# Patient Record
Sex: MALE | Race: WHITE | Employment: OTHER | URBAN - METROPOLITAN AREA
[De-identification: names, ages, dates, MRNs, and addresses within clinical notes are randomized per-mention and may not be internally consistent; named-entity substitution may affect disease eponyms.]

---

## 2020-10-15 ENCOUNTER — APPOINTMENT (OUTPATIENT)
Dept: CT IMAGING | Age: 45
End: 2020-10-15
Attending: EMERGENCY MEDICINE
Payer: SUBSIDIZED

## 2020-10-15 ENCOUNTER — HOSPITAL ENCOUNTER (EMERGENCY)
Age: 45
Discharge: HOME | End: 2020-10-15
Attending: EMERGENCY MEDICINE
Payer: SUBSIDIZED

## 2020-10-15 VITALS
OXYGEN SATURATION: 99 % | SYSTOLIC BLOOD PRESSURE: 145 MMHG | HEIGHT: 71 IN | HEART RATE: 72 BPM | TEMPERATURE: 98.1 F | BODY MASS INDEX: 26.6 KG/M2 | RESPIRATION RATE: 16 BRPM | WEIGHT: 190 LBS | DIASTOLIC BLOOD PRESSURE: 78 MMHG

## 2020-10-15 DIAGNOSIS — N20.0 KIDNEY STONE: ICD-10-CM

## 2020-10-15 DIAGNOSIS — D49.0 GASTRIC TUMOR: Primary | ICD-10-CM

## 2020-10-15 LAB
ALBUMIN SERPL-MCNC: 3.8 G/DL (ref 3.5–5)
ALBUMIN/GLOB SERPL: 1 {RATIO} (ref 1.2–3.5)
ALP SERPL-CCNC: 102 U/L (ref 50–136)
ALT SERPL-CCNC: 34 U/L (ref 12–65)
ANION GAP SERPL CALC-SCNC: 11 MMOL/L (ref 7–16)
AST SERPL-CCNC: 47 U/L (ref 15–37)
BACTERIA URNS QL MICRO: ABNORMAL /HPF
BASOPHILS # BLD: 0 K/UL (ref 0–0.2)
BASOPHILS NFR BLD: 0 % (ref 0–2)
BILIRUB SERPL-MCNC: 0.9 MG/DL (ref 0.2–1.1)
BUN SERPL-MCNC: 16 MG/DL (ref 6–23)
CALCIUM SERPL-MCNC: 9.6 MG/DL (ref 8.3–10.4)
CASTS URNS QL MICRO: 0 /LPF
CHLORIDE SERPL-SCNC: 101 MMOL/L (ref 98–107)
CO2 SERPL-SCNC: 25 MMOL/L (ref 21–32)
CREAT SERPL-MCNC: 1.1 MG/DL (ref 0.8–1.5)
CRYSTALS URNS QL MICRO: 0 /LPF
DIFFERENTIAL METHOD BLD: ABNORMAL
EOSINOPHIL # BLD: 0 K/UL (ref 0–0.8)
EOSINOPHIL NFR BLD: 0 % (ref 0.5–7.8)
EPI CELLS #/AREA URNS HPF: 0 /HPF
ERYTHROCYTE [DISTWIDTH] IN BLOOD BY AUTOMATED COUNT: 11.9 % (ref 11.9–14.6)
GLOBULIN SER CALC-MCNC: 3.9 G/DL (ref 2.3–3.5)
GLUCOSE SERPL-MCNC: 110 MG/DL (ref 65–100)
HCT VFR BLD AUTO: 40.5 % (ref 41.1–50.3)
HGB BLD-MCNC: 14.2 G/DL (ref 13.6–17.2)
IMM GRANULOCYTES # BLD AUTO: 0.1 K/UL (ref 0–0.5)
IMM GRANULOCYTES NFR BLD AUTO: 0 % (ref 0–5)
LYMPHOCYTES # BLD: 0.7 K/UL (ref 0.5–4.6)
LYMPHOCYTES NFR BLD: 6 % (ref 13–44)
MCH RBC QN AUTO: 29 PG (ref 26.1–32.9)
MCHC RBC AUTO-ENTMCNC: 35.1 G/DL (ref 31.4–35)
MCV RBC AUTO: 82.7 FL (ref 79.6–97.8)
MONOCYTES # BLD: 0.8 K/UL (ref 0.1–1.3)
MONOCYTES NFR BLD: 7 % (ref 4–12)
MUCOUS THREADS URNS QL MICRO: ABNORMAL /LPF
NEUTS SEG # BLD: 10 K/UL (ref 1.7–8.2)
NEUTS SEG NFR BLD: 86 % (ref 43–78)
NRBC # BLD: 0 K/UL (ref 0–0.2)
PLATELET # BLD AUTO: 388 K/UL (ref 150–450)
PMV BLD AUTO: 9.8 FL (ref 9.4–12.3)
POTASSIUM SERPL-SCNC: 3.6 MMOL/L (ref 3.5–5.1)
PROT SERPL-MCNC: 7.7 G/DL (ref 6.3–8.2)
RBC # BLD AUTO: 4.9 M/UL (ref 4.23–5.6)
RBC #/AREA URNS HPF: >100 /HPF
SODIUM SERPL-SCNC: 137 MMOL/L (ref 136–145)
WBC # BLD AUTO: 11.6 K/UL (ref 4.3–11.1)
WBC URNS QL MICRO: ABNORMAL /HPF
YEAST URNS QL MICRO: ABNORMAL

## 2020-10-15 PROCEDURE — 85025 COMPLETE CBC W/AUTO DIFF WBC: CPT

## 2020-10-15 PROCEDURE — 99283 EMERGENCY DEPT VISIT LOW MDM: CPT

## 2020-10-15 PROCEDURE — 80053 COMPREHEN METABOLIC PANEL: CPT

## 2020-10-15 PROCEDURE — 96374 THER/PROPH/DIAG INJ IV PUSH: CPT

## 2020-10-15 PROCEDURE — 74011250637 HC RX REV CODE- 250/637: Performed by: EMERGENCY MEDICINE

## 2020-10-15 PROCEDURE — 74176 CT ABD & PELVIS W/O CONTRAST: CPT

## 2020-10-15 PROCEDURE — 81015 MICROSCOPIC EXAM OF URINE: CPT

## 2020-10-15 PROCEDURE — 74011250636 HC RX REV CODE- 250/636: Performed by: EMERGENCY MEDICINE

## 2020-10-15 PROCEDURE — 96375 TX/PRO/DX INJ NEW DRUG ADDON: CPT

## 2020-10-15 PROCEDURE — 99284 EMERGENCY DEPT VISIT MOD MDM: CPT

## 2020-10-15 RX ORDER — HYDROMORPHONE HYDROCHLORIDE 1 MG/ML
1 INJECTION, SOLUTION INTRAMUSCULAR; INTRAVENOUS; SUBCUTANEOUS
Status: COMPLETED | OUTPATIENT
Start: 2020-10-15 | End: 2020-10-15

## 2020-10-15 RX ORDER — OXYCODONE AND ACETAMINOPHEN 7.5; 325 MG/1; MG/1
1 TABLET ORAL
Status: COMPLETED | OUTPATIENT
Start: 2020-10-15 | End: 2020-10-15

## 2020-10-15 RX ORDER — ONDANSETRON 8 MG/1
8 TABLET, ORALLY DISINTEGRATING ORAL
Status: COMPLETED | OUTPATIENT
Start: 2020-10-15 | End: 2020-10-15

## 2020-10-15 RX ORDER — ONDANSETRON 4 MG/1
4 TABLET, ORALLY DISINTEGRATING ORAL
Qty: 15 TAB | Refills: 0 | Status: SHIPPED | OUTPATIENT
Start: 2020-10-15 | End: 2021-05-05

## 2020-10-15 RX ORDER — OXYCODONE AND ACETAMINOPHEN 5; 325 MG/1; MG/1
1 TABLET ORAL
Qty: 15 TAB | Refills: 0 | Status: SHIPPED | OUTPATIENT
Start: 2020-10-15 | End: 2020-10-20

## 2020-10-15 RX ORDER — KETOROLAC TROMETHAMINE 30 MG/ML
30 INJECTION, SOLUTION INTRAMUSCULAR; INTRAVENOUS
Status: COMPLETED | OUTPATIENT
Start: 2020-10-15 | End: 2020-10-15

## 2020-10-15 RX ADMIN — HYDROMORPHONE HYDROCHLORIDE 1 MG: 1 INJECTION, SOLUTION INTRAMUSCULAR; INTRAVENOUS; SUBCUTANEOUS at 14:39

## 2020-10-15 RX ADMIN — KETOROLAC TROMETHAMINE 30 MG: 30 INJECTION, SOLUTION INTRAMUSCULAR at 16:48

## 2020-10-15 RX ADMIN — ONDANSETRON 8 MG: 8 TABLET, ORALLY DISINTEGRATING ORAL at 13:41

## 2020-10-15 RX ADMIN — SODIUM CHLORIDE 1000 ML: 900 INJECTION, SOLUTION INTRAVENOUS at 14:39

## 2020-10-15 RX ADMIN — OXYCODONE HYDROCHLORIDE AND ACETAMINOPHEN 1 TABLET: 7.5; 325 TABLET ORAL at 16:32

## 2020-10-15 NOTE — ED NOTES
I have reviewed discharge instructions with the patient. The patient verbalized understanding. Patient left ED via Discharge Method: ambulatory to Home with spouse. Opportunity for questions and clarification provided. Patient given 2 scripts. To continue your aftercare when you leave the hospital, you may receive an automated call from our care team to check in on how you are doing. This is a free service and part of our promise to provide the best care and service to meet your aftercare needs.  If you have questions, or wish to unsubscribe from this service please call [phone]. Thank you for Choosing our [facility] Emergency Department.

## 2020-10-15 NOTE — ED PROVIDER NOTES
Mask was worn during the entire patient examination. Darshana Clark is a 41 y.o. male who presents to the ED with a chief complaint of right flank pain. Patient states that started just couple hours ago and has been constant since. Is mainly in the right lower flank and right lower quadrant. Pain is a 7 out of 10. No radiation anywhere else. He then began to have gross hematuria. He states he has never had a kidney stone before. He denies any fevers or chills. Flank Pain    Associated symptoms include abdominal pain. Pertinent negatives include no chest pain, no fever and no dysuria. History reviewed. No pertinent past medical history. History reviewed. No pertinent surgical history. History reviewed. No pertinent family history.     Social History     Socioeconomic History    Marital status:      Spouse name: Not on file    Number of children: Not on file    Years of education: Not on file    Highest education level: Not on file   Occupational History    Not on file   Social Needs    Financial resource strain: Not on file    Food insecurity     Worry: Not on file     Inability: Not on file    Transportation needs     Medical: Not on file     Non-medical: Not on file   Tobacco Use    Smoking status: Never Smoker    Smokeless tobacco: Never Used   Substance and Sexual Activity    Alcohol use: Yes     Comment: occasional    Drug use: Never    Sexual activity: Not on file   Lifestyle    Physical activity     Days per week: Not on file     Minutes per session: Not on file    Stress: Not on file   Relationships    Social connections     Talks on phone: Not on file     Gets together: Not on file     Attends [employer] service: Not on file     Active member of club or organization: Not on file     Attends meetings of clubs or organizations: Not on file     Relationship status: Not on file    Intimate partner violence     Fear of current or ex partner: Not on file Emotionally abused: Not on file     Physically abused: Not on file     Forced sexual activity: Not on file   Other Topics Concern    Not on file   Social History Narrative    Not on file         ALLERGIES: Patient has no known allergies. Review of Systems   Constitutional: Negative. Negative for chills, fatigue and fever. Respiratory: Negative for chest tightness, shortness of breath, wheezing and stridor. Cardiovascular: Negative for chest pain, palpitations and leg swelling. Gastrointestinal: Positive for abdominal pain. Negative for diarrhea, nausea and vomiting. Genitourinary: Positive for flank pain, frequency and hematuria. Negative for difficulty urinating, dysuria, enuresis and genital sores. Musculoskeletal: Negative for arthralgias, joint swelling, myalgias, neck pain and neck stiffness. Skin: Negative for color change, rash and wound. Psychiatric/Behavioral: Negative for agitation. All other systems reviewed and are negative. Vitals:    10/15/20 1331   BP: (!) 145/78   Pulse: 72   Resp: 16   Temp: 98.1 °F (36.7 °C)   SpO2: 99%   Weight: 86.2 kg (190 lb)   Height: 5' 11\" (1.803 m)            Physical Exam  Vitals signs and nursing note reviewed. Constitutional:       General: He is not in acute distress. Appearance: He is well-developed. He is not ill-appearing, toxic-appearing or diaphoretic. HENT:      Head: Normocephalic and atraumatic. Eyes:      General: No scleral icterus. Conjunctiva/sclera: Conjunctivae normal.   Neck:      Musculoskeletal: Normal range of motion and neck supple. Trachea: No tracheal deviation. Cardiovascular:      Rate and Rhythm: Normal rate and regular rhythm. Pulmonary:      Effort: Pulmonary effort is normal. No respiratory distress. Breath sounds: No stridor. No wheezing, rhonchi or rales. Chest:      Chest wall: No tenderness. Abdominal:      Tenderness:  There is abdominal tenderness (right sided abdominal and flank pain is not reproduciple on reevaluation. ). There is no guarding or rebound. Hernia: No hernia is present. Skin:     Capillary Refill: Capillary refill takes less than 2 seconds. Neurological:      General: No focal deficit present. Mental Status: He is alert and oriented to person, place, and time. Mental status is at baseline. Cranial Nerves: No cranial nerve deficit. Sensory: No sensory deficit. Motor: No weakness. Coordination: Coordination normal.   Psychiatric:         Mood and Affect: Mood normal.         Behavior: Behavior normal.          MDM  Number of Diagnoses or Management Options  Gastric tumor:   Kidney stone:   Diagnosis management comments: Patient looks like he has recently passed right ureteral stone. Unfortunately looks like he has incidental finding of stomach cancer with metastasis to the liver. He does report some weight loss and some trouble swallowing. I have called oncology and discussed case with Dr. Xavier Bai he will see him on Monday at 1020 in the morning. I have also spoken with his family on the phone and we have discussed all of there current questions. Aime Roberts MD; 10/15/2020 @4:51 PM Voice dictation software was used during the making of this note. This software is not perfect and grammatical and other typographical errors may be present.   This note has not been proofread for errors.  ===================================================================          Amount and/or Complexity of Data Reviewed  Clinical lab tests: ordered and reviewed (Results for orders placed or performed during the hospital encounter of 10/15/20  -CBC WITH AUTOMATED DIFF       Result                      Value             Ref Range           WBC                         11.6 (H)          4.3 - 11.1 K*       RBC                         4.90              4.23 - 5.6 M*       HGB                         14.2              13.6 - 17.2 *       HCT 40.5 (L)          41.1 - 50.3 %       MCV                         82.7              79.6 - 97.8 *       MCH                         29.0              26.1 - 32.9 *       MCHC                        35.1 (H)          31.4 - 35.0 *       RDW                         11.9              11.9 - 14.6 %       PLATELET                    388               150 - 450 K/*       MPV                         9.8               9.4 - 12.3 FL       ABSOLUTE NRBC               0.00              0.0 - 0.2 K/*       DF                          AUTOMATED                             NEUTROPHILS                 86 (H)            43 - 78 %           LYMPHOCYTES                 6 (L)             13 - 44 %           MONOCYTES                   7                 4.0 - 12.0 %        EOSINOPHILS                 0 (L)             0.5 - 7.8 %         BASOPHILS                   0                 0.0 - 2.0 %         IMMATURE GRANULOCYTES       0                 0.0 - 5.0 %         ABS. NEUTROPHILS            10.0 (H)          1.7 - 8.2 K/*       ABS. LYMPHOCYTES            0.7               0.5 - 4.6 K/*       ABS. MONOCYTES              0.8               0.1 - 1.3 K/*       ABS. EOSINOPHILS            0.0               0.0 - 0.8 K/*       ABS. BASOPHILS              0.0               0.0 - 0.2 K/*       ABS. IMM.  GRANS.            0.1               0.0 - 0.5 K/*  -METABOLIC PANEL, COMPREHENSIVE       Result                      Value             Ref Range           Sodium                      137               136 - 145 mm*       Potassium                   3.6               3.5 - 5.1 mm*       Chloride                    101               98 - 107 mmo*       CO2                         25                21 - 32 mmol*       Anion gap                   11                7 - 16 mmol/L       Glucose                     110 (H)           65 - 100 mg/*       BUN                         16                6 - 23 MG/DL        Creatinine 1.10              0.8 - 1.5 MG*       GFR est AA                  >60               >60 ml/min/1*       GFR est non-AA              >60               >60 ml/min/1*       Calcium                     9.6               8.3 - 10.4 M*       Bilirubin, total            0.9               0.2 - 1.1 MG*       ALT (SGPT)                  34                12 - 65 U/L         AST (SGOT)                  47 (H)            15 - 37 U/L         Alk. phosphatase            102               50 - 136 U/L        Protein, total              7.7               6.3 - 8.2 g/*       Albumin                     3.8               3.5 - 5.0 g/*       Globulin                    3.9 (H)           2.3 - 3.5 g/*       A-G Ratio                   1.0 (L)           1.2 - 3.5      -URINE MICROSCOPIC       Result                      Value             Ref Range           WBC                         5-10              0 /hpf              RBC                         >100              0 /hpf              Epithelial cells            0                 0 /hpf              Bacteria                    1+ (H)            0 /hpf              Casts                       0                 0 /lpf              Crystals, urine             0                 0 /LPF              Mucus                       TRACE             0 /lpf              Yeast                       OCCASIONAL                      )  Tests in the radiology section of CPT®: ordered and reviewed (Ct Urogram Wo Cont    Result Date: 10/15/2020  CT OF THE ABDOMEN AND PELVIS WITHOUT IV CONTRAST INDICATION: right flank. COMPARISON: None available. TECHNIQUE: Multiple axial images were obtained through the abdomen and pelvis without IV contrast. Radiation dose reduction techniques were used for this study. Our CT scanners use one or all of the following: Automated exposure control, adjustment of the mA and/or kV according to patient size, iterative reconstruction.  FINDINGS: Visualized thorax: Normal. Liver: The liver demonstrates multiple large hypoattenuating masses, the largest centered in segment 6 measuring 10.0 x 8.7 cm. Partially imaged lesion in the left hepatic lobe measuring 6.6 x 2.8 cm. Gallbladder/biliary: Normal gallbladder. No biliary dilatation. Pancreas: Normal. Spleen: Normal. Adrenals: Normal. Kidneys: Small bilateral nonobstructing renal calculi measuring up to 4 mm in the right inferior pole. There is mild right hydroureter but no definite radiopaque ureteral stone is evident. A calcification in the right pelvis likely reflects an intravascular phleboliths. Bladder: Normal. Pelvic organs: Unremarkable prostate and seminal vesicles. Gastrointestinal: There is a large mass partially imaged, which appears to be centered along the greater curvature of the stomach, involving the gastric body, fundus, and cardia in likely the gastroesophageal junction. The mass is ill-defined but measures approximately 8.6 x 7.5 cm. The mass invades into the lesser omentum and may focally contacts the left diaphragmatic hellen. Peritoneum/retroperitoneum: No free fluid or worrisome peritoneal nodule. Invasion of the lesser omentum by the gastric mass, as above. Small bilateral fat-containing inguinal hernias. Lymph nodes: Retroperitoneal lymphadenopathy, including a lesser omentum metastatic node measuring 6.4 x 3.7 cm, a 2.2 x 1.5 cm retrocaval lymph node, and a 1.7 cm short axis left para-aortic lymph node. Vessels: Unremarkable noncontrast appearance. Bones/Soft tissues: No aggressive osseous lesion. IMPRESSION: 1. Large mass centered in the lesser curvature the stomach measuring up to 8.6 cm invading into the lesser omentum concerning for a primary gastric cancer. 2.  Multiple large hypoattenuating hepatic metastases. 3.  Retroperitoneal metastatic adenopathy. 4.  Small nonobstructing bilateral renal calculi measuring up to 4 mm in the right.  There is mild right hydroureter but no obstructing stone is evident. Findings could indicate a recently passed or nonradiopaque stone.  689 Above findings discussed with Dr. Ishaan Oliver by myself at the time of interpretation.   )  Discuss the patient with other providers: yes           Procedures

## 2020-10-15 NOTE — ED TRIAGE NOTES
Patient with mask on during triage, sharp pain to right flank area and obvious blood in urine. Patient advises pain causing some nausea, states some decrease in urine at this time, brought same in from home and gross blood present.

## 2020-10-15 NOTE — DISCHARGE INSTRUCTIONS
Please followup with Dr. Martell Haynes on Monday at 10:20 as we discussed to work up the stomache tumor we discussed.

## 2020-10-19 ENCOUNTER — HOSPITAL ENCOUNTER (OUTPATIENT)
Dept: LAB | Age: 45
Discharge: HOME | End: 2020-10-19
Payer: SUBSIDIZED

## 2020-10-19 DIAGNOSIS — D49.0 GASTRIC TUMOR: ICD-10-CM

## 2020-10-19 LAB
ALBUMIN SERPL-MCNC: 3.5 G/DL (ref 3.5–5)
ALBUMIN/GLOB SERPL: 0.8 {RATIO} (ref 1.2–3.5)
ALP SERPL-CCNC: 95 U/L (ref 50–136)
ALT SERPL-CCNC: 51 U/L (ref 12–65)
ANION GAP SERPL CALC-SCNC: 5 MMOL/L (ref 7–16)
AST SERPL-CCNC: 51 U/L (ref 15–37)
BASOPHILS # BLD: 0 K/UL (ref 0–0.2)
BASOPHILS NFR BLD: 0 % (ref 0–2)
BILIRUB SERPL-MCNC: 0.6 MG/DL (ref 0.2–1.1)
BUN SERPL-MCNC: 11 MG/DL (ref 6–23)
CALCIUM SERPL-MCNC: 9.2 MG/DL (ref 8.3–10.4)
CANCER AG19-9 SERPL-ACNC: 156.3 U/ML (ref 2–37)
CEA SERPL-MCNC: 21.2 NG/ML (ref 0–3)
CHLORIDE SERPL-SCNC: 101 MMOL/L (ref 98–107)
CO2 SERPL-SCNC: 30 MMOL/L (ref 21–32)
CREAT SERPL-MCNC: 0.9 MG/DL (ref 0.8–1.5)
DIFFERENTIAL METHOD BLD: ABNORMAL
EOSINOPHIL # BLD: 0.1 K/UL (ref 0–0.8)
EOSINOPHIL NFR BLD: 2 % (ref 0.5–7.8)
ERYTHROCYTE [DISTWIDTH] IN BLOOD BY AUTOMATED COUNT: 11.9 % (ref 11.9–14.6)
GLOBULIN SER CALC-MCNC: 4.2 G/DL (ref 2.3–3.5)
GLUCOSE SERPL-MCNC: 94 MG/DL (ref 65–100)
HCT VFR BLD AUTO: 40.8 % (ref 41.1–50.3)
HGB BLD-MCNC: 13.8 G/DL (ref 13.6–17.2)
IMM GRANULOCYTES # BLD AUTO: 0 K/UL (ref 0–0.5)
IMM GRANULOCYTES NFR BLD AUTO: 0 % (ref 0–5)
LYMPHOCYTES # BLD: 1 K/UL (ref 0.5–4.6)
LYMPHOCYTES NFR BLD: 16 % (ref 13–44)
MCH RBC QN AUTO: 28.6 PG (ref 26.1–32.9)
MCHC RBC AUTO-ENTMCNC: 33.8 G/DL (ref 31.4–35)
MCV RBC AUTO: 84.6 FL (ref 79.6–97.8)
MONOCYTES # BLD: 0.9 K/UL (ref 0.1–1.3)
MONOCYTES NFR BLD: 14 % (ref 4–12)
NEUTS SEG # BLD: 4.4 K/UL (ref 1.7–8.2)
NEUTS SEG NFR BLD: 68 % (ref 43–78)
NRBC # BLD: 0 K/UL (ref 0–0.2)
PLATELET # BLD AUTO: 373 K/UL (ref 150–450)
PMV BLD AUTO: 9.8 FL (ref 9.4–12.3)
POTASSIUM SERPL-SCNC: 3.3 MMOL/L (ref 3.5–5.1)
PROT SERPL-MCNC: 7.7 G/DL (ref 6.3–8.2)
RBC # BLD AUTO: 4.82 M/UL (ref 4.23–5.67)
SODIUM SERPL-SCNC: 136 MMOL/L (ref 136–145)
WBC # BLD AUTO: 6.5 K/UL (ref 4.3–11.1)

## 2020-10-19 PROCEDURE — 80053 COMPREHEN METABOLIC PANEL: CPT

## 2020-10-19 PROCEDURE — 86301 IMMUNOASSAY TUMOR CA 19-9: CPT

## 2020-10-19 PROCEDURE — 82378 CARCINOEMBRYONIC ANTIGEN: CPT

## 2020-10-19 PROCEDURE — 85025 COMPLETE CBC W/AUTO DIFF WBC: CPT

## 2020-10-19 PROCEDURE — 36415 COLL VENOUS BLD VENIPUNCTURE: CPT

## 2020-10-20 NOTE — PROGRESS NOTES
I sat down with Mr and Mrs Fang Pedersen per Dr. Geovanni Sagastume request to go over assistance opportunities to help with the financial side of treatment. Mr. Fang Pedersen does not have any insurance. I went over the hospital financial assistance application. I went through the application, income documentation needed, what the application applies to, determination timeframe, and how the effective dates work. I let them know that the would be contacted by [facility] to screen him for SSD, Medicaid, and could help him with [employer] enrollment. I also let them know that our , Luca Holman, would be getting into contact with them about other resources on the home front. I also went over the LPOA document that authorizes a [facility] representative to enroll him into an assistance opportunity. I went over the document with . Jean-Claude Cruz. He agreed to sign it and did. LPOA will be scanned into chart. Patient expressed understanding of the above information and all questions were answered to his satisfaction.

## 2020-10-21 ENCOUNTER — HOSPITAL ENCOUNTER (OUTPATIENT)
Dept: CT IMAGING | Age: 45
Discharge: HOME | End: 2020-10-21
Attending: INTERNAL MEDICINE
Payer: SUBSIDIZED

## 2020-10-21 DIAGNOSIS — D49.0 GASTRIC TUMOR: ICD-10-CM

## 2020-10-21 PROCEDURE — 74177 CT ABD & PELVIS W/CONTRAST: CPT

## 2020-10-21 PROCEDURE — 74011000636 HC RX REV CODE- 636: Performed by: INTERNAL MEDICINE

## 2020-10-21 PROCEDURE — 74011000258 HC RX REV CODE- 258: Performed by: INTERNAL MEDICINE

## 2020-10-21 RX ORDER — SODIUM CHLORIDE 0.9 % (FLUSH) 0.9 %
10 SYRINGE (ML) INJECTION
Status: COMPLETED | OUTPATIENT
Start: 2020-10-21 | End: 2020-10-21

## 2020-10-21 RX ADMIN — DIATRIZOATE MEGLUMINE AND DIATRIZOATE SODIUM 15 ML: 660; 100 LIQUID ORAL; RECTAL at 12:41

## 2020-10-21 RX ADMIN — SODIUM CHLORIDE 100 ML: 900 INJECTION, SOLUTION INTRAVENOUS at 12:41

## 2020-10-21 RX ADMIN — IOPAMIDOL 100 ML: 755 INJECTION, SOLUTION INTRAVENOUS at 12:41

## 2020-10-21 RX ADMIN — Medication 10 ML: at 12:41

## 2020-10-22 ENCOUNTER — HOSPITAL ENCOUNTER (OUTPATIENT)
Dept: ULTRASOUND IMAGING | Age: 45
Discharge: HOME | End: 2020-10-22
Attending: INTERNAL MEDICINE
Payer: SUBSIDIZED

## 2020-10-22 VITALS
HEIGHT: 71 IN | RESPIRATION RATE: 12 BRPM | TEMPERATURE: 99 F | HEART RATE: 75 BPM | OXYGEN SATURATION: 97 % | SYSTOLIC BLOOD PRESSURE: 125 MMHG | BODY MASS INDEX: 26.6 KG/M2 | WEIGHT: 190 LBS | DIASTOLIC BLOOD PRESSURE: 71 MMHG

## 2020-10-22 DIAGNOSIS — D49.0 GASTRIC TUMOR: ICD-10-CM

## 2020-10-22 DIAGNOSIS — R16.0 LIVER MASS: ICD-10-CM

## 2020-10-22 PROCEDURE — 47000 NEEDLE BIOPSY OF LIVER PERQ: CPT

## 2020-10-22 PROCEDURE — 88307 TISSUE EXAM BY PATHOLOGIST: CPT

## 2020-10-22 PROCEDURE — 99152 MOD SED SAME PHYS/QHP 5/>YRS: CPT

## 2020-10-22 PROCEDURE — 74011250636 HC RX REV CODE- 250/636: Performed by: RADIOLOGY

## 2020-10-22 PROCEDURE — 74011000250 HC RX REV CODE- 250: Performed by: RADIOLOGY

## 2020-10-22 PROCEDURE — 77030014007 HC SPNG HEMSTAT J&J -B

## 2020-10-22 RX ORDER — FENTANYL CITRATE 50 UG/ML
25-50 INJECTION, SOLUTION INTRAMUSCULAR; INTRAVENOUS
Status: DISCONTINUED | OUTPATIENT
Start: 2020-10-22 | End: 2020-10-26 | Stop reason: HOSPADM

## 2020-10-22 RX ORDER — MORPHINE SULFATE 10 MG/ML
1 INJECTION, SOLUTION INTRAMUSCULAR; INTRAVENOUS
Status: DISCONTINUED | OUTPATIENT
Start: 2020-10-22 | End: 2020-10-26 | Stop reason: HOSPADM

## 2020-10-22 RX ORDER — HYDROCODONE BITARTRATE AND ACETAMINOPHEN 7.5; 325 MG/1; MG/1
1 TABLET ORAL
Status: DISCONTINUED | OUTPATIENT
Start: 2020-10-22 | End: 2020-10-26 | Stop reason: HOSPADM

## 2020-10-22 RX ORDER — DIPHENHYDRAMINE HYDROCHLORIDE 50 MG/ML
12.5-5 INJECTION, SOLUTION INTRAMUSCULAR; INTRAVENOUS ONCE
Status: COMPLETED | OUTPATIENT
Start: 2020-10-22 | End: 2020-10-22

## 2020-10-22 RX ORDER — IBUPROFEN 200 MG
600 TABLET ORAL
Status: DISCONTINUED | OUTPATIENT
Start: 2020-10-22 | End: 2020-10-26 | Stop reason: HOSPADM

## 2020-10-22 RX ORDER — SODIUM CHLORIDE 9 MG/ML
150 INJECTION, SOLUTION INTRAVENOUS CONTINUOUS
Status: DISCONTINUED | OUTPATIENT
Start: 2020-10-22 | End: 2020-10-23 | Stop reason: HOSPADM

## 2020-10-22 RX ORDER — MIDAZOLAM HYDROCHLORIDE 1 MG/ML
.5-2 INJECTION, SOLUTION INTRAMUSCULAR; INTRAVENOUS
Status: DISCONTINUED | OUTPATIENT
Start: 2020-10-22 | End: 2020-10-26 | Stop reason: HOSPADM

## 2020-10-22 RX ORDER — SODIUM CHLORIDE 9 MG/ML
25 INJECTION, SOLUTION INTRAVENOUS ONCE
Status: COMPLETED | OUTPATIENT
Start: 2020-10-22 | End: 2020-10-22

## 2020-10-22 RX ORDER — LIDOCAINE HYDROCHLORIDE 20 MG/ML
0.2 INJECTION, SOLUTION INFILTRATION; PERINEURAL ONCE
Status: COMPLETED | OUTPATIENT
Start: 2020-10-22 | End: 2020-10-22

## 2020-10-22 RX ADMIN — FENTANYL CITRATE 50 MCG: 50 INJECTION, SOLUTION INTRAMUSCULAR; INTRAVENOUS at 08:25

## 2020-10-22 RX ADMIN — DIPHENHYDRAMINE HYDROCHLORIDE 50 MG: 50 INJECTION, SOLUTION INTRAMUSCULAR; INTRAVENOUS at 08:25

## 2020-10-22 RX ADMIN — FENTANYL CITRATE 50 MCG: 50 INJECTION, SOLUTION INTRAMUSCULAR; INTRAVENOUS at 08:35

## 2020-10-22 RX ADMIN — LIDOCAINE HYDROCHLORIDE 4 MG: 20 INJECTION, SOLUTION INFILTRATION; PERINEURAL at 08:38

## 2020-10-22 RX ADMIN — SODIUM CHLORIDE 25 ML/HR: 900 INJECTION, SOLUTION INTRAVENOUS at 08:20

## 2020-10-22 RX ADMIN — MIDAZOLAM 1 MG: 1 INJECTION INTRAMUSCULAR; INTRAVENOUS at 08:25

## 2020-10-22 RX ADMIN — MIDAZOLAM 1 MG: 1 INJECTION INTRAMUSCULAR; INTRAVENOUS at 08:35

## 2020-10-22 NOTE — H&P
Department of Interventional Radiology  [phone]    History and Physical    Patient:  Weber Rubinstein MRN:  [id number]  SSN:  [id number]    YOB: 1975  Age:  41 y.o. Sex:  male      Primary Care Provider:  None  Referring Physician:  Pebbles Sheets MD    Subjective:     Chief Complaint: biposy    History of the Present Illness: The patient is a 41 y.o. male with newly diagnosed gastric cancer who presents for biopsy of one of the liver lesions. He presented to the ED recently with kidney stone related complaints. CT scan revealed gastric tumor with liver mets. No pain today. Gross hematuria has resolved. NPO. History reviewed. No pertinent past medical history. Past Surgical History:   Procedure Laterality Date    HX ORTHOPAEDIC      ankle    HX TONSILLECTOMY      lazy eye        Review of Systems:    Pertinent items are noted in the History of Present Illness. Prior to Admission medications    Medication Sig Start Date End Date Taking? Authorizing Provider   ondansetron (ZOFRAN ODT) 4 mg disintegrating tablet Take 1 Tab by mouth every eight (8) hours as needed for Nausea.  10/15/20   Atul Grayson MD        No Known Allergies    Family History   Problem Relation Age of Onset    Dementia Mother     Depression Mother     Diabetes Mother    [address] [facility] Schizophrenia Mother      Social History     Tobacco Use    Smoking status: Never Smoker    Smokeless tobacco: Never Used   Substance Use Topics    Alcohol use: Yes     Comment: occasional        Objective:       Physical Examination:    Vitals:    10/22/20 0730   BP: 136/85   Pulse: 80   Resp: 16   Temp: 99 °F (37.2 °C)   SpO2: 95%   Weight: 86.2 kg (190 lb)   Height: 5' 11\" (1.803 m)       Pain Assessment  Pain Intensity 1: 0 (10/22/20 0730)        Patient Stated Pain Goal: 0      HEART: regular rate and rhythm  LUNG: clear to auscultation bilaterally  ABDOMEN: soft, nontender  EXTREMITIES: warm, no edema    Laboratory:     Lab Results   Component Value Date/Time    Sodium 136 10/19/2020 10:38 AM    Sodium 137 10/15/2020 02:41 PM    Potassium 3.3 (L) 10/19/2020 10:38 AM    Potassium 3.6 10/15/2020 02:41 PM    Chloride 101 10/19/2020 10:38 AM    Chloride 101 10/15/2020 02:41 PM    CO2 30 10/19/2020 10:38 AM    CO2 25 10/15/2020 02:41 PM    Anion gap 5 (L) 10/19/2020 10:38 AM    Anion gap 11 10/15/2020 02:41 PM    Glucose 94 10/19/2020 10:38 AM    Glucose 110 (H) 10/15/2020 02:41 PM    BUN 11 10/19/2020 10:38 AM    BUN 16 10/15/2020 02:41 PM    Creatinine 0.90 10/19/2020 10:38 AM    Creatinine 1.10 10/15/2020 02:41 PM    GFR est AA >60 10/19/2020 10:38 AM    GFR est AA >60 10/15/2020 02:41 PM    GFR est non-AA >60 10/19/2020 10:38 AM    GFR est non-AA >60 10/15/2020 02:41 PM    Calcium 9.2 10/19/2020 10:38 AM    Calcium 9.6 10/15/2020 02:41 PM    Albumin 3.5 10/19/2020 10:38 AM    Albumin 3.8 10/15/2020 02:41 PM    Protein, total 7.7 10/19/2020 10:38 AM    Protein, total 7.7 10/15/2020 02:41 PM    Globulin 4.2 (H) 10/19/2020 10:38 AM    Globulin 3.9 (H) 10/15/2020 02:41 PM    A-G Ratio 0.8 (L) 10/19/2020 10:38 AM    A-G Ratio 1.0 (L) 10/15/2020 02:41 PM    ALT (SGPT) 51 10/19/2020 10:38 AM    ALT (SGPT) 34 10/15/2020 02:41 PM     Lab Results   Component Value Date/Time    WBC 6.5 10/19/2020 10:38 AM    WBC 11.6 (H) 10/15/2020 02:41 PM    HGB 13.8 10/19/2020 10:38 AM    HGB 14.2 10/15/2020 02:41 PM    HCT 40.8 (L) 10/19/2020 10:38 AM    HCT 40.5 (L) 10/15/2020 02:41 PM    PLATELET 994 85/71/2021 10:38 AM    PLATELET 569 06/77/5573 02:41 PM     No results found for: APTT, PTP, INR, INREXT    Assessment:     Gastric cancer    Hospital Problems  Date Reviewed: 10/19/2020    None          Plan:     Planned Procedure:  Liver mets. Risks, benefits, and alternatives reviewed with patient and he agrees to proceed with the procedure.       Signed By: Kalyani Samaniego PA-C     October 22, 2020

## 2020-10-22 NOTE — PROGRESS NOTES
TRANSFER - OUT REPORT:    Verbal report given to Sonia Hwang on Cirilo Wallace  being transferred to IR room #2 for routine progression of care       Report consisted of patients Situation, Background, Assessment and   Recommendations(SBAR). Information from the following report(s) SBAR, Kardex, Procedure Summary and MAR was reviewed with the receiving nurse. Lines:   Peripheral IV 10/21/20 Right Antecubital (Active)       Peripheral IV 10/22/20 Left Antecubital (Active)        Opportunity for questions and clarification was provided.       Patient transported with:   Registered Nurse

## 2020-10-22 NOTE — PROCEDURES
Department of Interventional Radiology  [phone]        Interventional Radiology Brief Procedure Note    Patient: Eric Siegel MRN: [id number]  SSN: [id number]    YOB: 1975  Age: 41 y.o. Sex: male      Date of Procedure: 10/22/2020    Pre-Procedure Diagnosis: Gastric cancer w Hepatic metastases. Post-Procedure Diagnosis: SAME    Procedure(s): Image Guided Biopsy    Brief Description of Procedure: Left Lobe Liver Mass core bx    Performed By: Zuly Yee MD     Assistants: None    Anesthesia:Moderate Sedation    Estimated Blood Loss: Less than 10ml    Specimens:  Pathology    Implants:  None    Findings: Multiple masses.      Complications: None    Recommendations: 1 hour bedrest     Follow Up: Dr Nick Brian    Signed By: Zuly Yee MD     October 22, 2020

## 2020-10-22 NOTE — DISCHARGE INSTRUCTIONS
[address]  Department of Interventional Radiology  [facility]  [phone] Office  [phone] Fax    BIOPSY DISCHARGE INSTRUCTIONS    General Instructions:     A biopsy is the removal of a small piece of tissue for microscopic examination or testing. Healthy tissue can be obtained for the purpose of tissue-type matching for transplants. Unhealthy tissues are more commonly biopsied to diagnose disease. Lung Biopsy:     A needle lung biopsy is performed when there is a mass discovered in the lung area. The most serious risk is infection, bleeding, and pneumothorax (a collapsed lung). Signs of pneumothorax include shortness of breath, rapid heart rate, and blueness of the skin. If any of these occur, call 911. Liver Biopsy: This test helps detect cancer, infections, and the cause of an enlargement of the liver or elevated liver enzymes. It also helps to diagnose a number of liver diseases. The pain associated with the procedure may be felt in the shoulder. The risks include internal bleeding, pneumothorax, and injury to the surrounding organs. Renal Biopsy: This procedure is sometimes done to evaluate a transplanted kidney. It is also used to evaluate unexplained decrease in kidney function, blood, or protein in the urine. You may see bright red blood in the urine the first 24 hours after the test. If the bleeding lasts longer, you need to call your doctor. There is a risk of infection and bleeding into the muscle, which may cause soreness. Spinal Biopsy: This test is sometimes done in conjunction with other procedures. Your back will be sore, as we are taking a small sample of bone, which is slightly more difficult to sample than tissue. General Biopsy:     A mass can grow in any area of the body, and we are taking a specimen as ordered by your doctor. The risks are the same.  They include bleeding, pain, and infection. Home Care Instructions: You may resume your regular diet and medication regimen. Do not drink alcohol, drive, or make any important legal decisions in the next 24 hours. Do not lift anything heavier than a gallon of milk until the soreness goes away. You may use over the counter acetaminophen or ibuprofen for the soreness. You may apply an ice pack to the affected area for 20-30 minutes at time for the first 24 hours. After that, you may apply a heat pack. Call If: You should call your Physician and/or the Radiology Nurse if you have any questions or concerns about the biopsy site. Call if you should have increased pain, fever, redness, drainage, or bleeding more than a small spot on the bandage. Follow-Up Instructions: Please see your ordering doctor as he/she has requested. To Reach Us: If you have any questions about your procedure, please call the Interventional Radiology department at [phone]. After business hours (5pm) and weekends, call the answering service at [phone] and ask for the Radiologist on call to be paged. Si tiene Preguntas acerca del procedimiento, por favor llame al departamento de Radiología Intervencional al [phone]. Después de horas de oficina (5 pm) y los fines de [place], llamar al [place] [phone] y pregunte por el Radiologo de [facility]. Interventional Radiology General Nurse Discharge    After general anesthesia or intravenous sedation, for 24 hours or while taking prescription Narcotics:  · Limit your activities  · Do not drive and operate hazardous machinery  · Do not make important personal or business decisions  · Do  not drink alcoholic beverages  · If you have not urinated within 8 hours after discharge, please contact your surgeon on call. * Please give a list of your current medications to your Primary Care Provider.   * Please update this list whenever your medications are discontinued, doses are changed, or new medications (including over-the-counter products) are added. * Please carry medication information at all times in case of emergency situations. These are general instructions for a healthy lifestyle:    No smoking/ No tobacco products/ Avoid exposure to second hand smoke  Surgeon General's Warning:  Quitting smoking now greatly reduces serious risk to your health. Obesity, smoking, and sedentary lifestyle greatly increases your risk for illness  A healthy diet, regular physical exercise & weight monitoring are important for maintaining a healthy lifestyle    You may be retaining fluid if you have a history of heart failure or if you experience any of the following symptoms:  Weight gain of 3 pounds or more overnight or 5 pounds in a week, increased swelling in our hands or feet or shortness of breath while lying flat in bed. Please call your doctor as soon as you notice any of these symptoms; do not wait until your next office visit. Recognize signs and symptoms of STROKE:  F-face looks uneven    A-arms unable to move or move unevenly    S-speech slurred or non-existent    T-time-call 911 as soon as signs and symptoms begin-DO NOT go       Back to bed or wait to see if you get better-TIME IS BRAIN.       Patient Signature:  Date: 10/22/2020  Discharging Nurse: Amelia Burnett RN

## 2020-10-28 PROBLEM — C16.8 MALIGNANT NEOPLASM OF OVERLAPPING SITES OF STOMACH (HCC): Status: ACTIVE | Noted: 2020-10-28

## 2020-11-02 NOTE — PROGRESS NOTES
I spoke with Mr. Curtis Mtz regarding oral medications. I told him that if he ever had any problems getting his oral medications filled to give the dedicated [facility] , Nicolás Summers, a call. Next, I spoke with Mr. Curtis Mtz regarding enrolling with [facility]. I went over some of the services that [facility] and [facility] offers and the enrollment process. Mr. Curtis Mtz said he had already went by [facility] and enrolled. Next, I spoke with Mr. Curtis Mtz regarding the hospital financial assistance he was approved for. I let him know that if his income situation changes, a re-evaluation of the hospital assistance can be done. Next, I gave Mr. Gallardo flyers about the free Yoga classes for cancer survivors and the Oncology Massage program.  I let him know that he can get a free 30 minute massage. Lastly, I gave Mr. Curtis Mtz a form with various resource organizations that could assist with specific needs (example:  transportation, lodging, preparing meals, home cleaning)     Emailed Patient Referral form to the Arslan Alberts at [email or web address] [phone]. Form scanned into chart. Patient expressed understanding of the information above and all questions were answered to his satisfaction.

## 2020-11-03 ENCOUNTER — HOSPITAL ENCOUNTER (OUTPATIENT)
Dept: INTERVENTIONAL RADIOLOGY/VASCULAR | Age: 45
Discharge: HOME | End: 2020-11-03
Attending: INTERNAL MEDICINE
Payer: SUBSIDIZED

## 2020-11-03 VITALS
DIASTOLIC BLOOD PRESSURE: 59 MMHG | OXYGEN SATURATION: 91 % | HEART RATE: 101 BPM | RESPIRATION RATE: 16 BRPM | SYSTOLIC BLOOD PRESSURE: 125 MMHG

## 2020-11-03 DIAGNOSIS — Z79.899 NEED FOR PROPHYLACTIC CHEMOTHERAPY: ICD-10-CM

## 2020-11-03 PROCEDURE — 77030031139 HC SUT VCRL2 J&J -A

## 2020-11-03 PROCEDURE — 74011250636 HC RX REV CODE- 250/636: Performed by: RADIOLOGY

## 2020-11-03 PROCEDURE — 74011000250 HC RX REV CODE- 250: Performed by: RADIOLOGY

## 2020-11-03 PROCEDURE — C1788 PORT, INDWELLING, IMP: HCPCS

## 2020-11-03 PROCEDURE — 36561 INSERT TUNNELED CV CATH: CPT

## 2020-11-03 PROCEDURE — C1894 INTRO/SHEATH, NON-LASER: HCPCS

## 2020-11-03 PROCEDURE — 77030010507 HC ADH SKN DERMBND J&J -B

## 2020-11-03 PROCEDURE — 77030031131 HC SUT MXN P COVD -B

## 2020-11-03 RX ORDER — LIDOCAINE HYDROCHLORIDE AND EPINEPHRINE 20; 10 MG/ML; UG/ML
20-200 INJECTION, SOLUTION INFILTRATION; PERINEURAL ONCE
Status: COMPLETED | OUTPATIENT
Start: 2020-11-03 | End: 2020-11-03

## 2020-11-03 RX ORDER — FENTANYL CITRATE 50 UG/ML
12.5-1 INJECTION, SOLUTION INTRAMUSCULAR; INTRAVENOUS
Status: DISCONTINUED | OUTPATIENT
Start: 2020-11-03 | End: 2020-11-07 | Stop reason: HOSPADM

## 2020-11-03 RX ORDER — DIPHENHYDRAMINE HYDROCHLORIDE 50 MG/ML
12.5-5 INJECTION, SOLUTION INTRAMUSCULAR; INTRAVENOUS ONCE
Status: COMPLETED | OUTPATIENT
Start: 2020-11-03 | End: 2020-11-03

## 2020-11-03 RX ORDER — HEPARIN SODIUM (PORCINE) LOCK FLUSH IV SOLN 100 UNIT/ML 100 UNIT/ML
500 SOLUTION INTRAVENOUS ONCE
Status: COMPLETED | OUTPATIENT
Start: 2020-11-03 | End: 2020-11-03

## 2020-11-03 RX ORDER — MIDAZOLAM HYDROCHLORIDE 1 MG/ML
.25-2 INJECTION, SOLUTION INTRAMUSCULAR; INTRAVENOUS
Status: DISCONTINUED | OUTPATIENT
Start: 2020-11-03 | End: 2020-11-07 | Stop reason: HOSPADM

## 2020-11-03 RX ORDER — SODIUM CHLORIDE 9 MG/ML
25 INJECTION, SOLUTION INTRAVENOUS CONTINUOUS
Status: DISCONTINUED | OUTPATIENT
Start: 2020-11-03 | End: 2020-11-07 | Stop reason: HOSPADM

## 2020-11-03 RX ADMIN — MIDAZOLAM 1 MG: 1 INJECTION INTRAMUSCULAR; INTRAVENOUS at 10:36

## 2020-11-03 RX ADMIN — FENTANYL CITRATE 50 MCG: 50 INJECTION, SOLUTION INTRAMUSCULAR; INTRAVENOUS at 10:28

## 2020-11-03 RX ADMIN — SODIUM CHLORIDE 25 ML/HR: 900 INJECTION, SOLUTION INTRAVENOUS at 10:10

## 2020-11-03 RX ADMIN — HEPARIN SODIUM (PORCINE) LOCK FLUSH IV SOLN 100 UNIT/ML 500 UNITS: 100 SOLUTION at 10:47

## 2020-11-03 RX ADMIN — LIDOCAINE HYDROCHLORIDE AND EPINEPHRINE 160 MG: 20; 10 INJECTION, SOLUTION INFILTRATION; PERINEURAL at 10:45

## 2020-11-03 RX ADMIN — MIDAZOLAM 1 MG: 1 INJECTION INTRAMUSCULAR; INTRAVENOUS at 10:29

## 2020-11-03 RX ADMIN — DIPHENHYDRAMINE HYDROCHLORIDE 50 MG: 50 INJECTION, SOLUTION INTRAMUSCULAR; INTRAVENOUS at 10:20

## 2020-11-03 RX ADMIN — WATER 2 G: 1 INJECTION INTRAMUSCULAR; INTRAVENOUS; SUBCUTANEOUS at 10:00

## 2020-11-03 RX ADMIN — FENTANYL CITRATE 50 MCG: 50 INJECTION, SOLUTION INTRAMUSCULAR; INTRAVENOUS at 10:20

## 2020-11-03 RX ADMIN — MIDAZOLAM 1 MG: 1 INJECTION INTRAMUSCULAR; INTRAVENOUS at 10:20

## 2020-11-03 RX ADMIN — FENTANYL CITRATE 50 MCG: 50 INJECTION, SOLUTION INTRAMUSCULAR; INTRAVENOUS at 10:36

## 2020-11-03 NOTE — H&P
Department of Interventional Radiology  [phone]    History and Physical    Patient:  Ivana Eastman MRN:  [id number]  SSN:  [id number]    YOB: 1975  Age:  41 y.o. Sex:  male      Primary Care Provider:  None  Referring Physician:  Manjula Velazco MD    Subjective:     Chief Complaint: port    History of the Present Illness: The patient is a 41 y.o. male with metastatic gastric cancer who presents for venous chest port placement. NPO. History reviewed. No pertinent past medical history. Past Surgical History:   Procedure Laterality Date    HX ORTHOPAEDIC      ankle    HX TONSILLECTOMY      lazy eye        Review of Systems:    Pertinent items are noted in the History of Present Illness. Prior to Admission medications    Medication Sig Start Date End Date Taking? Authorizing Provider   mirtazapine (REMERON) 15 mg tablet Take 1 Tab by mouth nightly. 10/28/20  Yes Manjula Velazco MD   prochlorperazine (Compazine) 10 mg tablet Take 1 Tab by mouth every six (6) hours as needed for Nausea. Indications: nausea and vomiting caused by cancer drugs, nausea and vomiting 10/28/20   Manjula Velazco MD   ondansetron hcl (Zofran) 8 mg tablet Take 1 Tab by mouth every eight (8) hours as needed for Nausea. Indications: nausea and vomiting caused by cancer drugs 10/28/20   Manjula Velazco MD   lidocaine-prilocaine (EMLA) topical cream Apply  to affected area as needed for Pain. 10/28/20   Manjula Velazco MD   LORazepam (Ativan) 1 mg tablet Take 1 Tab by mouth every six (6) hours as needed (nausea). Max Daily Amount: 4 mg. Indications: nausea and vomiting caused by cancer drugs 10/28/20   Manjula Velazco MD   ondansetron [facility] ODT) 4 mg disintegrating tablet Take 1 Tab by mouth every eight (8) hours as needed for Nausea.  10/15/20   Vandana Grayson MD        No Known Allergies    Family History   Problem Relation Age of Onset   [facility] Dementia Mother     Depression Mother     Diabetes Mother    [facility] Schizophrenia Mother      Social History     Tobacco Use    Smoking status: Never Smoker    Smokeless tobacco: Never Used   Substance Use Topics    Alcohol use: Yes     Comment: occasional        Objective:       Physical Examination:    There were no vitals filed for this visit.     Pain Assessment  Pain Intensity 1: 0 (11/03/20 0848)               HEART: regular rate and rhythm  LUNG: clear to auscultation bilaterally  ABDOMEN: normal findings: soft, non-tender  EXTREMITIES: normal strength, tone, and muscle mass    Laboratory:     Lab Results   Component Value Date/Time    Sodium 136 10/19/2020 10:38 AM    Sodium 137 10/15/2020 02:41 PM    Potassium 3.3 (L) 10/19/2020 10:38 AM    Potassium 3.6 10/15/2020 02:41 PM    Chloride 101 10/19/2020 10:38 AM    Chloride 101 10/15/2020 02:41 PM    CO2 30 10/19/2020 10:38 AM    CO2 25 10/15/2020 02:41 PM    Anion gap 5 (L) 10/19/2020 10:38 AM    Anion gap 11 10/15/2020 02:41 PM    Glucose 94 10/19/2020 10:38 AM    Glucose 110 (H) 10/15/2020 02:41 PM    BUN 11 10/19/2020 10:38 AM    BUN 16 10/15/2020 02:41 PM    Creatinine 0.90 10/19/2020 10:38 AM    Creatinine 1.10 10/15/2020 02:41 PM    GFR est AA >60 10/19/2020 10:38 AM    GFR est AA >60 10/15/2020 02:41 PM    GFR est non-AA >60 10/19/2020 10:38 AM    GFR est non-AA >60 10/15/2020 02:41 PM    Calcium 9.2 10/19/2020 10:38 AM    Calcium 9.6 10/15/2020 02:41 PM    Albumin 3.5 10/19/2020 10:38 AM    Albumin 3.8 10/15/2020 02:41 PM    Protein, total 7.7 10/19/2020 10:38 AM    Protein, total 7.7 10/15/2020 02:41 PM    Globulin 4.2 (H) 10/19/2020 10:38 AM    Globulin 3.9 (H) 10/15/2020 02:41 PM    A-G Ratio 0.8 (L) 10/19/2020 10:38 AM    A-G Ratio 1.0 (L) 10/15/2020 02:41 PM    ALT (SGPT) 51 10/19/2020 10:38 AM    ALT (SGPT) 34 10/15/2020 02:41 PM     Lab Results   Component Value Date/Time    WBC 6.5 10/19/2020 10:38 AM    WBC 11.6 (H) 10/15/2020 02:41 PM    HGB 13.8 10/19/2020 10:38 AM    HGB 14.2 10/15/2020 02:41 PM    HCT 40.8 (L) 10/19/2020 10:38 AM    HCT 40.5 (L) 10/15/2020 02:41 PM    PLATELET 050 06/28/2504 10:38 AM    PLATELET 111 20/83/8953 02:41 PM     No results found for: APTT, PTP, INR, INREXT    Assessment:     Metastatic gastric cancer    Hospital Problems  Date Reviewed: 11/2/2020    None          Plan:     Planned Procedure:  Port placement    Risks, benefits, and alternatives reviewed with patient and he agrees to proceed with the procedure.       Signed By: Lalita Juan PA-C     November 3, 2020

## 2020-11-03 NOTE — DISCHARGE INSTRUCTIONS
[address]  Department of Interventional Radiology  [facility]  [phone] Office  [phone] Fax  Implanted Port Discharge Instructions      General Instructions:   A port is like an implanted IV. They are usually ordered for patients who will be getting chemotherapy, but can also be used as an IV for long term antibiotics, large amounts of fluids, and/or blood products. Your blood can be drawn from your port for labs also. Those patients who do not have good veins find the ports convenient as they can get the IV they need with one stick. The port can be used long term, and the care is easy. The device is under the skin, and once the skin heals, care is minimal. All that is required is the nurse who accesses the port will need to flush it with heparinized saline after each use. Ports are usually placed in the chest wall, usually on the right side. But they can be place in the arms and in the abdomen. Home Care Instructions: If your port is in your arm, do not allow blood pressure or other IVs to be place in that arm. Do not allow bra straps or any clothing to rub the skin over the port. Do not bathe or swim until the skin has healed and if the port is accessed. Once it is healed, and when the port is not accessed, it is okay to bathe and swim. Restrict yourself to light activity for the first 5 days after getting the port put in, after that, resume normal activity slowly. You may resume your normal diet and medications. Follow-Up Instructions: Please see your oncologist, or whatever physician ordered the port as he/she has requested of you. Call If: You should call your Physician and/or the Radiology Nurse if you notice redness, pus, swelling, or pain from the area of your incision. Call if you should develop a fever. The nurses who access your port will know to call your doctor if the port does not seem to be working properly. You need to tell the nurses who use the port if you should have any pain or swelling at the site during an infusion. To Reach Us: If you have any questions about your procedure, please call the Interventional Radiology department at [phone]. After business hours (5pm) and weekends, call the answering service at [phone] and ask for the Radiologist on call to be paged. Si tiene Preguntas acerca del procedimiento, por favor llame al departamento de Radiología Intervencional al [phone]. Después de horas de oficina (5 pm) y los fines de [place], llamar al [facility] [address] [address] al [phone] y pregunte por el Radiologo de [facility]. Interventional Radiology General Nurse Discharge    After general anesthesia or intravenous sedation, for 24 hours or while taking prescription Narcotics:  · Limit your activities  · Do not drive and operate hazardous machinery  · Do not make important personal or business decisions  · Do  not drink alcoholic beverages  · If you have not urinated within 8 hours after discharge, please contact your surgeon on call. * Please give a list of your current medications to your Primary Care Provider. * Please update this list whenever your medications are discontinued, doses are     changed, or new medications (including over-the-counter products) are added. * Please carry medication information at all times in case of emergency situations. These are general instructions for a healthy lifestyle:    No smoking/ No tobacco products/ Avoid exposure to second hand smoke  Surgeon General's Warning:  Quitting smoking now greatly reduces serious risk to your health.     Obesity, smoking, and sedentary lifestyle greatly increases your risk for illness  A healthy diet, regular physical exercise & weight monitoring are important for maintaining a healthy lifestyle    You may be retaining fluid if you have a history of heart failure or if you experience any of the following symptoms:  Weight gain of 3 pounds or more overnight or 5 pounds in a week, increased swelling in our hands or feet or shortness of breath while lying flat in bed. Please call your doctor as soon as you notice any of these symptoms; do not wait until your next office visit. Recognize signs and symptoms of STROKE:  F-face looks uneven    A-arms unable to move or move unevenly    S-speech slurred or non-existent    T-time-call 911 as soon as signs and symptoms begin-DO NOT go       Back to bed or wait to see if you get better-TIME IS BRAIN.         Patient Signature:  Date: 11/3/2020  Discharging Nurse: Jun Jimenes

## 2020-11-03 NOTE — PROCEDURES
Department of Interventional Radiology  [phone]        Interventional Radiology Brief Procedure Note    Patient: Bonita Lai MRN: [id number]  SSN: [id number]    YOB: 1975  Age: 41 y.o.   Sex: male      Date of Procedure: 11/3/2020    Pre-Procedure Diagnosis: metastatic gastric cancer    Post-Procedure Diagnosis: SAME    Procedure(s): Venous Chest Port Placement    Brief Description of Procedure: US, fluoro guided right IJ venous chest port placed    Performed By: Barbara Pop PA-C     Assistants: None    Anesthesia:moderate sedation per Shelli Alvarado MD    Estimated Blood Loss: Less than 10ml    Specimens:  None    Implants:  Chest Port Placement    Findings: catheter tip in right atrium     Complications: None    Recommendations: ok to use port     Follow Up: prn    Signed By: Barbara Pop PA-C     November 3, 2020

## 2020-11-05 ENCOUNTER — HOSPITAL ENCOUNTER (OUTPATIENT)
Dept: INFUSION THERAPY | Age: 45
Discharge: HOME | End: 2020-11-05
Payer: SUBSIDIZED

## 2020-11-05 ENCOUNTER — HOSPITAL ENCOUNTER (OUTPATIENT)
Dept: LAB | Age: 45
Discharge: HOME | End: 2020-11-05
Payer: SUBSIDIZED

## 2020-11-05 DIAGNOSIS — C16.8 MALIGNANT NEOPLASM OF OVERLAPPING SITES OF STOMACH (HCC): Primary | ICD-10-CM

## 2020-11-05 DIAGNOSIS — C16.8 MALIGNANT NEOPLASM OF OVERLAPPING SITES OF STOMACH (HCC): ICD-10-CM

## 2020-11-05 LAB
ALBUMIN SERPL-MCNC: 3.7 G/DL (ref 3.5–5)
ALBUMIN/GLOB SERPL: 0.8 {RATIO} (ref 1.2–3.5)
ALP SERPL-CCNC: 156 U/L (ref 50–136)
ALT SERPL-CCNC: 75 U/L (ref 12–65)
ANION GAP SERPL CALC-SCNC: 9 MMOL/L (ref 7–16)
AST SERPL-CCNC: 75 U/L (ref 15–37)
BASOPHILS # BLD: 0 K/UL (ref 0–0.2)
BASOPHILS NFR BLD: 0 % (ref 0–2)
BILIRUB SERPL-MCNC: 0.7 MG/DL (ref 0.2–1.1)
BUN SERPL-MCNC: 13 MG/DL (ref 6–23)
CALCIUM SERPL-MCNC: 9.3 MG/DL (ref 8.3–10.4)
CANCER AG19-9 SERPL-ACNC: 253 U/ML (ref 2–37)
CEA SERPL-MCNC: 26.3 NG/ML (ref 0–3)
CHLORIDE SERPL-SCNC: 100 MMOL/L (ref 98–107)
CO2 SERPL-SCNC: 27 MMOL/L (ref 21–32)
CREAT SERPL-MCNC: 1.1 MG/DL (ref 0.8–1.5)
DIFFERENTIAL METHOD BLD: ABNORMAL
EOSINOPHIL # BLD: 0.3 K/UL (ref 0–0.8)
EOSINOPHIL NFR BLD: 3 % (ref 0.5–7.8)
ERYTHROCYTE [DISTWIDTH] IN BLOOD BY AUTOMATED COUNT: 12.2 % (ref 11.9–14.6)
GLOBULIN SER CALC-MCNC: 4.4 G/DL (ref 2.3–3.5)
GLUCOSE SERPL-MCNC: 121 MG/DL (ref 65–100)
HCT VFR BLD AUTO: 40.6 % (ref 41.1–50.3)
HGB BLD-MCNC: 13.6 G/DL (ref 13.6–17.2)
IMM GRANULOCYTES # BLD AUTO: 0 K/UL (ref 0–0.5)
IMM GRANULOCYTES NFR BLD AUTO: 0 % (ref 0–5)
LYMPHOCYTES # BLD: 1.3 K/UL (ref 0.5–4.6)
LYMPHOCYTES NFR BLD: 16 % (ref 13–44)
MAGNESIUM SERPL-MCNC: 1.8 MG/DL (ref 1.8–2.4)
MCH RBC QN AUTO: 28 PG (ref 26.1–32.9)
MCHC RBC AUTO-ENTMCNC: 33.5 G/DL (ref 31.4–35)
MCV RBC AUTO: 83.7 FL (ref 79.6–97.8)
MONOCYTES # BLD: 0.9 K/UL (ref 0.1–1.3)
MONOCYTES NFR BLD: 10 % (ref 4–12)
NEUTS SEG # BLD: 5.8 K/UL (ref 1.7–8.2)
NEUTS SEG NFR BLD: 71 % (ref 43–78)
NRBC # BLD: 0 K/UL (ref 0–0.2)
PLATELET # BLD AUTO: 451 K/UL (ref 150–450)
PMV BLD AUTO: 9.6 FL (ref 9.4–12.3)
POTASSIUM SERPL-SCNC: 4 MMOL/L (ref 3.5–5.1)
PROT SERPL-MCNC: 8.1 G/DL (ref 6.3–8.2)
RBC # BLD AUTO: 4.85 M/UL (ref 4.23–5.67)
SODIUM SERPL-SCNC: 136 MMOL/L (ref 136–145)
WBC # BLD AUTO: 8.3 K/UL (ref 4.3–11.1)

## 2020-11-05 PROCEDURE — 80053 COMPREHEN METABOLIC PANEL: CPT

## 2020-11-05 PROCEDURE — 83735 ASSAY OF MAGNESIUM: CPT

## 2020-11-05 PROCEDURE — 96368 THER/DIAG CONCURRENT INF: CPT

## 2020-11-05 PROCEDURE — 96415 CHEMO IV INFUSION ADDL HR: CPT

## 2020-11-05 PROCEDURE — 96417 CHEMO IV INFUS EACH ADDL SEQ: CPT

## 2020-11-05 PROCEDURE — 96411 CHEMO IV PUSH ADDL DRUG: CPT

## 2020-11-05 PROCEDURE — 82378 CARCINOEMBRYONIC ANTIGEN: CPT

## 2020-11-05 PROCEDURE — 96413 CHEMO IV INFUSION 1 HR: CPT

## 2020-11-05 PROCEDURE — 96372 THER/PROPH/DIAG INJ SC/IM: CPT

## 2020-11-05 PROCEDURE — 36415 COLL VENOUS BLD VENIPUNCTURE: CPT

## 2020-11-05 PROCEDURE — 96375 TX/PRO/DX INJ NEW DRUG ADDON: CPT

## 2020-11-05 PROCEDURE — G0498 CHEMO EXTEND IV INFUS W/PUMP: HCPCS

## 2020-11-05 PROCEDURE — 74011000258 HC RX REV CODE- 258: Performed by: INTERNAL MEDICINE

## 2020-11-05 PROCEDURE — 85025 COMPLETE CBC W/AUTO DIFF WBC: CPT

## 2020-11-05 PROCEDURE — 86301 IMMUNOASSAY TUMOR CA 19-9: CPT

## 2020-11-05 PROCEDURE — 74011250636 HC RX REV CODE- 250/636: Performed by: INTERNAL MEDICINE

## 2020-11-05 RX ORDER — DIPHENHYDRAMINE HYDROCHLORIDE 50 MG/ML
50 INJECTION, SOLUTION INTRAMUSCULAR; INTRAVENOUS AS NEEDED
Status: DISPENSED | OUTPATIENT
Start: 2020-11-05 | End: 2020-11-05

## 2020-11-05 RX ORDER — DEXTROSE MONOHYDRATE 50 MG/ML
25 INJECTION, SOLUTION INTRAVENOUS CONTINUOUS
Status: ACTIVE | OUTPATIENT
Start: 2020-11-05 | End: 2020-11-05

## 2020-11-05 RX ORDER — FLUOROURACIL 50 MG/ML
300 INJECTION, SOLUTION INTRAVENOUS ONCE
Status: COMPLETED | OUTPATIENT
Start: 2020-11-05 | End: 2020-11-05

## 2020-11-05 RX ORDER — ATROPINE SULFATE 0.4 MG/ML
0.4 INJECTION, SOLUTION ENDOTRACHEAL; INTRAMEDULLARY; INTRAMUSCULAR; INTRAVENOUS; SUBCUTANEOUS ONCE
Status: COMPLETED | OUTPATIENT
Start: 2020-11-05 | End: 2020-11-05

## 2020-11-05 RX ORDER — ONDANSETRON 2 MG/ML
8 INJECTION INTRAMUSCULAR; INTRAVENOUS ONCE
Status: COMPLETED | OUTPATIENT
Start: 2020-11-05 | End: 2020-11-05

## 2020-11-05 RX ORDER — SODIUM CHLORIDE 0.9 % (FLUSH) 0.9 %
10 SYRINGE (ML) INJECTION AS NEEDED
Status: ACTIVE | OUTPATIENT
Start: 2020-11-05 | End: 2020-11-05

## 2020-11-05 RX ORDER — HYDROCORTISONE SODIUM SUCCINATE 100 MG/2ML
100 INJECTION, POWDER, FOR SOLUTION INTRAMUSCULAR; INTRAVENOUS AS NEEDED
Status: DISPENSED | OUTPATIENT
Start: 2020-11-05 | End: 2020-11-05

## 2020-11-05 RX ADMIN — ATROPINE SULFATE 0.4 MG: 0.4 INJECTION, SOLUTION INTRAMUSCULAR; INTRAVENOUS; SUBCUTANEOUS at 13:45

## 2020-11-05 RX ADMIN — DEXAMETHASONE SODIUM PHOSPHATE 12 MG: 4 INJECTION, SOLUTION INTRAMUSCULAR; INTRAVENOUS at 10:32

## 2020-11-05 RX ADMIN — LEUCOVORIN CALCIUM 816 MG: 350 INJECTION, POWDER, LYOPHILIZED, FOR SOLUTION INTRAMUSCULAR; INTRAVENOUS at 13:51

## 2020-11-05 RX ADMIN — DEXTROSE MONOHYDRATE 25 ML/HR: 5 INJECTION, SOLUTION INTRAVENOUS at 10:20

## 2020-11-05 RX ADMIN — Medication 10 ML: at 15:30

## 2020-11-05 RX ADMIN — OXALIPLATIN 132.5 MG: 5 INJECTION, SOLUTION INTRAVENOUS at 11:34

## 2020-11-05 RX ADMIN — Medication 10 ML: at 10:20

## 2020-11-05 RX ADMIN — IRINOTECAN HYDROCHLORIDE 240 MG: 20 INJECTION, SOLUTION INTRAVENOUS at 13:52

## 2020-11-05 RX ADMIN — ONDANSETRON 8 MG: 2 INJECTION INTRAMUSCULAR; INTRAVENOUS at 10:31

## 2020-11-05 RX ADMIN — FLUOROURACIL 4000 MG: 50 INJECTION, SOLUTION INTRAVENOUS at 15:37

## 2020-11-05 RX ADMIN — FLUOROURACIL 612 MG: 50 INJECTION, SOLUTION INTRAVENOUS at 15:34

## 2020-11-05 NOTE — PROGRESS NOTES
Arrived to the [facility]. D1C1 FOLFIRINOX completed. Elastomeric pump infusing. Connections checked with Benjamin Fisher RN. Patient tolerated well. Any issues or concerns during appointment: no.  Patient aware of next infusion appointment on 11/7/2020 (date) at 5 pm (time). Discharged ambulatory with self.

## 2020-11-07 ENCOUNTER — HOSPITAL ENCOUNTER (OUTPATIENT)
Dept: INFUSION THERAPY | Age: 45
Discharge: HOME | End: 2020-11-07
Payer: SUBSIDIZED

## 2020-11-07 VITALS
OXYGEN SATURATION: 97 % | HEART RATE: 97 BPM | SYSTOLIC BLOOD PRESSURE: 120 MMHG | RESPIRATION RATE: 18 BRPM | TEMPERATURE: 97.8 F | DIASTOLIC BLOOD PRESSURE: 68 MMHG

## 2020-11-07 DIAGNOSIS — C16.8 MALIGNANT NEOPLASM OF OVERLAPPING SITES OF STOMACH (HCC): Primary | ICD-10-CM

## 2020-11-07 PROCEDURE — 74011250636 HC RX REV CODE- 250/636: Performed by: INTERNAL MEDICINE

## 2020-11-07 PROCEDURE — 96360 HYDRATION IV INFUSION INIT: CPT

## 2020-11-07 RX ORDER — SODIUM CHLORIDE 0.9 % (FLUSH) 0.9 %
10 SYRINGE (ML) INJECTION AS NEEDED
Status: DISCONTINUED | OUTPATIENT
Start: 2020-11-07 | End: 2020-11-08 | Stop reason: HOSPADM

## 2020-11-07 RX ADMIN — SODIUM CHLORIDE 1000 ML: 900 INJECTION, SOLUTION INTRAVENOUS at 17:15

## 2020-11-07 RX ADMIN — Medication 10 ML: at 17:15

## 2020-11-07 NOTE — PROGRESS NOTES
Pt arrived ambulatory today at 1701, to have fluorouracil pump removed. Pt asked for a liter of IV fluids. Poor po fluid intake reported and pt tachycardic upon admission. IV fluids given per protocol. Pt tolerated without difficulty. Patient discharged via ambulatory accompanied by self. Instructed to notify physician of any problems, questions or concerns. Allowed opportunity for patient/family to ask questions. Verbalized understanding. Next appointment is Nov 19 at 0930 with Shan Ortiz.

## 2020-11-19 ENCOUNTER — HOSPITAL ENCOUNTER (OUTPATIENT)
Dept: LAB | Age: 45
Discharge: HOME | End: 2020-11-19
Payer: SUBSIDIZED

## 2020-11-19 ENCOUNTER — PATIENT OUTREACH (OUTPATIENT)
Dept: CASE MANAGEMENT | Age: 45
End: 2020-11-19

## 2020-11-19 ENCOUNTER — HOSPITAL ENCOUNTER (OUTPATIENT)
Dept: INFUSION THERAPY | Age: 45
Discharge: HOME | End: 2020-11-19
Payer: SUBSIDIZED

## 2020-11-19 DIAGNOSIS — C16.8 MALIGNANT NEOPLASM OF OVERLAPPING SITES OF STOMACH (HCC): Primary | ICD-10-CM

## 2020-11-19 DIAGNOSIS — C16.8 MALIGNANT NEOPLASM OF OVERLAPPING SITES OF STOMACH (HCC): ICD-10-CM

## 2020-11-19 DIAGNOSIS — E87.6 HYPOKALEMIA: Primary | ICD-10-CM

## 2020-11-19 PROBLEM — R11.2 CINV (CHEMOTHERAPY-INDUCED NAUSEA AND VOMITING): Status: ACTIVE | Noted: 2020-11-19

## 2020-11-19 PROBLEM — T45.1X5A CINV (CHEMOTHERAPY-INDUCED NAUSEA AND VOMITING): Status: ACTIVE | Noted: 2020-11-19

## 2020-11-19 LAB
ALBUMIN SERPL-MCNC: 3.5 G/DL (ref 3.5–5)
ALBUMIN/GLOB SERPL: 1 {RATIO} (ref 1.2–3.5)
ALP SERPL-CCNC: 119 U/L (ref 50–136)
ALT SERPL-CCNC: 49 U/L (ref 12–65)
ANION GAP SERPL CALC-SCNC: 8 MMOL/L (ref 7–16)
AST SERPL-CCNC: 31 U/L (ref 15–37)
BASOPHILS # BLD: 0 K/UL (ref 0–0.2)
BASOPHILS NFR BLD: 1 % (ref 0–2)
BILIRUB SERPL-MCNC: 0.5 MG/DL (ref 0.2–1.1)
BUN SERPL-MCNC: 11 MG/DL (ref 6–23)
CALCIUM SERPL-MCNC: 9 MG/DL (ref 8.3–10.4)
CANCER AG19-9 SERPL-ACNC: 210.4 U/ML (ref 2–37)
CEA SERPL-MCNC: 19.9 NG/ML (ref 0–3)
CHLORIDE SERPL-SCNC: 109 MMOL/L (ref 98–107)
CO2 SERPL-SCNC: 23 MMOL/L (ref 21–32)
CREAT SERPL-MCNC: 1 MG/DL (ref 0.8–1.5)
DIFFERENTIAL METHOD BLD: ABNORMAL
EOSINOPHIL # BLD: 0.1 K/UL (ref 0–0.8)
EOSINOPHIL NFR BLD: 4 % (ref 0.5–7.8)
ERYTHROCYTE [DISTWIDTH] IN BLOOD BY AUTOMATED COUNT: 13.1 % (ref 11.9–14.6)
GLOBULIN SER CALC-MCNC: 3.4 G/DL (ref 2.3–3.5)
GLUCOSE SERPL-MCNC: 174 MG/DL (ref 65–100)
HCT VFR BLD AUTO: 35.3 % (ref 41.1–50.3)
HGB BLD-MCNC: 11.8 G/DL (ref 13.6–17.2)
IMM GRANULOCYTES # BLD AUTO: 0 K/UL (ref 0–0.5)
IMM GRANULOCYTES NFR BLD AUTO: 0 % (ref 0–5)
LYMPHOCYTES # BLD: 1.2 K/UL (ref 0.5–4.6)
LYMPHOCYTES NFR BLD: 37 % (ref 13–44)
MAGNESIUM SERPL-MCNC: 2 MG/DL (ref 1.8–2.4)
MCH RBC QN AUTO: 28.4 PG (ref 26.1–32.9)
MCHC RBC AUTO-ENTMCNC: 33.4 G/DL (ref 31.4–35)
MCV RBC AUTO: 84.9 FL (ref 79.6–97.8)
MONOCYTES # BLD: 0.2 K/UL (ref 0.1–1.3)
MONOCYTES NFR BLD: 7 % (ref 4–12)
NEUTS SEG # BLD: 1.7 K/UL (ref 1.7–8.2)
NEUTS SEG NFR BLD: 51 % (ref 43–78)
NRBC # BLD: 0 K/UL (ref 0–0.2)
PLATELET # BLD AUTO: 273 K/UL (ref 150–450)
PMV BLD AUTO: 9.4 FL (ref 9.4–12.3)
POTASSIUM SERPL-SCNC: 3.3 MMOL/L (ref 3.5–5.1)
PROT SERPL-MCNC: 6.9 G/DL (ref 6.3–8.2)
RBC # BLD AUTO: 4.16 M/UL (ref 4.23–5.67)
SODIUM SERPL-SCNC: 140 MMOL/L (ref 136–145)
WBC # BLD AUTO: 3.3 K/UL (ref 4.3–11.1)

## 2020-11-19 PROCEDURE — 96413 CHEMO IV INFUSION 1 HR: CPT

## 2020-11-19 PROCEDURE — 80053 COMPREHEN METABOLIC PANEL: CPT

## 2020-11-19 PROCEDURE — 96417 CHEMO IV INFUS EACH ADDL SEQ: CPT

## 2020-11-19 PROCEDURE — 74011000258 HC RX REV CODE- 258: Performed by: INTERNAL MEDICINE

## 2020-11-19 PROCEDURE — 96376 TX/PRO/DX INJ SAME DRUG ADON: CPT

## 2020-11-19 PROCEDURE — 96372 THER/PROPH/DIAG INJ SC/IM: CPT

## 2020-11-19 PROCEDURE — 74011250636 HC RX REV CODE- 250/636: Performed by: INTERNAL MEDICINE

## 2020-11-19 PROCEDURE — 96375 TX/PRO/DX INJ NEW DRUG ADDON: CPT

## 2020-11-19 PROCEDURE — 96415 CHEMO IV INFUSION ADDL HR: CPT

## 2020-11-19 PROCEDURE — 85025 COMPLETE CBC W/AUTO DIFF WBC: CPT

## 2020-11-19 PROCEDURE — 83735 ASSAY OF MAGNESIUM: CPT

## 2020-11-19 PROCEDURE — 82378 CARCINOEMBRYONIC ANTIGEN: CPT

## 2020-11-19 PROCEDURE — G0498 CHEMO EXTEND IV INFUS W/PUMP: HCPCS

## 2020-11-19 PROCEDURE — 86301 IMMUNOASSAY TUMOR CA 19-9: CPT

## 2020-11-19 PROCEDURE — 96411 CHEMO IV PUSH ADDL DRUG: CPT

## 2020-11-19 PROCEDURE — 96368 THER/DIAG CONCURRENT INF: CPT

## 2020-11-19 RX ORDER — ONDANSETRON 2 MG/ML
8 INJECTION INTRAMUSCULAR; INTRAVENOUS AS NEEDED
Status: DISCONTINUED | OUTPATIENT
Start: 2020-11-19 | End: 2020-11-20 | Stop reason: HOSPADM

## 2020-11-19 RX ORDER — DEXAMETHASONE SODIUM PHOSPHATE 10 MG/ML
10 INJECTION INTRAMUSCULAR; INTRAVENOUS ONCE
Status: CANCELLED | OUTPATIENT
Start: 2020-11-21

## 2020-11-19 RX ORDER — POTASSIUM CHLORIDE 20 MEQ/1
40 TABLET, EXTENDED RELEASE ORAL DAILY
Qty: 60 TAB | Refills: 0 | Status: SHIPPED | OUTPATIENT
Start: 2020-11-19 | End: 2020-12-30 | Stop reason: SDUPTHER

## 2020-11-19 RX ORDER — FLUOROURACIL 50 MG/ML
300 INJECTION, SOLUTION INTRAVENOUS ONCE
Status: COMPLETED | OUTPATIENT
Start: 2020-11-19 | End: 2020-11-19

## 2020-11-19 RX ORDER — SODIUM CHLORIDE 9 MG/ML
1000 INJECTION, SOLUTION INTRAVENOUS ONCE
Status: CANCELLED
Start: 2020-11-21

## 2020-11-19 RX ORDER — SODIUM CHLORIDE 0.9 % (FLUSH) 0.9 %
10 SYRINGE (ML) INJECTION AS NEEDED
Status: ACTIVE | OUTPATIENT
Start: 2020-11-19 | End: 2020-11-19

## 2020-11-19 RX ORDER — ONDANSETRON 2 MG/ML
8 INJECTION INTRAMUSCULAR; INTRAVENOUS ONCE
Status: COMPLETED | OUTPATIENT
Start: 2020-11-19 | End: 2020-11-19

## 2020-11-19 RX ORDER — DEXTROSE MONOHYDRATE 50 MG/ML
25 INJECTION, SOLUTION INTRAVENOUS CONTINUOUS
Status: ACTIVE | OUTPATIENT
Start: 2020-11-19 | End: 2020-11-19

## 2020-11-19 RX ORDER — ATROPINE SULFATE 0.4 MG/ML
0.4 INJECTION, SOLUTION ENDOTRACHEAL; INTRAMEDULLARY; INTRAMUSCULAR; INTRAVENOUS; SUBCUTANEOUS ONCE
Status: COMPLETED | OUTPATIENT
Start: 2020-11-19 | End: 2020-11-19

## 2020-11-19 RX ORDER — ONDANSETRON 2 MG/ML
8 INJECTION INTRAMUSCULAR; INTRAVENOUS ONCE
Status: CANCELLED | OUTPATIENT
Start: 2020-11-21

## 2020-11-19 RX ADMIN — ONDANSETRON 8 MG: 2 INJECTION INTRAMUSCULAR; INTRAVENOUS at 11:32

## 2020-11-19 RX ADMIN — DEXTROSE MONOHYDRATE 25 ML/HR: 5 INJECTION, SOLUTION INTRAVENOUS at 12:00

## 2020-11-19 RX ADMIN — Medication 10 ML: at 16:40

## 2020-11-19 RX ADMIN — ATROPINE SULFATE 0.4 MG: 0.4 INJECTION, SOLUTION INTRAMUSCULAR; INTRAVENOUS; SUBCUTANEOUS at 14:37

## 2020-11-19 RX ADMIN — FLUOROURACIL 612 MG: 50 INJECTION, SOLUTION INTRAVENOUS at 16:42

## 2020-11-19 RX ADMIN — FLUOROURACIL 4000 MG: 50 INJECTION, SOLUTION INTRAVENOUS at 16:45

## 2020-11-19 RX ADMIN — DEXAMETHASONE SODIUM PHOSPHATE 12 MG: 4 INJECTION, SOLUTION INTRAMUSCULAR; INTRAVENOUS at 11:34

## 2020-11-19 RX ADMIN — ONDANSETRON 8 MG: 2 INJECTION INTRAMUSCULAR; INTRAVENOUS at 16:57

## 2020-11-19 RX ADMIN — Medication 10 ML: at 12:00

## 2020-11-19 RX ADMIN — LEUCOVORIN CALCIUM 816 MG: 100 INJECTION, POWDER, LYOPHILIZED, FOR SUSPENSION INTRAMUSCULAR; INTRAVENOUS at 14:35

## 2020-11-19 RX ADMIN — OXALIPLATIN 132.5 MG: 5 INJECTION, SOLUTION INTRAVENOUS at 12:15

## 2020-11-19 RX ADMIN — IRINOTECAN HYDROCHLORIDE 240 MG: 20 INJECTION, SOLUTION INTRAVENOUS at 14:35

## 2020-11-19 NOTE — PROGRESS NOTES
Arrived to the [facility]. Assessment completed and labs reviewed. . Pre meds and Folfirinox infusion completed. . Patient began to complain of nausea after Adrucil syringe was pushed. 8 mg of Zofran given. Elastomeric pump attached. Patient tolerated well. Any issues or concerns during appointment: none. Patient aware of next infusion appointment on 11/21/2020 at 1600. Discharged ambulatory.

## 2020-11-20 ENCOUNTER — HOSPITAL ENCOUNTER (EMERGENCY)
Age: 45
Discharge: HOME | End: 2020-11-20
Attending: EMERGENCY MEDICINE
Payer: SUBSIDIZED

## 2020-11-20 ENCOUNTER — APPOINTMENT (OUTPATIENT)
Dept: GENERAL RADIOLOGY | Age: 45
End: 2020-11-20
Attending: EMERGENCY MEDICINE
Payer: SUBSIDIZED

## 2020-11-20 VITALS
BODY MASS INDEX: 24.92 KG/M2 | HEIGHT: 71 IN | RESPIRATION RATE: 18 BRPM | TEMPERATURE: 99.4 F | DIASTOLIC BLOOD PRESSURE: 73 MMHG | OXYGEN SATURATION: 96 % | WEIGHT: 178 LBS | HEART RATE: 109 BPM | SYSTOLIC BLOOD PRESSURE: 117 MMHG

## 2020-11-20 DIAGNOSIS — R50.9 ACUTE FEBRILE ILLNESS: Primary | ICD-10-CM

## 2020-11-20 DIAGNOSIS — T45.1X5A ADVERSE EFFECT OF CHEMOTHERAPY, INITIAL ENCOUNTER: ICD-10-CM

## 2020-11-20 DIAGNOSIS — C16.9 MALIGNANT NEOPLASM OF STOMACH, UNSPECIFIED LOCATION (HCC): ICD-10-CM

## 2020-11-20 LAB
ALBUMIN SERPL-MCNC: 3.4 G/DL (ref 3.5–5)
ALBUMIN/GLOB SERPL: 0.9 {RATIO} (ref 1.2–3.5)
ALP SERPL-CCNC: 124 U/L (ref 50–136)
ALT SERPL-CCNC: 50 U/L (ref 12–65)
ANION GAP SERPL CALC-SCNC: 9 MMOL/L (ref 7–16)
APPEARANCE UR: NORMAL
AST SERPL-CCNC: 31 U/L (ref 15–37)
BASOPHILS # BLD: 0 K/UL (ref 0–0.2)
BASOPHILS NFR BLD: 0 % (ref 0–2)
BILIRUB SERPL-MCNC: 0.6 MG/DL (ref 0.2–1.1)
BILIRUB UR QL: NEGATIVE
BUN SERPL-MCNC: 12 MG/DL (ref 6–23)
CALCIUM SERPL-MCNC: 9.1 MG/DL (ref 8.3–10.4)
CHLORIDE SERPL-SCNC: 105 MMOL/L (ref 98–107)
CO2 SERPL-SCNC: 25 MMOL/L (ref 21–32)
COLOR UR: YELLOW
COVID-19 RAPID TEST, COVR: NOT DETECTED
CREAT SERPL-MCNC: 1.13 MG/DL (ref 0.8–1.5)
DIFFERENTIAL METHOD BLD: ABNORMAL
EOSINOPHIL # BLD: 0.1 K/UL (ref 0–0.8)
EOSINOPHIL NFR BLD: 1 % (ref 0.5–7.8)
ERYTHROCYTE [DISTWIDTH] IN BLOOD BY AUTOMATED COUNT: 13.1 % (ref 11.9–14.6)
GLOBULIN SER CALC-MCNC: 4 G/DL (ref 2.3–3.5)
GLUCOSE SERPL-MCNC: 146 MG/DL (ref 65–100)
GLUCOSE UR STRIP.AUTO-MCNC: NEGATIVE MG/DL
HCT VFR BLD AUTO: 36.6 % (ref 41.1–50.3)
HGB BLD-MCNC: 12.5 G/DL (ref 13.6–17.2)
HGB UR QL STRIP: NEGATIVE
IMM GRANULOCYTES # BLD AUTO: 0 K/UL (ref 0–0.5)
IMM GRANULOCYTES NFR BLD AUTO: 0 % (ref 0–5)
KETONES UR QL STRIP.AUTO: NEGATIVE MG/DL
LACTATE SERPL-SCNC: 2.4 MMOL/L (ref 0.4–2)
LEUKOCYTE ESTERASE UR QL STRIP.AUTO: NEGATIVE
LYMPHOCYTES # BLD: 0.6 K/UL (ref 0.5–4.6)
LYMPHOCYTES NFR BLD: 14 % (ref 13–44)
MCH RBC QN AUTO: 28.7 PG (ref 26.1–32.9)
MCHC RBC AUTO-ENTMCNC: 34.2 G/DL (ref 31.4–35)
MCV RBC AUTO: 83.9 FL (ref 79.6–97.8)
MONOCYTES # BLD: 0.5 K/UL (ref 0.1–1.3)
MONOCYTES NFR BLD: 13 % (ref 4–12)
NEUTS SEG # BLD: 2.9 K/UL (ref 1.7–8.2)
NEUTS SEG NFR BLD: 71 % (ref 43–78)
NITRITE UR QL STRIP.AUTO: NEGATIVE
NRBC # BLD: 0 K/UL (ref 0–0.2)
PH UR STRIP: 5.5 [PH] (ref 5–9)
PLATELET # BLD AUTO: 235 K/UL (ref 150–450)
PMV BLD AUTO: 9.3 FL (ref 9.4–12.3)
POTASSIUM SERPL-SCNC: 3.5 MMOL/L (ref 3.5–5.1)
PROT SERPL-MCNC: 7.4 G/DL (ref 6.3–8.2)
PROT UR STRIP-MCNC: NEGATIVE MG/DL
RBC # BLD AUTO: 4.36 M/UL (ref 4.23–5.6)
SARS COV-2, XPGCVT: NEGATIVE
SODIUM SERPL-SCNC: 139 MMOL/L (ref 136–145)
SOURCE, COVRS: NORMAL
SP GR UR REFRACTOMETRY: 1.02 (ref 1–1.02)
UROBILINOGEN UR QL STRIP.AUTO: 0.2 EU/DL (ref 0.2–1)
WBC # BLD AUTO: 4.1 K/UL (ref 4.3–11.1)

## 2020-11-20 PROCEDURE — 87635 SARS-COV-2 COVID-19 AMP PRB: CPT

## 2020-11-20 PROCEDURE — 87086 URINE CULTURE/COLONY COUNT: CPT

## 2020-11-20 PROCEDURE — 81003 URINALYSIS AUTO W/O SCOPE: CPT

## 2020-11-20 PROCEDURE — 74011250637 HC RX REV CODE- 250/637: Performed by: EMERGENCY MEDICINE

## 2020-11-20 PROCEDURE — 83605 ASSAY OF LACTIC ACID: CPT

## 2020-11-20 PROCEDURE — 99284 EMERGENCY DEPT VISIT MOD MDM: CPT

## 2020-11-20 PROCEDURE — 96374 THER/PROPH/DIAG INJ IV PUSH: CPT

## 2020-11-20 PROCEDURE — 74011000250 HC RX REV CODE- 250: Performed by: EMERGENCY MEDICINE

## 2020-11-20 PROCEDURE — 87040 BLOOD CULTURE FOR BACTERIA: CPT

## 2020-11-20 PROCEDURE — 85025 COMPLETE CBC W/AUTO DIFF WBC: CPT

## 2020-11-20 PROCEDURE — 93005 ELECTROCARDIOGRAM TRACING: CPT | Performed by: EMERGENCY MEDICINE

## 2020-11-20 PROCEDURE — 80053 COMPREHEN METABOLIC PANEL: CPT

## 2020-11-20 PROCEDURE — 74011250636 HC RX REV CODE- 250/636: Performed by: EMERGENCY MEDICINE

## 2020-11-20 PROCEDURE — 71045 X-RAY EXAM CHEST 1 VIEW: CPT

## 2020-11-20 PROCEDURE — 96361 HYDRATE IV INFUSION ADD-ON: CPT

## 2020-11-20 RX ORDER — ACETAMINOPHEN 500 MG
1000 TABLET ORAL
Status: COMPLETED | OUTPATIENT
Start: 2020-11-20 | End: 2020-11-20

## 2020-11-20 RX ADMIN — SODIUM CHLORIDE 1000 ML: 900 INJECTION, SOLUTION INTRAVENOUS at 05:11

## 2020-11-20 RX ADMIN — ACETAMINOPHEN 1000 MG: 500 TABLET, FILM COATED ORAL at 05:13

## 2020-11-20 RX ADMIN — PROCHLORPERAZINE EDISYLATE 10 MG: 5 INJECTION INTRAMUSCULAR; INTRAVENOUS at 05:11

## 2020-11-20 NOTE — ED TRIAGE NOTES
Pt reports he is a chemo pt with complains of fever with nausea. Reports taking Zofran at sergo 0100. Pt denies taking Tylenol or Motrin. Pt arrived with mask from home in place. Pt reports he had chemo today.

## 2020-11-20 NOTE — DISCHARGE INSTRUCTIONS
Alternate 4 ibuprofen every 8 hours with 2 extra-strength tylenol every 6 hours  Drink plenty of fluids   follow-up with Dr. Amy Chester, call the office to check in after you wake up later this morning  And to the ER if worse in any way

## 2020-11-20 NOTE — ED PROVIDER NOTES
Chief complaint : Fever    HISTORY OF PRESENT ILLNESS :  Location : Systemic    Quality : Tactile    Quantity : Constant    Timing : Few hours ago    Severity : 103 °F    Context : Patient with gastric cancer and liver metastasis discovered early October of this year    Alleviating / exacerbating factors : Patient received chemotherapy yesterday, Thursday, November 19    Associated Symptoms : Mild body aches, nausea but no vomiting             Past Medical History:   Diagnosis Date    Gastric cancer ([facility] [address]        Past Surgical History:   Procedure Laterality Date    HX ORTHOPAEDIC      ankle    HX TONSILLECTOMY      lazy eye    IR INSERT TUNL CVC W PORT OVER 5 YEARS  11/3/2020         Family History:   Problem Relation Age of Onset    Dementia Mother     Depression Mother     Diabetes Mother     Schizophrenia Mother        Social History     Socioeconomic History    Marital status:      Spouse name: Not on file    Number of children: Not on file    Years of education: Not on file    Highest education level: Not on file   Occupational History    Not on file   Social Needs    Financial resource strain: Not on file    Food insecurity     Worry: Not on file     Inability: Not on file    Transportation needs     Medical: Not on file     Non-medical: Not on file   Tobacco Use    Smoking status: Never Smoker    Smokeless tobacco: Never Used   Substance and Sexual Activity    Alcohol use: Yes     Comment: occasional    Drug use: Never    Sexual activity: Not on file   Lifestyle    Physical activity     Days per week: Not on file     Minutes per session: Not on file    Stress: Not on file   Relationships    Social connections     Talks on phone: Not on file     Gets together: Not on file     Attends Advent service: Not on file     Active member of club or organization: Not on file     Attends meetings of clubs or organizations: Not on file     Relationship status: Not on file    Intimate partner violence     Fear of current or ex partner: Not on file     Emotionally abused: Not on file     Physically abused: Not on file     Forced sexual activity: Not on file   Other Topics Concern    Not on file   Social History Narrative    Not on file         ALLERGIES: Patient has no known allergies. Review of Systems   Constitutional: Positive for chills and fever. HENT: Negative for rhinorrhea and sore throat. Eyes: Negative for discharge and redness. Respiratory: Negative for cough and shortness of breath. Cardiovascular: Negative for chest pain and palpitations. Gastrointestinal: Positive for nausea. Negative for abdominal pain, diarrhea and vomiting. Musculoskeletal: Positive for arthralgias, back pain and myalgias. Skin: Negative for pallor and rash. Neurological: Negative for dizziness and headaches. All other systems reviewed and are negative. Vitals:    11/20/20 0458 11/20/20 0500 11/20/20 0505 11/20/20 0548   BP: 129/66 129/66 129/66    Pulse:  89 (!) 125 98   Resp:  17 18    Temp:  (!) 103 °F (39.4 °C)     SpO2:  97% 99% 97%   Weight:  80.7 kg (178 lb)     Height:  5' 11\" (1.803 m)              Physical Exam  Vitals signs and nursing note reviewed. Constitutional:       General: He is not in acute distress. Appearance: Normal appearance. He is well-developed. He is not ill-appearing, toxic-appearing or diaphoretic. Comments: Febrile and shivering   HENT:      Head: Normocephalic and atraumatic. Right Ear: External ear normal.      Left Ear: External ear normal.      Mouth/Throat:      Mouth: Mucous membranes are moist.      Pharynx: Oropharynx is clear. No oropharyngeal exudate or posterior oropharyngeal erythema. Eyes:      General: No scleral icterus. Right eye: No discharge. Left eye: No discharge. Extraocular Movements: Extraocular movements intact.       Conjunctiva/sclera: Conjunctivae normal.      Pupils: Pupils are equal, round, and reactive to light. Neck:      Musculoskeletal: Normal range of motion and neck supple. Normal range of motion. Thyroid: No thyromegaly. Trachea: Trachea normal.   Cardiovascular:      Rate and Rhythm: Regular rhythm. Tachycardia present. Heart sounds: Normal heart sounds. No murmur. No gallop. Pulmonary:      Effort: Pulmonary effort is normal. No respiratory distress. Breath sounds: Normal breath sounds. No wheezing or rales. Abdominal:      General: Bowel sounds are normal.      Palpations: Abdomen is soft. There is no hepatomegaly, splenomegaly or pulsatile mass. Tenderness: There is no abdominal tenderness. There is no guarding. Musculoskeletal: Normal range of motion. Lymphadenopathy:      Cervical: No cervical adenopathy. Skin:     General: Skin is warm and dry. Neurological:      General: No focal deficit present. Mental Status: He is alert and oriented to person, place, and time. Mental status is at baseline. Motor: No abnormal muscle tone. Comments: cni 2-12 grossly   Psychiatric:         Mood and Affect: Mood normal.         Behavior: Behavior normal.          MDM  Number of Diagnoses or Management Options  Acute febrile illness: Adverse effect of chemotherapy, initial encounter:   Malignant neoplasm of stomach, unspecified location [facility]):   Diagnosis management comments: Medical decision making note:  Fever to 103 after his first day of chemotherapy, no source of infection encountered, cultures of urine and blood obtained. Went ahead and got a Covid swab as well. Discussed with oncology on-call. We will watch cultures, hold off on antibiotics at this point. This concludes the \"medical decision making note\" part of this emergency department visit note.          Amount and/or Complexity of Data Reviewed  Discuss the patient with other providers: yes Deisi camacho           Procedures

## 2020-11-20 NOTE — ED NOTES
I have reviewed discharge instructions with the patient. The patient verbalized understanding. Patient left ED via Discharge Method: ambulatory to Home with self. Opportunity for questions and clarification provided. Patient given 0 scripts. To continue your aftercare when you leave the hospital, you may receive an automated call from our care team to check in on how you are doing. This is a free service and part of our promise to provide the best care and service to meet your aftercare needs.  If you have questions, or wish to unsubscribe from this service please call [phone]. Thank you for Choosing our [facility] Emergency Department.

## 2020-11-21 ENCOUNTER — HOSPITAL ENCOUNTER (OUTPATIENT)
Dept: INFUSION THERAPY | Age: 45
Discharge: HOME | End: 2020-11-21
Payer: SUBSIDIZED

## 2020-11-21 VITALS
WEIGHT: 180 LBS | RESPIRATION RATE: 18 BRPM | TEMPERATURE: 98.8 F | OXYGEN SATURATION: 98 % | DIASTOLIC BLOOD PRESSURE: 82 MMHG | SYSTOLIC BLOOD PRESSURE: 129 MMHG | BODY MASS INDEX: 25.1 KG/M2 | HEART RATE: 83 BPM

## 2020-11-21 DIAGNOSIS — T45.1X5A CINV (CHEMOTHERAPY-INDUCED NAUSEA AND VOMITING): Primary | ICD-10-CM

## 2020-11-21 DIAGNOSIS — C16.8 MALIGNANT NEOPLASM OF OVERLAPPING SITES OF STOMACH (HCC): ICD-10-CM

## 2020-11-21 DIAGNOSIS — R11.2 CINV (CHEMOTHERAPY-INDUCED NAUSEA AND VOMITING): Primary | ICD-10-CM

## 2020-11-21 LAB
ATRIAL RATE: 101 BPM
CALCULATED P AXIS, ECG09: 71 DEGREES
CALCULATED R AXIS, ECG10: 23 DEGREES
CALCULATED T AXIS, ECG11: 55 DEGREES
DIAGNOSIS, 93000: NORMAL
P-R INTERVAL, ECG05: 124 MS
Q-T INTERVAL, ECG07: 330 MS
QRS DURATION, ECG06: 92 MS
QTC CALCULATION (BEZET), ECG08: 427 MS
VENTRICULAR RATE, ECG03: 101 BPM

## 2020-11-21 PROCEDURE — 96374 THER/PROPH/DIAG INJ IV PUSH: CPT

## 2020-11-21 PROCEDURE — 96361 HYDRATE IV INFUSION ADD-ON: CPT

## 2020-11-21 PROCEDURE — 74011250636 HC RX REV CODE- 250/636: Performed by: INTERNAL MEDICINE

## 2020-11-21 RX ORDER — DEXAMETHASONE SODIUM PHOSPHATE 10 MG/ML
10 INJECTION INTRAMUSCULAR; INTRAVENOUS ONCE
Status: DISCONTINUED | OUTPATIENT
Start: 2020-11-21 | End: 2020-11-21

## 2020-11-21 RX ORDER — SODIUM CHLORIDE 9 MG/ML
1000 INJECTION, SOLUTION INTRAVENOUS ONCE
Status: COMPLETED | OUTPATIENT
Start: 2020-11-21 | End: 2020-11-21

## 2020-11-21 RX ORDER — DEXAMETHASONE SODIUM PHOSPHATE 100 MG/10ML
10 INJECTION INTRAMUSCULAR; INTRAVENOUS ONCE
Status: COMPLETED | OUTPATIENT
Start: 2020-11-21 | End: 2020-11-21

## 2020-11-21 RX ORDER — SODIUM CHLORIDE 0.9 % (FLUSH) 0.9 %
10 SYRINGE (ML) INJECTION AS NEEDED
Status: DISCONTINUED | OUTPATIENT
Start: 2020-11-21 | End: 2020-11-22 | Stop reason: HOSPADM

## 2020-11-21 RX ORDER — ONDANSETRON 2 MG/ML
8 INJECTION INTRAMUSCULAR; INTRAVENOUS ONCE
Status: DISCONTINUED | OUTPATIENT
Start: 2020-11-21 | End: 2020-11-22 | Stop reason: HOSPADM

## 2020-11-21 RX ADMIN — DEXAMETHASONE SODIUM PHOSPHATE 10 MG: 10 INJECTION INTRAMUSCULAR; INTRAVENOUS at 16:02

## 2020-11-21 RX ADMIN — SODIUM CHLORIDE 1000 ML: 900 INJECTION, SOLUTION INTRAVENOUS at 16:05

## 2020-11-21 RX ADMIN — Medication 10 ML: at 16:01

## 2020-11-21 NOTE — PROGRESS NOTES
Arrived to the [facility]. DC pump and hydration/decadron completed. Patient tolerated well. Any issues or concerns during appointment: none  Patient aware of next infusion appointment on 12/3 at 9:30  Discharged ambulatory to home.

## 2020-11-22 LAB
BACTERIA SPEC CULT: NORMAL
SERVICE CMNT-IMP: NORMAL

## 2020-11-25 LAB
BACTERIA SPEC CULT: NORMAL
BACTERIA SPEC CULT: NORMAL
SERVICE CMNT-IMP: NORMAL
SERVICE CMNT-IMP: NORMAL

## 2020-12-03 ENCOUNTER — HOSPITAL ENCOUNTER (OUTPATIENT)
Dept: LAB | Age: 45
Discharge: HOME | End: 2020-12-03
Payer: SUBSIDIZED

## 2020-12-03 ENCOUNTER — HOSPITAL ENCOUNTER (OUTPATIENT)
Dept: INFUSION THERAPY | Age: 45
Discharge: HOME | End: 2020-12-03
Payer: SUBSIDIZED

## 2020-12-03 DIAGNOSIS — C16.8 MALIGNANT NEOPLASM OF OVERLAPPING SITES OF STOMACH (HCC): ICD-10-CM

## 2020-12-03 LAB
ALBUMIN SERPL-MCNC: 3.4 G/DL (ref 3.5–5)
ALBUMIN/GLOB SERPL: 0.9 {RATIO} (ref 1.2–3.5)
ALP SERPL-CCNC: 106 U/L (ref 50–136)
ALT SERPL-CCNC: 58 U/L (ref 12–65)
ANION GAP SERPL CALC-SCNC: 11 MMOL/L (ref 7–16)
AST SERPL-CCNC: 44 U/L (ref 15–37)
BASOPHILS # BLD: 0 K/UL (ref 0–0.2)
BASOPHILS NFR BLD: 0 % (ref 0–2)
BILIRUB SERPL-MCNC: 0.4 MG/DL (ref 0.2–1.1)
BUN SERPL-MCNC: 16 MG/DL (ref 6–23)
CALCIUM SERPL-MCNC: 8.8 MG/DL (ref 8.3–10.4)
CANCER AG19-9 SERPL-ACNC: 110.4 U/ML (ref 2–37)
CEA SERPL-MCNC: 13.5 NG/ML (ref 0–3)
CHLORIDE SERPL-SCNC: 106 MMOL/L (ref 98–107)
CO2 SERPL-SCNC: 22 MMOL/L (ref 21–32)
CREAT SERPL-MCNC: 1 MG/DL (ref 0.8–1.5)
DIFFERENTIAL METHOD BLD: ABNORMAL
EOSINOPHIL # BLD: 0 K/UL (ref 0–0.8)
EOSINOPHIL NFR BLD: 2 % (ref 0.5–7.8)
ERYTHROCYTE [DISTWIDTH] IN BLOOD BY AUTOMATED COUNT: 14.6 % (ref 11.9–14.6)
GLOBULIN SER CALC-MCNC: 3.7 G/DL (ref 2.3–3.5)
GLUCOSE SERPL-MCNC: 270 MG/DL (ref 65–100)
HCT VFR BLD AUTO: 35.1 % (ref 41.1–50.3)
HGB BLD-MCNC: 11.8 G/DL (ref 13.6–17.2)
IMM GRANULOCYTES # BLD AUTO: 0 K/UL (ref 0–0.5)
IMM GRANULOCYTES NFR BLD AUTO: 1 % (ref 0–5)
LYMPHOCYTES # BLD: 0.8 K/UL (ref 0.5–4.6)
LYMPHOCYTES NFR BLD: 49 % (ref 13–44)
MAGNESIUM SERPL-MCNC: 1.9 MG/DL (ref 1.8–2.4)
MCH RBC QN AUTO: 29 PG (ref 26.1–32.9)
MCHC RBC AUTO-ENTMCNC: 33.6 G/DL (ref 31.4–35)
MCV RBC AUTO: 86.2 FL (ref 79.6–97.8)
MONOCYTES # BLD: 0.2 K/UL (ref 0.1–1.3)
MONOCYTES NFR BLD: 12 % (ref 4–12)
NEUTS SEG # BLD: 0.6 K/UL (ref 1.7–8.2)
NEUTS SEG NFR BLD: 37 % (ref 43–78)
NRBC # BLD: 0 K/UL (ref 0–0.2)
PLATELET # BLD AUTO: 156 K/UL (ref 150–450)
PMV BLD AUTO: 9 FL (ref 9.4–12.3)
POTASSIUM SERPL-SCNC: 3.6 MMOL/L (ref 3.5–5.1)
PROT SERPL-MCNC: 7.1 G/DL (ref 6.3–8.2)
RBC # BLD AUTO: 4.07 M/UL (ref 4.23–5.67)
SODIUM SERPL-SCNC: 139 MMOL/L (ref 136–145)
WBC # BLD AUTO: 1.7 K/UL (ref 4.3–11.1)

## 2020-12-03 PROCEDURE — 83735 ASSAY OF MAGNESIUM: CPT

## 2020-12-03 PROCEDURE — 86301 IMMUNOASSAY TUMOR CA 19-9: CPT

## 2020-12-03 PROCEDURE — 80053 COMPREHEN METABOLIC PANEL: CPT

## 2020-12-03 PROCEDURE — 36591 DRAW BLOOD OFF VENOUS DEVICE: CPT

## 2020-12-03 PROCEDURE — 85025 COMPLETE CBC W/AUTO DIFF WBC: CPT

## 2020-12-03 PROCEDURE — 82378 CARCINOEMBRYONIC ANTIGEN: CPT

## 2020-12-05 ENCOUNTER — APPOINTMENT (OUTPATIENT)
Dept: INFUSION THERAPY | Age: 45
End: 2020-12-05

## 2020-12-10 ENCOUNTER — HOSPITAL ENCOUNTER (OUTPATIENT)
Dept: INFUSION THERAPY | Age: 45
Discharge: HOME | End: 2020-12-10
Payer: SUBSIDIZED

## 2020-12-10 DIAGNOSIS — C16.9 MALIGNANT NEOPLASM OF STOMACH, UNSPECIFIED LOCATION (HCC): Primary | ICD-10-CM

## 2020-12-10 DIAGNOSIS — T45.1X5A CINV (CHEMOTHERAPY-INDUCED NAUSEA AND VOMITING): ICD-10-CM

## 2020-12-10 DIAGNOSIS — R11.2 CINV (CHEMOTHERAPY-INDUCED NAUSEA AND VOMITING): ICD-10-CM

## 2020-12-10 DIAGNOSIS — C16.8 MALIGNANT NEOPLASM OF OVERLAPPING SITES OF STOMACH (HCC): ICD-10-CM

## 2020-12-10 LAB
ALBUMIN SERPL-MCNC: 3.6 G/DL (ref 3.5–5)
ALBUMIN/GLOB SERPL: 1 {RATIO} (ref 1.2–3.5)
ALP SERPL-CCNC: 158 U/L (ref 50–136)
ALT SERPL-CCNC: 131 U/L (ref 12–65)
ANION GAP SERPL CALC-SCNC: 8 MMOL/L (ref 7–16)
AST SERPL-CCNC: 88 U/L (ref 15–37)
BASOPHILS # BLD: 0.1 K/UL (ref 0–0.2)
BASOPHILS NFR BLD: 1 % (ref 0–2)
BILIRUB SERPL-MCNC: 0.4 MG/DL (ref 0.2–1.1)
BUN SERPL-MCNC: 12 MG/DL (ref 6–23)
CALCIUM SERPL-MCNC: 8.9 MG/DL (ref 8.3–10.4)
CANCER AG19-9 SERPL-ACNC: 152.3 U/ML (ref 2–37)
CEA SERPL-MCNC: 20.3 NG/ML (ref 0–3)
CHLORIDE SERPL-SCNC: 107 MMOL/L (ref 98–107)
CO2 SERPL-SCNC: 25 MMOL/L (ref 21–32)
CREAT SERPL-MCNC: 0.9 MG/DL (ref 0.8–1.5)
DIFFERENTIAL METHOD BLD: ABNORMAL
EOSINOPHIL # BLD: 0 K/UL (ref 0–0.8)
EOSINOPHIL NFR BLD: 0 % (ref 0.5–7.8)
ERYTHROCYTE [DISTWIDTH] IN BLOOD BY AUTOMATED COUNT: 15.9 % (ref 11.9–14.6)
GLOBULIN SER CALC-MCNC: 3.6 G/DL (ref 2.3–3.5)
GLUCOSE SERPL-MCNC: 142 MG/DL (ref 65–100)
HCT VFR BLD AUTO: 38 % (ref 41.1–50.3)
HGB BLD-MCNC: 12.6 G/DL (ref 13.6–17.2)
IMM GRANULOCYTES # BLD AUTO: 0.2 K/UL (ref 0–0.5)
IMM GRANULOCYTES NFR BLD AUTO: 5 % (ref 0–5)
LYMPHOCYTES # BLD: 1.7 K/UL (ref 0.5–4.6)
LYMPHOCYTES NFR BLD: 37 % (ref 13–44)
MAGNESIUM SERPL-MCNC: 2.2 MG/DL (ref 1.8–2.4)
MCH RBC QN AUTO: 29 PG (ref 26.1–32.9)
MCHC RBC AUTO-ENTMCNC: 33.2 G/DL (ref 31.4–35)
MCV RBC AUTO: 87.6 FL (ref 79.6–97.8)
MONOCYTES # BLD: 0.9 K/UL (ref 0.1–1.3)
MONOCYTES NFR BLD: 19 % (ref 4–12)
NEUTS SEG # BLD: 1.8 K/UL (ref 1.7–8.2)
NEUTS SEG NFR BLD: 38 % (ref 43–78)
NRBC # BLD: 0 K/UL (ref 0–0.2)
PLATELET # BLD AUTO: 313 K/UL (ref 150–450)
PMV BLD AUTO: 9.4 FL (ref 9.4–12.3)
POTASSIUM SERPL-SCNC: 4.2 MMOL/L (ref 3.5–5.1)
PROT SERPL-MCNC: 7.2 G/DL (ref 6.3–8.2)
RBC # BLD AUTO: 4.34 M/UL (ref 4.23–5.67)
SODIUM SERPL-SCNC: 140 MMOL/L (ref 136–145)
WBC # BLD AUTO: 4.7 K/UL (ref 4.3–11.1)

## 2020-12-10 PROCEDURE — 96411 CHEMO IV PUSH ADDL DRUG: CPT

## 2020-12-10 PROCEDURE — 96375 TX/PRO/DX INJ NEW DRUG ADDON: CPT

## 2020-12-10 PROCEDURE — 82378 CARCINOEMBRYONIC ANTIGEN: CPT

## 2020-12-10 PROCEDURE — 80053 COMPREHEN METABOLIC PANEL: CPT

## 2020-12-10 PROCEDURE — 83735 ASSAY OF MAGNESIUM: CPT

## 2020-12-10 PROCEDURE — 96372 THER/PROPH/DIAG INJ SC/IM: CPT

## 2020-12-10 PROCEDURE — 96415 CHEMO IV INFUSION ADDL HR: CPT

## 2020-12-10 PROCEDURE — 96367 TX/PROPH/DG ADDL SEQ IV INF: CPT

## 2020-12-10 PROCEDURE — 96417 CHEMO IV INFUS EACH ADDL SEQ: CPT

## 2020-12-10 PROCEDURE — 86301 IMMUNOASSAY TUMOR CA 19-9: CPT

## 2020-12-10 PROCEDURE — G0498 CHEMO EXTEND IV INFUS W/PUMP: HCPCS

## 2020-12-10 PROCEDURE — 74011000258 HC RX REV CODE- 258: Performed by: INTERNAL MEDICINE

## 2020-12-10 PROCEDURE — 96413 CHEMO IV INFUSION 1 HR: CPT

## 2020-12-10 PROCEDURE — 74011250636 HC RX REV CODE- 250/636: Performed by: INTERNAL MEDICINE

## 2020-12-10 PROCEDURE — 85025 COMPLETE CBC W/AUTO DIFF WBC: CPT

## 2020-12-10 PROCEDURE — 74011250636 HC RX REV CODE- 250/636: Performed by: NURSE PRACTITIONER

## 2020-12-10 PROCEDURE — 96368 THER/DIAG CONCURRENT INF: CPT

## 2020-12-10 PROCEDURE — 74011000258 HC RX REV CODE- 258: Performed by: NURSE PRACTITIONER

## 2020-12-10 RX ORDER — SODIUM CHLORIDE 0.9 % (FLUSH) 0.9 %
10 SYRINGE (ML) INJECTION AS NEEDED
Status: ACTIVE | OUTPATIENT
Start: 2020-12-10 | End: 2020-12-10

## 2020-12-10 RX ORDER — ONDANSETRON 2 MG/ML
8 INJECTION INTRAMUSCULAR; INTRAVENOUS ONCE
Status: COMPLETED | OUTPATIENT
Start: 2020-12-10 | End: 2020-12-10

## 2020-12-10 RX ORDER — DEXTROSE MONOHYDRATE 50 MG/ML
25 INJECTION, SOLUTION INTRAVENOUS CONTINUOUS
Status: ACTIVE | OUTPATIENT
Start: 2020-12-10 | End: 2020-12-10

## 2020-12-10 RX ORDER — FLUOROURACIL 50 MG/ML
300 INJECTION, SOLUTION INTRAVENOUS ONCE
Status: COMPLETED | OUTPATIENT
Start: 2020-12-10 | End: 2020-12-10

## 2020-12-10 RX ORDER — ATROPINE SULFATE 0.4 MG/ML
0.4 INJECTION, SOLUTION ENDOTRACHEAL; INTRAMEDULLARY; INTRAMUSCULAR; INTRAVENOUS; SUBCUTANEOUS ONCE
Status: COMPLETED | OUTPATIENT
Start: 2020-12-10 | End: 2020-12-10

## 2020-12-10 RX ADMIN — DEXAMETHASONE SODIUM PHOSPHATE 12 MG: 4 INJECTION, SOLUTION INTRAMUSCULAR; INTRAVENOUS at 10:21

## 2020-12-10 RX ADMIN — LEUCOVORIN CALCIUM 816 MG: 100 INJECTION, POWDER, LYOPHILIZED, FOR SUSPENSION INTRAMUSCULAR; INTRAVENOUS at 13:35

## 2020-12-10 RX ADMIN — FLUOROURACIL 4000 MG: 50 INJECTION, SOLUTION INTRAVENOUS at 15:32

## 2020-12-10 RX ADMIN — SODIUM CHLORIDE 150 MG: 900 INJECTION, SOLUTION INTRAVENOUS at 10:42

## 2020-12-10 RX ADMIN — IRINOTECAN HYDROCHLORIDE 240 MG: 20 INJECTION, SOLUTION INTRAVENOUS at 13:35

## 2020-12-10 RX ADMIN — Medication 10 ML: at 10:05

## 2020-12-10 RX ADMIN — ATROPINE SULFATE 0.4 MG: 0.4 INJECTION, SOLUTION INTRAMUSCULAR; INTRAVENOUS; SUBCUTANEOUS at 12:06

## 2020-12-10 RX ADMIN — ONDANSETRON 8 MG: 2 INJECTION INTRAMUSCULAR; INTRAVENOUS at 10:18

## 2020-12-10 RX ADMIN — OXALIPLATIN 132.5 MG: 5 INJECTION, SOLUTION, CONCENTRATE INTRAVENOUS at 11:19

## 2020-12-10 RX ADMIN — FLUOROURACIL 612 MG: 50 INJECTION, SOLUTION INTRAVENOUS at 15:22

## 2020-12-10 RX ADMIN — DEXTROSE MONOHYDRATE 25 ML/HR: 5 INJECTION, SOLUTION INTRAVENOUS at 10:05

## 2020-12-10 NOTE — PROGRESS NOTES
Arrived to the [facility]. Assessment completed. Labs reviewed. Patient tolerated chemotherapy well. Adrucil pump connected to his port today, before discharge, and all clamps on the tubing are open. The pump was checked with Vera Laughlin RN. Any issues or concerns during appointment: none. Patient aware of next infusion appointment on 12/12/20 (date) at 1500 (time) with [facility]. Discharged ambulatory, per self. Patient instructed to call Dr. Sumi Arevalo office immediately for any problems or concerns. He verbalizes understanding.

## 2020-12-12 ENCOUNTER — HOSPITAL ENCOUNTER (OUTPATIENT)
Dept: INFUSION THERAPY | Age: 45
Discharge: HOME | End: 2020-12-12
Payer: SUBSIDIZED

## 2020-12-12 VITALS
SYSTOLIC BLOOD PRESSURE: 118 MMHG | DIASTOLIC BLOOD PRESSURE: 73 MMHG | HEART RATE: 75 BPM | RESPIRATION RATE: 18 BRPM | TEMPERATURE: 97.3 F | OXYGEN SATURATION: 98 %

## 2020-12-12 DIAGNOSIS — T45.1X5A CINV (CHEMOTHERAPY-INDUCED NAUSEA AND VOMITING): ICD-10-CM

## 2020-12-12 DIAGNOSIS — C16.8 MALIGNANT NEOPLASM OF OVERLAPPING SITES OF STOMACH (HCC): ICD-10-CM

## 2020-12-12 DIAGNOSIS — R11.2 CINV (CHEMOTHERAPY-INDUCED NAUSEA AND VOMITING): ICD-10-CM

## 2020-12-12 PROCEDURE — 96523 IRRIG DRUG DELIVERY DEVICE: CPT

## 2020-12-12 RX ORDER — SODIUM CHLORIDE 0.9 % (FLUSH) 0.9 %
10-30 SYRINGE (ML) INJECTION AS NEEDED
Status: DISCONTINUED | OUTPATIENT
Start: 2020-12-12 | End: 2020-12-14 | Stop reason: HOSPADM

## 2020-12-12 RX ADMIN — Medication 10 ML: at 15:05

## 2020-12-12 NOTE — PROGRESS NOTES
Arrived to the [facility]. 5 FU Elastomeric pump completed. Provided education on pump  Patient instructed to report any side affects to ordering provider-  Patient tolerated well   Any issues or concerns during appointment:Patient asking about receiving IVF on day of pump d/c. Received them with first 2 cycles. No drop in B/P today,patient drinking well,no nausea. Patient declined nurse offer to infuse fluids.   Patient aware of next infusion appointment on Thursday,December 24th @ 1000  Discharged home ambulatory

## 2020-12-24 ENCOUNTER — HOSPITAL ENCOUNTER (OUTPATIENT)
Dept: INFUSION THERAPY | Age: 45
Discharge: HOME | End: 2020-12-24
Payer: SUBSIDIZED

## 2020-12-24 ENCOUNTER — PATIENT OUTREACH (OUTPATIENT)
Dept: CASE MANAGEMENT | Age: 45
End: 2020-12-24

## 2020-12-24 DIAGNOSIS — C16.8 MALIGNANT NEOPLASM OF OVERLAPPING SITES OF STOMACH (HCC): Primary | ICD-10-CM

## 2020-12-24 DIAGNOSIS — T45.1X5A CINV (CHEMOTHERAPY-INDUCED NAUSEA AND VOMITING): ICD-10-CM

## 2020-12-24 DIAGNOSIS — R11.2 CINV (CHEMOTHERAPY-INDUCED NAUSEA AND VOMITING): ICD-10-CM

## 2020-12-24 LAB
ALBUMIN SERPL-MCNC: 3.6 G/DL (ref 3.5–5)
ALBUMIN/GLOB SERPL: 1.1 {RATIO} (ref 1.2–3.5)
ALP SERPL-CCNC: 111 U/L (ref 50–136)
ALT SERPL-CCNC: 56 U/L (ref 12–65)
ANION GAP SERPL CALC-SCNC: 4 MMOL/L (ref 7–16)
AST SERPL-CCNC: 43 U/L (ref 15–37)
BASOPHILS # BLD: 0 K/UL (ref 0–0.2)
BASOPHILS NFR BLD: 0 % (ref 0–2)
BILIRUB SERPL-MCNC: 0.3 MG/DL (ref 0.2–1.1)
BUN SERPL-MCNC: 13 MG/DL (ref 6–23)
CALCIUM SERPL-MCNC: 9.2 MG/DL (ref 8.3–10.4)
CANCER AG19-9 SERPL-ACNC: 120.6 U/ML (ref 2–37)
CEA SERPL-MCNC: 16.9 NG/ML (ref 0–3)
CHLORIDE SERPL-SCNC: 109 MMOL/L (ref 98–107)
CO2 SERPL-SCNC: 27 MMOL/L (ref 21–32)
CREAT SERPL-MCNC: 0.8 MG/DL (ref 0.8–1.5)
DIFFERENTIAL METHOD BLD: ABNORMAL
EOSINOPHIL # BLD: 0 K/UL (ref 0–0.8)
EOSINOPHIL NFR BLD: 2 % (ref 0.5–7.8)
ERYTHROCYTE [DISTWIDTH] IN BLOOD BY AUTOMATED COUNT: 15.2 % (ref 11.9–14.6)
GLOBULIN SER CALC-MCNC: 3.4 G/DL (ref 2.3–3.5)
GLUCOSE SERPL-MCNC: 115 MG/DL (ref 65–100)
HCT VFR BLD AUTO: 35.3 % (ref 41.1–50.3)
HGB BLD-MCNC: 11.8 G/DL (ref 13.6–17.2)
IMM GRANULOCYTES # BLD AUTO: 0 K/UL (ref 0–0.5)
IMM GRANULOCYTES NFR BLD AUTO: 0 % (ref 0–5)
LYMPHOCYTES # BLD: 1.2 K/UL (ref 0.5–4.6)
LYMPHOCYTES NFR BLD: 43 % (ref 13–44)
MAGNESIUM SERPL-MCNC: 2 MG/DL (ref 1.8–2.4)
MCH RBC QN AUTO: 29.2 PG (ref 26.1–32.9)
MCHC RBC AUTO-ENTMCNC: 33.4 G/DL (ref 31.4–35)
MCV RBC AUTO: 87.4 FL (ref 79.6–97.8)
MONOCYTES # BLD: 0.5 K/UL (ref 0.1–1.3)
MONOCYTES NFR BLD: 19 % (ref 4–12)
NEUTS SEG # BLD: 1 K/UL (ref 1.7–8.2)
NEUTS SEG NFR BLD: 37 % (ref 43–78)
NRBC # BLD: 0 K/UL (ref 0–0.2)
PLATELET # BLD AUTO: 184 K/UL (ref 150–450)
PMV BLD AUTO: 9.8 FL (ref 9.4–12.3)
POTASSIUM SERPL-SCNC: 4.2 MMOL/L (ref 3.5–5.1)
PROT SERPL-MCNC: 7 G/DL (ref 6.3–8.2)
RBC # BLD AUTO: 4.04 M/UL (ref 4.23–5.67)
SODIUM SERPL-SCNC: 140 MMOL/L (ref 136–145)
WBC # BLD AUTO: 2.7 K/UL (ref 4.3–11.1)

## 2020-12-24 PROCEDURE — 96367 TX/PROPH/DG ADDL SEQ IV INF: CPT

## 2020-12-24 PROCEDURE — 82378 CARCINOEMBRYONIC ANTIGEN: CPT

## 2020-12-24 PROCEDURE — 85025 COMPLETE CBC W/AUTO DIFF WBC: CPT

## 2020-12-24 PROCEDURE — 86301 IMMUNOASSAY TUMOR CA 19-9: CPT

## 2020-12-24 PROCEDURE — 96411 CHEMO IV PUSH ADDL DRUG: CPT

## 2020-12-24 PROCEDURE — 96368 THER/DIAG CONCURRENT INF: CPT

## 2020-12-24 PROCEDURE — 84403 ASSAY OF TOTAL TESTOSTERONE: CPT

## 2020-12-24 PROCEDURE — 83735 ASSAY OF MAGNESIUM: CPT

## 2020-12-24 PROCEDURE — 80053 COMPREHEN METABOLIC PANEL: CPT

## 2020-12-24 PROCEDURE — 96417 CHEMO IV INFUS EACH ADDL SEQ: CPT

## 2020-12-24 PROCEDURE — 96372 THER/PROPH/DIAG INJ SC/IM: CPT

## 2020-12-24 PROCEDURE — G0498 CHEMO EXTEND IV INFUS W/PUMP: HCPCS

## 2020-12-24 PROCEDURE — 74011000258 HC RX REV CODE- 258: Performed by: INTERNAL MEDICINE

## 2020-12-24 PROCEDURE — 96415 CHEMO IV INFUSION ADDL HR: CPT

## 2020-12-24 PROCEDURE — 96375 TX/PRO/DX INJ NEW DRUG ADDON: CPT

## 2020-12-24 PROCEDURE — 96413 CHEMO IV INFUSION 1 HR: CPT

## 2020-12-24 PROCEDURE — 74011250636 HC RX REV CODE- 250/636: Performed by: INTERNAL MEDICINE

## 2020-12-24 RX ORDER — HYDROCORTISONE SODIUM SUCCINATE 100 MG/2ML
100 INJECTION, POWDER, FOR SOLUTION INTRAMUSCULAR; INTRAVENOUS AS NEEDED
Status: DISCONTINUED | OUTPATIENT
Start: 2020-12-24 | End: 2020-12-24 | Stop reason: HOSPADM

## 2020-12-24 RX ORDER — DEXTROSE MONOHYDRATE 50 MG/ML
25 INJECTION, SOLUTION INTRAVENOUS CONTINUOUS
Status: ACTIVE | OUTPATIENT
Start: 2020-12-24 | End: 2020-12-24

## 2020-12-24 RX ORDER — FLUOROURACIL 50 MG/ML
300 INJECTION, SOLUTION INTRAVENOUS ONCE
Status: COMPLETED | OUTPATIENT
Start: 2020-12-24 | End: 2020-12-24

## 2020-12-24 RX ORDER — ATROPINE SULFATE 0.4 MG/ML
0.4 INJECTION, SOLUTION ENDOTRACHEAL; INTRAMEDULLARY; INTRAMUSCULAR; INTRAVENOUS; SUBCUTANEOUS ONCE
Status: COMPLETED | OUTPATIENT
Start: 2020-12-24 | End: 2020-12-24

## 2020-12-24 RX ORDER — DIPHENHYDRAMINE HYDROCHLORIDE 50 MG/ML
25 INJECTION, SOLUTION INTRAMUSCULAR; INTRAVENOUS AS NEEDED
Status: DISCONTINUED | OUTPATIENT
Start: 2020-12-24 | End: 2020-12-24 | Stop reason: HOSPADM

## 2020-12-24 RX ORDER — ONDANSETRON 2 MG/ML
8 INJECTION INTRAMUSCULAR; INTRAVENOUS ONCE
Status: COMPLETED | OUTPATIENT
Start: 2020-12-24 | End: 2020-12-24

## 2020-12-24 RX ORDER — SODIUM CHLORIDE 0.9 % (FLUSH) 0.9 %
10 SYRINGE (ML) INJECTION ONCE
Status: COMPLETED | OUTPATIENT
Start: 2020-12-24 | End: 2020-12-24

## 2020-12-24 RX ORDER — SODIUM CHLORIDE 0.9 % (FLUSH) 0.9 %
10 SYRINGE (ML) INJECTION AS NEEDED
Status: ACTIVE | OUTPATIENT
Start: 2020-12-24 | End: 2020-12-24

## 2020-12-24 RX ADMIN — Medication 10 ML: at 10:30

## 2020-12-24 RX ADMIN — SODIUM CHLORIDE 150 MG: 900 INJECTION, SOLUTION INTRAVENOUS at 11:35

## 2020-12-24 RX ADMIN — LEUCOVORIN CALCIUM 816 MG: 100 INJECTION, POWDER, LYOPHILIZED, FOR SUSPENSION INTRAMUSCULAR; INTRAVENOUS at 14:15

## 2020-12-24 RX ADMIN — ONDANSETRON 8 MG: 2 INJECTION INTRAMUSCULAR; INTRAVENOUS at 10:58

## 2020-12-24 RX ADMIN — ATROPINE SULFATE 0.4 MG: 0.4 INJECTION, SOLUTION INTRAMUSCULAR; INTRAVENOUS; SUBCUTANEOUS at 13:25

## 2020-12-24 RX ADMIN — IRINOTECAN HYDROCHLORIDE 240 MG: 20 INJECTION, SOLUTION INTRAVENOUS at 14:15

## 2020-12-24 RX ADMIN — FLUOROURACIL 612 MG: 50 INJECTION, SOLUTION INTRAVENOUS at 16:10

## 2020-12-24 RX ADMIN — DEXTROSE MONOHYDRATE 25 ML/HR: 5 INJECTION, SOLUTION INTRAVENOUS at 10:40

## 2020-12-24 RX ADMIN — Medication 10 ML: at 16:15

## 2020-12-24 RX ADMIN — DEXAMETHASONE SODIUM PHOSPHATE 12 MG: 4 INJECTION, SOLUTION INTRAMUSCULAR; INTRAVENOUS at 11:14

## 2020-12-24 RX ADMIN — FLUOROURACIL 4000 MG: 50 INJECTION, SOLUTION INTRAVENOUS at 16:15

## 2020-12-24 RX ADMIN — OXALIPLATIN 132.5 MG: 5 INJECTION, SOLUTION, CONCENTRATE INTRAVENOUS at 12:05

## 2020-12-24 RX ADMIN — Medication 10 ML: at 09:15

## 2020-12-24 NOTE — PROGRESS NOTES
12/24/2020  Patient seen today with Dr Nallely Andre for pre-chemo office visit for C 4 5FU and adding Udenyca. Added labs called to lab.  Patient wishes to continue on treatment plan and navigation will follow

## 2020-12-24 NOTE — PROGRESS NOTES
Tolerated chemotherapy without difficulty. Fluorouracil elastomeric pump connected at 5ml/hr x 46 hours. Clamps unclamped x 2 and verified by Vitaly Chandler RN. Patient discharged via ambulation accompanied by self. Instructed to notify physician of any problems, questions or concerns after discharge. Next appointment is 01/26/2020 at 1430 with infusion.

## 2020-12-24 NOTE — ADDENDUM NOTE
Encounter addended by: Charity Vazquez on: 12/24/2020 10:48 AM   Actions taken: i-Vent created or edited

## 2020-12-24 NOTE — PROGRESS NOTES
Patient with blood tinged return to ort at time of arrival, after infusion of IVF patient noted to have full blood return.

## 2020-12-24 NOTE — PROGRESS NOTES
Port successfully accessed; despite several flushed and positions was not able to obtain blood return. Patient did not request Heparin. Labs drawn peripherally from left [facility]; sent to lab for testing. Educated patient on [employer] and reasoning why some ports do not give blood return.

## 2020-12-26 ENCOUNTER — HOSPITAL ENCOUNTER (OUTPATIENT)
Dept: INFUSION THERAPY | Age: 45
Discharge: HOME | End: 2020-12-26
Payer: SUBSIDIZED

## 2020-12-26 VITALS
SYSTOLIC BLOOD PRESSURE: 134 MMHG | TEMPERATURE: 98.1 F | HEART RATE: 95 BPM | RESPIRATION RATE: 18 BRPM | DIASTOLIC BLOOD PRESSURE: 71 MMHG | OXYGEN SATURATION: 97 %

## 2020-12-26 DIAGNOSIS — R11.2 CINV (CHEMOTHERAPY-INDUCED NAUSEA AND VOMITING): Primary | ICD-10-CM

## 2020-12-26 DIAGNOSIS — C16.8 MALIGNANT NEOPLASM OF OVERLAPPING SITES OF STOMACH (HCC): ICD-10-CM

## 2020-12-26 DIAGNOSIS — T45.1X5A CINV (CHEMOTHERAPY-INDUCED NAUSEA AND VOMITING): Primary | ICD-10-CM

## 2020-12-26 LAB
TESTOST FREE SERPL-MCNC: 7.5 PG/ML (ref 6.8–21.5)
TESTOST SERPL-MCNC: 628 NG/DL (ref 264–916)

## 2020-12-26 PROCEDURE — 96523 IRRIG DRUG DELIVERY DEVICE: CPT

## 2020-12-26 PROCEDURE — 74011000250 HC RX REV CODE- 250: Performed by: INTERNAL MEDICINE

## 2020-12-26 RX ORDER — SODIUM CHLORIDE 9 MG/ML
10 INJECTION INTRAMUSCULAR; INTRAVENOUS; SUBCUTANEOUS AS NEEDED
Status: DISCONTINUED | OUTPATIENT
Start: 2020-12-26 | End: 2020-12-27 | Stop reason: HOSPADM

## 2020-12-26 RX ADMIN — SODIUM CHLORIDE 10 ML: 9 INJECTION, SOLUTION INTRAMUSCULAR; INTRAVENOUS; SUBCUTANEOUS at 14:57

## 2020-12-26 NOTE — PROGRESS NOTES
Arrived to the [facility]. Assessment completed Adricil  elastomeric pump completed and d/carlos eduardo . Patient tolerated without problems. Any issues or concerns during appointment: None  Instructed to call Dr Master Ivory  with any side effects or concerns  Patient aware of next infusion appointment on 12/28/20 (date) at 9 27 AM (time).   Discharged ambulatory

## 2020-12-28 ENCOUNTER — HOSPITAL ENCOUNTER (OUTPATIENT)
Dept: INFUSION THERAPY | Age: 45
Discharge: HOME | End: 2020-12-28
Payer: SUBSIDIZED

## 2020-12-28 VITALS
WEIGHT: 184.8 LBS | BODY MASS INDEX: 25.77 KG/M2 | TEMPERATURE: 97.1 F | HEART RATE: 98 BPM | RESPIRATION RATE: 18 BRPM | DIASTOLIC BLOOD PRESSURE: 76 MMHG | SYSTOLIC BLOOD PRESSURE: 125 MMHG | OXYGEN SATURATION: 97 %

## 2020-12-28 DIAGNOSIS — R11.2 CINV (CHEMOTHERAPY-INDUCED NAUSEA AND VOMITING): Primary | ICD-10-CM

## 2020-12-28 DIAGNOSIS — C16.8 MALIGNANT NEOPLASM OF OVERLAPPING SITES OF STOMACH (HCC): ICD-10-CM

## 2020-12-28 DIAGNOSIS — T45.1X5A CINV (CHEMOTHERAPY-INDUCED NAUSEA AND VOMITING): Primary | ICD-10-CM

## 2020-12-28 PROCEDURE — 96372 THER/PROPH/DIAG INJ SC/IM: CPT

## 2020-12-28 PROCEDURE — 74011250636 HC RX REV CODE- 250/636: Performed by: INTERNAL MEDICINE

## 2020-12-28 RX ADMIN — PEGFILGRASTIM-CBQV 6 MG: 6 INJECTION, SOLUTION SUBCUTANEOUS at 11:57

## 2020-12-28 NOTE — PROGRESS NOTES
[facility] Assessment completed and labs reviewed. Udenyca injection given. Patient tolerated well. Any issues or concerns during appointment: none. Patient aware of next infusion appointment on 01/07/2021 at 1100. Discharged ambulatory.

## 2021-01-07 ENCOUNTER — PATIENT OUTREACH (OUTPATIENT)
Dept: CASE MANAGEMENT | Age: 46
End: 2021-01-07

## 2021-01-07 ENCOUNTER — HOSPITAL ENCOUNTER (OUTPATIENT)
Dept: INFUSION THERAPY | Age: 46
Discharge: HOME | End: 2021-01-07
Payer: SUBSIDIZED

## 2021-01-07 VITALS — OXYGEN SATURATION: 100 %

## 2021-01-07 DIAGNOSIS — C16.8 MALIGNANT NEOPLASM OF OVERLAPPING SITES OF STOMACH (HCC): Primary | ICD-10-CM

## 2021-01-07 DIAGNOSIS — T45.1X5A CINV (CHEMOTHERAPY-INDUCED NAUSEA AND VOMITING): ICD-10-CM

## 2021-01-07 DIAGNOSIS — R11.2 CINV (CHEMOTHERAPY-INDUCED NAUSEA AND VOMITING): ICD-10-CM

## 2021-01-07 LAB
ALBUMIN SERPL-MCNC: 3.4 G/DL (ref 3.5–5)
ALBUMIN/GLOB SERPL: 1 {RATIO} (ref 1.2–3.5)
ALP SERPL-CCNC: 189 U/L (ref 50–136)
ALT SERPL-CCNC: 57 U/L (ref 12–65)
ANION GAP SERPL CALC-SCNC: 7 MMOL/L (ref 7–16)
AST SERPL-CCNC: 57 U/L (ref 15–37)
BASOPHILS # BLD: 0.1 K/UL (ref 0–0.2)
BASOPHILS NFR BLD: 1 % (ref 0–2)
BILIRUB SERPL-MCNC: 0.2 MG/DL (ref 0.2–1.1)
BUN SERPL-MCNC: 12 MG/DL (ref 6–23)
CALCIUM SERPL-MCNC: 9 MG/DL (ref 8.3–10.4)
CANCER AG19-9 SERPL-ACNC: 125.6 U/ML (ref 2–37)
CEA SERPL-MCNC: 14.3 NG/ML (ref 0–3)
CHLORIDE SERPL-SCNC: 108 MMOL/L (ref 98–107)
CO2 SERPL-SCNC: 24 MMOL/L (ref 21–32)
CREAT SERPL-MCNC: 1 MG/DL (ref 0.8–1.5)
DIFFERENTIAL METHOD BLD: ABNORMAL
EOSINOPHIL # BLD: 0 K/UL (ref 0–0.8)
EOSINOPHIL NFR BLD: 0 % (ref 0.5–7.8)
ERYTHROCYTE [DISTWIDTH] IN BLOOD BY AUTOMATED COUNT: 15.9 % (ref 11.9–14.6)
GLOBULIN SER CALC-MCNC: 3.3 G/DL (ref 2.3–3.5)
GLUCOSE SERPL-MCNC: 184 MG/DL (ref 65–100)
HCT VFR BLD AUTO: 35.4 % (ref 41.1–50.3)
HGB BLD-MCNC: 11.4 G/DL (ref 13.6–17.2)
IMM GRANULOCYTES # BLD AUTO: 0.8 K/UL (ref 0–0.5)
IMM GRANULOCYTES NFR BLD AUTO: 6 % (ref 0–5)
LYMPHOCYTES # BLD: 1.8 K/UL (ref 0.5–4.6)
LYMPHOCYTES NFR BLD: 13 % (ref 13–44)
MAGNESIUM SERPL-MCNC: 2 MG/DL (ref 1.8–2.4)
MCH RBC QN AUTO: 29.3 PG (ref 26.1–32.9)
MCHC RBC AUTO-ENTMCNC: 32.2 G/DL (ref 31.4–35)
MCV RBC AUTO: 91 FL (ref 79.6–97.8)
MONOCYTES # BLD: 1.1 K/UL (ref 0.1–1.3)
MONOCYTES NFR BLD: 8 % (ref 4–12)
NEUTS SEG # BLD: 9.8 K/UL (ref 1.7–8.2)
NEUTS SEG NFR BLD: 72 % (ref 43–78)
NRBC # BLD: 0.05 K/UL (ref 0–0.2)
PLATELET # BLD AUTO: 107 K/UL (ref 150–450)
PLATELET COMMENTS,PCOM: ABNORMAL
PMV BLD AUTO: 10.2 FL (ref 9.4–12.3)
POTASSIUM SERPL-SCNC: 3.6 MMOL/L (ref 3.5–5.1)
PROT SERPL-MCNC: 6.7 G/DL (ref 6.3–8.2)
RBC # BLD AUTO: 3.89 M/UL (ref 4.23–5.67)
RBC MORPH BLD: ABNORMAL
RBC MORPH BLD: ABNORMAL
SODIUM SERPL-SCNC: 139 MMOL/L (ref 136–145)
WBC # BLD AUTO: 13.6 K/UL (ref 4.3–11.1)
WBC MORPH BLD: ABNORMAL

## 2021-01-07 PROCEDURE — 74011250636 HC RX REV CODE- 250/636: Performed by: INTERNAL MEDICINE

## 2021-01-07 PROCEDURE — 96375 TX/PRO/DX INJ NEW DRUG ADDON: CPT

## 2021-01-07 PROCEDURE — 80053 COMPREHEN METABOLIC PANEL: CPT

## 2021-01-07 PROCEDURE — 96417 CHEMO IV INFUS EACH ADDL SEQ: CPT

## 2021-01-07 PROCEDURE — 96367 TX/PROPH/DG ADDL SEQ IV INF: CPT

## 2021-01-07 PROCEDURE — 96372 THER/PROPH/DIAG INJ SC/IM: CPT

## 2021-01-07 PROCEDURE — 83735 ASSAY OF MAGNESIUM: CPT

## 2021-01-07 PROCEDURE — 82378 CARCINOEMBRYONIC ANTIGEN: CPT

## 2021-01-07 PROCEDURE — 86301 IMMUNOASSAY TUMOR CA 19-9: CPT

## 2021-01-07 PROCEDURE — 96413 CHEMO IV INFUSION 1 HR: CPT

## 2021-01-07 PROCEDURE — 96368 THER/DIAG CONCURRENT INF: CPT

## 2021-01-07 PROCEDURE — 74011000258 HC RX REV CODE- 258: Performed by: INTERNAL MEDICINE

## 2021-01-07 PROCEDURE — 96411 CHEMO IV PUSH ADDL DRUG: CPT

## 2021-01-07 PROCEDURE — G0498 CHEMO EXTEND IV INFUS W/PUMP: HCPCS

## 2021-01-07 PROCEDURE — 96415 CHEMO IV INFUSION ADDL HR: CPT

## 2021-01-07 PROCEDURE — 85025 COMPLETE CBC W/AUTO DIFF WBC: CPT

## 2021-01-07 RX ORDER — ONDANSETRON 2 MG/ML
8 INJECTION INTRAMUSCULAR; INTRAVENOUS ONCE
Status: COMPLETED | OUTPATIENT
Start: 2021-01-07 | End: 2021-01-07

## 2021-01-07 RX ORDER — FLUOROURACIL 50 MG/ML
300 INJECTION, SOLUTION INTRAVENOUS ONCE
Status: COMPLETED | OUTPATIENT
Start: 2021-01-07 | End: 2021-01-07

## 2021-01-07 RX ORDER — SODIUM CHLORIDE 0.9 % (FLUSH) 0.9 %
10 SYRINGE (ML) INJECTION AS NEEDED
Status: ACTIVE | OUTPATIENT
Start: 2021-01-07 | End: 2021-01-07

## 2021-01-07 RX ORDER — ATROPINE SULFATE 0.4 MG/ML
0.4 INJECTION, SOLUTION ENDOTRACHEAL; INTRAMEDULLARY; INTRAMUSCULAR; INTRAVENOUS; SUBCUTANEOUS ONCE
Status: COMPLETED | OUTPATIENT
Start: 2021-01-07 | End: 2021-01-07

## 2021-01-07 RX ORDER — DEXTROSE MONOHYDRATE 50 MG/ML
25 INJECTION, SOLUTION INTRAVENOUS CONTINUOUS
Status: ACTIVE | OUTPATIENT
Start: 2021-01-07 | End: 2021-01-07

## 2021-01-07 RX ADMIN — LEUCOVORIN CALCIUM 816 MG: 200 INJECTION, POWDER, LYOPHILIZED, FOR SUSPENSION INTRAMUSCULAR; INTRAVENOUS at 15:12

## 2021-01-07 RX ADMIN — OXALIPLATIN 132.5 MG: 5 INJECTION, SOLUTION, CONCENTRATE INTRAVENOUS at 13:00

## 2021-01-07 RX ADMIN — FLUOROURACIL 4000 MG: 50 INJECTION, SOLUTION INTRAVENOUS at 16:55

## 2021-01-07 RX ADMIN — IRINOTECAN HYDROCHLORIDE 240 MG: 20 INJECTION, SOLUTION INTRAVENOUS at 15:12

## 2021-01-07 RX ADMIN — FLUOROURACIL 612 MG: 50 INJECTION, SOLUTION INTRAVENOUS at 16:50

## 2021-01-07 RX ADMIN — DEXTROSE MONOHYDRATE 25 ML/HR: 5 INJECTION, SOLUTION INTRAVENOUS at 11:50

## 2021-01-07 RX ADMIN — ONDANSETRON 8 MG: 2 INJECTION INTRAMUSCULAR; INTRAVENOUS at 11:53

## 2021-01-07 RX ADMIN — DEXAMETHASONE SODIUM PHOSPHATE 12 MG: 4 INJECTION, SOLUTION INTRAMUSCULAR; INTRAVENOUS at 12:06

## 2021-01-07 RX ADMIN — ATROPINE SULFATE 0.4 MG: 0.4 INJECTION, SOLUTION INTRAMUSCULAR; INTRAVENOUS; SUBCUTANEOUS at 14:17

## 2021-01-07 RX ADMIN — SODIUM CHLORIDE 150 MG: 900 INJECTION, SOLUTION INTRAVENOUS at 12:30

## 2021-01-07 NOTE — PROGRESS NOTES
Tolerated chemotherapy without difficulty. Patient discharged via ambulation accompanied by family. Instructed to notify physician of any problems, questions or concerns after discharge.   Next appointment is 01/09/2021 at 1600 with Infusion for pump removal.

## 2021-01-09 ENCOUNTER — HOSPITAL ENCOUNTER (OUTPATIENT)
Dept: INFUSION THERAPY | Age: 46
Discharge: HOME | End: 2021-01-09
Payer: SUBSIDIZED

## 2021-01-09 DIAGNOSIS — C16.8 MALIGNANT NEOPLASM OF OVERLAPPING SITES OF STOMACH (HCC): ICD-10-CM

## 2021-01-09 DIAGNOSIS — R11.2 CINV (CHEMOTHERAPY-INDUCED NAUSEA AND VOMITING): Primary | ICD-10-CM

## 2021-01-09 DIAGNOSIS — T45.1X5A CINV (CHEMOTHERAPY-INDUCED NAUSEA AND VOMITING): Primary | ICD-10-CM

## 2021-01-09 PROCEDURE — 96523 IRRIG DRUG DELIVERY DEVICE: CPT

## 2021-01-09 RX ORDER — SODIUM CHLORIDE 0.9 % (FLUSH) 0.9 %
10 SYRINGE (ML) INJECTION AS NEEDED
Status: DISCONTINUED | OUTPATIENT
Start: 2021-01-09 | End: 2021-01-10 | Stop reason: HOSPADM

## 2021-01-09 RX ORDER — SODIUM CHLORIDE 9 MG/ML
10 INJECTION INTRAMUSCULAR; INTRAVENOUS; SUBCUTANEOUS AS NEEDED
Status: DISCONTINUED | OUTPATIENT
Start: 2021-01-09 | End: 2021-01-10 | Stop reason: HOSPADM

## 2021-01-09 RX ORDER — HEPARIN 100 UNIT/ML
300-500 SYRINGE INTRAVENOUS AS NEEDED
Status: DISCONTINUED | OUTPATIENT
Start: 2021-01-09 | End: 2021-01-10 | Stop reason: HOSPADM

## 2021-01-09 RX ADMIN — Medication 10 ML: at 16:15

## 2021-01-09 NOTE — PROGRESS NOTES
Arrived to the [facility]. DC chemo pump completed. Patient tolerated well. Any issues or concerns during appointment: Pt has concerns about the cost of Udenyca injections going forward. Advised pt to speak to financial assistance and physician regarding necessity for injection. Patient aware of next infusion appointment on 01/11/2021   Discharged home in stable condition.

## 2021-01-11 ENCOUNTER — HOSPITAL ENCOUNTER (OUTPATIENT)
Dept: INFUSION THERAPY | Age: 46
Discharge: HOME | End: 2021-01-11
Payer: SUBSIDIZED

## 2021-01-11 VITALS
DIASTOLIC BLOOD PRESSURE: 55 MMHG | TEMPERATURE: 98.2 F | RESPIRATION RATE: 16 BRPM | OXYGEN SATURATION: 99 % | SYSTOLIC BLOOD PRESSURE: 98 MMHG | HEART RATE: 91 BPM

## 2021-01-11 DIAGNOSIS — C16.8 MALIGNANT NEOPLASM OF OVERLAPPING SITES OF STOMACH (HCC): Primary | ICD-10-CM

## 2021-01-11 PROCEDURE — 74011250636 HC RX REV CODE- 250/636: Performed by: INTERNAL MEDICINE

## 2021-01-11 PROCEDURE — 96372 THER/PROPH/DIAG INJ SC/IM: CPT

## 2021-01-11 RX ADMIN — PEGFILGRASTIM-CBQV 6 MG: 6 INJECTION, SOLUTION SUBCUTANEOUS at 14:28

## 2021-01-11 NOTE — PROGRESS NOTES
Arrived to the [facility]. Udenyca injection completed. Provided education on udenyca side effects. Patient instructed to report any side affects to ordering provider. Patient tolerated well. Any issues or concerns during appointment: none. Patient aware of next infusion appointment on 1/21 at 9:30am.  Discharged ambulatory to home.

## 2021-01-21 ENCOUNTER — HOSPITAL ENCOUNTER (OUTPATIENT)
Dept: INFUSION THERAPY | Age: 46
Discharge: HOME | End: 2021-01-21
Payer: SUBSIDIZED

## 2021-01-21 DIAGNOSIS — C16.8 MALIGNANT NEOPLASM OF OVERLAPPING SITES OF STOMACH (HCC): Primary | ICD-10-CM

## 2021-01-21 DIAGNOSIS — T45.1X5A CINV (CHEMOTHERAPY-INDUCED NAUSEA AND VOMITING): ICD-10-CM

## 2021-01-21 DIAGNOSIS — R11.2 CINV (CHEMOTHERAPY-INDUCED NAUSEA AND VOMITING): ICD-10-CM

## 2021-01-21 LAB
ALBUMIN SERPL-MCNC: 3.4 G/DL (ref 3.5–5)
ALBUMIN/GLOB SERPL: 1 {RATIO} (ref 1.2–3.5)
ALP SERPL-CCNC: 198 U/L (ref 50–136)
ALT SERPL-CCNC: 46 U/L (ref 12–65)
ANION GAP SERPL CALC-SCNC: 8 MMOL/L (ref 7–16)
AST SERPL-CCNC: 38 U/L (ref 15–37)
BASOPHILS # BLD: 0.2 K/UL (ref 0–0.2)
BASOPHILS NFR BLD: 1 % (ref 0–2)
BILIRUB SERPL-MCNC: 0.2 MG/DL (ref 0.2–1.1)
BUN SERPL-MCNC: 11 MG/DL (ref 6–23)
CALCIUM SERPL-MCNC: 8.9 MG/DL (ref 8.3–10.4)
CANCER AG19-9 SERPL-ACNC: 72.3 U/ML (ref 2–37)
CEA SERPL-MCNC: 9.3 NG/ML (ref 0–3)
CHLORIDE SERPL-SCNC: 109 MMOL/L (ref 98–107)
CO2 SERPL-SCNC: 24 MMOL/L (ref 21–32)
CREAT SERPL-MCNC: 1.1 MG/DL (ref 0.8–1.5)
DIFFERENTIAL METHOD BLD: ABNORMAL
EOSINOPHIL # BLD: 0 K/UL (ref 0–0.8)
EOSINOPHIL NFR BLD: 0 % (ref 0.5–7.8)
ERYTHROCYTE [DISTWIDTH] IN BLOOD BY AUTOMATED COUNT: 16.5 % (ref 11.9–14.6)
GLOBULIN SER CALC-MCNC: 3.3 G/DL (ref 2.3–3.5)
GLUCOSE SERPL-MCNC: 168 MG/DL (ref 65–100)
HCT VFR BLD AUTO: 34.8 % (ref 41.1–50.3)
HGB BLD-MCNC: 11.3 G/DL (ref 13.6–17.2)
IMM GRANULOCYTES # BLD AUTO: 1.9 K/UL (ref 0–0.5)
IMM GRANULOCYTES NFR BLD AUTO: 10 % (ref 0–5)
LYMPHOCYTES # BLD: 2.6 K/UL (ref 0.5–4.6)
LYMPHOCYTES NFR BLD: 14 % (ref 13–44)
MAGNESIUM SERPL-MCNC: 2.1 MG/DL (ref 1.8–2.4)
MCH RBC QN AUTO: 29.9 PG (ref 26.1–32.9)
MCHC RBC AUTO-ENTMCNC: 32.5 G/DL (ref 31.4–35)
MCV RBC AUTO: 92.1 FL (ref 79.6–97.8)
MONOCYTES # BLD: 1.5 K/UL (ref 0.1–1.3)
MONOCYTES NFR BLD: 8 % (ref 4–12)
NEUTS SEG # BLD: 12.7 K/UL (ref 1.7–8.2)
NEUTS SEG NFR BLD: 67 % (ref 43–78)
NRBC # BLD: 0.09 K/UL (ref 0–0.2)
PLATELET # BLD AUTO: 138 K/UL (ref 150–450)
PLATELET COMMENTS,PCOM: ADEQUATE
PMV BLD AUTO: 9.5 FL (ref 9.4–12.3)
POTASSIUM SERPL-SCNC: 3.7 MMOL/L (ref 3.5–5.1)
PROT SERPL-MCNC: 6.7 G/DL (ref 6.3–8.2)
RBC # BLD AUTO: 3.78 M/UL (ref 4.23–5.67)
RBC MORPH BLD: ABNORMAL
SODIUM SERPL-SCNC: 141 MMOL/L (ref 136–145)
WBC # BLD AUTO: 18.9 K/UL (ref 4.3–11.1)
WBC MORPH BLD: ABNORMAL

## 2021-01-21 PROCEDURE — 80053 COMPREHEN METABOLIC PANEL: CPT

## 2021-01-21 PROCEDURE — 96411 CHEMO IV PUSH ADDL DRUG: CPT

## 2021-01-21 PROCEDURE — G0498 CHEMO EXTEND IV INFUS W/PUMP: HCPCS

## 2021-01-21 PROCEDURE — 85025 COMPLETE CBC W/AUTO DIFF WBC: CPT

## 2021-01-21 PROCEDURE — 96368 THER/DIAG CONCURRENT INF: CPT

## 2021-01-21 PROCEDURE — 96375 TX/PRO/DX INJ NEW DRUG ADDON: CPT

## 2021-01-21 PROCEDURE — 86301 IMMUNOASSAY TUMOR CA 19-9: CPT

## 2021-01-21 PROCEDURE — 82378 CARCINOEMBRYONIC ANTIGEN: CPT

## 2021-01-21 PROCEDURE — 96415 CHEMO IV INFUSION ADDL HR: CPT

## 2021-01-21 PROCEDURE — 96417 CHEMO IV INFUS EACH ADDL SEQ: CPT

## 2021-01-21 PROCEDURE — 74011250636 HC RX REV CODE- 250/636: Performed by: INTERNAL MEDICINE

## 2021-01-21 PROCEDURE — 96372 THER/PROPH/DIAG INJ SC/IM: CPT

## 2021-01-21 PROCEDURE — 96413 CHEMO IV INFUSION 1 HR: CPT

## 2021-01-21 PROCEDURE — 74011000258 HC RX REV CODE- 258: Performed by: INTERNAL MEDICINE

## 2021-01-21 PROCEDURE — 96367 TX/PROPH/DG ADDL SEQ IV INF: CPT

## 2021-01-21 PROCEDURE — 83735 ASSAY OF MAGNESIUM: CPT

## 2021-01-21 RX ORDER — SODIUM CHLORIDE 0.9 % (FLUSH) 0.9 %
10 SYRINGE (ML) INJECTION AS NEEDED
Status: ACTIVE | OUTPATIENT
Start: 2021-01-21 | End: 2021-01-21

## 2021-01-21 RX ORDER — ATROPINE SULFATE 0.4 MG/ML
0.4 INJECTION, SOLUTION ENDOTRACHEAL; INTRAMEDULLARY; INTRAMUSCULAR; INTRAVENOUS; SUBCUTANEOUS
Status: ACTIVE | OUTPATIENT
Start: 2021-01-21 | End: 2021-01-21

## 2021-01-21 RX ORDER — DEXTROSE MONOHYDRATE 50 MG/ML
25 INJECTION, SOLUTION INTRAVENOUS CONTINUOUS
Status: ACTIVE | OUTPATIENT
Start: 2021-01-21 | End: 2021-01-21

## 2021-01-21 RX ORDER — ATROPINE SULFATE 0.4 MG/ML
0.4 INJECTION, SOLUTION ENDOTRACHEAL; INTRAMEDULLARY; INTRAMUSCULAR; INTRAVENOUS; SUBCUTANEOUS ONCE
Status: COMPLETED | OUTPATIENT
Start: 2021-01-21 | End: 2021-01-21

## 2021-01-21 RX ORDER — ONDANSETRON 2 MG/ML
8 INJECTION INTRAMUSCULAR; INTRAVENOUS ONCE
Status: COMPLETED | OUTPATIENT
Start: 2021-01-21 | End: 2021-01-21

## 2021-01-21 RX ORDER — FLUOROURACIL 50 MG/ML
300 INJECTION, SOLUTION INTRAVENOUS ONCE
Status: COMPLETED | OUTPATIENT
Start: 2021-01-21 | End: 2021-01-21

## 2021-01-21 RX ADMIN — SODIUM CHLORIDE 150 MG: 900 INJECTION, SOLUTION INTRAVENOUS at 11:00

## 2021-01-21 RX ADMIN — OXALIPLATIN 132.5 MG: 5 INJECTION, SOLUTION, CONCENTRATE INTRAVENOUS at 11:27

## 2021-01-21 RX ADMIN — DEXTROSE MONOHYDRATE 25 ML/HR: 5 INJECTION, SOLUTION INTRAVENOUS at 10:36

## 2021-01-21 RX ADMIN — DEXAMETHASONE SODIUM PHOSPHATE 12 MG: 4 INJECTION, SOLUTION INTRAMUSCULAR; INTRAVENOUS at 10:40

## 2021-01-21 RX ADMIN — IRINOTECAN HYDROCHLORIDE 240 MG: 20 INJECTION, SOLUTION INTRAVENOUS at 13:09

## 2021-01-21 RX ADMIN — ONDANSETRON 8 MG: 2 INJECTION INTRAMUSCULAR; INTRAVENOUS at 10:31

## 2021-01-21 RX ADMIN — Medication 10 ML: at 10:30

## 2021-01-21 RX ADMIN — FLUOROURACIL 4000 MG: 50 INJECTION, SOLUTION INTRAVENOUS at 15:15

## 2021-01-21 RX ADMIN — FLUOROURACIL 612 MG: 50 INJECTION, SOLUTION INTRAVENOUS at 15:10

## 2021-01-21 RX ADMIN — ATROPINE SULFATE 0.4 MG: 0.4 INJECTION, SOLUTION INTRAMUSCULAR; INTRAVENOUS; SUBCUTANEOUS at 13:12

## 2021-01-21 RX ADMIN — LEUCOVORIN CALCIUM 816 MG: 200 INJECTION, POWDER, LYOPHILIZED, FOR SUSPENSION INTRAMUSCULAR; INTRAVENOUS at 13:09

## 2021-01-21 NOTE — PROGRESS NOTES
Arrived to the [facility]. Folfirinox completed. Patient tolerated without adverse reaction. Any issues or concerns during appointment: none. Patient aware of next infusion appointment on 1/23 (date) at 1330 (time). Discharged to home.

## 2021-01-23 ENCOUNTER — HOSPITAL ENCOUNTER (OUTPATIENT)
Dept: INFUSION THERAPY | Age: 46
Discharge: HOME | End: 2021-01-23

## 2021-01-23 NOTE — PROGRESS NOTES
Patient did no keep appointment for pump D/C. Called patient and he said he is out of town and can not come in until tomorrow. Informed patient that infusion center closes at 1500. Patient verbalizes understanding.

## 2021-01-24 ENCOUNTER — APPOINTMENT (OUTPATIENT)
Dept: INFUSION THERAPY | Age: 46
End: 2021-01-24

## 2021-01-24 ENCOUNTER — HOSPITAL ENCOUNTER (OUTPATIENT)
Dept: INFUSION THERAPY | Age: 46
Discharge: HOME | End: 2021-01-24
Payer: SUBSIDIZED

## 2021-01-24 VITALS
TEMPERATURE: 97.4 F | OXYGEN SATURATION: 99 % | HEART RATE: 80 BPM | SYSTOLIC BLOOD PRESSURE: 112 MMHG | DIASTOLIC BLOOD PRESSURE: 62 MMHG | RESPIRATION RATE: 18 BRPM

## 2021-01-24 DIAGNOSIS — T45.1X5A CINV (CHEMOTHERAPY-INDUCED NAUSEA AND VOMITING): Primary | ICD-10-CM

## 2021-01-24 DIAGNOSIS — R11.2 CINV (CHEMOTHERAPY-INDUCED NAUSEA AND VOMITING): Primary | ICD-10-CM

## 2021-01-24 DIAGNOSIS — C16.8 MALIGNANT NEOPLASM OF OVERLAPPING SITES OF STOMACH (HCC): ICD-10-CM

## 2021-01-24 PROCEDURE — 96523 IRRIG DRUG DELIVERY DEVICE: CPT

## 2021-01-24 RX ORDER — SODIUM CHLORIDE 0.9 % (FLUSH) 0.9 %
10 SYRINGE (ML) INJECTION AS NEEDED
Status: DISCONTINUED | OUTPATIENT
Start: 2021-01-24 | End: 2021-01-25 | Stop reason: HOSPADM

## 2021-01-24 RX ADMIN — Medication 10 ML: at 13:53

## 2021-01-24 NOTE — PROGRESS NOTES
Arrived to the [facility]. Chemotherapy pump completed & disconnected from pt. Patient tolerated well. Any issues or concerns during appointment: none. Patient aware of next infusion appointment on 2-4-21 (date) at 80 (time). Discharged via ambulatory.

## 2021-01-26 NOTE — ADDENDUM NOTE
Encounter addended by: Elan Mejia RN on: 1/26/2021 9:30 AM   Actions taken: MAR administration accepted

## 2021-01-26 NOTE — ADDENDUM NOTE
Encounter addended by: Brennen Walker RN on: 1/26/2021 6:15 PM   Actions taken: MAR administration edited, MAR administration accepted

## 2021-02-04 ENCOUNTER — HOSPITAL ENCOUNTER (OUTPATIENT)
Dept: INFUSION THERAPY | Age: 46
Discharge: HOME | End: 2021-02-04
Payer: SUBSIDIZED

## 2021-02-04 DIAGNOSIS — C16.8 MALIGNANT NEOPLASM OF OVERLAPPING SITES OF STOMACH (HCC): ICD-10-CM

## 2021-02-04 LAB
ALBUMIN SERPL-MCNC: 3.3 G/DL (ref 3.5–5)
ALBUMIN/GLOB SERPL: 1.1 {RATIO} (ref 1.2–3.5)
ALP SERPL-CCNC: 117 U/L (ref 50–136)
ALT SERPL-CCNC: 38 U/L (ref 12–65)
ANION GAP SERPL CALC-SCNC: 6 MMOL/L (ref 7–16)
AST SERPL-CCNC: 29 U/L (ref 15–37)
BASOPHILS # BLD: 0 K/UL (ref 0–0.2)
BASOPHILS NFR BLD: 1 % (ref 0–2)
BILIRUB SERPL-MCNC: 0.3 MG/DL (ref 0.2–1.1)
BUN SERPL-MCNC: 12 MG/DL (ref 6–23)
CALCIUM SERPL-MCNC: 8.2 MG/DL (ref 8.3–10.4)
CANCER AG19-9 SERPL-ACNC: 35.8 U/ML (ref 2–37)
CEA SERPL-MCNC: 5.8 NG/ML (ref 0–3)
CHLORIDE SERPL-SCNC: 112 MMOL/L (ref 98–107)
CO2 SERPL-SCNC: 25 MMOL/L (ref 21–32)
CREAT SERPL-MCNC: 0.8 MG/DL (ref 0.8–1.5)
DIFFERENTIAL METHOD BLD: ABNORMAL
EOSINOPHIL # BLD: 0 K/UL (ref 0–0.8)
EOSINOPHIL NFR BLD: 1 % (ref 0.5–7.8)
ERYTHROCYTE [DISTWIDTH] IN BLOOD BY AUTOMATED COUNT: 15 % (ref 11.9–14.6)
GLOBULIN SER CALC-MCNC: 2.9 G/DL (ref 2.3–3.5)
GLUCOSE SERPL-MCNC: 138 MG/DL (ref 65–100)
HCT VFR BLD AUTO: 29.6 % (ref 41.1–50.3)
HGB BLD-MCNC: 9.7 G/DL (ref 13.6–17.2)
IMM GRANULOCYTES # BLD AUTO: 0 K/UL (ref 0–0.5)
IMM GRANULOCYTES NFR BLD AUTO: 0 % (ref 0–5)
LYMPHOCYTES # BLD: 0.7 K/UL (ref 0.5–4.6)
LYMPHOCYTES NFR BLD: 46 % (ref 13–44)
MCH RBC QN AUTO: 29.4 PG (ref 26.1–32.9)
MCHC RBC AUTO-ENTMCNC: 32.8 G/DL (ref 31.4–35)
MCV RBC AUTO: 89.7 FL (ref 79.6–97.8)
MONOCYTES # BLD: 0.3 K/UL (ref 0.1–1.3)
MONOCYTES NFR BLD: 22 % (ref 4–12)
NEUTS SEG # BLD: 0.5 K/UL (ref 1.7–8.2)
NEUTS SEG NFR BLD: 30 % (ref 43–78)
NRBC # BLD: 0 K/UL (ref 0–0.2)
PLATELET # BLD AUTO: 127 K/UL (ref 150–450)
PMV BLD AUTO: 10.3 FL (ref 9.4–12.3)
POTASSIUM SERPL-SCNC: 3.1 MMOL/L (ref 3.5–5.1)
PROT SERPL-MCNC: 6.2 G/DL (ref 6.3–8.2)
RBC # BLD AUTO: 3.3 M/UL (ref 4.23–5.67)
SODIUM SERPL-SCNC: 143 MMOL/L (ref 136–145)
WBC # BLD AUTO: 1.5 K/UL (ref 4.3–11.1)

## 2021-02-04 PROCEDURE — 36591 DRAW BLOOD OFF VENOUS DEVICE: CPT

## 2021-02-04 PROCEDURE — 86301 IMMUNOASSAY TUMOR CA 19-9: CPT

## 2021-02-04 PROCEDURE — 85025 COMPLETE CBC W/AUTO DIFF WBC: CPT

## 2021-02-04 PROCEDURE — 82378 CARCINOEMBRYONIC ANTIGEN: CPT

## 2021-02-04 PROCEDURE — 80053 COMPREHEN METABOLIC PANEL: CPT

## 2021-02-06 ENCOUNTER — APPOINTMENT (OUTPATIENT)
Dept: INFUSION THERAPY | Age: 46
End: 2021-02-06

## 2021-02-07 ENCOUNTER — APPOINTMENT (OUTPATIENT)
Dept: INFUSION THERAPY | Age: 46
End: 2021-02-07

## 2021-02-10 ENCOUNTER — HOSPITAL ENCOUNTER (OUTPATIENT)
Dept: LAB | Age: 46
Discharge: HOME | End: 2021-02-10
Payer: SUBSIDIZED

## 2021-02-10 ENCOUNTER — HOSPITAL ENCOUNTER (OUTPATIENT)
Dept: CT IMAGING | Age: 46
Discharge: HOME | End: 2021-02-10
Attending: NURSE PRACTITIONER
Payer: SUBSIDIZED

## 2021-02-10 DIAGNOSIS — C16.8 MALIGNANT NEOPLASM OF OVERLAPPING SITES OF STOMACH (HCC): ICD-10-CM

## 2021-02-10 DIAGNOSIS — C16.9 MALIGNANT NEOPLASM OF STOMACH, UNSPECIFIED LOCATION (HCC): ICD-10-CM

## 2021-02-10 LAB
ALBUMIN SERPL-MCNC: 3.6 G/DL (ref 3.5–5)
ALBUMIN/GLOB SERPL: 1 {RATIO} (ref 1.2–3.5)
ALP SERPL-CCNC: 141 U/L (ref 50–136)
ALT SERPL-CCNC: 48 U/L (ref 12–65)
ANION GAP SERPL CALC-SCNC: 4 MMOL/L (ref 7–16)
AST SERPL-CCNC: 40 U/L (ref 15–37)
BASOPHILS # BLD: 0 K/UL (ref 0–0.2)
BASOPHILS NFR BLD: 1 % (ref 0–2)
BILIRUB SERPL-MCNC: 0.2 MG/DL (ref 0.2–1.1)
BUN SERPL-MCNC: 8 MG/DL (ref 6–23)
CALCIUM SERPL-MCNC: 8.9 MG/DL (ref 8.3–10.4)
CANCER AG19-9 SERPL-ACNC: 34.4 U/ML (ref 2–37)
CEA SERPL-MCNC: 5.4 NG/ML (ref 0–3)
CHLORIDE SERPL-SCNC: 108 MMOL/L (ref 98–107)
CO2 SERPL-SCNC: 31 MMOL/L (ref 21–32)
CREAT SERPL-MCNC: 0.9 MG/DL (ref 0.8–1.5)
DIFFERENTIAL METHOD BLD: ABNORMAL
EOSINOPHIL # BLD: 0.1 K/UL (ref 0–0.8)
EOSINOPHIL NFR BLD: 2 % (ref 0.5–7.8)
ERYTHROCYTE [DISTWIDTH] IN BLOOD BY AUTOMATED COUNT: 14.5 % (ref 11.9–14.6)
GLOBULIN SER CALC-MCNC: 3.5 G/DL (ref 2.3–3.5)
GLUCOSE SERPL-MCNC: 83 MG/DL (ref 65–100)
HCT VFR BLD AUTO: 36.3 % (ref 41.1–50.3)
HGB BLD-MCNC: 11.7 G/DL (ref 13.6–17.2)
IMM GRANULOCYTES # BLD AUTO: 0 K/UL (ref 0–0.5)
IMM GRANULOCYTES NFR BLD AUTO: 1 % (ref 0–5)
LYMPHOCYTES # BLD: 1.4 K/UL (ref 0.5–4.6)
LYMPHOCYTES NFR BLD: 37 % (ref 13–44)
MAGNESIUM SERPL-MCNC: 2.3 MG/DL (ref 1.8–2.4)
MCH RBC QN AUTO: 28.8 PG (ref 26.1–32.9)
MCHC RBC AUTO-ENTMCNC: 32.2 G/DL (ref 31.4–35)
MCV RBC AUTO: 89.4 FL (ref 79.6–97.8)
MONOCYTES # BLD: 0.9 K/UL (ref 0.1–1.3)
MONOCYTES NFR BLD: 24 % (ref 4–12)
NEUTS SEG # BLD: 1.3 K/UL (ref 1.7–8.2)
NEUTS SEG NFR BLD: 35 % (ref 43–78)
NRBC # BLD: 0 K/UL (ref 0–0.2)
PLATELET # BLD AUTO: 257 K/UL (ref 150–450)
PMV BLD AUTO: 9.3 FL (ref 9.4–12.3)
POTASSIUM SERPL-SCNC: 4.5 MMOL/L (ref 3.5–5.1)
PROT SERPL-MCNC: 7.1 G/DL (ref 6.3–8.2)
RBC # BLD AUTO: 4.06 M/UL (ref 4.23–5.67)
SODIUM SERPL-SCNC: 143 MMOL/L (ref 136–145)
WBC # BLD AUTO: 3.8 K/UL (ref 4.3–11.1)

## 2021-02-10 PROCEDURE — 83735 ASSAY OF MAGNESIUM: CPT

## 2021-02-10 PROCEDURE — 74177 CT ABD & PELVIS W/CONTRAST: CPT

## 2021-02-10 PROCEDURE — 80053 COMPREHEN METABOLIC PANEL: CPT

## 2021-02-10 PROCEDURE — 74011000258 HC RX REV CODE- 258: Performed by: NURSE PRACTITIONER

## 2021-02-10 PROCEDURE — 86301 IMMUNOASSAY TUMOR CA 19-9: CPT

## 2021-02-10 PROCEDURE — 85025 COMPLETE CBC W/AUTO DIFF WBC: CPT

## 2021-02-10 PROCEDURE — 36415 COLL VENOUS BLD VENIPUNCTURE: CPT

## 2021-02-10 PROCEDURE — 82378 CARCINOEMBRYONIC ANTIGEN: CPT

## 2021-02-10 PROCEDURE — 74011000636 HC RX REV CODE- 636: Performed by: NURSE PRACTITIONER

## 2021-02-10 RX ORDER — SODIUM CHLORIDE 0.9 % (FLUSH) 0.9 %
10 SYRINGE (ML) INJECTION
Status: COMPLETED | OUTPATIENT
Start: 2021-02-10 | End: 2021-02-10

## 2021-02-10 RX ADMIN — DIATRIZOATE MEGLUMINE AND DIATRIZOATE SODIUM 15 ML: 660; 100 LIQUID ORAL; RECTAL at 13:29

## 2021-02-10 RX ADMIN — SODIUM CHLORIDE 100 ML: 900 INJECTION, SOLUTION INTRAVENOUS at 13:29

## 2021-02-10 RX ADMIN — Medication 10 ML: at 13:29

## 2021-02-10 RX ADMIN — IOPAMIDOL 100 ML: 755 INJECTION, SOLUTION INTRAVENOUS at 13:29

## 2021-02-11 ENCOUNTER — HOSPITAL ENCOUNTER (OUTPATIENT)
Dept: INFUSION THERAPY | Age: 46
Discharge: HOME | End: 2021-02-11
Payer: SUBSIDIZED

## 2021-02-11 VITALS
SYSTOLIC BLOOD PRESSURE: 140 MMHG | BODY MASS INDEX: 26.47 KG/M2 | WEIGHT: 189.8 LBS | TEMPERATURE: 96.8 F | DIASTOLIC BLOOD PRESSURE: 74 MMHG | RESPIRATION RATE: 18 BRPM | OXYGEN SATURATION: 98 % | HEART RATE: 101 BPM

## 2021-02-11 DIAGNOSIS — C16.8 MALIGNANT NEOPLASM OF OVERLAPPING SITES OF STOMACH (HCC): ICD-10-CM

## 2021-02-11 DIAGNOSIS — R11.2 CINV (CHEMOTHERAPY-INDUCED NAUSEA AND VOMITING): Primary | ICD-10-CM

## 2021-02-11 DIAGNOSIS — T45.1X5A CINV (CHEMOTHERAPY-INDUCED NAUSEA AND VOMITING): Primary | ICD-10-CM

## 2021-02-11 PROCEDURE — 96367 TX/PROPH/DG ADDL SEQ IV INF: CPT

## 2021-02-11 PROCEDURE — 96411 CHEMO IV PUSH ADDL DRUG: CPT

## 2021-02-11 PROCEDURE — 96417 CHEMO IV INFUS EACH ADDL SEQ: CPT

## 2021-02-11 PROCEDURE — G0498 CHEMO EXTEND IV INFUS W/PUMP: HCPCS

## 2021-02-11 PROCEDURE — 96368 THER/DIAG CONCURRENT INF: CPT

## 2021-02-11 PROCEDURE — 96415 CHEMO IV INFUSION ADDL HR: CPT

## 2021-02-11 PROCEDURE — 96413 CHEMO IV INFUSION 1 HR: CPT

## 2021-02-11 PROCEDURE — 96372 THER/PROPH/DIAG INJ SC/IM: CPT

## 2021-02-11 PROCEDURE — 96375 TX/PRO/DX INJ NEW DRUG ADDON: CPT

## 2021-02-11 PROCEDURE — 74011000258 HC RX REV CODE- 258: Performed by: INTERNAL MEDICINE

## 2021-02-11 PROCEDURE — 74011250636 HC RX REV CODE- 250/636: Performed by: INTERNAL MEDICINE

## 2021-02-11 RX ORDER — FLUOROURACIL 50 MG/ML
300 INJECTION, SOLUTION INTRAVENOUS ONCE
Status: COMPLETED | OUTPATIENT
Start: 2021-02-11 | End: 2021-02-11

## 2021-02-11 RX ORDER — ONDANSETRON 2 MG/ML
8 INJECTION INTRAMUSCULAR; INTRAVENOUS ONCE
Status: COMPLETED | OUTPATIENT
Start: 2021-02-11 | End: 2021-02-11

## 2021-02-11 RX ORDER — SODIUM CHLORIDE 0.9 % (FLUSH) 0.9 %
10 SYRINGE (ML) INJECTION AS NEEDED
Status: ACTIVE | OUTPATIENT
Start: 2021-02-11 | End: 2021-02-11

## 2021-02-11 RX ORDER — DEXTROSE MONOHYDRATE 50 MG/ML
25 INJECTION, SOLUTION INTRAVENOUS CONTINUOUS
Status: ACTIVE | OUTPATIENT
Start: 2021-02-11 | End: 2021-02-11

## 2021-02-11 RX ORDER — ATROPINE SULFATE 0.4 MG/ML
0.4 INJECTION, SOLUTION ENDOTRACHEAL; INTRAMEDULLARY; INTRAMUSCULAR; INTRAVENOUS; SUBCUTANEOUS ONCE
Status: COMPLETED | OUTPATIENT
Start: 2021-02-11 | End: 2021-02-11

## 2021-02-11 RX ADMIN — ONDANSETRON 8 MG: 2 INJECTION INTRAMUSCULAR; INTRAVENOUS at 09:35

## 2021-02-11 RX ADMIN — IRINOTECAN HYDROCHLORIDE 240 MG: 20 INJECTION, SOLUTION INTRAVENOUS at 13:00

## 2021-02-11 RX ADMIN — LEUCOVORIN CALCIUM 816 MG: 350 INJECTION, POWDER, LYOPHILIZED, FOR SOLUTION INTRAMUSCULAR; INTRAVENOUS at 13:00

## 2021-02-11 RX ADMIN — FLUOROURACIL 612 MG: 50 INJECTION, SOLUTION INTRAVENOUS at 14:45

## 2021-02-11 RX ADMIN — SODIUM CHLORIDE 150 MG: 900 INJECTION, SOLUTION INTRAVENOUS at 10:15

## 2021-02-11 RX ADMIN — OXALIPLATIN 132.5 MG: 5 INJECTION, SOLUTION, CONCENTRATE INTRAVENOUS at 10:53

## 2021-02-11 RX ADMIN — Medication 10 ML: at 09:33

## 2021-02-11 RX ADMIN — FLUOROURACIL 4000 MG: 50 INJECTION, SOLUTION INTRAVENOUS at 14:50

## 2021-02-11 RX ADMIN — DEXTROSE MONOHYDRATE 25 ML/HR: 5 INJECTION, SOLUTION INTRAVENOUS at 09:34

## 2021-02-11 RX ADMIN — ATROPINE SULFATE 0.4 MG: 0.4 INJECTION, SOLUTION INTRAMUSCULAR; INTRAVENOUS; SUBCUTANEOUS at 13:02

## 2021-02-11 RX ADMIN — DEXAMETHASONE SODIUM PHOSPHATE 12 MG: 4 INJECTION, SOLUTION INTRAMUSCULAR; INTRAVENOUS at 09:55

## 2021-02-11 NOTE — PROGRESS NOTES
Arrived to the [facility]. Folfirinox completed. Patient tolerated without adverse reaction. Any issues or concerns during appointment: none. Patient aware of next infusion appointment on 2/13 (date) at 1330 (time). Discharged to home.

## 2021-02-11 NOTE — ADDENDUM NOTE
Encounter addended by: Yeni Louie RPH on: 2/11/2021 9:28 AM   Actions taken: i-Vent created or edited

## 2021-02-12 ENCOUNTER — HOSPITAL ENCOUNTER (OUTPATIENT)
Dept: INFUSION THERAPY | Age: 46
End: 2021-02-12

## 2021-02-13 ENCOUNTER — HOSPITAL ENCOUNTER (OUTPATIENT)
Dept: INFUSION THERAPY | Age: 46
Discharge: HOME | End: 2021-02-13
Payer: SUBSIDIZED

## 2021-02-13 VITALS
TEMPERATURE: 96.9 F | SYSTOLIC BLOOD PRESSURE: 128 MMHG | DIASTOLIC BLOOD PRESSURE: 73 MMHG | RESPIRATION RATE: 16 BRPM | HEART RATE: 81 BPM | OXYGEN SATURATION: 99 %

## 2021-02-13 DIAGNOSIS — T45.1X5A CINV (CHEMOTHERAPY-INDUCED NAUSEA AND VOMITING): Primary | ICD-10-CM

## 2021-02-13 DIAGNOSIS — C16.8 MALIGNANT NEOPLASM OF OVERLAPPING SITES OF STOMACH (HCC): ICD-10-CM

## 2021-02-13 DIAGNOSIS — R11.2 CINV (CHEMOTHERAPY-INDUCED NAUSEA AND VOMITING): Primary | ICD-10-CM

## 2021-02-13 PROCEDURE — 96523 IRRIG DRUG DELIVERY DEVICE: CPT

## 2021-02-13 RX ORDER — SODIUM CHLORIDE 0.9 % (FLUSH) 0.9 %
10 SYRINGE (ML) INJECTION AS NEEDED
Status: DISCONTINUED | OUTPATIENT
Start: 2021-02-13 | End: 2021-02-14 | Stop reason: HOSPADM

## 2021-02-13 RX ADMIN — Medication 10 ML: at 12:39

## 2021-02-13 NOTE — PROGRESS NOTES
Arrived to the [facility]. Assessment and continuous chemotherapy pump completed. Patient tolerated well. Any issues or concerns during appointment: No.  Patient aware of next infusion appointment on 02/14/21 at 230pm.  Discharged ambulatory.

## 2021-02-14 ENCOUNTER — APPOINTMENT (OUTPATIENT)
Dept: INFUSION THERAPY | Age: 46
End: 2021-02-14

## 2021-02-14 ENCOUNTER — HOSPITAL ENCOUNTER (OUTPATIENT)
Dept: INFUSION THERAPY | Age: 46
Discharge: HOME | End: 2021-02-14
Payer: SUBSIDIZED

## 2021-02-14 VITALS
OXYGEN SATURATION: 99 % | RESPIRATION RATE: 18 BRPM | HEART RATE: 76 BPM | TEMPERATURE: 97 F | SYSTOLIC BLOOD PRESSURE: 122 MMHG | DIASTOLIC BLOOD PRESSURE: 71 MMHG

## 2021-02-14 DIAGNOSIS — R11.2 CINV (CHEMOTHERAPY-INDUCED NAUSEA AND VOMITING): Primary | ICD-10-CM

## 2021-02-14 DIAGNOSIS — C16.8 MALIGNANT NEOPLASM OF OVERLAPPING SITES OF STOMACH (HCC): ICD-10-CM

## 2021-02-14 DIAGNOSIS — T45.1X5A CINV (CHEMOTHERAPY-INDUCED NAUSEA AND VOMITING): Primary | ICD-10-CM

## 2021-02-14 PROCEDURE — 74011250636 HC RX REV CODE- 250/636: Performed by: INTERNAL MEDICINE

## 2021-02-14 PROCEDURE — 96372 THER/PROPH/DIAG INJ SC/IM: CPT

## 2021-02-14 RX ADMIN — PEGFILGRASTIM-CBQV 6 MG: 6 INJECTION, SOLUTION SUBCUTANEOUS at 14:36

## 2021-02-14 NOTE — PROGRESS NOTES
Arrived to the [facility]. Udenyca injection completed. Patient tolerated well. Any issues or concerns during appointment: none. Discharged ambulatory.

## 2021-02-24 ENCOUNTER — HOSPITAL ENCOUNTER (OUTPATIENT)
Dept: LAB | Age: 46
Discharge: HOME | End: 2021-02-24
Payer: SUBSIDIZED

## 2021-02-24 ENCOUNTER — PATIENT OUTREACH (OUTPATIENT)
Dept: CASE MANAGEMENT | Age: 46
End: 2021-02-24

## 2021-02-24 DIAGNOSIS — C16.8 MALIGNANT NEOPLASM OF OVERLAPPING SITES OF STOMACH (HCC): ICD-10-CM

## 2021-02-24 LAB
ALBUMIN SERPL-MCNC: 3.4 G/DL (ref 3.5–5)
ALBUMIN/GLOB SERPL: 1.1 {RATIO} (ref 1.2–3.5)
ALP SERPL-CCNC: 231 U/L (ref 50–136)
ALT SERPL-CCNC: 106 U/L (ref 12–65)
ANION GAP SERPL CALC-SCNC: 6 MMOL/L (ref 7–16)
AST SERPL-CCNC: 62 U/L (ref 15–37)
BASOPHILS # BLD: 0.2 K/UL (ref 0–0.2)
BASOPHILS NFR BLD: 1 % (ref 0–2)
BILIRUB SERPL-MCNC: 0.2 MG/DL (ref 0.2–1.1)
BUN SERPL-MCNC: 12 MG/DL (ref 6–23)
CALCIUM SERPL-MCNC: 8.8 MG/DL (ref 8.3–10.4)
CANCER AG19-9 SERPL-ACNC: 51 U/ML (ref 2–37)
CEA SERPL-MCNC: 6.8 NG/ML (ref 0–3)
CHLORIDE SERPL-SCNC: 111 MMOL/L (ref 98–107)
CO2 SERPL-SCNC: 27 MMOL/L (ref 21–32)
CREAT SERPL-MCNC: 0.9 MG/DL (ref 0.8–1.5)
DIFFERENTIAL METHOD BLD: ABNORMAL
EOSINOPHIL # BLD: 0 K/UL (ref 0–0.8)
EOSINOPHIL NFR BLD: 0 % (ref 0.5–7.8)
ERYTHROCYTE [DISTWIDTH] IN BLOOD BY AUTOMATED COUNT: 14.6 % (ref 11.9–14.6)
GLOBULIN SER CALC-MCNC: 3.2 G/DL (ref 2.3–3.5)
GLUCOSE SERPL-MCNC: 112 MG/DL (ref 65–100)
HCT VFR BLD AUTO: 35.4 % (ref 41.1–50.3)
HGB BLD-MCNC: 11.3 G/DL (ref 13.6–17.2)
IMM GRANULOCYTES # BLD AUTO: 1.2 K/UL (ref 0–0.5)
IMM GRANULOCYTES NFR BLD AUTO: 7 % (ref 0–5)
LYMPHOCYTES # BLD: 2 K/UL (ref 0.5–4.6)
LYMPHOCYTES NFR BLD: 12 % (ref 13–44)
MAGNESIUM SERPL-MCNC: 2 MG/DL (ref 1.8–2.4)
MCH RBC QN AUTO: 28.6 PG (ref 26.1–32.9)
MCHC RBC AUTO-ENTMCNC: 31.9 G/DL (ref 31.4–35)
MCV RBC AUTO: 89.6 FL (ref 79.6–97.8)
MONOCYTES # BLD: 1.5 K/UL (ref 0.1–1.3)
MONOCYTES NFR BLD: 9 % (ref 4–12)
NEUTS SEG # BLD: 11.7 K/UL (ref 1.7–8.2)
NEUTS SEG NFR BLD: 71 % (ref 43–78)
NRBC # BLD: 0.04 K/UL (ref 0–0.2)
PLATELET # BLD AUTO: 126 K/UL (ref 150–450)
PLATELET COMMENTS,PCOM: ABNORMAL
PMV BLD AUTO: 10.2 FL (ref 9.4–12.3)
POTASSIUM SERPL-SCNC: 3.6 MMOL/L (ref 3.5–5.1)
PROT SERPL-MCNC: 6.6 G/DL (ref 6.3–8.2)
RBC # BLD AUTO: 3.95 M/UL (ref 4.23–5.67)
RBC MORPH BLD: ABNORMAL
SODIUM SERPL-SCNC: 144 MMOL/L (ref 136–145)
WBC # BLD AUTO: 16.6 K/UL (ref 4.3–11.1)
WBC MORPH BLD: ABNORMAL

## 2021-02-24 PROCEDURE — 85025 COMPLETE CBC W/AUTO DIFF WBC: CPT

## 2021-02-24 PROCEDURE — 86301 IMMUNOASSAY TUMOR CA 19-9: CPT

## 2021-02-24 PROCEDURE — 83735 ASSAY OF MAGNESIUM: CPT

## 2021-02-24 PROCEDURE — 36415 COLL VENOUS BLD VENIPUNCTURE: CPT

## 2021-02-24 PROCEDURE — 80053 COMPREHEN METABOLIC PANEL: CPT

## 2021-02-24 PROCEDURE — 82378 CARCINOEMBRYONIC ANTIGEN: CPT

## 2021-02-24 NOTE — PROGRESS NOTES
2/24/2021  Patient seen with MARI NP for pre-treatment office visit for C8 D1 FOLFIRINOX on 2/25. Patient to proceed to infusion. Questions and discussion completed to the satisfaction of the patient. Patient encouraged to continue using the imodium for diarrhea, and to try taking two 15 mg Remeron to see if that would aide sleep. Prescription would be adjusted next office visit if it is helpful. Patient also encouraged to keep an eye on the port site for any signs of infection. Patient encouraged to call the office for any concerns or assistance. Patient wishes to continue on treatment plan and navigation will follow.

## 2021-02-25 ENCOUNTER — HOSPITAL ENCOUNTER (OUTPATIENT)
Dept: INFUSION THERAPY | Age: 46
Discharge: HOME | End: 2021-02-25
Payer: SUBSIDIZED

## 2021-02-25 VITALS
SYSTOLIC BLOOD PRESSURE: 123 MMHG | BODY MASS INDEX: 26.3 KG/M2 | DIASTOLIC BLOOD PRESSURE: 78 MMHG | OXYGEN SATURATION: 98 % | TEMPERATURE: 96.3 F | RESPIRATION RATE: 20 BRPM | WEIGHT: 188.6 LBS | HEART RATE: 73 BPM

## 2021-02-25 DIAGNOSIS — T45.1X5A CINV (CHEMOTHERAPY-INDUCED NAUSEA AND VOMITING): Primary | ICD-10-CM

## 2021-02-25 DIAGNOSIS — R11.2 CINV (CHEMOTHERAPY-INDUCED NAUSEA AND VOMITING): Primary | ICD-10-CM

## 2021-02-25 DIAGNOSIS — C16.8 MALIGNANT NEOPLASM OF OVERLAPPING SITES OF STOMACH (HCC): ICD-10-CM

## 2021-02-25 PROCEDURE — 96372 THER/PROPH/DIAG INJ SC/IM: CPT

## 2021-02-25 PROCEDURE — 96417 CHEMO IV INFUS EACH ADDL SEQ: CPT

## 2021-02-25 PROCEDURE — 96413 CHEMO IV INFUSION 1 HR: CPT

## 2021-02-25 PROCEDURE — G0498 CHEMO EXTEND IV INFUS W/PUMP: HCPCS

## 2021-02-25 PROCEDURE — 96368 THER/DIAG CONCURRENT INF: CPT

## 2021-02-25 PROCEDURE — 74011000258 HC RX REV CODE- 258: Performed by: INTERNAL MEDICINE

## 2021-02-25 PROCEDURE — 96367 TX/PROPH/DG ADDL SEQ IV INF: CPT

## 2021-02-25 PROCEDURE — 96375 TX/PRO/DX INJ NEW DRUG ADDON: CPT

## 2021-02-25 PROCEDURE — 96415 CHEMO IV INFUSION ADDL HR: CPT

## 2021-02-25 PROCEDURE — 96411 CHEMO IV PUSH ADDL DRUG: CPT

## 2021-02-25 PROCEDURE — 74011250636 HC RX REV CODE- 250/636: Performed by: INTERNAL MEDICINE

## 2021-02-25 RX ORDER — ATROPINE SULFATE 0.4 MG/ML
0.4 INJECTION, SOLUTION ENDOTRACHEAL; INTRAMEDULLARY; INTRAMUSCULAR; INTRAVENOUS; SUBCUTANEOUS ONCE
Status: COMPLETED | OUTPATIENT
Start: 2021-02-25 | End: 2021-02-25

## 2021-02-25 RX ORDER — DEXTROSE MONOHYDRATE 50 MG/ML
25 INJECTION, SOLUTION INTRAVENOUS CONTINUOUS
Status: ACTIVE | OUTPATIENT
Start: 2021-02-25 | End: 2021-02-25

## 2021-02-25 RX ORDER — FLUOROURACIL 50 MG/ML
300 INJECTION, SOLUTION INTRAVENOUS ONCE
Status: COMPLETED | OUTPATIENT
Start: 2021-02-25 | End: 2021-02-25

## 2021-02-25 RX ORDER — SODIUM CHLORIDE 0.9 % (FLUSH) 0.9 %
10 SYRINGE (ML) INJECTION AS NEEDED
Status: ACTIVE | OUTPATIENT
Start: 2021-02-25 | End: 2021-02-25

## 2021-02-25 RX ORDER — ONDANSETRON 2 MG/ML
8 INJECTION INTRAMUSCULAR; INTRAVENOUS ONCE
Status: COMPLETED | OUTPATIENT
Start: 2021-02-25 | End: 2021-02-25

## 2021-02-25 RX ADMIN — IRINOTECAN HYDROCHLORIDE 240 MG: 20 INJECTION, SOLUTION INTRAVENOUS at 10:37

## 2021-02-25 RX ADMIN — OXALIPLATIN 132.5 MG: 5 INJECTION, SOLUTION, CONCENTRATE INTRAVENOUS at 08:44

## 2021-02-25 RX ADMIN — LEUCOVORIN CALCIUM 816 MG: 350 INJECTION, POWDER, LYOPHILIZED, FOR SOLUTION INTRAMUSCULAR; INTRAVENOUS at 10:37

## 2021-02-25 RX ADMIN — DEXAMETHASONE SODIUM PHOSPHATE 12 MG: 4 INJECTION, SOLUTION INTRAMUSCULAR; INTRAVENOUS at 07:40

## 2021-02-25 RX ADMIN — ATROPINE SULFATE 0.4 MG: 0.4 INJECTION, SOLUTION INTRAMUSCULAR; INTRAVENOUS; SUBCUTANEOUS at 10:37

## 2021-02-25 RX ADMIN — DEXTROSE MONOHYDRATE 25 ML/HR: 5 INJECTION, SOLUTION INTRAVENOUS at 08:20

## 2021-02-25 RX ADMIN — ONDANSETRON 8 MG: 2 INJECTION INTRAMUSCULAR; INTRAVENOUS at 07:38

## 2021-02-25 RX ADMIN — Medication 10 ML: at 12:23

## 2021-02-25 RX ADMIN — FLUOROURACIL 612 MG: 50 INJECTION, SOLUTION INTRAVENOUS at 12:18

## 2021-02-25 RX ADMIN — FLUOROURACIL 4000 MG: 50 INJECTION, SOLUTION INTRAVENOUS at 12:23

## 2021-02-25 RX ADMIN — SODIUM CHLORIDE 150 MG: 900 INJECTION, SOLUTION INTRAVENOUS at 07:56

## 2021-02-25 NOTE — PROGRESS NOTES
Pt arrived ambulatory, folfirinox completed, pt tolerated well, pump connected and unclamped, pt discharged home ambulatory aware of appointment on saturday

## 2021-02-27 ENCOUNTER — HOSPITAL ENCOUNTER (OUTPATIENT)
Dept: INFUSION THERAPY | Age: 46
Discharge: HOME | End: 2021-02-27
Payer: SUBSIDIZED

## 2021-02-27 VITALS
TEMPERATURE: 97.1 F | SYSTOLIC BLOOD PRESSURE: 144 MMHG | OXYGEN SATURATION: 97 % | RESPIRATION RATE: 18 BRPM | HEART RATE: 84 BPM | DIASTOLIC BLOOD PRESSURE: 85 MMHG

## 2021-02-27 DIAGNOSIS — R11.2 CINV (CHEMOTHERAPY-INDUCED NAUSEA AND VOMITING): Primary | ICD-10-CM

## 2021-02-27 DIAGNOSIS — C16.8 MALIGNANT NEOPLASM OF OVERLAPPING SITES OF STOMACH (HCC): ICD-10-CM

## 2021-02-27 DIAGNOSIS — T45.1X5A CINV (CHEMOTHERAPY-INDUCED NAUSEA AND VOMITING): Primary | ICD-10-CM

## 2021-02-27 PROCEDURE — 96523 IRRIG DRUG DELIVERY DEVICE: CPT

## 2021-02-27 RX ORDER — SODIUM CHLORIDE 0.9 % (FLUSH) 0.9 %
10 SYRINGE (ML) INJECTION AS NEEDED
Status: ACTIVE | OUTPATIENT
Start: 2021-02-27 | End: 2021-02-27

## 2021-02-27 RX ADMIN — Medication 10 ML: at 11:37

## 2021-02-27 NOTE — PROGRESS NOTES
Arrived to the [facility]. 5FU pump completed and disconnected and patient tolerated well. Any issues or concerns during appointment: denies  Patient given education on process  Instructed patient to notify provider for any issues or worrisome symptoms. They verbalized understanding. Patient aware of next infusion appointment scheduled for tomorrow at 1130  Discharged ambulatory with self.

## 2021-02-28 ENCOUNTER — HOSPITAL ENCOUNTER (OUTPATIENT)
Dept: INFUSION THERAPY | Age: 46
Discharge: HOME | End: 2021-02-28
Payer: SUBSIDIZED

## 2021-02-28 VITALS
DIASTOLIC BLOOD PRESSURE: 70 MMHG | SYSTOLIC BLOOD PRESSURE: 115 MMHG | OXYGEN SATURATION: 100 % | TEMPERATURE: 97.2 F | HEART RATE: 75 BPM | RESPIRATION RATE: 18 BRPM

## 2021-02-28 DIAGNOSIS — C16.8 MALIGNANT NEOPLASM OF OVERLAPPING SITES OF STOMACH (HCC): ICD-10-CM

## 2021-02-28 DIAGNOSIS — T45.1X5A CINV (CHEMOTHERAPY-INDUCED NAUSEA AND VOMITING): Primary | ICD-10-CM

## 2021-02-28 DIAGNOSIS — R11.2 CINV (CHEMOTHERAPY-INDUCED NAUSEA AND VOMITING): Primary | ICD-10-CM

## 2021-02-28 PROCEDURE — 74011250636 HC RX REV CODE- 250/636: Performed by: INTERNAL MEDICINE

## 2021-02-28 PROCEDURE — 96372 THER/PROPH/DIAG INJ SC/IM: CPT

## 2021-02-28 RX ADMIN — PEGFILGRASTIM-CBQV 6 MG: 6 INJECTION, SOLUTION SUBCUTANEOUS at 11:42

## 2021-02-28 NOTE — PROGRESS NOTES
Arrived to the [facility]. Assessment complete. Udenyca completed. Patient tolerated without problems. Any issues or concerns during appointment: None. Patient aware of next infusion appointment on 3/11/2021 (date) at 0930 (time). Discharged ambulatory.

## 2021-03-10 ENCOUNTER — HOSPITAL ENCOUNTER (OUTPATIENT)
Dept: LAB | Age: 46
Discharge: HOME | End: 2021-03-10
Payer: SUBSIDIZED

## 2021-03-10 DIAGNOSIS — C16.8 MALIGNANT NEOPLASM OF OVERLAPPING SITES OF STOMACH (HCC): ICD-10-CM

## 2021-03-10 LAB
ALBUMIN SERPL-MCNC: 3.7 G/DL (ref 3.5–5)
ALBUMIN/GLOB SERPL: 1.1 {RATIO} (ref 1.2–3.5)
ALP SERPL-CCNC: 203 U/L (ref 50–136)
ALT SERPL-CCNC: 62 U/L (ref 12–65)
ANION GAP SERPL CALC-SCNC: 3 MMOL/L (ref 7–16)
AST SERPL-CCNC: 39 U/L (ref 15–37)
BASOPHILS # BLD: 0 K/UL (ref 0–0.2)
BASOPHILS NFR BLD: 1 % (ref 0–2)
BILIRUB SERPL-MCNC: 0.2 MG/DL (ref 0.2–1.1)
BUN SERPL-MCNC: 12 MG/DL (ref 6–23)
CALCIUM SERPL-MCNC: 9.3 MG/DL (ref 8.3–10.4)
CANCER AG19-9 SERPL-ACNC: 50.2 U/ML (ref 2–37)
CEA SERPL-MCNC: 6.7 NG/ML (ref 0–3)
CHLORIDE SERPL-SCNC: 110 MMOL/L (ref 98–107)
CO2 SERPL-SCNC: 30 MMOL/L (ref 21–32)
CREAT SERPL-MCNC: 0.8 MG/DL (ref 0.8–1.5)
DIFFERENTIAL METHOD BLD: ABNORMAL
EOSINOPHIL # BLD: 0.1 K/UL (ref 0–0.8)
EOSINOPHIL NFR BLD: 2 % (ref 0.5–7.8)
ERYTHROCYTE [DISTWIDTH] IN BLOOD BY AUTOMATED COUNT: 15.1 % (ref 11.9–14.6)
GLOBULIN SER CALC-MCNC: 3.4 G/DL (ref 2.3–3.5)
GLUCOSE SERPL-MCNC: 83 MG/DL (ref 65–100)
HCT VFR BLD AUTO: 36.2 % (ref 41.1–50.3)
HGB BLD-MCNC: 11.5 G/DL (ref 13.6–17.2)
IMM GRANULOCYTES # BLD AUTO: 0 K/UL (ref 0–0.5)
IMM GRANULOCYTES NFR BLD AUTO: 1 % (ref 0–5)
LYMPHOCYTES # BLD: 1.2 K/UL (ref 0.5–4.6)
LYMPHOCYTES NFR BLD: 35 % (ref 13–44)
MAGNESIUM SERPL-MCNC: 2.3 MG/DL (ref 1.8–2.4)
MCH RBC QN AUTO: 28.8 PG (ref 26.1–32.9)
MCHC RBC AUTO-ENTMCNC: 31.8 G/DL (ref 31.4–35)
MCV RBC AUTO: 90.7 FL (ref 79.6–97.8)
MONOCYTES # BLD: 0.6 K/UL (ref 0.1–1.3)
MONOCYTES NFR BLD: 17 % (ref 4–12)
NEUTS SEG # BLD: 1.6 K/UL (ref 1.7–8.2)
NEUTS SEG NFR BLD: 45 % (ref 43–78)
NRBC # BLD: 0 K/UL (ref 0–0.2)
PLATELET # BLD AUTO: 145 K/UL (ref 150–450)
PMV BLD AUTO: 10.1 FL (ref 9.4–12.3)
POTASSIUM SERPL-SCNC: 4.5 MMOL/L (ref 3.5–5.1)
PROT SERPL-MCNC: 7.1 G/DL (ref 6.3–8.2)
RBC # BLD AUTO: 3.99 M/UL (ref 4.23–5.67)
SODIUM SERPL-SCNC: 143 MMOL/L (ref 136–145)
WBC # BLD AUTO: 3.6 K/UL (ref 4.3–11.1)

## 2021-03-10 PROCEDURE — 85025 COMPLETE CBC W/AUTO DIFF WBC: CPT

## 2021-03-10 PROCEDURE — 83735 ASSAY OF MAGNESIUM: CPT

## 2021-03-10 PROCEDURE — 82378 CARCINOEMBRYONIC ANTIGEN: CPT

## 2021-03-10 PROCEDURE — 80053 COMPREHEN METABOLIC PANEL: CPT

## 2021-03-10 PROCEDURE — 86301 IMMUNOASSAY TUMOR CA 19-9: CPT

## 2021-03-10 PROCEDURE — 36415 COLL VENOUS BLD VENIPUNCTURE: CPT

## 2021-03-11 ENCOUNTER — HOSPITAL ENCOUNTER (OUTPATIENT)
Dept: INFUSION THERAPY | Age: 46
Discharge: HOME | End: 2021-03-11
Payer: SUBSIDIZED

## 2021-03-11 VITALS
SYSTOLIC BLOOD PRESSURE: 117 MMHG | BODY MASS INDEX: 26.22 KG/M2 | RESPIRATION RATE: 18 BRPM | OXYGEN SATURATION: 96 % | TEMPERATURE: 98.4 F | DIASTOLIC BLOOD PRESSURE: 75 MMHG | WEIGHT: 188 LBS | HEART RATE: 80 BPM

## 2021-03-11 DIAGNOSIS — R11.2 CINV (CHEMOTHERAPY-INDUCED NAUSEA AND VOMITING): Primary | ICD-10-CM

## 2021-03-11 DIAGNOSIS — C16.8 MALIGNANT NEOPLASM OF OVERLAPPING SITES OF STOMACH (HCC): ICD-10-CM

## 2021-03-11 DIAGNOSIS — T45.1X5A CINV (CHEMOTHERAPY-INDUCED NAUSEA AND VOMITING): Primary | ICD-10-CM

## 2021-03-11 PROCEDURE — 96417 CHEMO IV INFUS EACH ADDL SEQ: CPT

## 2021-03-11 PROCEDURE — 96367 TX/PROPH/DG ADDL SEQ IV INF: CPT

## 2021-03-11 PROCEDURE — 74011000258 HC RX REV CODE- 258: Performed by: INTERNAL MEDICINE

## 2021-03-11 PROCEDURE — G0498 CHEMO EXTEND IV INFUS W/PUMP: HCPCS

## 2021-03-11 PROCEDURE — 96415 CHEMO IV INFUSION ADDL HR: CPT

## 2021-03-11 PROCEDURE — 74011250636 HC RX REV CODE- 250/636: Performed by: INTERNAL MEDICINE

## 2021-03-11 PROCEDURE — 96413 CHEMO IV INFUSION 1 HR: CPT

## 2021-03-11 PROCEDURE — 96368 THER/DIAG CONCURRENT INF: CPT

## 2021-03-11 PROCEDURE — 96372 THER/PROPH/DIAG INJ SC/IM: CPT

## 2021-03-11 PROCEDURE — 96375 TX/PRO/DX INJ NEW DRUG ADDON: CPT

## 2021-03-11 PROCEDURE — 96411 CHEMO IV PUSH ADDL DRUG: CPT

## 2021-03-11 RX ORDER — FLUOROURACIL 50 MG/ML
300 INJECTION, SOLUTION INTRAVENOUS ONCE
Status: COMPLETED | OUTPATIENT
Start: 2021-03-11 | End: 2021-03-11

## 2021-03-11 RX ORDER — SODIUM CHLORIDE 0.9 % (FLUSH) 0.9 %
10 SYRINGE (ML) INJECTION AS NEEDED
Status: ACTIVE | OUTPATIENT
Start: 2021-03-11 | End: 2021-03-11

## 2021-03-11 RX ORDER — ATROPINE SULFATE 0.4 MG/ML
0.4 INJECTION, SOLUTION ENDOTRACHEAL; INTRAMEDULLARY; INTRAMUSCULAR; INTRAVENOUS; SUBCUTANEOUS ONCE
Status: COMPLETED | OUTPATIENT
Start: 2021-03-11 | End: 2021-03-11

## 2021-03-11 RX ORDER — DEXTROSE MONOHYDRATE 50 MG/ML
25 INJECTION, SOLUTION INTRAVENOUS CONTINUOUS
Status: ACTIVE | OUTPATIENT
Start: 2021-03-11 | End: 2021-03-11

## 2021-03-11 RX ORDER — ONDANSETRON 2 MG/ML
8 INJECTION INTRAMUSCULAR; INTRAVENOUS ONCE
Status: COMPLETED | OUTPATIENT
Start: 2021-03-11 | End: 2021-03-11

## 2021-03-11 RX ADMIN — DEXAMETHASONE SODIUM PHOSPHATE 12 MG: 4 INJECTION, SOLUTION INTRAMUSCULAR; INTRAVENOUS at 10:53

## 2021-03-11 RX ADMIN — FLUOROURACIL 612 MG: 50 INJECTION, SOLUTION INTRAVENOUS at 15:18

## 2021-03-11 RX ADMIN — LEUCOVORIN CALCIUM 816 MG: 350 INJECTION, POWDER, LYOPHILIZED, FOR SOLUTION INTRAMUSCULAR; INTRAVENOUS at 13:47

## 2021-03-11 RX ADMIN — DEXTROSE MONOHYDRATE 25 ML/HR: 5 INJECTION, SOLUTION INTRAVENOUS at 10:45

## 2021-03-11 RX ADMIN — ATROPINE SULFATE 0.4 MG: 0.4 INJECTION, SOLUTION INTRAMUSCULAR; INTRAVENOUS; SUBCUTANEOUS at 12:19

## 2021-03-11 RX ADMIN — FOSAPREPITANT 150 MG: 150 INJECTION, POWDER, LYOPHILIZED, FOR SOLUTION INTRAVENOUS at 11:08

## 2021-03-11 RX ADMIN — ONDANSETRON 8 MG: 2 INJECTION INTRAMUSCULAR; INTRAVENOUS at 10:51

## 2021-03-11 RX ADMIN — IRINOTECAN HYDROCHLORIDE 240 MG: 20 INJECTION, SOLUTION INTRAVENOUS at 13:47

## 2021-03-11 RX ADMIN — OXALIPLATIN 132.5 MG: 5 INJECTION, SOLUTION INTRAVENOUS at 11:43

## 2021-03-11 RX ADMIN — FLUOROURACIL 4000 MG: 50 INJECTION, SOLUTION INTRAVENOUS at 15:19

## 2021-03-11 RX ADMIN — Medication 10 ML: at 10:45

## 2021-03-11 NOTE — PROGRESS NOTES
Arrived to the [facility]. FOLFIRINOX completed. Patient tolerated well. Any issues or concerns during appointment: none. Patient aware of next infusion appointment on 3/13 (date) at 4:30 PM (time). Discharged ambulatory.

## 2021-03-13 ENCOUNTER — HOSPITAL ENCOUNTER (OUTPATIENT)
Dept: INFUSION THERAPY | Age: 46
Discharge: HOME | End: 2021-03-13
Payer: SUBSIDIZED

## 2021-03-13 VITALS
DIASTOLIC BLOOD PRESSURE: 66 MMHG | TEMPERATURE: 97.9 F | HEART RATE: 64 BPM | SYSTOLIC BLOOD PRESSURE: 121 MMHG | RESPIRATION RATE: 18 BRPM | OXYGEN SATURATION: 98 %

## 2021-03-13 DIAGNOSIS — T45.1X5A CINV (CHEMOTHERAPY-INDUCED NAUSEA AND VOMITING): Primary | ICD-10-CM

## 2021-03-13 DIAGNOSIS — R11.2 CINV (CHEMOTHERAPY-INDUCED NAUSEA AND VOMITING): Primary | ICD-10-CM

## 2021-03-13 DIAGNOSIS — C16.8 MALIGNANT NEOPLASM OF OVERLAPPING SITES OF STOMACH (HCC): ICD-10-CM

## 2021-03-13 PROCEDURE — 96523 IRRIG DRUG DELIVERY DEVICE: CPT

## 2021-03-13 RX ORDER — SODIUM CHLORIDE 0.9 % (FLUSH) 0.9 %
10 SYRINGE (ML) INJECTION EVERY 8 HOURS
Status: DISCONTINUED | OUTPATIENT
Start: 2021-03-13 | End: 2021-03-15 | Stop reason: HOSPADM

## 2021-03-13 RX ADMIN — Medication 10 ML: at 14:10

## 2021-03-13 NOTE — PROGRESS NOTES
Arrived to the [facility]. Assessment completed . 5 FU Elastomeric pump completed. Patient tolerated without problems. Any issues or concerns during appointment: None  Instructed to call Dr Shanta Brooks  with any side effects or concerns  Patient aware of next infusion appointment on 3/14/21(date) at 2 30 Pm (time).   Discharged ambulatory

## 2021-03-14 ENCOUNTER — HOSPITAL ENCOUNTER (OUTPATIENT)
Dept: INFUSION THERAPY | Age: 46
Discharge: HOME | End: 2021-03-14
Payer: SUBSIDIZED

## 2021-03-14 VITALS
TEMPERATURE: 98.1 F | OXYGEN SATURATION: 100 % | SYSTOLIC BLOOD PRESSURE: 115 MMHG | RESPIRATION RATE: 18 BRPM | DIASTOLIC BLOOD PRESSURE: 75 MMHG | HEART RATE: 76 BPM

## 2021-03-14 DIAGNOSIS — C16.8 MALIGNANT NEOPLASM OF OVERLAPPING SITES OF STOMACH (HCC): ICD-10-CM

## 2021-03-14 DIAGNOSIS — R11.2 CINV (CHEMOTHERAPY-INDUCED NAUSEA AND VOMITING): Primary | ICD-10-CM

## 2021-03-14 DIAGNOSIS — T45.1X5A CINV (CHEMOTHERAPY-INDUCED NAUSEA AND VOMITING): Primary | ICD-10-CM

## 2021-03-14 PROCEDURE — 74011250636 HC RX REV CODE- 250/636: Performed by: INTERNAL MEDICINE

## 2021-03-14 PROCEDURE — 96372 THER/PROPH/DIAG INJ SC/IM: CPT

## 2021-03-14 RX ADMIN — PEGFILGRASTIM-CBQV 6 MG: 6 INJECTION, SOLUTION SUBCUTANEOUS at 14:34

## 2021-03-14 NOTE — PROGRESS NOTES
Arrived to the [facility]. Carlie completed. Provided education on side effects. Patient instructed to report any side affects to ordering provider. Patient tolerated well. Any issues or concerns during appointment: none. Patient aware of next infusion appointment on 03/25/21 (date) at 0900 (time). Discharged ambulatory.

## 2021-03-24 ENCOUNTER — HOSPITAL ENCOUNTER (OUTPATIENT)
Dept: LAB | Age: 46
Discharge: HOME | End: 2021-03-24
Payer: SUBSIDIZED

## 2021-03-24 DIAGNOSIS — R30.0 BURNING WITH URINATION: ICD-10-CM

## 2021-03-24 DIAGNOSIS — C16.8 MALIGNANT NEOPLASM OF OVERLAPPING SITES OF STOMACH (HCC): ICD-10-CM

## 2021-03-24 LAB
ALBUMIN SERPL-MCNC: 3.7 G/DL (ref 3.5–5)
ALBUMIN/GLOB SERPL: 1.1 {RATIO} (ref 1.2–3.5)
ALP SERPL-CCNC: 262 U/L (ref 50–136)
ALT SERPL-CCNC: 99 U/L (ref 12–65)
ANION GAP SERPL CALC-SCNC: 5 MMOL/L (ref 7–16)
APPEARANCE UR: ABNORMAL
AST SERPL-CCNC: 55 U/L (ref 15–37)
BACTERIA URNS QL MICRO: ABNORMAL /HPF
BASOPHILS # BLD: 0.1 K/UL (ref 0–0.2)
BASOPHILS NFR BLD: 1 % (ref 0–2)
BILIRUB SERPL-MCNC: 0.2 MG/DL (ref 0.2–1.1)
BILIRUB UR QL: NEGATIVE
BUN SERPL-MCNC: 16 MG/DL (ref 6–23)
CALCIUM SERPL-MCNC: 8.9 MG/DL (ref 8.3–10.4)
CANCER AG19-9 SERPL-ACNC: 64.5 U/ML (ref 2–37)
CASTS URNS QL MICRO: ABNORMAL /LPF
CEA SERPL-MCNC: 8.2 NG/ML (ref 0–3)
CHLORIDE SERPL-SCNC: 108 MMOL/L (ref 98–107)
CO2 SERPL-SCNC: 28 MMOL/L (ref 21–32)
COLOR UR: YELLOW
CREAT SERPL-MCNC: 1 MG/DL (ref 0.8–1.5)
CRYSTALS URNS QL MICRO: 0 /LPF
DIFFERENTIAL METHOD BLD: ABNORMAL
EOSINOPHIL # BLD: 0.1 K/UL (ref 0–0.8)
EOSINOPHIL NFR BLD: 1 % (ref 0.5–7.8)
EPI CELLS #/AREA URNS HPF: 0 /HPF
ERYTHROCYTE [DISTWIDTH] IN BLOOD BY AUTOMATED COUNT: 15.7 % (ref 11.9–14.6)
GLOBULIN SER CALC-MCNC: 3.4 G/DL (ref 2.3–3.5)
GLUCOSE SERPL-MCNC: 109 MG/DL (ref 65–100)
GLUCOSE UR STRIP.AUTO-MCNC: NEGATIVE MG/DL
HCT VFR BLD AUTO: 39.1 % (ref 41.1–50.3)
HGB BLD-MCNC: 12.1 G/DL (ref 13.6–17.2)
HGB UR QL STRIP: ABNORMAL
IMM GRANULOCYTES # BLD AUTO: 0.9 K/UL (ref 0–0.5)
IMM GRANULOCYTES NFR BLD AUTO: 7 % (ref 0–5)
KETONES UR QL STRIP.AUTO: NEGATIVE MG/DL
LEUKOCYTE ESTERASE UR QL STRIP.AUTO: ABNORMAL
LYMPHOCYTES # BLD: 1.6 K/UL (ref 0.5–4.6)
LYMPHOCYTES NFR BLD: 13 % (ref 13–44)
MAGNESIUM SERPL-MCNC: 2.1 MG/DL (ref 1.8–2.4)
MCH RBC QN AUTO: 28.2 PG (ref 26.1–32.9)
MCHC RBC AUTO-ENTMCNC: 30.9 G/DL (ref 31.4–35)
MCV RBC AUTO: 91.1 FL (ref 79.6–97.8)
MONOCYTES # BLD: 1.3 K/UL (ref 0.1–1.3)
MONOCYTES NFR BLD: 11 % (ref 4–12)
MUCOUS THREADS URNS QL MICRO: ABNORMAL /LPF
NEUTS SEG # BLD: 8.2 K/UL (ref 1.7–8.2)
NEUTS SEG NFR BLD: 67 % (ref 43–78)
NITRITE UR QL STRIP.AUTO: NEGATIVE
NRBC # BLD: 0.02 K/UL (ref 0–0.2)
PH UR STRIP: 5 [PH] (ref 5–9)
PLATELET # BLD AUTO: 131 K/UL (ref 150–450)
PLATELET COMMENTS,PCOM: SLIGHT
PMV BLD AUTO: 9.6 FL (ref 9.4–12.3)
POTASSIUM SERPL-SCNC: 3.9 MMOL/L (ref 3.5–5.1)
PROT SERPL-MCNC: 7.1 G/DL (ref 6.3–8.2)
PROT UR STRIP-MCNC: ABNORMAL MG/DL
RBC # BLD AUTO: 4.29 M/UL (ref 4.23–5.6)
RBC #/AREA URNS HPF: ABNORMAL /HPF
RBC MORPH BLD: ABNORMAL
SODIUM SERPL-SCNC: 141 MMOL/L (ref 136–145)
SP GR UR REFRACTOMETRY: 1.02 (ref 1–1.02)
UROBILINOGEN UR QL STRIP.AUTO: 0.2 EU/DL (ref 0.2–1)
WBC # BLD AUTO: 12.2 K/UL (ref 4.3–11.1)
WBC MORPH BLD: ABNORMAL
WBC URNS QL MICRO: ABNORMAL /HPF

## 2021-03-24 PROCEDURE — 86301 IMMUNOASSAY TUMOR CA 19-9: CPT

## 2021-03-24 PROCEDURE — 36415 COLL VENOUS BLD VENIPUNCTURE: CPT

## 2021-03-24 PROCEDURE — 80053 COMPREHEN METABOLIC PANEL: CPT

## 2021-03-24 PROCEDURE — 82378 CARCINOEMBRYONIC ANTIGEN: CPT

## 2021-03-24 PROCEDURE — 81003 URINALYSIS AUTO W/O SCOPE: CPT

## 2021-03-24 PROCEDURE — 85025 COMPLETE CBC W/AUTO DIFF WBC: CPT

## 2021-03-24 PROCEDURE — 81015 MICROSCOPIC EXAM OF URINE: CPT

## 2021-03-24 PROCEDURE — 83735 ASSAY OF MAGNESIUM: CPT

## 2021-03-25 ENCOUNTER — HOSPITAL ENCOUNTER (OUTPATIENT)
Dept: INFUSION THERAPY | Age: 46
Discharge: HOME | End: 2021-03-25
Payer: SUBSIDIZED

## 2021-03-25 VITALS
TEMPERATURE: 98.2 F | SYSTOLIC BLOOD PRESSURE: 126 MMHG | DIASTOLIC BLOOD PRESSURE: 75 MMHG | OXYGEN SATURATION: 98 % | RESPIRATION RATE: 16 BRPM | HEART RATE: 74 BPM | BODY MASS INDEX: 25.91 KG/M2 | WEIGHT: 185.8 LBS

## 2021-03-25 DIAGNOSIS — T45.1X5A CINV (CHEMOTHERAPY-INDUCED NAUSEA AND VOMITING): Primary | ICD-10-CM

## 2021-03-25 DIAGNOSIS — R11.2 CINV (CHEMOTHERAPY-INDUCED NAUSEA AND VOMITING): Primary | ICD-10-CM

## 2021-03-25 DIAGNOSIS — C16.8 MALIGNANT NEOPLASM OF OVERLAPPING SITES OF STOMACH (HCC): ICD-10-CM

## 2021-03-25 PROCEDURE — 74011250636 HC RX REV CODE- 250/636: Performed by: INTERNAL MEDICINE

## 2021-03-25 PROCEDURE — 96411 CHEMO IV PUSH ADDL DRUG: CPT

## 2021-03-25 PROCEDURE — 96372 THER/PROPH/DIAG INJ SC/IM: CPT

## 2021-03-25 PROCEDURE — 96413 CHEMO IV INFUSION 1 HR: CPT

## 2021-03-25 PROCEDURE — 96367 TX/PROPH/DG ADDL SEQ IV INF: CPT

## 2021-03-25 PROCEDURE — 96368 THER/DIAG CONCURRENT INF: CPT

## 2021-03-25 PROCEDURE — 74011000258 HC RX REV CODE- 258: Performed by: INTERNAL MEDICINE

## 2021-03-25 PROCEDURE — 96415 CHEMO IV INFUSION ADDL HR: CPT

## 2021-03-25 PROCEDURE — G0498 CHEMO EXTEND IV INFUS W/PUMP: HCPCS

## 2021-03-25 PROCEDURE — 96375 TX/PRO/DX INJ NEW DRUG ADDON: CPT

## 2021-03-25 RX ORDER — ONDANSETRON 2 MG/ML
8 INJECTION INTRAMUSCULAR; INTRAVENOUS ONCE
Status: COMPLETED | OUTPATIENT
Start: 2021-03-25 | End: 2021-03-25

## 2021-03-25 RX ORDER — DEXTROSE MONOHYDRATE 50 MG/ML
25 INJECTION, SOLUTION INTRAVENOUS CONTINUOUS
Status: ACTIVE | OUTPATIENT
Start: 2021-03-25 | End: 2021-03-25

## 2021-03-25 RX ORDER — SODIUM CHLORIDE 0.9 % (FLUSH) 0.9 %
10 SYRINGE (ML) INJECTION AS NEEDED
Status: ACTIVE | OUTPATIENT
Start: 2021-03-25 | End: 2021-03-25

## 2021-03-25 RX ORDER — ATROPINE SULFATE 0.4 MG/ML
0.4 INJECTION, SOLUTION ENDOTRACHEAL; INTRAMEDULLARY; INTRAMUSCULAR; INTRAVENOUS; SUBCUTANEOUS ONCE
Status: COMPLETED | OUTPATIENT
Start: 2021-03-25 | End: 2021-03-25

## 2021-03-25 RX ORDER — FLUOROURACIL 50 MG/ML
300 INJECTION, SOLUTION INTRAVENOUS ONCE
Status: COMPLETED | OUTPATIENT
Start: 2021-03-25 | End: 2021-03-25

## 2021-03-25 RX ADMIN — LEUCOVORIN CALCIUM 816 MG: 350 INJECTION, POWDER, LYOPHILIZED, FOR SOLUTION INTRAMUSCULAR; INTRAVENOUS at 11:54

## 2021-03-25 RX ADMIN — Medication 10 ML: at 10:30

## 2021-03-25 RX ADMIN — FLUOROURACIL 612 MG: 50 INJECTION, SOLUTION INTRAVENOUS at 13:41

## 2021-03-25 RX ADMIN — FLUOROURACIL 4000 MG: 50 INJECTION, SOLUTION INTRAVENOUS at 13:48

## 2021-03-25 RX ADMIN — FOSAPREPITANT 150 MG: 150 INJECTION, POWDER, LYOPHILIZED, FOR SOLUTION INTRAVENOUS at 11:15

## 2021-03-25 RX ADMIN — IRINOTECAN HYDROCHLORIDE 240 MG: 20 INJECTION, SOLUTION INTRAVENOUS at 11:54

## 2021-03-25 RX ADMIN — ATROPINE SULFATE 0.4 MG: 0.4 INJECTION, SOLUTION INTRAMUSCULAR; INTRAVENOUS; SUBCUTANEOUS at 11:56

## 2021-03-25 RX ADMIN — DEXAMETHASONE SODIUM PHOSPHATE 12 MG: 4 INJECTION, SOLUTION INTRAMUSCULAR; INTRAVENOUS at 10:53

## 2021-03-25 RX ADMIN — ONDANSETRON 8 MG: 2 INJECTION INTRAMUSCULAR; INTRAVENOUS at 10:51

## 2021-03-25 RX ADMIN — DEXTROSE MONOHYDRATE 25 ML/HR: 5 INJECTION, SOLUTION INTRAVENOUS at 10:30

## 2021-03-25 RX ADMIN — Medication 10 ML: at 13:48

## 2021-03-25 NOTE — PROGRESS NOTES
Patient tolerated chemotherapy well. No reactions. Fluorouracil in Elastomeric infusion pump connected to port to infuse over 46 hrs. See MAR. Patient/family instructed to notify [facility] on call number [phone] of any problems, questions or concerns with pump. Patient/family reminded to use chemotherapy precautions at home. Allowed opportunity for patient/family to ask questions. Verbalized understanding of instructions. Patient discharged ambulatory. Next appointment is 03/27/21 at [phone]  with Benjamín.

## 2021-03-27 ENCOUNTER — HOSPITAL ENCOUNTER (OUTPATIENT)
Dept: INFUSION THERAPY | Age: 46
Discharge: HOME | End: 2021-03-27
Payer: SUBSIDIZED

## 2021-03-27 VITALS
SYSTOLIC BLOOD PRESSURE: 122 MMHG | DIASTOLIC BLOOD PRESSURE: 76 MMHG | RESPIRATION RATE: 18 BRPM | HEART RATE: 69 BPM | TEMPERATURE: 98.4 F

## 2021-03-27 DIAGNOSIS — R11.2 CINV (CHEMOTHERAPY-INDUCED NAUSEA AND VOMITING): Primary | ICD-10-CM

## 2021-03-27 DIAGNOSIS — C16.8 MALIGNANT NEOPLASM OF OVERLAPPING SITES OF STOMACH (HCC): ICD-10-CM

## 2021-03-27 DIAGNOSIS — T45.1X5A CINV (CHEMOTHERAPY-INDUCED NAUSEA AND VOMITING): Primary | ICD-10-CM

## 2021-03-27 PROCEDURE — 96523 IRRIG DRUG DELIVERY DEVICE: CPT

## 2021-03-27 RX ORDER — SODIUM CHLORIDE 0.9 % (FLUSH) 0.9 %
10 SYRINGE (ML) INJECTION AS NEEDED
Status: DISCONTINUED | OUTPATIENT
Start: 2021-03-27 | End: 2021-03-28 | Stop reason: HOSPADM

## 2021-03-27 RX ADMIN — Medication 10 ML: at 14:38

## 2021-03-27 NOTE — PROGRESS NOTES
Arrived to the [facility]. 5FU pump completed and disconnected and patient tolerated well. Any issues or concerns during appointment: denies  Patient given education on process  Instructed patient to notify provider for any issues or worrisome symptoms. They verbalized understanding. Patient aware of next infusion appointment scheduled for tomorrow at 230pm  Discharged ambulatory with self.

## 2021-03-28 ENCOUNTER — HOSPITAL ENCOUNTER (OUTPATIENT)
Dept: INFUSION THERAPY | Age: 46
Discharge: HOME | End: 2021-03-28
Payer: SUBSIDIZED

## 2021-03-28 VITALS
DIASTOLIC BLOOD PRESSURE: 80 MMHG | RESPIRATION RATE: 18 BRPM | TEMPERATURE: 98.2 F | HEART RATE: 86 BPM | OXYGEN SATURATION: 98 % | SYSTOLIC BLOOD PRESSURE: 142 MMHG

## 2021-03-28 DIAGNOSIS — R11.2 CINV (CHEMOTHERAPY-INDUCED NAUSEA AND VOMITING): Primary | ICD-10-CM

## 2021-03-28 DIAGNOSIS — T45.1X5A CINV (CHEMOTHERAPY-INDUCED NAUSEA AND VOMITING): Primary | ICD-10-CM

## 2021-03-28 DIAGNOSIS — C16.8 MALIGNANT NEOPLASM OF OVERLAPPING SITES OF STOMACH (HCC): ICD-10-CM

## 2021-03-28 PROCEDURE — 96372 THER/PROPH/DIAG INJ SC/IM: CPT

## 2021-03-28 PROCEDURE — 74011250636 HC RX REV CODE- 250/636: Performed by: INTERNAL MEDICINE

## 2021-03-28 RX ADMIN — PEGFILGRASTIM-CBQV 6 MG: 6 INJECTION, SOLUTION SUBCUTANEOUS at 14:45

## 2021-03-28 NOTE — PROGRESS NOTES
Arrived to the [facility]. Carlie completed. Provided education on hydration    Patient instructed to report any side affects to ordering provider. Patient tolerated without problems  Any issues or concerns during appointment: no.  Patient aware of next infusion appointment on 4/8/21 (date) at 0930 (time). Discharged ambulatory.

## 2021-04-07 ENCOUNTER — HOSPITAL ENCOUNTER (OUTPATIENT)
Dept: LAB | Age: 46
Discharge: HOME | End: 2021-04-07
Payer: SUBSIDIZED

## 2021-04-07 DIAGNOSIS — R30.0 BURNING WITH URINATION: ICD-10-CM

## 2021-04-07 DIAGNOSIS — C16.8 MALIGNANT NEOPLASM OF OVERLAPPING SITES OF STOMACH (HCC): ICD-10-CM

## 2021-04-07 LAB
ALBUMIN SERPL-MCNC: 4 G/DL (ref 3.5–5)
ALBUMIN/GLOB SERPL: 1.2 {RATIO} (ref 1.2–3.5)
ALP SERPL-CCNC: 261 U/L (ref 50–136)
ALT SERPL-CCNC: 73 U/L (ref 12–65)
ANION GAP SERPL CALC-SCNC: 6 MMOL/L (ref 7–16)
APPEARANCE UR: CLEAR
AST SERPL-CCNC: 45 U/L (ref 15–37)
BACTERIA URNS QL MICRO: 0 /HPF
BASOPHILS # BLD: 0.2 K/UL (ref 0–0.2)
BASOPHILS NFR BLD: 1 % (ref 0–2)
BILIRUB SERPL-MCNC: 0.3 MG/DL (ref 0.2–1.1)
BILIRUB UR QL: NEGATIVE
BUN SERPL-MCNC: 15 MG/DL (ref 6–23)
CALCIUM SERPL-MCNC: 8.9 MG/DL (ref 8.3–10.4)
CANCER AG19-9 SERPL-ACNC: 52.5 U/ML (ref 2–37)
CASTS URNS QL MICRO: 0 /LPF
CEA SERPL-MCNC: 8.5 NG/ML (ref 0–3)
CHLORIDE SERPL-SCNC: 109 MMOL/L (ref 98–107)
CO2 SERPL-SCNC: 27 MMOL/L (ref 21–32)
COLOR UR: YELLOW
CREAT SERPL-MCNC: 1.1 MG/DL (ref 0.8–1.5)
CRYSTALS URNS QL MICRO: ABNORMAL /LPF
DIFFERENTIAL METHOD BLD: ABNORMAL
EOSINOPHIL # BLD: 0.2 K/UL (ref 0–0.8)
EOSINOPHIL NFR BLD: 1 % (ref 0.5–7.8)
EPI CELLS #/AREA URNS HPF: 0 /HPF
ERYTHROCYTE [DISTWIDTH] IN BLOOD BY AUTOMATED COUNT: 16.5 % (ref 11.9–14.6)
GLOBULIN SER CALC-MCNC: 3.3 G/DL (ref 2.3–3.5)
GLUCOSE SERPL-MCNC: 86 MG/DL (ref 65–100)
GLUCOSE UR STRIP.AUTO-MCNC: NEGATIVE MG/DL
HCT VFR BLD AUTO: 39.2 %
HGB BLD-MCNC: 12.1 G/DL (ref 13.6–17.2)
HGB UR QL STRIP: ABNORMAL
IMM GRANULOCYTES # BLD AUTO: 1.2 K/UL (ref 0–0.5)
IMM GRANULOCYTES NFR BLD AUTO: 6 % (ref 0–5)
KETONES UR QL STRIP.AUTO: NEGATIVE MG/DL
LEUKOCYTE ESTERASE UR QL STRIP.AUTO: ABNORMAL
LYMPHOCYTES # BLD: 2.3 K/UL (ref 0.5–4.6)
LYMPHOCYTES NFR BLD: 12 % (ref 13–44)
MAGNESIUM SERPL-MCNC: 2 MG/DL (ref 1.8–2.4)
MCH RBC QN AUTO: 28.1 PG (ref 26.1–32.9)
MCHC RBC AUTO-ENTMCNC: 30.9 G/DL (ref 31.4–35)
MCV RBC AUTO: 91 FL (ref 79.6–97.8)
MONOCYTES # BLD: 2.1 K/UL (ref 0.1–1.3)
MONOCYTES NFR BLD: 11 % (ref 4–12)
MUCOUS THREADS URNS QL MICRO: ABNORMAL /LPF
NEUTS SEG # BLD: 13.2 K/UL (ref 1.7–8.2)
NEUTS SEG NFR BLD: 69 % (ref 43–78)
NITRITE UR QL STRIP.AUTO: NEGATIVE
NRBC # BLD: 0.03 K/UL (ref 0–0.2)
PH UR STRIP: 5.5 [PH] (ref 5–9)
PLATELET # BLD AUTO: 182 K/UL (ref 150–450)
PLATELET COMMENTS,PCOM: ADEQUATE
PMV BLD AUTO: 10 FL (ref 9.4–12.3)
POTASSIUM SERPL-SCNC: 3.6 MMOL/L (ref 3.5–5.1)
PROT SERPL-MCNC: 7.3 G/DL (ref 6.3–8.2)
PROT UR STRIP-MCNC: NEGATIVE MG/DL
RBC # BLD AUTO: 4.31 M/UL (ref 4.23–5.6)
RBC #/AREA URNS HPF: ABNORMAL /HPF
RBC MORPH BLD: ABNORMAL
SODIUM SERPL-SCNC: 142 MMOL/L (ref 136–145)
SP GR UR REFRACTOMETRY: 1.02 (ref 1–1.02)
UROBILINOGEN UR QL STRIP.AUTO: 0.2 EU/DL (ref 0.2–1)
WBC # BLD AUTO: 19.2 K/UL (ref 4.3–11.1)
WBC MORPH BLD: ABNORMAL
WBC URNS QL MICRO: ABNORMAL /HPF

## 2021-04-07 PROCEDURE — 81015 MICROSCOPIC EXAM OF URINE: CPT

## 2021-04-07 PROCEDURE — 82378 CARCINOEMBRYONIC ANTIGEN: CPT

## 2021-04-07 PROCEDURE — 86301 IMMUNOASSAY TUMOR CA 19-9: CPT

## 2021-04-07 PROCEDURE — 83735 ASSAY OF MAGNESIUM: CPT

## 2021-04-07 PROCEDURE — 36415 COLL VENOUS BLD VENIPUNCTURE: CPT

## 2021-04-07 PROCEDURE — 80053 COMPREHEN METABOLIC PANEL: CPT

## 2021-04-07 PROCEDURE — 85025 COMPLETE CBC W/AUTO DIFF WBC: CPT

## 2021-04-07 PROCEDURE — 81003 URINALYSIS AUTO W/O SCOPE: CPT

## 2021-04-08 ENCOUNTER — HOSPITAL ENCOUNTER (OUTPATIENT)
Dept: INFUSION THERAPY | Age: 46
Discharge: HOME | End: 2021-04-08
Payer: SUBSIDIZED

## 2021-04-08 VITALS
TEMPERATURE: 98.2 F | SYSTOLIC BLOOD PRESSURE: 128 MMHG | RESPIRATION RATE: 16 BRPM | HEART RATE: 74 BPM | BODY MASS INDEX: 26.53 KG/M2 | DIASTOLIC BLOOD PRESSURE: 71 MMHG | WEIGHT: 190.2 LBS | OXYGEN SATURATION: 97 %

## 2021-04-08 DIAGNOSIS — C16.8 MALIGNANT NEOPLASM OF OVERLAPPING SITES OF STOMACH (HCC): ICD-10-CM

## 2021-04-08 DIAGNOSIS — T45.1X5A CINV (CHEMOTHERAPY-INDUCED NAUSEA AND VOMITING): Primary | ICD-10-CM

## 2021-04-08 DIAGNOSIS — R11.2 CINV (CHEMOTHERAPY-INDUCED NAUSEA AND VOMITING): Primary | ICD-10-CM

## 2021-04-08 PROBLEM — R39.15 URGENCY OF URINATION: Status: ACTIVE | Noted: 2021-04-08

## 2021-04-08 LAB
APPEARANCE UR: CLEAR
BACTERIA URNS QL MICRO: ABNORMAL /HPF
BILIRUB UR QL: ABNORMAL
CASTS URNS QL MICRO: ABNORMAL /LPF
COLOR UR: ABNORMAL
CRYSTALS URNS QL MICRO: 0 /LPF
EPI CELLS #/AREA URNS HPF: 0 /HPF
GLUCOSE UR STRIP.AUTO-MCNC: 250 MG/DL
HGB UR QL STRIP: ABNORMAL
KETONES UR QL STRIP.AUTO: ABNORMAL MG/DL
LEUKOCYTE ESTERASE UR QL STRIP.AUTO: NEGATIVE
MUCOUS THREADS URNS QL MICRO: ABNORMAL /LPF
NITRITE UR QL STRIP.AUTO: POSITIVE
OTHER OBSERVATIONS,UCOM: ABNORMAL
PH UR STRIP: 5 [PH] (ref 5–9)
PROT UR STRIP-MCNC: 30 MG/DL
RBC #/AREA URNS HPF: ABNORMAL /HPF
SP GR UR REFRACTOMETRY: 1.01 (ref 1–1.02)
UROBILINOGEN UR QL STRIP.AUTO: 2 EU/DL (ref 0.2–1)
WBC URNS QL MICRO: ABNORMAL /HPF

## 2021-04-08 PROCEDURE — 74011250636 HC RX REV CODE- 250/636: Performed by: INTERNAL MEDICINE

## 2021-04-08 PROCEDURE — 81015 MICROSCOPIC EXAM OF URINE: CPT

## 2021-04-08 PROCEDURE — 96368 THER/DIAG CONCURRENT INF: CPT

## 2021-04-08 PROCEDURE — 87086 URINE CULTURE/COLONY COUNT: CPT

## 2021-04-08 PROCEDURE — G0498 CHEMO EXTEND IV INFUS W/PUMP: HCPCS

## 2021-04-08 PROCEDURE — 96367 TX/PROPH/DG ADDL SEQ IV INF: CPT

## 2021-04-08 PROCEDURE — 96415 CHEMO IV INFUSION ADDL HR: CPT

## 2021-04-08 PROCEDURE — 96411 CHEMO IV PUSH ADDL DRUG: CPT

## 2021-04-08 PROCEDURE — 81003 URINALYSIS AUTO W/O SCOPE: CPT

## 2021-04-08 PROCEDURE — 96372 THER/PROPH/DIAG INJ SC/IM: CPT

## 2021-04-08 PROCEDURE — 96375 TX/PRO/DX INJ NEW DRUG ADDON: CPT

## 2021-04-08 PROCEDURE — 74011000258 HC RX REV CODE- 258: Performed by: INTERNAL MEDICINE

## 2021-04-08 PROCEDURE — 96417 CHEMO IV INFUS EACH ADDL SEQ: CPT

## 2021-04-08 PROCEDURE — 96413 CHEMO IV INFUSION 1 HR: CPT

## 2021-04-08 RX ORDER — FLUOROURACIL 50 MG/ML
300 INJECTION, SOLUTION INTRAVENOUS ONCE
Status: COMPLETED | OUTPATIENT
Start: 2021-04-08 | End: 2021-04-08

## 2021-04-08 RX ORDER — ATROPINE SULFATE 0.4 MG/ML
0.4 INJECTION, SOLUTION ENDOTRACHEAL; INTRAMEDULLARY; INTRAMUSCULAR; INTRAVENOUS; SUBCUTANEOUS ONCE
Status: COMPLETED | OUTPATIENT
Start: 2021-04-08 | End: 2021-04-08

## 2021-04-08 RX ORDER — ONDANSETRON 2 MG/ML
8 INJECTION INTRAMUSCULAR; INTRAVENOUS ONCE
Status: COMPLETED | OUTPATIENT
Start: 2021-04-08 | End: 2021-04-08

## 2021-04-08 RX ORDER — SODIUM CHLORIDE 9 MG/ML
10 INJECTION INTRAMUSCULAR; INTRAVENOUS; SUBCUTANEOUS AS NEEDED
Status: ACTIVE | OUTPATIENT
Start: 2021-04-08 | End: 2021-04-08

## 2021-04-08 RX ORDER — DEXTROSE MONOHYDRATE 50 MG/ML
25 INJECTION, SOLUTION INTRAVENOUS CONTINUOUS
Status: ACTIVE | OUTPATIENT
Start: 2021-04-08 | End: 2021-04-08

## 2021-04-08 RX ADMIN — SODIUM CHLORIDE 10 ML: 9 INJECTION INTRAMUSCULAR; INTRAVENOUS; SUBCUTANEOUS at 15:05

## 2021-04-08 RX ADMIN — ONDANSETRON 8 MG: 2 INJECTION INTRAMUSCULAR; INTRAVENOUS at 10:20

## 2021-04-08 RX ADMIN — LEUCOVORIN CALCIUM 816 MG: 350 INJECTION, POWDER, LYOPHILIZED, FOR SOLUTION INTRAMUSCULAR; INTRAVENOUS at 13:19

## 2021-04-08 RX ADMIN — ATROPINE SULFATE 0.4 MG: 0.4 INJECTION, SOLUTION INTRAMUSCULAR; INTRAVENOUS; SUBCUTANEOUS at 13:22

## 2021-04-08 RX ADMIN — FLUOROURACIL 4000 MG: 50 INJECTION, SOLUTION INTRAVENOUS at 15:06

## 2021-04-08 RX ADMIN — DEXTROSE MONOHYDRATE 25 ML/HR: 5 INJECTION, SOLUTION INTRAVENOUS at 09:50

## 2021-04-08 RX ADMIN — DEXAMETHASONE SODIUM PHOSPHATE 12 MG: 4 INJECTION, SOLUTION INTRAMUSCULAR; INTRAVENOUS at 10:23

## 2021-04-08 RX ADMIN — FOSAPREPITANT 150 MG: 150 INJECTION, POWDER, LYOPHILIZED, FOR SOLUTION INTRAVENOUS at 10:44

## 2021-04-08 RX ADMIN — IRINOTECAN HYDROCHLORIDE 240 MG: 20 INJECTION, SOLUTION INTRAVENOUS at 13:25

## 2021-04-08 RX ADMIN — OXALIPLATIN 100 MG: 5 INJECTION, SOLUTION INTRAVENOUS at 11:15

## 2021-04-08 RX ADMIN — FLUOROURACIL 612 MG: 50 INJECTION, SOLUTION INTRAVENOUS at 15:01

## 2021-04-08 NOTE — PROGRESS NOTES
Arrived to the [facility]. Assessment complete, labs reviewed. Folfirinoxcompleted. Patient tolerated without problems. . Flurouracil  elastomeric pump connected to infuse over 46 hours at 5ml/hr. Clamps unclamped x 2 and verified with Marvel Horner Rn  Pump placed at 1506  Urine for UA and culture collected during visit  Any issues or concerns during appointment: None  Instructed to call provider with any side effects or concerns  Patient aware of next infusion appointment on 4/10/21(date) at 3 PM (time).   Discharged ambulatory

## 2021-04-10 ENCOUNTER — HOSPITAL ENCOUNTER (OUTPATIENT)
Dept: INFUSION THERAPY | Age: 46
Discharge: HOME | End: 2021-04-10
Payer: SUBSIDIZED

## 2021-04-10 VITALS
HEART RATE: 78 BPM | SYSTOLIC BLOOD PRESSURE: 124 MMHG | RESPIRATION RATE: 18 BRPM | TEMPERATURE: 98.3 F | OXYGEN SATURATION: 98 % | DIASTOLIC BLOOD PRESSURE: 73 MMHG

## 2021-04-10 DIAGNOSIS — R39.15 URGENCY OF URINATION: Primary | ICD-10-CM

## 2021-04-10 DIAGNOSIS — C16.8 MALIGNANT NEOPLASM OF OVERLAPPING SITES OF STOMACH (HCC): ICD-10-CM

## 2021-04-10 DIAGNOSIS — R11.2 CINV (CHEMOTHERAPY-INDUCED NAUSEA AND VOMITING): ICD-10-CM

## 2021-04-10 DIAGNOSIS — T45.1X5A CINV (CHEMOTHERAPY-INDUCED NAUSEA AND VOMITING): ICD-10-CM

## 2021-04-10 LAB
BACTERIA SPEC CULT: NORMAL
SERVICE CMNT-IMP: NORMAL

## 2021-04-10 PROCEDURE — 96523 IRRIG DRUG DELIVERY DEVICE: CPT

## 2021-04-10 RX ORDER — SODIUM CHLORIDE 0.9 % (FLUSH) 0.9 %
10 SYRINGE (ML) INJECTION AS NEEDED
Status: DISCONTINUED | OUTPATIENT
Start: 2021-04-10 | End: 2021-04-11 | Stop reason: HOSPADM

## 2021-04-10 RX ADMIN — Medication 10 ML: at 14:40

## 2021-04-10 NOTE — PROGRESS NOTES
Arrived to the [facility]. DC CI 5fu pump completed. Patient tolerated well. Any issues or concerns during appointment: no.  Patient aware of next infusion appointment on  4/11 at 1330  Discharged to home ambulatory.

## 2021-04-11 ENCOUNTER — HOSPITAL ENCOUNTER (OUTPATIENT)
Dept: INFUSION THERAPY | Age: 46
Discharge: HOME | End: 2021-04-11
Payer: SUBSIDIZED

## 2021-04-11 VITALS
HEART RATE: 74 BPM | TEMPERATURE: 98.1 F | RESPIRATION RATE: 18 BRPM | SYSTOLIC BLOOD PRESSURE: 131 MMHG | OXYGEN SATURATION: 97 % | DIASTOLIC BLOOD PRESSURE: 82 MMHG

## 2021-04-11 DIAGNOSIS — R11.2 CINV (CHEMOTHERAPY-INDUCED NAUSEA AND VOMITING): ICD-10-CM

## 2021-04-11 DIAGNOSIS — C16.8 MALIGNANT NEOPLASM OF OVERLAPPING SITES OF STOMACH (HCC): ICD-10-CM

## 2021-04-11 DIAGNOSIS — T45.1X5A CINV (CHEMOTHERAPY-INDUCED NAUSEA AND VOMITING): ICD-10-CM

## 2021-04-11 DIAGNOSIS — R39.15 URGENCY OF URINATION: Primary | ICD-10-CM

## 2021-04-11 PROCEDURE — 74011250636 HC RX REV CODE- 250/636: Performed by: INTERNAL MEDICINE

## 2021-04-11 PROCEDURE — 96372 THER/PROPH/DIAG INJ SC/IM: CPT

## 2021-04-11 RX ADMIN — PEGFILGRASTIM-CBQV 6 MG: 6 INJECTION, SOLUTION SUBCUTANEOUS at 13:36

## 2021-04-11 NOTE — PROGRESS NOTES
Pt. Discharged ambulatory. Tolerated injection well. No distress noted. To call physician with any problems or concerns. Understanding voiced. To return to Infusions on 4/22/21. Patient's chemotherapy pump was completed at 1300 on 4/10/21. Nurse disconnected pump at 1440.

## 2021-04-12 NOTE — PROGRESS NOTES
Phoned patient to discuss MyChart question. Per NP, Patient should complete the 7 days of Macrobid. Script for additional tablets sent to [facility]. Pt reports symptoms resolved. Encouraged to call with questions. Navigation will continue to follow.

## 2021-04-21 ENCOUNTER — HOSPITAL ENCOUNTER (OUTPATIENT)
Dept: LAB | Age: 46
Discharge: HOME | End: 2021-04-21
Payer: SUBSIDIZED

## 2021-04-21 ENCOUNTER — PATIENT OUTREACH (OUTPATIENT)
Dept: CASE MANAGEMENT | Age: 46
End: 2021-04-21

## 2021-04-21 DIAGNOSIS — C16.8 MALIGNANT NEOPLASM OF OVERLAPPING SITES OF STOMACH (HCC): ICD-10-CM

## 2021-04-21 LAB
ALBUMIN SERPL-MCNC: 3.8 G/DL (ref 3.5–5)
ALBUMIN/GLOB SERPL: 1.2 {RATIO} (ref 1.2–3.5)
ALP SERPL-CCNC: 229 U/L (ref 50–136)
ALT SERPL-CCNC: 67 U/L (ref 12–65)
ANION GAP SERPL CALC-SCNC: 4 MMOL/L (ref 7–16)
AST SERPL-CCNC: 43 U/L (ref 15–37)
BASOPHILS # BLD: 0.1 K/UL (ref 0–0.2)
BASOPHILS NFR BLD: 1 % (ref 0–2)
BILIRUB SERPL-MCNC: 0.2 MG/DL (ref 0.2–1.1)
BUN SERPL-MCNC: 17 MG/DL (ref 6–23)
CALCIUM SERPL-MCNC: 9.2 MG/DL (ref 8.3–10.4)
CANCER AG19-9 SERPL-ACNC: 49.4 U/ML (ref 2–37)
CEA SERPL-MCNC: 7.6 NG/ML (ref 0–3)
CHLORIDE SERPL-SCNC: 107 MMOL/L (ref 98–107)
CO2 SERPL-SCNC: 30 MMOL/L (ref 21–32)
CREAT SERPL-MCNC: 0.9 MG/DL (ref 0.8–1.5)
DIFFERENTIAL METHOD BLD: ABNORMAL
EOSINOPHIL # BLD: 0.2 K/UL (ref 0–0.8)
EOSINOPHIL NFR BLD: 2 % (ref 0.5–7.8)
ERYTHROCYTE [DISTWIDTH] IN BLOOD BY AUTOMATED COUNT: 16.9 % (ref 11.9–14.6)
GLOBULIN SER CALC-MCNC: 3.3 G/DL (ref 2.3–3.5)
GLUCOSE SERPL-MCNC: 114 MG/DL (ref 65–100)
HCT VFR BLD AUTO: 37.5 %
HGB BLD-MCNC: 11.7 G/DL (ref 13.6–17.2)
IMM GRANULOCYTES # BLD AUTO: 0.3 K/UL (ref 0–0.5)
IMM GRANULOCYTES NFR BLD AUTO: 4 % (ref 0–5)
LYMPHOCYTES # BLD: 1.5 K/UL (ref 0.5–4.6)
LYMPHOCYTES NFR BLD: 18 % (ref 13–44)
MAGNESIUM SERPL-MCNC: 2.3 MG/DL (ref 1.8–2.4)
MCH RBC QN AUTO: 28.2 PG (ref 26.1–32.9)
MCHC RBC AUTO-ENTMCNC: 31.2 G/DL (ref 31.4–35)
MCV RBC AUTO: 90.4 FL (ref 79.6–97.8)
MONOCYTES # BLD: 1 K/UL (ref 0.1–1.3)
MONOCYTES NFR BLD: 12 % (ref 4–12)
NEUTS SEG # BLD: 5 K/UL (ref 1.7–8.2)
NEUTS SEG NFR BLD: 63 % (ref 43–78)
NRBC # BLD: 0.02 K/UL (ref 0–0.2)
PLATELET # BLD AUTO: 146 K/UL (ref 150–450)
PLATELET COMMENTS,PCOM: ABNORMAL
PMV BLD AUTO: 10.6 FL (ref 9.4–12.3)
POTASSIUM SERPL-SCNC: 3.8 MMOL/L (ref 3.5–5.1)
PROT SERPL-MCNC: 7.1 G/DL (ref 6.3–8.2)
RBC # BLD AUTO: 4.15 M/UL (ref 4.23–5.6)
RBC MORPH BLD: ABNORMAL
RBC MORPH BLD: ABNORMAL
SODIUM SERPL-SCNC: 141 MMOL/L (ref 136–145)
WBC # BLD AUTO: 8.1 K/UL (ref 4.3–11.1)
WBC MORPH BLD: ABNORMAL

## 2021-04-21 PROCEDURE — 83735 ASSAY OF MAGNESIUM: CPT

## 2021-04-21 PROCEDURE — 82378 CARCINOEMBRYONIC ANTIGEN: CPT

## 2021-04-21 PROCEDURE — 85025 COMPLETE CBC W/AUTO DIFF WBC: CPT

## 2021-04-21 PROCEDURE — 86301 IMMUNOASSAY TUMOR CA 19-9: CPT

## 2021-04-21 PROCEDURE — 80053 COMPREHEN METABOLIC PANEL: CPT

## 2021-04-21 PROCEDURE — 36415 COLL VENOUS BLD VENIPUNCTURE: CPT

## 2021-04-22 ENCOUNTER — HOSPITAL ENCOUNTER (OUTPATIENT)
Dept: INFUSION THERAPY | Age: 46
Discharge: HOME | End: 2021-04-22
Payer: SUBSIDIZED

## 2021-04-22 VITALS
DIASTOLIC BLOOD PRESSURE: 75 MMHG | WEIGHT: 190 LBS | HEART RATE: 75 BPM | RESPIRATION RATE: 16 BRPM | BODY MASS INDEX: 26.5 KG/M2 | OXYGEN SATURATION: 97 % | SYSTOLIC BLOOD PRESSURE: 125 MMHG | TEMPERATURE: 98 F

## 2021-04-22 DIAGNOSIS — T45.1X5A CINV (CHEMOTHERAPY-INDUCED NAUSEA AND VOMITING): ICD-10-CM

## 2021-04-22 DIAGNOSIS — C16.8 MALIGNANT NEOPLASM OF OVERLAPPING SITES OF STOMACH (HCC): ICD-10-CM

## 2021-04-22 DIAGNOSIS — R11.2 CINV (CHEMOTHERAPY-INDUCED NAUSEA AND VOMITING): ICD-10-CM

## 2021-04-22 DIAGNOSIS — R39.15 URGENCY OF URINATION: Primary | ICD-10-CM

## 2021-04-22 PROCEDURE — 96372 THER/PROPH/DIAG INJ SC/IM: CPT

## 2021-04-22 PROCEDURE — G0498 CHEMO EXTEND IV INFUS W/PUMP: HCPCS

## 2021-04-22 PROCEDURE — 96413 CHEMO IV INFUSION 1 HR: CPT

## 2021-04-22 PROCEDURE — 96367 TX/PROPH/DG ADDL SEQ IV INF: CPT

## 2021-04-22 PROCEDURE — 96417 CHEMO IV INFUS EACH ADDL SEQ: CPT

## 2021-04-22 PROCEDURE — 74011250636 HC RX REV CODE- 250/636: Performed by: INTERNAL MEDICINE

## 2021-04-22 PROCEDURE — 96368 THER/DIAG CONCURRENT INF: CPT

## 2021-04-22 PROCEDURE — 96415 CHEMO IV INFUSION ADDL HR: CPT

## 2021-04-22 PROCEDURE — 96411 CHEMO IV PUSH ADDL DRUG: CPT

## 2021-04-22 PROCEDURE — 74011000258 HC RX REV CODE- 258: Performed by: INTERNAL MEDICINE

## 2021-04-22 PROCEDURE — 96375 TX/PRO/DX INJ NEW DRUG ADDON: CPT

## 2021-04-22 RX ORDER — ATROPINE SULFATE 0.4 MG/ML
0.4 INJECTION, SOLUTION ENDOTRACHEAL; INTRAMEDULLARY; INTRAMUSCULAR; INTRAVENOUS; SUBCUTANEOUS ONCE
Status: COMPLETED | OUTPATIENT
Start: 2021-04-22 | End: 2021-04-22

## 2021-04-22 RX ORDER — DEXTROSE MONOHYDRATE 50 MG/ML
25 INJECTION, SOLUTION INTRAVENOUS CONTINUOUS
Status: ACTIVE | OUTPATIENT
Start: 2021-04-22 | End: 2021-04-22

## 2021-04-22 RX ORDER — ONDANSETRON 2 MG/ML
8 INJECTION INTRAMUSCULAR; INTRAVENOUS ONCE
Status: COMPLETED | OUTPATIENT
Start: 2021-04-22 | End: 2021-04-22

## 2021-04-22 RX ORDER — FLUOROURACIL 50 MG/ML
300 INJECTION, SOLUTION INTRAVENOUS ONCE
Status: COMPLETED | OUTPATIENT
Start: 2021-04-22 | End: 2021-04-22

## 2021-04-22 RX ORDER — SODIUM CHLORIDE 0.9 % (FLUSH) 0.9 %
10 SYRINGE (ML) INJECTION AS NEEDED
Status: ACTIVE | OUTPATIENT
Start: 2021-04-22 | End: 2021-04-22

## 2021-04-22 RX ADMIN — FOSAPREPITANT 150 MG: 150 INJECTION, POWDER, LYOPHILIZED, FOR SOLUTION INTRAVENOUS at 10:37

## 2021-04-22 RX ADMIN — FLUOROURACIL 4000 MG: 50 INJECTION, SOLUTION INTRAVENOUS at 14:55

## 2021-04-22 RX ADMIN — ONDANSETRON 8 MG: 2 INJECTION INTRAMUSCULAR; INTRAVENOUS at 09:42

## 2021-04-22 RX ADMIN — DEXTROSE MONOHYDRATE 25 ML/HR: 5 INJECTION, SOLUTION INTRAVENOUS at 09:25

## 2021-04-22 RX ADMIN — IRINOTECAN HYDROCHLORIDE 240 MG: 20 INJECTION, SOLUTION INTRAVENOUS at 13:12

## 2021-04-22 RX ADMIN — ATROPINE SULFATE 0.4 MG: 0.4 INJECTION, SOLUTION INTRAMUSCULAR; INTRAVENOUS; SUBCUTANEOUS at 13:00

## 2021-04-22 RX ADMIN — Medication 10 ML: at 09:25

## 2021-04-22 RX ADMIN — LEUCOVORIN CALCIUM 816 MG: 350 INJECTION, POWDER, LYOPHILIZED, FOR SOLUTION INTRAMUSCULAR; INTRAVENOUS at 13:12

## 2021-04-22 RX ADMIN — OXALIPLATIN 100 MG: 5 INJECTION, SOLUTION INTRAVENOUS at 11:03

## 2021-04-22 RX ADMIN — FLUOROURACIL 612 MG: 50 INJECTION, SOLUTION INTRAVENOUS at 14:51

## 2021-04-22 RX ADMIN — DEXAMETHASONE SODIUM PHOSPHATE 12 MG: 4 INJECTION, SOLUTION INTRAMUSCULAR; INTRAVENOUS at 10:15

## 2021-04-22 NOTE — PROGRESS NOTES
Pt arrived ambulatory to [facility]. Port accessed with good blood return. D5 infusing. Pre meds given as ordered. Oxaliplatin infused. Atropine SQ. Irrinotecan and leucovorin infusing. 5FU iv push. Pump infusing at 1455. Pt aware of next appt on 4/24/21 at 1400. Pt discharged ambulatory.

## 2021-04-24 ENCOUNTER — HOSPITAL ENCOUNTER (OUTPATIENT)
Dept: INFUSION THERAPY | Age: 46
Discharge: HOME | End: 2021-04-24
Payer: SUBSIDIZED

## 2021-04-24 VITALS
TEMPERATURE: 98.2 F | SYSTOLIC BLOOD PRESSURE: 143 MMHG | OXYGEN SATURATION: 99 % | RESPIRATION RATE: 16 BRPM | HEART RATE: 73 BPM | DIASTOLIC BLOOD PRESSURE: 78 MMHG

## 2021-04-24 DIAGNOSIS — R39.15 URGENCY OF URINATION: Primary | ICD-10-CM

## 2021-04-24 DIAGNOSIS — C16.8 MALIGNANT NEOPLASM OF OVERLAPPING SITES OF STOMACH (HCC): ICD-10-CM

## 2021-04-24 DIAGNOSIS — T45.1X5A CINV (CHEMOTHERAPY-INDUCED NAUSEA AND VOMITING): ICD-10-CM

## 2021-04-24 DIAGNOSIS — R11.2 CINV (CHEMOTHERAPY-INDUCED NAUSEA AND VOMITING): ICD-10-CM

## 2021-04-24 PROCEDURE — 96523 IRRIG DRUG DELIVERY DEVICE: CPT

## 2021-04-24 RX ORDER — SODIUM CHLORIDE 0.9 % (FLUSH) 0.9 %
10 SYRINGE (ML) INJECTION AS NEEDED
Status: DISCONTINUED | OUTPATIENT
Start: 2021-04-24 | End: 2021-04-25 | Stop reason: HOSPADM

## 2021-04-24 RX ADMIN — Medication 10 ML: at 14:01

## 2021-04-24 NOTE — PROGRESS NOTES
Continuous chemo completed; elastomeric pump removed. Patient tolerated well. Patient aware of next appointment on 4/25 at 1430.     So Dixon RN

## 2021-04-25 ENCOUNTER — HOSPITAL ENCOUNTER (OUTPATIENT)
Dept: INFUSION THERAPY | Age: 46
Discharge: HOME | End: 2021-04-25
Payer: SUBSIDIZED

## 2021-04-25 VITALS
OXYGEN SATURATION: 98 % | SYSTOLIC BLOOD PRESSURE: 114 MMHG | HEART RATE: 88 BPM | TEMPERATURE: 99 F | BODY MASS INDEX: 26.08 KG/M2 | DIASTOLIC BLOOD PRESSURE: 68 MMHG | RESPIRATION RATE: 18 BRPM | WEIGHT: 187 LBS

## 2021-04-25 DIAGNOSIS — T45.1X5A CINV (CHEMOTHERAPY-INDUCED NAUSEA AND VOMITING): ICD-10-CM

## 2021-04-25 DIAGNOSIS — R11.2 CINV (CHEMOTHERAPY-INDUCED NAUSEA AND VOMITING): ICD-10-CM

## 2021-04-25 DIAGNOSIS — R39.15 URGENCY OF URINATION: Primary | ICD-10-CM

## 2021-04-25 DIAGNOSIS — C16.8 MALIGNANT NEOPLASM OF OVERLAPPING SITES OF STOMACH (HCC): ICD-10-CM

## 2021-04-25 PROCEDURE — 96372 THER/PROPH/DIAG INJ SC/IM: CPT

## 2021-04-25 PROCEDURE — 74011250636 HC RX REV CODE- 250/636: Performed by: INTERNAL MEDICINE

## 2021-04-25 RX ADMIN — PEGFILGRASTIM-CBQV 6 MG: 6 INJECTION, SOLUTION SUBCUTANEOUS at 13:42

## 2021-04-25 NOTE — PROGRESS NOTES
Arrived to the [facility]. Carlie completed. Patient tolerated well. Any issues or concerns during appointment: None. Patient aware of next infusion appointment on 5/6 (date) at 0800 (time). Discharged ambulatory in stable condition.

## 2021-05-05 ENCOUNTER — HOSPITAL ENCOUNTER (OUTPATIENT)
Dept: LAB | Age: 46
Discharge: HOME | End: 2021-05-05
Payer: SUBSIDIZED

## 2021-05-05 ENCOUNTER — PATIENT OUTREACH (OUTPATIENT)
Dept: CASE MANAGEMENT | Age: 46
End: 2021-05-05

## 2021-05-05 DIAGNOSIS — C16.8 MALIGNANT NEOPLASM OF OVERLAPPING SITES OF STOMACH (HCC): ICD-10-CM

## 2021-05-05 LAB
ALBUMIN SERPL-MCNC: 4.1 G/DL (ref 3.5–5)
ALBUMIN/GLOB SERPL: 1.2 {RATIO} (ref 1.2–3.5)
ALP SERPL-CCNC: 288 U/L (ref 50–136)
ALT SERPL-CCNC: 94 U/L (ref 12–65)
ANION GAP SERPL CALC-SCNC: 6 MMOL/L (ref 7–16)
AST SERPL-CCNC: 58 U/L (ref 15–37)
BASOPHILS # BLD: 0.1 K/UL (ref 0–0.2)
BASOPHILS NFR BLD: 1 % (ref 0–2)
BILIRUB SERPL-MCNC: 0.2 MG/DL (ref 0.2–1.1)
BUN SERPL-MCNC: 11 MG/DL (ref 6–23)
CALCIUM SERPL-MCNC: 8.9 MG/DL (ref 8.3–10.4)
CANCER AG19-9 SERPL-ACNC: 66.9 U/ML (ref 2–37)
CEA SERPL-MCNC: 8.9 NG/ML (ref 0–3)
CHLORIDE SERPL-SCNC: 109 MMOL/L (ref 98–107)
CO2 SERPL-SCNC: 29 MMOL/L (ref 21–32)
CREAT SERPL-MCNC: 1 MG/DL (ref 0.8–1.5)
DIFFERENTIAL METHOD BLD: ABNORMAL
EOSINOPHIL # BLD: 0.1 K/UL (ref 0–0.8)
EOSINOPHIL NFR BLD: 1 % (ref 0.5–7.8)
ERYTHROCYTE [DISTWIDTH] IN BLOOD BY AUTOMATED COUNT: 17.4 % (ref 11.9–14.6)
GLOBULIN SER CALC-MCNC: 3.3 G/DL (ref 2.3–3.5)
GLUCOSE SERPL-MCNC: 72 MG/DL (ref 65–100)
HCT VFR BLD AUTO: 38.5 %
HGB BLD-MCNC: 12.3 G/DL (ref 13.6–17.2)
IMM GRANULOCYTES # BLD AUTO: 1 K/UL (ref 0–0.5)
IMM GRANULOCYTES NFR BLD AUTO: 7 % (ref 0–5)
LYMPHOCYTES # BLD: 1.7 K/UL (ref 0.5–4.6)
LYMPHOCYTES NFR BLD: 12 % (ref 13–44)
MAGNESIUM SERPL-MCNC: 2.4 MG/DL (ref 1.8–2.4)
MCH RBC QN AUTO: 28.7 PG (ref 26.1–32.9)
MCHC RBC AUTO-ENTMCNC: 31.9 G/DL (ref 31.4–35)
MCV RBC AUTO: 90 FL (ref 79.6–97.8)
MONOCYTES # BLD: 1.9 K/UL (ref 0.1–1.3)
MONOCYTES NFR BLD: 13 % (ref 4–12)
NEUTS SEG # BLD: 9.7 K/UL (ref 1.7–8.2)
NEUTS SEG NFR BLD: 66 % (ref 43–78)
NRBC # BLD: 0.02 K/UL (ref 0–0.2)
PLATELET # BLD AUTO: 153 K/UL (ref 150–450)
PLATELET COMMENTS,PCOM: ADEQUATE
PMV BLD AUTO: 10.4 FL (ref 9.4–12.3)
POTASSIUM SERPL-SCNC: 3.9 MMOL/L (ref 3.5–5.1)
PROT SERPL-MCNC: 7.4 G/DL (ref 6.3–8.2)
RBC # BLD AUTO: 4.28 M/UL (ref 4.23–5.6)
RBC MORPH BLD: ABNORMAL
RBC MORPH BLD: ABNORMAL
SODIUM SERPL-SCNC: 144 MMOL/L (ref 136–145)
WBC # BLD AUTO: 14.5 K/UL (ref 4.3–11.1)
WBC MORPH BLD: ABNORMAL

## 2021-05-05 PROCEDURE — 36415 COLL VENOUS BLD VENIPUNCTURE: CPT

## 2021-05-05 PROCEDURE — 80053 COMPREHEN METABOLIC PANEL: CPT

## 2021-05-05 PROCEDURE — 85025 COMPLETE CBC W/AUTO DIFF WBC: CPT

## 2021-05-05 PROCEDURE — 86301 IMMUNOASSAY TUMOR CA 19-9: CPT

## 2021-05-05 PROCEDURE — 83735 ASSAY OF MAGNESIUM: CPT

## 2021-05-05 PROCEDURE — 82378 CARCINOEMBRYONIC ANTIGEN: CPT

## 2021-05-05 NOTE — PROGRESS NOTES
I saw patient today prior to chemo tomorrow prior to C13. Pt denies complaints today.  We will renew Ativan at pt request. Nurse navigation will continue following

## 2021-05-06 ENCOUNTER — HOSPITAL ENCOUNTER (OUTPATIENT)
Dept: INFUSION THERAPY | Age: 46
Discharge: HOME | End: 2021-05-06
Payer: SUBSIDIZED

## 2021-05-06 VITALS
OXYGEN SATURATION: 98 % | RESPIRATION RATE: 16 BRPM | HEART RATE: 80 BPM | DIASTOLIC BLOOD PRESSURE: 74 MMHG | BODY MASS INDEX: 26.69 KG/M2 | SYSTOLIC BLOOD PRESSURE: 121 MMHG | TEMPERATURE: 98.4 F | WEIGHT: 191.4 LBS

## 2021-05-06 DIAGNOSIS — C16.8 MALIGNANT NEOPLASM OF OVERLAPPING SITES OF STOMACH (HCC): ICD-10-CM

## 2021-05-06 DIAGNOSIS — T45.1X5A CINV (CHEMOTHERAPY-INDUCED NAUSEA AND VOMITING): ICD-10-CM

## 2021-05-06 DIAGNOSIS — R11.2 CINV (CHEMOTHERAPY-INDUCED NAUSEA AND VOMITING): ICD-10-CM

## 2021-05-06 DIAGNOSIS — R39.15 URGENCY OF URINATION: Primary | ICD-10-CM

## 2021-05-06 PROCEDURE — 96415 CHEMO IV INFUSION ADDL HR: CPT

## 2021-05-06 PROCEDURE — 74011250636 HC RX REV CODE- 250/636: Performed by: INTERNAL MEDICINE

## 2021-05-06 PROCEDURE — 74011000258 HC RX REV CODE- 258: Performed by: INTERNAL MEDICINE

## 2021-05-06 PROCEDURE — 96411 CHEMO IV PUSH ADDL DRUG: CPT

## 2021-05-06 PROCEDURE — G0498 CHEMO EXTEND IV INFUS W/PUMP: HCPCS

## 2021-05-06 PROCEDURE — 96367 TX/PROPH/DG ADDL SEQ IV INF: CPT

## 2021-05-06 PROCEDURE — 96413 CHEMO IV INFUSION 1 HR: CPT

## 2021-05-06 PROCEDURE — 96368 THER/DIAG CONCURRENT INF: CPT

## 2021-05-06 PROCEDURE — 96372 THER/PROPH/DIAG INJ SC/IM: CPT

## 2021-05-06 PROCEDURE — 96417 CHEMO IV INFUS EACH ADDL SEQ: CPT

## 2021-05-06 PROCEDURE — 96375 TX/PRO/DX INJ NEW DRUG ADDON: CPT

## 2021-05-06 RX ORDER — SODIUM CHLORIDE 9 MG/ML
10 INJECTION INTRAMUSCULAR; INTRAVENOUS; SUBCUTANEOUS AS NEEDED
Status: ACTIVE | OUTPATIENT
Start: 2021-05-06 | End: 2021-05-06

## 2021-05-06 RX ORDER — ONDANSETRON 2 MG/ML
8 INJECTION INTRAMUSCULAR; INTRAVENOUS ONCE
Status: COMPLETED | OUTPATIENT
Start: 2021-05-06 | End: 2021-05-06

## 2021-05-06 RX ORDER — FLUOROURACIL 50 MG/ML
300 INJECTION, SOLUTION INTRAVENOUS ONCE
Status: COMPLETED | OUTPATIENT
Start: 2021-05-06 | End: 2021-05-06

## 2021-05-06 RX ORDER — ATROPINE SULFATE 0.4 MG/ML
0.4 INJECTION, SOLUTION ENDOTRACHEAL; INTRAMEDULLARY; INTRAMUSCULAR; INTRAVENOUS; SUBCUTANEOUS ONCE
Status: COMPLETED | OUTPATIENT
Start: 2021-05-06 | End: 2021-05-06

## 2021-05-06 RX ORDER — DEXTROSE MONOHYDRATE 50 MG/ML
25 INJECTION, SOLUTION INTRAVENOUS CONTINUOUS
Status: ACTIVE | OUTPATIENT
Start: 2021-05-06 | End: 2021-05-06

## 2021-05-06 RX ORDER — SODIUM CHLORIDE 0.9 % (FLUSH) 0.9 %
10 SYRINGE (ML) INJECTION AS NEEDED
Status: ACTIVE | OUTPATIENT
Start: 2021-05-06 | End: 2021-05-06

## 2021-05-06 RX ADMIN — FOSAPREPITANT 150 MG: 150 INJECTION, POWDER, LYOPHILIZED, FOR SOLUTION INTRAVENOUS at 09:35

## 2021-05-06 RX ADMIN — Medication 10 ML: at 14:15

## 2021-05-06 RX ADMIN — ONDANSETRON 8 MG: 2 INJECTION INTRAMUSCULAR; INTRAVENOUS at 09:13

## 2021-05-06 RX ADMIN — DEXAMETHASONE SODIUM PHOSPHATE 12 MG: 4 INJECTION, SOLUTION INTRAMUSCULAR; INTRAVENOUS at 09:17

## 2021-05-06 RX ADMIN — ATROPINE SULFATE 0.4 MG: 0.4 INJECTION, SOLUTION INTRAMUSCULAR; INTRAVENOUS; SUBCUTANEOUS at 12:28

## 2021-05-06 RX ADMIN — LEUCOVORIN CALCIUM 816 MG: 350 INJECTION, POWDER, LYOPHILIZED, FOR SOLUTION INTRAMUSCULAR; INTRAVENOUS at 12:34

## 2021-05-06 RX ADMIN — FLUOROURACIL 4000 MG: 50 INJECTION, SOLUTION INTRAVENOUS at 14:13

## 2021-05-06 RX ADMIN — FLUOROURACIL 612 MG: 50 INJECTION, SOLUTION INTRAVENOUS at 14:13

## 2021-05-06 RX ADMIN — IRINOTECAN HYDROCHLORIDE 240 MG: 20 INJECTION, SOLUTION INTRAVENOUS at 12:32

## 2021-05-06 RX ADMIN — OXALIPLATIN 100 MG: 5 INJECTION, SOLUTION INTRAVENOUS at 10:18

## 2021-05-06 RX ADMIN — DEXTROSE MONOHYDRATE 25 ML/HR: 5 INJECTION, SOLUTION INTRAVENOUS at 08:45

## 2021-05-06 NOTE — Clinical Note
Arrived to the infusion center. Assessment done. Completed Folfirinox without signs of adverse reaction. No new issues or concerns voiced during the visit. Aware of next visit on 5/8 at 1400

## 2021-05-06 NOTE — ADDENDUM NOTE
Encounter addended by: Noah Harris RPH on: 5/6/2021 8:55 AM   Actions taken: i-Vent created or edited

## 2021-05-06 NOTE — PROGRESS NOTES
Arrived to the infusion center. Assessment completed. Folfiri completed without signs of any adverse reaction. No new issues or concerns voiced during the visit. Aware of appointment on 5/8 at 1400. Pump started at 1420. Discharged in satisfactory condition.

## 2021-05-08 ENCOUNTER — HOSPITAL ENCOUNTER (OUTPATIENT)
Dept: INFUSION THERAPY | Age: 46
Discharge: HOME | End: 2021-05-08
Payer: SUBSIDIZED

## 2021-05-08 VITALS
SYSTOLIC BLOOD PRESSURE: 142 MMHG | DIASTOLIC BLOOD PRESSURE: 72 MMHG | TEMPERATURE: 98.2 F | OXYGEN SATURATION: 98 % | HEART RATE: 85 BPM | RESPIRATION RATE: 16 BRPM

## 2021-05-08 DIAGNOSIS — C16.8 MALIGNANT NEOPLASM OF OVERLAPPING SITES OF STOMACH (HCC): ICD-10-CM

## 2021-05-08 DIAGNOSIS — T45.1X5A CINV (CHEMOTHERAPY-INDUCED NAUSEA AND VOMITING): ICD-10-CM

## 2021-05-08 DIAGNOSIS — R39.15 URGENCY OF URINATION: Primary | ICD-10-CM

## 2021-05-08 DIAGNOSIS — R11.2 CINV (CHEMOTHERAPY-INDUCED NAUSEA AND VOMITING): ICD-10-CM

## 2021-05-08 PROCEDURE — 96523 IRRIG DRUG DELIVERY DEVICE: CPT

## 2021-05-08 RX ORDER — HEPARIN 100 UNIT/ML
300-500 SYRINGE INTRAVENOUS AS NEEDED
Status: DISCONTINUED | OUTPATIENT
Start: 2021-05-08 | End: 2021-05-09 | Stop reason: HOSPADM

## 2021-05-08 RX ORDER — SODIUM CHLORIDE 9 MG/ML
10 INJECTION INTRAMUSCULAR; INTRAVENOUS; SUBCUTANEOUS AS NEEDED
Status: DISCONTINUED | OUTPATIENT
Start: 2021-05-08 | End: 2021-05-09 | Stop reason: HOSPADM

## 2021-05-08 RX ORDER — SODIUM CHLORIDE 0.9 % (FLUSH) 0.9 %
10 SYRINGE (ML) INJECTION AS NEEDED
Status: DISCONTINUED | OUTPATIENT
Start: 2021-05-08 | End: 2021-05-09 | Stop reason: HOSPADM

## 2021-05-08 RX ADMIN — SODIUM CHLORIDE 10 ML: 9 INJECTION INTRAMUSCULAR; INTRAVENOUS; SUBCUTANEOUS at 14:22

## 2021-05-08 RX ADMIN — Medication 10 ML: at 14:20

## 2021-05-08 NOTE — PROGRESS NOTES
Arrived to the [facility]. Adrucil in Elastomeric pump disconnected from port, flushed port and deaccessed. Patient tolerated well. Any issues or concerns during appointment: NO.  Patient aware of next infusion appointment on 05/09/21 (date) at [phone] (time). Discharged ambulatory.

## 2021-05-09 ENCOUNTER — HOSPITAL ENCOUNTER (OUTPATIENT)
Dept: INFUSION THERAPY | Age: 46
Discharge: HOME | End: 2021-05-09
Payer: SUBSIDIZED

## 2021-05-09 VITALS
WEIGHT: 191.6 LBS | OXYGEN SATURATION: 97 % | RESPIRATION RATE: 17 BRPM | SYSTOLIC BLOOD PRESSURE: 126 MMHG | DIASTOLIC BLOOD PRESSURE: 72 MMHG | BODY MASS INDEX: 26.72 KG/M2 | HEART RATE: 81 BPM | TEMPERATURE: 98.7 F

## 2021-05-09 DIAGNOSIS — R11.2 CINV (CHEMOTHERAPY-INDUCED NAUSEA AND VOMITING): ICD-10-CM

## 2021-05-09 DIAGNOSIS — C16.8 MALIGNANT NEOPLASM OF OVERLAPPING SITES OF STOMACH (HCC): ICD-10-CM

## 2021-05-09 DIAGNOSIS — T45.1X5A CINV (CHEMOTHERAPY-INDUCED NAUSEA AND VOMITING): ICD-10-CM

## 2021-05-09 DIAGNOSIS — R39.15 URGENCY OF URINATION: Primary | ICD-10-CM

## 2021-05-09 PROCEDURE — 96372 THER/PROPH/DIAG INJ SC/IM: CPT

## 2021-05-09 PROCEDURE — 74011250636 HC RX REV CODE- 250/636: Performed by: INTERNAL MEDICINE

## 2021-05-09 RX ADMIN — PEGFILGRASTIM-CBQV 6 MG: 6 INJECTION, SOLUTION SUBCUTANEOUS at 14:32

## 2021-05-09 NOTE — PROGRESS NOTES
Arrived to the [facility]. Assessment complete. Udenyca completed. Patient tolerated without porblems. Any issues or concerns during appointment: None. Patient aware of next infusion appointment on 5/20/2021 (date) at 0830 (time). Discharged ambulatory.

## 2021-05-12 ENCOUNTER — HOSPITAL ENCOUNTER (OUTPATIENT)
Dept: CT IMAGING | Age: 46
Discharge: HOME | End: 2021-05-12
Attending: INTERNAL MEDICINE
Payer: SUBSIDIZED

## 2021-05-12 DIAGNOSIS — C16.9 MALIGNANT NEOPLASM OF STOMACH, UNSPECIFIED LOCATION (HCC): ICD-10-CM

## 2021-05-12 PROCEDURE — 74011000636 HC RX REV CODE- 636: Performed by: INTERNAL MEDICINE

## 2021-05-12 PROCEDURE — 74011000258 HC RX REV CODE- 258: Performed by: INTERNAL MEDICINE

## 2021-05-12 PROCEDURE — 74177 CT ABD & PELVIS W/CONTRAST: CPT

## 2021-05-12 RX ORDER — SODIUM CHLORIDE 0.9 % (FLUSH) 0.9 %
10 SYRINGE (ML) INJECTION
Status: COMPLETED | OUTPATIENT
Start: 2021-05-12 | End: 2021-05-12

## 2021-05-12 RX ADMIN — Medication 10 ML: at 10:49

## 2021-05-12 RX ADMIN — DIATRIZOATE MEGLUMINE AND DIATRIZOATE SODIUM 15 ML: 660; 100 LIQUID ORAL; RECTAL at 10:48

## 2021-05-12 RX ADMIN — IOPAMIDOL 100 ML: 755 INJECTION, SOLUTION INTRAVENOUS at 10:48

## 2021-05-12 RX ADMIN — SODIUM CHLORIDE 100 ML: 900 INJECTION, SOLUTION INTRAVENOUS at 10:49

## 2021-05-19 ENCOUNTER — HOSPITAL ENCOUNTER (OUTPATIENT)
Dept: LAB | Age: 46
Discharge: HOME | End: 2021-05-19
Payer: SUBSIDIZED

## 2021-05-19 ENCOUNTER — PATIENT OUTREACH (OUTPATIENT)
Dept: CASE MANAGEMENT | Age: 46
End: 2021-05-19

## 2021-05-19 DIAGNOSIS — C16.9 MALIGNANT NEOPLASM OF STOMACH, UNSPECIFIED LOCATION (HCC): ICD-10-CM

## 2021-05-19 LAB
ALBUMIN SERPL-MCNC: 3.9 G/DL (ref 3.5–5)
ALBUMIN/GLOB SERPL: 1.3 {RATIO} (ref 1.2–3.5)
ALP SERPL-CCNC: 241 U/L (ref 50–136)
ALT SERPL-CCNC: 73 U/L (ref 12–65)
ANION GAP SERPL CALC-SCNC: 6 MMOL/L (ref 7–16)
AST SERPL-CCNC: 48 U/L (ref 15–37)
BASOPHILS # BLD: 0.1 K/UL (ref 0–0.2)
BASOPHILS NFR BLD: 1 % (ref 0–2)
BILIRUB SERPL-MCNC: 0.2 MG/DL (ref 0.2–1.1)
BUN SERPL-MCNC: 13 MG/DL (ref 6–23)
CALCIUM SERPL-MCNC: 8.6 MG/DL (ref 8.3–10.4)
CANCER AG19-9 SERPL-ACNC: 65.1 U/ML (ref 2–37)
CEA SERPL-MCNC: 9.6 NG/ML (ref 0–3)
CHLORIDE SERPL-SCNC: 109 MMOL/L (ref 98–107)
CO2 SERPL-SCNC: 27 MMOL/L (ref 21–32)
CREAT SERPL-MCNC: 1 MG/DL (ref 0.8–1.5)
DIFFERENTIAL METHOD BLD: ABNORMAL
EOSINOPHIL # BLD: 0.1 K/UL (ref 0–0.8)
EOSINOPHIL NFR BLD: 1 % (ref 0.5–7.8)
ERYTHROCYTE [DISTWIDTH] IN BLOOD BY AUTOMATED COUNT: 17.2 % (ref 11.9–14.6)
GLOBULIN SER CALC-MCNC: 3.1 G/DL (ref 2.3–3.5)
GLUCOSE SERPL-MCNC: 108 MG/DL (ref 65–100)
HCT VFR BLD AUTO: 36.4 %
HGB BLD-MCNC: 11.7 G/DL (ref 13.6–17.2)
IMM GRANULOCYTES # BLD AUTO: 0.4 K/UL (ref 0–0.5)
IMM GRANULOCYTES NFR BLD AUTO: 4 % (ref 0–5)
LYMPHOCYTES # BLD: 1.4 K/UL (ref 0.5–4.6)
LYMPHOCYTES NFR BLD: 16 % (ref 13–44)
MAGNESIUM SERPL-MCNC: 2.2 MG/DL (ref 1.8–2.4)
MCH RBC QN AUTO: 29 PG (ref 26.1–32.9)
MCHC RBC AUTO-ENTMCNC: 32.1 G/DL (ref 31.4–35)
MCV RBC AUTO: 90.3 FL (ref 79.6–97.8)
MONOCYTES # BLD: 1.2 K/UL (ref 0.1–1.3)
MONOCYTES NFR BLD: 13 % (ref 4–12)
NEUTS SEG # BLD: 5.8 K/UL (ref 1.7–8.2)
NEUTS SEG NFR BLD: 65 % (ref 43–78)
NRBC # BLD: 0.03 K/UL (ref 0–0.2)
PLATELET # BLD AUTO: 128 K/UL (ref 150–450)
PMV BLD AUTO: 10.8 FL (ref 9.4–12.3)
POTASSIUM SERPL-SCNC: 3.6 MMOL/L (ref 3.5–5.1)
PROT SERPL-MCNC: 7 G/DL (ref 6.3–8.2)
RBC # BLD AUTO: 4.03 M/UL (ref 4.23–5.6)
SODIUM SERPL-SCNC: 142 MMOL/L (ref 136–145)
WBC # BLD AUTO: 8.9 K/UL (ref 4.3–11.1)

## 2021-05-19 PROCEDURE — 36415 COLL VENOUS BLD VENIPUNCTURE: CPT

## 2021-05-19 PROCEDURE — 82378 CARCINOEMBRYONIC ANTIGEN: CPT

## 2021-05-19 PROCEDURE — 83735 ASSAY OF MAGNESIUM: CPT

## 2021-05-19 PROCEDURE — 86301 IMMUNOASSAY TUMOR CA 19-9: CPT

## 2021-05-19 PROCEDURE — 80053 COMPREHEN METABOLIC PANEL: CPT

## 2021-05-19 PROCEDURE — 85025 COMPLETE CBC W/AUTO DIFF WBC: CPT

## 2021-05-19 NOTE — PROGRESS NOTES
5/19/2021 Saw the patient with Dr Lin Mensah. Dr Lin Mensah discussed the CT results and overall the CT shows stable disease except for left periaortic retroperitoneal lymph node and we will refer the patient for radiation to this area. The patient has no new complaints. I have asked him to let us know if he does not receive a radiation appointment. He will proceed with folfirinox as planned. Navigation will continue to follow.

## 2021-05-20 ENCOUNTER — HOSPITAL ENCOUNTER (OUTPATIENT)
Dept: INFUSION THERAPY | Age: 46
Discharge: HOME | End: 2021-05-20
Payer: SUBSIDIZED

## 2021-05-20 VITALS
HEART RATE: 71 BPM | RESPIRATION RATE: 16 BRPM | OXYGEN SATURATION: 98 % | TEMPERATURE: 98.5 F | WEIGHT: 189 LBS | SYSTOLIC BLOOD PRESSURE: 152 MMHG | DIASTOLIC BLOOD PRESSURE: 69 MMHG | BODY MASS INDEX: 26.36 KG/M2

## 2021-05-20 DIAGNOSIS — R39.15 URGENCY OF URINATION: Primary | ICD-10-CM

## 2021-05-20 DIAGNOSIS — R11.2 CINV (CHEMOTHERAPY-INDUCED NAUSEA AND VOMITING): ICD-10-CM

## 2021-05-20 DIAGNOSIS — T45.1X5A CINV (CHEMOTHERAPY-INDUCED NAUSEA AND VOMITING): ICD-10-CM

## 2021-05-20 DIAGNOSIS — C16.8 MALIGNANT NEOPLASM OF OVERLAPPING SITES OF STOMACH (HCC): ICD-10-CM

## 2021-05-20 PROCEDURE — 96417 CHEMO IV INFUS EACH ADDL SEQ: CPT

## 2021-05-20 PROCEDURE — 96415 CHEMO IV INFUSION ADDL HR: CPT

## 2021-05-20 PROCEDURE — 96367 TX/PROPH/DG ADDL SEQ IV INF: CPT

## 2021-05-20 PROCEDURE — 96375 TX/PRO/DX INJ NEW DRUG ADDON: CPT

## 2021-05-20 PROCEDURE — 96413 CHEMO IV INFUSION 1 HR: CPT

## 2021-05-20 PROCEDURE — 96368 THER/DIAG CONCURRENT INF: CPT

## 2021-05-20 PROCEDURE — 74011250636 HC RX REV CODE- 250/636: Performed by: INTERNAL MEDICINE

## 2021-05-20 PROCEDURE — 74011000258 HC RX REV CODE- 258: Performed by: INTERNAL MEDICINE

## 2021-05-20 PROCEDURE — 96372 THER/PROPH/DIAG INJ SC/IM: CPT

## 2021-05-20 PROCEDURE — 96411 CHEMO IV PUSH ADDL DRUG: CPT

## 2021-05-20 PROCEDURE — G0498 CHEMO EXTEND IV INFUS W/PUMP: HCPCS

## 2021-05-20 RX ORDER — ATROPINE SULFATE 0.4 MG/ML
0.4 INJECTION, SOLUTION ENDOTRACHEAL; INTRAMEDULLARY; INTRAMUSCULAR; INTRAVENOUS; SUBCUTANEOUS ONCE
Status: COMPLETED | OUTPATIENT
Start: 2021-05-20 | End: 2021-05-20

## 2021-05-20 RX ORDER — FLUOROURACIL 50 MG/ML
300 INJECTION, SOLUTION INTRAVENOUS ONCE
Status: COMPLETED | OUTPATIENT
Start: 2021-05-20 | End: 2021-05-20

## 2021-05-20 RX ORDER — DEXTROSE MONOHYDRATE 50 MG/ML
25 INJECTION, SOLUTION INTRAVENOUS CONTINUOUS
Status: ACTIVE | OUTPATIENT
Start: 2021-05-20 | End: 2021-05-20

## 2021-05-20 RX ORDER — ATROPINE SULFATE 0.4 MG/ML
0.4 INJECTION, SOLUTION ENDOTRACHEAL; INTRAMEDULLARY; INTRAMUSCULAR; INTRAVENOUS; SUBCUTANEOUS
Status: ACTIVE | OUTPATIENT
Start: 2021-05-20 | End: 2021-05-20

## 2021-05-20 RX ORDER — SODIUM CHLORIDE 0.9 % (FLUSH) 0.9 %
10 SYRINGE (ML) INJECTION AS NEEDED
Status: ACTIVE | OUTPATIENT
Start: 2021-05-20 | End: 2021-05-20

## 2021-05-20 RX ORDER — ONDANSETRON 2 MG/ML
8 INJECTION INTRAMUSCULAR; INTRAVENOUS ONCE
Status: COMPLETED | OUTPATIENT
Start: 2021-05-20 | End: 2021-05-20

## 2021-05-20 RX ADMIN — LEUCOVORIN CALCIUM 816 MG: 350 INJECTION, POWDER, LYOPHILIZED, FOR SOLUTION INTRAMUSCULAR; INTRAVENOUS at 12:25

## 2021-05-20 RX ADMIN — IRINOTECAN HYDROCHLORIDE 240 MG: 20 INJECTION, SOLUTION INTRAVENOUS at 12:25

## 2021-05-20 RX ADMIN — FOSAPREPITANT 150 MG: 150 INJECTION, POWDER, LYOPHILIZED, FOR SOLUTION INTRAVENOUS at 09:46

## 2021-05-20 RX ADMIN — FLUOROURACIL 4000 MG: 50 INJECTION, SOLUTION INTRAVENOUS at 14:00

## 2021-05-20 RX ADMIN — DEXAMETHASONE SODIUM PHOSPHATE 12 MG: 4 INJECTION, SOLUTION INTRAMUSCULAR; INTRAVENOUS at 09:30

## 2021-05-20 RX ADMIN — DEXTROSE MONOHYDRATE 25 ML/HR: 5 INJECTION, SOLUTION INTRAVENOUS at 10:19

## 2021-05-20 RX ADMIN — ATROPINE SULFATE 0.4 MG: 0.4 INJECTION, SOLUTION INTRAMUSCULAR; INTRAVENOUS; SUBCUTANEOUS at 11:31

## 2021-05-20 RX ADMIN — OXALIPLATIN 100 MG: 5 INJECTION, SOLUTION INTRAVENOUS at 10:20

## 2021-05-20 RX ADMIN — ONDANSETRON 8 MG: 2 INJECTION INTRAMUSCULAR; INTRAVENOUS at 09:27

## 2021-05-20 RX ADMIN — FLUOROURACIL 612 MG: 50 INJECTION, SOLUTION INTRAVENOUS at 13:55

## 2021-05-20 NOTE — PROGRESS NOTES
Arrived to the [facility]. FOLFIRINOX completed. Patient tolerated well. Fluorouracil elastomeric pump connected to pt. Any issues or concerns during appointment: none. Patient aware of next infusion appointment on 5-22-21 (date) at 1300 (time). Discharged via ambulatory.

## 2021-05-22 ENCOUNTER — HOSPITAL ENCOUNTER (OUTPATIENT)
Dept: INFUSION THERAPY | Age: 46
Discharge: HOME | End: 2021-05-22
Payer: SUBSIDIZED

## 2021-05-22 VITALS
HEART RATE: 88 BPM | SYSTOLIC BLOOD PRESSURE: 121 MMHG | TEMPERATURE: 98.2 F | DIASTOLIC BLOOD PRESSURE: 82 MMHG | RESPIRATION RATE: 16 BRPM | OXYGEN SATURATION: 98 %

## 2021-05-22 DIAGNOSIS — R11.2 CINV (CHEMOTHERAPY-INDUCED NAUSEA AND VOMITING): ICD-10-CM

## 2021-05-22 DIAGNOSIS — R39.15 URGENCY OF URINATION: Primary | ICD-10-CM

## 2021-05-22 DIAGNOSIS — C16.8 MALIGNANT NEOPLASM OF OVERLAPPING SITES OF STOMACH (HCC): ICD-10-CM

## 2021-05-22 DIAGNOSIS — T45.1X5A CINV (CHEMOTHERAPY-INDUCED NAUSEA AND VOMITING): ICD-10-CM

## 2021-05-22 PROCEDURE — 96523 IRRIG DRUG DELIVERY DEVICE: CPT

## 2021-05-22 RX ORDER — SODIUM CHLORIDE 9 MG/ML
10 INJECTION INTRAMUSCULAR; INTRAVENOUS; SUBCUTANEOUS AS NEEDED
Status: DISCONTINUED | OUTPATIENT
Start: 2021-05-22 | End: 2021-05-23 | Stop reason: HOSPADM

## 2021-05-22 RX ADMIN — SODIUM CHLORIDE 10 ML: 9 INJECTION INTRAMUSCULAR; INTRAVENOUS; SUBCUTANEOUS at 13:31

## 2021-05-22 NOTE — PROGRESS NOTES
Arrived to the [facility]. [place] 5fu pump completed. Provided education on hydration maintenance. Patient instructed to report any side affects to ordering provider. Patient tolerated well. Any issues or concerns during appointment: none. Patient aware of next infusion appointment on 5/23 at 2:30pm.  Discharged ambulatory to home.

## 2021-05-23 ENCOUNTER — HOSPITAL ENCOUNTER (OUTPATIENT)
Dept: INFUSION THERAPY | Age: 46
Discharge: HOME | End: 2021-05-23
Payer: SUBSIDIZED

## 2021-05-23 VITALS
RESPIRATION RATE: 17 BRPM | DIASTOLIC BLOOD PRESSURE: 74 MMHG | SYSTOLIC BLOOD PRESSURE: 122 MMHG | TEMPERATURE: 98.2 F | HEART RATE: 84 BPM | BODY MASS INDEX: 26.11 KG/M2 | WEIGHT: 187.2 LBS | OXYGEN SATURATION: 98 %

## 2021-05-23 DIAGNOSIS — C16.8 MALIGNANT NEOPLASM OF OVERLAPPING SITES OF STOMACH (HCC): ICD-10-CM

## 2021-05-23 DIAGNOSIS — T45.1X5A CINV (CHEMOTHERAPY-INDUCED NAUSEA AND VOMITING): ICD-10-CM

## 2021-05-23 DIAGNOSIS — R11.2 CINV (CHEMOTHERAPY-INDUCED NAUSEA AND VOMITING): ICD-10-CM

## 2021-05-23 DIAGNOSIS — R39.15 URGENCY OF URINATION: Primary | ICD-10-CM

## 2021-05-23 PROCEDURE — 96372 THER/PROPH/DIAG INJ SC/IM: CPT

## 2021-05-23 PROCEDURE — 74011250636 HC RX REV CODE- 250/636: Performed by: INTERNAL MEDICINE

## 2021-05-23 RX ADMIN — PEGFILGRASTIM-CBQV 6 MG: 6 INJECTION, SOLUTION SUBCUTANEOUS at 14:38

## 2021-05-23 NOTE — PROGRESS NOTES
Arrived to the [facility]. Assessment complete. Udenyca completed. Patient tolerated without problems. Any issues or concerns during appointment: None. Patient aware of next infusion appointment on 6/3/2021 (date) at 0830 (time). Discharged ambulatory.

## 2021-05-26 ENCOUNTER — HOSPITAL ENCOUNTER (OUTPATIENT)
Dept: RADIATION ONCOLOGY | Age: 46
Discharge: HOME | End: 2021-05-26
Payer: SUBSIDIZED

## 2021-05-26 VITALS
OXYGEN SATURATION: 98 % | DIASTOLIC BLOOD PRESSURE: 78 MMHG | HEART RATE: 93 BPM | WEIGHT: 184 LBS | TEMPERATURE: 99 F | BODY MASS INDEX: 25.66 KG/M2 | SYSTOLIC BLOOD PRESSURE: 136 MMHG

## 2021-05-26 PROCEDURE — 99211 OFF/OP EST MAY X REQ PHY/QHP: CPT

## 2021-05-26 NOTE — PROGRESS NOTES
Pt is here today for the initial RT consult with Dr. Ryan Gale for gastric cancer. Pt has been receiving chemo, and according to the 5/12/21 CT CAP has had a mixed response with progression of a left periaortic LN. He also has mets to the liver. An overview of RT was given. Pt denies N/V, or pain. RT consents were signed. Per Dr. Ryan Gale, he will talk with Dr. Sidra Cronin about the best way to schedule the RT around pt's chemo schedule. Pt is aware of the CT/Sim appt. on 6/4/21.

## 2021-05-27 ENCOUNTER — HOSPITAL ENCOUNTER (OUTPATIENT)
Dept: RADIATION ONCOLOGY | Age: 46
Discharge: HOME | End: 2021-05-27
Payer: SUBSIDIZED

## 2021-05-27 PROCEDURE — 77470 SPECIAL RADIATION TREATMENT: CPT

## 2021-05-27 NOTE — CONSULTS
Patient: June Hdez MRN: [id number]  SSN: [id number]    YOB: 1975  Age: 40 y.o. Sex: male      Other Providers:  Dr. Nehemias Ellsworth: fatigue    DIAGNOSIS: metastatic gastric cancer    PREVIOUS TREATMENT:  1. Nov 2020 - present: FOLFIRINOX    HISTORY OF PRESENT ILLNESS:  June Hdez is a 40 y.o. male who I am seeing at the request of Dr. Eboni Mcrae. His oncologic history began this October when he was found to have widely metastatic gastric cancer. Biopsy of gastric mass confirmed adenocarcinoma. He began chemotherapy and his latest scan earlier this month revealed a CR in some hepatic lesions and a OR in the rest. Unfortunately, there is one abdominal LN that has slowly increased in size. Symptomatically, he's tolerated chemo. Notes some fatigue. No pain, N/V or diarrhea. PAST MEDICAL HISTORY:    Past Medical History:   Diagnosis Date    Gastric cancer [facility])        The patient denies history of collagen vascular diseases, pacemaker insertion, prior radiation. PAST SURGICAL HISTORY:   Past Surgical History:   Procedure Laterality Date    HX ORTHOPAEDIC      ankle    HX TONSILLECTOMY      lazy eye    IR INSERT TUNL CVC W PORT OVER 5 YEARS  11/3/2020       MEDICATIONS:     Current Outpatient Medications:     LORazepam (Ativan) 1 mg tablet, Take 1 Tab by mouth every six (6) hours as needed (nausea). Max Daily Amount: 4 mg., Disp: 90 Tab, Rfl: 1    mirtazapine (REMERON) 30 mg tablet, Take 1 Tab by mouth nightly., Disp: 30 Tab, Rfl: 5    lidocaine-prilocaine (EMLA) topical cream, Apply  to affected area as needed for Pain. (Patient not taking: Reported on 5/19/2021), Disp: 30 g, Rfl: 1    prochlorperazine (Compazine) 10 mg tablet, Take 1 Tab by mouth every six (6) hours as needed for Nausea.  Indications: nausea and vomiting caused by cancer drugs, nausea and vomiting (Patient not taking: Reported on 5/19/2021), Disp: 90 Tab, Rfl: 3    ondansetron hcl (Zofran) 8 mg tablet, Take 1 Tab by mouth every eight (8) hours as needed for Nausea. Indications: nausea and vomiting caused by cancer drugs (Patient not taking: Reported on 5/19/2021), Disp: 60 Tab, Rfl: 3    ALLERGIES:   No Known Allergies    SOCIAL HISTORY:   Social History     Socioeconomic History    Marital status:      Spouse name: Not on file    Number of children: Not on file    Years of education: Not on file    Highest education level: Not on file   Occupational History    Not on file   Tobacco Use    Smoking status: Never Smoker    Smokeless tobacco: Never Used   Substance and Sexual Activity    Alcohol use: Yes     Comment: occasional    Drug use: Never    Sexual activity: Not on file   Other Topics Concern    Not on file   Social History Narrative    Not on file     Social Determinants of Health     Financial Resource Strain:     Difficulty of Paying Living Expenses:    Food Insecurity:     Worried About Running Out of Food in the Last Year:     [address] in the Last Year:    Transportation Needs:     Lack of Transportation (Medical):  Lack of Transportation (Non-Medical):    Physical Activity:     Days of Exercise per Week:     Minutes of Exercise per Session:    Stress:     Feeling of Stress :    Social Connections:     Frequency of Communication with Friends and Family:     Frequency of Social Gatherings with Friends and Family:     Attends [employer] Services:     Active Member of Clubs or Organizations:     Attends Club or Organization Meetings:     Marital Status:    Intimate Partner Violence:     Fear of Current or Ex-Partner:     Emotionally Abused:     Physically Abused:     Sexually Abused:        FAMILY HISTORY:   Family History   Problem Relation Age of Onset    Dementia Mother     Depression Mother     Diabetes Mother     Schizophrenia Mother        REVIEW OF SYSTEMS: Please see the completed review of systems sheet in the chart that I have reviewed today. PHYSICAL EXAMINATION:   ECOG Performance status 0  VITAL SIGNS:   Visit Vitals  /78   Pulse 93   Temp 99 °F (37.2 °C)   Wt 83.5 kg (184 lb)   SpO2 98%   BMI 25.66 kg/m²      General: well developed/nourished adult Male in no acute distress; appears stated age  [de-identified]: normocephalic, atraumatic; EOMI  Neck: supple with full ROM; Respiratory: normal inspiratory effort, no audible wheezes  Extremities: no cyanosis, clubbing, or edema  Musculoskeletal: mobility intact x4; normal ROM in all joints  Skin: no skin lesions identified  Neuro: AOx3; sensation intact x 4; CNII-XII grossly intact  Psych: appropriate affect, insight, and judgement  GI: abdomen soft, non-distended    PATHOLOGY:    Pathology report reviewed - see HPI    LABORATORY:   Lab Results   Component Value Date/Time    Sodium 142 05/19/2021 02:26 PM    Potassium 3.6 05/19/2021 02:26 PM    Chloride 109 (H) 05/19/2021 02:26 PM    CO2 27 05/19/2021 02:26 PM    Anion gap 6 (L) 05/19/2021 02:26 PM    Glucose 108 (H) 05/19/2021 02:26 PM    BUN 13 05/19/2021 02:26 PM    Creatinine 1.00 05/19/2021 02:26 PM    GFR est AA >60 05/19/2021 02:26 PM    GFR est non-AA >60 05/19/2021 02:26 PM    Calcium 8.6 05/19/2021 02:26 PM    Magnesium 2.2 05/19/2021 02:26 PM    Albumin 3.9 05/19/2021 02:26 PM    Protein, total 7.0 05/19/2021 02:26 PM    Globulin 3.1 05/19/2021 02:26 PM    A-G Ratio 1.3 05/19/2021 02:26 PM    ALT (SGPT) 73 (H) 05/19/2021 02:26 PM     Lab Results   Component Value Date/Time    WBC 8.9 05/19/2021 02:26 PM    HGB 11.7 (L) 05/19/2021 02:26 PM    HCT 36.4 05/19/2021 02:26 PM    PLATELET 110 (L) 32/26/2673 02:26 PM       RADIOLOGY:    CT CHEST ABD PELV W CONT    Result Date: 5/12/2021  CT CHEST, ABDOMEN AND PELVIS WITH INTRAVENOUS CONTRAST DATED 5/12/2021. History: Stomach cancer follow-up.  Comparison: CT chest, abdomen, and pelvis 2/10/2021 Technique:   Multiple contiguous helical CT images reconstructed at 5 mm were obtained from the base of the neck to the ischial tuberosities following oral and 100 cc Isovue-370 without acute complication. All CT scans performed at this facility use one or all of the following: Automated exposure control, adjustment of the mA and/or kVp according to patient's size, iterative reconstruction. Findings: CT Chest: The base of the neck is unremarkable in appearance. No axillary, mediastinal, or hilar lymphadenopathy is seen. The thoracic aorta is normal in caliber. Evaluation with lung windows demonstrates no evolving suspicious pulmonary lesion. No pleural effusion is seen. Lungs are expanded without evidence for pneumothorax. No evolving aggressive osseous lesion is seen. CT ABDOMEN:  The Liver demonstrates stable or improving lesions. The prior largest mass involving segment 7 is slightly decreased in size now measuring 7.6 cm x 7 cm. A directly adjacent satellite nodule now seen on image 48 is unchanged measuring 3 cm in maximal diameter. Additional smaller scattered lesions in the more anterior right lobe appear stable or improved. No evolving suspicious hepatic lesion is seen. The spleen is moderately enlarged although homogeneous in its appearance. This could be related to hepatic changes previously described. No contour deforming or enhancing mass lesions are seen of the pancreas or right adrenal gland. Left adrenal nodularity which appears to represent direct mesenteric tumor extension is not significant changed. The gallbladder has an unremarkable CT appearance without radioopaque stones or pericholecystic fluid/inflammatory changes. The kidneys enhance symmetrically and no evidence of hydronephrosis is seen. The visualized loops of small bowel and colon are normal in caliber. No free fluid and no free air is seen in the abdomen.  Soft tissue mass centered at the gastroesophageal junction is stable if not slightly improved now measuring 6.8 cm x 5 cm when measured using a similar technique (previously measured 7.4 cm x 4.9 cm). Direct extension of mesenteric tumor encasing the celiac axis and its proximal branches, and abutting the SMA, and pancreas are not significant changed. However, there is continued enlargement of the left periaortic retroperitoneal lymph node which is now seen on image 71 measuring 2.8 cm x 2.1 cm (previously measured 2.4 cm x 1.8 m). No evolving adenopathy is otherwise seen in the abdomen. An additional right retroperitoneal lymph node at the level of the renal vessels is not significantly changed now measuring 11 mm short axis. The abdominal aorta is unremarkable in appearance. No evolving aggressive osseous lesion is seen. CT PELVIS: No abnormal pelvic fluid collections are present. No evolving pelvic adenopathy is seen. The urinary bladder is unremarkable. No evolving aggressive osseous lesion is seen. 1.  Mixed response with again suggested. Mass centered at the gastroesophageal junction invading the mesentery appears stable or slightly improved. Hepatic metastatic disease appears slightly improved. However, there is continued progression of the left periaortic retroperitoneal lymph node which now measures 2.8 cm x 2.1 cm previously measured 2.4 cm x 1.8 cm. This is concerning for an evolving metastatic lymph node. This report was made using voice transcription. Despite my best efforts to avoid any, transcription errors may persist. If there is any question about the accuracy of the report or need for clarification, then please call [phone], or text me through perfectserv for clarification or correction. IMPRESSION:  Heidi Erazo is a 40 y.o. male with metastatic gastric cancer with oligoprogressive disease on FOLFIRINOX. PLAN:    Plan to treat this lesion to 25 Gy in 5 fractions. Simulation Friday. Will coordinate with Dr. Martell Pratt re: timing of SBRT between chemo cycles.     Delbert Loja MD   May 27, 2021

## 2021-05-28 ENCOUNTER — DOCUMENTATION ONLY (OUTPATIENT)
Dept: HEMATOLOGY | Age: 46
End: 2021-05-28

## 2021-05-28 ENCOUNTER — HOSPITAL ENCOUNTER (OUTPATIENT)
Dept: RADIATION ONCOLOGY | Age: 46
Discharge: HOME | End: 2021-05-28
Payer: SUBSIDIZED

## 2021-05-28 PROCEDURE — 77334 RADIATION TREATMENT AID(S): CPT

## 2021-06-02 ENCOUNTER — HOSPITAL ENCOUNTER (OUTPATIENT)
Dept: LAB | Age: 46
Discharge: HOME | End: 2021-06-02

## 2021-06-02 ENCOUNTER — PATIENT OUTREACH (OUTPATIENT)
Dept: CASE MANAGEMENT | Age: 46
End: 2021-06-02

## 2021-06-02 DIAGNOSIS — C16.8 MALIGNANT NEOPLASM OF OVERLAPPING SITES OF STOMACH (HCC): ICD-10-CM

## 2021-06-02 LAB
ALBUMIN SERPL-MCNC: 3.8 G/DL (ref 3.5–5)
ALBUMIN/GLOB SERPL: 1.3 {RATIO} (ref 1.2–3.5)
ALP SERPL-CCNC: 222 U/L (ref 50–136)
ALT SERPL-CCNC: 72 U/L (ref 12–65)
ANION GAP SERPL CALC-SCNC: 5 MMOL/L (ref 7–16)
AST SERPL-CCNC: 50 U/L (ref 15–37)
BASOPHILS # BLD: 0.1 K/UL (ref 0–0.2)
BASOPHILS NFR BLD: 1 % (ref 0–2)
BILIRUB SERPL-MCNC: 0.2 MG/DL (ref 0.2–1.1)
BUN SERPL-MCNC: 12 MG/DL (ref 6–23)
CALCIUM SERPL-MCNC: 9.1 MG/DL (ref 8.3–10.4)
CHLORIDE SERPL-SCNC: 109 MMOL/L (ref 98–107)
CO2 SERPL-SCNC: 29 MMOL/L (ref 21–32)
CREAT SERPL-MCNC: 0.8 MG/DL (ref 0.8–1.5)
DIFFERENTIAL METHOD BLD: ABNORMAL
EOSINOPHIL # BLD: 0.1 K/UL (ref 0–0.8)
EOSINOPHIL NFR BLD: 1 % (ref 0.5–7.8)
ERYTHROCYTE [DISTWIDTH] IN BLOOD BY AUTOMATED COUNT: 17.2 % (ref 11.9–14.6)
GLOBULIN SER CALC-MCNC: 2.9 G/DL (ref 2.3–3.5)
GLUCOSE SERPL-MCNC: 86 MG/DL (ref 65–100)
HCT VFR BLD AUTO: 35.4 %
HGB BLD-MCNC: 11.3 G/DL (ref 13.6–17.2)
IMM GRANULOCYTES # BLD AUTO: 0.4 K/UL (ref 0–0.5)
IMM GRANULOCYTES NFR BLD AUTO: 5 % (ref 0–5)
LYMPHOCYTES # BLD: 1.3 K/UL (ref 0.5–4.6)
LYMPHOCYTES NFR BLD: 15 % (ref 13–44)
MAGNESIUM SERPL-MCNC: 2.2 MG/DL (ref 1.8–2.4)
MCH RBC QN AUTO: 28.5 PG (ref 26.1–32.9)
MCHC RBC AUTO-ENTMCNC: 31.9 G/DL (ref 31.4–35)
MCV RBC AUTO: 89.4 FL (ref 79.6–97.8)
MONOCYTES # BLD: 1.2 K/UL (ref 0.1–1.3)
MONOCYTES NFR BLD: 14 % (ref 4–12)
NEUTS SEG # BLD: 5.7 K/UL (ref 1.7–8.2)
NEUTS SEG NFR BLD: 64 % (ref 43–78)
NRBC # BLD: 0.03 K/UL (ref 0–0.2)
PLATELET # BLD AUTO: 128 K/UL (ref 150–450)
PLATELET COMMENTS,PCOM: ABNORMAL
PMV BLD AUTO: 9.6 FL (ref 9.4–12.3)
POTASSIUM SERPL-SCNC: 3.5 MMOL/L (ref 3.5–5.1)
PROT SERPL-MCNC: 6.7 G/DL (ref 6.3–8.2)
RBC # BLD AUTO: 3.96 M/UL (ref 4.23–5.6)
RBC MORPH BLD: ABNORMAL
SODIUM SERPL-SCNC: 143 MMOL/L (ref 136–145)
WBC # BLD AUTO: 8.8 K/UL (ref 4.3–11.1)
WBC MORPH BLD: SLIGHT

## 2021-06-02 PROCEDURE — 83735 ASSAY OF MAGNESIUM: CPT

## 2021-06-02 PROCEDURE — 80053 COMPREHEN METABOLIC PANEL: CPT

## 2021-06-02 PROCEDURE — 85025 COMPLETE CBC W/AUTO DIFF WBC: CPT

## 2021-06-02 PROCEDURE — 36415 COLL VENOUS BLD VENIPUNCTURE: CPT

## 2021-06-02 NOTE — PROGRESS NOTES
I saw patient today with Jasmyne Bowen prior to [employer]. Pt denies current complaints or uncontrolled symptoms. We will hold chemo tomorrow and pt will proceed with radiation. CT SIM  was 5-28-21. Dr Tushar Raines and pt notified of plan. Pt will return on 6-30-21 to restart chemo after radiation.

## 2021-06-03 ENCOUNTER — HOSPITAL ENCOUNTER (OUTPATIENT)
Dept: RADIATION ONCOLOGY | Age: 46
Discharge: HOME | End: 2021-06-03

## 2021-06-03 ENCOUNTER — HOSPITAL ENCOUNTER (OUTPATIENT)
Dept: INFUSION THERAPY | Age: 46
End: 2021-06-03

## 2021-06-03 PROCEDURE — 77300 RADIATION THERAPY DOSE PLAN: CPT

## 2021-06-03 PROCEDURE — 77399 UNLISTED PX MED RADJ PHYSICS: CPT

## 2021-06-03 PROCEDURE — 77301 RADIOTHERAPY DOSE PLAN IMRT: CPT

## 2021-06-03 PROCEDURE — 77293 RESPIRATOR MOTION MGMT SIMUL: CPT

## 2021-06-04 ENCOUNTER — HOSPITAL ENCOUNTER (OUTPATIENT)
Dept: RADIATION ONCOLOGY | Age: 46
Discharge: HOME | End: 2021-06-04

## 2021-06-04 PROCEDURE — 77338 DESIGN MLC DEVICE FOR IMRT: CPT

## 2021-06-06 ENCOUNTER — APPOINTMENT (OUTPATIENT)
Dept: INFUSION THERAPY | Age: 46
End: 2021-06-06

## 2021-06-07 ENCOUNTER — HOSPITAL ENCOUNTER (OUTPATIENT)
Dept: RADIATION ONCOLOGY | Age: 46
Discharge: HOME | End: 2021-06-07

## 2021-06-07 PROCEDURE — 77373 STRTCTC BDY RAD THER TX DLVR: CPT

## 2021-06-08 ENCOUNTER — HOSPITAL ENCOUNTER (OUTPATIENT)
Dept: RADIATION ONCOLOGY | Age: 46
Discharge: HOME | End: 2021-06-08

## 2021-06-08 VITALS
HEART RATE: 84 BPM | TEMPERATURE: 99.5 F | WEIGHT: 190.1 LBS | SYSTOLIC BLOOD PRESSURE: 122 MMHG | DIASTOLIC BLOOD PRESSURE: 76 MMHG | OXYGEN SATURATION: 96 % | BODY MASS INDEX: 26.51 KG/M2

## 2021-06-08 PROCEDURE — 77373 STRTCTC BDY RAD THER TX DLVR: CPT

## 2021-06-08 NOTE — PROGRESS NOTES
Patient: Ren Amaral MRN: [id number]  SSN: [id number]    YOB: 1975  Age: 40 y.o. Sex: male      DIAGNOSIS:  Metastatic gastric cancer    TREATMENT SITE:  Abdomen    DOSE and FRACTIONATION:  1000 of 2500 cGy; 2 of 5 fractions    INTERVAL HISTORY:  Ren Amaral is a 40 y.o. male being treated for metastatic gastric cancer. Week 1: No complaints. OBJECTIVE:  NAD  Visit Vitals  /76   Pulse 84   Temp 99.5 °F (37.5 °C)   Wt 86.2 kg (190 lb 1.6 oz)   SpO2 96%   BMI 26.51 kg/m²       Lab Results   Component Value Date/Time    Sodium 143 06/02/2021 07:58 AM    Potassium 3.5 06/02/2021 07:58 AM    Chloride 109 (H) 06/02/2021 07:58 AM    CO2 29 06/02/2021 07:58 AM    Anion gap 5 (L) 06/02/2021 07:58 AM    Glucose 86 06/02/2021 07:58 AM    BUN 12 06/02/2021 07:58 AM    Creatinine 0.80 06/02/2021 07:58 AM    GFR est AA >60 06/02/2021 07:58 AM    GFR est non-AA >60 06/02/2021 07:58 AM    Calcium 9.1 06/02/2021 07:58 AM    Magnesium 2.2 06/02/2021 07:58 AM    Albumin 3.8 06/02/2021 07:58 AM    Protein, total 6.7 06/02/2021 07:58 AM    Globulin 2.9 06/02/2021 07:58 AM    A-G Ratio 1.3 06/02/2021 07:58 AM    ALT (SGPT) 72 (H) 06/02/2021 07:58 AM     Lab Results   Component Value Date/Time    WBC 8.8 06/02/2021 07:58 AM    HGB 11.3 (L) 06/02/2021 07:58 AM    HCT 35.4 06/02/2021 07:58 AM    PLATELET 074 (L) 50/83/9567 07:58 AM       ASSESSMENT and PLAN:  Ren Amaral is tolerating radiation as anticipated for the current dose and fraction. He will complete radiation therapy on 6/11/21 and follow up in radiation oncology in 6 months.       Yousuf Brown MD   June 8, 2021

## 2021-06-09 ENCOUNTER — HOSPITAL ENCOUNTER (OUTPATIENT)
Dept: RADIATION ONCOLOGY | Age: 46
Discharge: HOME | End: 2021-06-09

## 2021-06-09 PROCEDURE — 77373 STRTCTC BDY RAD THER TX DLVR: CPT

## 2021-06-10 ENCOUNTER — HOSPITAL ENCOUNTER (OUTPATIENT)
Dept: RADIATION ONCOLOGY | Age: 46
Discharge: HOME | End: 2021-06-10

## 2021-06-10 PROCEDURE — 77373 STRTCTC BDY RAD THER TX DLVR: CPT

## 2021-06-11 ENCOUNTER — HOSPITAL ENCOUNTER (OUTPATIENT)
Dept: RADIATION ONCOLOGY | Age: 46
Discharge: HOME | End: 2021-06-11

## 2021-06-11 PROCEDURE — 77373 STRTCTC BDY RAD THER TX DLVR: CPT

## 2021-06-11 PROCEDURE — 77336 RADIATION PHYSICS CONSULT: CPT

## 2021-06-30 ENCOUNTER — PATIENT OUTREACH (OUTPATIENT)
Dept: CASE MANAGEMENT | Age: 46
End: 2021-06-30

## 2021-06-30 ENCOUNTER — HOSPITAL ENCOUNTER (OUTPATIENT)
Dept: LAB | Age: 46
Discharge: HOME | End: 2021-06-30

## 2021-06-30 DIAGNOSIS — C16.8 MALIGNANT NEOPLASM OF OVERLAPPING SITES OF STOMACH (HCC): ICD-10-CM

## 2021-06-30 LAB
ALBUMIN SERPL-MCNC: 3.7 G/DL (ref 3.5–5)
ALBUMIN/GLOB SERPL: 1.1 {RATIO} (ref 1.2–3.5)
ALP SERPL-CCNC: 157 U/L (ref 50–136)
ALT SERPL-CCNC: 58 U/L (ref 12–65)
ANION GAP SERPL CALC-SCNC: 5 MMOL/L (ref 7–16)
AST SERPL-CCNC: 46 U/L (ref 15–37)
BASOPHILS # BLD: 0 K/UL (ref 0–0.2)
BASOPHILS NFR BLD: 0 % (ref 0–2)
BILIRUB SERPL-MCNC: 0.4 MG/DL (ref 0.2–1.1)
BUN SERPL-MCNC: 12 MG/DL (ref 6–23)
CALCIUM SERPL-MCNC: 9.2 MG/DL (ref 8.3–10.4)
CEA SERPL-MCNC: 18.2 NG/ML (ref 0–3)
CHLORIDE SERPL-SCNC: 110 MMOL/L (ref 98–107)
CO2 SERPL-SCNC: 27 MMOL/L (ref 21–32)
CREAT SERPL-MCNC: 0.7 MG/DL (ref 0.8–1.5)
DIFFERENTIAL METHOD BLD: ABNORMAL
EOSINOPHIL # BLD: 0.3 K/UL (ref 0–0.8)
EOSINOPHIL NFR BLD: 7 % (ref 0.5–7.8)
ERYTHROCYTE [DISTWIDTH] IN BLOOD BY AUTOMATED COUNT: 14.9 % (ref 11.9–14.6)
GLOBULIN SER CALC-MCNC: 3.3 G/DL (ref 2.3–3.5)
GLUCOSE SERPL-MCNC: 94 MG/DL (ref 65–100)
HCT VFR BLD AUTO: 38 %
HGB BLD-MCNC: 11.9 G/DL (ref 13.6–17.2)
IMM GRANULOCYTES # BLD AUTO: 0 K/UL (ref 0–0.5)
IMM GRANULOCYTES NFR BLD AUTO: 0 % (ref 0–5)
LYMPHOCYTES # BLD: 0.8 K/UL (ref 0.5–4.6)
LYMPHOCYTES NFR BLD: 17 % (ref 13–44)
MAGNESIUM SERPL-MCNC: 2 MG/DL (ref 1.8–2.4)
MCH RBC QN AUTO: 29.3 PG (ref 26.1–32.9)
MCHC RBC AUTO-ENTMCNC: 31.3 G/DL (ref 31.4–35)
MCV RBC AUTO: 93.6 FL (ref 79.6–97.8)
MONOCYTES # BLD: 0.5 K/UL (ref 0.1–1.3)
MONOCYTES NFR BLD: 11 % (ref 4–12)
NEUTS SEG # BLD: 2.9 K/UL (ref 1.7–8.2)
NEUTS SEG NFR BLD: 64 % (ref 43–78)
NRBC # BLD: 0 K/UL (ref 0–0.2)
PLATELET # BLD AUTO: 104 K/UL (ref 150–450)
PMV BLD AUTO: 10.2 FL (ref 9.4–12.3)
POTASSIUM SERPL-SCNC: 4.3 MMOL/L (ref 3.5–5.1)
PROT SERPL-MCNC: 7 G/DL (ref 6.3–8.2)
RBC # BLD AUTO: 4.06 M/UL (ref 4.23–5.6)
SODIUM SERPL-SCNC: 142 MMOL/L (ref 136–145)
WBC # BLD AUTO: 4.6 K/UL (ref 4.3–11.1)

## 2021-06-30 PROCEDURE — 83735 ASSAY OF MAGNESIUM: CPT

## 2021-06-30 PROCEDURE — 85025 COMPLETE CBC W/AUTO DIFF WBC: CPT

## 2021-06-30 PROCEDURE — 82378 CARCINOEMBRYONIC ANTIGEN: CPT

## 2021-06-30 PROCEDURE — 36415 COLL VENOUS BLD VENIPUNCTURE: CPT

## 2021-06-30 PROCEDURE — 80053 COMPREHEN METABOLIC PANEL: CPT

## 2021-06-30 NOTE — PROGRESS NOTES
Saw patient prior to C15 Folfiri. Pt states he doesn't feel as if Neurontin has helped neuropathy very much but neuropathy is tolerable at this point and he can use hands/feet and function with daily activities. Pt finished radiation first of June 2021. He denies any complaints at present or uncontrolled symptoms.  Navigation will be following

## 2021-07-01 ENCOUNTER — HOSPITAL ENCOUNTER (OUTPATIENT)
Dept: INFUSION THERAPY | Age: 46
Discharge: HOME | End: 2021-07-01

## 2021-07-01 VITALS
RESPIRATION RATE: 16 BRPM | TEMPERATURE: 98.6 F | OXYGEN SATURATION: 98 % | DIASTOLIC BLOOD PRESSURE: 74 MMHG | HEART RATE: 83 BPM | SYSTOLIC BLOOD PRESSURE: 133 MMHG | BODY MASS INDEX: 27.2 KG/M2 | WEIGHT: 195 LBS

## 2021-07-01 VITALS
RESPIRATION RATE: 18 BRPM | TEMPERATURE: 99 F | BODY MASS INDEX: 27.2 KG/M2 | OXYGEN SATURATION: 98 % | HEART RATE: 84 BPM | DIASTOLIC BLOOD PRESSURE: 65 MMHG | SYSTOLIC BLOOD PRESSURE: 123 MMHG | HEIGHT: 71 IN

## 2021-07-01 DIAGNOSIS — R11.2 CINV (CHEMOTHERAPY-INDUCED NAUSEA AND VOMITING): ICD-10-CM

## 2021-07-01 DIAGNOSIS — R39.15 URGENCY OF URINATION: Primary | ICD-10-CM

## 2021-07-01 DIAGNOSIS — C16.8 MALIGNANT NEOPLASM OF OVERLAPPING SITES OF STOMACH (HCC): ICD-10-CM

## 2021-07-01 DIAGNOSIS — T45.1X5A CINV (CHEMOTHERAPY-INDUCED NAUSEA AND VOMITING): ICD-10-CM

## 2021-07-01 PROCEDURE — 96367 TX/PROPH/DG ADDL SEQ IV INF: CPT

## 2021-07-01 PROCEDURE — 74011250636 HC RX REV CODE- 250/636: Performed by: INTERNAL MEDICINE

## 2021-07-01 PROCEDURE — 96413 CHEMO IV INFUSION 1 HR: CPT

## 2021-07-01 PROCEDURE — 96417 CHEMO IV INFUS EACH ADDL SEQ: CPT

## 2021-07-01 PROCEDURE — 96372 THER/PROPH/DIAG INJ SC/IM: CPT

## 2021-07-01 PROCEDURE — 96411 CHEMO IV PUSH ADDL DRUG: CPT

## 2021-07-01 PROCEDURE — 96375 TX/PRO/DX INJ NEW DRUG ADDON: CPT

## 2021-07-01 PROCEDURE — G0498 CHEMO EXTEND IV INFUS W/PUMP: HCPCS

## 2021-07-01 PROCEDURE — 96415 CHEMO IV INFUSION ADDL HR: CPT

## 2021-07-01 PROCEDURE — 96368 THER/DIAG CONCURRENT INF: CPT

## 2021-07-01 PROCEDURE — 74011000258 HC RX REV CODE- 258: Performed by: INTERNAL MEDICINE

## 2021-07-01 RX ORDER — FLUOROURACIL 50 MG/ML
300 INJECTION, SOLUTION INTRAVENOUS ONCE
Status: COMPLETED | OUTPATIENT
Start: 2021-07-01 | End: 2021-07-01

## 2021-07-01 RX ORDER — SODIUM CHLORIDE 0.9 % (FLUSH) 0.9 %
10 SYRINGE (ML) INJECTION AS NEEDED
Status: ACTIVE | OUTPATIENT
Start: 2021-07-01 | End: 2021-07-01

## 2021-07-01 RX ORDER — ONDANSETRON 2 MG/ML
8 INJECTION INTRAMUSCULAR; INTRAVENOUS ONCE
Status: COMPLETED | OUTPATIENT
Start: 2021-07-01 | End: 2021-07-01

## 2021-07-01 RX ORDER — DEXTROSE MONOHYDRATE 50 MG/ML
25 INJECTION, SOLUTION INTRAVENOUS CONTINUOUS
Status: ACTIVE | OUTPATIENT
Start: 2021-07-01 | End: 2021-07-01

## 2021-07-01 RX ORDER — ATROPINE SULFATE 0.4 MG/ML
0.4 INJECTION, SOLUTION ENDOTRACHEAL; INTRAMEDULLARY; INTRAMUSCULAR; INTRAVENOUS; SUBCUTANEOUS ONCE
Status: COMPLETED | OUTPATIENT
Start: 2021-07-01 | End: 2021-07-01

## 2021-07-01 RX ADMIN — DEXTROSE MONOHYDRATE 25 ML/HR: 5 INJECTION, SOLUTION INTRAVENOUS at 09:51

## 2021-07-01 RX ADMIN — OXALIPLATIN 100 MG: 5 INJECTION, SOLUTION INTRAVENOUS at 10:45

## 2021-07-01 RX ADMIN — ONDANSETRON 8 MG: 2 INJECTION INTRAMUSCULAR; INTRAVENOUS at 09:41

## 2021-07-01 RX ADMIN — FOSAPREPITANT 150 MG: 150 INJECTION, POWDER, LYOPHILIZED, FOR SOLUTION INTRAVENOUS at 10:11

## 2021-07-01 RX ADMIN — FLUOROURACIL 4000 MG: 50 INJECTION, SOLUTION INTRAVENOUS at 14:49

## 2021-07-01 RX ADMIN — LEUCOVORIN CALCIUM 800 MG: 200 INJECTION, POWDER, LYOPHILIZED, FOR SOLUTION INTRAMUSCULAR; INTRAVENOUS at 13:00

## 2021-07-01 RX ADMIN — DEXAMETHASONE SODIUM PHOSPHATE 12 MG: 4 INJECTION, SOLUTION INTRAMUSCULAR; INTRAVENOUS at 09:47

## 2021-07-01 RX ADMIN — ATROPINE SULFATE 0.4 MG: 0.4 INJECTION, SOLUTION INTRAMUSCULAR; INTRAVENOUS; SUBCUTANEOUS at 12:51

## 2021-07-01 RX ADMIN — FLUOROURACIL 612 MG: 50 INJECTION, SOLUTION INTRAVENOUS at 14:46

## 2021-07-01 RX ADMIN — Medication 10 ML: at 14:50

## 2021-07-01 RX ADMIN — IRINOTECAN HYDROCHLORIDE 240 MG: 20 INJECTION, SOLUTION INTRAVENOUS at 12:58

## 2021-07-01 NOTE — ADDENDUM NOTE
Encounter addended by: Yeni Louie, [facility] on: 7/1/2021 9:19 AM   Actions taken: Flowsheet accepted, i-Vent created or edited

## 2021-07-01 NOTE — PROGRESS NOTES
Arrived to the infusion center. Folfirinox completed without signs of adverse reaction. No new issues or concerns voiced during  the visit. Aware of appointment on 7/3 at 1600 to have pump dc/d. Discharged in stable condtion.  Pump placed at 1500

## 2021-07-03 ENCOUNTER — HOSPITAL ENCOUNTER (OUTPATIENT)
Dept: INFUSION THERAPY | Age: 46
Discharge: HOME | End: 2021-07-03

## 2021-07-03 VITALS
OXYGEN SATURATION: 95 % | DIASTOLIC BLOOD PRESSURE: 71 MMHG | TEMPERATURE: 98.1 F | RESPIRATION RATE: 18 BRPM | SYSTOLIC BLOOD PRESSURE: 125 MMHG | HEART RATE: 75 BPM

## 2021-07-03 DIAGNOSIS — R39.15 URGENCY OF URINATION: Primary | ICD-10-CM

## 2021-07-03 DIAGNOSIS — R11.2 CINV (CHEMOTHERAPY-INDUCED NAUSEA AND VOMITING): ICD-10-CM

## 2021-07-03 DIAGNOSIS — T45.1X5A CINV (CHEMOTHERAPY-INDUCED NAUSEA AND VOMITING): ICD-10-CM

## 2021-07-03 DIAGNOSIS — C16.8 MALIGNANT NEOPLASM OF OVERLAPPING SITES OF STOMACH (HCC): ICD-10-CM

## 2021-07-03 PROCEDURE — 96523 IRRIG DRUG DELIVERY DEVICE: CPT

## 2021-07-03 RX ORDER — SODIUM CHLORIDE 0.9 % (FLUSH) 0.9 %
10 SYRINGE (ML) INJECTION AS NEEDED
Status: DISCONTINUED | OUTPATIENT
Start: 2021-07-03 | End: 2021-07-04 | Stop reason: HOSPADM

## 2021-07-03 RX ADMIN — Medication 10 ML: at 13:20

## 2021-07-03 NOTE — PROGRESS NOTES
[facility] D/C pump completed. Patient tolerated well. Any issues or concerns during appointment: no.  Patient aware of next infusion appointment on 7/4/2021 (date) at 1 pm (time). Discharged ambulatory with self.

## 2021-07-04 ENCOUNTER — HOSPITAL ENCOUNTER (OUTPATIENT)
Dept: INFUSION THERAPY | Age: 46
Discharge: HOME | End: 2021-07-04

## 2021-07-04 VITALS
RESPIRATION RATE: 18 BRPM | HEART RATE: 102 BPM | OXYGEN SATURATION: 96 % | WEIGHT: 195.8 LBS | SYSTOLIC BLOOD PRESSURE: 137 MMHG | BODY MASS INDEX: 27.31 KG/M2 | TEMPERATURE: 98.6 F | DIASTOLIC BLOOD PRESSURE: 75 MMHG

## 2021-07-04 DIAGNOSIS — R11.2 CINV (CHEMOTHERAPY-INDUCED NAUSEA AND VOMITING): ICD-10-CM

## 2021-07-04 DIAGNOSIS — R39.15 URGENCY OF URINATION: Primary | ICD-10-CM

## 2021-07-04 DIAGNOSIS — T45.1X5A CINV (CHEMOTHERAPY-INDUCED NAUSEA AND VOMITING): ICD-10-CM

## 2021-07-04 DIAGNOSIS — C16.8 MALIGNANT NEOPLASM OF OVERLAPPING SITES OF STOMACH (HCC): ICD-10-CM

## 2021-07-04 PROCEDURE — 96372 THER/PROPH/DIAG INJ SC/IM: CPT

## 2021-07-04 PROCEDURE — 74011250636 HC RX REV CODE- 250/636: Performed by: INTERNAL MEDICINE

## 2021-07-04 RX ADMIN — PEGFILGRASTIM-CBQV 6 MG: 6 INJECTION, SOLUTION SUBCUTANEOUS at 13:25

## 2021-07-04 NOTE — PROGRESS NOTES
Problem: Knowledge Deficit  Goal: *Verbalizes understanding of procedures and medications  Outcome: Progressing Towards Goal     Problem: Knowledge Deficit  Goal: *Verbalizes understanding of procedures and medications  Outcome: Progressing Towards Goal     Problem: Patient Education:  Go to Education Activity  Goal: Patient/Family Education  Outcome: Progressing Towards Goal

## 2021-07-04 NOTE — PROGRESS NOTES
Pt arrived ambulatory today at 1308, to receive Udenyca. Pt tolerated without difficulty. Patient discharged via ambulatory accompanied by self. Instructed to notify physician of any problems, questions or concerns. Allowed opportunity for patient/family to ask questions. Verbalized understanding. Next appointment is July 14 at 1100 with Shan Ortiz.

## 2021-07-07 ENCOUNTER — PATIENT OUTREACH (OUTPATIENT)
Dept: CASE MANAGEMENT | Age: 46
End: 2021-07-07

## 2021-07-07 NOTE — PROGRESS NOTES
Spoke with patient yesterday and he requested Gabapentin BID.  Spoke with Dr Eulalio Sandoval and he will be prescribed Gabapentin 300mg BID for neuropathy

## 2021-07-14 ENCOUNTER — HOSPITAL ENCOUNTER (OUTPATIENT)
Dept: LAB | Age: 46
Discharge: HOME | End: 2021-07-14

## 2021-07-14 ENCOUNTER — HOSPITAL ENCOUNTER (OUTPATIENT)
Dept: INFUSION THERAPY | Age: 46
Discharge: HOME | End: 2021-07-14

## 2021-07-14 ENCOUNTER — PATIENT OUTREACH (OUTPATIENT)
Dept: CASE MANAGEMENT | Age: 46
End: 2021-07-14

## 2021-07-14 DIAGNOSIS — C16.8 MALIGNANT NEOPLASM OF OVERLAPPING SITES OF STOMACH (HCC): ICD-10-CM

## 2021-07-14 LAB
ALBUMIN SERPL-MCNC: 3.9 G/DL (ref 3.5–5)
ALBUMIN/GLOB SERPL: 1.3 {RATIO} (ref 1.2–3.5)
ALP SERPL-CCNC: 165 U/L (ref 50–136)
ALT SERPL-CCNC: 55 U/L (ref 12–65)
ANION GAP SERPL CALC-SCNC: 6 MMOL/L (ref 7–16)
AST SERPL-CCNC: 39 U/L (ref 15–37)
BASOPHILS # BLD: 0 K/UL (ref 0–0.2)
BASOPHILS NFR BLD: 0 % (ref 0–2)
BILIRUB SERPL-MCNC: 0.3 MG/DL (ref 0.2–1.1)
BUN SERPL-MCNC: 10 MG/DL (ref 6–23)
CALCIUM SERPL-MCNC: 9.1 MG/DL (ref 8.3–10.4)
CEA SERPL-MCNC: 19.5 NG/ML (ref 0–3)
CHLORIDE SERPL-SCNC: 107 MMOL/L (ref 98–107)
CO2 SERPL-SCNC: 29 MMOL/L (ref 21–32)
CREAT SERPL-MCNC: 0.8 MG/DL (ref 0.8–1.5)
DIFFERENTIAL METHOD BLD: ABNORMAL
EOSINOPHIL # BLD: 0.2 K/UL (ref 0–0.8)
EOSINOPHIL NFR BLD: 5 % (ref 0.5–7.8)
ERYTHROCYTE [DISTWIDTH] IN BLOOD BY AUTOMATED COUNT: 14.6 % (ref 11.9–14.6)
GLOBULIN SER CALC-MCNC: 3.1 G/DL (ref 2.3–3.5)
GLUCOSE SERPL-MCNC: 90 MG/DL (ref 65–100)
HCT VFR BLD AUTO: 35.9 %
HGB BLD-MCNC: 11.6 G/DL (ref 13.6–17.2)
IMM GRANULOCYTES # BLD AUTO: 0 K/UL (ref 0–0.5)
IMM GRANULOCYTES NFR BLD AUTO: 0 % (ref 0–5)
LYMPHOCYTES # BLD: 0.9 K/UL (ref 0.5–4.6)
LYMPHOCYTES NFR BLD: 20 % (ref 13–44)
MAGNESIUM SERPL-MCNC: 2.4 MG/DL (ref 1.8–2.4)
MCH RBC QN AUTO: 29.9 PG (ref 26.1–32.9)
MCHC RBC AUTO-ENTMCNC: 32.3 G/DL (ref 31.4–35)
MCV RBC AUTO: 92.5 FL (ref 79.6–97.8)
MONOCYTES # BLD: 0.5 K/UL (ref 0.1–1.3)
MONOCYTES NFR BLD: 10 % (ref 4–12)
NEUTS SEG # BLD: 3.1 K/UL (ref 1.7–8.2)
NEUTS SEG NFR BLD: 65 % (ref 43–78)
NRBC # BLD: 0 K/UL (ref 0–0.2)
PLATELET # BLD AUTO: 95 K/UL (ref 150–450)
PMV BLD AUTO: 9.5 FL (ref 9.4–12.3)
POTASSIUM SERPL-SCNC: 3.8 MMOL/L (ref 3.5–5.1)
PROT SERPL-MCNC: 7 G/DL (ref 6.3–8.2)
RBC # BLD AUTO: 3.88 M/UL (ref 4.23–5.6)
SODIUM SERPL-SCNC: 142 MMOL/L (ref 136–145)
WBC # BLD AUTO: 4.7 K/UL (ref 4.3–11.1)

## 2021-07-14 PROCEDURE — 36415 COLL VENOUS BLD VENIPUNCTURE: CPT

## 2021-07-14 PROCEDURE — 85025 COMPLETE CBC W/AUTO DIFF WBC: CPT

## 2021-07-14 PROCEDURE — 82378 CARCINOEMBRYONIC ANTIGEN: CPT

## 2021-07-14 PROCEDURE — 80053 COMPREHEN METABOLIC PANEL: CPT

## 2021-07-14 PROCEDURE — 83735 ASSAY OF MAGNESIUM: CPT

## 2021-07-14 NOTE — PROGRESS NOTES
I saw patient today with Dr Gregor Reina. Chemo is usually decoupled and scheduled on Thursday so we will move until tomorrow. Pt does not voice complaints today.  Nurse navigation will continue to follow

## 2021-07-15 ENCOUNTER — HOSPITAL ENCOUNTER (OUTPATIENT)
Dept: INFUSION THERAPY | Age: 46
Discharge: HOME | End: 2021-07-15

## 2021-07-15 VITALS
OXYGEN SATURATION: 96 % | SYSTOLIC BLOOD PRESSURE: 120 MMHG | DIASTOLIC BLOOD PRESSURE: 69 MMHG | RESPIRATION RATE: 16 BRPM | TEMPERATURE: 98.7 F | HEART RATE: 85 BPM | WEIGHT: 195 LBS | BODY MASS INDEX: 27.2 KG/M2

## 2021-07-15 DIAGNOSIS — R39.15 URGENCY OF URINATION: Primary | ICD-10-CM

## 2021-07-15 DIAGNOSIS — C16.8 MALIGNANT NEOPLASM OF OVERLAPPING SITES OF STOMACH (HCC): ICD-10-CM

## 2021-07-15 DIAGNOSIS — R11.2 CINV (CHEMOTHERAPY-INDUCED NAUSEA AND VOMITING): ICD-10-CM

## 2021-07-15 DIAGNOSIS — T45.1X5A CINV (CHEMOTHERAPY-INDUCED NAUSEA AND VOMITING): ICD-10-CM

## 2021-07-15 PROCEDURE — 96415 CHEMO IV INFUSION ADDL HR: CPT

## 2021-07-15 PROCEDURE — 96368 THER/DIAG CONCURRENT INF: CPT

## 2021-07-15 PROCEDURE — 96375 TX/PRO/DX INJ NEW DRUG ADDON: CPT

## 2021-07-15 PROCEDURE — 96413 CHEMO IV INFUSION 1 HR: CPT

## 2021-07-15 PROCEDURE — G0498 CHEMO EXTEND IV INFUS W/PUMP: HCPCS

## 2021-07-15 PROCEDURE — 74011000258 HC RX REV CODE- 258: Performed by: INTERNAL MEDICINE

## 2021-07-15 PROCEDURE — 96372 THER/PROPH/DIAG INJ SC/IM: CPT

## 2021-07-15 PROCEDURE — 96367 TX/PROPH/DG ADDL SEQ IV INF: CPT

## 2021-07-15 PROCEDURE — 74011250636 HC RX REV CODE- 250/636: Performed by: INTERNAL MEDICINE

## 2021-07-15 PROCEDURE — 96411 CHEMO IV PUSH ADDL DRUG: CPT

## 2021-07-15 PROCEDURE — 96417 CHEMO IV INFUS EACH ADDL SEQ: CPT

## 2021-07-15 RX ORDER — SODIUM CHLORIDE 9 MG/ML
10 INJECTION INTRAMUSCULAR; INTRAVENOUS; SUBCUTANEOUS AS NEEDED
Status: ACTIVE | OUTPATIENT
Start: 2021-07-15 | End: 2021-07-15

## 2021-07-15 RX ORDER — ATROPINE SULFATE 0.4 MG/ML
0.4 INJECTION, SOLUTION ENDOTRACHEAL; INTRAMEDULLARY; INTRAMUSCULAR; INTRAVENOUS; SUBCUTANEOUS ONCE
Status: COMPLETED | OUTPATIENT
Start: 2021-07-15 | End: 2021-07-15

## 2021-07-15 RX ORDER — DEXTROSE MONOHYDRATE 50 MG/ML
25 INJECTION, SOLUTION INTRAVENOUS CONTINUOUS
Status: ACTIVE | OUTPATIENT
Start: 2021-07-15 | End: 2021-07-15

## 2021-07-15 RX ORDER — SODIUM CHLORIDE 9 MG/ML
25 INJECTION, SOLUTION INTRAVENOUS CONTINUOUS
Status: DISCONTINUED | OUTPATIENT
Start: 2021-07-15 | End: 2021-07-17 | Stop reason: HOSPADM

## 2021-07-15 RX ORDER — ONDANSETRON 2 MG/ML
8 INJECTION INTRAMUSCULAR; INTRAVENOUS ONCE
Status: COMPLETED | OUTPATIENT
Start: 2021-07-15 | End: 2021-07-15

## 2021-07-15 RX ORDER — FLUOROURACIL 50 MG/ML
300 INJECTION, SOLUTION INTRAVENOUS ONCE
Status: COMPLETED | OUTPATIENT
Start: 2021-07-15 | End: 2021-07-15

## 2021-07-15 RX ADMIN — DEXTROSE MONOHYDRATE 25 ML/HR: 5 INJECTION, SOLUTION INTRAVENOUS at 09:58

## 2021-07-15 RX ADMIN — SODIUM CHLORIDE 25 ML/HR: 900 INJECTION, SOLUTION INTRAVENOUS at 08:30

## 2021-07-15 RX ADMIN — DEXAMETHASONE SODIUM PHOSPHATE 12 MG: 4 INJECTION, SOLUTION INTRAMUSCULAR; INTRAVENOUS at 09:05

## 2021-07-15 RX ADMIN — IRINOTECAN HYDROCHLORIDE 240 MG: 20 INJECTION, SOLUTION INTRAVENOUS at 12:10

## 2021-07-15 RX ADMIN — SODIUM CHLORIDE 10 ML: 9 INJECTION INTRAMUSCULAR; INTRAVENOUS; SUBCUTANEOUS at 14:05

## 2021-07-15 RX ADMIN — FLUOROURACIL 612 MG: 50 INJECTION, SOLUTION INTRAVENOUS at 13:53

## 2021-07-15 RX ADMIN — OXALIPLATIN 100 MG: 5 INJECTION, SOLUTION INTRAVENOUS at 09:58

## 2021-07-15 RX ADMIN — LEUCOVORIN CALCIUM 800 MG: 350 INJECTION, POWDER, LYOPHILIZED, FOR SOLUTION INTRAMUSCULAR; INTRAVENOUS at 12:10

## 2021-07-15 RX ADMIN — ONDANSETRON 8 MG: 2 INJECTION INTRAMUSCULAR; INTRAVENOUS at 09:02

## 2021-07-15 RX ADMIN — FLUOROURACIL 4000 MG: 50 INJECTION, SOLUTION INTRAVENOUS at 13:57

## 2021-07-15 RX ADMIN — FOSAPREPITANT 150 MG: 150 INJECTION, POWDER, LYOPHILIZED, FOR SOLUTION INTRAVENOUS at 09:27

## 2021-07-15 RX ADMIN — ATROPINE SULFATE 0.4 MG: 0.4 INJECTION, SOLUTION INTRAMUSCULAR; INTRAVENOUS; SUBCUTANEOUS at 12:07

## 2021-07-15 NOTE — PROGRESS NOTES
Arrived to the [facility]. Assessment complete, labs reviewed. Folfirinox completed. Patient tolerated without problems. . Flurouracil  elastomeric pump connected to infuse over 46 hours at 5ml/hr. Clamps unclamped x 2 and verified with [facility] RN  Pump placed at 1357  Any issues or concerns during appointment: None  Instructed to call provider with any side effects or concerns  Patient aware of next infusion appointment on 7/17/21(date) at 2 PM for pump removal (time).   Discharged ambulatory

## 2021-07-16 ENCOUNTER — APPOINTMENT (OUTPATIENT)
Dept: INFUSION THERAPY | Age: 46
End: 2021-07-16

## 2021-07-17 ENCOUNTER — HOSPITAL ENCOUNTER (OUTPATIENT)
Dept: INFUSION THERAPY | Age: 46
Discharge: HOME | End: 2021-07-17

## 2021-07-17 VITALS
OXYGEN SATURATION: 96 % | HEART RATE: 81 BPM | SYSTOLIC BLOOD PRESSURE: 124 MMHG | TEMPERATURE: 98.2 F | DIASTOLIC BLOOD PRESSURE: 71 MMHG | RESPIRATION RATE: 18 BRPM

## 2021-07-17 DIAGNOSIS — T45.1X5A CINV (CHEMOTHERAPY-INDUCED NAUSEA AND VOMITING): ICD-10-CM

## 2021-07-17 DIAGNOSIS — R11.2 CINV (CHEMOTHERAPY-INDUCED NAUSEA AND VOMITING): ICD-10-CM

## 2021-07-17 DIAGNOSIS — R39.15 URGENCY OF URINATION: Primary | ICD-10-CM

## 2021-07-17 DIAGNOSIS — C16.8 MALIGNANT NEOPLASM OF OVERLAPPING SITES OF STOMACH (HCC): ICD-10-CM

## 2021-07-17 PROCEDURE — 96523 IRRIG DRUG DELIVERY DEVICE: CPT

## 2021-07-17 RX ORDER — SODIUM CHLORIDE 0.9 % (FLUSH) 0.9 %
10 SYRINGE (ML) INJECTION AS NEEDED
Status: DISCONTINUED | OUTPATIENT
Start: 2021-07-17 | End: 2021-07-18 | Stop reason: HOSPADM

## 2021-07-17 RX ADMIN — Medication 10 ML: at 13:05

## 2021-07-17 NOTE — PROGRESS NOTES
Patient arrived for chemo pump removal. Elastomeric pump completely empty. Disposed of chemo pump per chemo gator. Patient discharged via ambulation accompanied by self. Instructed to notify physician of any problems, questions or concerns after discharge. Next appointment is 07/18/2021 at 1430 with Infusion for Udenyca injection. Annette Queen

## 2021-07-18 ENCOUNTER — HOSPITAL ENCOUNTER (OUTPATIENT)
Dept: INFUSION THERAPY | Age: 46
Discharge: HOME | End: 2021-07-18

## 2021-07-18 VITALS
HEART RATE: 88 BPM | OXYGEN SATURATION: 99 % | DIASTOLIC BLOOD PRESSURE: 74 MMHG | SYSTOLIC BLOOD PRESSURE: 127 MMHG | RESPIRATION RATE: 16 BRPM | TEMPERATURE: 98.3 F

## 2021-07-18 DIAGNOSIS — R11.2 CINV (CHEMOTHERAPY-INDUCED NAUSEA AND VOMITING): ICD-10-CM

## 2021-07-18 DIAGNOSIS — T45.1X5A CINV (CHEMOTHERAPY-INDUCED NAUSEA AND VOMITING): ICD-10-CM

## 2021-07-18 DIAGNOSIS — R39.15 URGENCY OF URINATION: Primary | ICD-10-CM

## 2021-07-18 DIAGNOSIS — C16.8 MALIGNANT NEOPLASM OF OVERLAPPING SITES OF STOMACH (HCC): ICD-10-CM

## 2021-07-18 PROCEDURE — 96372 THER/PROPH/DIAG INJ SC/IM: CPT

## 2021-07-18 PROCEDURE — 74011250636 HC RX REV CODE- 250/636: Performed by: INTERNAL MEDICINE

## 2021-07-18 RX ADMIN — PEGFILGRASTIM-CBQV 6 MG: 6 INJECTION, SOLUTION SUBCUTANEOUS at 13:32

## 2021-07-18 NOTE — PROGRESS NOTES
Arrived to the [facility]. Carlie completed. Provided education on side effects to report to MD.    Patient instructed to report any side affects to ordering provider. Patient tolerated well. Any issues or concerns during appointment: none. Patient aware of next infusion appointment on 07/29/21 (date) at 0800 (time). Discharged ambulatory.

## 2021-07-28 ENCOUNTER — HOSPITAL ENCOUNTER (OUTPATIENT)
Dept: LAB | Age: 46
Discharge: HOME | End: 2021-07-28

## 2021-07-28 ENCOUNTER — APPOINTMENT (OUTPATIENT)
Dept: INFUSION THERAPY | Age: 46
End: 2021-07-28

## 2021-07-28 DIAGNOSIS — C16.9 MALIGNANT NEOPLASM OF STOMACH, UNSPECIFIED LOCATION (HCC): ICD-10-CM

## 2021-07-28 LAB
ALBUMIN SERPL-MCNC: 3.9 G/DL (ref 3.5–5)
ALBUMIN/GLOB SERPL: 1.2 {RATIO} (ref 1.2–3.5)
ALP SERPL-CCNC: 178 U/L (ref 50–136)
ALT SERPL-CCNC: 73 U/L (ref 12–65)
ANION GAP SERPL CALC-SCNC: 7 MMOL/L (ref 7–16)
AST SERPL-CCNC: 53 U/L (ref 15–37)
BASOPHILS # BLD: 0 K/UL (ref 0–0.2)
BASOPHILS NFR BLD: 1 % (ref 0–2)
BILIRUB SERPL-MCNC: 0.2 MG/DL (ref 0.2–1.1)
BUN SERPL-MCNC: 14 MG/DL (ref 6–23)
CALCIUM SERPL-MCNC: 9 MG/DL (ref 8.3–10.4)
CANCER AG19-9 SERPL-ACNC: 166.3 U/ML (ref 2–37)
CEA SERPL-MCNC: 19.9 NG/ML (ref 0–3)
CHLORIDE SERPL-SCNC: 107 MMOL/L (ref 98–107)
CO2 SERPL-SCNC: 28 MMOL/L (ref 21–32)
CREAT SERPL-MCNC: 0.9 MG/DL (ref 0.8–1.5)
DIFFERENTIAL METHOD BLD: ABNORMAL
EOSINOPHIL # BLD: 0.1 K/UL (ref 0–0.8)
EOSINOPHIL NFR BLD: 1 % (ref 0.5–7.8)
ERYTHROCYTE [DISTWIDTH] IN BLOOD BY AUTOMATED COUNT: 14.8 % (ref 11.9–14.6)
GLOBULIN SER CALC-MCNC: 3.2 G/DL (ref 2.3–3.5)
GLUCOSE SERPL-MCNC: 121 MG/DL (ref 65–100)
HCT VFR BLD AUTO: 36.2 %
HGB BLD-MCNC: 11.8 G/DL (ref 13.6–17.2)
IMM GRANULOCYTES # BLD AUTO: 0.2 K/UL (ref 0–0.5)
IMM GRANULOCYTES NFR BLD AUTO: 4 % (ref 0–5)
LYMPHOCYTES # BLD: 1 K/UL (ref 0.5–4.6)
LYMPHOCYTES NFR BLD: 16 % (ref 13–44)
MAGNESIUM SERPL-MCNC: 2.3 MG/DL (ref 1.8–2.4)
MCH RBC QN AUTO: 30.4 PG (ref 26.1–32.9)
MCHC RBC AUTO-ENTMCNC: 32.6 G/DL (ref 31.4–35)
MCV RBC AUTO: 93.3 FL (ref 79.6–97.8)
MONOCYTES # BLD: 0.8 K/UL (ref 0.1–1.3)
MONOCYTES NFR BLD: 12 % (ref 4–12)
NEUTS SEG # BLD: 4.4 K/UL (ref 1.7–8.2)
NEUTS SEG NFR BLD: 67 % (ref 43–78)
NRBC # BLD: 0 K/UL (ref 0–0.2)
PLATELET # BLD AUTO: 104 K/UL (ref 150–450)
PMV BLD AUTO: 9.4 FL (ref 9.4–12.3)
POTASSIUM SERPL-SCNC: 4.1 MMOL/L (ref 3.5–5.1)
PROT SERPL-MCNC: 7.1 G/DL (ref 6.3–8.2)
RBC # BLD AUTO: 3.88 M/UL (ref 4.23–5.6)
SODIUM SERPL-SCNC: 142 MMOL/L (ref 136–145)
WBC # BLD AUTO: 6.5 K/UL (ref 4.3–11.1)

## 2021-07-28 PROCEDURE — 82378 CARCINOEMBRYONIC ANTIGEN: CPT

## 2021-07-28 PROCEDURE — 36415 COLL VENOUS BLD VENIPUNCTURE: CPT

## 2021-07-28 PROCEDURE — 86301 IMMUNOASSAY TUMOR CA 19-9: CPT

## 2021-07-28 PROCEDURE — 80053 COMPREHEN METABOLIC PANEL: CPT

## 2021-07-28 PROCEDURE — 85025 COMPLETE CBC W/AUTO DIFF WBC: CPT

## 2021-07-28 PROCEDURE — 83735 ASSAY OF MAGNESIUM: CPT

## 2021-07-29 ENCOUNTER — HOSPITAL ENCOUNTER (OUTPATIENT)
Dept: INFUSION THERAPY | Age: 46
Discharge: HOME | End: 2021-07-29

## 2021-07-29 VITALS
HEART RATE: 77 BPM | RESPIRATION RATE: 18 BRPM | WEIGHT: 195 LBS | TEMPERATURE: 98.3 F | OXYGEN SATURATION: 97 % | DIASTOLIC BLOOD PRESSURE: 67 MMHG | BODY MASS INDEX: 27.2 KG/M2 | SYSTOLIC BLOOD PRESSURE: 112 MMHG

## 2021-07-29 DIAGNOSIS — R39.15 URGENCY OF URINATION: Primary | ICD-10-CM

## 2021-07-29 DIAGNOSIS — R11.2 CINV (CHEMOTHERAPY-INDUCED NAUSEA AND VOMITING): ICD-10-CM

## 2021-07-29 DIAGNOSIS — T45.1X5A CINV (CHEMOTHERAPY-INDUCED NAUSEA AND VOMITING): ICD-10-CM

## 2021-07-29 DIAGNOSIS — C16.8 MALIGNANT NEOPLASM OF OVERLAPPING SITES OF STOMACH (HCC): ICD-10-CM

## 2021-07-29 PROCEDURE — 96413 CHEMO IV INFUSION 1 HR: CPT

## 2021-07-29 PROCEDURE — 74011000258 HC RX REV CODE- 258: Performed by: INTERNAL MEDICINE

## 2021-07-29 PROCEDURE — G0498 CHEMO EXTEND IV INFUS W/PUMP: HCPCS

## 2021-07-29 PROCEDURE — 96375 TX/PRO/DX INJ NEW DRUG ADDON: CPT

## 2021-07-29 PROCEDURE — 96417 CHEMO IV INFUS EACH ADDL SEQ: CPT

## 2021-07-29 PROCEDURE — 96367 TX/PROPH/DG ADDL SEQ IV INF: CPT

## 2021-07-29 PROCEDURE — 96415 CHEMO IV INFUSION ADDL HR: CPT

## 2021-07-29 PROCEDURE — 96368 THER/DIAG CONCURRENT INF: CPT

## 2021-07-29 PROCEDURE — 96372 THER/PROPH/DIAG INJ SC/IM: CPT

## 2021-07-29 PROCEDURE — 96411 CHEMO IV PUSH ADDL DRUG: CPT

## 2021-07-29 PROCEDURE — 74011250636 HC RX REV CODE- 250/636: Performed by: INTERNAL MEDICINE

## 2021-07-29 RX ORDER — SODIUM CHLORIDE 0.9 % (FLUSH) 0.9 %
10 SYRINGE (ML) INJECTION AS NEEDED
Status: ACTIVE | OUTPATIENT
Start: 2021-07-29 | End: 2021-07-29

## 2021-07-29 RX ORDER — FLUOROURACIL 50 MG/ML
300 INJECTION, SOLUTION INTRAVENOUS ONCE
Status: COMPLETED | OUTPATIENT
Start: 2021-07-29 | End: 2021-07-29

## 2021-07-29 RX ORDER — ONDANSETRON 2 MG/ML
8 INJECTION INTRAMUSCULAR; INTRAVENOUS ONCE
Status: COMPLETED | OUTPATIENT
Start: 2021-07-29 | End: 2021-07-29

## 2021-07-29 RX ORDER — DEXTROSE MONOHYDRATE 50 MG/ML
25 INJECTION, SOLUTION INTRAVENOUS CONTINUOUS
Status: ACTIVE | OUTPATIENT
Start: 2021-07-29 | End: 2021-07-29

## 2021-07-29 RX ORDER — ATROPINE SULFATE 0.4 MG/ML
0.4 INJECTION, SOLUTION ENDOTRACHEAL; INTRAMEDULLARY; INTRAMUSCULAR; INTRAVENOUS; SUBCUTANEOUS
Status: ACTIVE | OUTPATIENT
Start: 2021-07-29 | End: 2021-07-29

## 2021-07-29 RX ORDER — ATROPINE SULFATE 0.4 MG/ML
0.4 INJECTION, SOLUTION ENDOTRACHEAL; INTRAMEDULLARY; INTRAMUSCULAR; INTRAVENOUS; SUBCUTANEOUS ONCE
Status: COMPLETED | OUTPATIENT
Start: 2021-07-29 | End: 2021-07-29

## 2021-07-29 RX ADMIN — ONDANSETRON 8 MG: 2 INJECTION INTRAMUSCULAR; INTRAVENOUS at 09:11

## 2021-07-29 RX ADMIN — FOSAPREPITANT 150 MG: 150 INJECTION, POWDER, LYOPHILIZED, FOR SOLUTION INTRAVENOUS at 09:30

## 2021-07-29 RX ADMIN — FLUOROURACIL 4000 MG: 50 INJECTION, SOLUTION INTRAVENOUS at 13:55

## 2021-07-29 RX ADMIN — OXALIPLATIN 100 MG: 5 INJECTION, SOLUTION INTRAVENOUS at 10:00

## 2021-07-29 RX ADMIN — DEXAMETHASONE SODIUM PHOSPHATE 12 MG: 4 INJECTION, SOLUTION INTRAMUSCULAR; INTRAVENOUS at 09:14

## 2021-07-29 RX ADMIN — DEXTROSE MONOHYDRATE 25 ML/HR: 5 INJECTION, SOLUTION INTRAVENOUS at 09:50

## 2021-07-29 RX ADMIN — LEUCOVORIN CALCIUM 816 MG: 350 INJECTION, POWDER, LYOPHILIZED, FOR SOLUTION INTRAMUSCULAR; INTRAVENOUS at 12:09

## 2021-07-29 RX ADMIN — ATROPINE SULFATE 0.4 MG: 0.4 INJECTION, SOLUTION INTRAMUSCULAR; INTRAVENOUS; SUBCUTANEOUS at 12:08

## 2021-07-29 RX ADMIN — FLUOROURACIL 612 MG: 50 INJECTION, SOLUTION INTRAVENOUS at 13:48

## 2021-07-29 RX ADMIN — Medication 10 ML: at 09:00

## 2021-07-29 RX ADMIN — IRINOTECAN HYDROCHLORIDE 240 MG: 20 INJECTION, SOLUTION INTRAVENOUS at 12:10

## 2021-07-29 NOTE — PROGRESS NOTES
Pt. Discharged ambulatory. Tolerated chemotherapy well. Continuous chemo infusion pump in place and functioning properly. To call physician with any problems or concerns. Understanding voiced. To return to Infusions on 7/1/21.

## 2021-07-30 ENCOUNTER — APPOINTMENT (OUTPATIENT)
Dept: INFUSION THERAPY | Age: 46
End: 2021-07-30

## 2021-07-31 ENCOUNTER — HOSPITAL ENCOUNTER (OUTPATIENT)
Dept: INFUSION THERAPY | Age: 46
Discharge: HOME | End: 2021-07-31

## 2021-07-31 VITALS
WEIGHT: 197.4 LBS | RESPIRATION RATE: 18 BRPM | OXYGEN SATURATION: 96 % | SYSTOLIC BLOOD PRESSURE: 121 MMHG | TEMPERATURE: 98.3 F | DIASTOLIC BLOOD PRESSURE: 71 MMHG | BODY MASS INDEX: 27.53 KG/M2 | HEART RATE: 83 BPM

## 2021-07-31 DIAGNOSIS — R11.2 CINV (CHEMOTHERAPY-INDUCED NAUSEA AND VOMITING): ICD-10-CM

## 2021-07-31 DIAGNOSIS — R39.15 URGENCY OF URINATION: Primary | ICD-10-CM

## 2021-07-31 DIAGNOSIS — T45.1X5A CINV (CHEMOTHERAPY-INDUCED NAUSEA AND VOMITING): ICD-10-CM

## 2021-07-31 DIAGNOSIS — C16.8 MALIGNANT NEOPLASM OF OVERLAPPING SITES OF STOMACH (HCC): ICD-10-CM

## 2021-07-31 PROCEDURE — 96523 IRRIG DRUG DELIVERY DEVICE: CPT

## 2021-07-31 RX ORDER — SODIUM CHLORIDE 0.9 % (FLUSH) 0.9 %
10 SYRINGE (ML) INJECTION AS NEEDED
Status: DISCONTINUED | OUTPATIENT
Start: 2021-07-31 | End: 2021-08-01 | Stop reason: HOSPADM

## 2021-07-31 RX ADMIN — Medication 10 ML: at 14:08

## 2021-07-31 NOTE — PROGRESS NOTES
Arrived to the [facility]. D/C pump completed. Patient tolerated well. Any issues or concerns during appointment: none. Patient aware of next infusion appointment on 8/1 (date) at 2:30 PM (time). Discharged ambulatory.

## 2021-08-01 ENCOUNTER — HOSPITAL ENCOUNTER (OUTPATIENT)
Dept: INFUSION THERAPY | Age: 46
Discharge: HOME | End: 2021-08-01

## 2021-08-01 VITALS
TEMPERATURE: 98 F | HEART RATE: 86 BPM | RESPIRATION RATE: 18 BRPM | SYSTOLIC BLOOD PRESSURE: 127 MMHG | DIASTOLIC BLOOD PRESSURE: 63 MMHG | OXYGEN SATURATION: 95 %

## 2021-08-01 DIAGNOSIS — R11.2 CINV (CHEMOTHERAPY-INDUCED NAUSEA AND VOMITING): ICD-10-CM

## 2021-08-01 DIAGNOSIS — T45.1X5A CINV (CHEMOTHERAPY-INDUCED NAUSEA AND VOMITING): ICD-10-CM

## 2021-08-01 DIAGNOSIS — C16.8 MALIGNANT NEOPLASM OF OVERLAPPING SITES OF STOMACH (HCC): ICD-10-CM

## 2021-08-01 DIAGNOSIS — R39.15 URGENCY OF URINATION: Primary | ICD-10-CM

## 2021-08-01 PROCEDURE — 96372 THER/PROPH/DIAG INJ SC/IM: CPT

## 2021-08-01 PROCEDURE — 74011250636 HC RX REV CODE- 250/636: Performed by: INTERNAL MEDICINE

## 2021-08-01 RX ADMIN — PEGFILGRASTIM-CBQV 6 MG: 6 INJECTION, SOLUTION SUBCUTANEOUS at 14:49

## 2021-08-01 NOTE — PROGRESS NOTES
Arrived to the [facility]. Assessment and Udenyca completed. Patient tolerated well. Any issues or concerns during appointment: No.  Patient aware of next infusion appointment on 08/19/21 @0775. Discharged ambulatory.

## 2021-08-12 ENCOUNTER — HOSPITAL ENCOUNTER (OUTPATIENT)
Dept: CT IMAGING | Age: 46
Discharge: HOME | End: 2021-08-12
Attending: INTERNAL MEDICINE

## 2021-08-12 DIAGNOSIS — C16.8 MALIGNANT NEOPLASM OF OVERLAPPING SITES OF STOMACH (HCC): ICD-10-CM

## 2021-08-12 RX ORDER — HEPARIN SODIUM (PORCINE) LOCK FLUSH IV SOLN 100 UNIT/ML 100 UNIT/ML
500 SOLUTION INTRAVENOUS AS NEEDED
Status: DISCONTINUED | OUTPATIENT
Start: 2021-08-12 | End: 2021-08-16 | Stop reason: HOSPADM

## 2021-08-12 RX ORDER — SODIUM CHLORIDE 0.9 % (FLUSH) 0.9 %
10 SYRINGE (ML) INJECTION
Status: COMPLETED | OUTPATIENT
Start: 2021-08-12 | End: 2021-08-12

## 2021-08-12 RX ADMIN — HEPARIN SODIUM (PORCINE) LOCK FLUSH IV SOLN 100 UNIT/ML 500 UNITS: 100 SOLUTION at 12:30

## 2021-08-12 RX ADMIN — Medication 10 ML: at 12:37

## 2021-08-12 NOTE — PROGRESS NOTES
Port accessed with # 20 gauge 3/4 inch Stearns needle. Site without redness or swelling, has good blood return. Sensitive dressing applied to site. Pt tolerated procedure well, offering no complaints.

## 2021-08-12 NOTE — PROGRESS NOTES
Port accessed with # 20 gauge 3/4 inch Stearns needle. Site without redness or swelling, has good blood return. Sensitive dressing applied to site. Pt tollerated pro cedure well, offering no complaints.

## 2021-08-12 NOTE — PROGRESS NOTES
Port de-accessed after being flushed with 20 cc of normal saline and 5 cc of heparin flush. Site without redness or swelling, had good blood return. Pressure dressing applied to site with 2x2 and paper tape. Pt tolerated procedure well, offering no complaints.

## 2021-08-17 ENCOUNTER — DOCUMENTATION ONLY (OUTPATIENT)
Dept: HEMATOLOGY | Age: 46
End: 2021-08-17

## 2021-08-17 NOTE — PROGRESS NOTES
Julio César Downey from [facility] has reached out to Mr. Gallardo to screen him for disability and Medicaid without success. I called Mr. Gallardo at the phone number on file and left him a message with Taina's phone number and extension.

## 2021-08-18 ENCOUNTER — HOSPITAL ENCOUNTER (OUTPATIENT)
Dept: LAB | Age: 46
Discharge: HOME | End: 2021-08-18

## 2021-08-18 DIAGNOSIS — C16.8 MALIGNANT NEOPLASM OF OVERLAPPING SITES OF STOMACH (HCC): ICD-10-CM

## 2021-08-18 LAB
ALBUMIN SERPL-MCNC: 3.5 G/DL (ref 3.5–5)
ALBUMIN/GLOB SERPL: 1 {RATIO} (ref 1.2–3.5)
ALP SERPL-CCNC: 153 U/L (ref 50–136)
ALT SERPL-CCNC: 47 U/L (ref 12–65)
ANION GAP SERPL CALC-SCNC: 5 MMOL/L (ref 7–16)
AST SERPL-CCNC: 45 U/L (ref 15–37)
BASOPHILS # BLD: 0 K/UL (ref 0–0.2)
BASOPHILS NFR BLD: 1 % (ref 0–2)
BILIRUB SERPL-MCNC: 0.3 MG/DL (ref 0.2–1.1)
BUN SERPL-MCNC: 8 MG/DL (ref 6–23)
CALCIUM SERPL-MCNC: 8.8 MG/DL (ref 8.3–10.4)
CEA SERPL-MCNC: 24.8 NG/ML (ref 0–3)
CHLORIDE SERPL-SCNC: 108 MMOL/L (ref 98–107)
CO2 SERPL-SCNC: 28 MMOL/L (ref 21–32)
CREAT SERPL-MCNC: 0.9 MG/DL (ref 0.8–1.5)
DIFFERENTIAL METHOD BLD: ABNORMAL
EOSINOPHIL # BLD: 0 K/UL (ref 0–0.8)
EOSINOPHIL NFR BLD: 1 % (ref 0.5–7.8)
ERYTHROCYTE [DISTWIDTH] IN BLOOD BY AUTOMATED COUNT: 15.9 % (ref 11.9–14.6)
GLOBULIN SER CALC-MCNC: 3.5 G/DL (ref 2.3–3.5)
GLUCOSE SERPL-MCNC: 77 MG/DL (ref 65–100)
HCT VFR BLD AUTO: 35.2 %
HGB BLD-MCNC: 11.4 G/DL (ref 13.6–17.2)
IMM GRANULOCYTES # BLD AUTO: 0.1 K/UL (ref 0–0.5)
IMM GRANULOCYTES NFR BLD AUTO: 1 % (ref 0–5)
LYMPHOCYTES # BLD: 0.8 K/UL (ref 0.5–4.6)
LYMPHOCYTES NFR BLD: 10 % (ref 13–44)
MAGNESIUM SERPL-MCNC: 2.3 MG/DL (ref 1.8–2.4)
MCH RBC QN AUTO: 30.5 PG (ref 26.1–32.9)
MCHC RBC AUTO-ENTMCNC: 32.4 G/DL (ref 31.4–35)
MCV RBC AUTO: 94.1 FL (ref 79.6–97.8)
MONOCYTES # BLD: 0.9 K/UL (ref 0.1–1.3)
MONOCYTES NFR BLD: 11 % (ref 4–12)
NEUTS SEG # BLD: 5.8 K/UL (ref 1.7–8.2)
NEUTS SEG NFR BLD: 77 % (ref 43–78)
NRBC # BLD: 0 K/UL (ref 0–0.2)
PLATELET # BLD AUTO: 100 K/UL (ref 150–450)
PMV BLD AUTO: 10.1 FL (ref 9.4–12.3)
POTASSIUM SERPL-SCNC: 4.1 MMOL/L (ref 3.5–5.1)
PROT SERPL-MCNC: 7 G/DL (ref 6.3–8.2)
RBC # BLD AUTO: 3.74 M/UL (ref 4.23–5.6)
SODIUM SERPL-SCNC: 141 MMOL/L (ref 136–145)
WBC # BLD AUTO: 7.5 K/UL (ref 4.3–11.1)

## 2021-08-18 PROCEDURE — 85025 COMPLETE CBC W/AUTO DIFF WBC: CPT

## 2021-08-18 PROCEDURE — 82378 CARCINOEMBRYONIC ANTIGEN: CPT

## 2021-08-18 PROCEDURE — 80053 COMPREHEN METABOLIC PANEL: CPT

## 2021-08-18 PROCEDURE — 36415 COLL VENOUS BLD VENIPUNCTURE: CPT

## 2021-08-18 PROCEDURE — 83735 ASSAY OF MAGNESIUM: CPT

## 2021-08-19 ENCOUNTER — HOSPITAL ENCOUNTER (OUTPATIENT)
Dept: INFUSION THERAPY | Age: 46
Discharge: HOME | End: 2021-08-19

## 2021-08-19 VITALS
HEART RATE: 78 BPM | TEMPERATURE: 98.5 F | DIASTOLIC BLOOD PRESSURE: 74 MMHG | RESPIRATION RATE: 18 BRPM | OXYGEN SATURATION: 99 % | SYSTOLIC BLOOD PRESSURE: 115 MMHG | WEIGHT: 191.2 LBS | BODY MASS INDEX: 26.67 KG/M2

## 2021-08-19 DIAGNOSIS — R11.2 CINV (CHEMOTHERAPY-INDUCED NAUSEA AND VOMITING): ICD-10-CM

## 2021-08-19 DIAGNOSIS — R39.15 URGENCY OF URINATION: Primary | ICD-10-CM

## 2021-08-19 DIAGNOSIS — C16.8 MALIGNANT NEOPLASM OF OVERLAPPING SITES OF STOMACH (HCC): ICD-10-CM

## 2021-08-19 DIAGNOSIS — T45.1X5A CINV (CHEMOTHERAPY-INDUCED NAUSEA AND VOMITING): ICD-10-CM

## 2021-08-19 PROCEDURE — 74011000258 HC RX REV CODE- 258: Performed by: INTERNAL MEDICINE

## 2021-08-19 PROCEDURE — 96367 TX/PROPH/DG ADDL SEQ IV INF: CPT

## 2021-08-19 PROCEDURE — 96415 CHEMO IV INFUSION ADDL HR: CPT

## 2021-08-19 PROCEDURE — 96375 TX/PRO/DX INJ NEW DRUG ADDON: CPT

## 2021-08-19 PROCEDURE — 74011250636 HC RX REV CODE- 250/636: Performed by: INTERNAL MEDICINE

## 2021-08-19 PROCEDURE — 96413 CHEMO IV INFUSION 1 HR: CPT

## 2021-08-19 PROCEDURE — 96372 THER/PROPH/DIAG INJ SC/IM: CPT

## 2021-08-19 PROCEDURE — 96411 CHEMO IV PUSH ADDL DRUG: CPT

## 2021-08-19 PROCEDURE — G0498 CHEMO EXTEND IV INFUS W/PUMP: HCPCS

## 2021-08-19 PROCEDURE — 96368 THER/DIAG CONCURRENT INF: CPT

## 2021-08-19 PROCEDURE — 96417 CHEMO IV INFUS EACH ADDL SEQ: CPT

## 2021-08-19 RX ORDER — HYDROCORTISONE SODIUM SUCCINATE 100 MG/2ML
100 INJECTION, POWDER, FOR SOLUTION INTRAMUSCULAR; INTRAVENOUS AS NEEDED
Status: ACTIVE | OUTPATIENT
Start: 2021-08-19 | End: 2021-08-19

## 2021-08-19 RX ORDER — FLUOROURACIL 50 MG/ML
300 INJECTION, SOLUTION INTRAVENOUS ONCE
Status: COMPLETED | OUTPATIENT
Start: 2021-08-19 | End: 2021-08-19

## 2021-08-19 RX ORDER — DIPHENHYDRAMINE HYDROCHLORIDE 50 MG/ML
25 INJECTION, SOLUTION INTRAMUSCULAR; INTRAVENOUS AS NEEDED
Status: ACTIVE | OUTPATIENT
Start: 2021-08-19 | End: 2021-08-19

## 2021-08-19 RX ORDER — ONDANSETRON 2 MG/ML
8 INJECTION INTRAMUSCULAR; INTRAVENOUS ONCE
Status: COMPLETED | OUTPATIENT
Start: 2021-08-19 | End: 2021-08-19

## 2021-08-19 RX ORDER — ATROPINE SULFATE 0.4 MG/ML
0.4 INJECTION, SOLUTION ENDOTRACHEAL; INTRAMEDULLARY; INTRAMUSCULAR; INTRAVENOUS; SUBCUTANEOUS ONCE
Status: COMPLETED | OUTPATIENT
Start: 2021-08-19 | End: 2021-08-19

## 2021-08-19 RX ORDER — ONDANSETRON 2 MG/ML
8 INJECTION INTRAMUSCULAR; INTRAVENOUS AS NEEDED
Status: ACTIVE | OUTPATIENT
Start: 2021-08-19 | End: 2021-08-19

## 2021-08-19 RX ORDER — DEXTROSE MONOHYDRATE 50 MG/ML
25 INJECTION, SOLUTION INTRAVENOUS CONTINUOUS
Status: ACTIVE | OUTPATIENT
Start: 2021-08-19 | End: 2021-08-19

## 2021-08-19 RX ORDER — SODIUM CHLORIDE 0.9 % (FLUSH) 0.9 %
10 SYRINGE (ML) INJECTION AS NEEDED
Status: ACTIVE | OUTPATIENT
Start: 2021-08-19 | End: 2021-08-19

## 2021-08-19 RX ADMIN — Medication 10 ML: at 07:43

## 2021-08-19 RX ADMIN — DEXTROSE MONOHYDRATE 25 ML/HR: 5 INJECTION, SOLUTION INTRAVENOUS at 07:43

## 2021-08-19 RX ADMIN — LEUCOVORIN CALCIUM 816 MG: 100 INJECTION, POWDER, LYOPHILIZED, FOR SUSPENSION INTRAMUSCULAR; INTRAVENOUS at 11:16

## 2021-08-19 RX ADMIN — FLUOROURACIL 4000 MG: 50 INJECTION, SOLUTION INTRAVENOUS at 13:11

## 2021-08-19 RX ADMIN — IRINOTECAN HYDROCHLORIDE 240 MG: 20 INJECTION, SOLUTION INTRAVENOUS at 11:16

## 2021-08-19 RX ADMIN — DEXAMETHASONE SODIUM PHOSPHATE 12 MG: 4 INJECTION, SOLUTION INTRAMUSCULAR; INTRAVENOUS at 08:00

## 2021-08-19 RX ADMIN — OXALIPLATIN 100 MG: 5 INJECTION, SOLUTION INTRAVENOUS at 09:01

## 2021-08-19 RX ADMIN — ONDANSETRON 8 MG: 2 INJECTION INTRAMUSCULAR; INTRAVENOUS at 07:54

## 2021-08-19 RX ADMIN — FOSAPREPITANT 150 MG: 150 INJECTION, POWDER, LYOPHILIZED, FOR SOLUTION INTRAVENOUS at 08:28

## 2021-08-19 RX ADMIN — ATROPINE SULFATE 0.4 MG: 0.4 INJECTION, SOLUTION INTRAMUSCULAR; INTRAVENOUS; SUBCUTANEOUS at 10:15

## 2021-08-19 RX ADMIN — FLUOROURACIL 612 MG: 50 INJECTION, SOLUTION INTRAVENOUS at 13:07

## 2021-08-19 NOTE — PROGRESS NOTES
Arrived to the [facility]. Assessment completed. Labs reviewed. Patient tolerated chemotherapy  well. The Fluorouracil pump was connected to his port, before discharge. The port was checked with Mei Oliveira RN. Any issues or concerns during appointment: none. Patient aware of next infusion appointment on 8/20/21 (date) at 1600 (time) with IV infusion center. Patient aware of next lab and [facility] office visit on 9/1/21 (date) at [phone] with lab and 21 [phone] with [facility]. Discharged ambulatory, per self. Patient instructed to call Dr. Fransisca Scherer office immediately for any problems or concerns. He verbalizes understanding.

## 2021-08-21 ENCOUNTER — HOSPITAL ENCOUNTER (OUTPATIENT)
Dept: INFUSION THERAPY | Age: 46
Discharge: HOME | End: 2021-08-21

## 2021-08-21 VITALS
SYSTOLIC BLOOD PRESSURE: 119 MMHG | HEART RATE: 83 BPM | TEMPERATURE: 98.1 F | RESPIRATION RATE: 18 BRPM | DIASTOLIC BLOOD PRESSURE: 74 MMHG

## 2021-08-21 DIAGNOSIS — R11.2 CINV (CHEMOTHERAPY-INDUCED NAUSEA AND VOMITING): ICD-10-CM

## 2021-08-21 DIAGNOSIS — C16.8 MALIGNANT NEOPLASM OF OVERLAPPING SITES OF STOMACH (HCC): ICD-10-CM

## 2021-08-21 DIAGNOSIS — R39.15 URGENCY OF URINATION: Primary | ICD-10-CM

## 2021-08-21 DIAGNOSIS — T45.1X5A CINV (CHEMOTHERAPY-INDUCED NAUSEA AND VOMITING): ICD-10-CM

## 2021-08-21 PROCEDURE — 96523 IRRIG DRUG DELIVERY DEVICE: CPT

## 2021-08-21 RX ORDER — SODIUM CHLORIDE 0.9 % (FLUSH) 0.9 %
10 SYRINGE (ML) INJECTION AS NEEDED
Status: DISCONTINUED | OUTPATIENT
Start: 2021-08-21 | End: 2021-08-22 | Stop reason: HOSPADM

## 2021-08-21 RX ADMIN — Medication 10 ML: at 13:47

## 2021-08-21 NOTE — PROGRESS NOTES
Arrived to the Infusion Center.  5FU pump completed and disconnected and patient tolerated well. Any issues or concerns during appointment: denies  Patient given education on process  Instructed patient to notify provider for any issues or worrisome symptoms. They verbalized understanding. Patient aware of next infusion appointment scheduled for 8/22/21 at 1130am  Discharged ambulatory with self.

## 2021-08-22 ENCOUNTER — HOSPITAL ENCOUNTER (OUTPATIENT)
Dept: INFUSION THERAPY | Age: 46
Discharge: HOME | End: 2021-08-22

## 2021-08-22 VITALS
OXYGEN SATURATION: 96 % | SYSTOLIC BLOOD PRESSURE: 126 MMHG | TEMPERATURE: 98.4 F | DIASTOLIC BLOOD PRESSURE: 84 MMHG | HEART RATE: 87 BPM | RESPIRATION RATE: 16 BRPM

## 2021-08-22 DIAGNOSIS — T45.1X5A CINV (CHEMOTHERAPY-INDUCED NAUSEA AND VOMITING): ICD-10-CM

## 2021-08-22 DIAGNOSIS — R39.15 URGENCY OF URINATION: Primary | ICD-10-CM

## 2021-08-22 DIAGNOSIS — C16.8 MALIGNANT NEOPLASM OF OVERLAPPING SITES OF STOMACH (HCC): ICD-10-CM

## 2021-08-22 DIAGNOSIS — R11.2 CINV (CHEMOTHERAPY-INDUCED NAUSEA AND VOMITING): ICD-10-CM

## 2021-08-22 PROCEDURE — 74011250636 HC RX REV CODE- 250/636: Performed by: INTERNAL MEDICINE

## 2021-08-22 PROCEDURE — 96372 THER/PROPH/DIAG INJ SC/IM: CPT

## 2021-08-22 RX ADMIN — PEGFILGRASTIM-CBQV 6 MG: 6 INJECTION, SOLUTION SUBCUTANEOUS at 14:11

## 2021-08-22 NOTE — PROGRESS NOTES
Arrived to the [facility]. Udenyca injection completed. Provided education on same    Patient instructed to report any side affects to ordering provider. Patient tolerated well. Any issues or concerns during appointment: none. Patient aware of next infusion appointment on 09/02/2021 (date) at 0800 (time). Discharged ambulatory.

## 2021-09-01 ENCOUNTER — HOSPITAL ENCOUNTER (OUTPATIENT)
Dept: INFUSION THERAPY | Age: 46
End: 2021-09-01

## 2021-09-01 ENCOUNTER — HOSPITAL ENCOUNTER (OUTPATIENT)
Dept: LAB | Age: 46
Discharge: HOME | End: 2021-09-01

## 2021-09-01 DIAGNOSIS — C16.8 MALIGNANT NEOPLASM OF OVERLAPPING SITES OF STOMACH (HCC): ICD-10-CM

## 2021-09-01 LAB
ALBUMIN SERPL-MCNC: 3.6 G/DL (ref 3.5–5)
ALBUMIN/GLOB SERPL: 1 {RATIO} (ref 1.2–3.5)
ALP SERPL-CCNC: 199 U/L (ref 50–136)
ALT SERPL-CCNC: 67 U/L (ref 12–65)
ANION GAP SERPL CALC-SCNC: 5 MMOL/L (ref 7–16)
AST SERPL-CCNC: 48 U/L (ref 15–37)
BASOPHILS # BLD: 0 K/UL (ref 0–0.2)
BASOPHILS NFR BLD: 1 % (ref 0–2)
BILIRUB SERPL-MCNC: 0.3 MG/DL (ref 0.2–1.1)
BUN SERPL-MCNC: 12 MG/DL (ref 6–23)
CALCIUM SERPL-MCNC: 9.2 MG/DL (ref 8.3–10.4)
CANCER AG19-9 SERPL-ACNC: 249.6 U/ML (ref 2–37)
CEA SERPL-MCNC: 28.6 NG/ML (ref 0–3)
CHLORIDE SERPL-SCNC: 113 MMOL/L (ref 98–107)
CO2 SERPL-SCNC: 26 MMOL/L (ref 21–32)
CREAT SERPL-MCNC: 0.9 MG/DL (ref 0.8–1.5)
DIFFERENTIAL METHOD BLD: ABNORMAL
EOSINOPHIL # BLD: 0.1 K/UL (ref 0–0.8)
EOSINOPHIL NFR BLD: 1 % (ref 0.5–7.8)
ERYTHROCYTE [DISTWIDTH] IN BLOOD BY AUTOMATED COUNT: 15.5 % (ref 11.9–14.6)
GLOBULIN SER CALC-MCNC: 3.6 G/DL (ref 2.3–3.5)
GLUCOSE SERPL-MCNC: 122 MG/DL (ref 65–100)
HCT VFR BLD AUTO: 36.6 %
HGB BLD-MCNC: 12.1 G/DL (ref 13.6–17.2)
IMM GRANULOCYTES # BLD AUTO: 0 K/UL (ref 0–0.5)
IMM GRANULOCYTES NFR BLD AUTO: 1 % (ref 0–5)
LYMPHOCYTES # BLD: 1 K/UL (ref 0.5–4.6)
LYMPHOCYTES NFR BLD: 17 % (ref 13–44)
MAGNESIUM SERPL-MCNC: 2.3 MG/DL (ref 1.8–2.4)
MCH RBC QN AUTO: 30.9 PG (ref 26.1–32.9)
MCHC RBC AUTO-ENTMCNC: 33.1 G/DL (ref 31.4–35)
MCV RBC AUTO: 93.4 FL (ref 79.6–97.8)
MONOCYTES # BLD: 0.7 K/UL (ref 0.1–1.3)
MONOCYTES NFR BLD: 12 % (ref 4–12)
NEUTS SEG # BLD: 4.2 K/UL (ref 1.7–8.2)
NEUTS SEG NFR BLD: 69 % (ref 43–78)
NRBC # BLD: 0 K/UL (ref 0–0.2)
PLATELET # BLD AUTO: 89 K/UL (ref 150–450)
PMV BLD AUTO: 10.3 FL (ref 9.4–12.3)
POTASSIUM SERPL-SCNC: 3.3 MMOL/L (ref 3.5–5.1)
PROT SERPL-MCNC: 7.2 G/DL (ref 6.3–8.2)
RBC # BLD AUTO: 3.92 M/UL (ref 4.23–5.6)
SODIUM SERPL-SCNC: 144 MMOL/L (ref 136–145)
WBC # BLD AUTO: 6 K/UL (ref 4.3–11.1)

## 2021-09-01 PROCEDURE — 86301 IMMUNOASSAY TUMOR CA 19-9: CPT

## 2021-09-01 PROCEDURE — 82378 CARCINOEMBRYONIC ANTIGEN: CPT

## 2021-09-01 PROCEDURE — 85025 COMPLETE CBC W/AUTO DIFF WBC: CPT

## 2021-09-01 PROCEDURE — 83735 ASSAY OF MAGNESIUM: CPT

## 2021-09-01 PROCEDURE — 36415 COLL VENOUS BLD VENIPUNCTURE: CPT

## 2021-09-01 PROCEDURE — 80053 COMPREHEN METABOLIC PANEL: CPT

## 2021-09-02 ENCOUNTER — PATIENT OUTREACH (OUTPATIENT)
Dept: CASE MANAGEMENT | Age: 46
End: 2021-09-02

## 2021-09-02 ENCOUNTER — HOSPITAL ENCOUNTER (OUTPATIENT)
Dept: INFUSION THERAPY | Age: 46
Discharge: HOME | End: 2021-09-02

## 2021-09-02 VITALS
WEIGHT: 190 LBS | RESPIRATION RATE: 16 BRPM | OXYGEN SATURATION: 96 % | DIASTOLIC BLOOD PRESSURE: 77 MMHG | BODY MASS INDEX: 26.5 KG/M2 | HEART RATE: 76 BPM | SYSTOLIC BLOOD PRESSURE: 130 MMHG | TEMPERATURE: 98.3 F

## 2021-09-02 DIAGNOSIS — T45.1X5A CINV (CHEMOTHERAPY-INDUCED NAUSEA AND VOMITING): ICD-10-CM

## 2021-09-02 DIAGNOSIS — R11.2 CINV (CHEMOTHERAPY-INDUCED NAUSEA AND VOMITING): ICD-10-CM

## 2021-09-02 DIAGNOSIS — C16.8 MALIGNANT NEOPLASM OF OVERLAPPING SITES OF STOMACH (HCC): ICD-10-CM

## 2021-09-02 DIAGNOSIS — R39.15 URGENCY OF URINATION: Primary | ICD-10-CM

## 2021-09-02 PROCEDURE — 74011000258 HC RX REV CODE- 258: Performed by: INTERNAL MEDICINE

## 2021-09-02 PROCEDURE — 96375 TX/PRO/DX INJ NEW DRUG ADDON: CPT

## 2021-09-02 PROCEDURE — 96417 CHEMO IV INFUS EACH ADDL SEQ: CPT

## 2021-09-02 PROCEDURE — G0498 CHEMO EXTEND IV INFUS W/PUMP: HCPCS

## 2021-09-02 PROCEDURE — 96372 THER/PROPH/DIAG INJ SC/IM: CPT

## 2021-09-02 PROCEDURE — 96411 CHEMO IV PUSH ADDL DRUG: CPT

## 2021-09-02 PROCEDURE — 96413 CHEMO IV INFUSION 1 HR: CPT

## 2021-09-02 PROCEDURE — 96415 CHEMO IV INFUSION ADDL HR: CPT

## 2021-09-02 PROCEDURE — 74011250636 HC RX REV CODE- 250/636: Performed by: INTERNAL MEDICINE

## 2021-09-02 PROCEDURE — 96367 TX/PROPH/DG ADDL SEQ IV INF: CPT

## 2021-09-02 PROCEDURE — 96368 THER/DIAG CONCURRENT INF: CPT

## 2021-09-02 RX ORDER — ONDANSETRON 2 MG/ML
8 INJECTION INTRAMUSCULAR; INTRAVENOUS ONCE
Status: COMPLETED | OUTPATIENT
Start: 2021-09-02 | End: 2021-09-02

## 2021-09-02 RX ORDER — DEXTROSE MONOHYDRATE 50 MG/ML
25 INJECTION, SOLUTION INTRAVENOUS CONTINUOUS
Status: ACTIVE | OUTPATIENT
Start: 2021-09-02 | End: 2021-09-02

## 2021-09-02 RX ORDER — FLUOROURACIL 50 MG/ML
300 INJECTION, SOLUTION INTRAVENOUS ONCE
Status: COMPLETED | OUTPATIENT
Start: 2021-09-02 | End: 2021-09-02

## 2021-09-02 RX ORDER — SODIUM CHLORIDE 0.9 % (FLUSH) 0.9 %
10 SYRINGE (ML) INJECTION AS NEEDED
Status: ACTIVE | OUTPATIENT
Start: 2021-09-02 | End: 2021-09-02

## 2021-09-02 RX ORDER — ATROPINE SULFATE 0.4 MG/ML
0.4 INJECTION, SOLUTION ENDOTRACHEAL; INTRAMEDULLARY; INTRAMUSCULAR; INTRAVENOUS; SUBCUTANEOUS ONCE
Status: COMPLETED | OUTPATIENT
Start: 2021-09-02 | End: 2021-09-02

## 2021-09-02 RX ADMIN — DEXTROSE MONOHYDRATE 25 ML/HR: 5 INJECTION, SOLUTION INTRAVENOUS at 08:40

## 2021-09-02 RX ADMIN — FLUOROURACIL 4000 MG: 50 INJECTION, SOLUTION INTRAVENOUS at 13:48

## 2021-09-02 RX ADMIN — FOSAPREPITANT 150 MG: 150 INJECTION, POWDER, LYOPHILIZED, FOR SOLUTION INTRAVENOUS at 09:25

## 2021-09-02 RX ADMIN — ONDANSETRON 8 MG: 2 INJECTION INTRAMUSCULAR; INTRAVENOUS at 08:59

## 2021-09-02 RX ADMIN — IRINOTECAN HYDROCHLORIDE 240 MG: 20 INJECTION, SOLUTION INTRAVENOUS at 11:55

## 2021-09-02 RX ADMIN — LEUCOVORIN CALCIUM 816 MG: 350 INJECTION, POWDER, LYOPHILIZED, FOR SOLUTION INTRAMUSCULAR; INTRAVENOUS at 11:55

## 2021-09-02 RX ADMIN — OXALIPLATIN 100 MG: 5 INJECTION, SOLUTION INTRAVENOUS at 09:50

## 2021-09-02 RX ADMIN — ATROPINE SULFATE 0.4 MG: 0.4 INJECTION, SOLUTION INTRAMUSCULAR; INTRAVENOUS; SUBCUTANEOUS at 11:50

## 2021-09-02 RX ADMIN — DEXAMETHASONE SODIUM PHOSPHATE 12 MG: 4 INJECTION, SOLUTION INTRAMUSCULAR; INTRAVENOUS at 09:00

## 2021-09-02 RX ADMIN — Medication 10 ML: at 13:40

## 2021-09-02 RX ADMIN — Medication 10 ML: at 08:40

## 2021-09-02 RX ADMIN — FLUOROURACIL 612 MG: 50 INJECTION, SOLUTION INTRAVENOUS at 13:43

## 2021-09-02 NOTE — PROGRESS NOTES
Arrived to the [facility]. FOLFIRINOX completed. Elastomeric pump infusing. Patient tolerated well. Any issues or concerns during appointment: Patient has several small cuts around port site, but not directly on the insertion site from shaving. Patient educated on infection prevention and recommendation not to shave around port site. Patient aware of next infusion appointment on 9/4/2021 (date) at 1:30 pm (time). Patient aware of next lab and [facility] office visit on 9/15/2021 (date) at 9:50 am (time). Discharged ambulatory with self.

## 2021-09-02 NOTE — PROGRESS NOTES
I saw patient on 9-1-21 with Dr Jaylene Flores with his wife. Dr Jaylene Flores discussed possibe surgery option vs continuing chemotherapy. Pt states neuropathy is worsening in hands so this is concerning with treatment distance. Pt will continue regimen for now and await next scan to see if disease stays under control. Decision will be made at that time. Some concern since CEA continues to slowly rise.

## 2021-09-04 ENCOUNTER — HOSPITAL ENCOUNTER (OUTPATIENT)
Dept: INFUSION THERAPY | Age: 46
Discharge: HOME | End: 2021-09-04

## 2021-09-04 VITALS
SYSTOLIC BLOOD PRESSURE: 131 MMHG | TEMPERATURE: 98.6 F | DIASTOLIC BLOOD PRESSURE: 62 MMHG | OXYGEN SATURATION: 97 % | RESPIRATION RATE: 16 BRPM | HEART RATE: 78 BPM

## 2021-09-04 DIAGNOSIS — R39.15 URGENCY OF URINATION: Primary | ICD-10-CM

## 2021-09-04 DIAGNOSIS — T45.1X5A CINV (CHEMOTHERAPY-INDUCED NAUSEA AND VOMITING): ICD-10-CM

## 2021-09-04 DIAGNOSIS — C16.8 MALIGNANT NEOPLASM OF OVERLAPPING SITES OF STOMACH (HCC): ICD-10-CM

## 2021-09-04 DIAGNOSIS — R11.2 CINV (CHEMOTHERAPY-INDUCED NAUSEA AND VOMITING): ICD-10-CM

## 2021-09-04 PROCEDURE — 96523 IRRIG DRUG DELIVERY DEVICE: CPT

## 2021-09-04 RX ORDER — SODIUM CHLORIDE 9 MG/ML
10 INJECTION INTRAMUSCULAR; INTRAVENOUS; SUBCUTANEOUS AS NEEDED
Status: DISCONTINUED | OUTPATIENT
Start: 2021-09-04 | End: 2021-09-05 | Stop reason: HOSPADM

## 2021-09-04 RX ADMIN — SODIUM CHLORIDE 10 ML: 9 INJECTION INTRAMUSCULAR; INTRAVENOUS; SUBCUTANEOUS at 13:38

## 2021-09-04 NOTE — PROGRESS NOTES
Arrived to the [facility]. MO 5fu pump completed. Provided education on chemo side effects. Patient instructed to report any side affects to ordering provider. Patient tolerated well. Any issues or concerns during appointment: none. Patient aware of next infusion appointment on 9/5 at 2:45pm.  Discharged ambulatory to home.

## 2021-09-05 ENCOUNTER — HOSPITAL ENCOUNTER (OUTPATIENT)
Dept: INFUSION THERAPY | Age: 46
Discharge: HOME | End: 2021-09-05

## 2021-09-05 VITALS
DIASTOLIC BLOOD PRESSURE: 83 MMHG | HEART RATE: 84 BPM | TEMPERATURE: 99 F | RESPIRATION RATE: 18 BRPM | SYSTOLIC BLOOD PRESSURE: 121 MMHG | OXYGEN SATURATION: 97 %

## 2021-09-05 DIAGNOSIS — R39.15 URGENCY OF URINATION: Primary | ICD-10-CM

## 2021-09-05 DIAGNOSIS — T45.1X5A CINV (CHEMOTHERAPY-INDUCED NAUSEA AND VOMITING): ICD-10-CM

## 2021-09-05 DIAGNOSIS — C16.8 MALIGNANT NEOPLASM OF OVERLAPPING SITES OF STOMACH (HCC): ICD-10-CM

## 2021-09-05 DIAGNOSIS — R11.2 CINV (CHEMOTHERAPY-INDUCED NAUSEA AND VOMITING): ICD-10-CM

## 2021-09-05 PROCEDURE — 74011250636 HC RX REV CODE- 250/636: Performed by: INTERNAL MEDICINE

## 2021-09-05 PROCEDURE — 96372 THER/PROPH/DIAG INJ SC/IM: CPT

## 2021-09-05 RX ADMIN — PEGFILGRASTIM-CBQV 6 MG: 6 INJECTION, SOLUTION SUBCUTANEOUS at 14:05

## 2021-09-05 NOTE — PROGRESS NOTES
Arrived to the [facility]. Udenyca completed and tolerated well. Any issues or concerns during appointment: denies  Patient given education on injection   Informed of next infusion appointment scheduled for 9/16/21 at [facility] [place] [address] scheduled for 9/15/21 at 9:19 and 10:15am.  Instructed patient to notify provider for any issues or worrisome symptoms. They verbalized understanding. Discharged ambulatory with self.

## 2021-09-15 ENCOUNTER — HOSPITAL ENCOUNTER (OUTPATIENT)
Dept: LAB | Age: 46
Discharge: HOME | End: 2021-09-15

## 2021-09-15 DIAGNOSIS — C16.8 MALIGNANT NEOPLASM OF OVERLAPPING SITES OF STOMACH (HCC): ICD-10-CM

## 2021-09-15 DIAGNOSIS — C16.9 MALIGNANT NEOPLASM OF STOMACH, UNSPECIFIED LOCATION (HCC): ICD-10-CM

## 2021-09-15 LAB
ALBUMIN SERPL-MCNC: 3.7 G/DL (ref 3.5–5)
ALBUMIN/GLOB SERPL: 1.1 {RATIO} (ref 1.2–3.5)
ALP SERPL-CCNC: 174 U/L (ref 50–136)
ALT SERPL-CCNC: 62 U/L (ref 12–65)
ANION GAP SERPL CALC-SCNC: 5 MMOL/L (ref 7–16)
AST SERPL-CCNC: 46 U/L (ref 15–37)
BASOPHILS # BLD: 0 K/UL (ref 0–0.2)
BASOPHILS NFR BLD: 0 % (ref 0–2)
BILIRUB SERPL-MCNC: 0.3 MG/DL (ref 0.2–1.1)
BUN SERPL-MCNC: 10 MG/DL (ref 6–23)
CALCIUM SERPL-MCNC: 8.8 MG/DL (ref 8.3–10.4)
CANCER AG19-9 SERPL-ACNC: 232 U/ML (ref 2–37)
CEA SERPL-MCNC: 29 NG/ML (ref 0–3)
CHLORIDE SERPL-SCNC: 107 MMOL/L (ref 98–107)
CO2 SERPL-SCNC: 28 MMOL/L (ref 21–32)
CREAT SERPL-MCNC: 1 MG/DL (ref 0.8–1.5)
DIFFERENTIAL METHOD BLD: ABNORMAL
EOSINOPHIL # BLD: 0.1 K/UL (ref 0–0.8)
EOSINOPHIL NFR BLD: 1 % (ref 0.5–7.8)
ERYTHROCYTE [DISTWIDTH] IN BLOOD BY AUTOMATED COUNT: 15.6 % (ref 11.9–14.6)
GLOBULIN SER CALC-MCNC: 3.4 G/DL (ref 2.3–3.5)
GLUCOSE SERPL-MCNC: 126 MG/DL (ref 65–100)
HCT VFR BLD AUTO: 35.2 %
HGB BLD-MCNC: 11.6 G/DL (ref 13.6–17.2)
IMM GRANULOCYTES # BLD AUTO: 0.1 K/UL (ref 0–0.5)
IMM GRANULOCYTES NFR BLD AUTO: 1 % (ref 0–5)
LYMPHOCYTES # BLD: 0.9 K/UL (ref 0.5–4.6)
LYMPHOCYTES NFR BLD: 14 % (ref 13–44)
MAGNESIUM SERPL-MCNC: 2.1 MG/DL (ref 1.8–2.4)
MCH RBC QN AUTO: 30.8 PG (ref 26.1–32.9)
MCHC RBC AUTO-ENTMCNC: 33 G/DL (ref 31.4–35)
MCV RBC AUTO: 93.4 FL (ref 79.6–97.8)
MONOCYTES # BLD: 0.8 K/UL (ref 0.1–1.3)
MONOCYTES NFR BLD: 13 % (ref 4–12)
NEUTS SEG # BLD: 4.4 K/UL (ref 1.7–8.2)
NEUTS SEG NFR BLD: 71 % (ref 43–78)
NRBC # BLD: 0 K/UL (ref 0–0.2)
PLATELET # BLD AUTO: 91 K/UL (ref 150–450)
PMV BLD AUTO: 10 FL (ref 9.4–12.3)
POTASSIUM SERPL-SCNC: 3.9 MMOL/L (ref 3.5–5.1)
PROT SERPL-MCNC: 7.1 G/DL (ref 6.3–8.2)
RBC # BLD AUTO: 3.77 M/UL (ref 4.23–5.6)
SODIUM SERPL-SCNC: 140 MMOL/L (ref 136–145)
WBC # BLD AUTO: 6.2 K/UL (ref 4.3–11.1)

## 2021-09-15 PROCEDURE — 36415 COLL VENOUS BLD VENIPUNCTURE: CPT

## 2021-09-15 PROCEDURE — 85025 COMPLETE CBC W/AUTO DIFF WBC: CPT

## 2021-09-15 PROCEDURE — 86301 IMMUNOASSAY TUMOR CA 19-9: CPT

## 2021-09-15 PROCEDURE — 82378 CARCINOEMBRYONIC ANTIGEN: CPT

## 2021-09-15 PROCEDURE — 83735 ASSAY OF MAGNESIUM: CPT

## 2021-09-15 PROCEDURE — 80053 COMPREHEN METABOLIC PANEL: CPT

## 2021-09-16 ENCOUNTER — HOSPITAL ENCOUNTER (OUTPATIENT)
Dept: INFUSION THERAPY | Age: 46
Discharge: HOME | End: 2021-09-16

## 2021-09-16 VITALS
OXYGEN SATURATION: 100 % | TEMPERATURE: 98.1 F | BODY MASS INDEX: 25.86 KG/M2 | HEART RATE: 83 BPM | RESPIRATION RATE: 18 BRPM | SYSTOLIC BLOOD PRESSURE: 114 MMHG | WEIGHT: 185.4 LBS | DIASTOLIC BLOOD PRESSURE: 72 MMHG

## 2021-09-16 DIAGNOSIS — T45.1X5A CINV (CHEMOTHERAPY-INDUCED NAUSEA AND VOMITING): ICD-10-CM

## 2021-09-16 DIAGNOSIS — C16.8 MALIGNANT NEOPLASM OF OVERLAPPING SITES OF STOMACH (HCC): ICD-10-CM

## 2021-09-16 DIAGNOSIS — R39.15 URGENCY OF URINATION: Primary | ICD-10-CM

## 2021-09-16 DIAGNOSIS — R11.2 CINV (CHEMOTHERAPY-INDUCED NAUSEA AND VOMITING): ICD-10-CM

## 2021-09-16 PROCEDURE — 96375 TX/PRO/DX INJ NEW DRUG ADDON: CPT

## 2021-09-16 PROCEDURE — 96411 CHEMO IV PUSH ADDL DRUG: CPT

## 2021-09-16 PROCEDURE — 74011250636 HC RX REV CODE- 250/636: Performed by: INTERNAL MEDICINE

## 2021-09-16 PROCEDURE — 96367 TX/PROPH/DG ADDL SEQ IV INF: CPT

## 2021-09-16 PROCEDURE — G0498 CHEMO EXTEND IV INFUS W/PUMP: HCPCS

## 2021-09-16 PROCEDURE — 96372 THER/PROPH/DIAG INJ SC/IM: CPT

## 2021-09-16 PROCEDURE — 96368 THER/DIAG CONCURRENT INF: CPT

## 2021-09-16 PROCEDURE — 96415 CHEMO IV INFUSION ADDL HR: CPT

## 2021-09-16 PROCEDURE — 96413 CHEMO IV INFUSION 1 HR: CPT

## 2021-09-16 PROCEDURE — 74011000258 HC RX REV CODE- 258: Performed by: INTERNAL MEDICINE

## 2021-09-16 PROCEDURE — 96417 CHEMO IV INFUS EACH ADDL SEQ: CPT

## 2021-09-16 RX ORDER — FLUOROURACIL 50 MG/ML
300 INJECTION, SOLUTION INTRAVENOUS ONCE
Status: COMPLETED | OUTPATIENT
Start: 2021-09-16 | End: 2021-09-16

## 2021-09-16 RX ORDER — SODIUM CHLORIDE 0.9 % (FLUSH) 0.9 %
10 SYRINGE (ML) INJECTION AS NEEDED
Status: ACTIVE | OUTPATIENT
Start: 2021-09-16 | End: 2021-09-16

## 2021-09-16 RX ORDER — ONDANSETRON 2 MG/ML
8 INJECTION INTRAMUSCULAR; INTRAVENOUS ONCE
Status: COMPLETED | OUTPATIENT
Start: 2021-09-16 | End: 2021-09-16

## 2021-09-16 RX ORDER — DEXTROSE MONOHYDRATE 50 MG/ML
25 INJECTION, SOLUTION INTRAVENOUS CONTINUOUS
Status: ACTIVE | OUTPATIENT
Start: 2021-09-16 | End: 2021-09-16

## 2021-09-16 RX ORDER — ATROPINE SULFATE 0.4 MG/ML
0.4 INJECTION, SOLUTION ENDOTRACHEAL; INTRAMEDULLARY; INTRAMUSCULAR; INTRAVENOUS; SUBCUTANEOUS ONCE
Status: COMPLETED | OUTPATIENT
Start: 2021-09-16 | End: 2021-09-16

## 2021-09-16 RX ADMIN — ONDANSETRON 8 MG: 2 INJECTION INTRAMUSCULAR; INTRAVENOUS at 09:01

## 2021-09-16 RX ADMIN — DEXAMETHASONE SODIUM PHOSPHATE 12 MG: 4 INJECTION, SOLUTION INTRAMUSCULAR; INTRAVENOUS at 09:03

## 2021-09-16 RX ADMIN — Medication 10 ML: at 13:37

## 2021-09-16 RX ADMIN — FLUOROURACIL 612 MG: 50 INJECTION, SOLUTION INTRAVENOUS at 13:30

## 2021-09-16 RX ADMIN — IRINOTECAN HYDROCHLORIDE 240 MG: 20 INJECTION, SOLUTION INTRAVENOUS at 11:53

## 2021-09-16 RX ADMIN — DEXTROSE MONOHYDRATE 25 ML/HR: 5 INJECTION, SOLUTION INTRAVENOUS at 09:44

## 2021-09-16 RX ADMIN — FOSAPREPITANT 150 MG: 150 INJECTION, POWDER, LYOPHILIZED, FOR SOLUTION INTRAVENOUS at 09:21

## 2021-09-16 RX ADMIN — ATROPINE SULFATE 0.4 MG: 0.4 INJECTION, SOLUTION INTRAMUSCULAR; INTRAVENOUS; SUBCUTANEOUS at 11:51

## 2021-09-16 RX ADMIN — LEUCOVORIN CALCIUM 816 MG: 350 INJECTION, POWDER, LYOPHILIZED, FOR SOLUTION INTRAMUSCULAR; INTRAVENOUS at 11:53

## 2021-09-16 RX ADMIN — FLUOROURACIL 4000 MG: 50 INJECTION, SOLUTION INTRAVENOUS at 13:33

## 2021-09-16 RX ADMIN — OXALIPLATIN 100 MG: 5 INJECTION, SOLUTION INTRAVENOUS at 09:44

## 2021-09-16 NOTE — PROGRESS NOTES
Patient arrived at [facility] for Folfirinox. Assessment completed. VSS. No needs voiced at this time. Patient tolerated chemo well. Patient is aware of next appointment on 9/18/2021 @1333.

## 2021-09-18 ENCOUNTER — HOSPITAL ENCOUNTER (OUTPATIENT)
Dept: INFUSION THERAPY | Age: 46
Discharge: HOME | End: 2021-09-18

## 2021-09-18 VITALS
RESPIRATION RATE: 18 BRPM | TEMPERATURE: 98.7 F | DIASTOLIC BLOOD PRESSURE: 71 MMHG | HEART RATE: 79 BPM | SYSTOLIC BLOOD PRESSURE: 116 MMHG | OXYGEN SATURATION: 97 %

## 2021-09-18 DIAGNOSIS — R39.15 URGENCY OF URINATION: Primary | ICD-10-CM

## 2021-09-18 DIAGNOSIS — C16.8 MALIGNANT NEOPLASM OF OVERLAPPING SITES OF STOMACH (HCC): ICD-10-CM

## 2021-09-18 DIAGNOSIS — R11.2 CINV (CHEMOTHERAPY-INDUCED NAUSEA AND VOMITING): ICD-10-CM

## 2021-09-18 DIAGNOSIS — T45.1X5A CINV (CHEMOTHERAPY-INDUCED NAUSEA AND VOMITING): ICD-10-CM

## 2021-09-18 PROCEDURE — 96523 IRRIG DRUG DELIVERY DEVICE: CPT

## 2021-09-18 RX ORDER — SODIUM CHLORIDE 0.9 % (FLUSH) 0.9 %
10 SYRINGE (ML) INJECTION AS NEEDED
Status: DISCONTINUED | OUTPATIENT
Start: 2021-09-18 | End: 2021-09-19 | Stop reason: HOSPADM

## 2021-09-18 RX ADMIN — Medication 10 ML: at 13:45

## 2021-09-19 ENCOUNTER — HOSPITAL ENCOUNTER (OUTPATIENT)
Dept: INFUSION THERAPY | Age: 46
Discharge: HOME | End: 2021-09-19

## 2021-09-19 VITALS
TEMPERATURE: 98.7 F | RESPIRATION RATE: 16 BRPM | OXYGEN SATURATION: 98 % | DIASTOLIC BLOOD PRESSURE: 70 MMHG | SYSTOLIC BLOOD PRESSURE: 132 MMHG | HEART RATE: 84 BPM

## 2021-09-19 DIAGNOSIS — C16.8 MALIGNANT NEOPLASM OF OVERLAPPING SITES OF STOMACH (HCC): ICD-10-CM

## 2021-09-19 DIAGNOSIS — R39.15 URGENCY OF URINATION: Primary | ICD-10-CM

## 2021-09-19 DIAGNOSIS — T45.1X5A CINV (CHEMOTHERAPY-INDUCED NAUSEA AND VOMITING): ICD-10-CM

## 2021-09-19 DIAGNOSIS — R11.2 CINV (CHEMOTHERAPY-INDUCED NAUSEA AND VOMITING): ICD-10-CM

## 2021-09-19 PROCEDURE — 96372 THER/PROPH/DIAG INJ SC/IM: CPT

## 2021-09-19 PROCEDURE — 74011250636 HC RX REV CODE- 250/636: Performed by: INTERNAL MEDICINE

## 2021-09-19 RX ADMIN — PEGFILGRASTIM-CBQV 6 MG: 6 INJECTION, SOLUTION SUBCUTANEOUS at 14:36

## 2021-09-29 ENCOUNTER — PATIENT OUTREACH (OUTPATIENT)
Dept: CASE MANAGEMENT | Age: 46
End: 2021-09-29

## 2021-09-29 ENCOUNTER — HOSPITAL ENCOUNTER (OUTPATIENT)
Dept: LAB | Age: 46
Discharge: HOME | End: 2021-09-29

## 2021-09-29 DIAGNOSIS — C16.8 MALIGNANT NEOPLASM OF OVERLAPPING SITES OF STOMACH (HCC): ICD-10-CM

## 2021-09-29 LAB
ALBUMIN SERPL-MCNC: 3.7 G/DL (ref 3.5–5)
ALBUMIN/GLOB SERPL: 1 {RATIO} (ref 1.2–3.5)
ALP SERPL-CCNC: 189 U/L (ref 50–136)
ALT SERPL-CCNC: 56 U/L (ref 12–65)
ANION GAP SERPL CALC-SCNC: 4 MMOL/L (ref 7–16)
AST SERPL-CCNC: 46 U/L (ref 15–37)
BASOPHILS # BLD: 0.1 K/UL (ref 0–0.2)
BASOPHILS NFR BLD: 1 % (ref 0–2)
BILIRUB SERPL-MCNC: 0.2 MG/DL (ref 0.2–1.1)
BUN SERPL-MCNC: 12 MG/DL (ref 6–23)
CALCIUM SERPL-MCNC: 8.7 MG/DL (ref 8.3–10.4)
CANCER AG19-9 SERPL-ACNC: 252.1 U/ML (ref 2–37)
CEA SERPL-MCNC: 32.3 NG/ML (ref 0–3)
CHLORIDE SERPL-SCNC: 108 MMOL/L (ref 98–107)
CO2 SERPL-SCNC: 30 MMOL/L (ref 21–32)
CREAT SERPL-MCNC: 1 MG/DL (ref 0.8–1.5)
DIFFERENTIAL METHOD BLD: ABNORMAL
EOSINOPHIL # BLD: 0.1 K/UL (ref 0–0.8)
EOSINOPHIL NFR BLD: 1 % (ref 0.5–7.8)
ERYTHROCYTE [DISTWIDTH] IN BLOOD BY AUTOMATED COUNT: 16.1 % (ref 11.9–14.6)
GLOBULIN SER CALC-MCNC: 3.6 G/DL (ref 2.3–3.5)
GLUCOSE SERPL-MCNC: 127 MG/DL (ref 65–100)
HCT VFR BLD AUTO: 33.6 %
HGB BLD-MCNC: 11 G/DL (ref 13.6–17.2)
IMM GRANULOCYTES # BLD AUTO: 0.3 K/UL (ref 0–0.5)
IMM GRANULOCYTES NFR BLD AUTO: 4 % (ref 0–5)
LYMPHOCYTES # BLD: 0.9 K/UL (ref 0.5–4.6)
LYMPHOCYTES NFR BLD: 11 % (ref 13–44)
MAGNESIUM SERPL-MCNC: 2.4 MG/DL (ref 1.8–2.4)
MCH RBC QN AUTO: 31.4 PG (ref 26.1–32.9)
MCHC RBC AUTO-ENTMCNC: 32.7 G/DL (ref 31.4–35)
MCV RBC AUTO: 96 FL (ref 79.6–97.8)
MONOCYTES # BLD: 1.2 K/UL (ref 0.1–1.3)
MONOCYTES NFR BLD: 14 % (ref 4–12)
NEUTS SEG # BLD: 5.8 K/UL (ref 1.7–8.2)
NEUTS SEG NFR BLD: 69 % (ref 43–78)
NRBC # BLD: 0.04 K/UL (ref 0–0.2)
PLATELET # BLD AUTO: 91 K/UL (ref 150–450)
PLATELET COMMENTS,PCOM: ABNORMAL
PMV BLD AUTO: 10.1 FL (ref 9.4–12.3)
POTASSIUM SERPL-SCNC: 3.6 MMOL/L (ref 3.5–5.1)
PROT SERPL-MCNC: 7.3 G/DL (ref 6.3–8.2)
RBC # BLD AUTO: 3.5 M/UL (ref 4.23–5.6)
RBC MORPH BLD: ABNORMAL
SODIUM SERPL-SCNC: 142 MMOL/L (ref 136–145)
WBC # BLD AUTO: 8.4 K/UL (ref 4.3–11.1)
WBC MORPH BLD: ABNORMAL

## 2021-09-29 PROCEDURE — 85025 COMPLETE CBC W/AUTO DIFF WBC: CPT

## 2021-09-29 PROCEDURE — 86301 IMMUNOASSAY TUMOR CA 19-9: CPT

## 2021-09-29 PROCEDURE — 82378 CARCINOEMBRYONIC ANTIGEN: CPT

## 2021-09-29 PROCEDURE — 83735 ASSAY OF MAGNESIUM: CPT

## 2021-09-29 PROCEDURE — 36415 COLL VENOUS BLD VENIPUNCTURE: CPT

## 2021-09-29 PROCEDURE — 80053 COMPREHEN METABOLIC PANEL: CPT

## 2021-09-29 NOTE — PROGRESS NOTES
9/29/21 saw pt today with Dr. Maya Zavala for pre chemo FOLFIRINOX. He is tolerating chemo well. Diarrhea well controlled. PO intake is good. Infusion tomorrow. Follow up in 2 weeks. Encouraged to call with any concerns. Navigation will continue to follow.

## 2021-09-30 ENCOUNTER — HOSPITAL ENCOUNTER (OUTPATIENT)
Dept: INFUSION THERAPY | Age: 46
Discharge: HOME | End: 2021-09-30

## 2021-09-30 VITALS
BODY MASS INDEX: 25.8 KG/M2 | RESPIRATION RATE: 16 BRPM | TEMPERATURE: 98.7 F | WEIGHT: 185 LBS | DIASTOLIC BLOOD PRESSURE: 70 MMHG | SYSTOLIC BLOOD PRESSURE: 130 MMHG | HEART RATE: 90 BPM | OXYGEN SATURATION: 96 %

## 2021-09-30 DIAGNOSIS — C16.8 MALIGNANT NEOPLASM OF OVERLAPPING SITES OF STOMACH (HCC): ICD-10-CM

## 2021-09-30 DIAGNOSIS — T45.1X5A CINV (CHEMOTHERAPY-INDUCED NAUSEA AND VOMITING): ICD-10-CM

## 2021-09-30 DIAGNOSIS — R11.2 CINV (CHEMOTHERAPY-INDUCED NAUSEA AND VOMITING): ICD-10-CM

## 2021-09-30 DIAGNOSIS — R39.15 URGENCY OF URINATION: Primary | ICD-10-CM

## 2021-09-30 PROCEDURE — 96415 CHEMO IV INFUSION ADDL HR: CPT

## 2021-09-30 PROCEDURE — 74011250636 HC RX REV CODE- 250/636: Performed by: INTERNAL MEDICINE

## 2021-09-30 PROCEDURE — 74011000258 HC RX REV CODE- 258: Performed by: INTERNAL MEDICINE

## 2021-09-30 PROCEDURE — 96411 CHEMO IV PUSH ADDL DRUG: CPT

## 2021-09-30 PROCEDURE — G0498 CHEMO EXTEND IV INFUS W/PUMP: HCPCS

## 2021-09-30 PROCEDURE — 96413 CHEMO IV INFUSION 1 HR: CPT

## 2021-09-30 PROCEDURE — 96368 THER/DIAG CONCURRENT INF: CPT

## 2021-09-30 PROCEDURE — 96375 TX/PRO/DX INJ NEW DRUG ADDON: CPT

## 2021-09-30 PROCEDURE — 96417 CHEMO IV INFUS EACH ADDL SEQ: CPT

## 2021-09-30 PROCEDURE — 96372 THER/PROPH/DIAG INJ SC/IM: CPT

## 2021-09-30 RX ORDER — ONDANSETRON 2 MG/ML
8 INJECTION INTRAMUSCULAR; INTRAVENOUS ONCE
Status: COMPLETED | OUTPATIENT
Start: 2021-09-30 | End: 2021-09-30

## 2021-09-30 RX ORDER — ATROPINE SULFATE 0.4 MG/ML
0.4 INJECTION, SOLUTION ENDOTRACHEAL; INTRAMEDULLARY; INTRAMUSCULAR; INTRAVENOUS; SUBCUTANEOUS ONCE
Status: COMPLETED | OUTPATIENT
Start: 2021-09-30 | End: 2021-09-30

## 2021-09-30 RX ORDER — FLUOROURACIL 50 MG/ML
300 INJECTION, SOLUTION INTRAVENOUS ONCE
Status: COMPLETED | OUTPATIENT
Start: 2021-09-30 | End: 2021-09-30

## 2021-09-30 RX ORDER — DEXTROSE MONOHYDRATE 50 MG/ML
25 INJECTION, SOLUTION INTRAVENOUS CONTINUOUS
Status: ACTIVE | OUTPATIENT
Start: 2021-09-30 | End: 2021-09-30

## 2021-09-30 RX ORDER — SODIUM CHLORIDE 0.9 % (FLUSH) 0.9 %
10 SYRINGE (ML) INJECTION AS NEEDED
Status: ACTIVE | OUTPATIENT
Start: 2021-09-30 | End: 2021-09-30

## 2021-09-30 RX ADMIN — IRINOTECAN HYDROCHLORIDE 240 MG: 20 INJECTION, SOLUTION INTRAVENOUS at 13:10

## 2021-09-30 RX ADMIN — LEUCOVORIN CALCIUM 816 MG: 100 INJECTION, POWDER, LYOPHILIZED, FOR SUSPENSION INTRAMUSCULAR; INTRAVENOUS at 13:10

## 2021-09-30 RX ADMIN — Medication 10 ML: at 10:00

## 2021-09-30 RX ADMIN — FOSAPREPITANT 150 MG: 150 INJECTION, POWDER, LYOPHILIZED, FOR SOLUTION INTRAVENOUS at 10:25

## 2021-09-30 RX ADMIN — FLUOROURACIL 4000 MG: 50 INJECTION, SOLUTION INTRAVENOUS at 14:50

## 2021-09-30 RX ADMIN — DEXAMETHASONE SODIUM PHOSPHATE 12 MG: 4 INJECTION, SOLUTION INTRAMUSCULAR; INTRAVENOUS at 10:03

## 2021-09-30 RX ADMIN — DEXTROSE MONOHYDRATE 25 ML/HR: 5 INJECTION, SOLUTION INTRAVENOUS at 10:01

## 2021-09-30 RX ADMIN — FLUOROURACIL 612 MG: 50 INJECTION, SOLUTION INTRAVENOUS at 14:46

## 2021-09-30 RX ADMIN — OXALIPLATIN 100 MG: 5 INJECTION, SOLUTION INTRAVENOUS at 11:00

## 2021-09-30 RX ADMIN — ATROPINE SULFATE 0.4 MG: 0.4 INJECTION, SOLUTION INTRAMUSCULAR; INTRAVENOUS; SUBCUTANEOUS at 12:45

## 2021-09-30 RX ADMIN — ONDANSETRON 8 MG: 2 INJECTION INTRAMUSCULAR; INTRAVENOUS at 10:01

## 2021-09-30 NOTE — PROGRESS NOTES
Patient arrived ambulatory to infusion center. C21D1 of FOLFIRINOX completed. Patient tolerated well. Discharged home ambulatory with elastomeric pump. Tubing unclamped. Patient aware of pump d/c appt on 10/2.  At 1:30 pm.

## 2021-10-02 ENCOUNTER — HOSPITAL ENCOUNTER (OUTPATIENT)
Dept: INFUSION THERAPY | Age: 46
Discharge: HOME | End: 2021-10-02

## 2021-10-02 VITALS
RESPIRATION RATE: 18 BRPM | DIASTOLIC BLOOD PRESSURE: 66 MMHG | SYSTOLIC BLOOD PRESSURE: 121 MMHG | TEMPERATURE: 98.7 F | OXYGEN SATURATION: 96 % | HEART RATE: 95 BPM

## 2021-10-02 DIAGNOSIS — R11.2 CINV (CHEMOTHERAPY-INDUCED NAUSEA AND VOMITING): ICD-10-CM

## 2021-10-02 DIAGNOSIS — C16.8 MALIGNANT NEOPLASM OF OVERLAPPING SITES OF STOMACH (HCC): ICD-10-CM

## 2021-10-02 DIAGNOSIS — T45.1X5A CINV (CHEMOTHERAPY-INDUCED NAUSEA AND VOMITING): ICD-10-CM

## 2021-10-02 DIAGNOSIS — R39.15 URGENCY OF URINATION: Primary | ICD-10-CM

## 2021-10-02 PROCEDURE — 96523 IRRIG DRUG DELIVERY DEVICE: CPT

## 2021-10-02 RX ORDER — SODIUM CHLORIDE 0.9 % (FLUSH) 0.9 %
10 SYRINGE (ML) INJECTION AS NEEDED
Status: DISCONTINUED | OUTPATIENT
Start: 2021-10-02 | End: 2021-10-03 | Stop reason: HOSPADM

## 2021-10-02 RX ADMIN — Medication 10 ML: at 14:41

## 2021-10-02 NOTE — PROGRESS NOTES
Arrived to the Infusion Center.  5FU pump completed and disconnected and patient tolerated well. Any issues or concerns during appointment: denies  Patient given education on process  Instructed patient to notify provider for any issues or worrisome symptoms. They verbalized understanding. Patient aware of next infusion appointment scheduled for 10/2/21 at 230pm   Discharged ambulatory with self.

## 2021-10-03 ENCOUNTER — HOSPITAL ENCOUNTER (OUTPATIENT)
Dept: INFUSION THERAPY | Age: 46
Discharge: HOME | End: 2021-10-03

## 2021-10-03 VITALS
HEART RATE: 82 BPM | DIASTOLIC BLOOD PRESSURE: 74 MMHG | TEMPERATURE: 98.3 F | RESPIRATION RATE: 18 BRPM | SYSTOLIC BLOOD PRESSURE: 129 MMHG

## 2021-10-03 DIAGNOSIS — R11.2 CINV (CHEMOTHERAPY-INDUCED NAUSEA AND VOMITING): ICD-10-CM

## 2021-10-03 DIAGNOSIS — C16.8 MALIGNANT NEOPLASM OF OVERLAPPING SITES OF STOMACH (HCC): ICD-10-CM

## 2021-10-03 DIAGNOSIS — T45.1X5A CINV (CHEMOTHERAPY-INDUCED NAUSEA AND VOMITING): ICD-10-CM

## 2021-10-03 DIAGNOSIS — R39.15 URGENCY OF URINATION: Primary | ICD-10-CM

## 2021-10-03 PROCEDURE — 74011250636 HC RX REV CODE- 250/636: Performed by: INTERNAL MEDICINE

## 2021-10-03 PROCEDURE — 96372 THER/PROPH/DIAG INJ SC/IM: CPT

## 2021-10-03 RX ADMIN — PEGFILGRASTIM-CBQV 6 MG: 6 INJECTION, SOLUTION SUBCUTANEOUS at 14:17

## 2021-10-03 NOTE — PROGRESS NOTES
Arrived to the [facility]. Carmencitaenyca completed. Provided education on Udenyca    Patient instructed to report any side affects to ordering provider. Patient tolerated well. Any issues or concerns during appointment: none  Patient aware of next infusion appointment on 10/14 (date) at 0830 (time). Discharged ambulatory, no distress noted.

## 2021-10-13 ENCOUNTER — HOSPITAL ENCOUNTER (OUTPATIENT)
Dept: LAB | Age: 46
Discharge: HOME | End: 2021-10-13

## 2021-10-13 DIAGNOSIS — C16.8 MALIGNANT NEOPLASM OF OVERLAPPING SITES OF STOMACH (HCC): ICD-10-CM

## 2021-10-13 LAB
ALBUMIN SERPL-MCNC: 3.6 G/DL (ref 3.5–5)
ALBUMIN/GLOB SERPL: 1 {RATIO} (ref 1.2–3.5)
ALP SERPL-CCNC: 196 U/L (ref 50–136)
ALT SERPL-CCNC: 62 U/L (ref 12–65)
ANION GAP SERPL CALC-SCNC: 6 MMOL/L (ref 7–16)
AST SERPL-CCNC: 53 U/L (ref 15–37)
BASOPHILS # BLD: 0.1 K/UL (ref 0–0.2)
BASOPHILS NFR BLD: 1 % (ref 0–2)
BILIRUB SERPL-MCNC: 0.3 MG/DL (ref 0.2–1.1)
BUN SERPL-MCNC: 11 MG/DL (ref 6–23)
CALCIUM SERPL-MCNC: 9.2 MG/DL (ref 8.3–10.4)
CHLORIDE SERPL-SCNC: 109 MMOL/L (ref 98–107)
CO2 SERPL-SCNC: 27 MMOL/L (ref 21–32)
CREAT SERPL-MCNC: 0.9 MG/DL (ref 0.8–1.5)
DIFFERENTIAL METHOD BLD: ABNORMAL
EOSINOPHIL # BLD: 0.1 K/UL (ref 0–0.8)
EOSINOPHIL NFR BLD: 1 % (ref 0.5–7.8)
ERYTHROCYTE [DISTWIDTH] IN BLOOD BY AUTOMATED COUNT: 16.7 % (ref 11.9–14.6)
GLOBULIN SER CALC-MCNC: 3.6 G/DL (ref 2.3–3.5)
GLUCOSE SERPL-MCNC: 135 MG/DL (ref 65–100)
HCT VFR BLD AUTO: 33.6 %
HGB BLD-MCNC: 10.8 G/DL (ref 13.6–17.2)
IMM GRANULOCYTES # BLD AUTO: 0.3 K/UL (ref 0–0.5)
IMM GRANULOCYTES NFR BLD AUTO: 4 % (ref 0–5)
LYMPHOCYTES # BLD: 1 K/UL (ref 0.5–4.6)
LYMPHOCYTES NFR BLD: 12 % (ref 13–44)
MAGNESIUM SERPL-MCNC: 2.3 MG/DL (ref 1.8–2.4)
MCH RBC QN AUTO: 31.1 PG (ref 26.1–32.9)
MCHC RBC AUTO-ENTMCNC: 32.1 G/DL (ref 31.4–35)
MCV RBC AUTO: 96.8 FL (ref 79.6–97.8)
MONOCYTES # BLD: 0.9 K/UL (ref 0.1–1.3)
MONOCYTES NFR BLD: 10 % (ref 4–12)
NEUTS SEG # BLD: 6.3 K/UL (ref 1.7–8.2)
NEUTS SEG NFR BLD: 72 % (ref 43–78)
NRBC # BLD: 0.02 K/UL (ref 0–0.2)
PLATELET # BLD AUTO: 91 K/UL (ref 150–450)
PLATELET COMMENTS,PCOM: SLIGHT
PMV BLD AUTO: 10.3 FL (ref 9.4–12.3)
POTASSIUM SERPL-SCNC: 3.3 MMOL/L (ref 3.5–5.1)
PROT SERPL-MCNC: 7.2 G/DL (ref 6.3–8.2)
RBC # BLD AUTO: 3.47 M/UL (ref 4.23–5.6)
RBC MORPH BLD: ABNORMAL
SODIUM SERPL-SCNC: 142 MMOL/L (ref 136–145)
WBC # BLD AUTO: 8.7 K/UL (ref 4.3–11.1)
WBC MORPH BLD: ABNORMAL

## 2021-10-13 PROCEDURE — 85025 COMPLETE CBC W/AUTO DIFF WBC: CPT

## 2021-10-13 PROCEDURE — 83735 ASSAY OF MAGNESIUM: CPT

## 2021-10-13 PROCEDURE — 36415 COLL VENOUS BLD VENIPUNCTURE: CPT

## 2021-10-13 PROCEDURE — 80053 COMPREHEN METABOLIC PANEL: CPT

## 2021-10-14 ENCOUNTER — HOSPITAL ENCOUNTER (OUTPATIENT)
Dept: INFUSION THERAPY | Age: 46
Discharge: HOME | End: 2021-10-14

## 2021-10-14 VITALS
DIASTOLIC BLOOD PRESSURE: 82 MMHG | WEIGHT: 184 LBS | RESPIRATION RATE: 18 BRPM | HEART RATE: 90 BPM | BODY MASS INDEX: 25.66 KG/M2 | SYSTOLIC BLOOD PRESSURE: 115 MMHG | TEMPERATURE: 97.8 F | OXYGEN SATURATION: 98 %

## 2021-10-14 DIAGNOSIS — R11.2 CINV (CHEMOTHERAPY-INDUCED NAUSEA AND VOMITING): ICD-10-CM

## 2021-10-14 DIAGNOSIS — R39.15 URGENCY OF URINATION: Primary | ICD-10-CM

## 2021-10-14 DIAGNOSIS — T45.1X5A CINV (CHEMOTHERAPY-INDUCED NAUSEA AND VOMITING): ICD-10-CM

## 2021-10-14 DIAGNOSIS — C16.8 MALIGNANT NEOPLASM OF OVERLAPPING SITES OF STOMACH (HCC): ICD-10-CM

## 2021-10-14 LAB — CEA SERPL-MCNC: 34.5 NG/ML (ref 0–3)

## 2021-10-14 PROCEDURE — G0498 CHEMO EXTEND IV INFUS W/PUMP: HCPCS

## 2021-10-14 PROCEDURE — 96413 CHEMO IV INFUSION 1 HR: CPT

## 2021-10-14 PROCEDURE — 82378 CARCINOEMBRYONIC ANTIGEN: CPT

## 2021-10-14 PROCEDURE — 74011250636 HC RX REV CODE- 250/636: Performed by: INTERNAL MEDICINE

## 2021-10-14 PROCEDURE — 96411 CHEMO IV PUSH ADDL DRUG: CPT

## 2021-10-14 PROCEDURE — 96417 CHEMO IV INFUS EACH ADDL SEQ: CPT

## 2021-10-14 PROCEDURE — 96372 THER/PROPH/DIAG INJ SC/IM: CPT

## 2021-10-14 PROCEDURE — 96375 TX/PRO/DX INJ NEW DRUG ADDON: CPT

## 2021-10-14 PROCEDURE — 74011000258 HC RX REV CODE- 258: Performed by: INTERNAL MEDICINE

## 2021-10-14 PROCEDURE — 96367 TX/PROPH/DG ADDL SEQ IV INF: CPT

## 2021-10-14 PROCEDURE — 96368 THER/DIAG CONCURRENT INF: CPT

## 2021-10-14 PROCEDURE — 96415 CHEMO IV INFUSION ADDL HR: CPT

## 2021-10-14 RX ORDER — FLUOROURACIL 50 MG/ML
300 INJECTION, SOLUTION INTRAVENOUS ONCE
Status: COMPLETED | OUTPATIENT
Start: 2021-10-14 | End: 2021-10-14

## 2021-10-14 RX ORDER — ONDANSETRON 2 MG/ML
8 INJECTION INTRAMUSCULAR; INTRAVENOUS ONCE
Status: COMPLETED | OUTPATIENT
Start: 2021-10-14 | End: 2021-10-14

## 2021-10-14 RX ORDER — DEXTROSE MONOHYDRATE 50 MG/ML
25 INJECTION, SOLUTION INTRAVENOUS CONTINUOUS
Status: ACTIVE | OUTPATIENT
Start: 2021-10-14 | End: 2021-10-14

## 2021-10-14 RX ORDER — SODIUM CHLORIDE 0.9 % (FLUSH) 0.9 %
10 SYRINGE (ML) INJECTION AS NEEDED
Status: ACTIVE | OUTPATIENT
Start: 2021-10-14 | End: 2021-10-14

## 2021-10-14 RX ORDER — ATROPINE SULFATE 0.4 MG/ML
0.4 INJECTION, SOLUTION ENDOTRACHEAL; INTRAMEDULLARY; INTRAMUSCULAR; INTRAVENOUS; SUBCUTANEOUS ONCE
Status: COMPLETED | OUTPATIENT
Start: 2021-10-14 | End: 2021-10-14

## 2021-10-14 RX ADMIN — ATROPINE SULFATE 0.4 MG: 0.4 INJECTION, SOLUTION INTRAMUSCULAR; INTRAVENOUS; SUBCUTANEOUS at 12:50

## 2021-10-14 RX ADMIN — DEXTROSE MONOHYDRATE 100 ML/HR: 5 INJECTION, SOLUTION INTRAVENOUS at 09:15

## 2021-10-14 RX ADMIN — LEUCOVORIN CALCIUM 816 MG: 350 INJECTION, POWDER, LYOPHILIZED, FOR SOLUTION INTRAMUSCULAR; INTRAVENOUS at 12:54

## 2021-10-14 RX ADMIN — OXALIPLATIN 100 MG: 5 INJECTION, SOLUTION INTRAVENOUS at 10:53

## 2021-10-14 RX ADMIN — IRINOTECAN HYDROCHLORIDE 240 MG: 20 INJECTION, SOLUTION INTRAVENOUS at 12:54

## 2021-10-14 RX ADMIN — FOSAPREPITANT 150 MG: 150 INJECTION, POWDER, LYOPHILIZED, FOR SOLUTION INTRAVENOUS at 10:25

## 2021-10-14 RX ADMIN — DEXAMETHASONE SODIUM PHOSPHATE 12 MG: 4 INJECTION, SOLUTION INTRAMUSCULAR; INTRAVENOUS at 10:04

## 2021-10-14 RX ADMIN — FLUOROURACIL 612 MG: 50 INJECTION, SOLUTION INTRAVENOUS at 14:40

## 2021-10-14 RX ADMIN — ONDANSETRON 8 MG: 2 INJECTION INTRAMUSCULAR; INTRAVENOUS at 10:02

## 2021-10-14 RX ADMIN — Medication 10 ML: at 10:54

## 2021-10-14 RX ADMIN — Medication 10 ML: at 09:15

## 2021-10-14 RX ADMIN — FLUOROURACIL 4000 MG: 50 INJECTION, SOLUTION INTRAVENOUS at 14:45

## 2021-10-14 NOTE — PROGRESS NOTES
Problem: Patient Education: Go to Patient Education Activity  Goal: Patient/Family Education  Outcome: Progressing Towards Goal     Problem: Knowledge Deficit  Goal: *Participate in the learning process  Outcome: Progressing Towards Goal  Goal: *Verbalize description of procedure  Outcome: Progressing Towards Goal  Goal: *Verbalizes understanding and describes medication purposes and frequencies  Outcome: Progressing Towards Goal  Goal: *Knowledge of discharge instructions  Outcome: Progressing Towards Goal

## 2021-10-14 NOTE — PROGRESS NOTES
Pt arrived ambulatory today at [address] [facility], to receive IV FOLFIRINOX regimen. Pt tolerated without difficulty. Patient discharged via ambulatory accompanied by self. Instructed to notify physician of any problems, questions or concerns. Allowed opportunity for patient/family to ask questions. Verbalized understanding. Next appointment is Oct 16 at 1330 with [address] [place] [address].

## 2021-10-14 NOTE — ADDENDUM NOTE
Encounter addended by: Meseret Seo [facility] on: 10/14/2021 9:29 AM   Actions taken: i-Vent created or edited

## 2021-10-15 ENCOUNTER — PATIENT OUTREACH (OUTPATIENT)
Dept: CASE MANAGEMENT | Age: 46
End: 2021-10-15

## 2021-10-16 ENCOUNTER — HOSPITAL ENCOUNTER (OUTPATIENT)
Dept: INFUSION THERAPY | Age: 46
Discharge: HOME | End: 2021-10-16

## 2021-10-16 VITALS
HEART RATE: 83 BPM | DIASTOLIC BLOOD PRESSURE: 77 MMHG | SYSTOLIC BLOOD PRESSURE: 123 MMHG | RESPIRATION RATE: 18 BRPM | TEMPERATURE: 99.1 F | OXYGEN SATURATION: 95 %

## 2021-10-16 DIAGNOSIS — C16.8 MALIGNANT NEOPLASM OF OVERLAPPING SITES OF STOMACH (HCC): ICD-10-CM

## 2021-10-16 DIAGNOSIS — R39.15 URGENCY OF URINATION: Primary | ICD-10-CM

## 2021-10-16 DIAGNOSIS — R11.2 CINV (CHEMOTHERAPY-INDUCED NAUSEA AND VOMITING): ICD-10-CM

## 2021-10-16 DIAGNOSIS — T45.1X5A CINV (CHEMOTHERAPY-INDUCED NAUSEA AND VOMITING): ICD-10-CM

## 2021-10-16 PROCEDURE — 96523 IRRIG DRUG DELIVERY DEVICE: CPT

## 2021-10-16 RX ORDER — SODIUM CHLORIDE 0.9 % (FLUSH) 0.9 %
10 SYRINGE (ML) INJECTION AS NEEDED
Status: DISCONTINUED | OUTPATIENT
Start: 2021-10-16 | End: 2021-10-17 | Stop reason: HOSPADM

## 2021-10-16 RX ADMIN — Medication 10 ML: at 13:39

## 2021-10-16 NOTE — PROGRESS NOTES
Patient arrived ambulatory to infusion center for pump d/c. VS stable. Port de-accessed and patient discharged ambulatory. Patient aware of infusion appt tomorrow 10/17.

## 2021-10-17 ENCOUNTER — HOSPITAL ENCOUNTER (OUTPATIENT)
Dept: INFUSION THERAPY | Age: 46
Discharge: HOME | End: 2021-10-17

## 2021-10-17 VITALS
OXYGEN SATURATION: 97 % | DIASTOLIC BLOOD PRESSURE: 77 MMHG | HEART RATE: 89 BPM | RESPIRATION RATE: 18 BRPM | TEMPERATURE: 98.4 F | SYSTOLIC BLOOD PRESSURE: 142 MMHG

## 2021-10-17 DIAGNOSIS — T45.1X5A CINV (CHEMOTHERAPY-INDUCED NAUSEA AND VOMITING): ICD-10-CM

## 2021-10-17 DIAGNOSIS — R11.2 CINV (CHEMOTHERAPY-INDUCED NAUSEA AND VOMITING): ICD-10-CM

## 2021-10-17 DIAGNOSIS — C16.8 MALIGNANT NEOPLASM OF OVERLAPPING SITES OF STOMACH (HCC): ICD-10-CM

## 2021-10-17 DIAGNOSIS — R39.15 URGENCY OF URINATION: Primary | ICD-10-CM

## 2021-10-17 PROCEDURE — 96372 THER/PROPH/DIAG INJ SC/IM: CPT

## 2021-10-17 PROCEDURE — 74011250636 HC RX REV CODE- 250/636: Performed by: INTERNAL MEDICINE

## 2021-10-17 RX ADMIN — PEGFILGRASTIM-CBQV 6 MG: 6 INJECTION, SOLUTION SUBCUTANEOUS at 14:30

## 2021-11-03 ENCOUNTER — HOSPITAL ENCOUNTER (OUTPATIENT)
Dept: LAB | Age: 46
Discharge: HOME | End: 2021-11-03

## 2021-11-03 ENCOUNTER — PATIENT OUTREACH (OUTPATIENT)
Dept: CASE MANAGEMENT | Age: 46
End: 2021-11-03

## 2021-11-03 DIAGNOSIS — C16.8 MALIGNANT NEOPLASM OF OVERLAPPING SITES OF STOMACH (HCC): ICD-10-CM

## 2021-11-03 LAB
ALBUMIN SERPL-MCNC: 3.4 G/DL (ref 3.5–5)
ALBUMIN/GLOB SERPL: 0.9 {RATIO} (ref 1.2–3.5)
ALP SERPL-CCNC: 174 U/L (ref 50–136)
ALT SERPL-CCNC: 40 U/L (ref 12–65)
ANION GAP SERPL CALC-SCNC: 3 MMOL/L (ref 7–16)
AST SERPL-CCNC: 39 U/L (ref 15–37)
BASOPHILS # BLD: 0 K/UL (ref 0–0.2)
BASOPHILS NFR BLD: 0 % (ref 0–2)
BILIRUB SERPL-MCNC: 0.3 MG/DL (ref 0.2–1.1)
BUN SERPL-MCNC: 10 MG/DL (ref 6–23)
CALCIUM SERPL-MCNC: 9.2 MG/DL (ref 8.3–10.4)
CANCER AG19-9 SERPL-ACNC: 319.4 U/ML (ref 2–37)
CEA SERPL-MCNC: 35.2 NG/ML (ref 0–3)
CHLORIDE SERPL-SCNC: 110 MMOL/L (ref 98–107)
CO2 SERPL-SCNC: 29 MMOL/L (ref 21–32)
CREAT SERPL-MCNC: 0.9 MG/DL (ref 0.8–1.5)
DIFFERENTIAL METHOD BLD: ABNORMAL
EOSINOPHIL # BLD: 0.1 K/UL (ref 0–0.8)
EOSINOPHIL NFR BLD: 1 % (ref 0.5–7.8)
ERYTHROCYTE [DISTWIDTH] IN BLOOD BY AUTOMATED COUNT: 17.4 % (ref 11.9–14.6)
GLOBULIN SER CALC-MCNC: 3.7 G/DL (ref 2.3–3.5)
GLUCOSE SERPL-MCNC: 118 MG/DL (ref 65–100)
HCT VFR BLD AUTO: 34.4 %
HGB BLD-MCNC: 11.2 G/DL (ref 13.6–17.2)
IMM GRANULOCYTES # BLD AUTO: 0.1 K/UL (ref 0–0.5)
IMM GRANULOCYTES NFR BLD AUTO: 1 % (ref 0–5)
LYMPHOCYTES # BLD: 0.7 K/UL (ref 0.5–4.6)
LYMPHOCYTES NFR BLD: 7 % (ref 13–44)
MAGNESIUM SERPL-MCNC: 2.4 MG/DL (ref 1.8–2.4)
MCH RBC QN AUTO: 32.8 PG (ref 26.1–32.9)
MCHC RBC AUTO-ENTMCNC: 32.6 G/DL (ref 31.4–35)
MCV RBC AUTO: 100.9 FL (ref 79.6–97.8)
MONOCYTES # BLD: 0.8 K/UL (ref 0.1–1.3)
MONOCYTES NFR BLD: 8 % (ref 4–12)
NEUTS SEG # BLD: 7.7 K/UL (ref 1.7–8.2)
NEUTS SEG NFR BLD: 83 % (ref 43–78)
NRBC # BLD: 0 K/UL (ref 0–0.2)
PLATELET # BLD AUTO: 116 K/UL (ref 150–450)
PMV BLD AUTO: 10 FL (ref 9.4–12.3)
POTASSIUM SERPL-SCNC: 4.1 MMOL/L (ref 3.5–5.1)
PROT SERPL-MCNC: 7.1 G/DL (ref 6.3–8.2)
RBC # BLD AUTO: 3.41 M/UL (ref 4.23–5.6)
SODIUM SERPL-SCNC: 142 MMOL/L (ref 136–145)
WBC # BLD AUTO: 9.3 K/UL (ref 4.3–11.1)

## 2021-11-03 PROCEDURE — 82378 CARCINOEMBRYONIC ANTIGEN: CPT

## 2021-11-03 PROCEDURE — 83735 ASSAY OF MAGNESIUM: CPT

## 2021-11-03 PROCEDURE — 36415 COLL VENOUS BLD VENIPUNCTURE: CPT

## 2021-11-03 PROCEDURE — 80053 COMPREHEN METABOLIC PANEL: CPT

## 2021-11-03 PROCEDURE — 85025 COMPLETE CBC W/AUTO DIFF WBC: CPT

## 2021-11-03 PROCEDURE — 86301 IMMUNOASSAY TUMOR CA 19-9: CPT

## 2021-11-03 NOTE — PROGRESS NOTES
I saw patient today with Dr Ольга Jansen prior to Folfirinox. Pt recently was on cruise and had relaxing time. He denies any complaints today.  He will continue to therapy tomorrow. ----michelle to follow

## 2021-11-04 ENCOUNTER — HOSPITAL ENCOUNTER (OUTPATIENT)
Dept: INFUSION THERAPY | Age: 46
Discharge: HOME | End: 2021-11-04

## 2021-11-04 DIAGNOSIS — C16.8 MALIGNANT NEOPLASM OF OVERLAPPING SITES OF STOMACH (HCC): ICD-10-CM

## 2021-11-04 DIAGNOSIS — T45.1X5A CINV (CHEMOTHERAPY-INDUCED NAUSEA AND VOMITING): ICD-10-CM

## 2021-11-04 DIAGNOSIS — R39.15 URGENCY OF URINATION: Primary | ICD-10-CM

## 2021-11-04 DIAGNOSIS — R11.2 CINV (CHEMOTHERAPY-INDUCED NAUSEA AND VOMITING): ICD-10-CM

## 2021-11-04 PROCEDURE — 96368 THER/DIAG CONCURRENT INF: CPT

## 2021-11-04 PROCEDURE — 96415 CHEMO IV INFUSION ADDL HR: CPT

## 2021-11-04 PROCEDURE — 96367 TX/PROPH/DG ADDL SEQ IV INF: CPT

## 2021-11-04 PROCEDURE — 96413 CHEMO IV INFUSION 1 HR: CPT

## 2021-11-04 PROCEDURE — 74011250636 HC RX REV CODE- 250/636: Performed by: INTERNAL MEDICINE

## 2021-11-04 PROCEDURE — 96411 CHEMO IV PUSH ADDL DRUG: CPT

## 2021-11-04 PROCEDURE — 96375 TX/PRO/DX INJ NEW DRUG ADDON: CPT

## 2021-11-04 PROCEDURE — 74011000258 HC RX REV CODE- 258: Performed by: INTERNAL MEDICINE

## 2021-11-04 PROCEDURE — 96417 CHEMO IV INFUS EACH ADDL SEQ: CPT

## 2021-11-04 PROCEDURE — 96372 THER/PROPH/DIAG INJ SC/IM: CPT

## 2021-11-04 PROCEDURE — G0498 CHEMO EXTEND IV INFUS W/PUMP: HCPCS

## 2021-11-04 RX ORDER — FLUOROURACIL 50 MG/ML
300 INJECTION, SOLUTION INTRAVENOUS ONCE
Status: COMPLETED | OUTPATIENT
Start: 2021-11-04 | End: 2021-11-04

## 2021-11-04 RX ORDER — ATROPINE SULFATE 0.4 MG/ML
0.4 INJECTION, SOLUTION ENDOTRACHEAL; INTRAMEDULLARY; INTRAMUSCULAR; INTRAVENOUS; SUBCUTANEOUS ONCE
Status: COMPLETED | OUTPATIENT
Start: 2021-11-04 | End: 2021-11-04

## 2021-11-04 RX ORDER — DEXTROSE MONOHYDRATE 50 MG/ML
25 INJECTION, SOLUTION INTRAVENOUS CONTINUOUS
Status: ACTIVE | OUTPATIENT
Start: 2021-11-04 | End: 2021-11-04

## 2021-11-04 RX ORDER — ONDANSETRON 2 MG/ML
8 INJECTION INTRAMUSCULAR; INTRAVENOUS ONCE
Status: COMPLETED | OUTPATIENT
Start: 2021-11-04 | End: 2021-11-04

## 2021-11-04 RX ORDER — SODIUM CHLORIDE 0.9 % (FLUSH) 0.9 %
10 SYRINGE (ML) INJECTION AS NEEDED
Status: ACTIVE | OUTPATIENT
Start: 2021-11-04 | End: 2021-11-04

## 2021-11-04 RX ADMIN — FLUOROURACIL 612 MG: 50 INJECTION, SOLUTION INTRAVENOUS at 13:57

## 2021-11-04 RX ADMIN — ATROPINE SULFATE 0.4 MG: 0.4 INJECTION, SOLUTION INTRAMUSCULAR; INTRAVENOUS; SUBCUTANEOUS at 12:16

## 2021-11-04 RX ADMIN — LEUCOVORIN CALCIUM 816 MG: 350 INJECTION, POWDER, LYOPHILIZED, FOR SOLUTION INTRAMUSCULAR; INTRAVENOUS at 12:16

## 2021-11-04 RX ADMIN — DEXTROSE MONOHYDRATE 25 ML/HR: 5 INJECTION, SOLUTION INTRAVENOUS at 09:07

## 2021-11-04 RX ADMIN — OXALIPLATIN 100 MG: 5 INJECTION, SOLUTION INTRAVENOUS at 10:12

## 2021-11-04 RX ADMIN — ONDANSETRON 8 MG: 2 INJECTION INTRAMUSCULAR; INTRAVENOUS at 09:15

## 2021-11-04 RX ADMIN — Medication 10 ML: at 14:02

## 2021-11-04 RX ADMIN — DEXAMETHASONE SODIUM PHOSPHATE 12 MG: 4 INJECTION, SOLUTION INTRAMUSCULAR; INTRAVENOUS at 09:30

## 2021-11-04 RX ADMIN — FLUOROURACIL 4000 MG: 50 INJECTION, SOLUTION INTRAVENOUS at 14:03

## 2021-11-04 RX ADMIN — FOSAPREPITANT 150 MG: 150 INJECTION, POWDER, LYOPHILIZED, FOR SOLUTION INTRAVENOUS at 09:46

## 2021-11-04 RX ADMIN — IRINOTECAN HYDROCHLORIDE 240 MG: 20 INJECTION, SOLUTION INTRAVENOUS at 12:16

## 2021-11-04 NOTE — PROGRESS NOTES
Pt arrived ambulatory. Folfirinox completed without complications. 5FU pump infusing. Pt aware of appt 11/6 at 1330. Discharged ambulatory, no distress noted.

## 2021-11-06 ENCOUNTER — HOSPITAL ENCOUNTER (OUTPATIENT)
Dept: INFUSION THERAPY | Age: 46
Discharge: HOME | End: 2021-11-06

## 2021-11-06 VITALS
DIASTOLIC BLOOD PRESSURE: 74 MMHG | WEIGHT: 183.6 LBS | SYSTOLIC BLOOD PRESSURE: 120 MMHG | BODY MASS INDEX: 25.61 KG/M2 | RESPIRATION RATE: 18 BRPM | HEART RATE: 84 BPM | TEMPERATURE: 98.4 F | OXYGEN SATURATION: 99 %

## 2021-11-06 PROCEDURE — 96523 IRRIG DRUG DELIVERY DEVICE: CPT

## 2021-11-06 RX ORDER — SODIUM CHLORIDE 0.9 % (FLUSH) 0.9 %
10 SYRINGE (ML) INJECTION EVERY 8 HOURS
Status: ACTIVE | OUTPATIENT
Start: 2021-11-06

## 2021-11-06 RX ADMIN — Medication 10 ML: at 10:24

## 2021-11-06 NOTE — PROGRESS NOTES
Arrived to the infusion center. chemo pump dc/d. No issues or concerns voiced. Awre of his next appointment on 11/7 for Ady Hylton. Disscharged ambulatory in satisfactory condition.

## 2021-11-07 ENCOUNTER — HOSPITAL ENCOUNTER (OUTPATIENT)
Dept: INFUSION THERAPY | Age: 46
Discharge: HOME | End: 2021-11-07

## 2021-11-07 VITALS
TEMPERATURE: 98.1 F | OXYGEN SATURATION: 98 % | HEART RATE: 86 BPM | SYSTOLIC BLOOD PRESSURE: 111 MMHG | DIASTOLIC BLOOD PRESSURE: 77 MMHG | RESPIRATION RATE: 17 BRPM

## 2021-11-07 DIAGNOSIS — R11.2 CINV (CHEMOTHERAPY-INDUCED NAUSEA AND VOMITING): ICD-10-CM

## 2021-11-07 DIAGNOSIS — R39.15 URGENCY OF URINATION: Primary | ICD-10-CM

## 2021-11-07 DIAGNOSIS — C16.8 MALIGNANT NEOPLASM OF OVERLAPPING SITES OF STOMACH (HCC): ICD-10-CM

## 2021-11-07 DIAGNOSIS — T45.1X5A CINV (CHEMOTHERAPY-INDUCED NAUSEA AND VOMITING): ICD-10-CM

## 2021-11-07 PROCEDURE — 96372 THER/PROPH/DIAG INJ SC/IM: CPT

## 2021-11-07 PROCEDURE — 74011250636 HC RX REV CODE- 250/636: Performed by: INTERNAL MEDICINE

## 2021-11-07 RX ADMIN — PEGFILGRASTIM-CBQV 6 MG: 6 INJECTION, SOLUTION SUBCUTANEOUS at 13:20

## 2021-11-07 NOTE — PROGRESS NOTES
Arrived to the [facility] ambulatory. lesly dwyer completed. Patient tolerated well. Any issues or concerns during appointment: no.  Patient aware of next infusion appointment on 11/18 @0830  Discharged to home ambulatory.

## 2021-11-16 ENCOUNTER — HOSPITAL ENCOUNTER (OUTPATIENT)
Dept: CT IMAGING | Age: 46
Discharge: HOME | End: 2021-11-16
Attending: INTERNAL MEDICINE

## 2021-11-16 DIAGNOSIS — C16.8 MALIGNANT NEOPLASM OF OVERLAPPING SITES OF STOMACH (HCC): ICD-10-CM

## 2021-11-16 RX ORDER — SODIUM CHLORIDE 0.9 % (FLUSH) 0.9 %
10 SYRINGE (ML) INJECTION
Status: COMPLETED | OUTPATIENT
Start: 2021-11-16 | End: 2021-11-16

## 2021-11-16 RX ADMIN — Medication 10 ML: at 12:51

## 2021-11-17 ENCOUNTER — HOSPITAL ENCOUNTER (OUTPATIENT)
Dept: LAB | Age: 46
Discharge: HOME | End: 2021-11-17

## 2021-11-17 DIAGNOSIS — C78.7 LIVER METASTASIS (HCC): ICD-10-CM

## 2021-11-17 LAB
ALBUMIN SERPL-MCNC: 3.5 G/DL (ref 3.5–5)
ALBUMIN/GLOB SERPL: 0.9 {RATIO} (ref 1.2–3.5)
ALP SERPL-CCNC: 197 U/L (ref 50–136)
ALT SERPL-CCNC: 44 U/L (ref 12–65)
ANION GAP SERPL CALC-SCNC: 9 MMOL/L (ref 7–16)
AST SERPL-CCNC: 33 U/L (ref 15–37)
BASOPHILS # BLD: 0 K/UL (ref 0–0.2)
BASOPHILS NFR BLD: 0 % (ref 0–2)
BILIRUB SERPL-MCNC: 0.3 MG/DL (ref 0.2–1.1)
BUN SERPL-MCNC: 9 MG/DL (ref 6–23)
CALCIUM SERPL-MCNC: 8.7 MG/DL (ref 8.3–10.4)
CEA SERPL-MCNC: 38.9 NG/ML (ref 0–3)
CHLORIDE SERPL-SCNC: 107 MMOL/L (ref 98–107)
CO2 SERPL-SCNC: 26 MMOL/L (ref 21–32)
CREAT SERPL-MCNC: 1 MG/DL (ref 0.8–1.5)
DIFFERENTIAL METHOD BLD: ABNORMAL
EOSINOPHIL # BLD: 0 K/UL (ref 0–0.8)
EOSINOPHIL NFR BLD: 1 % (ref 0.5–7.8)
ERYTHROCYTE [DISTWIDTH] IN BLOOD BY AUTOMATED COUNT: 15.8 % (ref 11.9–14.6)
GLOBULIN SER CALC-MCNC: 3.7 G/DL (ref 2.3–3.5)
GLUCOSE SERPL-MCNC: 178 MG/DL (ref 65–100)
HCT VFR BLD AUTO: 32.9 %
HGB BLD-MCNC: 11 G/DL (ref 13.6–17.2)
IMM GRANULOCYTES # BLD AUTO: 0 K/UL (ref 0–0.5)
IMM GRANULOCYTES NFR BLD AUTO: 1 % (ref 0–5)
LYMPHOCYTES # BLD: 0.8 K/UL (ref 0.5–4.6)
LYMPHOCYTES NFR BLD: 12 % (ref 13–44)
MAGNESIUM SERPL-MCNC: 2.3 MG/DL (ref 1.8–2.4)
MCH RBC QN AUTO: 32.9 PG (ref 26.1–32.9)
MCHC RBC AUTO-ENTMCNC: 33.4 G/DL (ref 31.4–35)
MCV RBC AUTO: 98.5 FL (ref 79.6–97.8)
MONOCYTES # BLD: 0.7 K/UL (ref 0.1–1.3)
MONOCYTES NFR BLD: 10 % (ref 4–12)
NEUTS SEG # BLD: 5.3 K/UL (ref 1.7–8.2)
NEUTS SEG NFR BLD: 76 % (ref 43–78)
NRBC # BLD: 0 K/UL (ref 0–0.2)
PLATELET # BLD AUTO: 98 K/UL (ref 150–450)
PMV BLD AUTO: 9.5 FL (ref 9.4–12.3)
POTASSIUM SERPL-SCNC: 3.3 MMOL/L (ref 3.5–5.1)
PROT SERPL-MCNC: 7.2 G/DL (ref 6.3–8.2)
RBC # BLD AUTO: 3.34 M/UL (ref 4.23–5.6)
SODIUM SERPL-SCNC: 142 MMOL/L (ref 136–145)
WBC # BLD AUTO: 7 K/UL (ref 4.3–11.1)

## 2021-11-17 PROCEDURE — 83735 ASSAY OF MAGNESIUM: CPT

## 2021-11-17 PROCEDURE — 80053 COMPREHEN METABOLIC PANEL: CPT

## 2021-11-17 PROCEDURE — 82378 CARCINOEMBRYONIC ANTIGEN: CPT

## 2021-11-17 PROCEDURE — 36415 COLL VENOUS BLD VENIPUNCTURE: CPT

## 2021-11-17 PROCEDURE — 85025 COMPLETE CBC W/AUTO DIFF WBC: CPT

## 2021-11-18 ENCOUNTER — HOSPITAL ENCOUNTER (OUTPATIENT)
Dept: INFUSION THERAPY | Age: 46
Discharge: HOME | End: 2021-11-18

## 2021-11-18 ENCOUNTER — PATIENT OUTREACH (OUTPATIENT)
Dept: CASE MANAGEMENT | Age: 46
End: 2021-11-18

## 2021-11-18 VITALS
WEIGHT: 184 LBS | RESPIRATION RATE: 16 BRPM | TEMPERATURE: 98.9 F | BODY MASS INDEX: 25.66 KG/M2 | OXYGEN SATURATION: 98 % | HEART RATE: 76 BPM | DIASTOLIC BLOOD PRESSURE: 77 MMHG | SYSTOLIC BLOOD PRESSURE: 133 MMHG

## 2021-11-18 DIAGNOSIS — T45.1X5A CINV (CHEMOTHERAPY-INDUCED NAUSEA AND VOMITING): ICD-10-CM

## 2021-11-18 DIAGNOSIS — R11.2 CINV (CHEMOTHERAPY-INDUCED NAUSEA AND VOMITING): ICD-10-CM

## 2021-11-18 DIAGNOSIS — C16.8 MALIGNANT NEOPLASM OF OVERLAPPING SITES OF STOMACH (HCC): ICD-10-CM

## 2021-11-18 DIAGNOSIS — R39.15 URGENCY OF URINATION: Primary | ICD-10-CM

## 2021-11-18 PROCEDURE — 96413 CHEMO IV INFUSION 1 HR: CPT

## 2021-11-18 PROCEDURE — G0498 CHEMO EXTEND IV INFUS W/PUMP: HCPCS

## 2021-11-18 PROCEDURE — 96368 THER/DIAG CONCURRENT INF: CPT

## 2021-11-18 PROCEDURE — 96375 TX/PRO/DX INJ NEW DRUG ADDON: CPT

## 2021-11-18 PROCEDURE — 96415 CHEMO IV INFUSION ADDL HR: CPT

## 2021-11-18 PROCEDURE — 96372 THER/PROPH/DIAG INJ SC/IM: CPT

## 2021-11-18 PROCEDURE — 74011000258 HC RX REV CODE- 258: Performed by: INTERNAL MEDICINE

## 2021-11-18 PROCEDURE — 96367 TX/PROPH/DG ADDL SEQ IV INF: CPT

## 2021-11-18 PROCEDURE — 96411 CHEMO IV PUSH ADDL DRUG: CPT

## 2021-11-18 PROCEDURE — 74011250636 HC RX REV CODE- 250/636: Performed by: INTERNAL MEDICINE

## 2021-11-18 PROCEDURE — 96417 CHEMO IV INFUS EACH ADDL SEQ: CPT

## 2021-11-18 RX ORDER — SODIUM CHLORIDE 0.9 % (FLUSH) 0.9 %
10 SYRINGE (ML) INJECTION AS NEEDED
Status: ACTIVE | OUTPATIENT
Start: 2021-11-18 | End: 2021-11-18

## 2021-11-18 RX ORDER — FLUOROURACIL 50 MG/ML
300 INJECTION, SOLUTION INTRAVENOUS ONCE
Status: COMPLETED | OUTPATIENT
Start: 2021-11-18 | End: 2021-11-18

## 2021-11-18 RX ORDER — ATROPINE SULFATE 0.4 MG/ML
0.4 INJECTION, SOLUTION ENDOTRACHEAL; INTRAMEDULLARY; INTRAMUSCULAR; INTRAVENOUS; SUBCUTANEOUS ONCE
Status: COMPLETED | OUTPATIENT
Start: 2021-11-18 | End: 2021-11-18

## 2021-11-18 RX ORDER — DEXTROSE MONOHYDRATE 50 MG/ML
25 INJECTION, SOLUTION INTRAVENOUS CONTINUOUS
Status: ACTIVE | OUTPATIENT
Start: 2021-11-18 | End: 2021-11-18

## 2021-11-18 RX ORDER — ONDANSETRON 2 MG/ML
8 INJECTION INTRAMUSCULAR; INTRAVENOUS ONCE
Status: COMPLETED | OUTPATIENT
Start: 2021-11-18 | End: 2021-11-18

## 2021-11-18 RX ADMIN — FLUOROURACIL 4000 MG: 50 INJECTION, SOLUTION INTRAVENOUS at 14:27

## 2021-11-18 RX ADMIN — ONDANSETRON 8 MG: 2 INJECTION INTRAMUSCULAR; INTRAVENOUS at 09:37

## 2021-11-18 RX ADMIN — ATROPINE SULFATE 0.4 MG: 0.4 INJECTION, SOLUTION INTRAMUSCULAR; INTRAVENOUS; SUBCUTANEOUS at 12:13

## 2021-11-18 RX ADMIN — FOSAPREPITANT 150 MG: 150 INJECTION, POWDER, LYOPHILIZED, FOR SOLUTION INTRAVENOUS at 09:58

## 2021-11-18 RX ADMIN — DEXAMETHASONE SODIUM PHOSPHATE 12 MG: 4 INJECTION, SOLUTION INTRAMUSCULAR; INTRAVENOUS at 09:40

## 2021-11-18 RX ADMIN — Medication 10 ML: at 09:05

## 2021-11-18 RX ADMIN — Medication 10 ML: at 14:25

## 2021-11-18 RX ADMIN — FLUOROURACIL 612 MG: 50 INJECTION, SOLUTION INTRAVENOUS at 14:21

## 2021-11-18 RX ADMIN — LEUCOVORIN CALCIUM 816 MG: 200 INJECTION, POWDER, LYOPHILIZED, FOR SOLUTION INTRAMUSCULAR; INTRAVENOUS at 12:19

## 2021-11-18 RX ADMIN — DEXTROSE MONOHYDRATE 25 ML/HR: 5 INJECTION, SOLUTION INTRAVENOUS at 10:27

## 2021-11-18 RX ADMIN — IRINOTECAN HYDROCHLORIDE 240 MG: 20 INJECTION, SOLUTION INTRAVENOUS at 12:36

## 2021-11-18 RX ADMIN — OXALIPLATIN 100 MG: 5 INJECTION, SOLUTION INTRAVENOUS at 10:34

## 2021-11-18 NOTE — ADDENDUM NOTE
Encounter addended by: Earnest Tejada, [address] [facility] on: 11/18/2021 9:25 AM   Actions taken: i-Vent created or edited

## 2021-11-18 NOTE — PROGRESS NOTES
Patient arrived at [facility] for Folfirinox. Assessment completed. No needs voiced at this time. Patient tolerated chemo well and is aware of next appointment on 11/20/2021 @1330. Patient discharged ambulatory.

## 2021-11-18 NOTE — PROGRESS NOTES
Saw patient on 11-17-21 with Dr Heather Vang prior to [facility] treatment. Dr Heather Vang explained that patient has progression on scans but this can be spot radiated and we can continue Folfirinox. Pt can also choose to start Immunotherapy and stop Folfirinox so these options were given to pt. For now, he will continue Folfirinox therapy and consult with radiation on 11-22-21.  Navigation is following

## 2021-11-20 ENCOUNTER — HOSPITAL ENCOUNTER (OUTPATIENT)
Dept: INFUSION THERAPY | Age: 46
Discharge: HOME | End: 2021-11-20

## 2021-11-20 VITALS
DIASTOLIC BLOOD PRESSURE: 85 MMHG | TEMPERATURE: 98.2 F | RESPIRATION RATE: 18 BRPM | HEART RATE: 80 BPM | SYSTOLIC BLOOD PRESSURE: 135 MMHG

## 2021-11-20 DIAGNOSIS — C16.8 MALIGNANT NEOPLASM OF OVERLAPPING SITES OF STOMACH (HCC): ICD-10-CM

## 2021-11-20 DIAGNOSIS — R39.15 URGENCY OF URINATION: Primary | ICD-10-CM

## 2021-11-20 DIAGNOSIS — T45.1X5A CINV (CHEMOTHERAPY-INDUCED NAUSEA AND VOMITING): ICD-10-CM

## 2021-11-20 DIAGNOSIS — R11.2 CINV (CHEMOTHERAPY-INDUCED NAUSEA AND VOMITING): ICD-10-CM

## 2021-11-20 PROCEDURE — 96523 IRRIG DRUG DELIVERY DEVICE: CPT

## 2021-11-20 RX ORDER — SODIUM CHLORIDE 0.9 % (FLUSH) 0.9 %
10 SYRINGE (ML) INJECTION AS NEEDED
Status: DISCONTINUED | OUTPATIENT
Start: 2021-11-20 | End: 2021-11-21 | Stop reason: HOSPADM

## 2021-11-20 RX ADMIN — Medication 10 ML: at 13:33

## 2021-11-20 NOTE — PROGRESS NOTES
Arrived to the Infusion Center.  5FU pump completed and disconnected and patient tolerated well. Any issues or concerns during appointment: not r/t treatment but patient stated had to put cat down today - condolences given. Patient given education on process  Instructed patient to notify provider for any issues or worrisome symptoms. They verbalized understanding. Patient aware of next infusion appointment scheduled for 11/21/21 at 230pm   Discharged ambulatory with self.

## 2021-11-21 ENCOUNTER — HOSPITAL ENCOUNTER (OUTPATIENT)
Dept: INFUSION THERAPY | Age: 46
Discharge: HOME | End: 2021-11-21

## 2021-11-21 VITALS
OXYGEN SATURATION: 97 % | DIASTOLIC BLOOD PRESSURE: 81 MMHG | RESPIRATION RATE: 18 BRPM | SYSTOLIC BLOOD PRESSURE: 133 MMHG | HEART RATE: 80 BPM | TEMPERATURE: 98.4 F

## 2021-11-21 DIAGNOSIS — T45.1X5A CINV (CHEMOTHERAPY-INDUCED NAUSEA AND VOMITING): ICD-10-CM

## 2021-11-21 DIAGNOSIS — C16.8 MALIGNANT NEOPLASM OF OVERLAPPING SITES OF STOMACH (HCC): ICD-10-CM

## 2021-11-21 DIAGNOSIS — R11.2 CINV (CHEMOTHERAPY-INDUCED NAUSEA AND VOMITING): ICD-10-CM

## 2021-11-21 DIAGNOSIS — R39.15 URGENCY OF URINATION: Primary | ICD-10-CM

## 2021-11-21 PROCEDURE — 74011250636 HC RX REV CODE- 250/636: Performed by: INTERNAL MEDICINE

## 2021-11-21 PROCEDURE — 96372 THER/PROPH/DIAG INJ SC/IM: CPT

## 2021-11-21 RX ADMIN — PEGFILGRASTIM-CBQV 6 MG: 6 INJECTION, SOLUTION SUBCUTANEOUS at 14:52

## 2021-11-21 NOTE — PROGRESS NOTES
Arrived to the [facility]. Udenyca injection completed. Provided education on [facility]. Patient has had this medication before. Opportunity for questions provided. Patient instructed to report any side affects to ordering provider. Patient tolerated well. Any issues or concerns during appointment: no.  Patient aware of next infusion appointment on 12/1/2021 (date) at 10:40 am (time). Patient aware of next infusion appointment on 12/2/2021 (date) at 8:30 am (time). Discharged ambulatory with self.

## 2021-11-22 ENCOUNTER — HOSPITAL ENCOUNTER (OUTPATIENT)
Dept: RADIATION ONCOLOGY | Age: 46
Discharge: HOME | End: 2021-11-22

## 2021-11-22 VITALS
SYSTOLIC BLOOD PRESSURE: 123 MMHG | OXYGEN SATURATION: 97 % | WEIGHT: 178.6 LBS | TEMPERATURE: 99.5 F | BODY MASS INDEX: 24.91 KG/M2 | HEART RATE: 113 BPM | DIASTOLIC BLOOD PRESSURE: 78 MMHG

## 2021-11-22 PROCEDURE — 99211 OFF/OP EST MAY X REQ PHY/QHP: CPT

## 2021-11-22 NOTE — PROGRESS NOTES
Pt is here again at [address] [place] [address] to discuss additional RT for new adrenal mets as indicated by a 11/16/21 CT CAP. Pt received SBRT x 5 treatments 6/2021 for stomach cancer. He has been receiving FOLFIRINOX chemo through Dr. Angélica Cisneros in [facility]--the latest one was 11/18/21, and the next cycle is scheduled for 12/1/21. Pt denies pain. Pt is aware to be NPO for the CT/Sim appt on 11/30/21. RT consents were signed.

## 2021-11-23 NOTE — CONSULTS
Patient: Yoli Vargas MRN: [id number]  SSN: [id number]    YOB: 1975  Age: 40 y.o. Sex: male      Other Providers:  Dr. Trent English: fatigue    DIAGNOSIS: metastatic (oligoprogressive) gastric cancer    PREVIOUS TREATMENT:  1. Nov 2020 - present: FOLFIRINOX  2. 06/11/2021: SBRT to L para-aortic LN    HISTORY OF PRESENT ILLNESS:  Yoli Vargas is a 40 y.o. male who I am seeing at the request of Dr. Heather Vang. His oncologic history is outlined above. Most recently, he had a CT which showed stable size of the treated LN and other disease sites except for a new L adrenal met and a periaortic LN adjacent to the descending thoracic aorta in the posterior mediastinum. Symptomatically, he has some fatigue. He has had several deaths in the family and this has been emotionally taxing on him. He has no weight loss or pain. PAST MEDICAL HISTORY:    Past Medical History:   Diagnosis Date    Gastric cancer [facility])        The patient denies history of collagen vascular diseases, pacemaker insertion, prior radiation or prior chemotherapy. PAST SURGICAL HISTORY:   Past Surgical History:   Procedure Laterality Date    HX ORTHOPAEDIC      ankle    HX TONSILLECTOMY      lazy eye    IR INSERT TUNL CVC W PORT OVER 5 YEARS  11/3/2020       MEDICATIONS:     Current Outpatient Medications:     zolpidem (AMBIEN) 10 mg tablet, Take 1 Tablet by mouth nightly as needed for Sleep. Max Daily Amount: 10 mg., Disp: 30 Tablet, Rfl: 1    gabapentin (NEURONTIN) 300 mg capsule, Take 1 Capsule by mouth two (2) times a day., Disp: 60 Capsule, Rfl: 2    LORazepam (Ativan) 1 mg tablet, Take 1 Tablet by mouth every six (6) hours as needed (nausea). Max Daily Amount: 4 mg., Disp: 90 Tablet, Rfl: 1    diphenoxylate-atropine (LomotiL) 2.5-0.025 mg per tablet, Take 1 Tablet by mouth four (4) times daily as needed for Diarrhea. Max Daily Amount: 4 Tablets.  Indications: diarrhea caused by chemotherapy, Disp: 60 Tablet, Rfl: 0    potassium chloride (K-DUR, KLOR-CON) 20 mEq tablet, Take 1 Tablet by mouth daily. , Disp: 30 Tablet, Rfl: 2    mirtazapine (REMERON) 30 mg tablet, Take 1 Tablet by mouth nightly., Disp: 30 Tablet, Rfl: 5    lidocaine-prilocaine (EMLA) topical cream, Apply  to affected area as needed for Pain., Disp: 30 g, Rfl: 1    prochlorperazine (Compazine) 10 mg tablet, Take 1 Tab by mouth every six (6) hours as needed for Nausea. Indications: nausea and vomiting caused by cancer drugs, nausea and vomiting, Disp: 90 Tab, Rfl: 3    ondansetron hcl (Zofran) 8 mg tablet, Take 1 Tab by mouth every eight (8) hours as needed for Nausea. Indications: nausea and vomiting caused by cancer drugs, Disp: 60 Tab, Rfl: 3  No current facility-administered medications for this encounter.     Facility-Administered Medications Ordered in Other Encounters:     central line flush (saline) syringe 10 mL, 10 mL, InterCATHeter, Q8H, Millie Chavez MD    central line flush (saline) syringe 10 mL, 10 mL, InterCATHeter, Q8H, Millie Chavez MD    ALLERGIES:   No Known Allergies    SOCIAL HISTORY:   Social History     Socioeconomic History    Marital status:      Spouse name: Not on file    Number of children: Not on file    Years of education: Not on file    Highest education level: Not on file   Occupational History    Not on file   Tobacco Use    Smoking status: Never Smoker    Smokeless tobacco: Never Used   Substance and Sexual Activity    Alcohol use: Yes     Comment: occasional    Drug use: Never    Sexual activity: Not on file   Other Topics Concern    Not on file   Social History Narrative    Not on file     Social Determinants of Health     Financial Resource Strain:     Difficulty of Paying Living Expenses: Not on file   Food Insecurity:     Worried About Running Out of Food in the Last Year: Not on file    Abena of Food in the Last Year: Not on file   Transportation Needs:     Lack of Transportation (Medical): Not on file    Lack of Transportation (Non-Medical): Not on file   Physical Activity:     Days of Exercise per Week: Not on file    Minutes of Exercise per Session: Not on file   Stress:     Feeling of Stress : Not on file   Social Connections:     Frequency of Communication with Friends and Family: Not on file    Frequency of Social Gatherings with Friends and Family: Not on file    Attends Synagogue Services: Not on file    Active Member of [address] [place] [address] [employer] or Organizations: Not on file    Attends Club or Organization Meetings: Not on file    Marital Status: Not on file   Intimate Partner Violence:     Fear of Current or Ex-Partner: Not on file    Emotionally Abused: Not on file    Physically Abused: Not on file    Sexually Abused: Not on file   Housing Stability:     Unable to Pay for Housing in the Last Year: Not on file    Number of [place] in the Last Year: Not on file    Unstable Housing in the Last Year: Not on file       FAMILY HISTORY:   Family History   Problem Relation Age of Onset    Dementia Mother     Depression Mother     Diabetes Mother     Schizophrenia Mother        REVIEW OF SYSTEMS: Please see the completed review of systems sheet in the chart that I have reviewed today. PHYSICAL EXAMINATION:   ECOG Performance status 0  VITAL SIGNS:   Visit Vitals  /78   Pulse (!) 113   Temp 99.5 °F (37.5 °C)   Wt 81 kg (178 lb 9.6 oz)   SpO2 97%   BMI 24.91 kg/m²      General: well developed/nourished adult Male in no acute distress; appears stated age  [de-identified]: normocephalic, atraumatic; EOMI  Neck: supple with full ROM;    Respiratory: normal inspiratory effort, no audible wheezes  Extremities: no cyanosis, clubbing, or edema  Musculoskeletal: mobility intact x4; normal ROM in all joints  Skin: no skin lesions identified  Neuro: AOx3; sensation intact x 4; CNII-XII grossly intact  Psych: appropriate affect, insight, and judgement  GI: abdomen soft, non-distended    PATHOLOGY:    No new path    LABORATORY:   Lab Results   Component Value Date/Time    Sodium 142 11/17/2021 11:10 AM    Potassium 3.3 (L) 11/17/2021 11:10 AM    Chloride 107 11/17/2021 11:10 AM    CO2 26 11/17/2021 11:10 AM    Anion gap 9 11/17/2021 11:10 AM    Glucose 178 (H) 11/17/2021 11:10 AM    BUN 9 11/17/2021 11:10 AM    Creatinine 1.00 11/17/2021 11:10 AM    GFR est AA >60 11/17/2021 11:10 AM    GFR est non-AA >60 11/17/2021 11:10 AM    Calcium 8.7 11/17/2021 11:10 AM    Magnesium 2.3 11/17/2021 11:10 AM    Albumin 3.5 11/17/2021 11:10 AM    Protein, total 7.2 11/17/2021 11:10 AM    Globulin 3.7 (H) 11/17/2021 11:10 AM    A-G Ratio 0.9 (L) 11/17/2021 11:10 AM    ALT (SGPT) 44 11/17/2021 11:10 AM     Lab Results   Component Value Date/Time    WBC 7.0 11/17/2021 11:10 AM    HGB 11.0 (L) 11/17/2021 11:10 AM    HCT 32.9 11/17/2021 11:10 AM    PLATELET 98 (L) 45/99/5752 11:10 AM       RADIOLOGY:    CT CHEST ABD PELV W CONT    Result Date: 11/16/2021  CT of the Chest, Abdomen, and Pelvis INDICATION: Follow-up gastric cancer. Multiple axial images were obtained through the chest, abdomen, and pelvis. Oral contrast was used for bowel opacification. 100mL of Isovue 370 intravenous contrast was used for better evaluation of solid organs and vascular structures. Radiation dose reduction techniques were used for this study. All CT scans performed at this facility use one or all of the following: Automated exposure control, adjustment of the mA and/or kVp according to patient's size, iterative reconstruction. COMPARISON: CT chest abdomen and pelvis 8/12/2021. FINDINGS: LUNGS AND PLEURA: Lungs are clear. No pleural fluid. MEDIASTINUM/AXILLAE: Heart is normal in size. No pericardial fluid. Right chest port is in good position. No enlarged mediastinal, hilar or axillary lymph nodes.  An enlarged periaortic node left paraspinal region on image 42 of series 2 measures 2.0 x 2.0 cm significantly increased from the previous 1.1 x 0.9 cm. HEPATOBILIARY: Posterior medial right hepatic lobe mass now measures 8.7 x 7.1 cm on image 55, previously 9.0 x 6.8 cm. No new liver lesions. Gallbladder is decompressed. PANCREAS: Gastric mass partially involves the body/tail of the pancreas. SPLEEN: Stable splenomegaly. Spleen measures 16 cm in AP dimension. ADRENAL GLANDS: Right adrenal gland is unremarkable. A new left adrenal metastasis measures 3.4 x 2.5 cm. KIDNEYS/BLADDER: Kidneys and bladder are unremarkable. No hydronephrosis or renal calculi. BOWEL: No bowel obstruction or diverticulitis. Soft tissue mass near the distal esophagus and gastric cardia now measures 6.8 x 5.4 cm, previously 6.9 x 5.0 cm, minimally changed. This is contiguous with the body/tail of the pancreas, periaortic region and the new left adrenal metastasis. LYMPH NODES: Left periaortic lymph node on image 72 of series 2 measures 2.5 x 1.9 cm, previously 2.1 x 2.1 cm which is minimally changed. No other discrete, enlarged abdominal or pelvic lymph nodes. VASCULATURE: Unremarkable. PELVIC ORGANS: Prostate gland and rectum are unremarkable. No pelvic free fluid. MUSCULOSKELETAL: No destructive bone lesions. 1. Enlarging periaortic lymph node adjacent to the descending thoracic aorta, posterior mediastinum. This in addition to the new left adrenal metastasis suggest progression of patient's disease. Hepatic metastasis and perigastric mass near the GE junction are relatively stable. Left periaortic lymph node in the mid abdomen also minimally changed. 2. Stable splenomegaly. IMPRESSION:  Lynne Dixon is a 40 y.o. male with oligoprogressive SBRT. PLAN:    Plan for SBRT, 40 Gy in 5 fractions to both sites. Will discuss with Dr. Jc Public timing given that he is on chemotherapy. Simulation set for 11/30 with RT to start soon thereafter.     Zion Moore MD   November 23, 2021

## 2021-11-30 ENCOUNTER — HOSPITAL ENCOUNTER (OUTPATIENT)
Dept: RADIATION ONCOLOGY | Age: 46
Discharge: HOME | End: 2021-11-30

## 2021-11-30 ENCOUNTER — PATIENT OUTREACH (OUTPATIENT)
Dept: CASE MANAGEMENT | Age: 46
End: 2021-11-30

## 2021-11-30 PROCEDURE — 77470 SPECIAL RADIATION TREATMENT: CPT

## 2021-11-30 PROCEDURE — 77334 RADIATION TREATMENT AID(S): CPT

## 2021-12-01 ENCOUNTER — HOSPITAL ENCOUNTER (OUTPATIENT)
Dept: LAB | Age: 46
Discharge: HOME | End: 2021-12-01

## 2021-12-01 DIAGNOSIS — C77.2 METASTASIS TO RETROPERITONEAL LYMPH NODE (HCC): ICD-10-CM

## 2021-12-01 LAB
ALBUMIN SERPL-MCNC: 3.5 G/DL (ref 3.5–5)
ALBUMIN/GLOB SERPL: 1 {RATIO} (ref 1.2–3.5)
ALP SERPL-CCNC: 178 U/L (ref 50–136)
ALT SERPL-CCNC: 45 U/L (ref 12–65)
ANION GAP SERPL CALC-SCNC: 8 MMOL/L (ref 7–16)
AST SERPL-CCNC: 38 U/L (ref 15–37)
BASOPHILS # BLD: 0 K/UL (ref 0–0.2)
BASOPHILS NFR BLD: 1 % (ref 0–2)
BILIRUB SERPL-MCNC: 0.2 MG/DL (ref 0.2–1.1)
BUN SERPL-MCNC: 9 MG/DL (ref 6–23)
CALCIUM SERPL-MCNC: 8.9 MG/DL (ref 8.3–10.4)
CEA SERPL-MCNC: 48.2 NG/ML (ref 0–3)
CHLORIDE SERPL-SCNC: 105 MMOL/L (ref 98–107)
CO2 SERPL-SCNC: 28 MMOL/L (ref 21–32)
CREAT SERPL-MCNC: 1 MG/DL (ref 0.8–1.5)
DIFFERENTIAL METHOD BLD: ABNORMAL
EOSINOPHIL # BLD: 0.1 K/UL (ref 0–0.8)
EOSINOPHIL NFR BLD: 1 % (ref 0.5–7.8)
ERYTHROCYTE [DISTWIDTH] IN BLOOD BY AUTOMATED COUNT: 15.4 % (ref 11.9–14.6)
GLOBULIN SER CALC-MCNC: 3.6 G/DL (ref 2.3–3.5)
GLUCOSE SERPL-MCNC: 81 MG/DL (ref 65–100)
HCT VFR BLD AUTO: 32.3 %
HGB BLD-MCNC: 10.7 G/DL (ref 13.6–17.2)
IMM GRANULOCYTES # BLD AUTO: 0.1 K/UL (ref 0–0.5)
IMM GRANULOCYTES NFR BLD AUTO: 1 % (ref 0–5)
LYMPHOCYTES # BLD: 1 K/UL (ref 0.5–4.6)
LYMPHOCYTES NFR BLD: 16 % (ref 13–44)
MAGNESIUM SERPL-MCNC: 2.3 MG/DL (ref 1.8–2.4)
MCH RBC QN AUTO: 33.3 PG (ref 26.1–32.9)
MCHC RBC AUTO-ENTMCNC: 33.1 G/DL (ref 31.4–35)
MCV RBC AUTO: 100.6 FL (ref 79.6–97.8)
MONOCYTES # BLD: 0.7 K/UL (ref 0.1–1.3)
MONOCYTES NFR BLD: 12 % (ref 4–12)
NEUTS SEG # BLD: 4.1 K/UL (ref 1.7–8.2)
NEUTS SEG NFR BLD: 69 % (ref 43–78)
NRBC # BLD: 0 K/UL (ref 0–0.2)
PLATELET # BLD AUTO: 95 K/UL (ref 150–450)
PMV BLD AUTO: 9.6 FL (ref 9.4–12.3)
POTASSIUM SERPL-SCNC: 3.8 MMOL/L (ref 3.5–5.1)
PROT SERPL-MCNC: 7.1 G/DL (ref 6.3–8.2)
RBC # BLD AUTO: 3.21 M/UL (ref 4.23–5.6)
SODIUM SERPL-SCNC: 141 MMOL/L (ref 136–145)
WBC # BLD AUTO: 5.9 K/UL (ref 4.3–11.1)

## 2021-12-01 PROCEDURE — 80053 COMPREHEN METABOLIC PANEL: CPT

## 2021-12-01 PROCEDURE — 85025 COMPLETE CBC W/AUTO DIFF WBC: CPT

## 2021-12-01 PROCEDURE — 82378 CARCINOEMBRYONIC ANTIGEN: CPT

## 2021-12-01 PROCEDURE — 36415 COLL VENOUS BLD VENIPUNCTURE: CPT

## 2021-12-01 PROCEDURE — 83735 ASSAY OF MAGNESIUM: CPT

## 2021-12-01 RX ORDER — SODIUM CHLORIDE 0.9 % (FLUSH) 0.9 %
10 SYRINGE (ML) INJECTION EVERY 8 HOURS
Status: ACTIVE | OUTPATIENT
Start: 2021-12-01

## 2021-12-02 ENCOUNTER — HOSPITAL ENCOUNTER (OUTPATIENT)
Dept: INFUSION THERAPY | Age: 46
Discharge: HOME | End: 2021-12-02

## 2021-12-02 ENCOUNTER — PATIENT OUTREACH (OUTPATIENT)
Dept: CASE MANAGEMENT | Age: 46
End: 2021-12-02

## 2021-12-02 ENCOUNTER — HOSPITAL ENCOUNTER (OUTPATIENT)
Dept: RADIATION ONCOLOGY | Age: 46
Discharge: HOME | End: 2021-12-02

## 2021-12-02 VITALS
DIASTOLIC BLOOD PRESSURE: 77 MMHG | BODY MASS INDEX: 25.1 KG/M2 | RESPIRATION RATE: 16 BRPM | OXYGEN SATURATION: 98 % | WEIGHT: 180 LBS | TEMPERATURE: 97.9 F | HEART RATE: 69 BPM | SYSTOLIC BLOOD PRESSURE: 125 MMHG

## 2021-12-02 DIAGNOSIS — C16.8 MALIGNANT NEOPLASM OF OVERLAPPING SITES OF STOMACH (HCC): ICD-10-CM

## 2021-12-02 DIAGNOSIS — T45.1X5A CINV (CHEMOTHERAPY-INDUCED NAUSEA AND VOMITING): ICD-10-CM

## 2021-12-02 DIAGNOSIS — R11.2 CINV (CHEMOTHERAPY-INDUCED NAUSEA AND VOMITING): ICD-10-CM

## 2021-12-02 DIAGNOSIS — R39.15 URGENCY OF URINATION: Primary | ICD-10-CM

## 2021-12-02 PROCEDURE — 96413 CHEMO IV INFUSION 1 HR: CPT

## 2021-12-02 PROCEDURE — 96415 CHEMO IV INFUSION ADDL HR: CPT

## 2021-12-02 PROCEDURE — 74011250636 HC RX REV CODE- 250/636: Performed by: INTERNAL MEDICINE

## 2021-12-02 PROCEDURE — 96372 THER/PROPH/DIAG INJ SC/IM: CPT

## 2021-12-02 PROCEDURE — 96368 THER/DIAG CONCURRENT INF: CPT

## 2021-12-02 PROCEDURE — 77399 UNLISTED PX MED RADJ PHYSICS: CPT

## 2021-12-02 PROCEDURE — 77300 RADIATION THERAPY DOSE PLAN: CPT

## 2021-12-02 PROCEDURE — 77293 RESPIRATOR MOTION MGMT SIMUL: CPT

## 2021-12-02 PROCEDURE — 96375 TX/PRO/DX INJ NEW DRUG ADDON: CPT

## 2021-12-02 PROCEDURE — 96411 CHEMO IV PUSH ADDL DRUG: CPT

## 2021-12-02 PROCEDURE — 77301 RADIOTHERAPY DOSE PLAN IMRT: CPT

## 2021-12-02 PROCEDURE — G0498 CHEMO EXTEND IV INFUS W/PUMP: HCPCS

## 2021-12-02 PROCEDURE — 74011000258 HC RX REV CODE- 258: Performed by: INTERNAL MEDICINE

## 2021-12-02 PROCEDURE — 96417 CHEMO IV INFUS EACH ADDL SEQ: CPT

## 2021-12-02 PROCEDURE — 96367 TX/PROPH/DG ADDL SEQ IV INF: CPT

## 2021-12-02 RX ORDER — ATROPINE SULFATE 0.4 MG/ML
0.4 INJECTION, SOLUTION ENDOTRACHEAL; INTRAMEDULLARY; INTRAMUSCULAR; INTRAVENOUS; SUBCUTANEOUS ONCE
Status: COMPLETED | OUTPATIENT
Start: 2021-12-02 | End: 2021-12-02

## 2021-12-02 RX ORDER — FLUOROURACIL 50 MG/ML
300 INJECTION, SOLUTION INTRAVENOUS ONCE
Status: COMPLETED | OUTPATIENT
Start: 2021-12-02 | End: 2021-12-02

## 2021-12-02 RX ORDER — DEXTROSE MONOHYDRATE 50 MG/ML
25 INJECTION, SOLUTION INTRAVENOUS CONTINUOUS
Status: ACTIVE | OUTPATIENT
Start: 2021-12-02 | End: 2021-12-02

## 2021-12-02 RX ORDER — ONDANSETRON 2 MG/ML
8 INJECTION INTRAMUSCULAR; INTRAVENOUS ONCE
Status: COMPLETED | OUTPATIENT
Start: 2021-12-02 | End: 2021-12-02

## 2021-12-02 RX ORDER — SODIUM CHLORIDE 0.9 % (FLUSH) 0.9 %
10 SYRINGE (ML) INJECTION AS NEEDED
Status: ACTIVE | OUTPATIENT
Start: 2021-12-02 | End: 2021-12-02

## 2021-12-02 RX ADMIN — Medication 10 ML: at 08:50

## 2021-12-02 RX ADMIN — DEXTROSE MONOHYDRATE 25 ML/HR: 5 INJECTION, SOLUTION INTRAVENOUS at 08:50

## 2021-12-02 RX ADMIN — DEXAMETHASONE SODIUM PHOSPHATE 12 MG: 4 INJECTION, SOLUTION INTRAMUSCULAR; INTRAVENOUS at 09:03

## 2021-12-02 RX ADMIN — OXALIPLATIN 100 MG: 5 INJECTION, SOLUTION INTRAVENOUS at 09:40

## 2021-12-02 RX ADMIN — ATROPINE SULFATE 0.4 MG: 0.4 INJECTION, SOLUTION INTRAMUSCULAR; INTRAVENOUS; SUBCUTANEOUS at 11:43

## 2021-12-02 RX ADMIN — FLUOROURACIL 4000 MG: 50 INJECTION, SOLUTION INTRAVENOUS at 13:25

## 2021-12-02 RX ADMIN — IRINOTECAN HYDROCHLORIDE 240 MG: 20 INJECTION, SOLUTION INTRAVENOUS at 11:46

## 2021-12-02 RX ADMIN — LEUCOVORIN CALCIUM 816 MG: 350 INJECTION, POWDER, LYOPHILIZED, FOR SOLUTION INTRAMUSCULAR; INTRAVENOUS at 11:45

## 2021-12-02 RX ADMIN — FLUOROURACIL 612 MG: 50 INJECTION, SOLUTION INTRAVENOUS at 13:20

## 2021-12-02 RX ADMIN — ONDANSETRON 8 MG: 2 INJECTION INTRAMUSCULAR; INTRAVENOUS at 08:59

## 2021-12-02 RX ADMIN — FOSAPREPITANT 150 MG: 150 INJECTION, POWDER, LYOPHILIZED, FOR SOLUTION INTRAVENOUS at 09:18

## 2021-12-02 NOTE — PROGRESS NOTES
I saw patient on 12-1-21 with Dr Ольга Jansen prior to [facility]. We will proceed with this dose. Pt was CT SIM'd on 11-30-21 and communication with Dr Brianna Sommers states radiation to start in 2-3 weeks. We will tentatively schedule pt for 12-29-21 for next dose but this may need adjusting. Pt is aware.  Nurse navigation is following

## 2021-12-02 NOTE — PROGRESS NOTES
Pt arrived ambulatory, chemo completed, ambulatory, 5FU pump connected and unclamped, discharged home ambulatory

## 2021-12-03 NOTE — ADDENDUM NOTE
Encounter addended by: Tobin Jaimes RN on: 12/3/2021 1:44 PM   Actions taken: MAR administration edited, MAR administration accepted

## 2021-12-03 NOTE — ADDENDUM NOTE
Encounter addended by: Dru Treviño RN on: 12/3/2021 4:24 AM   Actions taken: MAR administration edited, MAR administration accepted

## 2021-12-03 NOTE — ADDENDUM NOTE
Encounter addended by: Terry Mallory RN on: 12/3/2021 4:20 AM   Actions taken: MAR administration edited, MAR administration accepted

## 2021-12-04 ENCOUNTER — HOSPITAL ENCOUNTER (OUTPATIENT)
Dept: INFUSION THERAPY | Age: 46
Discharge: HOME | End: 2021-12-04

## 2021-12-04 VITALS
TEMPERATURE: 98.9 F | RESPIRATION RATE: 18 BRPM | OXYGEN SATURATION: 99 % | DIASTOLIC BLOOD PRESSURE: 69 MMHG | HEART RATE: 75 BPM | SYSTOLIC BLOOD PRESSURE: 117 MMHG

## 2021-12-04 DIAGNOSIS — T45.1X5A CINV (CHEMOTHERAPY-INDUCED NAUSEA AND VOMITING): ICD-10-CM

## 2021-12-04 DIAGNOSIS — C16.8 MALIGNANT NEOPLASM OF OVERLAPPING SITES OF STOMACH (HCC): ICD-10-CM

## 2021-12-04 DIAGNOSIS — R39.15 URGENCY OF URINATION: Primary | ICD-10-CM

## 2021-12-04 DIAGNOSIS — R11.2 CINV (CHEMOTHERAPY-INDUCED NAUSEA AND VOMITING): ICD-10-CM

## 2021-12-04 PROCEDURE — 96523 IRRIG DRUG DELIVERY DEVICE: CPT

## 2021-12-04 RX ORDER — SODIUM CHLORIDE 0.9 % (FLUSH) 0.9 %
10 SYRINGE (ML) INJECTION AS NEEDED
Status: DISCONTINUED | OUTPATIENT
Start: 2021-12-04 | End: 2021-12-05 | Stop reason: HOSPADM

## 2021-12-04 RX ADMIN — Medication 10 ML: at 13:45

## 2021-12-04 NOTE — PROGRESS NOTES
Arrived to the Infusion Center.  5FU pump completed and disconnected and patient tolerated well. Any issues or concerns during appointment: denies  Patient given education on process  Instructed patient to notify provider for any issues or worrisome symptoms. They verbalized understanding. Patient aware of next infusion appointment scheduled for 12/5/21 at 2pm   Discharged ambulatory with self.

## 2021-12-05 ENCOUNTER — HOSPITAL ENCOUNTER (OUTPATIENT)
Dept: INFUSION THERAPY | Age: 46
Discharge: HOME | End: 2021-12-05

## 2021-12-05 VITALS — TEMPERATURE: 98.9 F | OXYGEN SATURATION: 100 % | HEART RATE: 87 BPM | RESPIRATION RATE: 18 BRPM

## 2021-12-05 DIAGNOSIS — R39.15 URGENCY OF URINATION: Primary | ICD-10-CM

## 2021-12-05 DIAGNOSIS — C16.8 MALIGNANT NEOPLASM OF OVERLAPPING SITES OF STOMACH (HCC): ICD-10-CM

## 2021-12-05 DIAGNOSIS — T45.1X5A CINV (CHEMOTHERAPY-INDUCED NAUSEA AND VOMITING): ICD-10-CM

## 2021-12-05 DIAGNOSIS — R11.2 CINV (CHEMOTHERAPY-INDUCED NAUSEA AND VOMITING): ICD-10-CM

## 2021-12-05 PROCEDURE — 96372 THER/PROPH/DIAG INJ SC/IM: CPT

## 2021-12-05 PROCEDURE — 74011250636 HC RX REV CODE- 250/636: Performed by: INTERNAL MEDICINE

## 2021-12-05 RX ADMIN — PEGFILGRASTIM-CBQV 6 MG: 6 INJECTION, SOLUTION SUBCUTANEOUS at 14:04

## 2021-12-05 NOTE — PROGRESS NOTES
Arrived to the [facility]. Udenyca injection completed. Patient tolerated well. Any issues or concerns during appointment: none. Patient aware of next infusion appointment on 12/30 (date) at 8:00 AM (time). Discharged ambulatory.

## 2021-12-06 ENCOUNTER — HOSPITAL ENCOUNTER (OUTPATIENT)
Dept: RADIATION ONCOLOGY | Age: 46
Discharge: HOME | End: 2021-12-06

## 2021-12-06 PROCEDURE — 77338 DESIGN MLC DEVICE FOR IMRT: CPT

## 2021-12-06 NOTE — ADDENDUM NOTE
Encounter addended by: Sagar Mays RN on: 12/6/2021 10:50 AM   Actions taken: MAR administration accepted

## 2021-12-06 NOTE — ADDENDUM NOTE
Encounter addended by: Jose Khan RN on: 12/6/2021 1:47 PM   Actions taken: Charge Capture section accepted

## 2021-12-06 NOTE — PROGRESS NOTES
Arrived to the infusion center. Pump disconnected and port deaccessed. No new issues or complaints. Aware of his next appointment. Dischaged ambulatory in satisfactory condition.

## 2021-12-09 NOTE — ADDENDUM NOTE
Encounter addended by: Vinay Al RN on: 12/9/2021 10:23 AM   Actions taken: Charge Capture section accepted

## 2021-12-15 ENCOUNTER — HOSPITAL ENCOUNTER (OUTPATIENT)
Dept: LAB | Age: 46
Discharge: HOME | End: 2021-12-15

## 2021-12-15 DIAGNOSIS — C16.8 MALIGNANT NEOPLASM OF OVERLAPPING SITES OF STOMACH (HCC): ICD-10-CM

## 2021-12-15 LAB
ALBUMIN SERPL-MCNC: 3.7 G/DL (ref 3.5–5)
ALBUMIN/GLOB SERPL: 1.2 {RATIO} (ref 1.2–3.5)
ALP SERPL-CCNC: 199 U/L (ref 50–136)
ALT SERPL-CCNC: 58 U/L (ref 12–65)
ANION GAP SERPL CALC-SCNC: 6 MMOL/L (ref 7–16)
AST SERPL-CCNC: 58 U/L (ref 15–37)
BASOPHILS # BLD: 0.1 K/UL (ref 0–0.2)
BASOPHILS NFR BLD: 1 % (ref 0–2)
BILIRUB SERPL-MCNC: 0.3 MG/DL (ref 0.2–1.1)
BUN SERPL-MCNC: 11 MG/DL (ref 6–23)
CALCIUM SERPL-MCNC: 9 MG/DL (ref 8.3–10.4)
CEA SERPL-MCNC: 46.7 NG/ML (ref 0–3)
CHLORIDE SERPL-SCNC: 107 MMOL/L (ref 98–107)
CO2 SERPL-SCNC: 28 MMOL/L (ref 21–32)
CREAT SERPL-MCNC: 1 MG/DL (ref 0.8–1.5)
DIFFERENTIAL METHOD BLD: ABNORMAL
EOSINOPHIL # BLD: 0 K/UL (ref 0–0.8)
EOSINOPHIL NFR BLD: 0 % (ref 0.5–7.8)
ERYTHROCYTE [DISTWIDTH] IN BLOOD BY AUTOMATED COUNT: 15.9 % (ref 11.9–14.6)
GLOBULIN SER CALC-MCNC: 3.2 G/DL (ref 2.3–3.5)
GLUCOSE SERPL-MCNC: 136 MG/DL (ref 65–100)
HCT VFR BLD AUTO: 29.4 %
HGB BLD-MCNC: 9.7 G/DL (ref 13.6–17.2)
IMM GRANULOCYTES # BLD AUTO: 0.3 K/UL (ref 0–0.5)
IMM GRANULOCYTES NFR BLD AUTO: 3 % (ref 0–5)
LYMPHOCYTES # BLD: 1 K/UL (ref 0.5–4.6)
LYMPHOCYTES NFR BLD: 11 % (ref 13–44)
MAGNESIUM SERPL-MCNC: 2.4 MG/DL (ref 1.8–2.4)
MCH RBC QN AUTO: 33.7 PG (ref 26.1–32.9)
MCHC RBC AUTO-ENTMCNC: 33 G/DL (ref 31.4–35)
MCV RBC AUTO: 102.1 FL (ref 79.6–97.8)
MONOCYTES # BLD: 1 K/UL (ref 0.1–1.3)
MONOCYTES NFR BLD: 11 % (ref 4–12)
NEUTS SEG # BLD: 6.4 K/UL (ref 1.7–8.2)
NEUTS SEG NFR BLD: 74 % (ref 43–78)
NRBC # BLD: 0.02 K/UL (ref 0–0.2)
PLATELET # BLD AUTO: 83 K/UL (ref 150–450)
PLATELET COMMENTS,PCOM: SLIGHT
PMV BLD AUTO: 9.7 FL (ref 9.4–12.3)
POTASSIUM SERPL-SCNC: 3.4 MMOL/L (ref 3.5–5.1)
PROT SERPL-MCNC: 6.9 G/DL (ref 6.3–8.2)
RBC # BLD AUTO: 2.88 M/UL (ref 4.23–5.6)
RBC MORPH BLD: ABNORMAL
SODIUM SERPL-SCNC: 141 MMOL/L (ref 136–145)
WBC # BLD AUTO: 8.8 K/UL (ref 4.3–11.1)
WBC MORPH BLD: ABNORMAL

## 2021-12-15 PROCEDURE — 80053 COMPREHEN METABOLIC PANEL: CPT

## 2021-12-15 PROCEDURE — 83735 ASSAY OF MAGNESIUM: CPT

## 2021-12-15 PROCEDURE — 36415 COLL VENOUS BLD VENIPUNCTURE: CPT

## 2021-12-15 PROCEDURE — 85025 COMPLETE CBC W/AUTO DIFF WBC: CPT

## 2021-12-15 PROCEDURE — 82378 CARCINOEMBRYONIC ANTIGEN: CPT

## 2021-12-16 ENCOUNTER — HOSPITAL ENCOUNTER (OUTPATIENT)
Dept: INFUSION THERAPY | Age: 46
Discharge: HOME | End: 2021-12-16

## 2021-12-16 ENCOUNTER — PATIENT OUTREACH (OUTPATIENT)
Dept: CASE MANAGEMENT | Age: 46
End: 2021-12-16

## 2021-12-16 ENCOUNTER — APPOINTMENT (OUTPATIENT)
Dept: RADIATION ONCOLOGY | Age: 46
End: 2021-12-16

## 2021-12-16 VITALS
DIASTOLIC BLOOD PRESSURE: 78 MMHG | OXYGEN SATURATION: 99 % | SYSTOLIC BLOOD PRESSURE: 120 MMHG | RESPIRATION RATE: 18 BRPM | HEART RATE: 79 BPM | TEMPERATURE: 98.4 F | WEIGHT: 182 LBS | BODY MASS INDEX: 25.38 KG/M2

## 2021-12-16 DIAGNOSIS — R11.2 CINV (CHEMOTHERAPY-INDUCED NAUSEA AND VOMITING): ICD-10-CM

## 2021-12-16 DIAGNOSIS — C16.8 MALIGNANT NEOPLASM OF OVERLAPPING SITES OF STOMACH (HCC): ICD-10-CM

## 2021-12-16 DIAGNOSIS — R39.15 URGENCY OF URINATION: Primary | ICD-10-CM

## 2021-12-16 DIAGNOSIS — T45.1X5A CINV (CHEMOTHERAPY-INDUCED NAUSEA AND VOMITING): ICD-10-CM

## 2021-12-16 PROCEDURE — 74011000258 HC RX REV CODE- 258: Performed by: INTERNAL MEDICINE

## 2021-12-16 PROCEDURE — 96375 TX/PRO/DX INJ NEW DRUG ADDON: CPT

## 2021-12-16 PROCEDURE — 74011250636 HC RX REV CODE- 250/636: Performed by: INTERNAL MEDICINE

## 2021-12-16 PROCEDURE — 96417 CHEMO IV INFUS EACH ADDL SEQ: CPT

## 2021-12-16 PROCEDURE — 96372 THER/PROPH/DIAG INJ SC/IM: CPT

## 2021-12-16 PROCEDURE — G0498 CHEMO EXTEND IV INFUS W/PUMP: HCPCS

## 2021-12-16 PROCEDURE — 96368 THER/DIAG CONCURRENT INF: CPT

## 2021-12-16 PROCEDURE — 96415 CHEMO IV INFUSION ADDL HR: CPT

## 2021-12-16 PROCEDURE — 96413 CHEMO IV INFUSION 1 HR: CPT

## 2021-12-16 PROCEDURE — 96367 TX/PROPH/DG ADDL SEQ IV INF: CPT

## 2021-12-16 PROCEDURE — 96411 CHEMO IV PUSH ADDL DRUG: CPT

## 2021-12-16 RX ORDER — ATROPINE SULFATE 0.4 MG/ML
0.4 INJECTION, SOLUTION ENDOTRACHEAL; INTRAMEDULLARY; INTRAMUSCULAR; INTRAVENOUS; SUBCUTANEOUS ONCE
Status: COMPLETED | OUTPATIENT
Start: 2021-12-16 | End: 2021-12-16

## 2021-12-16 RX ORDER — SODIUM CHLORIDE 0.9 % (FLUSH) 0.9 %
10 SYRINGE (ML) INJECTION AS NEEDED
Status: ACTIVE | OUTPATIENT
Start: 2021-12-16 | End: 2021-12-16

## 2021-12-16 RX ORDER — FLUOROURACIL 50 MG/ML
300 INJECTION, SOLUTION INTRAVENOUS ONCE
Status: COMPLETED | OUTPATIENT
Start: 2021-12-16 | End: 2021-12-16

## 2021-12-16 RX ORDER — DEXTROSE MONOHYDRATE 50 MG/ML
25 INJECTION, SOLUTION INTRAVENOUS CONTINUOUS
Status: ACTIVE | OUTPATIENT
Start: 2021-12-16 | End: 2021-12-16

## 2021-12-16 RX ORDER — ONDANSETRON 2 MG/ML
8 INJECTION INTRAMUSCULAR; INTRAVENOUS ONCE
Status: COMPLETED | OUTPATIENT
Start: 2021-12-16 | End: 2021-12-16

## 2021-12-16 RX ADMIN — FLUOROURACIL 4000 MG: 50 INJECTION, SOLUTION INTRAVENOUS at 13:20

## 2021-12-16 RX ADMIN — Medication 10 ML: at 13:20

## 2021-12-16 RX ADMIN — ONDANSETRON 8 MG: 2 INJECTION INTRAMUSCULAR; INTRAVENOUS at 08:06

## 2021-12-16 RX ADMIN — IRINOTECAN HYDROCHLORIDE 240 MG: 20 INJECTION, SOLUTION INTRAVENOUS at 11:20

## 2021-12-16 RX ADMIN — OXALIPLATIN 100 MG: 5 INJECTION, SOLUTION INTRAVENOUS at 09:10

## 2021-12-16 RX ADMIN — FOSAPREPITANT 150 MG: 150 INJECTION, POWDER, LYOPHILIZED, FOR SOLUTION INTRAVENOUS at 08:30

## 2021-12-16 RX ADMIN — FLUOROURACIL 612 MG: 50 INJECTION, SOLUTION INTRAVENOUS at 13:12

## 2021-12-16 RX ADMIN — ATROPINE SULFATE 0.4 MG: 0.4 INJECTION, SOLUTION INTRAMUSCULAR; INTRAVENOUS; SUBCUTANEOUS at 11:25

## 2021-12-16 RX ADMIN — LEUCOVORIN CALCIUM 816 MG: 350 INJECTION, POWDER, LYOPHILIZED, FOR SOLUTION INTRAMUSCULAR; INTRAVENOUS at 11:20

## 2021-12-16 RX ADMIN — Medication 10 ML: at 07:35

## 2021-12-16 RX ADMIN — DEXAMETHASONE SODIUM PHOSPHATE 12 MG: 4 INJECTION, SOLUTION INTRAMUSCULAR; INTRAVENOUS at 08:10

## 2021-12-16 RX ADMIN — DEXTROSE MONOHYDRATE 25 ML/HR: 5 INJECTION, SOLUTION INTRAVENOUS at 07:35

## 2021-12-16 NOTE — PROGRESS NOTES
Arrived to the [facility]. FOLFIRINOX chemo infusion completed. Adrucil in Elatomeric continuous  Infusion pump connected to port. Patient tolerated well. Any issues or concerns during appointment: NO.  Patient aware of next infusion appointment on 12/18/21 (date) at 0 (time). Patient aware of next lab and [facility] office visit on 01/03/22 (date) at [phone] (time). Discharged ambulatory.

## 2021-12-16 NOTE — ADDENDUM NOTE
Encounter addended by: Sharron Mcnally [facility] on: 12/16/2021 7:45 AM   Actions taken: i-Vent created or edited

## 2021-12-16 NOTE — PROGRESS NOTES
Saw patient on 12-16-21 with Dr Jonathan Sheffield prior to [facility]. Pt will start radiation on 12-20-21 per Dr Tom Ram. Dr Jonathan Sheffield states pt may continue radiation and not skip any days. Pt will be given several extra days to recover due to the holidays otherwise we will continue to next cycle on 1-3-22.  Nurse navigation will cont to follow

## 2021-12-18 ENCOUNTER — HOSPITAL ENCOUNTER (OUTPATIENT)
Dept: INFUSION THERAPY | Age: 46
Discharge: HOME | End: 2021-12-18

## 2021-12-18 VITALS
HEART RATE: 88 BPM | DIASTOLIC BLOOD PRESSURE: 76 MMHG | OXYGEN SATURATION: 98 % | SYSTOLIC BLOOD PRESSURE: 127 MMHG | RESPIRATION RATE: 18 BRPM | TEMPERATURE: 98.4 F

## 2021-12-18 DIAGNOSIS — C16.8 MALIGNANT NEOPLASM OF OVERLAPPING SITES OF STOMACH (HCC): ICD-10-CM

## 2021-12-18 DIAGNOSIS — R11.2 CINV (CHEMOTHERAPY-INDUCED NAUSEA AND VOMITING): ICD-10-CM

## 2021-12-18 DIAGNOSIS — T45.1X5A CINV (CHEMOTHERAPY-INDUCED NAUSEA AND VOMITING): ICD-10-CM

## 2021-12-18 DIAGNOSIS — R39.15 URGENCY OF URINATION: Primary | ICD-10-CM

## 2021-12-18 PROCEDURE — 96523 IRRIG DRUG DELIVERY DEVICE: CPT

## 2021-12-18 RX ORDER — SODIUM CHLORIDE 0.9 % (FLUSH) 0.9 %
10 SYRINGE (ML) INJECTION AS NEEDED
Status: DISCONTINUED | OUTPATIENT
Start: 2021-12-18 | End: 2021-12-19 | Stop reason: HOSPADM

## 2021-12-18 RX ADMIN — Medication 10 ML: at 12:20

## 2021-12-19 ENCOUNTER — HOSPITAL ENCOUNTER (OUTPATIENT)
Dept: INFUSION THERAPY | Age: 46
Discharge: HOME | End: 2021-12-19

## 2021-12-19 VITALS
OXYGEN SATURATION: 96 % | RESPIRATION RATE: 16 BRPM | DIASTOLIC BLOOD PRESSURE: 77 MMHG | HEART RATE: 92 BPM | TEMPERATURE: 98.5 F | SYSTOLIC BLOOD PRESSURE: 115 MMHG

## 2021-12-19 DIAGNOSIS — T45.1X5A CINV (CHEMOTHERAPY-INDUCED NAUSEA AND VOMITING): ICD-10-CM

## 2021-12-19 DIAGNOSIS — R11.2 CINV (CHEMOTHERAPY-INDUCED NAUSEA AND VOMITING): ICD-10-CM

## 2021-12-19 DIAGNOSIS — C16.8 MALIGNANT NEOPLASM OF OVERLAPPING SITES OF STOMACH (HCC): ICD-10-CM

## 2021-12-19 DIAGNOSIS — R39.15 URGENCY OF URINATION: Primary | ICD-10-CM

## 2021-12-19 PROCEDURE — 96372 THER/PROPH/DIAG INJ SC/IM: CPT

## 2021-12-19 PROCEDURE — 74011250636 HC RX REV CODE- 250/636: Performed by: INTERNAL MEDICINE

## 2021-12-19 RX ADMIN — PEGFILGRASTIM-CBQV 6 MG: 6 INJECTION, SOLUTION SUBCUTANEOUS at 13:03

## 2021-12-19 NOTE — PROGRESS NOTES
Arrived to the [facility]. Carlie completed. Patient tolerated well. Any issues or concerns during appointment: none. Patient aware of next infusion appointment on 1/4/22 at 0800. Patient aware of next lab and [facility] office visit on 1/3/22 at 0950. Discharged amb.

## 2021-12-20 ENCOUNTER — HOSPITAL ENCOUNTER (OUTPATIENT)
Dept: RADIATION ONCOLOGY | Age: 46
Discharge: HOME | End: 2021-12-20

## 2021-12-20 PROCEDURE — 77373 STRTCTC BDY RAD THER TX DLVR: CPT

## 2021-12-21 ENCOUNTER — HOSPITAL ENCOUNTER (OUTPATIENT)
Dept: RADIATION ONCOLOGY | Age: 46
Discharge: HOME | End: 2021-12-21

## 2021-12-21 VITALS
HEART RATE: 87 BPM | SYSTOLIC BLOOD PRESSURE: 103 MMHG | DIASTOLIC BLOOD PRESSURE: 72 MMHG | TEMPERATURE: 98.4 F | RESPIRATION RATE: 16 BRPM | WEIGHT: 178.1 LBS | OXYGEN SATURATION: 100 % | BODY MASS INDEX: 24.84 KG/M2

## 2021-12-21 PROCEDURE — 77373 STRTCTC BDY RAD THER TX DLVR: CPT

## 2021-12-21 NOTE — PROGRESS NOTES
Patient: Nic Hinds MRN: [id number]  SSN: [id number]    YOB: 1975  Age: 40 y.o. Sex: male      DIAGNOSIS:  metastatic (oligoprogressive) gastric cancer    TREATMENT SITE:  L adrenal and posterior mediastinum    DOSE and FRACTIONATION:  2 of 5 fractions; 1600 cGy and 2000 cGy of 4000 cGy and 5000 cGy, respectively    INTERVAL HISTORY:  Nic Hinds is a 40 y.o. male being treated for oligoprogression. Week 1: no complaints      OBJECTIVE:  NAD  Visit Vitals  /72 (BP 1 Location: Left upper arm, BP Patient Position: Sitting)   Pulse 87   Temp 98.4 °F (36.9 °C)   Resp 16   Wt 80.8 kg (178 lb 1.6 oz)   SpO2 100%   BMI 24.84 kg/m²       Lab Results   Component Value Date/Time    Sodium 141 12/15/2021 02:55 PM    Potassium 3.4 (L) 12/15/2021 02:55 PM    Chloride 107 12/15/2021 02:55 PM    CO2 28 12/15/2021 02:55 PM    Anion gap 6 (L) 12/15/2021 02:55 PM    Glucose 136 (H) 12/15/2021 02:55 PM    BUN 11 12/15/2021 02:55 PM    Creatinine 1.00 12/15/2021 02:55 PM    GFR est AA >60 12/15/2021 02:55 PM    GFR est non-AA >60 12/15/2021 02:55 PM    Calcium 9.0 12/15/2021 02:55 PM    Magnesium 2.4 12/15/2021 02:55 PM    Albumin 3.7 12/15/2021 02:55 PM    Protein, total 6.9 12/15/2021 02:55 PM    Globulin 3.2 12/15/2021 02:55 PM    A-G Ratio 1.2 12/15/2021 02:55 PM    ALT (SGPT) 58 12/15/2021 02:55 PM     Lab Results   Component Value Date/Time    WBC 8.8 12/15/2021 02:55 PM    HGB 9.7 (L) 12/15/2021 02:55 PM    HCT 29.4 12/15/2021 02:55 PM    PLATELET 83 (L) 51/46/2725 02:55 PM       ASSESSMENT and PLAN:  Nic Hinds is tolerating radiation as anticipated for the current dose and fraction. We will continue on as planned.         Tiffanie Riojas MD   December 21, 2021

## 2021-12-22 ENCOUNTER — HOSPITAL ENCOUNTER (OUTPATIENT)
Dept: RADIATION ONCOLOGY | Age: 46
Discharge: HOME | End: 2021-12-22

## 2021-12-22 PROCEDURE — 77373 STRTCTC BDY RAD THER TX DLVR: CPT

## 2021-12-23 ENCOUNTER — HOSPITAL ENCOUNTER (OUTPATIENT)
Dept: RADIATION ONCOLOGY | Age: 46
Discharge: HOME | End: 2021-12-23

## 2021-12-23 PROCEDURE — 77373 STRTCTC BDY RAD THER TX DLVR: CPT

## 2021-12-27 ENCOUNTER — HOSPITAL ENCOUNTER (OUTPATIENT)
Dept: RADIATION ONCOLOGY | Age: 46
Discharge: HOME | End: 2021-12-27

## 2021-12-27 PROCEDURE — 77336 RADIATION PHYSICS CONSULT: CPT

## 2021-12-27 PROCEDURE — 77373 STRTCTC BDY RAD THER TX DLVR: CPT

## 2022-01-01 ENCOUNTER — HOSPITAL ENCOUNTER (OUTPATIENT)
Age: 47
Setting detail: OUTPATIENT SURGERY
Discharge: HOME | End: 2022-07-05
Attending: INTERNAL MEDICINE | Admitting: INTERNAL MEDICINE

## 2022-01-01 ENCOUNTER — HOSPITAL ENCOUNTER (OUTPATIENT)
Dept: INFUSION THERAPY | Age: 47
Discharge: HOME | End: 2022-05-28

## 2022-01-01 ENCOUNTER — HOSPITAL ENCOUNTER (OUTPATIENT)
Dept: INFUSION THERAPY | Age: 47
Discharge: HOME | End: 2022-07-13

## 2022-01-01 ENCOUNTER — OFFICE VISIT (OUTPATIENT)
Dept: ONCOLOGY | Age: 47
End: 2022-01-01

## 2022-01-01 ENCOUNTER — APPOINTMENT (OUTPATIENT)
Dept: INTERVENTIONAL RADIOLOGY/VASCULAR | Age: 47
DRG: 872 | End: 2022-01-01

## 2022-01-01 ENCOUNTER — PREP FOR PROCEDURE (OUTPATIENT)
Dept: ADMINISTRATIVE | Age: 47
End: 2022-01-01

## 2022-01-01 ENCOUNTER — ANESTHESIA (OUTPATIENT)
Dept: ENDOSCOPY | Age: 47
End: 2022-01-01

## 2022-01-01 ENCOUNTER — CLINICAL DOCUMENTATION (OUTPATIENT)
Dept: ONCOLOGY | Age: 47
End: 2022-01-01

## 2022-01-01 ENCOUNTER — HOSPITAL ENCOUNTER (OUTPATIENT)
Dept: INFUSION THERAPY | Age: 47
Discharge: HOME | End: 2022-08-15

## 2022-01-01 ENCOUNTER — HOSPITAL ENCOUNTER (INPATIENT)
Age: 47
LOS: 2 days | Discharge: HOME | DRG: 375 | End: 2022-08-06
Attending: INTERNAL MEDICINE | Admitting: INTERNAL MEDICINE

## 2022-01-01 ENCOUNTER — HOSPITAL ENCOUNTER (OUTPATIENT)
Dept: INFUSION THERAPY | Age: 47
Discharge: HOME | End: 2022-07-21

## 2022-01-01 ENCOUNTER — HOSPITAL ENCOUNTER (OUTPATIENT)
Dept: INFUSION THERAPY | Age: 47
Discharge: HOME | End: 2022-09-06

## 2022-01-01 ENCOUNTER — HOSPITAL ENCOUNTER (OUTPATIENT)
Dept: RADIATION ONCOLOGY | Age: 47
Setting detail: RECURRING SERIES
Discharge: HOME | End: 2022-09-04

## 2022-01-01 ENCOUNTER — APPOINTMENT (OUTPATIENT)
Dept: RADIATION ONCOLOGY | Age: 47
End: 2022-01-01

## 2022-01-01 ENCOUNTER — HOSPITAL ENCOUNTER (OUTPATIENT)
Dept: CT IMAGING | Age: 47
Discharge: HOME | End: 2022-07-01

## 2022-01-01 ENCOUNTER — HOSPITAL ENCOUNTER (OUTPATIENT)
Dept: LAB | Age: 47
Discharge: HOME | End: 2022-07-02

## 2022-01-01 ENCOUNTER — TELEPHONE (OUTPATIENT)
Dept: ONCOLOGY | Age: 47
End: 2022-01-01

## 2022-01-01 ENCOUNTER — HOSPITAL ENCOUNTER (OUTPATIENT)
Age: 47
Setting detail: OUTPATIENT SURGERY
Discharge: HOME | End: 2022-07-14
Attending: INTERNAL MEDICINE | Admitting: INTERNAL MEDICINE

## 2022-01-01 ENCOUNTER — ANESTHESIA EVENT (OUTPATIENT)
Dept: ENDOSCOPY | Age: 47
End: 2022-01-01

## 2022-01-01 ENCOUNTER — TELEPHONE (OUTPATIENT)
Dept: INTERNAL MEDICINE CLINIC | Facility: CLINIC | Age: 47
End: 2022-01-01

## 2022-01-01 ENCOUNTER — APPOINTMENT (OUTPATIENT)
Dept: INTERVENTIONAL RADIOLOGY/VASCULAR | Age: 47
DRG: 375 | End: 2022-01-01

## 2022-01-01 ENCOUNTER — HOSPITAL ENCOUNTER (OUTPATIENT)
Dept: LAB | Age: 47
Discharge: HOME | End: 2022-07-15

## 2022-01-01 ENCOUNTER — HOSPITAL ENCOUNTER (OUTPATIENT)
Dept: INTERVENTIONAL RADIOLOGY/VASCULAR | Age: 47
Discharge: HOME | End: 2022-09-11

## 2022-01-01 ENCOUNTER — HOSPITAL ENCOUNTER (OUTPATIENT)
Dept: INFUSION THERAPY | Age: 47
End: 2022-01-01

## 2022-01-01 ENCOUNTER — HOSPITAL ENCOUNTER (OUTPATIENT)
Dept: INFUSION THERAPY | Age: 47
Discharge: HOME | End: 2022-06-30

## 2022-01-01 ENCOUNTER — OFFICE VISIT (OUTPATIENT)
Dept: ONCOLOGY | Age: 47
End: 2022-01-01
Payer: SELF-PAY

## 2022-01-01 ENCOUNTER — HOSPITAL ENCOUNTER (INPATIENT)
Age: 47
LOS: 2 days | Discharge: HOME | DRG: 378 | End: 2022-07-19
Attending: EMERGENCY MEDICINE | Admitting: FAMILY MEDICINE

## 2022-01-01 ENCOUNTER — HOSPITAL ENCOUNTER (OUTPATIENT)
Dept: LAB | Age: 47
Discharge: HOME | End: 2022-06-11

## 2022-01-01 ENCOUNTER — HOSPITAL ENCOUNTER (INPATIENT)
Age: 47
LOS: 4 days | Discharge: HOME | DRG: 375 | End: 2022-09-02
Attending: EMERGENCY MEDICINE | Admitting: FAMILY MEDICINE

## 2022-01-01 ENCOUNTER — ANESTHESIA (OUTPATIENT)
Dept: ENDOSCOPY | Age: 47
DRG: 374 | End: 2022-01-01

## 2022-01-01 ENCOUNTER — APPOINTMENT (OUTPATIENT)
Dept: OTHER | Age: 47
DRG: 872 | End: 2022-01-01

## 2022-01-01 ENCOUNTER — APPOINTMENT (OUTPATIENT)
Dept: CT IMAGING | Age: 47
DRG: 375 | End: 2022-01-01

## 2022-01-01 ENCOUNTER — HOSPITAL ENCOUNTER (OUTPATIENT)
Dept: INFUSION THERAPY | Age: 47
Setting detail: INFUSION SERIES
Discharge: HOME | End: 2022-06-12

## 2022-01-01 ENCOUNTER — HOSPITAL ENCOUNTER (OUTPATIENT)
Dept: INFUSION THERAPY | Age: 47
Discharge: HOME | End: 2022-06-09

## 2022-01-01 ENCOUNTER — ANESTHESIA EVENT (OUTPATIENT)
Dept: ENDOSCOPY | Age: 47
DRG: 374 | End: 2022-01-01

## 2022-01-01 ENCOUNTER — HOSPITAL ENCOUNTER (OUTPATIENT)
Dept: CT IMAGING | Age: 47
Discharge: HOME | End: 2022-06-30

## 2022-01-01 ENCOUNTER — HOSPITAL ENCOUNTER (INPATIENT)
Age: 47
LOS: 3 days | Discharge: ANOTHER HOSPITAL | DRG: 374 | End: 2022-08-04
Attending: EMERGENCY MEDICINE | Admitting: STUDENT IN AN ORGANIZED HEALTH CARE EDUCATION/TRAINING PROGRAM

## 2022-01-01 ENCOUNTER — HOSPITAL ENCOUNTER (OUTPATIENT)
Dept: INFUSION THERAPY | Age: 47
Discharge: HOME | End: 2022-07-02

## 2022-01-01 ENCOUNTER — HOSPITAL ENCOUNTER (OUTPATIENT)
Dept: INFUSION THERAPY | Age: 47
Discharge: HOME | End: 2022-07-03

## 2022-01-01 ENCOUNTER — HOSPITAL ENCOUNTER (EMERGENCY)
Dept: GENERAL RADIOLOGY | Age: 47
Discharge: HOME | DRG: 374 | End: 2022-08-04

## 2022-01-01 ENCOUNTER — APPOINTMENT (OUTPATIENT)
Dept: GENERAL RADIOLOGY | Age: 47
DRG: 374 | End: 2022-01-01

## 2022-01-01 ENCOUNTER — HOSPITAL ENCOUNTER (OUTPATIENT)
Dept: INFUSION THERAPY | Age: 47
Discharge: HOME | End: 2022-05-31

## 2022-01-01 ENCOUNTER — APPOINTMENT (OUTPATIENT)
Dept: GENERAL RADIOLOGY | Age: 47
DRG: 375 | End: 2022-01-01

## 2022-01-01 ENCOUNTER — APPOINTMENT (OUTPATIENT)
Dept: CT IMAGING | Age: 47
DRG: 872 | End: 2022-01-01

## 2022-01-01 ENCOUNTER — HOSPITAL ENCOUNTER (OUTPATIENT)
Dept: INFUSION THERAPY | Age: 47
Discharge: HOME | End: 2022-05-26

## 2022-01-01 ENCOUNTER — HOSPITAL ENCOUNTER (OUTPATIENT)
Dept: INFUSION THERAPY | Age: 47
Discharge: HOME | End: 2022-06-11

## 2022-01-01 ENCOUNTER — HOSPITAL ENCOUNTER (INPATIENT)
Age: 47
LOS: 3 days | Discharge: HOME | DRG: 872 | End: 2022-08-26
Attending: EMERGENCY MEDICINE | Admitting: STUDENT IN AN ORGANIZED HEALTH CARE EDUCATION/TRAINING PROGRAM

## 2022-01-01 ENCOUNTER — APPOINTMENT (OUTPATIENT)
Dept: GENERAL RADIOLOGY | Age: 47
End: 2022-01-01
Attending: INTERNAL MEDICINE

## 2022-01-01 VITALS
OXYGEN SATURATION: 96 % | SYSTOLIC BLOOD PRESSURE: 108 MMHG | WEIGHT: 157 LBS | HEIGHT: 72 IN | RESPIRATION RATE: 16 BRPM | BODY MASS INDEX: 21.26 KG/M2 | TEMPERATURE: 98.6 F | DIASTOLIC BLOOD PRESSURE: 79 MMHG | HEART RATE: 92 BPM

## 2022-01-01 VITALS
RESPIRATION RATE: 16 BRPM | HEIGHT: 72 IN | BODY MASS INDEX: 21.26 KG/M2 | WEIGHT: 157 LBS | HEART RATE: 105 BPM | TEMPERATURE: 98.3 F | SYSTOLIC BLOOD PRESSURE: 108 MMHG | OXYGEN SATURATION: 97 % | DIASTOLIC BLOOD PRESSURE: 76 MMHG

## 2022-01-01 VITALS
HEART RATE: 104 BPM | SYSTOLIC BLOOD PRESSURE: 93 MMHG | WEIGHT: 147.4 LBS | RESPIRATION RATE: 15 BRPM | HEIGHT: 72 IN | DIASTOLIC BLOOD PRESSURE: 63 MMHG | BODY MASS INDEX: 19.96 KG/M2 | TEMPERATURE: 97.8 F | OXYGEN SATURATION: 98 %

## 2022-01-01 VITALS
OXYGEN SATURATION: 99 % | SYSTOLIC BLOOD PRESSURE: 96 MMHG | WEIGHT: 155 LBS | TEMPERATURE: 96.8 F | RESPIRATION RATE: 16 BRPM | HEART RATE: 84 BPM | DIASTOLIC BLOOD PRESSURE: 55 MMHG | HEIGHT: 71 IN | BODY MASS INDEX: 21.7 KG/M2

## 2022-01-01 VITALS
WEIGHT: 175 LBS | TEMPERATURE: 97.4 F | RESPIRATION RATE: 18 BRPM | DIASTOLIC BLOOD PRESSURE: 59 MMHG | HEIGHT: 71 IN | HEART RATE: 82 BPM | BODY MASS INDEX: 24.5 KG/M2 | OXYGEN SATURATION: 97 % | SYSTOLIC BLOOD PRESSURE: 108 MMHG

## 2022-01-01 VITALS
HEART RATE: 98 BPM | DIASTOLIC BLOOD PRESSURE: 64 MMHG | WEIGHT: 156.8 LBS | TEMPERATURE: 97.8 F | RESPIRATION RATE: 16 BRPM | OXYGEN SATURATION: 100 % | SYSTOLIC BLOOD PRESSURE: 103 MMHG | BODY MASS INDEX: 21.56 KG/M2

## 2022-01-01 VITALS
HEART RATE: 99 BPM | TEMPERATURE: 98.7 F | SYSTOLIC BLOOD PRESSURE: 104 MMHG | RESPIRATION RATE: 16 BRPM | DIASTOLIC BLOOD PRESSURE: 60 MMHG

## 2022-01-01 VITALS
RESPIRATION RATE: 12 BRPM | TEMPERATURE: 97.5 F | BODY MASS INDEX: 22.89 KG/M2 | DIASTOLIC BLOOD PRESSURE: 54 MMHG | HEART RATE: 107 BPM | OXYGEN SATURATION: 95 % | SYSTOLIC BLOOD PRESSURE: 81 MMHG | WEIGHT: 169 LBS | HEIGHT: 72 IN

## 2022-01-01 VITALS
SYSTOLIC BLOOD PRESSURE: 100 MMHG | HEART RATE: 99 BPM | OXYGEN SATURATION: 100 % | TEMPERATURE: 98 F | DIASTOLIC BLOOD PRESSURE: 52 MMHG | RESPIRATION RATE: 18 BRPM

## 2022-01-01 VITALS
RESPIRATION RATE: 18 BRPM | OXYGEN SATURATION: 99 % | HEART RATE: 90 BPM | BODY MASS INDEX: 20.88 KG/M2 | WEIGHT: 154.2 LBS | HEIGHT: 72 IN | TEMPERATURE: 99.1 F | DIASTOLIC BLOOD PRESSURE: 50 MMHG | SYSTOLIC BLOOD PRESSURE: 88 MMHG

## 2022-01-01 VITALS
OXYGEN SATURATION: 94 % | TEMPERATURE: 98.2 F | DIASTOLIC BLOOD PRESSURE: 53 MMHG | SYSTOLIC BLOOD PRESSURE: 91 MMHG | RESPIRATION RATE: 12 BRPM | HEART RATE: 104 BPM

## 2022-01-01 VITALS
SYSTOLIC BLOOD PRESSURE: 97 MMHG | OXYGEN SATURATION: 94 % | HEIGHT: 71 IN | BODY MASS INDEX: 21 KG/M2 | TEMPERATURE: 99 F | WEIGHT: 150 LBS | DIASTOLIC BLOOD PRESSURE: 60 MMHG | HEART RATE: 78 BPM | RESPIRATION RATE: 18 BRPM

## 2022-01-01 VITALS
WEIGHT: 175.3 LBS | TEMPERATURE: 98.1 F | BODY MASS INDEX: 23.74 KG/M2 | HEIGHT: 72 IN | RESPIRATION RATE: 21 BRPM | DIASTOLIC BLOOD PRESSURE: 74 MMHG | OXYGEN SATURATION: 97 % | SYSTOLIC BLOOD PRESSURE: 102 MMHG | HEART RATE: 100 BPM

## 2022-01-01 VITALS
SYSTOLIC BLOOD PRESSURE: 104 MMHG | HEART RATE: 91 BPM | WEIGHT: 173.5 LBS | OXYGEN SATURATION: 92 % | RESPIRATION RATE: 18 BRPM | BODY MASS INDEX: 24.29 KG/M2 | TEMPERATURE: 98.2 F | HEIGHT: 71 IN | DIASTOLIC BLOOD PRESSURE: 73 MMHG

## 2022-01-01 VITALS
RESPIRATION RATE: 18 BRPM | DIASTOLIC BLOOD PRESSURE: 54 MMHG | OXYGEN SATURATION: 100 % | HEART RATE: 69 BPM | SYSTOLIC BLOOD PRESSURE: 98 MMHG | TEMPERATURE: 98 F

## 2022-01-01 VITALS
DIASTOLIC BLOOD PRESSURE: 66 MMHG | WEIGHT: 161 LBS | OXYGEN SATURATION: 100 % | SYSTOLIC BLOOD PRESSURE: 113 MMHG | BODY MASS INDEX: 21.81 KG/M2 | TEMPERATURE: 98.3 F | HEART RATE: 88 BPM | RESPIRATION RATE: 16 BRPM | HEIGHT: 72 IN

## 2022-01-01 VITALS
TEMPERATURE: 98.4 F | HEART RATE: 106 BPM | WEIGHT: 170 LBS | SYSTOLIC BLOOD PRESSURE: 95 MMHG | OXYGEN SATURATION: 97 % | RESPIRATION RATE: 16 BRPM | BODY MASS INDEX: 23.8 KG/M2 | DIASTOLIC BLOOD PRESSURE: 66 MMHG | HEIGHT: 71 IN

## 2022-01-01 VITALS
SYSTOLIC BLOOD PRESSURE: 91 MMHG | HEART RATE: 113 BPM | DIASTOLIC BLOOD PRESSURE: 57 MMHG | RESPIRATION RATE: 18 BRPM | TEMPERATURE: 98.5 F

## 2022-01-01 VITALS
OXYGEN SATURATION: 100 % | HEIGHT: 72 IN | WEIGHT: 156.6 LBS | TEMPERATURE: 97.9 F | SYSTOLIC BLOOD PRESSURE: 102 MMHG | BODY MASS INDEX: 21.21 KG/M2 | DIASTOLIC BLOOD PRESSURE: 67 MMHG | HEART RATE: 86 BPM | RESPIRATION RATE: 21 BRPM

## 2022-01-01 VITALS
RESPIRATION RATE: 16 BRPM | HEART RATE: 90 BPM | SYSTOLIC BLOOD PRESSURE: 106 MMHG | TEMPERATURE: 99.4 F | OXYGEN SATURATION: 98 % | DIASTOLIC BLOOD PRESSURE: 66 MMHG

## 2022-01-01 VITALS
DIASTOLIC BLOOD PRESSURE: 48 MMHG | TEMPERATURE: 98 F | RESPIRATION RATE: 20 BRPM | HEART RATE: 61 BPM | OXYGEN SATURATION: 98 % | WEIGHT: 156 LBS | BODY MASS INDEX: 21.45 KG/M2 | SYSTOLIC BLOOD PRESSURE: 87 MMHG

## 2022-01-01 VITALS
OXYGEN SATURATION: 98 % | RESPIRATION RATE: 18 BRPM | SYSTOLIC BLOOD PRESSURE: 93 MMHG | HEART RATE: 85 BPM | TEMPERATURE: 98.9 F | DIASTOLIC BLOOD PRESSURE: 62 MMHG

## 2022-01-01 VITALS
OXYGEN SATURATION: 97 % | HEART RATE: 106 BPM | DIASTOLIC BLOOD PRESSURE: 70 MMHG | TEMPERATURE: 98 F | SYSTOLIC BLOOD PRESSURE: 96 MMHG

## 2022-01-01 VITALS
HEART RATE: 88 BPM | SYSTOLIC BLOOD PRESSURE: 120 MMHG | DIASTOLIC BLOOD PRESSURE: 50 MMHG | TEMPERATURE: 98.4 F | OXYGEN SATURATION: 100 % | RESPIRATION RATE: 18 BRPM

## 2022-01-01 VITALS
RESPIRATION RATE: 16 BRPM | OXYGEN SATURATION: 93 % | WEIGHT: 179.01 LBS | TEMPERATURE: 99 F | BODY MASS INDEX: 25.06 KG/M2 | HEART RATE: 98 BPM | DIASTOLIC BLOOD PRESSURE: 84 MMHG | SYSTOLIC BLOOD PRESSURE: 118 MMHG | HEIGHT: 71 IN

## 2022-01-01 DIAGNOSIS — G89.3 CANCER ASSOCIATED PAIN: ICD-10-CM

## 2022-01-01 DIAGNOSIS — E03.9 HYPOTHYROIDISM, UNSPECIFIED TYPE: ICD-10-CM

## 2022-01-01 DIAGNOSIS — C16.8 MALIGNANT NEOPLASM OF OVERLAPPING SITES OF STOMACH (HCC): ICD-10-CM

## 2022-01-01 DIAGNOSIS — C16.8 MALIGNANT NEOPLASM OF OVERLAPPING SITES OF STOMACH (HCC): Primary | ICD-10-CM

## 2022-01-01 DIAGNOSIS — R53.83 FATIGUE DUE TO TREATMENT: ICD-10-CM

## 2022-01-01 DIAGNOSIS — G62.9 POLYNEUROPATHY, UNSPECIFIED: ICD-10-CM

## 2022-01-01 DIAGNOSIS — R11.2 CINV (CHEMOTHERAPY-INDUCED NAUSEA AND VOMITING): Primary | ICD-10-CM

## 2022-01-01 DIAGNOSIS — E87.6 HYPOKALEMIA: ICD-10-CM

## 2022-01-01 DIAGNOSIS — T45.1X5A CINV (CHEMOTHERAPY-INDUCED NAUSEA AND VOMITING): Primary | ICD-10-CM

## 2022-01-01 DIAGNOSIS — T45.1X5A CINV (CHEMOTHERAPY-INDUCED NAUSEA AND VOMITING): ICD-10-CM

## 2022-01-01 DIAGNOSIS — R53.1 GENERAL WEAKNESS: ICD-10-CM

## 2022-01-01 DIAGNOSIS — Z00.8 NUTRITIONAL ASSESSMENT: Primary | ICD-10-CM

## 2022-01-01 DIAGNOSIS — R11.2 CINV (CHEMOTHERAPY-INDUCED NAUSEA AND VOMITING): ICD-10-CM

## 2022-01-01 DIAGNOSIS — D64.9 ANEMIA, UNSPECIFIED TYPE: ICD-10-CM

## 2022-01-01 DIAGNOSIS — D69.6 THROMBOCYTOPENIA (HCC): ICD-10-CM

## 2022-01-01 DIAGNOSIS — K92.2 GASTROINTESTINAL HEMORRHAGE, UNSPECIFIED GASTROINTESTINAL HEMORRHAGE TYPE: Primary | ICD-10-CM

## 2022-01-01 DIAGNOSIS — R63.4 WEIGHT LOSS: ICD-10-CM

## 2022-01-01 DIAGNOSIS — R13.10 DYSPHAGIA, UNSPECIFIED TYPE: ICD-10-CM

## 2022-01-01 DIAGNOSIS — D64.9 ANEMIA, UNSPECIFIED TYPE: Primary | ICD-10-CM

## 2022-01-01 DIAGNOSIS — E03.9 HYPOTHYROIDISM, UNSPECIFIED TYPE: Primary | ICD-10-CM

## 2022-01-01 DIAGNOSIS — T45.1X5A CHEMOTHERAPY-INDUCED NEUTROPENIA (HCC): ICD-10-CM

## 2022-01-01 DIAGNOSIS — D64.9 SEVERE ANEMIA: ICD-10-CM

## 2022-01-01 DIAGNOSIS — C78.7 LIVER METASTASIS (HCC): ICD-10-CM

## 2022-01-01 DIAGNOSIS — R65.20 SEVERE SEPSIS (HCC): Primary | ICD-10-CM

## 2022-01-01 DIAGNOSIS — Z79.899 HIGH RISK MEDICATION USE: ICD-10-CM

## 2022-01-01 DIAGNOSIS — R18.0 MALIGNANT ASCITES: ICD-10-CM

## 2022-01-01 DIAGNOSIS — R64 CACHEXIA (HCC): ICD-10-CM

## 2022-01-01 DIAGNOSIS — Z66 DNR (DO NOT RESUSCITATE): ICD-10-CM

## 2022-01-01 DIAGNOSIS — D70.1 CHEMOTHERAPY-INDUCED NEUTROPENIA (HCC): ICD-10-CM

## 2022-01-01 DIAGNOSIS — A41.9 SEVERE SEPSIS (HCC): Primary | ICD-10-CM

## 2022-01-01 DIAGNOSIS — D64.9 SEVERE ANEMIA: Primary | ICD-10-CM

## 2022-01-01 DIAGNOSIS — K92.1 HEMATOCHEZIA: ICD-10-CM

## 2022-01-01 DIAGNOSIS — E87.6 HYPOKALEMIA: Primary | ICD-10-CM

## 2022-01-01 DIAGNOSIS — Z09 HOSPITAL DISCHARGE FOLLOW-UP: ICD-10-CM

## 2022-01-01 DIAGNOSIS — R18.8 OTHER ASCITES: ICD-10-CM

## 2022-01-01 DIAGNOSIS — C79.9 METASTATIC MALIGNANT NEOPLASM, UNSPECIFIED SITE (HCC): ICD-10-CM

## 2022-01-01 DIAGNOSIS — G89.3 CANCER ASSOCIATED PAIN: Primary | ICD-10-CM

## 2022-01-01 LAB
25(OH)D3 SERPL-MCNC: 8.8 NG/ML (ref 30–100)
ABO + RH BLD: NORMAL
ALBUMIN FLD-MCNC: 0.6 G/DL
ALBUMIN FLD-MCNC: 1.1 G/DL
ALBUMIN SERPL-MCNC: 1.7 G/DL (ref 3.5–5)
ALBUMIN SERPL-MCNC: 1.9 G/DL (ref 3.5–5)
ALBUMIN SERPL-MCNC: 1.9 G/DL (ref 3.5–5)
ALBUMIN SERPL-MCNC: 2 G/DL (ref 3.5–5)
ALBUMIN SERPL-MCNC: 2.1 G/DL (ref 3.5–5)
ALBUMIN SERPL-MCNC: 2.2 G/DL (ref 3.5–5)
ALBUMIN SERPL-MCNC: 2.2 G/DL (ref 3.5–5)
ALBUMIN SERPL-MCNC: 2.3 G/DL (ref 3.5–5)
ALBUMIN SERPL-MCNC: 2.6 G/DL (ref 3.5–5)
ALBUMIN SERPL-MCNC: 3 G/DL (ref 3.5–5)
ALBUMIN SERPL-MCNC: 3 G/DL (ref 3.5–5)
ALBUMIN SERPL-MCNC: 3.4 G/DL (ref 3.5–5)
ALBUMIN SERPL-MCNC: 3.5 G/DL (ref 3.5–5)
ALBUMIN/GLOB SERPL: 0.5 {RATIO} (ref 1.2–3.5)
ALBUMIN/GLOB SERPL: 0.6 {RATIO} (ref 1.2–3.5)
ALBUMIN/GLOB SERPL: 0.7 {RATIO} (ref 1.2–3.5)
ALBUMIN/GLOB SERPL: 0.8 {RATIO} (ref 1.2–3.5)
ALBUMIN/GLOB SERPL: 0.9 {RATIO} (ref 1.2–3.5)
ALBUMIN/GLOB SERPL: 0.9 {RATIO} (ref 1.2–3.5)
ALBUMIN/GLOB SERPL: 1.1 {RATIO} (ref 1.2–3.5)
ALP SERPL-CCNC: 147 U/L (ref 50–136)
ALP SERPL-CCNC: 169 U/L (ref 50–136)
ALP SERPL-CCNC: 177 U/L (ref 50–136)
ALP SERPL-CCNC: 180 U/L (ref 50–136)
ALP SERPL-CCNC: 181 U/L (ref 50–136)
ALP SERPL-CCNC: 188 U/L (ref 50–136)
ALP SERPL-CCNC: 196 U/L (ref 50–136)
ALP SERPL-CCNC: 204 U/L (ref 50–136)
ALP SERPL-CCNC: 236 U/L (ref 50–136)
ALP SERPL-CCNC: 256 U/L (ref 50–136)
ALP SERPL-CCNC: 261 U/L (ref 50–136)
ALP SERPL-CCNC: 265 U/L (ref 50–136)
ALP SERPL-CCNC: 276 U/L (ref 50–136)
ALP SERPL-CCNC: 282 U/L (ref 50–136)
ALP SERPL-CCNC: 285 U/L (ref 50–136)
ALP SERPL-CCNC: 306 U/L (ref 50–136)
ALP SERPL-CCNC: 309 U/L (ref 50–136)
ALP SERPL-CCNC: 373 U/L (ref 50–136)
ALP SERPL-CCNC: 406 U/L (ref 50–136)
ALT SERPL-CCNC: 20 U/L (ref 12–65)
ALT SERPL-CCNC: 23 U/L (ref 12–65)
ALT SERPL-CCNC: 23 U/L (ref 12–65)
ALT SERPL-CCNC: 24 U/L (ref 12–65)
ALT SERPL-CCNC: 25 U/L (ref 12–65)
ALT SERPL-CCNC: 26 U/L (ref 12–65)
ALT SERPL-CCNC: 27 U/L (ref 12–65)
ALT SERPL-CCNC: 27 U/L (ref 12–65)
ALT SERPL-CCNC: 28 U/L (ref 12–65)
ALT SERPL-CCNC: 28 U/L (ref 12–65)
ALT SERPL-CCNC: 30 U/L (ref 12–65)
ALT SERPL-CCNC: 32 U/L (ref 12–65)
ALT SERPL-CCNC: 33 U/L (ref 12–65)
ALT SERPL-CCNC: 40 U/L (ref 12–65)
ALT SERPL-CCNC: 43 U/L (ref 12–65)
AMMONIA PLAS-SCNC: <10 UMOL/L (ref 11–32)
AMYLASE FLD-CCNC: 6 U/L
AMYLASE FLD-CCNC: 9 U/L
ANION GAP SERPL CALC-SCNC: 10 MMOL/L (ref 7–16)
ANION GAP SERPL CALC-SCNC: 4 MMOL/L (ref 7–16)
ANION GAP SERPL CALC-SCNC: 4 MMOL/L (ref 7–16)
ANION GAP SERPL CALC-SCNC: 6 MMOL/L (ref 7–16)
ANION GAP SERPL CALC-SCNC: 7 MMOL/L (ref 4–13)
ANION GAP SERPL CALC-SCNC: 7 MMOL/L (ref 7–16)
ANION GAP SERPL CALC-SCNC: 8 MMOL/L (ref 4–13)
ANION GAP SERPL CALC-SCNC: 8 MMOL/L (ref 7–16)
ANION GAP SERPL CALC-SCNC: 9 MMOL/L (ref 7–16)
ANION GAP SERPL CALC-SCNC: 9 MMOL/L (ref 7–16)
APPEARANCE FLD: NORMAL
APPEARANCE FLD: NORMAL
APPEARANCE UR: CLEAR
APTT PPP: 35.3 SEC (ref 24.1–35.1)
APTT PPP: 35.3 SEC (ref 24.1–35.1)
APTT PPP: 36.2 SEC (ref 24.1–35.1)
APTT PPP: 40.7 SEC (ref 24.1–35.1)
APTT PPP: 42.4 SEC (ref 24.1–35.1)
ARTERIAL PATENCY WRIST A: ABNORMAL
ARTERIAL PATENCY WRIST A: POSITIVE
AST SERPL-CCNC: 102 U/L (ref 15–37)
AST SERPL-CCNC: 103 U/L (ref 15–37)
AST SERPL-CCNC: 110 U/L (ref 15–37)
AST SERPL-CCNC: 114 U/L (ref 15–37)
AST SERPL-CCNC: 116 U/L (ref 15–37)
AST SERPL-CCNC: 149 U/L (ref 15–37)
AST SERPL-CCNC: 150 U/L (ref 15–37)
AST SERPL-CCNC: 155 U/L (ref 15–37)
AST SERPL-CCNC: 36 U/L (ref 15–37)
AST SERPL-CCNC: 40 U/L (ref 15–37)
AST SERPL-CCNC: 41 U/L (ref 15–37)
AST SERPL-CCNC: 43 U/L (ref 15–37)
AST SERPL-CCNC: 47 U/L (ref 15–37)
AST SERPL-CCNC: 64 U/L (ref 15–37)
AST SERPL-CCNC: 67 U/L (ref 15–37)
AST SERPL-CCNC: 70 U/L (ref 15–37)
AST SERPL-CCNC: 87 U/L (ref 15–37)
AST SERPL-CCNC: 92 U/L (ref 15–37)
AST SERPL-CCNC: 95 U/L (ref 15–37)
B PERT DNA SPEC QL NAA+PROBE: NOT DETECTED
BACTERIA SPEC CULT: NORMAL
BACTERIA URNS QL MICRO: NEGATIVE /HPF
BASE DEFICIT BLD-SCNC: 2.9 MMOL/L
BASE DEFICIT BLD-SCNC: 4.6 MMOL/L
BASOPHILS # BLD: 0 K/UL (ref 0–0.2)
BASOPHILS # BLD: 0.1 K/UL (ref 0–0.2)
BASOPHILS NFR BLD: 0 % (ref 0–2)
BASOPHILS NFR BLD: 1 % (ref 0–2)
BDY SITE: ABNORMAL
BDY SITE: ABNORMAL
BILIRUB SERPL-MCNC: 0.2 MG/DL (ref 0.2–1.1)
BILIRUB SERPL-MCNC: 0.3 MG/DL (ref 0.2–1.1)
BILIRUB SERPL-MCNC: 0.4 MG/DL (ref 0.2–1.1)
BILIRUB SERPL-MCNC: 0.4 MG/DL (ref 0.2–1.1)
BILIRUB SERPL-MCNC: 0.6 MG/DL (ref 0.2–1.1)
BILIRUB SERPL-MCNC: 0.7 MG/DL (ref 0.2–1.1)
BILIRUB SERPL-MCNC: 0.8 MG/DL (ref 0.2–1.1)
BILIRUB SERPL-MCNC: 0.9 MG/DL (ref 0.2–1.1)
BILIRUB SERPL-MCNC: 1 MG/DL (ref 0.2–1.1)
BILIRUB SERPL-MCNC: 1.5 MG/DL (ref 0.2–1.1)
BILIRUB SERPL-MCNC: 1.7 MG/DL (ref 0.2–1.1)
BILIRUB SERPL-MCNC: 1.8 MG/DL (ref 0.2–1.1)
BILIRUB SERPL-MCNC: 1.9 MG/DL (ref 0.2–1.1)
BILIRUB SERPL-MCNC: 2.1 MG/DL (ref 0.2–1.1)
BILIRUB SERPL-MCNC: 2.4 MG/DL (ref 0.2–1.1)
BILIRUB UR QL: NEGATIVE
BLD PROD TYP BPU: NORMAL
BLOOD BANK DISPENSE STATUS: NORMAL
BLOOD GROUP ANTIBODIES SERPL: NORMAL
BORDETELLA PARAPERTUSSIS BY PCR: NOT DETECTED
BPU ID: NORMAL
BUN SERPL-MCNC: 10 MG/DL (ref 6–23)
BUN SERPL-MCNC: 11 MG/DL (ref 6–23)
BUN SERPL-MCNC: 12 MG/DL (ref 6–23)
BUN SERPL-MCNC: 13 MG/DL (ref 6–23)
BUN SERPL-MCNC: 14 MG/DL (ref 6–23)
BUN SERPL-MCNC: 15 MG/DL (ref 6–23)
BUN SERPL-MCNC: 16 MG/DL (ref 6–23)
BUN SERPL-MCNC: 16 MG/DL (ref 6–23)
BUN SERPL-MCNC: 17 MG/DL (ref 6–23)
BUN SERPL-MCNC: 18 MG/DL (ref 6–23)
BUN SERPL-MCNC: 18 MG/DL (ref 6–23)
BUN SERPL-MCNC: 19 MG/DL (ref 6–23)
BUN SERPL-MCNC: 20 MG/DL (ref 6–23)
BUN SERPL-MCNC: 26 MG/DL (ref 6–23)
BUN SERPL-MCNC: 7 MG/DL (ref 6–23)
BUN SERPL-MCNC: 8 MG/DL (ref 6–23)
BUN SERPL-MCNC: 9 MG/DL (ref 6–23)
C PNEUM DNA SPEC QL NAA+PROBE: NOT DETECTED
CALCIUM SERPL-MCNC: 5.2 MG/DL (ref 8.3–10.4)
CALCIUM SERPL-MCNC: 7.3 MG/DL (ref 8.3–10.4)
CALCIUM SERPL-MCNC: 7.3 MG/DL (ref 8.3–10.4)
CALCIUM SERPL-MCNC: 7.4 MG/DL (ref 8.3–10.4)
CALCIUM SERPL-MCNC: 7.5 MG/DL (ref 8.3–10.4)
CALCIUM SERPL-MCNC: 7.7 MG/DL (ref 8.3–10.4)
CALCIUM SERPL-MCNC: 7.8 MG/DL (ref 8.3–10.4)
CALCIUM SERPL-MCNC: 7.8 MG/DL (ref 8.3–10.4)
CALCIUM SERPL-MCNC: 7.9 MG/DL (ref 8.3–10.4)
CALCIUM SERPL-MCNC: 8 MG/DL (ref 8.3–10.4)
CALCIUM SERPL-MCNC: 8.1 MG/DL (ref 8.3–10.4)
CALCIUM SERPL-MCNC: 8.2 MG/DL (ref 8.3–10.4)
CALCIUM SERPL-MCNC: 8.2 MG/DL (ref 8.3–10.4)
CALCIUM SERPL-MCNC: 8.4 MG/DL (ref 8.3–10.4)
CALCIUM SERPL-MCNC: 8.5 MG/DL (ref 8.3–10.4)
CALCIUM SERPL-MCNC: 8.9 MG/DL (ref 8.3–10.4)
CASTS URNS QL MICRO: ABNORMAL /LPF
CEA SERPL-MCNC: 38.5 NG/ML (ref 0–3)
CEA SERPL-MCNC: 43.8 NG/ML (ref 0–3)
CEA SERPL-MCNC: 50.9 NG/ML (ref 0–3)
CEA SERPL-MCNC: 80.7 NG/ML (ref 0–3)
CHLORIDE SERPL-SCNC: 100 MMOL/L (ref 98–107)
CHLORIDE SERPL-SCNC: 101 MMOL/L (ref 98–107)
CHLORIDE SERPL-SCNC: 103 MMOL/L (ref 101–110)
CHLORIDE SERPL-SCNC: 103 MMOL/L (ref 101–110)
CHLORIDE SERPL-SCNC: 103 MMOL/L (ref 98–107)
CHLORIDE SERPL-SCNC: 104 MMOL/L (ref 98–107)
CHLORIDE SERPL-SCNC: 105 MMOL/L (ref 98–107)
CHLORIDE SERPL-SCNC: 106 MMOL/L (ref 98–107)
CHLORIDE SERPL-SCNC: 108 MMOL/L (ref 98–107)
CHLORIDE SERPL-SCNC: 108 MMOL/L (ref 98–107)
CHLORIDE SERPL-SCNC: 99 MMOL/L (ref 101–110)
CHLORIDE SERPL-SCNC: 99 MMOL/L (ref 101–110)
CO2 BLD-SCNC: 20 MMOL/L (ref 13–23)
CO2 SERPL-SCNC: 17 MMOL/L (ref 21–32)
CO2 SERPL-SCNC: 19 MMOL/L (ref 21–32)
CO2 SERPL-SCNC: 19 MMOL/L (ref 21–32)
CO2 SERPL-SCNC: 20 MMOL/L (ref 21–32)
CO2 SERPL-SCNC: 21 MMOL/L (ref 21–32)
CO2 SERPL-SCNC: 21 MMOL/L (ref 21–32)
CO2 SERPL-SCNC: 22 MMOL/L (ref 21–32)
CO2 SERPL-SCNC: 23 MMOL/L (ref 21–32)
CO2 SERPL-SCNC: 24 MMOL/L (ref 21–32)
CO2 SERPL-SCNC: 24 MMOL/L (ref 21–32)
CO2 SERPL-SCNC: 25 MMOL/L (ref 21–32)
CO2 SERPL-SCNC: 26 MMOL/L (ref 21–32)
CO2 SERPL-SCNC: 27 MMOL/L (ref 21–32)
CO2 SERPL-SCNC: 28 MMOL/L (ref 21–32)
COLOR FLD: NORMAL
COLOR FLD: YELLOW
COLOR UR: ABNORMAL
CORTIS BS SERPL-MCNC: 21.8 UG/DL
CREAT SERPL-MCNC: 0.65 MG/DL (ref 0.8–1.5)
CREAT SERPL-MCNC: 0.68 MG/DL (ref 0.8–1.5)
CREAT SERPL-MCNC: 0.7 MG/DL (ref 0.8–1.5)
CREAT SERPL-MCNC: 0.76 MG/DL (ref 0.8–1.5)
CREAT SERPL-MCNC: 0.8 MG/DL (ref 0.8–1.5)
CREAT SERPL-MCNC: 0.81 MG/DL (ref 0.8–1.5)
CREAT SERPL-MCNC: 0.88 MG/DL (ref 0.8–1.5)
CREAT SERPL-MCNC: 0.9 MG/DL (ref 0.8–1.5)
CREAT SERPL-MCNC: 1 MG/DL (ref 0.8–1.5)
CREAT SERPL-MCNC: 1.1 MG/DL (ref 0.8–1.5)
CREAT SERPL-MCNC: 1.2 MG/DL (ref 0.8–1.5)
CREAT SERPL-MCNC: 1.3 MG/DL (ref 0.8–1.5)
CROSSMATCH RESULT: NORMAL
CYTOLOGY-NON GYN: NORMAL
CYTOLOGY-NON GYN: NORMAL
D DIMER PPP FEU-MCNC: 14.74 UG/ML(FEU)
D DIMER PPP FEU-MCNC: 15.77 UG/ML(FEU)
D DIMER PPP FEU-MCNC: 16.43 UG/ML(FEU)
D DIMER PPP FEU-MCNC: 9.87 UG/ML(FEU)
DAT POLY-SP REAG RBC QL: NORMAL
DAT POLY-SP REAG RBC QL: NORMAL
DIFFERENTIAL METHOD BLD: ABNORMAL
EKG ATRIAL RATE: 102 BPM
EKG ATRIAL RATE: 112 BPM
EKG ATRIAL RATE: 117 BPM
EKG DIAGNOSIS: NORMAL
EKG P AXIS: 49 DEGREES
EKG P AXIS: 62 DEGREES
EKG P AXIS: 62 DEGREES
EKG P-R INTERVAL: 114 MS
EKG P-R INTERVAL: 114 MS
EKG P-R INTERVAL: 122 MS
EKG Q-T INTERVAL: 310 MS
EKG Q-T INTERVAL: 310 MS
EKG Q-T INTERVAL: 411 MS
EKG QRS DURATION: 82 MS
EKG QRS DURATION: 86 MS
EKG QRS DURATION: 98 MS
EKG QTC CALCULATION (BAZETT): 404 MS
EKG QTC CALCULATION (BAZETT): 432 MS
EKG QTC CALCULATION (BAZETT): 549 MS
EKG R AXIS: 105 DEGREES
EKG R AXIS: 25 DEGREES
EKG R AXIS: 94 DEGREES
EKG T AXIS: 31 DEGREES
EKG T AXIS: 32 DEGREES
EKG T AXIS: 58 DEGREES
EKG VENTRICULAR RATE: 102 BPM
EKG VENTRICULAR RATE: 108 BPM
EKG VENTRICULAR RATE: 117 BPM
EOSINOPHIL # BLD: 0 K/UL (ref 0–0.8)
EOSINOPHIL # BLD: 0.1 K/UL (ref 0–0.8)
EOSINOPHIL # BLD: 0.2 K/UL (ref 0–0.8)
EOSINOPHIL NFR BLD: 0 % (ref 0.5–7.8)
EOSINOPHIL NFR BLD: 1 % (ref 0.5–7.8)
EOSINOPHIL NFR BLD: 2 % (ref 0.5–7.8)
EPI CELLS #/AREA URNS HPF: ABNORMAL /HPF
ERYTHROCYTE [DISTWIDTH] IN BLOOD BY AUTOMATED COUNT: 15.5 % (ref 11.9–14.6)
ERYTHROCYTE [DISTWIDTH] IN BLOOD BY AUTOMATED COUNT: 16.4 % (ref 11.9–14.6)
ERYTHROCYTE [DISTWIDTH] IN BLOOD BY AUTOMATED COUNT: 17.8 % (ref 11.9–14.6)
ERYTHROCYTE [DISTWIDTH] IN BLOOD BY AUTOMATED COUNT: 18.9 % (ref 11.9–14.6)
ERYTHROCYTE [DISTWIDTH] IN BLOOD BY AUTOMATED COUNT: 19 % (ref 11.9–14.6)
ERYTHROCYTE [DISTWIDTH] IN BLOOD BY AUTOMATED COUNT: 19.3 % (ref 11.9–14.6)
ERYTHROCYTE [DISTWIDTH] IN BLOOD BY AUTOMATED COUNT: 19.3 % (ref 11.9–14.6)
ERYTHROCYTE [DISTWIDTH] IN BLOOD BY AUTOMATED COUNT: 19.4 % (ref 11.9–14.6)
ERYTHROCYTE [DISTWIDTH] IN BLOOD BY AUTOMATED COUNT: 19.5 % (ref 11.9–14.6)
ERYTHROCYTE [DISTWIDTH] IN BLOOD BY AUTOMATED COUNT: 19.6 % (ref 11.9–14.6)
ERYTHROCYTE [DISTWIDTH] IN BLOOD BY AUTOMATED COUNT: 19.6 % (ref 11.9–14.6)
ERYTHROCYTE [DISTWIDTH] IN BLOOD BY AUTOMATED COUNT: 19.9 % (ref 11.9–14.6)
ERYTHROCYTE [DISTWIDTH] IN BLOOD BY AUTOMATED COUNT: 20.1 % (ref 11.9–14.6)
ERYTHROCYTE [DISTWIDTH] IN BLOOD BY AUTOMATED COUNT: 20.7 % (ref 11.9–14.6)
ERYTHROCYTE [DISTWIDTH] IN BLOOD BY AUTOMATED COUNT: 21 % (ref 11.9–14.6)
ERYTHROCYTE [DISTWIDTH] IN BLOOD BY AUTOMATED COUNT: 21 % (ref 11.9–14.6)
ERYTHROCYTE [DISTWIDTH] IN BLOOD BY AUTOMATED COUNT: 21.1 % (ref 11.9–14.6)
ERYTHROCYTE [DISTWIDTH] IN BLOOD BY AUTOMATED COUNT: 21.2 % (ref 11.9–14.6)
ERYTHROCYTE [DISTWIDTH] IN BLOOD BY AUTOMATED COUNT: 21.3 % (ref 11.9–14.6)
ERYTHROCYTE [DISTWIDTH] IN BLOOD BY AUTOMATED COUNT: 21.5 % (ref 11.9–14.6)
ERYTHROCYTE [DISTWIDTH] IN BLOOD BY AUTOMATED COUNT: 21.6 % (ref 11.9–14.6)
ERYTHROCYTE [DISTWIDTH] IN BLOOD BY AUTOMATED COUNT: 21.6 % (ref 11.9–14.6)
ERYTHROCYTE [DISTWIDTH] IN BLOOD BY AUTOMATED COUNT: 21.7 % (ref 11.9–14.6)
FERRITIN SERPL-MCNC: 183 NG/ML (ref 8–388)
FERRITIN SERPL-MCNC: 289 NG/ML (ref 8–388)
FERRITIN SERPL-MCNC: 307 NG/ML (ref 8–388)
FERRITIN SERPL-MCNC: 331 NG/ML (ref 8–388)
FIBRINOGEN PPP-MCNC: 179 MG/DL (ref 190–501)
FIBRINOGEN PPP-MCNC: 206 MG/DL (ref 190–501)
FIBRINOGEN PPP-MCNC: 209 MG/DL (ref 190–501)
FIBRINOGEN PPP-MCNC: 219 MG/DL (ref 190–501)
FIBRINOGEN PPP-MCNC: 228 MG/DL (ref 190–501)
FLUAV SUBTYP SPEC NAA+PROBE: NOT DETECTED
FLUBV RNA SPEC QL NAA+PROBE: NOT DETECTED
FLUID CULTURE: NORMAL
FOLATE SERPL-MCNC: 33.3 NG/ML (ref 3.1–17.5)
FOLATE SERPL-MCNC: 7.9 NG/ML (ref 3.1–17.5)
FOLATE SERPL-MCNC: 9.9 NG/ML (ref 3.1–17.5)
GAS FLOW.O2 O2 DELIVERY SYS: ABNORMAL L/MIN
GAS FLOW.O2 O2 DELIVERY SYS: ABNORMAL L/MIN
GLOBULIN SER CALC-MCNC: 2.8 G/DL (ref 2.3–3.5)
GLOBULIN SER CALC-MCNC: 2.9 G/DL (ref 2.3–3.5)
GLOBULIN SER CALC-MCNC: 3 G/DL (ref 2.3–3.5)
GLOBULIN SER CALC-MCNC: 3 G/DL (ref 2.3–3.5)
GLOBULIN SER CALC-MCNC: 3.1 G/DL (ref 2.3–3.5)
GLOBULIN SER CALC-MCNC: 3.2 G/DL (ref 2.3–3.5)
GLOBULIN SER CALC-MCNC: 3.3 G/DL (ref 2.3–3.5)
GLOBULIN SER CALC-MCNC: 3.3 G/DL (ref 2.3–3.5)
GLOBULIN SER CALC-MCNC: 3.5 G/DL (ref 2.3–3.5)
GLOBULIN SER CALC-MCNC: 3.5 G/DL (ref 2.3–3.5)
GLOBULIN SER CALC-MCNC: 3.6 G/DL (ref 2.3–3.5)
GLOBULIN SER CALC-MCNC: 3.7 G/DL (ref 2.3–3.5)
GLOBULIN SER CALC-MCNC: 3.7 G/DL (ref 2.3–3.5)
GLOBULIN SER CALC-MCNC: 3.9 G/DL (ref 2.3–3.5)
GLOBULIN SER CALC-MCNC: 4.2 G/DL (ref 2.3–3.5)
GLUCOSE FLD-MCNC: 120 MG/DL
GLUCOSE FLD-MCNC: 121 MG/DL
GLUCOSE SERPL-MCNC: 103 MG/DL (ref 65–100)
GLUCOSE SERPL-MCNC: 103 MG/DL (ref 65–100)
GLUCOSE SERPL-MCNC: 109 MG/DL (ref 65–100)
GLUCOSE SERPL-MCNC: 137 MG/DL (ref 65–100)
GLUCOSE SERPL-MCNC: 150 MG/DL (ref 65–100)
GLUCOSE SERPL-MCNC: 160 MG/DL (ref 65–100)
GLUCOSE SERPL-MCNC: 163 MG/DL (ref 65–100)
GLUCOSE SERPL-MCNC: 204 MG/DL (ref 65–100)
GLUCOSE SERPL-MCNC: 74 MG/DL (ref 65–100)
GLUCOSE SERPL-MCNC: 75 MG/DL (ref 65–100)
GLUCOSE SERPL-MCNC: 76 MG/DL (ref 65–100)
GLUCOSE SERPL-MCNC: 81 MG/DL (ref 65–100)
GLUCOSE SERPL-MCNC: 83 MG/DL (ref 65–100)
GLUCOSE SERPL-MCNC: 85 MG/DL (ref 65–100)
GLUCOSE SERPL-MCNC: 86 MG/DL (ref 65–100)
GLUCOSE SERPL-MCNC: 87 MG/DL (ref 65–100)
GLUCOSE SERPL-MCNC: 89 MG/DL (ref 65–100)
GLUCOSE SERPL-MCNC: 89 MG/DL (ref 65–100)
GLUCOSE SERPL-MCNC: 91 MG/DL (ref 65–100)
GLUCOSE SERPL-MCNC: 92 MG/DL (ref 65–100)
GLUCOSE SERPL-MCNC: 92 MG/DL (ref 65–100)
GLUCOSE SERPL-MCNC: 94 MG/DL (ref 65–100)
GLUCOSE SERPL-MCNC: 98 MG/DL (ref 65–100)
GLUCOSE UR STRIP.AUTO-MCNC: NEGATIVE MG/DL
GRAM STN SPEC: NORMAL
HADV DNA SPEC QL NAA+PROBE: NOT DETECTED
HAPTOGLOB SERPL-MCNC: 103 MG/DL (ref 30–200)
HAPTOGLOB SERPL-MCNC: 118 MG/DL (ref 30–200)
HAPTOGLOB SERPL-MCNC: 145 MG/DL (ref 30–200)
HCO3 BLD-SCNC: 18.6 MMOL/L (ref 22–26)
HCO3 BLD-SCNC: 19.6 MMOL/L (ref 22–26)
HCOV 229E RNA SPEC QL NAA+PROBE: NOT DETECTED
HCOV HKU1 RNA SPEC QL NAA+PROBE: NOT DETECTED
HCOV NL63 RNA SPEC QL NAA+PROBE: NOT DETECTED
HCOV OC43 RNA SPEC QL NAA+PROBE: NOT DETECTED
HCT VFR BLD AUTO: 16.2 %
HCT VFR BLD AUTO: 16.4 % (ref 41.1–50.3)
HCT VFR BLD AUTO: 17 % (ref 41.1–50.3)
HCT VFR BLD AUTO: 19 % (ref 41.1–50.3)
HCT VFR BLD AUTO: 20.6 %
HCT VFR BLD AUTO: 20.8 % (ref 41.1–50.3)
HCT VFR BLD AUTO: 21.2 % (ref 41.1–50.3)
HCT VFR BLD AUTO: 21.6 % (ref 41.1–50.3)
HCT VFR BLD AUTO: 21.6 % (ref 41.1–50.3)
HCT VFR BLD AUTO: 22.4 % (ref 41.1–50.3)
HCT VFR BLD AUTO: 22.6 %
HCT VFR BLD AUTO: 22.6 % (ref 41.1–50.3)
HCT VFR BLD AUTO: 24 % (ref 41.1–50.3)
HCT VFR BLD AUTO: 24.2 % (ref 41.1–50.3)
HCT VFR BLD AUTO: 24.6 % (ref 41.1–50.3)
HCT VFR BLD AUTO: 24.7 %
HCT VFR BLD AUTO: 24.8 % (ref 41.1–50.3)
HCT VFR BLD AUTO: 25.2 %
HCT VFR BLD AUTO: 25.2 % (ref 41.1–50.3)
HCT VFR BLD AUTO: 25.6 % (ref 41.1–50.3)
HCT VFR BLD AUTO: 25.7 % (ref 41.1–50.3)
HCT VFR BLD AUTO: 25.7 % (ref 41.1–50.3)
HCT VFR BLD AUTO: 26.1 % (ref 41.1–50.3)
HCT VFR BLD AUTO: 26.3 % (ref 41.1–50.3)
HCT VFR BLD AUTO: 26.5 % (ref 41.1–50.3)
HCT VFR BLD AUTO: 26.5 % (ref 41.1–50.3)
HCT VFR BLD AUTO: 26.7 % (ref 41.1–50.3)
HCT VFR BLD AUTO: 26.8 % (ref 41.1–50.3)
HCT VFR BLD AUTO: 26.9 % (ref 41.1–50.3)
HCT VFR BLD AUTO: 27.5 %
HCT VFR BLD AUTO: 27.5 % (ref 41.1–50.3)
HCT VFR BLD AUTO: 27.6 % (ref 41.1–50.3)
HCT VFR BLD AUTO: 27.8 % (ref 41.1–50.3)
HCT VFR BLD AUTO: 27.8 % (ref 41.1–50.3)
HCT VFR BLD AUTO: 28.3 %
HCT VFR BLD AUTO: 31.2 % (ref 41.1–50.3)
HGB BLD-MCNC: 10.4 G/DL (ref 13.6–17.2)
HGB BLD-MCNC: 5.3 G/DL (ref 13.6–17.2)
HGB BLD-MCNC: 5.4 G/DL (ref 13.6–17.2)
HGB BLD-MCNC: 5.7 G/DL (ref 13.6–17.2)
HGB BLD-MCNC: 5.9 G/DL (ref 13.6–17.2)
HGB BLD-MCNC: 6.6 G/DL (ref 13.6–17.2)
HGB BLD-MCNC: 6.6 G/DL (ref 13.6–17.2)
HGB BLD-MCNC: 6.9 G/DL (ref 13.6–17.2)
HGB BLD-MCNC: 6.9 G/DL (ref 13.6–17.2)
HGB BLD-MCNC: 7.2 G/DL (ref 13.6–17.2)
HGB BLD-MCNC: 7.4 G/DL (ref 13.6–17.2)
HGB BLD-MCNC: 7.5 G/DL (ref 13.6–17.2)
HGB BLD-MCNC: 7.8 G/DL (ref 13.6–17.2)
HGB BLD-MCNC: 7.8 G/DL (ref 13.6–17.2)
HGB BLD-MCNC: 7.9 G/DL (ref 13.6–17.2)
HGB BLD-MCNC: 8.1 G/DL (ref 13.6–17.2)
HGB BLD-MCNC: 8.1 G/DL (ref 13.6–17.2)
HGB BLD-MCNC: 8.2 G/DL (ref 13.6–17.2)
HGB BLD-MCNC: 8.4 G/DL (ref 13.6–17.2)
HGB BLD-MCNC: 8.6 G/DL (ref 13.6–17.2)
HGB BLD-MCNC: 8.7 G/DL (ref 13.6–17.2)
HGB BLD-MCNC: 8.7 G/DL (ref 13.6–17.2)
HGB BLD-MCNC: 8.9 G/DL (ref 13.6–17.2)
HGB BLD-MCNC: 9 G/DL (ref 13.6–17.2)
HGB BLD-MCNC: 9.1 G/DL (ref 13.6–17.2)
HGB BLD-MCNC: 9.4 G/DL (ref 13.6–17.2)
HGB BLD-MCNC: 9.5 G/DL (ref 13.6–17.2)
HGB RETIC QN AUTO: 32 PG (ref 29–35)
HGB RETIC QN AUTO: 33 PG (ref 29–35)
HGB RETIC QN AUTO: 35 PG (ref 29–35)
HGB UR QL STRIP: NEGATIVE
HISTORY CHECK: NORMAL
HMPV RNA SPEC QL NAA+PROBE: NOT DETECTED
HPIV1 RNA SPEC QL NAA+PROBE: NOT DETECTED
HPIV2 RNA SPEC QL NAA+PROBE: NOT DETECTED
HPIV3 RNA SPEC QL NAA+PROBE: NOT DETECTED
HPIV4 RNA SPEC QL NAA+PROBE: NOT DETECTED
IMM GRANULOCYTES # BLD AUTO: 0 K/UL (ref 0–0.5)
IMM GRANULOCYTES # BLD AUTO: 0.1 K/UL (ref 0–0.5)
IMM GRANULOCYTES # BLD AUTO: 0.2 K/UL (ref 0–0.5)
IMM GRANULOCYTES # BLD AUTO: 0.2 K/UL (ref 0–0.5)
IMM GRANULOCYTES NFR BLD AUTO: 0 % (ref 0–5)
IMM GRANULOCYTES NFR BLD AUTO: 0 % (ref 0–5)
IMM GRANULOCYTES NFR BLD AUTO: 1 % (ref 0–5)
IMM GRANULOCYTES NFR BLD AUTO: 2 % (ref 0–5)
IMM RETICS NFR: 24.3 % (ref 2.3–13.4)
IMM RETICS NFR: 25.1 % (ref 2.3–13.4)
IMM RETICS NFR: 29.1 % (ref 2.3–13.4)
IMM RETICS NFR: 34.6 % (ref 2.3–13.4)
INR BLD: 1.3 (ref 0.9–1.2)
INR PPP: 1.2
INR PPP: 1.3
INR PPP: 1.3
IRON SATN MFR SERPL: 19 %
IRON SATN MFR SERPL: 21 %
IRON SATN MFR SERPL: 41 %
IRON SATN MFR SERPL: 47 %
IRON SERPL-MCNC: 29 UG/DL (ref 35–150)
IRON SERPL-MCNC: 55 UG/DL (ref 35–150)
IRON SERPL-MCNC: 57 UG/DL (ref 35–150)
IRON SERPL-MCNC: 83 UG/DL (ref 35–150)
KETONES UR QL STRIP.AUTO: 15 MG/DL
L PNEUMO1 AG UR QL IA: NEGATIVE
LACTATE SERPL-SCNC: 1.9 MMOL/L (ref 0.4–2)
LACTATE SERPL-SCNC: 1.9 MMOL/L (ref 0.4–2)
LACTATE SERPL-SCNC: 2 MMOL/L (ref 0.4–2)
LACTATE SERPL-SCNC: 3.2 MMOL/L (ref 0.4–2)
LDH FLD L TO P-CCNC: 1101 U/L
LDH FLD L TO P-CCNC: 837 U/L
LDH SERPL L TO P-CCNC: 3071 U/L (ref 100–190)
LDH SERPL L TO P-CCNC: 3327 U/L (ref 100–190)
LEUKOCYTE ESTERASE UR QL STRIP.AUTO: ABNORMAL
LIPASE SERPL-CCNC: 31 U/L (ref 73–393)
LIPASE SERPL-CCNC: 36 U/L (ref 73–393)
LYMPHOCYTES # BLD: 0.3 K/UL (ref 0.5–4.6)
LYMPHOCYTES # BLD: 0.4 K/UL (ref 0.5–4.6)
LYMPHOCYTES # BLD: 0.5 K/UL (ref 0.5–4.6)
LYMPHOCYTES # BLD: 0.6 K/UL (ref 0.5–4.6)
LYMPHOCYTES # BLD: 0.7 K/UL (ref 0.5–4.6)
LYMPHOCYTES # BLD: 0.8 K/UL (ref 0.5–4.6)
LYMPHOCYTES # BLD: 1 K/UL (ref 0.5–4.6)
LYMPHOCYTES # BLD: 1 K/UL (ref 0.5–4.6)
LYMPHOCYTES # BLD: 1.1 K/UL (ref 0.5–4.6)
LYMPHOCYTES NFR BLD: 11 % (ref 13–44)
LYMPHOCYTES NFR BLD: 14 % (ref 13–44)
LYMPHOCYTES NFR BLD: 3 % (ref 13–44)
LYMPHOCYTES NFR BLD: 3 % (ref 13–44)
LYMPHOCYTES NFR BLD: 4 % (ref 13–44)
LYMPHOCYTES NFR BLD: 5 % (ref 13–44)
LYMPHOCYTES NFR BLD: 6 % (ref 13–44)
LYMPHOCYTES NFR BLD: 7 % (ref 13–44)
LYMPHOCYTES NFR BLD: 8 % (ref 13–44)
LYMPHOCYTES NFR BLD: 9 % (ref 13–44)
LYMPHOCYTES NFR BRONCH MANUAL: 30 %
LYMPHOCYTES NFR BRONCH MANUAL: 85 %
Lab: NORMAL
M PNEUMO DNA SPEC QL NAA+PROBE: NOT DETECTED
M PNEUMO IGG SER IA-ACNC: 618 U/ML (ref 0–99)
M PNEUMO IGM SER IA-ACNC: <770 U/ML (ref 0–769)
MACROPHAGES NFR BRONCH MANUAL: 1 %
MAGNESIUM SERPL-MCNC: 1.8 MG/DL (ref 1.8–2.4)
MAGNESIUM SERPL-MCNC: 1.8 MG/DL (ref 1.8–2.4)
MAGNESIUM SERPL-MCNC: 1.9 MG/DL (ref 1.8–2.4)
MAGNESIUM SERPL-MCNC: 2 MG/DL (ref 1.8–2.4)
MAGNESIUM SERPL-MCNC: 2.2 MG/DL (ref 1.8–2.4)
MAGNESIUM SERPL-MCNC: 2.3 MG/DL (ref 1.8–2.4)
MAGNESIUM SERPL-MCNC: 2.4 MG/DL (ref 1.8–2.4)
MAGNESIUM SERPL-MCNC: 2.5 MG/DL (ref 1.8–2.4)
MCH RBC QN AUTO: 30.4 PG (ref 26.1–32.9)
MCH RBC QN AUTO: 30.5 PG (ref 26.1–32.9)
MCH RBC QN AUTO: 30.5 PG (ref 26.1–32.9)
MCH RBC QN AUTO: 30.6 PG (ref 26.1–32.9)
MCH RBC QN AUTO: 30.8 PG (ref 26.1–32.9)
MCH RBC QN AUTO: 30.9 PG (ref 26.1–32.9)
MCH RBC QN AUTO: 31 PG (ref 26.1–32.9)
MCH RBC QN AUTO: 31.2 PG (ref 26.1–32.9)
MCH RBC QN AUTO: 31.4 PG (ref 26.1–32.9)
MCH RBC QN AUTO: 31.4 PG (ref 26.1–32.9)
MCH RBC QN AUTO: 31.7 PG (ref 26.1–32.9)
MCH RBC QN AUTO: 31.7 PG (ref 26.1–32.9)
MCH RBC QN AUTO: 31.9 PG (ref 26.1–32.9)
MCH RBC QN AUTO: 32.1 PG (ref 26.1–32.9)
MCH RBC QN AUTO: 32.3 PG (ref 26.1–32.9)
MCH RBC QN AUTO: 32.4 PG (ref 26.1–32.9)
MCH RBC QN AUTO: 32.4 PG (ref 26.1–32.9)
MCH RBC QN AUTO: 33 PG (ref 26.1–32.9)
MCH RBC QN AUTO: 34.3 PG (ref 26.1–32.9)
MCH RBC QN AUTO: 35.1 PG (ref 26.1–32.9)
MCH RBC QN AUTO: 35.3 PG (ref 26.1–32.9)
MCH RBC QN AUTO: 35.7 PG (ref 26.1–32.9)
MCH RBC QN AUTO: 36.7 PG (ref 26.1–32.9)
MCHC RBC AUTO-ENTMCNC: 31.1 G/DL (ref 31.4–35)
MCHC RBC AUTO-ENTMCNC: 31.3 G/DL (ref 31.4–35)
MCHC RBC AUTO-ENTMCNC: 31.7 G/DL (ref 31.4–35)
MCHC RBC AUTO-ENTMCNC: 32 G/DL (ref 31.4–35)
MCHC RBC AUTO-ENTMCNC: 32.4 G/DL (ref 31.4–35)
MCHC RBC AUTO-ENTMCNC: 32.5 G/DL (ref 31.4–35)
MCHC RBC AUTO-ENTMCNC: 32.6 G/DL (ref 31.4–35)
MCHC RBC AUTO-ENTMCNC: 32.7 G/DL (ref 31.4–35)
MCHC RBC AUTO-ENTMCNC: 32.7 G/DL (ref 31.4–35)
MCHC RBC AUTO-ENTMCNC: 32.8 G/DL (ref 31.4–35)
MCHC RBC AUTO-ENTMCNC: 32.8 G/DL (ref 31.4–35)
MCHC RBC AUTO-ENTMCNC: 32.9 G/DL (ref 31.4–35)
MCHC RBC AUTO-ENTMCNC: 33 G/DL (ref 31.4–35)
MCHC RBC AUTO-ENTMCNC: 33.1 G/DL (ref 31.4–35)
MCHC RBC AUTO-ENTMCNC: 33.2 G/DL (ref 31.4–35)
MCHC RBC AUTO-ENTMCNC: 33.3 G/DL (ref 31.4–35)
MCHC RBC AUTO-ENTMCNC: 33.8 G/DL (ref 31.4–35)
MCHC RBC AUTO-ENTMCNC: 33.8 G/DL (ref 31.4–35)
MCHC RBC AUTO-ENTMCNC: 34.1 G/DL (ref 31.4–35)
MCHC RBC AUTO-ENTMCNC: 34.2 G/DL (ref 31.4–35)
MCHC RBC AUTO-ENTMCNC: 34.5 G/DL (ref 31.4–35)
MCV RBC AUTO: 102.2 FL (ref 79.6–97.8)
MCV RBC AUTO: 103.5 FL (ref 79.6–97.8)
MCV RBC AUTO: 104.6 FL (ref 79.6–97.8)
MCV RBC AUTO: 107.3 FL (ref 79.6–97.8)
MCV RBC AUTO: 109.6 FL (ref 79.6–97.8)
MCV RBC AUTO: 110.2 FL (ref 79.6–97.8)
MCV RBC AUTO: 111.8 FL (ref 79.6–97.8)
MCV RBC AUTO: 87.9 FL (ref 79.6–97.8)
MCV RBC AUTO: 89.7 FL (ref 79.6–97.8)
MCV RBC AUTO: 90 FL (ref 79.6–97.8)
MCV RBC AUTO: 91.7 FL (ref 79.6–97.8)
MCV RBC AUTO: 92.3 FL (ref 79.6–97.8)
MCV RBC AUTO: 93.1 FL (ref 79.6–97.8)
MCV RBC AUTO: 93.8 FL (ref 79.6–97.8)
MCV RBC AUTO: 94 FL (ref 79.6–97.8)
MCV RBC AUTO: 95.2 FL (ref 79.6–97.8)
MCV RBC AUTO: 95.6 FL (ref 79.6–97.8)
MCV RBC AUTO: 95.9 FL (ref 79.6–97.8)
MCV RBC AUTO: 96.2 FL (ref 79.6–97.8)
MCV RBC AUTO: 96.3 FL (ref 79.6–97.8)
MCV RBC AUTO: 96.5 FL (ref 79.6–97.8)
MCV RBC AUTO: 97.5 FL (ref 79.6–97.8)
MCV RBC AUTO: 97.6 FL (ref 79.6–97.8)
MCV RBC AUTO: 99.5 FL (ref 79.6–97.8)
MCV RBC AUTO: 99.6 FL (ref 79.6–97.8)
MM INDURATION, POC: 0 MM (ref 0–5)
MM INDURATION, POC: 0 MM (ref 0–5)
MONOCYTES # BLD: 0.1 K/UL (ref 0.1–1.3)
MONOCYTES # BLD: 0.5 K/UL (ref 0.1–1.3)
MONOCYTES # BLD: 0.6 K/UL (ref 0.1–1.3)
MONOCYTES # BLD: 0.6 K/UL (ref 0.1–1.3)
MONOCYTES # BLD: 0.7 K/UL (ref 0.1–1.3)
MONOCYTES # BLD: 0.8 K/UL (ref 0.1–1.3)
MONOCYTES # BLD: 0.8 K/UL (ref 0.1–1.3)
MONOCYTES # BLD: 0.9 K/UL (ref 0.1–1.3)
MONOCYTES # BLD: 1 K/UL (ref 0.1–1.3)
MONOCYTES # BLD: 1.1 K/UL (ref 0.1–1.3)
MONOCYTES # BLD: 1.1 K/UL (ref 0.1–1.3)
MONOCYTES # BLD: 1.2 K/UL (ref 0.1–1.3)
MONOCYTES # BLD: 1.3 K/UL (ref 0.1–1.3)
MONOCYTES NFR BLD: 1 % (ref 4–12)
MONOCYTES NFR BLD: 10 % (ref 4–12)
MONOCYTES NFR BLD: 10 % (ref 4–12)
MONOCYTES NFR BLD: 11 % (ref 4–12)
MONOCYTES NFR BLD: 12 % (ref 4–12)
MONOCYTES NFR BLD: 5 % (ref 4–12)
MONOCYTES NFR BLD: 6 % (ref 4–12)
MONOCYTES NFR BLD: 7 % (ref 4–12)
MONOCYTES NFR BLD: 7 % (ref 4–12)
MONOCYTES NFR BLD: 8 % (ref 4–12)
MONOCYTES NFR BLD: 9 % (ref 4–12)
NEUTROPHILS NFR BRONCH MANUAL: 15 %
NEUTROPHILS NFR BRONCH MANUAL: 69 %
NEUTS SEG # BLD: 11.2 K/UL (ref 1.7–8.2)
NEUTS SEG # BLD: 11.4 K/UL (ref 1.7–8.2)
NEUTS SEG # BLD: 11.6 K/UL (ref 1.7–8.2)
NEUTS SEG # BLD: 11.7 K/UL (ref 1.7–8.2)
NEUTS SEG # BLD: 11.7 K/UL (ref 1.7–8.2)
NEUTS SEG # BLD: 13.2 K/UL (ref 1.7–8.2)
NEUTS SEG # BLD: 17.7 K/UL (ref 1.7–8.2)
NEUTS SEG # BLD: 3.6 K/UL (ref 1.7–8.2)
NEUTS SEG # BLD: 3.7 K/UL (ref 1.7–8.2)
NEUTS SEG # BLD: 4.8 K/UL (ref 1.7–8.2)
NEUTS SEG # BLD: 4.8 K/UL (ref 1.7–8.2)
NEUTS SEG # BLD: 5.1 K/UL (ref 1.7–8.2)
NEUTS SEG # BLD: 6 K/UL (ref 1.7–8.2)
NEUTS SEG # BLD: 6.8 K/UL (ref 1.7–8.2)
NEUTS SEG # BLD: 6.8 K/UL (ref 1.7–8.2)
NEUTS SEG # BLD: 6.9 K/UL (ref 1.7–8.2)
NEUTS SEG # BLD: 7.1 K/UL (ref 1.7–8.2)
NEUTS SEG # BLD: 7.7 K/UL (ref 1.7–8.2)
NEUTS SEG # BLD: 7.8 K/UL (ref 1.7–8.2)
NEUTS SEG # BLD: 8.2 K/UL (ref 1.7–8.2)
NEUTS SEG # BLD: 8.3 K/UL (ref 1.7–8.2)
NEUTS SEG # BLD: 8.6 K/UL (ref 1.7–8.2)
NEUTS SEG # BLD: 8.8 K/UL (ref 1.7–8.2)
NEUTS SEG # BLD: 9.1 K/UL (ref 1.7–8.2)
NEUTS SEG # BLD: 9.1 K/UL (ref 1.7–8.2)
NEUTS SEG NFR BLD: 75 % (ref 43–78)
NEUTS SEG NFR BLD: 75 % (ref 43–78)
NEUTS SEG NFR BLD: 78 % (ref 43–78)
NEUTS SEG NFR BLD: 79 % (ref 43–78)
NEUTS SEG NFR BLD: 79 % (ref 43–78)
NEUTS SEG NFR BLD: 80 % (ref 43–78)
NEUTS SEG NFR BLD: 81 % (ref 43–78)
NEUTS SEG NFR BLD: 81 % (ref 43–78)
NEUTS SEG NFR BLD: 82 % (ref 43–78)
NEUTS SEG NFR BLD: 82 % (ref 43–78)
NEUTS SEG NFR BLD: 83 % (ref 43–78)
NEUTS SEG NFR BLD: 84 % (ref 43–78)
NEUTS SEG NFR BLD: 85 % (ref 43–78)
NEUTS SEG NFR BLD: 85 % (ref 43–78)
NEUTS SEG NFR BLD: 86 % (ref 43–78)
NEUTS SEG NFR BLD: 87 % (ref 43–78)
NEUTS SEG NFR BLD: 89 % (ref 43–78)
NEUTS SEG NFR BLD: 90 % (ref 43–78)
NEUTS SEG NFR BLD: 94 % (ref 43–78)
NITRITE UR QL STRIP.AUTO: NEGATIVE
NRBC # BLD: 0 K/UL (ref 0–0.2)
NRBC # BLD: 0.02 K/UL (ref 0–0.2)
NRBC # BLD: 0.02 K/UL (ref 0–0.2)
NRBC # BLD: 0.03 K/UL (ref 0–0.2)
NRBC # BLD: 0.05 K/UL (ref 0–0.2)
NRBC # BLD: 0.08 K/UL (ref 0–0.2)
NUC CELL # FLD: 158 /CU MM
NUC CELL # FLD: 420 /CU MM
ORGANISM ID: NORMAL
PATH REV BLD -IMP: NORMAL
PCO2 BLD: 26.4 MMHG (ref 35–45)
PCO2 BLD: 26.5 MMHG (ref 35–45)
PH BLD: 7.45 [PH] (ref 7.35–7.45)
PH BLD: 7.48 [PH] (ref 7.35–7.45)
PH UR STRIP: 6 [PH] (ref 5–9)
PHOSPHATE SERPL-MCNC: 1.9 MG/DL (ref 2.5–4.5)
PHOSPHATE SERPL-MCNC: 2.5 MG/DL (ref 2.5–4.5)
PLATELET # BLD AUTO: 102 K/UL (ref 150–450)
PLATELET # BLD AUTO: 102 K/UL (ref 150–450)
PLATELET # BLD AUTO: 112 K/UL (ref 150–450)
PLATELET # BLD AUTO: 113 K/UL (ref 150–450)
PLATELET # BLD AUTO: 114 K/UL (ref 150–450)
PLATELET # BLD AUTO: 115 K/UL (ref 150–450)
PLATELET # BLD AUTO: 118 K/UL (ref 150–450)
PLATELET # BLD AUTO: 141 K/UL (ref 150–450)
PLATELET # BLD AUTO: 166 K/UL (ref 150–450)
PLATELET # BLD AUTO: 167 K/UL (ref 150–450)
PLATELET # BLD AUTO: 177 K/UL (ref 150–450)
PLATELET # BLD AUTO: 65 K/UL (ref 150–450)
PLATELET # BLD AUTO: 66 K/UL (ref 150–450)
PLATELET # BLD AUTO: 72 K/UL (ref 150–450)
PLATELET # BLD AUTO: 73 K/UL (ref 150–450)
PLATELET # BLD AUTO: 79 K/UL (ref 150–450)
PLATELET # BLD AUTO: 81 K/UL (ref 150–450)
PLATELET # BLD AUTO: 82 K/UL (ref 150–450)
PLATELET # BLD AUTO: 83 K/UL (ref 150–450)
PLATELET # BLD AUTO: 86 K/UL (ref 150–450)
PLATELET # BLD AUTO: 94 K/UL (ref 150–450)
PLATELET # BLD AUTO: 95 K/UL (ref 150–450)
PLATELET # BLD AUTO: 96 K/UL (ref 150–450)
PLATELET # BLD AUTO: 97 K/UL (ref 150–450)
PLATELET # BLD AUTO: 99 K/UL (ref 150–450)
PLATELET COMMENT: ABNORMAL
PLATELET COMMENT: SLIGHT
PMV BLD AUTO: 10 FL (ref 9.4–12.3)
PMV BLD AUTO: 10 FL (ref 9.4–12.3)
PMV BLD AUTO: 10.1 FL (ref 9.4–12.3)
PMV BLD AUTO: 10.2 FL (ref 9.4–12.3)
PMV BLD AUTO: 10.3 FL (ref 9.4–12.3)
PMV BLD AUTO: 10.3 FL (ref 9.4–12.3)
PMV BLD AUTO: 10.4 FL (ref 9.4–12.3)
PMV BLD AUTO: 10.4 FL (ref 9.4–12.3)
PMV BLD AUTO: 10.5 FL (ref 9.4–12.3)
PMV BLD AUTO: 10.5 FL (ref 9.4–12.3)
PMV BLD AUTO: 10.7 FL (ref 9.4–12.3)
PMV BLD AUTO: 9.1 FL (ref 9.4–12.3)
PMV BLD AUTO: 9.5 FL (ref 9.4–12.3)
PMV BLD AUTO: 9.5 FL (ref 9.4–12.3)
PMV BLD AUTO: 9.7 FL (ref 9.4–12.3)
PMV BLD AUTO: 9.7 FL (ref 9.4–12.3)
PMV BLD AUTO: 9.8 FL (ref 9.4–12.3)
PMV BLD AUTO: 9.9 FL (ref 9.4–12.3)
PO2 BLD: 61 MMHG (ref 75–100)
PO2 BLD: 71 MMHG (ref 75–100)
POTASSIUM SERPL-SCNC: 3 MMOL/L (ref 3.5–5.1)
POTASSIUM SERPL-SCNC: 3.1 MMOL/L (ref 3.5–5.1)
POTASSIUM SERPL-SCNC: 3.1 MMOL/L (ref 3.5–5.1)
POTASSIUM SERPL-SCNC: 3.3 MMOL/L (ref 3.5–5.1)
POTASSIUM SERPL-SCNC: 3.4 MMOL/L (ref 3.5–5.1)
POTASSIUM SERPL-SCNC: 3.5 MMOL/L (ref 3.5–5.1)
POTASSIUM SERPL-SCNC: 3.6 MMOL/L (ref 3.5–5.1)
POTASSIUM SERPL-SCNC: 3.7 MMOL/L (ref 3.5–5.1)
POTASSIUM SERPL-SCNC: 4 MMOL/L (ref 3.5–5.1)
POTASSIUM SERPL-SCNC: 4.1 MMOL/L (ref 3.5–5.1)
POTASSIUM SERPL-SCNC: 4.1 MMOL/L (ref 3.5–5.1)
POTASSIUM SERPL-SCNC: 5 MMOL/L (ref 3.5–5.1)
POTASSIUM SERPL-SCNC: 5.3 MMOL/L (ref 3.5–5.1)
PPD, POC: NEGATIVE
PPD, POC: NEGATIVE
PROCALCITONIN SERPL-MCNC: 0.45 NG/ML (ref 0–0.49)
PROCALCITONIN SERPL-MCNC: 0.55 NG/ML (ref 0–0.49)
PROCALCITONIN SERPL-MCNC: 1.35 NG/ML (ref 0–0.49)
PROT FLD-MCNC: 1.3 G/DL
PROT FLD-MCNC: 2.3 G/DL
PROT SERPL-MCNC: 4.8 G/DL (ref 6.3–8.2)
PROT SERPL-MCNC: 4.9 G/DL (ref 6.3–8.2)
PROT SERPL-MCNC: 5 G/DL (ref 6.3–8.2)
PROT SERPL-MCNC: 5.1 G/DL (ref 6.3–8.2)
PROT SERPL-MCNC: 5.1 G/DL (ref 6.3–8.2)
PROT SERPL-MCNC: 5.2 G/DL (ref 6.3–8.2)
PROT SERPL-MCNC: 5.3 G/DL (ref 6.3–8.2)
PROT SERPL-MCNC: 5.4 G/DL (ref 6.3–8.2)
PROT SERPL-MCNC: 5.8 G/DL (ref 6.3–8.2)
PROT SERPL-MCNC: 5.9 G/DL (ref 6.3–8.2)
PROT SERPL-MCNC: 6.2 G/DL (ref 6.3–8.2)
PROT SERPL-MCNC: 6.3 G/DL (ref 6.3–8.2)
PROT SERPL-MCNC: 6.6 G/DL (ref 6.3–8.2)
PROT SERPL-MCNC: 6.6 G/DL (ref 6.3–8.2)
PROT SERPL-MCNC: 7.2 G/DL (ref 6.3–8.2)
PROT UR STRIP-MCNC: NEGATIVE MG/DL
PROTHROMBIN TIME: 15.8 SEC (ref 12.6–14.5)
PROTHROMBIN TIME: 16 SEC (ref 12.6–14.5)
PROTHROMBIN TIME: 16.1 SEC (ref 12.6–14.5)
PROTHROMBIN TIME: 16.4 SEC (ref 12.6–14.5)
PROTHROMBIN TIME: 16.7 SEC (ref 12.6–14.5)
PT BLD: 15.4 SECS (ref 9.6–11.6)
RBC # BLD AUTO: 1.51 M/UL (ref 4.23–5.6)
RBC # BLD AUTO: 1.68 M/UL (ref 4.23–5.6)
RBC # BLD AUTO: 1.86 M/UL (ref 4.23–5.6)
RBC # BLD AUTO: 1.87 M/UL (ref 4.23–5.6)
RBC # BLD AUTO: 2.13 M/UL (ref 4.23–5.6)
RBC # BLD AUTO: 2.16 M/UL (ref 4.23–5.6)
RBC # BLD AUTO: 2.17 M/UL (ref 4.23–5.6)
RBC # BLD AUTO: 2.21 M/UL (ref 4.23–5.6)
RBC # BLD AUTO: 2.26 M/UL (ref 4.23–5.6)
RBC # BLD AUTO: 2.3 M/UL (ref 4.23–5.6)
RBC # BLD AUTO: 2.52 M/UL (ref 4.23–5.6)
RBC # BLD AUTO: 2.57 M/UL (ref 4.23–5.6)
RBC # BLD AUTO: 2.59 M/UL (ref 4.23–5.6)
RBC # BLD AUTO: 2.66 M/UL (ref 4.23–5.6)
RBC # BLD AUTO: 2.71 M/UL (ref 4.23–5.6)
RBC # BLD AUTO: 2.73 M/UL (ref 4.23–5.6)
RBC # BLD AUTO: 2.76 M/UL (ref 4.23–5.6)
RBC # BLD AUTO: 2.81 M/UL (ref 4.23–5.6)
RBC # BLD AUTO: 2.82 M/UL (ref 4.23–5.6)
RBC # BLD AUTO: 2.85 M/UL (ref 4.23–5.6)
RBC # BLD AUTO: 2.85 M/UL (ref 4.23–5.6)
RBC # BLD AUTO: 2.87 M/UL (ref 4.23–5.6)
RBC # BLD AUTO: 3.03 M/UL (ref 4.23–5.6)
RBC # BLD AUTO: 3.04 M/UL (ref 4.23–5.6)
RBC # BLD AUTO: 3.09 M/UL (ref 4.23–5.6)
RBC # FLD: 6000 /CU MM
RBC # FLD: NORMAL /CU MM
RBC #/AREA URNS HPF: ABNORMAL /HPF
RBC MORPH BLD: ABNORMAL
RETICS # AUTO: 0.08 M/UL (ref 0.03–0.1)
RETICS # AUTO: 0.12 M/UL (ref 0.03–0.1)
RETICS # AUTO: 0.12 M/UL (ref 0.03–0.1)
RETICS # AUTO: 0.15 M/UL (ref 0.03–0.1)
RETICS/RBC NFR AUTO: 3 % (ref 0.3–2)
RETICS/RBC NFR AUTO: 4.7 % (ref 0.3–2)
RETICS/RBC NFR AUTO: 6.4 % (ref 0.3–2)
RETICS/RBC NFR AUTO: 8.1 % (ref 0.3–2)
RSV RNA SPEC QL NAA+PROBE: NOT DETECTED
RV+EV RNA SPEC QL NAA+PROBE: NOT DETECTED
S PNEUM AG SPEC QL LA: NEGATIVE
SAO2 % BLD: 93.1 % (ref 95–98)
SAO2 % BLD: 95.2 % (ref 95–98)
SARS-COV-2 RDRP RESP QL NAA+PROBE: NOT DETECTED
SARS-COV-2 RNA RESP QL NAA+PROBE: NOT DETECTED
SERVICE CMNT-IMP: ABNORMAL
SERVICE CMNT-IMP: NORMAL
SODIUM SERPL-SCNC: 126 MMOL/L (ref 136–145)
SODIUM SERPL-SCNC: 128 MMOL/L (ref 138–145)
SODIUM SERPL-SCNC: 129 MMOL/L (ref 136–145)
SODIUM SERPL-SCNC: 129 MMOL/L (ref 136–145)
SODIUM SERPL-SCNC: 130 MMOL/L (ref 136–145)
SODIUM SERPL-SCNC: 131 MMOL/L (ref 138–145)
SODIUM SERPL-SCNC: 132 MMOL/L (ref 136–145)
SODIUM SERPL-SCNC: 132 MMOL/L (ref 136–145)
SODIUM SERPL-SCNC: 132 MMOL/L (ref 138–145)
SODIUM SERPL-SCNC: 133 MMOL/L (ref 136–145)
SODIUM SERPL-SCNC: 133 MMOL/L (ref 138–145)
SODIUM SERPL-SCNC: 133 MMOL/L (ref 138–145)
SODIUM SERPL-SCNC: 134 MMOL/L (ref 136–145)
SODIUM SERPL-SCNC: 136 MMOL/L (ref 136–145)
SODIUM SERPL-SCNC: 137 MMOL/L (ref 136–145)
SODIUM SERPL-SCNC: 137 MMOL/L (ref 136–145)
SODIUM SERPL-SCNC: 138 MMOL/L (ref 136–145)
SODIUM SERPL-SCNC: 139 MMOL/L (ref 136–145)
SOURCE: NORMAL
SP GR UR REFRACTOMETRY: 1.02 (ref 1–1.02)
SPECIMEN EXP DATE BLD: NORMAL
SPECIMEN SOURCE FLD: NORMAL
SPECIMEN SOURCE FLD: POSITIVE
SPECIMEN SOURCE: NORMAL
SPECIMEN TYPE: ABNORMAL
SPECIMEN TYPE: ABNORMAL
SPECIMEN: NORMAL
TIBC SERPL-MCNC: 117 UG/DL (ref 250–450)
TIBC SERPL-MCNC: 151 UG/DL (ref 250–450)
TIBC SERPL-MCNC: 202 UG/DL (ref 250–450)
TIBC SERPL-MCNC: 269 UG/DL (ref 250–450)
TRANSFERRIN SERPL-MCNC: 131 MG/DL (ref 202–364)
TROPONIN I SERPL HS-MCNC: <3 PG/ML (ref 0–14)
TSH, 3RD GENERATION: 102 UIU/ML (ref 0.36–3.74)
TSH, 3RD GENERATION: 110 UIU/ML (ref 0.36–3)
TSH, 3RD GENERATION: 111 UIU/ML (ref 0.36–3.74)
UNIT DIVISION: 0
UROBILINOGEN UR QL STRIP.AUTO: 1 EU/DL (ref 0.2–1)
VANCOMYCIN SERPL-MCNC: 19.9 UG/ML
VIT B12 SERPL-MCNC: 1270 PG/ML (ref 193–986)
VIT B12 SERPL-MCNC: 1570 PG/ML (ref 193–986)
VIT B12 SERPL-MCNC: 5556 PG/ML (ref 193–986)
WBC # BLD AUTO: 10.2 K/UL (ref 4.3–11.1)
WBC # BLD AUTO: 10.6 K/UL (ref 4.3–11.1)
WBC # BLD AUTO: 10.7 K/UL (ref 4.3–11.1)
WBC # BLD AUTO: 10.8 K/UL (ref 4.3–11.1)
WBC # BLD AUTO: 13.1 K/UL (ref 4.3–11.1)
WBC # BLD AUTO: 13.1 K/UL (ref 4.3–11.1)
WBC # BLD AUTO: 13.9 K/UL (ref 4.3–11.1)
WBC # BLD AUTO: 14 K/UL (ref 4.3–11.1)
WBC # BLD AUTO: 14.1 K/UL (ref 4.3–11.1)
WBC # BLD AUTO: 15.8 K/UL (ref 4.3–11.1)
WBC # BLD AUTO: 19.5 K/UL (ref 4.3–11.1)
WBC # BLD AUTO: 4.7 K/UL (ref 4.3–11.1)
WBC # BLD AUTO: 5 K/UL (ref 4.3–11.1)
WBC # BLD AUTO: 6.1 K/UL (ref 4.3–11.1)
WBC # BLD AUTO: 6.2 K/UL (ref 4.3–11.1)
WBC # BLD AUTO: 6.4 K/UL (ref 4.3–11.1)
WBC # BLD AUTO: 7.6 K/UL (ref 4.3–11.1)
WBC # BLD AUTO: 8.2 K/UL (ref 4.3–11.1)
WBC # BLD AUTO: 8.7 K/UL (ref 4.3–11.1)
WBC # BLD AUTO: 8.9 K/UL (ref 4.3–11.1)
WBC # BLD AUTO: 9.1 K/UL (ref 4.3–11.1)
WBC # BLD AUTO: 9.2 K/UL (ref 4.3–11.1)
WBC # BLD AUTO: 9.7 K/UL (ref 4.3–11.1)
WBC MORPH BLD: ABNORMAL
WBC MORPH BLD: ABNORMAL
WBC URNS QL MICRO: ABNORMAL /HPF

## 2022-01-01 PROCEDURE — 77290 THER RAD SIMULAJ FIELD CPLX: CPT

## 2022-01-01 PROCEDURE — 85018 HEMOGLOBIN: CPT

## 2022-01-01 PROCEDURE — 82306 VITAMIN D 25 HYDROXY: CPT

## 2022-01-01 PROCEDURE — 96374 THER/PROPH/DIAG INJ IV PUSH: CPT

## 2022-01-01 PROCEDURE — 36415 COLL VENOUS BLD VENIPUNCTURE: CPT

## 2022-01-01 PROCEDURE — 3700000000 HC ANESTHESIA ATTENDED CARE: Performed by: INTERNAL MEDICINE

## 2022-01-01 PROCEDURE — 6360000002 HC RX W HCPCS: Performed by: INTERNAL MEDICINE

## 2022-01-01 PROCEDURE — 2580000003 HC RX 258: Performed by: INTERNAL MEDICINE

## 2022-01-01 PROCEDURE — 6360000002 HC RX W HCPCS: Performed by: NURSE PRACTITIONER

## 2022-01-01 PROCEDURE — 74177 CT ABD & PELVIS W/CONTRAST: CPT

## 2022-01-01 PROCEDURE — 99223 1ST HOSP IP/OBS HIGH 75: CPT | Performed by: INTERNAL MEDICINE

## 2022-01-01 PROCEDURE — 99285 EMERGENCY DEPT VISIT HI MDM: CPT

## 2022-01-01 PROCEDURE — 97161 PT EVAL LOW COMPLEX 20 MIN: CPT

## 2022-01-01 PROCEDURE — 36430 TRANSFUSION BLD/BLD COMPNT: CPT

## 2022-01-01 PROCEDURE — 2580000003 HC RX 258: Performed by: EMERGENCY MEDICINE

## 2022-01-01 PROCEDURE — 82728 ASSAY OF FERRITIN: CPT

## 2022-01-01 PROCEDURE — 96413 CHEMO IV INFUSION 1 HR: CPT

## 2022-01-01 PROCEDURE — 2580000003 HC RX 258

## 2022-01-01 PROCEDURE — 1100000000 HC RM PRIVATE

## 2022-01-01 PROCEDURE — 6360000002 HC RX W HCPCS: Performed by: FAMILY MEDICINE

## 2022-01-01 PROCEDURE — 97165 OT EVAL LOW COMPLEX 30 MIN: CPT

## 2022-01-01 PROCEDURE — 80053 COMPREHEN METABOLIC PANEL: CPT

## 2022-01-01 PROCEDURE — 83605 ASSAY OF LACTIC ACID: CPT

## 2022-01-01 PROCEDURE — 85025 COMPLETE CBC W/AUTO DIFF WBC: CPT

## 2022-01-01 PROCEDURE — 0202U NFCT DS 22 TRGT SARS-COV-2: CPT

## 2022-01-01 PROCEDURE — 7100000000 HC PACU RECOVERY - FIRST 15 MIN: Performed by: INTERNAL MEDICINE

## 2022-01-01 PROCEDURE — 97530 THERAPEUTIC ACTIVITIES: CPT

## 2022-01-01 PROCEDURE — P9047 ALBUMIN (HUMAN), 25%, 50ML: HCPCS | Performed by: FAMILY MEDICINE

## 2022-01-01 PROCEDURE — 86901 BLOOD TYPING SEROLOGIC RH(D): CPT

## 2022-01-01 PROCEDURE — 97535 SELF CARE MNGMENT TRAINING: CPT

## 2022-01-01 PROCEDURE — P9040 RBC LEUKOREDUCED IRRADIATED: HCPCS

## 2022-01-01 PROCEDURE — 96367 TX/PROPH/DG ADDL SEQ IV INF: CPT

## 2022-01-01 PROCEDURE — 1100000003 HC PRIVATE W/ TELEMETRY

## 2022-01-01 PROCEDURE — 3609017700 HC EGD DILATION GASTRIC/DUODENAL STRICTURE: Performed by: INTERNAL MEDICINE

## 2022-01-01 PROCEDURE — 94761 N-INVAS EAR/PLS OXIMETRY MLT: CPT

## 2022-01-01 PROCEDURE — 83735 ASSAY OF MAGNESIUM: CPT

## 2022-01-01 PROCEDURE — 30233N1 TRANSFUSION OF NONAUTOLOGOUS RED BLOOD CELLS INTO PERIPHERAL VEIN, PERCUTANEOUS APPROACH: ICD-10-PCS | Performed by: STUDENT IN AN ORGANIZED HEALTH CARE EDUCATION/TRAINING PROGRAM

## 2022-01-01 PROCEDURE — 84157 ASSAY OF PROTEIN OTHER: CPT

## 2022-01-01 PROCEDURE — 2580000003 HC RX 258: Performed by: NURSE PRACTITIONER

## 2022-01-01 PROCEDURE — 6370000000 HC RX 637 (ALT 250 FOR IP): Performed by: INTERNAL MEDICINE

## 2022-01-01 PROCEDURE — 82042 OTHER SOURCE ALBUMIN QUAN EA: CPT

## 2022-01-01 PROCEDURE — 6370000000 HC RX 637 (ALT 250 FOR IP): Performed by: NURSE PRACTITIONER

## 2022-01-01 PROCEDURE — 94760 N-INVAS EAR/PLS OXIMETRY 1: CPT

## 2022-01-01 PROCEDURE — 99356 PR PROLONGED SVC I/P OR OBS SETTING 1ST HOUR: CPT | Performed by: NURSE PRACTITIONER

## 2022-01-01 PROCEDURE — 86923 COMPATIBILITY TEST ELECTRIC: CPT

## 2022-01-01 PROCEDURE — 87205 SMEAR GRAM STAIN: CPT

## 2022-01-01 PROCEDURE — 83010 ASSAY OF HAPTOGLOBIN QUANT: CPT

## 2022-01-01 PROCEDURE — 85379 FIBRIN DEGRADATION QUANT: CPT

## 2022-01-01 PROCEDURE — C1769 GUIDE WIRE: HCPCS | Performed by: INTERNAL MEDICINE

## 2022-01-01 PROCEDURE — 82803 BLOOD GASES ANY COMBINATION: CPT

## 2022-01-01 PROCEDURE — 2500000003 HC RX 250 WO HCPCS: Performed by: PHYSICIAN ASSISTANT

## 2022-01-01 PROCEDURE — 6370000000 HC RX 637 (ALT 250 FOR IP): Performed by: HOSPITALIST

## 2022-01-01 PROCEDURE — 71045 X-RAY EXAM CHEST 1 VIEW: CPT

## 2022-01-01 PROCEDURE — 30233N1 TRANSFUSION OF NONAUTOLOGOUS RED BLOOD CELLS INTO PERIPHERAL VEIN, PERCUTANEOUS APPROACH: ICD-10-PCS | Performed by: EMERGENCY MEDICINE

## 2022-01-01 PROCEDURE — 70450 CT HEAD/BRAIN W/O DYE: CPT

## 2022-01-01 PROCEDURE — 96372 THER/PROPH/DIAG INJ SC/IM: CPT

## 2022-01-01 PROCEDURE — 97166 OT EVAL MOD COMPLEX 45 MIN: CPT

## 2022-01-01 PROCEDURE — C1729 CATH, DRAINAGE: HCPCS

## 2022-01-01 PROCEDURE — 85610 PROTHROMBIN TIME: CPT

## 2022-01-01 PROCEDURE — 7100000001 HC PACU RECOVERY - ADDTL 15 MIN: Performed by: INTERNAL MEDICINE

## 2022-01-01 PROCEDURE — 2580000003 HC RX 258: Performed by: STUDENT IN AN ORGANIZED HEALTH CARE EDUCATION/TRAINING PROGRAM

## 2022-01-01 PROCEDURE — 99214 OFFICE O/P EST MOD 30 MIN: CPT | Performed by: NURSE PRACTITIONER

## 2022-01-01 PROCEDURE — 82945 GLUCOSE OTHER FLUID: CPT

## 2022-01-01 PROCEDURE — 36600 WITHDRAWAL OF ARTERIAL BLOOD: CPT

## 2022-01-01 PROCEDURE — 85730 THROMBOPLASTIN TIME PARTIAL: CPT

## 2022-01-01 PROCEDURE — 96411 CHEMO IV PUSH ADDL DRUG: CPT

## 2022-01-01 PROCEDURE — 87449 NOS EACH ORGANISM AG IA: CPT

## 2022-01-01 PROCEDURE — 85014 HEMATOCRIT: CPT

## 2022-01-01 PROCEDURE — 6370000000 HC RX 637 (ALT 250 FOR IP): Performed by: FAMILY MEDICINE

## 2022-01-01 PROCEDURE — 96375 TX/PRO/DX INJ NEW DRUG ADDON: CPT

## 2022-01-01 PROCEDURE — 99232 SBSQ HOSP IP/OBS MODERATE 35: CPT | Performed by: INTERNAL MEDICINE

## 2022-01-01 PROCEDURE — 0HQ0XZZ REPAIR SCALP SKIN, EXTERNAL APPROACH: ICD-10-PCS | Performed by: SURGERY

## 2022-01-01 PROCEDURE — 99496 TRANSJ CARE MGMT HIGH F2F 7D: CPT | Performed by: INTERNAL MEDICINE

## 2022-01-01 PROCEDURE — 96417 CHEMO IV INFUS EACH ADDL SEQ: CPT

## 2022-01-01 PROCEDURE — 96415 CHEMO IV INFUSION ADDL HR: CPT

## 2022-01-01 PROCEDURE — 6370000000 HC RX 637 (ALT 250 FOR IP): Performed by: STUDENT IN AN ORGANIZED HEALTH CARE EDUCATION/TRAINING PROGRAM

## 2022-01-01 PROCEDURE — 2500000003 HC RX 250 WO HCPCS: Performed by: HOSPITALIST

## 2022-01-01 PROCEDURE — 82140 ASSAY OF AMMONIA: CPT

## 2022-01-01 PROCEDURE — 7100000011 HC PHASE II RECOVERY - ADDTL 15 MIN: Performed by: INTERNAL MEDICINE

## 2022-01-01 PROCEDURE — 83690 ASSAY OF LIPASE: CPT

## 2022-01-01 PROCEDURE — 6360000002 HC RX W HCPCS: Performed by: NURSE ANESTHETIST, CERTIFIED REGISTERED

## 2022-01-01 PROCEDURE — P9016 RBC LEUKOCYTES REDUCED: HCPCS

## 2022-01-01 PROCEDURE — 3700000001 HC ADD 15 MINUTES (ANESTHESIA): Performed by: INTERNAL MEDICINE

## 2022-01-01 PROCEDURE — 6360000002 HC RX W HCPCS: Performed by: STUDENT IN AN ORGANIZED HEALTH CARE EDUCATION/TRAINING PROGRAM

## 2022-01-01 PROCEDURE — 93005 ELECTROCARDIOGRAM TRACING: CPT | Performed by: EMERGENCY MEDICINE

## 2022-01-01 PROCEDURE — 36591 DRAW BLOOD OFF VENOUS DEVICE: CPT

## 2022-01-01 PROCEDURE — APPSS180 APP SPLIT SHARED TIME > 60 MINUTES: Performed by: NURSE PRACTITIONER

## 2022-01-01 PROCEDURE — 0DJ08ZZ INSPECTION OF UPPER INTESTINAL TRACT, VIA NATURAL OR ARTIFICIAL OPENING ENDOSCOPIC: ICD-10-PCS | Performed by: INTERNAL MEDICINE

## 2022-01-01 PROCEDURE — 2500000003 HC RX 250 WO HCPCS: Performed by: REGISTERED NURSE

## 2022-01-01 PROCEDURE — 97162 PT EVAL MOD COMPLEX 30 MIN: CPT

## 2022-01-01 PROCEDURE — 80048 BASIC METABOLIC PNL TOTAL CA: CPT

## 2022-01-01 PROCEDURE — 2580000003 HC RX 258: Performed by: FAMILY MEDICINE

## 2022-01-01 PROCEDURE — 2580000003 HC RX 258: Performed by: REGISTERED NURSE

## 2022-01-01 PROCEDURE — 6360000002 HC RX W HCPCS: Performed by: ANESTHESIOLOGY

## 2022-01-01 PROCEDURE — 2500000003 HC RX 250 WO HCPCS: Performed by: NURSE ANESTHETIST, CERTIFIED REGISTERED

## 2022-01-01 PROCEDURE — 82378 CARCINOEMBRYONIC ANTIGEN: CPT

## 2022-01-01 PROCEDURE — 2580000003 HC RX 258: Performed by: ANESTHESIOLOGY

## 2022-01-01 PROCEDURE — 86644 CMV ANTIBODY: CPT

## 2022-01-01 PROCEDURE — P9037 PLATE PHERES LEUKOREDU IRRAD: HCPCS

## 2022-01-01 PROCEDURE — 83615 LACTATE (LD) (LDH) ENZYME: CPT

## 2022-01-01 PROCEDURE — 2700000000 HC OXYGEN THERAPY PER DAY

## 2022-01-01 PROCEDURE — 96366 THER/PROPH/DIAG IV INF ADDON: CPT

## 2022-01-01 PROCEDURE — 96523 IRRIG DRUG DELIVERY DEVICE: CPT

## 2022-01-01 PROCEDURE — 96368 THER/DIAG CONCURRENT INF: CPT

## 2022-01-01 PROCEDURE — 89050 BODY FLUID CELL COUNT: CPT

## 2022-01-01 PROCEDURE — 83550 IRON BINDING TEST: CPT

## 2022-01-01 PROCEDURE — 2500000003 HC RX 250 WO HCPCS: Performed by: NURSE PRACTITIONER

## 2022-01-01 PROCEDURE — 87040 BLOOD CULTURE FOR BACTERIA: CPT

## 2022-01-01 PROCEDURE — 87086 URINE CULTURE/COLONY COUNT: CPT

## 2022-01-01 PROCEDURE — 87635 SARS-COV-2 COVID-19 AMP PRB: CPT

## 2022-01-01 PROCEDURE — 85046 RETICYTE/HGB CONCENTRATE: CPT

## 2022-01-01 PROCEDURE — 82607 VITAMIN B-12: CPT

## 2022-01-01 PROCEDURE — 51798 US URINE CAPACITY MEASURE: CPT

## 2022-01-01 PROCEDURE — 83540 ASSAY OF IRON: CPT

## 2022-01-01 PROCEDURE — 2500000003 HC RX 250 WO HCPCS: Performed by: FAMILY MEDICINE

## 2022-01-01 PROCEDURE — 86738 MYCOPLASMA ANTIBODY: CPT

## 2022-01-01 PROCEDURE — 12013 RPR F/E/E/N/L/M 2.6-5.0 CM: CPT | Performed by: SURGERY

## 2022-01-01 PROCEDURE — C1726 CATH, BAL DIL, NON-VASCULAR: HCPCS | Performed by: INTERNAL MEDICINE

## 2022-01-01 PROCEDURE — 85384 FIBRINOGEN ACTIVITY: CPT

## 2022-01-01 PROCEDURE — 6360000004 HC RX CONTRAST MEDICATION: Performed by: EMERGENCY MEDICINE

## 2022-01-01 PROCEDURE — 76000 FLUOROSCOPY <1 HR PHYS/QHP: CPT

## 2022-01-01 PROCEDURE — P9047 ALBUMIN (HUMAN), 25%, 50ML: HCPCS | Performed by: EMERGENCY MEDICINE

## 2022-01-01 PROCEDURE — 96365 THER/PROPH/DIAG IV INF INIT: CPT

## 2022-01-01 PROCEDURE — 6360000002 HC RX W HCPCS: Performed by: PHYSICIAN ASSISTANT

## 2022-01-01 PROCEDURE — G0498 CHEMO EXTEND IV INFUS W/PUMP: HCPCS

## 2022-01-01 PROCEDURE — 99222 1ST HOSP IP/OBS MODERATE 55: CPT | Performed by: NURSE PRACTITIONER

## 2022-01-01 PROCEDURE — 86880 COOMBS TEST DIRECT: CPT

## 2022-01-01 PROCEDURE — 2400000000

## 2022-01-01 PROCEDURE — 88305 TISSUE EXAM BY PATHOLOGIST: CPT

## 2022-01-01 PROCEDURE — 99215 OFFICE O/P EST HI 40 MIN: CPT | Performed by: INTERNAL MEDICINE

## 2022-01-01 PROCEDURE — 2709999900 HC NON-CHARGEABLE SUPPLY: Performed by: INTERNAL MEDICINE

## 2022-01-01 PROCEDURE — 7100000010 HC PHASE II RECOVERY - FIRST 15 MIN: Performed by: INTERNAL MEDICINE

## 2022-01-01 PROCEDURE — P9047 ALBUMIN (HUMAN), 25%, 50ML: HCPCS | Performed by: PHYSICIAN ASSISTANT

## 2022-01-01 PROCEDURE — 0W9B3ZZ DRAINAGE OF LEFT PLEURAL CAVITY, PERCUTANEOUS APPROACH: ICD-10-PCS | Performed by: RADIOLOGY

## 2022-01-01 PROCEDURE — 2500000003 HC RX 250 WO HCPCS

## 2022-01-01 PROCEDURE — 81001 URINALYSIS AUTO W/SCOPE: CPT

## 2022-01-01 PROCEDURE — 82746 ASSAY OF FOLIC ACID SERUM: CPT

## 2022-01-01 PROCEDURE — 84100 ASSAY OF PHOSPHORUS: CPT

## 2022-01-01 PROCEDURE — 99254 IP/OBS CNSLTJ NEW/EST MOD 60: CPT | Performed by: SURGERY

## 2022-01-01 PROCEDURE — 6360000002 HC RX W HCPCS: Performed by: EMERGENCY MEDICINE

## 2022-01-01 PROCEDURE — 82150 ASSAY OF AMYLASE: CPT

## 2022-01-01 PROCEDURE — 2580000003 HC RX 258: Performed by: PHYSICIAN ASSISTANT

## 2022-01-01 PROCEDURE — 30233N1 TRANSFUSION OF NONAUTOLOGOUS RED BLOOD CELLS INTO PERIPHERAL VEIN, PERCUTANEOUS APPROACH: ICD-10-PCS | Performed by: INTERNAL MEDICINE

## 2022-01-01 PROCEDURE — 99497 ADVNCD CARE PLAN 30 MIN: CPT | Performed by: NURSE PRACTITIONER

## 2022-01-01 PROCEDURE — 3609013700 HC EGD STENT PLACEMENT: Performed by: INTERNAL MEDICINE

## 2022-01-01 PROCEDURE — 84145 PROCALCITONIN (PCT): CPT

## 2022-01-01 PROCEDURE — 87899 AGENT NOS ASSAY W/OPTIC: CPT

## 2022-01-01 PROCEDURE — 77470 SPECIAL RADIATION TREATMENT: CPT

## 2022-01-01 PROCEDURE — 88112 CYTOPATH CELL ENHANCE TECH: CPT

## 2022-01-01 PROCEDURE — 99498 ADVNCD CARE PLAN ADDL 30 MIN: CPT | Performed by: NURSE PRACTITIONER

## 2022-01-01 PROCEDURE — 84484 ASSAY OF TROPONIN QUANT: CPT

## 2022-01-01 PROCEDURE — 82247 BILIRUBIN TOTAL: CPT

## 2022-01-01 PROCEDURE — 97116 GAIT TRAINING THERAPY: CPT

## 2022-01-01 PROCEDURE — C1874 STENT, COATED/COV W/DEL SYS: HCPCS | Performed by: INTERNAL MEDICINE

## 2022-01-01 PROCEDURE — 84443 ASSAY THYROID STIM HORMONE: CPT

## 2022-01-01 PROCEDURE — A4216 STERILE WATER/SALINE, 10 ML: HCPCS | Performed by: NURSE PRACTITIONER

## 2022-01-01 PROCEDURE — 6360000002 HC RX W HCPCS: Performed by: REGISTERED NURSE

## 2022-01-01 PROCEDURE — 74022 RADEX COMPL AQT ABD SERIES: CPT

## 2022-01-01 PROCEDURE — A4216 STERILE WATER/SALINE, 10 ML: HCPCS | Performed by: ANESTHESIOLOGY

## 2022-01-01 PROCEDURE — 80202 ASSAY OF VANCOMYCIN: CPT

## 2022-01-01 PROCEDURE — P9047 ALBUMIN (HUMAN), 25%, 50ML: HCPCS | Performed by: STUDENT IN AN ORGANIZED HEALTH CARE EDUCATION/TRAINING PROGRAM

## 2022-01-01 PROCEDURE — 3609017100 HC EGD: Performed by: INTERNAL MEDICINE

## 2022-01-01 PROCEDURE — 6360000002 HC RX W HCPCS

## 2022-01-01 PROCEDURE — 99239 HOSP IP/OBS DSCHRG MGMT >30: CPT | Performed by: INTERNAL MEDICINE

## 2022-01-01 PROCEDURE — 2500000003 HC RX 250 WO HCPCS: Performed by: ANESTHESIOLOGY

## 2022-01-01 PROCEDURE — 0W9G3ZZ DRAINAGE OF PERITONEAL CAVITY, PERCUTANEOUS APPROACH: ICD-10-PCS | Performed by: RADIOLOGY

## 2022-01-01 PROCEDURE — 80069 RENAL FUNCTION PANEL: CPT

## 2022-01-01 PROCEDURE — 82533 TOTAL CORTISOL: CPT

## 2022-01-01 PROCEDURE — 96361 HYDRATE IV INFUSION ADD-ON: CPT

## 2022-01-01 PROCEDURE — 6360000002 HC RX W HCPCS: Performed by: HOSPITALIST

## 2022-01-01 DEVICE — STENT SYSTEM
Type: IMPLANTABLE DEVICE | Site: ESOPHAGUS | Status: FUNCTIONAL
Brand: WALLFLEX™ ESOPHAGEAL

## 2022-01-01 RX ORDER — POTASSIUM CHLORIDE 20 MEQ/1
20 TABLET, EXTENDED RELEASE ORAL 2 TIMES DAILY
Qty: 14 TABLET | Refills: 0 | Status: ON HOLD
Start: 2022-01-01 | End: 2022-01-01 | Stop reason: HOSPADM

## 2022-01-01 RX ORDER — GABAPENTIN 300 MG/1
300 CAPSULE ORAL 2 TIMES DAILY
Qty: 180 CAPSULE | Refills: 2 | Status: SHIPPED | OUTPATIENT
Start: 2022-01-01 | End: 2022-11-13

## 2022-01-01 RX ORDER — MIRTAZAPINE 15 MG/1
30 TABLET, FILM COATED ORAL NIGHTLY
Status: DISCONTINUED | OUTPATIENT
Start: 2022-01-01 | End: 2022-01-01 | Stop reason: HOSPADM

## 2022-01-01 RX ORDER — 0.9 % SODIUM CHLORIDE 0.9 %
500 INTRAVENOUS SOLUTION INTRAVENOUS ONCE
Status: DISCONTINUED | OUTPATIENT
Start: 2022-01-01 | End: 2022-01-01

## 2022-01-01 RX ORDER — DIPHENHYDRAMINE HYDROCHLORIDE 50 MG/ML
50 INJECTION INTRAMUSCULAR; INTRAVENOUS
Status: CANCELLED | OUTPATIENT
Start: 2022-01-01

## 2022-01-01 RX ORDER — SODIUM CHLORIDE 9 MG/ML
INJECTION, SOLUTION INTRAVENOUS CONTINUOUS
Status: DISCONTINUED | OUTPATIENT
Start: 2022-01-01 | End: 2022-01-01 | Stop reason: HOSPADM

## 2022-01-01 RX ORDER — METRONIDAZOLE 500 MG/100ML
500 INJECTION, SOLUTION INTRAVENOUS EVERY 8 HOURS
Status: DISCONTINUED | OUTPATIENT
Start: 2022-01-01 | End: 2022-01-01 | Stop reason: HOSPADM

## 2022-01-01 RX ORDER — ONDANSETRON 2 MG/ML
8 INJECTION INTRAMUSCULAR; INTRAVENOUS
Status: CANCELLED | OUTPATIENT
Start: 2022-01-01

## 2022-01-01 RX ORDER — SODIUM CHLORIDE 0.9 % (FLUSH) 0.9 %
5-40 SYRINGE (ML) INJECTION PRN
Status: DISCONTINUED | OUTPATIENT
Start: 2022-01-01 | End: 2022-01-01

## 2022-01-01 RX ORDER — CALCIUM GLUCONATE 20 MG/ML
1000 INJECTION, SOLUTION INTRAVENOUS
Status: DISCONTINUED | OUTPATIENT
Start: 2022-01-01 | End: 2022-01-01

## 2022-01-01 RX ORDER — ATROPINE SULFATE 0.1 MG/ML
0.4 INJECTION INTRAVENOUS ONCE
Status: DISCONTINUED | OUTPATIENT
Start: 2022-01-01 | End: 2022-01-01 | Stop reason: SDUPTHER

## 2022-01-01 RX ORDER — SODIUM CHLORIDE 9 MG/ML
INJECTION, SOLUTION INTRAVENOUS PRN
Status: CANCELLED | OUTPATIENT
Start: 2022-01-01

## 2022-01-01 RX ORDER — ACETAMINOPHEN 325 MG/1
650 TABLET ORAL ONCE
Status: COMPLETED | OUTPATIENT
Start: 2022-01-01 | End: 2022-01-01

## 2022-01-01 RX ORDER — SODIUM CHLORIDE 9 MG/ML
5-250 INJECTION, SOLUTION INTRAVENOUS PRN
Status: CANCELLED | OUTPATIENT
Start: 2022-01-01

## 2022-01-01 RX ORDER — ALBUTEROL SULFATE 90 UG/1
4 AEROSOL, METERED RESPIRATORY (INHALATION) PRN
Status: CANCELLED | OUTPATIENT
Start: 2022-01-01

## 2022-01-01 RX ORDER — ACETAMINOPHEN 500 MG
1000 TABLET ORAL ONCE
Status: CANCELLED | OUTPATIENT
Start: 2022-01-01 | End: 2022-01-01

## 2022-01-01 RX ORDER — SODIUM CHLORIDE 9 MG/ML
INJECTION, SOLUTION INTRAVENOUS PRN
OUTPATIENT
Start: 2022-01-01

## 2022-01-01 RX ORDER — SODIUM CHLORIDE 0.9 % (FLUSH) 0.9 %
5-40 SYRINGE (ML) INJECTION PRN
Status: DISCONTINUED | OUTPATIENT
Start: 2022-01-01 | End: 2022-01-01 | Stop reason: HOSPADM

## 2022-01-01 RX ORDER — SODIUM CHLORIDE 9 MG/ML
20 INJECTION, SOLUTION INTRAVENOUS CONTINUOUS
Status: CANCELLED | OUTPATIENT
Start: 2022-01-01

## 2022-01-01 RX ORDER — EPHEDRINE SULFATE/0.9% NACL/PF 50 MG/5 ML
SYRINGE (ML) INTRAVENOUS PRN
Status: DISCONTINUED | OUTPATIENT
Start: 2022-01-01 | End: 2022-01-01 | Stop reason: SDUPTHER

## 2022-01-01 RX ORDER — METRONIDAZOLE 500 MG/100ML
500 INJECTION, SOLUTION INTRAVENOUS EVERY 8 HOURS
Status: DISCONTINUED | OUTPATIENT
Start: 2022-01-01 | End: 2022-01-01

## 2022-01-01 RX ORDER — SODIUM CHLORIDE, SODIUM LACTATE, POTASSIUM CHLORIDE, CALCIUM CHLORIDE 600; 310; 30; 20 MG/100ML; MG/100ML; MG/100ML; MG/100ML
INJECTION, SOLUTION INTRAVENOUS CONTINUOUS
Status: DISCONTINUED | OUTPATIENT
Start: 2022-01-01 | End: 2022-01-01 | Stop reason: HOSPADM

## 2022-01-01 RX ORDER — SUCCINYLCHOLINE CHLORIDE 20 MG/ML
INJECTION INTRAMUSCULAR; INTRAVENOUS PRN
Status: DISCONTINUED | OUTPATIENT
Start: 2022-01-01 | End: 2022-01-01 | Stop reason: SDUPTHER

## 2022-01-01 RX ORDER — SODIUM CHLORIDE 9 MG/ML
INJECTION, SOLUTION INTRAVENOUS ONCE
Status: COMPLETED | OUTPATIENT
Start: 2022-01-01 | End: 2022-01-01

## 2022-01-01 RX ORDER — EPINEPHRINE 1 MG/ML
0.3 INJECTION, SOLUTION, CONCENTRATE INTRAVENOUS PRN
Status: CANCELLED | OUTPATIENT
Start: 2022-01-01

## 2022-01-01 RX ORDER — SODIUM CHLORIDE 9 MG/ML
INJECTION, SOLUTION INTRAVENOUS CONTINUOUS
Status: CANCELLED | OUTPATIENT
Start: 2022-01-01

## 2022-01-01 RX ORDER — SODIUM CHLORIDE 0.9 % (FLUSH) 0.9 %
10 SYRINGE (ML) INJECTION PRN
Status: DISCONTINUED | OUTPATIENT
Start: 2022-01-01 | End: 2022-01-01 | Stop reason: HOSPADM

## 2022-01-01 RX ORDER — LORAZEPAM 1 MG/1
TABLET ORAL
Qty: 90 TABLET | Refills: 0 | Status: SHIPPED | OUTPATIENT
Start: 2022-01-01 | End: 2022-01-01

## 2022-01-01 RX ORDER — FENTANYL CITRATE 50 UG/ML
100 INJECTION, SOLUTION INTRAMUSCULAR; INTRAVENOUS
Status: DISCONTINUED | OUTPATIENT
Start: 2022-01-01 | End: 2022-01-01 | Stop reason: HOSPADM

## 2022-01-01 RX ORDER — LIDOCAINE HYDROCHLORIDE AND EPINEPHRINE 10; 10 MG/ML; UG/ML
INJECTION, SOLUTION INFILTRATION; PERINEURAL
Status: COMPLETED | OUTPATIENT
Start: 2022-01-01 | End: 2022-01-01

## 2022-01-01 RX ORDER — HYDROMORPHONE HYDROCHLORIDE 2 MG/ML
0.25 INJECTION, SOLUTION INTRAMUSCULAR; INTRAVENOUS; SUBCUTANEOUS EVERY 5 MIN PRN
Status: DISCONTINUED | OUTPATIENT
Start: 2022-01-01 | End: 2022-01-01 | Stop reason: HOSPADM

## 2022-01-01 RX ORDER — ACETAMINOPHEN 325 MG/1
650 TABLET ORAL
Status: CANCELLED | OUTPATIENT
Start: 2022-01-01

## 2022-01-01 RX ORDER — ONDANSETRON 2 MG/ML
8 INJECTION INTRAMUSCULAR; INTRAVENOUS ONCE
Status: COMPLETED | OUTPATIENT
Start: 2022-01-01 | End: 2022-01-01

## 2022-01-01 RX ORDER — METRONIDAZOLE 500 MG/100ML
500 INJECTION, SOLUTION INTRAVENOUS ONCE AS NEEDED
Status: DISCONTINUED | OUTPATIENT
Start: 2022-01-01 | End: 2022-01-01 | Stop reason: HOSPADM

## 2022-01-01 RX ORDER — SODIUM CHLORIDE 9 MG/ML
INJECTION, SOLUTION INTRAVENOUS PRN
Status: DISCONTINUED | OUTPATIENT
Start: 2022-01-01 | End: 2022-01-01 | Stop reason: HOSPADM

## 2022-01-01 RX ORDER — PROPOFOL 10 MG/ML
INJECTION, EMULSION INTRAVENOUS PRN
Status: DISCONTINUED | OUTPATIENT
Start: 2022-01-01 | End: 2022-01-01 | Stop reason: SDUPTHER

## 2022-01-01 RX ORDER — ONDANSETRON 4 MG/1
4 TABLET, ORALLY DISINTEGRATING ORAL EVERY 8 HOURS PRN
Status: DISCONTINUED | OUTPATIENT
Start: 2022-01-01 | End: 2022-01-01 | Stop reason: HOSPADM

## 2022-01-01 RX ORDER — SUCRALFATE 1 G/1
1 TABLET ORAL EVERY 8 HOURS SCHEDULED
Status: DISCONTINUED | OUTPATIENT
Start: 2022-01-01 | End: 2022-01-01 | Stop reason: HOSPADM

## 2022-01-01 RX ORDER — SODIUM CHLORIDE 0.9 % (FLUSH) 0.9 %
5-40 SYRINGE (ML) INJECTION EVERY 12 HOURS SCHEDULED
OUTPATIENT
Start: 2022-01-01

## 2022-01-01 RX ORDER — MEPERIDINE HYDROCHLORIDE 50 MG/ML
12.5 INJECTION INTRAMUSCULAR; INTRAVENOUS; SUBCUTANEOUS PRN
Status: CANCELLED | OUTPATIENT
Start: 2022-01-01

## 2022-01-01 RX ORDER — SODIUM CHLORIDE 9 MG/ML
5-40 INJECTION INTRAVENOUS PRN
Status: DISCONTINUED | OUTPATIENT
Start: 2022-01-01 | End: 2022-01-01 | Stop reason: HOSPADM

## 2022-01-01 RX ORDER — 0.9 % SODIUM CHLORIDE 0.9 %
10 VIAL (ML) INJECTION ONCE
Status: CANCELLED | OUTPATIENT
Start: 2022-01-01 | End: 2022-01-01

## 2022-01-01 RX ORDER — PANTOPRAZOLE SODIUM 40 MG/1
40 TABLET, DELAYED RELEASE ORAL DAILY
Status: DISCONTINUED | OUTPATIENT
Start: 2022-01-01 | End: 2022-01-01 | Stop reason: HOSPADM

## 2022-01-01 RX ORDER — DIPHENHYDRAMINE HYDROCHLORIDE 50 MG/ML
50 INJECTION INTRAMUSCULAR; INTRAVENOUS ONCE
Status: CANCELLED | OUTPATIENT
Start: 2022-01-01 | End: 2022-01-01

## 2022-01-01 RX ORDER — CEFDINIR 300 MG/1
300 CAPSULE ORAL 2 TIMES DAILY
Qty: 20 CAPSULE | Refills: 0 | Status: SHIPPED | OUTPATIENT
Start: 2022-01-01 | End: 2022-01-01

## 2022-01-01 RX ORDER — PEGFILGRASTIM-CBQV 6 MG/.6ML
INJECTION, SOLUTION SUBCUTANEOUS
Status: ON HOLD | COMMUNITY
Start: 2022-01-01 | End: 2022-01-01 | Stop reason: HOSPADM

## 2022-01-01 RX ORDER — SODIUM BICARBONATE 650 MG/1
650 TABLET ORAL 4 TIMES DAILY
Status: DISPENSED | OUTPATIENT
Start: 2022-01-01 | End: 2022-01-01

## 2022-01-01 RX ORDER — ONDANSETRON 2 MG/ML
4 INJECTION INTRAMUSCULAR; INTRAVENOUS EVERY 6 HOURS PRN
Status: DISCONTINUED | OUTPATIENT
Start: 2022-01-01 | End: 2022-01-01 | Stop reason: HOSPADM

## 2022-01-01 RX ORDER — SODIUM CHLORIDE 9 MG/ML
20 INJECTION, SOLUTION INTRAVENOUS CONTINUOUS
Status: DISCONTINUED | OUTPATIENT
Start: 2022-01-01 | End: 2022-01-01

## 2022-01-01 RX ORDER — METOCLOPRAMIDE HYDROCHLORIDE 5 MG/ML
10 INJECTION INTRAMUSCULAR; INTRAVENOUS
Status: CANCELLED | OUTPATIENT
Start: 2022-01-01 | End: 2022-01-01

## 2022-01-01 RX ORDER — ACETAMINOPHEN 325 MG/1
650 TABLET ORAL ONCE
Status: CANCELLED | OUTPATIENT
Start: 2022-01-01 | End: 2022-01-01

## 2022-01-01 RX ORDER — SODIUM CHLORIDE 0.9 % (FLUSH) 0.9 %
5-40 SYRINGE (ML) INJECTION EVERY 12 HOURS SCHEDULED
Status: DISCONTINUED | OUTPATIENT
Start: 2022-01-01 | End: 2022-01-01 | Stop reason: HOSPADM

## 2022-01-01 RX ORDER — ACETAMINOPHEN 325 MG/1
650 TABLET ORAL EVERY 6 HOURS PRN
Status: CANCELLED | OUTPATIENT
Start: 2022-01-01

## 2022-01-01 RX ORDER — LABETALOL HYDROCHLORIDE 5 MG/ML
10 INJECTION, SOLUTION INTRAVENOUS
Status: CANCELLED | OUTPATIENT
Start: 2022-01-01

## 2022-01-01 RX ORDER — SODIUM CHLORIDE 9 MG/ML
25 INJECTION, SOLUTION INTRAVENOUS PRN
Status: DISCONTINUED | OUTPATIENT
Start: 2022-01-01 | End: 2022-01-01 | Stop reason: HOSPADM

## 2022-01-01 RX ORDER — LORAZEPAM 1 MG/1
1 TABLET ORAL EVERY 4 HOURS PRN
Status: CANCELLED | OUTPATIENT
Start: 2022-01-01

## 2022-01-01 RX ORDER — LORAZEPAM 1 MG/1
1 TABLET ORAL EVERY 4 HOURS PRN
Status: DISCONTINUED | OUTPATIENT
Start: 2022-01-01 | End: 2022-01-01 | Stop reason: HOSPADM

## 2022-01-01 RX ORDER — DIPHENHYDRAMINE HYDROCHLORIDE 50 MG/ML
50 INJECTION INTRAMUSCULAR; INTRAVENOUS ONCE
Status: DISCONTINUED | OUTPATIENT
Start: 2022-01-01 | End: 2022-01-01 | Stop reason: HOSPADM

## 2022-01-01 RX ORDER — HEPARIN SODIUM (PORCINE) LOCK FLUSH IV SOLN 100 UNIT/ML 100 UNIT/ML
500 SOLUTION INTRAVENOUS PRN
Status: CANCELLED | OUTPATIENT
Start: 2022-01-01

## 2022-01-01 RX ORDER — POTASSIUM CHLORIDE 20 MEQ/1
40 TABLET, EXTENDED RELEASE ORAL PRN
Status: CANCELLED | OUTPATIENT
Start: 2022-01-01

## 2022-01-01 RX ORDER — FLUOROURACIL 50 MG/ML
300 INJECTION, SOLUTION INTRAVENOUS ONCE
Status: COMPLETED | OUTPATIENT
Start: 2022-01-01 | End: 2022-01-01

## 2022-01-01 RX ORDER — SODIUM CHLORIDE 0.9 % (FLUSH) 0.9 %
5-40 SYRINGE (ML) INJECTION PRN
Status: CANCELLED | OUTPATIENT
Start: 2022-01-01

## 2022-01-01 RX ORDER — SODIUM CHLORIDE 9 MG/ML
5-40 INJECTION INTRAVENOUS PRN
Status: CANCELLED | OUTPATIENT
Start: 2022-01-01

## 2022-01-01 RX ORDER — SODIUM CHLORIDE 9 MG/ML
25 INJECTION, SOLUTION INTRAVENOUS PRN
Status: CANCELLED | OUTPATIENT
Start: 2022-01-01

## 2022-01-01 RX ORDER — SODIUM CHLORIDE 0.9 % (FLUSH) 0.9 %
5-40 SYRINGE (ML) INJECTION PRN
OUTPATIENT
Start: 2022-01-01

## 2022-01-01 RX ORDER — ACETAMINOPHEN 650 MG/1
650 SUPPOSITORY RECTAL EVERY 6 HOURS PRN
Status: DISCONTINUED | OUTPATIENT
Start: 2022-01-01 | End: 2022-01-01 | Stop reason: HOSPADM

## 2022-01-01 RX ORDER — ATROPINE SULFATE 0.4 MG/ML
0.4 AMPUL (ML) INJECTION ONCE
Status: COMPLETED | OUTPATIENT
Start: 2022-01-01 | End: 2022-01-01

## 2022-01-01 RX ORDER — DIPHENHYDRAMINE HCL 25 MG
25 TABLET ORAL ONCE
Status: CANCELLED | OUTPATIENT
Start: 2022-01-01 | End: 2022-01-01

## 2022-01-01 RX ORDER — LORAZEPAM 1 MG/1
1 TABLET ORAL ONCE
Status: COMPLETED | OUTPATIENT
Start: 2022-01-01 | End: 2022-01-01

## 2022-01-01 RX ORDER — OXYCODONE HYDROCHLORIDE 5 MG/1
10 TABLET ORAL PRN
Status: DISCONTINUED | OUTPATIENT
Start: 2022-01-01 | End: 2022-01-01 | Stop reason: HOSPADM

## 2022-01-01 RX ORDER — METHYLPREDNISOLONE SODIUM SUCCINATE 125 MG/2ML
125 INJECTION, POWDER, LYOPHILIZED, FOR SOLUTION INTRAMUSCULAR; INTRAVENOUS ONCE
Status: DISCONTINUED | OUTPATIENT
Start: 2022-01-01 | End: 2022-01-01 | Stop reason: HOSPADM

## 2022-01-01 RX ORDER — SCOLOPAMINE TRANSDERMAL SYSTEM 1 MG/1
1 PATCH, EXTENDED RELEASE TRANSDERMAL
Status: DISCONTINUED | OUTPATIENT
Start: 2022-01-01 | End: 2022-01-01

## 2022-01-01 RX ORDER — LEVOTHYROXINE SODIUM 0.05 MG/1
50 TABLET ORAL DAILY
Status: DISCONTINUED | OUTPATIENT
Start: 2022-01-01 | End: 2022-01-01 | Stop reason: HOSPADM

## 2022-01-01 RX ORDER — DEXTROSE MONOHYDRATE 50 MG/ML
5-250 INJECTION, SOLUTION INTRAVENOUS PRN
Status: DISCONTINUED | OUTPATIENT
Start: 2022-01-01 | End: 2022-01-01 | Stop reason: HOSPADM

## 2022-01-01 RX ORDER — SODIUM CHLORIDE 9 MG/ML
20 INJECTION, SOLUTION INTRAVENOUS CONTINUOUS
Status: DISCONTINUED | OUTPATIENT
Start: 2022-01-01 | End: 2022-01-01 | Stop reason: HOSPADM

## 2022-01-01 RX ORDER — SODIUM CHLORIDE 9 MG/ML
INJECTION, SOLUTION INTRAVENOUS PRN
Status: DISCONTINUED | OUTPATIENT
Start: 2022-01-01 | End: 2022-01-01 | Stop reason: SDUPTHER

## 2022-01-01 RX ORDER — LANOLIN ALCOHOL/MO/W.PET/CERES
325 CREAM (GRAM) TOPICAL
Qty: 30 TABLET | Refills: 0 | Status: SHIPPED
Start: 2022-01-01 | End: 2022-01-01

## 2022-01-01 RX ORDER — SODIUM CHLORIDE 9 MG/ML
INJECTION, SOLUTION INTRAVENOUS CONTINUOUS
Status: CANCELLED | OUTPATIENT
Start: 2022-09-27

## 2022-01-01 RX ORDER — FAMOTIDINE 10 MG/ML
20 INJECTION, SOLUTION INTRAVENOUS
Status: CANCELLED | OUTPATIENT
Start: 2022-09-27

## 2022-01-01 RX ORDER — ACETAMINOPHEN 325 MG/1
650 TABLET ORAL EVERY 6 HOURS PRN
Status: DISCONTINUED | OUTPATIENT
Start: 2022-01-01 | End: 2022-01-01 | Stop reason: HOSPADM

## 2022-01-01 RX ORDER — ONDANSETRON 2 MG/ML
4 INJECTION INTRAMUSCULAR; INTRAVENOUS
Status: COMPLETED | OUTPATIENT
Start: 2022-01-01 | End: 2022-01-01

## 2022-01-01 RX ORDER — LIDOCAINE HYDROCHLORIDE 20 MG/ML
INJECTION, SOLUTION EPIDURAL; INFILTRATION; INTRACAUDAL; PERINEURAL PRN
Status: DISCONTINUED | OUTPATIENT
Start: 2022-01-01 | End: 2022-01-01 | Stop reason: SDUPTHER

## 2022-01-01 RX ORDER — ONDANSETRON 2 MG/ML
8 INJECTION INTRAMUSCULAR; INTRAVENOUS
Status: CANCELLED | OUTPATIENT
Start: 2022-09-27

## 2022-01-01 RX ORDER — SODIUM CHLORIDE 0.9 % (FLUSH) 0.9 %
5-40 SYRINGE (ML) INJECTION PRN
Status: CANCELLED | OUTPATIENT
Start: 2022-09-27

## 2022-01-01 RX ORDER — ONDANSETRON 2 MG/ML
4 INJECTION INTRAMUSCULAR; INTRAVENOUS EVERY 6 HOURS PRN
Status: CANCELLED | OUTPATIENT
Start: 2022-01-01

## 2022-01-01 RX ORDER — DIPHENHYDRAMINE HCL 25 MG
25 CAPSULE ORAL ONCE
Status: CANCELLED | OUTPATIENT
Start: 2022-01-01 | End: 2022-01-01

## 2022-01-01 RX ORDER — SODIUM CHLORIDE 9 MG/ML
25 INJECTION, SOLUTION INTRAVENOUS PRN
Status: DISCONTINUED | OUTPATIENT
Start: 2022-01-01 | End: 2022-01-01

## 2022-01-01 RX ORDER — ACETAMINOPHEN 325 MG/1
650 TABLET ORAL ONCE
Status: DISCONTINUED | OUTPATIENT
Start: 2022-01-01 | End: 2022-01-01 | Stop reason: HOSPADM

## 2022-01-01 RX ORDER — PANTOPRAZOLE SODIUM 40 MG/1
40 TABLET, DELAYED RELEASE ORAL
Status: DISCONTINUED | OUTPATIENT
Start: 2022-01-01 | End: 2022-01-01 | Stop reason: HOSPADM

## 2022-01-01 RX ORDER — POTASSIUM CHLORIDE 7.45 MG/ML
10 INJECTION INTRAVENOUS PRN
Status: DISCONTINUED | OUTPATIENT
Start: 2022-01-01 | End: 2022-01-01 | Stop reason: HOSPADM

## 2022-01-01 RX ORDER — HYDROCODONE BITARTRATE AND ACETAMINOPHEN 10; 325 MG/1; MG/1
1 TABLET ORAL EVERY 6 HOURS PRN
Status: DISCONTINUED | OUTPATIENT
Start: 2022-01-01 | End: 2022-01-01 | Stop reason: HOSPADM

## 2022-01-01 RX ORDER — METHYLPREDNISOLONE SODIUM SUCCINATE 125 MG/2ML
125 INJECTION, POWDER, LYOPHILIZED, FOR SOLUTION INTRAMUSCULAR; INTRAVENOUS ONCE
Status: CANCELLED | OUTPATIENT
Start: 2022-01-01 | End: 2022-01-01

## 2022-01-01 RX ORDER — ATROPINE SULFATE 0.1 MG/ML
0.4 INJECTION INTRAVENOUS
Status: DISCONTINUED | OUTPATIENT
Start: 2022-01-01 | End: 2022-01-01 | Stop reason: SDUPTHER

## 2022-01-01 RX ORDER — AMOXICILLIN AND CLAVULANATE POTASSIUM 875; 125 MG/1; MG/1
1 TABLET, FILM COATED ORAL EVERY 12 HOURS SCHEDULED
Status: COMPLETED | OUTPATIENT
Start: 2022-01-01 | End: 2022-01-01

## 2022-01-01 RX ORDER — HYDROMORPHONE HYDROCHLORIDE 2 MG/ML
0.5 INJECTION, SOLUTION INTRAMUSCULAR; INTRAVENOUS; SUBCUTANEOUS EVERY 5 MIN PRN
Status: DISCONTINUED | OUTPATIENT
Start: 2022-01-01 | End: 2022-01-01 | Stop reason: HOSPADM

## 2022-01-01 RX ORDER — HYDROMORPHONE HYDROCHLORIDE 2 MG/ML
0.25 INJECTION, SOLUTION INTRAMUSCULAR; INTRAVENOUS; SUBCUTANEOUS
Status: CANCELLED | OUTPATIENT
Start: 2022-01-01 | End: 2022-01-01

## 2022-01-01 RX ORDER — PROPOFOL 10 MG/ML
INJECTION, EMULSION INTRAVENOUS CONTINUOUS PRN
Status: DISCONTINUED | OUTPATIENT
Start: 2022-01-01 | End: 2022-01-01 | Stop reason: SDUPTHER

## 2022-01-01 RX ORDER — GLUCAGON 1 MG/ML
1 KIT INJECTION PRN
Status: DISCONTINUED | OUTPATIENT
Start: 2022-01-01 | End: 2022-01-01 | Stop reason: HOSPADM

## 2022-01-01 RX ORDER — EPINEPHRINE 1 MG/ML
0.3 INJECTION, SOLUTION, CONCENTRATE INTRAVENOUS PRN
Status: CANCELLED | OUTPATIENT
Start: 2022-09-27

## 2022-01-01 RX ORDER — PANTOPRAZOLE SODIUM 40 MG/1
40 TABLET, DELAYED RELEASE ORAL
Status: CANCELLED | OUTPATIENT
Start: 2022-01-01

## 2022-01-01 RX ORDER — LIDOCAINE HYDROCHLORIDE 10 MG/ML
1 INJECTION, SOLUTION INFILTRATION; PERINEURAL
Status: CANCELLED | OUTPATIENT
Start: 2022-01-01 | End: 2022-01-01

## 2022-01-01 RX ORDER — FUROSEMIDE 10 MG/ML
20 INJECTION INTRAMUSCULAR; INTRAVENOUS ONCE
Status: CANCELLED | OUTPATIENT
Start: 2022-01-01 | End: 2022-01-01

## 2022-01-01 RX ORDER — METRONIDAZOLE 500 MG/1
500 TABLET ORAL EVERY 8 HOURS SCHEDULED
Status: DISCONTINUED | OUTPATIENT
Start: 2022-01-01 | End: 2022-01-01 | Stop reason: HOSPADM

## 2022-01-01 RX ORDER — HYDROMORPHONE HYDROCHLORIDE 2 MG/ML
0.5 INJECTION, SOLUTION INTRAMUSCULAR; INTRAVENOUS; SUBCUTANEOUS EVERY 10 MIN PRN
Status: CANCELLED | OUTPATIENT
Start: 2022-01-01

## 2022-01-01 RX ORDER — DEXTROSE MONOHYDRATE 50 MG/ML
100 INJECTION, SOLUTION INTRAVENOUS PRN
Status: CANCELLED | OUTPATIENT
Start: 2022-01-01

## 2022-01-01 RX ORDER — POTASSIUM CHLORIDE 20 MEQ/1
40 TABLET, EXTENDED RELEASE ORAL PRN
Status: DISCONTINUED | OUTPATIENT
Start: 2022-01-01 | End: 2022-01-01 | Stop reason: HOSPADM

## 2022-01-01 RX ORDER — FUROSEMIDE 10 MG/ML
20 INJECTION INTRAMUSCULAR; INTRAVENOUS ONCE
Status: DISCONTINUED | OUTPATIENT
Start: 2022-01-01 | End: 2022-01-01

## 2022-01-01 RX ORDER — FAMOTIDINE 10 MG/ML
20 INJECTION, SOLUTION INTRAVENOUS
Status: CANCELLED | OUTPATIENT
Start: 2022-01-01

## 2022-01-01 RX ORDER — ATROPINE SULFATE 0.1 MG/ML
0.4 INJECTION INTRAVENOUS ONCE
Status: CANCELLED | OUTPATIENT
Start: 2022-01-01 | End: 2022-01-01

## 2022-01-01 RX ORDER — OXYCODONE HYDROCHLORIDE 5 MG/1
10 TABLET ORAL PRN
Status: CANCELLED | OUTPATIENT
Start: 2022-01-01 | End: 2022-01-01

## 2022-01-01 RX ORDER — MORPHINE SULFATE 2 MG/ML
2 INJECTION, SOLUTION INTRAMUSCULAR; INTRAVENOUS ONCE
Status: COMPLETED | OUTPATIENT
Start: 2022-01-01 | End: 2022-01-01

## 2022-01-01 RX ORDER — OXYCODONE HYDROCHLORIDE 5 MG/1
5 TABLET ORAL PRN
Status: DISCONTINUED | OUTPATIENT
Start: 2022-01-01 | End: 2022-01-01 | Stop reason: HOSPADM

## 2022-01-01 RX ORDER — ERGOCALCIFEROL 1.25 MG/1
50000 CAPSULE ORAL
Qty: 5 CAPSULE | Refills: 0 | Status: SHIPPED
Start: 2022-01-01 | End: 2022-01-01

## 2022-01-01 RX ORDER — POLYETHYLENE GLYCOL 3350 17 G/17G
17 POWDER, FOR SOLUTION ORAL DAILY PRN
Status: DISCONTINUED | OUTPATIENT
Start: 2022-01-01 | End: 2022-01-01 | Stop reason: HOSPADM

## 2022-01-01 RX ORDER — ALBUMIN (HUMAN) 12.5 G/50ML
25 SOLUTION INTRAVENOUS EVERY 6 HOURS
Status: COMPLETED | OUTPATIENT
Start: 2022-01-01 | End: 2022-01-01

## 2022-01-01 RX ORDER — GABAPENTIN 300 MG/1
300 CAPSULE ORAL 2 TIMES DAILY
Status: DISCONTINUED | OUTPATIENT
Start: 2022-01-01 | End: 2022-01-01 | Stop reason: HOSPADM

## 2022-01-01 RX ORDER — LIDOCAINE HYDROCHLORIDE 10 MG/ML
INJECTION, SOLUTION EPIDURAL; INFILTRATION; INTRACAUDAL; PERINEURAL
Status: COMPLETED | OUTPATIENT
Start: 2022-01-01 | End: 2022-01-01

## 2022-01-01 RX ORDER — ONDANSETRON 4 MG/1
4 TABLET, ORALLY DISINTEGRATING ORAL EVERY 8 HOURS PRN
Status: CANCELLED | OUTPATIENT
Start: 2022-01-01

## 2022-01-01 RX ORDER — DIPHENHYDRAMINE HYDROCHLORIDE 50 MG/ML
12.5 INJECTION INTRAMUSCULAR; INTRAVENOUS
Status: CANCELLED | OUTPATIENT
Start: 2022-01-01 | End: 2022-01-01

## 2022-01-01 RX ORDER — ATROPINE SULFATE 0.1 MG/ML
0.4 INJECTION INTRAVENOUS
Status: CANCELLED | OUTPATIENT
Start: 2022-01-01

## 2022-01-01 RX ORDER — SODIUM CHLORIDE 9 MG/ML
INJECTION, SOLUTION INTRAVENOUS PRN
Status: DISCONTINUED | OUTPATIENT
Start: 2022-01-01 | End: 2022-01-01

## 2022-01-01 RX ORDER — POTASSIUM CHLORIDE 7.45 MG/ML
20 INJECTION INTRAVENOUS ONCE
Status: CANCELLED
Start: 2022-01-01

## 2022-01-01 RX ORDER — SODIUM CHLORIDE 9 MG/ML
5-250 INJECTION, SOLUTION INTRAVENOUS PRN
Status: CANCELLED | OUTPATIENT
Start: 2022-09-27

## 2022-01-01 RX ORDER — ONDANSETRON 2 MG/ML
4 INJECTION INTRAMUSCULAR; INTRAVENOUS
Status: DISCONTINUED | OUTPATIENT
Start: 2022-01-01 | End: 2022-01-01 | Stop reason: HOSPADM

## 2022-01-01 RX ORDER — PANTOPRAZOLE SODIUM 40 MG/1
40 TABLET, DELAYED RELEASE ORAL DAILY
Status: ON HOLD | COMMUNITY
End: 2022-01-01 | Stop reason: SDUPTHER

## 2022-01-01 RX ORDER — SODIUM CHLORIDE 9 MG/ML
INJECTION, SOLUTION INTRAVENOUS CONTINUOUS
Status: DISCONTINUED | OUTPATIENT
Start: 2022-01-01 | End: 2022-01-01

## 2022-01-01 RX ORDER — MAGNESIUM SULFATE IN WATER 40 MG/ML
2000 INJECTION, SOLUTION INTRAVENOUS PRN
Status: DISCONTINUED | OUTPATIENT
Start: 2022-01-01 | End: 2022-01-01 | Stop reason: HOSPADM

## 2022-01-01 RX ORDER — GLUCAGON 1 MG/ML
1 KIT INJECTION PRN
Status: CANCELLED | OUTPATIENT
Start: 2022-01-01

## 2022-01-01 RX ORDER — AMOXICILLIN AND CLAVULANATE POTASSIUM 875; 125 MG/1; MG/1
1 TABLET, FILM COATED ORAL EVERY 12 HOURS SCHEDULED
Status: DISCONTINUED | OUTPATIENT
Start: 2022-01-01 | End: 2022-01-01 | Stop reason: HOSPADM

## 2022-01-01 RX ORDER — ERGOCALCIFEROL 1.25 MG/1
50000 CAPSULE ORAL
Status: DISCONTINUED | OUTPATIENT
Start: 2022-01-01 | End: 2022-01-01 | Stop reason: HOSPADM

## 2022-01-01 RX ORDER — HYDROMORPHONE HYDROCHLORIDE 2 MG/ML
0.5 INJECTION, SOLUTION INTRAMUSCULAR; INTRAVENOUS; SUBCUTANEOUS EVERY 5 MIN PRN
Status: CANCELLED | OUTPATIENT
Start: 2022-01-01

## 2022-01-01 RX ORDER — ALBUMIN (HUMAN) 12.5 G/50ML
25 SOLUTION INTRAVENOUS ONCE
Status: DISCONTINUED | OUTPATIENT
Start: 2022-01-01 | End: 2022-01-01 | Stop reason: SDUPTHER

## 2022-01-01 RX ORDER — ONDANSETRON 4 MG/1
8 TABLET, ORALLY DISINTEGRATING ORAL EVERY 8 HOURS PRN
Status: DISCONTINUED | OUTPATIENT
Start: 2022-01-01 | End: 2022-01-01 | Stop reason: HOSPADM

## 2022-01-01 RX ORDER — HEPARIN SODIUM (PORCINE) LOCK FLUSH IV SOLN 100 UNIT/ML 100 UNIT/ML
300 SOLUTION INTRAVENOUS PRN
Status: DISCONTINUED | OUTPATIENT
Start: 2022-01-01 | End: 2022-01-01 | Stop reason: HOSPADM

## 2022-01-01 RX ORDER — ACETAMINOPHEN 500 MG
1000 TABLET ORAL ONCE
Status: DISCONTINUED | OUTPATIENT
Start: 2022-01-01 | End: 2022-01-01 | Stop reason: HOSPADM

## 2022-01-01 RX ORDER — 0.9 % SODIUM CHLORIDE 0.9 %
1000 INTRAVENOUS SOLUTION INTRAVENOUS ONCE
Status: COMPLETED | OUTPATIENT
Start: 2022-01-01 | End: 2022-01-01

## 2022-01-01 RX ORDER — SODIUM CHLORIDE 9 MG/ML
5-40 INJECTION INTRAVENOUS PRN
Status: CANCELLED | OUTPATIENT
Start: 2022-09-27

## 2022-01-01 RX ORDER — ATROPINE SULFATE 0.4 MG/ML
0.4 AMPUL (ML) INJECTION ONCE
Status: ACTIVE | OUTPATIENT
Start: 2022-01-01

## 2022-01-01 RX ORDER — SUCRALFATE 1 G/1
TABLET ORAL
Status: ON HOLD | COMMUNITY
Start: 2022-01-01 | End: 2022-01-01 | Stop reason: HOSPADM

## 2022-01-01 RX ORDER — DIPHENHYDRAMINE HCL 25 MG
25 CAPSULE ORAL ONCE
Status: COMPLETED | OUTPATIENT
Start: 2022-01-01 | End: 2022-01-01

## 2022-01-01 RX ORDER — ACETAMINOPHEN 325 MG/1
650 TABLET ORAL
Status: CANCELLED | OUTPATIENT
Start: 2022-09-27

## 2022-01-01 RX ORDER — HEPARIN SODIUM (PORCINE) LOCK FLUSH IV SOLN 100 UNIT/ML 100 UNIT/ML
100 SOLUTION INTRAVENOUS PRN
Status: DISCONTINUED | OUTPATIENT
Start: 2022-01-01 | End: 2022-01-01

## 2022-01-01 RX ORDER — MIRTAZAPINE 15 MG/1
30 TABLET, FILM COATED ORAL NIGHTLY
Status: CANCELLED | OUTPATIENT
Start: 2022-01-01

## 2022-01-01 RX ORDER — HYDROCODONE BITARTRATE AND ACETAMINOPHEN 10; 325 MG/1; MG/1
1 TABLET ORAL EVERY 6 HOURS PRN
Qty: 56 TABLET | Refills: 0 | Status: SHIPPED | OUTPATIENT
Start: 2022-01-01 | End: 2022-01-01 | Stop reason: SDUPTHER

## 2022-01-01 RX ORDER — LIDOCAINE HYDROCHLORIDE 20 MG/ML
2 INJECTION, SOLUTION EPIDURAL; INFILTRATION; INTRACAUDAL; PERINEURAL ONCE
Status: COMPLETED | OUTPATIENT
Start: 2022-01-01 | End: 2022-01-01

## 2022-01-01 RX ORDER — EPINEPHRINE 1 MG/ML
0.3 INJECTION, SOLUTION, CONCENTRATE INTRAVENOUS PRN
Status: DISCONTINUED | OUTPATIENT
Start: 2022-01-01 | End: 2022-01-01 | Stop reason: HOSPADM

## 2022-01-01 RX ORDER — SODIUM CHLORIDE 9 MG/ML
5-250 INJECTION, SOLUTION INTRAVENOUS PRN
Status: DISCONTINUED | OUTPATIENT
Start: 2022-01-01 | End: 2022-01-01 | Stop reason: HOSPADM

## 2022-01-01 RX ORDER — ALBUTEROL SULFATE 90 UG/1
4 AEROSOL, METERED RESPIRATORY (INHALATION) PRN
Status: CANCELLED | OUTPATIENT
Start: 2022-09-27

## 2022-01-01 RX ORDER — PROCHLORPERAZINE EDISYLATE 5 MG/ML
10 INJECTION INTRAMUSCULAR; INTRAVENOUS EVERY 6 HOURS PRN
Status: DISCONTINUED | OUTPATIENT
Start: 2022-01-01 | End: 2022-01-01 | Stop reason: HOSPADM

## 2022-01-01 RX ORDER — MIDAZOLAM HYDROCHLORIDE 2 MG/2ML
2 INJECTION, SOLUTION INTRAMUSCULAR; INTRAVENOUS
Status: DISCONTINUED | OUTPATIENT
Start: 2022-01-01 | End: 2022-01-01 | Stop reason: HOSPADM

## 2022-01-01 RX ORDER — ACETAMINOPHEN 650 MG/1
650 SUPPOSITORY RECTAL EVERY 6 HOURS PRN
Status: CANCELLED | OUTPATIENT
Start: 2022-01-01

## 2022-01-01 RX ORDER — DEXTROSE MONOHYDRATE 50 MG/ML
5-250 INJECTION, SOLUTION INTRAVENOUS PRN
Status: CANCELLED | OUTPATIENT
Start: 2022-01-01

## 2022-01-01 RX ORDER — POTASSIUM CHLORIDE 7.45 MG/ML
10 INJECTION INTRAVENOUS PRN
Status: CANCELLED | OUTPATIENT
Start: 2022-01-01

## 2022-01-01 RX ORDER — DIPHENHYDRAMINE HCL 25 MG
25 CAPSULE ORAL EVERY 6 HOURS PRN
Status: DISCONTINUED | OUTPATIENT
Start: 2022-01-01 | End: 2022-01-01 | Stop reason: HOSPADM

## 2022-01-01 RX ORDER — LORAZEPAM 1 MG/1
2 TABLET ORAL NIGHTLY PRN
Status: DISCONTINUED | OUTPATIENT
Start: 2022-01-01 | End: 2022-01-01 | Stop reason: HOSPADM

## 2022-01-01 RX ORDER — SODIUM CHLORIDE 0.9 % (FLUSH) 0.9 %
5-40 SYRINGE (ML) INJECTION EVERY 12 HOURS SCHEDULED
Status: CANCELLED | OUTPATIENT
Start: 2022-01-01

## 2022-01-01 RX ORDER — ERGOCALCIFEROL 1.25 MG/1
50000 CAPSULE ORAL
Status: COMPLETED | OUTPATIENT
Start: 2022-01-01 | End: 2022-01-01

## 2022-01-01 RX ORDER — ALBUMIN (HUMAN) 12.5 G/50ML
SOLUTION INTRAVENOUS CONTINUOUS PRN
Status: COMPLETED | OUTPATIENT
Start: 2022-01-01 | End: 2022-01-01

## 2022-01-01 RX ORDER — FUROSEMIDE 40 MG/1
40 TABLET ORAL ONCE
Status: COMPLETED | OUTPATIENT
Start: 2022-01-01 | End: 2022-01-01

## 2022-01-01 RX ORDER — HYDROCODONE BITARTRATE AND ACETAMINOPHEN 10; 325 MG/1; MG/1
1 TABLET ORAL EVERY 6 HOURS PRN
Qty: 56 TABLET | Refills: 0 | Status: SHIPPED | OUTPATIENT
Start: 2022-01-01 | End: 2022-09-22

## 2022-01-01 RX ORDER — MEPERIDINE HYDROCHLORIDE 50 MG/ML
12.5 INJECTION INTRAMUSCULAR; INTRAVENOUS; SUBCUTANEOUS PRN
Status: CANCELLED | OUTPATIENT
Start: 2022-09-27

## 2022-01-01 RX ORDER — OXYCODONE HYDROCHLORIDE 5 MG/1
5 TABLET ORAL PRN
Status: CANCELLED | OUTPATIENT
Start: 2022-01-01 | End: 2022-01-01

## 2022-01-01 RX ORDER — SODIUM CHLORIDE, SODIUM LACTATE, POTASSIUM CHLORIDE, CALCIUM CHLORIDE 600; 310; 30; 20 MG/100ML; MG/100ML; MG/100ML; MG/100ML
INJECTION, SOLUTION INTRAVENOUS CONTINUOUS PRN
Status: DISCONTINUED | OUTPATIENT
Start: 2022-01-01 | End: 2022-01-01 | Stop reason: SDUPTHER

## 2022-01-01 RX ORDER — DEXTROSE MONOHYDRATE 50 MG/ML
100 INJECTION, SOLUTION INTRAVENOUS PRN
Status: DISCONTINUED | OUTPATIENT
Start: 2022-01-01 | End: 2022-01-01 | Stop reason: HOSPADM

## 2022-01-01 RX ORDER — MIRTAZAPINE 30 MG/1
30 TABLET, FILM COATED ORAL NIGHTLY
Qty: 30 TABLET | Refills: 0 | Status: SHIPPED | OUTPATIENT
Start: 2022-01-01

## 2022-01-01 RX ORDER — SODIUM CHLORIDE 9 MG/ML
10 INJECTION INTRAVENOUS ONCE
Status: DISCONTINUED | OUTPATIENT
Start: 2022-01-01 | End: 2022-01-01

## 2022-01-01 RX ORDER — FENTANYL CITRATE 50 UG/ML
25 INJECTION, SOLUTION INTRAMUSCULAR; INTRAVENOUS EVERY 5 MIN PRN
Status: CANCELLED | OUTPATIENT
Start: 2022-01-01

## 2022-01-01 RX ORDER — FLUOROURACIL 50 MG/ML
300 INJECTION, SOLUTION INTRAVENOUS ONCE
Status: CANCELLED | OUTPATIENT
Start: 2022-01-01 | End: 2022-01-01

## 2022-01-01 RX ORDER — 0.9 % SODIUM CHLORIDE 0.9 %
1000 INTRAVENOUS SOLUTION INTRAVENOUS ONCE
Status: DISCONTINUED | OUTPATIENT
Start: 2022-01-01 | End: 2022-01-01

## 2022-01-01 RX ORDER — HEPARIN SODIUM (PORCINE) LOCK FLUSH IV SOLN 100 UNIT/ML 100 UNIT/ML
500 SOLUTION INTRAVENOUS ONCE
Status: COMPLETED | OUTPATIENT
Start: 2022-01-01 | End: 2022-01-01

## 2022-01-01 RX ORDER — ALBUMIN (HUMAN) 12.5 G/50ML
25 SOLUTION INTRAVENOUS ONCE
Status: COMPLETED | OUTPATIENT
Start: 2022-01-01 | End: 2022-01-01

## 2022-01-01 RX ORDER — HEPARIN SODIUM (PORCINE) LOCK FLUSH IV SOLN 100 UNIT/ML 100 UNIT/ML
500 SOLUTION INTRAVENOUS PRN
Status: DISCONTINUED | OUTPATIENT
Start: 2022-01-01 | End: 2022-01-01 | Stop reason: HOSPADM

## 2022-01-01 RX ORDER — PANTOPRAZOLE SODIUM 40 MG/1
40 TABLET, DELAYED RELEASE ORAL DAILY
Qty: 30 TABLET | Refills: 0 | Status: SHIPPED | OUTPATIENT
Start: 2022-01-01 | End: 2022-01-01

## 2022-01-01 RX ORDER — ONDANSETRON 2 MG/ML
8 INJECTION INTRAMUSCULAR; INTRAVENOUS ONCE
Status: CANCELLED | OUTPATIENT
Start: 2022-01-01 | End: 2022-01-01

## 2022-01-01 RX ORDER — LEVOTHYROXINE SODIUM 0.05 MG/1
50 TABLET ORAL DAILY
Qty: 90 TABLET | Refills: 1 | Status: SHIPPED | OUTPATIENT
Start: 2022-01-01

## 2022-01-01 RX ORDER — HYDROMORPHONE HYDROCHLORIDE 2 MG/ML
0.5 INJECTION, SOLUTION INTRAMUSCULAR; INTRAVENOUS; SUBCUTANEOUS
Status: CANCELLED | OUTPATIENT
Start: 2022-01-01 | End: 2022-01-01

## 2022-01-01 RX ORDER — FUROSEMIDE 10 MG/ML
40 INJECTION INTRAMUSCULAR; INTRAVENOUS ONCE
Status: COMPLETED | OUTPATIENT
Start: 2022-01-01 | End: 2022-01-01

## 2022-01-01 RX ORDER — DIPHENHYDRAMINE HYDROCHLORIDE 50 MG/ML
50 INJECTION INTRAMUSCULAR; INTRAVENOUS
Status: CANCELLED | OUTPATIENT
Start: 2022-09-27

## 2022-01-01 RX ORDER — GABAPENTIN 300 MG/1
300 CAPSULE ORAL 2 TIMES DAILY
Status: CANCELLED | OUTPATIENT
Start: 2022-01-01

## 2022-01-01 RX ORDER — LEVOTHYROXINE SODIUM 0.05 MG/1
50 TABLET ORAL
Status: DISCONTINUED | OUTPATIENT
Start: 2022-01-01 | End: 2022-01-01 | Stop reason: HOSPADM

## 2022-01-01 RX ORDER — METRONIDAZOLE 500 MG/100ML
500 INJECTION, SOLUTION INTRAVENOUS EVERY 8 HOURS
Status: CANCELLED | OUTPATIENT
Start: 2022-01-01

## 2022-01-01 RX ORDER — HYDROMORPHONE HYDROCHLORIDE 2 MG/ML
0.25 INJECTION, SOLUTION INTRAMUSCULAR; INTRAVENOUS; SUBCUTANEOUS EVERY 5 MIN PRN
Status: CANCELLED | OUTPATIENT
Start: 2022-01-01

## 2022-01-01 RX ORDER — ATROPINE SULFATE 0.4 MG/ML
0.4 AMPUL (ML) INJECTION ONCE
Status: DISCONTINUED | OUTPATIENT
Start: 2022-01-01 | End: 2022-01-01 | Stop reason: SDUPTHER

## 2022-01-01 RX ORDER — ONDANSETRON 2 MG/ML
4 INJECTION INTRAMUSCULAR; INTRAVENOUS
Status: CANCELLED | OUTPATIENT
Start: 2022-01-01 | End: 2022-01-01

## 2022-01-01 RX ORDER — LIDOCAINE HYDROCHLORIDE 10 MG/ML
1 INJECTION, SOLUTION EPIDURAL; INFILTRATION; INTRACAUDAL; PERINEURAL
Status: CANCELLED | OUTPATIENT
Start: 2022-01-01 | End: 2022-01-01

## 2022-01-01 RX ORDER — LABETALOL HYDROCHLORIDE 5 MG/ML
10 INJECTION, SOLUTION INTRAVENOUS
Status: DISCONTINUED | OUTPATIENT
Start: 2022-01-01 | End: 2022-01-01 | Stop reason: HOSPADM

## 2022-01-01 RX ORDER — AMOXICILLIN AND CLAVULANATE POTASSIUM 875; 125 MG/1; MG/1
1 TABLET, FILM COATED ORAL EVERY 12 HOURS SCHEDULED
Qty: 8 TABLET | Refills: 0 | Status: ON HOLD
Start: 2022-01-01 | End: 2022-01-01 | Stop reason: HOSPADM

## 2022-01-01 RX ORDER — MIDAZOLAM HYDROCHLORIDE 2 MG/2ML
2 INJECTION, SOLUTION INTRAMUSCULAR; INTRAVENOUS
Status: CANCELLED | OUTPATIENT
Start: 2022-01-01 | End: 2022-01-01

## 2022-01-01 RX ORDER — HEPARIN SODIUM (PORCINE) LOCK FLUSH IV SOLN 100 UNIT/ML 100 UNIT/ML
500 SOLUTION INTRAVENOUS PRN
Status: CANCELLED | OUTPATIENT
Start: 2022-09-27

## 2022-01-01 RX ORDER — HYDROCODONE BITARTRATE AND ACETAMINOPHEN 10; 325 MG/1; MG/1
1 TABLET ORAL EVERY 6 HOURS PRN
Qty: 56 TABLET | Refills: 0 | Status: CANCELLED | OUTPATIENT
Start: 2022-01-01 | End: 2022-09-21

## 2022-01-01 RX ORDER — LEVOTHYROXINE SODIUM 0.05 MG/1
50 TABLET ORAL DAILY
Qty: 90 TABLET | Refills: 1 | Status: SHIPPED | OUTPATIENT
Start: 2022-01-01 | End: 2022-01-01 | Stop reason: SDUPTHER

## 2022-01-01 RX ORDER — LEVOTHYROXINE SODIUM 0.05 MG/1
50 TABLET ORAL
Status: CANCELLED | OUTPATIENT
Start: 2022-01-01

## 2022-01-01 RX ORDER — POTASSIUM CHLORIDE 14.9 MG/ML
20 INJECTION INTRAVENOUS ONCE
Status: COMPLETED | OUTPATIENT
Start: 2022-01-01 | End: 2022-01-01

## 2022-01-01 RX ORDER — LIDOCAINE HYDROCHLORIDE 10 MG/ML
2 INJECTION, SOLUTION EPIDURAL; INFILTRATION; INTRACAUDAL; PERINEURAL ONCE
Status: DISCONTINUED | OUTPATIENT
Start: 2022-01-01 | End: 2022-01-01

## 2022-01-01 RX ORDER — MIRTAZAPINE 30 MG/1
30 TABLET, FILM COATED ORAL NIGHTLY
Qty: 30 TABLET | Refills: 5 | Status: SHIPPED | OUTPATIENT
Start: 2022-01-01 | End: 2022-01-01

## 2022-01-01 RX ORDER — SODIUM CHLORIDE 0.9 % (FLUSH) 0.9 %
10 SYRINGE (ML) INJECTION PRN
OUTPATIENT
Start: 2022-01-01

## 2022-01-01 RX ORDER — PROCHLORPERAZINE EDISYLATE 5 MG/ML
10 INJECTION INTRAMUSCULAR; INTRAVENOUS EVERY 6 HOURS PRN
Status: DISCONTINUED | OUTPATIENT
Start: 2022-01-01 | End: 2022-01-01 | Stop reason: SDUPTHER

## 2022-01-01 RX ORDER — ONDANSETRON 8 MG/1
4 TABLET, ORALLY DISINTEGRATING ORAL EVERY 8 HOURS PRN
Status: DISCONTINUED | OUTPATIENT
Start: 2022-01-01 | End: 2022-01-01 | Stop reason: HOSPADM

## 2022-01-01 RX ORDER — ONDANSETRON 2 MG/ML
INJECTION INTRAMUSCULAR; INTRAVENOUS PRN
Status: DISCONTINUED | OUTPATIENT
Start: 2022-01-01 | End: 2022-01-01 | Stop reason: SDUPTHER

## 2022-01-01 RX ORDER — SODIUM CHLORIDE 9 MG/ML
INJECTION, SOLUTION INTRAVENOUS CONTINUOUS PRN
Status: COMPLETED | OUTPATIENT
Start: 2022-01-01 | End: 2022-01-01

## 2022-01-01 RX ORDER — PHENYLEPHRINE HYDROCHLORIDE 10 MG/ML
INJECTION INTRAVENOUS PRN
Status: DISCONTINUED | OUTPATIENT
Start: 2022-01-01 | End: 2022-01-01 | Stop reason: SDUPTHER

## 2022-01-01 RX ORDER — SCOLOPAMINE TRANSDERMAL SYSTEM 1 MG/1
1 PATCH, EXTENDED RELEASE TRANSDERMAL ONCE
Status: DISCONTINUED | OUTPATIENT
Start: 2022-01-01 | End: 2022-01-01 | Stop reason: HOSPADM

## 2022-01-01 RX ORDER — MEPERIDINE HYDROCHLORIDE 25 MG/ML
12.5 INJECTION INTRAMUSCULAR; INTRAVENOUS; SUBCUTANEOUS PRN
Status: CANCELLED | OUTPATIENT
Start: 2022-01-01

## 2022-01-01 RX ORDER — IPRATROPIUM BROMIDE AND ALBUTEROL SULFATE 2.5; .5 MG/3ML; MG/3ML
1 SOLUTION RESPIRATORY (INHALATION)
Status: CANCELLED | OUTPATIENT
Start: 2022-01-01 | End: 2022-01-01

## 2022-01-01 RX ORDER — SODIUM BICARBONATE 650 MG/1
650 TABLET ORAL 4 TIMES DAILY
Status: DISCONTINUED | OUTPATIENT
Start: 2022-01-01 | End: 2022-01-01

## 2022-01-01 RX ORDER — ATROPINE SULFATE 0.4 MG/ML
0.4 AMPUL (ML) INJECTION ONCE
Status: CANCELLED
Start: 2022-01-01 | End: 2022-01-01

## 2022-01-01 RX ORDER — POLYETHYLENE GLYCOL 3350 17 G/17G
17 POWDER, FOR SOLUTION ORAL DAILY PRN
Status: CANCELLED | OUTPATIENT
Start: 2022-01-01

## 2022-01-01 RX ORDER — PROCHLORPERAZINE MALEATE 10 MG
10 TABLET ORAL EVERY 6 HOURS PRN
Status: DISCONTINUED | OUTPATIENT
Start: 2022-01-01 | End: 2022-01-01 | Stop reason: HOSPADM

## 2022-01-01 RX ORDER — MAGNESIUM SULFATE IN WATER 40 MG/ML
2000 INJECTION, SOLUTION INTRAVENOUS PRN
Status: CANCELLED | OUTPATIENT
Start: 2022-01-01

## 2022-01-01 RX ADMIN — SODIUM CHLORIDE, PRESERVATIVE FREE 10 ML: 5 INJECTION INTRAVENOUS at 10:29

## 2022-01-01 RX ADMIN — HYDROCODONE BITARTRATE AND ACETAMINOPHEN 1 TABLET: 10; 325 TABLET ORAL at 16:24

## 2022-01-01 RX ADMIN — ATROPINE SULFATE 0.4 MG: 0.1 INJECTION, SOLUTION INTRAVENOUS at 13:00

## 2022-01-01 RX ADMIN — SODIUM CHLORIDE, PRESERVATIVE FREE 10 ML: 5 INJECTION INTRAVENOUS at 08:39

## 2022-01-01 RX ADMIN — PROPOFOL 200 MG: 10 INJECTION, EMULSION INTRAVENOUS at 13:28

## 2022-01-01 RX ADMIN — SODIUM CHLORIDE, PRESERVATIVE FREE 10 ML: 5 INJECTION INTRAVENOUS at 09:38

## 2022-01-01 RX ADMIN — ALBUMIN (HUMAN) 25 G: 0.25 INJECTION, SOLUTION INTRAVENOUS at 08:46

## 2022-01-01 RX ADMIN — Medication 5 UNITS: at 23:00

## 2022-01-01 RX ADMIN — SODIUM CHLORIDE, POTASSIUM CHLORIDE, SODIUM LACTATE AND CALCIUM CHLORIDE: 600; 310; 30; 20 INJECTION, SOLUTION INTRAVENOUS at 12:42

## 2022-01-01 RX ADMIN — SODIUM BICARBONATE 650 MG TABLET 650 MG: at 08:26

## 2022-01-01 RX ADMIN — DEXAMETHASONE SODIUM PHOSPHATE 12 MG: 4 INJECTION, SOLUTION INTRAMUSCULAR; INTRAVENOUS at 10:03

## 2022-01-01 RX ADMIN — ACETAMINOPHEN 650 MG: 325 TABLET, FILM COATED ORAL at 00:26

## 2022-01-01 RX ADMIN — LORAZEPAM 2 MG: 1 TABLET ORAL at 20:43

## 2022-01-01 RX ADMIN — SODIUM CHLORIDE, PRESERVATIVE FREE 10 ML: 5 INJECTION INTRAVENOUS at 14:28

## 2022-01-01 RX ADMIN — ACETAMINOPHEN 650 MG: 325 TABLET, FILM COATED ORAL at 02:56

## 2022-01-01 RX ADMIN — ALBUMIN (HUMAN) 25 G: 0.25 INJECTION, SOLUTION INTRAVENOUS at 10:28

## 2022-01-01 RX ADMIN — SODIUM CHLORIDE: 9 INJECTION, SOLUTION INTRAVENOUS at 14:15

## 2022-01-01 RX ADMIN — LORAZEPAM 2 MG: 1 TABLET ORAL at 21:09

## 2022-01-01 RX ADMIN — PANTOPRAZOLE SODIUM 40 MG: 40 TABLET, DELAYED RELEASE ORAL at 05:44

## 2022-01-01 RX ADMIN — CEFEPIME 2000 MG: 2 INJECTION, POWDER, FOR SOLUTION INTRAVENOUS at 23:12

## 2022-01-01 RX ADMIN — ERGOCALCIFEROL 50000 UNITS: 1.25 CAPSULE ORAL at 10:28

## 2022-01-01 RX ADMIN — SODIUM CHLORIDE, PRESERVATIVE FREE 10 ML: 5 INJECTION INTRAVENOUS at 09:10

## 2022-01-01 RX ADMIN — PANTOPRAZOLE SODIUM 40 MG: 40 TABLET, DELAYED RELEASE ORAL at 05:14

## 2022-01-01 RX ADMIN — ACETAMINOPHEN 650 MG: 325 TABLET ORAL at 08:43

## 2022-01-01 RX ADMIN — ACETAMINOPHEN 650 MG: 325 TABLET, FILM COATED ORAL at 02:06

## 2022-01-01 RX ADMIN — CEFEPIME 2000 MG: 2 INJECTION, POWDER, FOR SOLUTION INTRAVENOUS at 16:47

## 2022-01-01 RX ADMIN — CEFTRIAXONE 1000 MG: 1 INJECTION, POWDER, FOR SOLUTION INTRAMUSCULAR; INTRAVENOUS at 15:21

## 2022-01-01 RX ADMIN — PROCHLORPERAZINE EDISYLATE 10 MG: 5 INJECTION INTRAMUSCULAR; INTRAVENOUS at 21:27

## 2022-01-01 RX ADMIN — HYDROCODONE BITARTRATE AND ACETAMINOPHEN 1 TABLET: 10; 325 TABLET ORAL at 17:30

## 2022-01-01 RX ADMIN — ERGOCALCIFEROL 50000 UNITS: 1.25 CAPSULE ORAL at 08:29

## 2022-01-01 RX ADMIN — HYDROCODONE BITARTRATE AND ACETAMINOPHEN 1 TABLET: 10; 325 TABLET ORAL at 03:06

## 2022-01-01 RX ADMIN — DEXTROSE MONOHYDRATE 50 ML/HR: 5 INJECTION, SOLUTION INTRAVENOUS at 09:00

## 2022-01-01 RX ADMIN — SODIUM CHLORIDE, PRESERVATIVE FREE 10 ML: 5 INJECTION INTRAVENOUS at 14:25

## 2022-01-01 RX ADMIN — MIRTAZAPINE 30 MG: 15 TABLET, FILM COATED ORAL at 20:44

## 2022-01-01 RX ADMIN — DIPHENHYDRAMINE HYDROCHLORIDE 25 MG: 25 CAPSULE ORAL at 14:54

## 2022-01-01 RX ADMIN — GABAPENTIN 300 MG: 300 CAPSULE ORAL at 20:43

## 2022-01-01 RX ADMIN — PROPOFOL 50 MG: 10 INJECTION, EMULSION INTRAVENOUS at 12:24

## 2022-01-01 RX ADMIN — LIDOCAINE HYDROCHLORIDE 10 ML: 10 INJECTION, SOLUTION EPIDURAL; INFILTRATION; INTRACAUDAL; PERINEURAL at 11:50

## 2022-01-01 RX ADMIN — PANTOPRAZOLE SODIUM 40 MG: 40 TABLET, DELAYED RELEASE ORAL at 08:38

## 2022-01-01 RX ADMIN — PROPOFOL 20 MG: 10 INJECTION, EMULSION INTRAVENOUS at 12:31

## 2022-01-01 RX ADMIN — PANTOPRAZOLE SODIUM 40 MG: 40 TABLET, DELAYED RELEASE ORAL at 15:37

## 2022-01-01 RX ADMIN — CEFEPIME 2000 MG: 2 INJECTION, POWDER, FOR SOLUTION INTRAVENOUS at 05:17

## 2022-01-01 RX ADMIN — LEVOTHYROXINE SODIUM 50 MCG: 0.05 TABLET ORAL at 07:54

## 2022-01-01 RX ADMIN — SODIUM CHLORIDE, PRESERVATIVE FREE 10 ML: 5 INJECTION INTRAVENOUS at 12:57

## 2022-01-01 RX ADMIN — MIRTAZAPINE 30 MG: 15 TABLET, FILM COATED ORAL at 21:24

## 2022-01-01 RX ADMIN — AMOXICILLIN AND CLAVULANATE POTASSIUM 1 TABLET: 875; 125 TABLET, FILM COATED ORAL at 22:46

## 2022-01-01 RX ADMIN — CEFEPIME 2000 MG: 2 INJECTION, POWDER, FOR SOLUTION INTRAVENOUS at 05:36

## 2022-01-01 RX ADMIN — FLUOROURACIL 3850 MG: 50 INJECTION, SOLUTION INTRAVENOUS at 14:58

## 2022-01-01 RX ADMIN — SODIUM CHLORIDE, PRESERVATIVE FREE 10 ML: 5 INJECTION INTRAVENOUS at 22:07

## 2022-01-01 RX ADMIN — ALBUMIN (HUMAN) 25 G: 0.25 INJECTION, SOLUTION INTRAVENOUS at 16:46

## 2022-01-01 RX ADMIN — GABAPENTIN 300 MG: 300 CAPSULE ORAL at 21:08

## 2022-01-01 RX ADMIN — SODIUM CHLORIDE, PRESERVATIVE FREE 10 ML: 5 INJECTION INTRAVENOUS at 09:07

## 2022-01-01 RX ADMIN — METRONIDAZOLE 500 MG: 500 INJECTION, SOLUTION INTRAVENOUS at 22:45

## 2022-01-01 RX ADMIN — SODIUM CHLORIDE, PRESERVATIVE FREE 10 ML: 5 INJECTION INTRAVENOUS at 09:26

## 2022-01-01 RX ADMIN — MIRTAZAPINE 30 MG: 15 TABLET, FILM COATED ORAL at 20:48

## 2022-01-01 RX ADMIN — GABAPENTIN 300 MG: 300 CAPSULE ORAL at 21:24

## 2022-01-01 RX ADMIN — LIDOCAINE HYDROCHLORIDE 10 ML: 10 INJECTION, SOLUTION EPIDURAL; INFILTRATION; INTRACAUDAL; PERINEURAL at 10:27

## 2022-01-01 RX ADMIN — SODIUM CHLORIDE, SODIUM LACTATE, POTASSIUM CHLORIDE, AND CALCIUM CHLORIDE: 600; 310; 30; 20 INJECTION, SOLUTION INTRAVENOUS at 15:34

## 2022-01-01 RX ADMIN — METRONIDAZOLE 500 MG: 500 INJECTION, SOLUTION INTRAVENOUS at 01:01

## 2022-01-01 RX ADMIN — ALBUMIN (HUMAN) 25 G: 0.25 INJECTION, SOLUTION INTRAVENOUS at 05:00

## 2022-01-01 RX ADMIN — Medication 100 MG: at 15:40

## 2022-01-01 RX ADMIN — CEFEPIME 2000 MG: 2 INJECTION, POWDER, FOR SOLUTION INTRAVENOUS at 23:17

## 2022-01-01 RX ADMIN — PANTOPRAZOLE SODIUM 40 MG: 40 TABLET, DELAYED RELEASE ORAL at 08:54

## 2022-01-01 RX ADMIN — LEVOTHYROXINE SODIUM 50 MCG: 0.05 TABLET ORAL at 10:28

## 2022-01-01 RX ADMIN — ALBUMIN (HUMAN) 25 G: 0.25 INJECTION, SOLUTION INTRAVENOUS at 22:46

## 2022-01-01 RX ADMIN — Medication 10 MG: at 16:10

## 2022-01-01 RX ADMIN — AZITHROMYCIN MONOHYDRATE 500 MG: 500 INJECTION, POWDER, LYOPHILIZED, FOR SOLUTION INTRAVENOUS at 02:28

## 2022-01-01 RX ADMIN — MIRTAZAPINE 30 MG: 15 TABLET, FILM COATED ORAL at 21:08

## 2022-01-01 RX ADMIN — LEVOTHYROXINE SODIUM 50 MCG: 0.05 TABLET ORAL at 08:28

## 2022-01-01 RX ADMIN — POTASSIUM CHLORIDE 40 MEQ: 20 TABLET, EXTENDED RELEASE ORAL at 08:37

## 2022-01-01 RX ADMIN — METRONIDAZOLE 500 MG: 500 INJECTION, SOLUTION INTRAVENOUS at 23:03

## 2022-01-01 RX ADMIN — HYDROMORPHONE HYDROCHLORIDE 0.5 MG: 2 INJECTION, SOLUTION INTRAMUSCULAR; INTRAVENOUS; SUBCUTANEOUS at 14:39

## 2022-01-01 RX ADMIN — DIPHENHYDRAMINE HYDROCHLORIDE 25 MG: 25 CAPSULE ORAL at 08:43

## 2022-01-01 RX ADMIN — SODIUM CHLORIDE, PRESERVATIVE FREE 10 ML: 5 INJECTION INTRAVENOUS at 23:06

## 2022-01-01 RX ADMIN — IOPAMIDOL 100 ML: 755 INJECTION, SOLUTION INTRAVENOUS at 06:04

## 2022-01-01 RX ADMIN — PEGFILGRASTIM-CBQV 6 MG: 6 INJECTION, SOLUTION SUBCUTANEOUS at 14:00

## 2022-01-01 RX ADMIN — AMOXICILLIN AND CLAVULANATE POTASSIUM 1 TABLET: 875; 125 TABLET, FILM COATED ORAL at 21:08

## 2022-01-01 RX ADMIN — ONDANSETRON 4 MG: 2 INJECTION INTRAMUSCULAR; INTRAVENOUS at 02:54

## 2022-01-01 RX ADMIN — FOSAPREPITANT 150 MG: 150 INJECTION, POWDER, LYOPHILIZED, FOR SOLUTION INTRAVENOUS at 10:15

## 2022-01-01 RX ADMIN — METRONIDAZOLE 500 MG: 500 INJECTION, SOLUTION INTRAVENOUS at 09:08

## 2022-01-01 RX ADMIN — Medication 10 MG: at 13:25

## 2022-01-01 RX ADMIN — PEGFILGRASTIM-CBQV 6 MG: 6 INJECTION, SOLUTION SUBCUTANEOUS at 11:58

## 2022-01-01 RX ADMIN — PROPOFOL 30 MG: 10 INJECTION, EMULSION INTRAVENOUS at 12:26

## 2022-01-01 RX ADMIN — AZITHROMYCIN MONOHYDRATE 500 MG: 500 INJECTION, POWDER, LYOPHILIZED, FOR SOLUTION INTRAVENOUS at 17:54

## 2022-01-01 RX ADMIN — DEXAMETHASONE SODIUM PHOSPHATE 12 MG: 4 INJECTION, SOLUTION INTRAMUSCULAR; INTRAVENOUS at 09:57

## 2022-01-01 RX ADMIN — GABAPENTIN 300 MG: 300 CAPSULE ORAL at 21:15

## 2022-01-01 RX ADMIN — DIPHENHYDRAMINE HYDROCHLORIDE 25 MG: 25 CAPSULE ORAL at 02:56

## 2022-01-01 RX ADMIN — GABAPENTIN 300 MG: 300 CAPSULE ORAL at 20:48

## 2022-01-01 RX ADMIN — FUROSEMIDE 40 MG: 40 TABLET ORAL at 10:09

## 2022-01-01 RX ADMIN — FLUOROURACIL 3850 MG: 50 INJECTION, SOLUTION INTRAVENOUS at 14:25

## 2022-01-01 RX ADMIN — LIDOCAINE HYDROCHLORIDE AND EPINEPHRINE 4 ML: 10; 10 INJECTION, SOLUTION INFILTRATION; PERINEURAL at 11:55

## 2022-01-01 RX ADMIN — AMOXICILLIN AND CLAVULANATE POTASSIUM 1 TABLET: 875; 125 TABLET, FILM COATED ORAL at 10:28

## 2022-01-01 RX ADMIN — OXALIPLATIN 95 MG: 5 INJECTION, SOLUTION INTRAVENOUS at 11:04

## 2022-01-01 RX ADMIN — SODIUM CHLORIDE: 9 INJECTION, SOLUTION INTRAVENOUS at 16:11

## 2022-01-01 RX ADMIN — ONDANSETRON 8 MG: 2 INJECTION INTRAMUSCULAR; INTRAVENOUS at 09:59

## 2022-01-01 RX ADMIN — IRINOTECAN HYDROCHLORIDE 240 MG: 20 INJECTION, SOLUTION INTRAVENOUS at 12:50

## 2022-01-01 RX ADMIN — PANTOPRAZOLE SODIUM 40 MG: 40 TABLET, DELAYED RELEASE ORAL at 09:48

## 2022-01-01 RX ADMIN — PHENYLEPHRINE HYDROCHLORIDE 100 MCG: 0.1 INJECTION, SOLUTION INTRAVENOUS at 15:45

## 2022-01-01 RX ADMIN — MIRTAZAPINE 30 MG: 15 TABLET, FILM COATED ORAL at 20:17

## 2022-01-01 RX ADMIN — SODIUM CHLORIDE 125 MG: 9 INJECTION, SOLUTION INTRAVENOUS at 12:25

## 2022-01-01 RX ADMIN — SODIUM BICARBONATE 650 MG TABLET 650 MG: at 16:24

## 2022-01-01 RX ADMIN — OXALIPLATIN 95 MG: 5 INJECTION, SOLUTION INTRAVENOUS at 10:45

## 2022-01-01 RX ADMIN — DEXAMETHASONE SODIUM PHOSPHATE 12 MG: 4 INJECTION, SOLUTION INTRAMUSCULAR; INTRAVENOUS at 10:04

## 2022-01-01 RX ADMIN — CEFTRIAXONE 1000 MG: 1 INJECTION, POWDER, FOR SOLUTION INTRAMUSCULAR; INTRAVENOUS at 06:50

## 2022-01-01 RX ADMIN — SODIUM CHLORIDE 10 ML: 9 INJECTION, SOLUTION INTRAMUSCULAR; INTRAVENOUS; SUBCUTANEOUS at 15:06

## 2022-01-01 RX ADMIN — SODIUM CHLORIDE, PRESERVATIVE FREE 10 ML: 5 INJECTION INTRAVENOUS at 21:45

## 2022-01-01 RX ADMIN — GABAPENTIN 300 MG: 300 CAPSULE ORAL at 08:26

## 2022-01-01 RX ADMIN — ACETAMINOPHEN 650 MG: 325 TABLET, FILM COATED ORAL at 00:56

## 2022-01-01 RX ADMIN — IRINOTECAN HYDROCHLORIDE 240 MG: 20 INJECTION, SOLUTION INTRAVENOUS at 13:06

## 2022-01-01 RX ADMIN — LEVOTHYROXINE SODIUM 50 MCG: 0.05 TABLET ORAL at 05:47

## 2022-01-01 RX ADMIN — SODIUM CHLORIDE 25 ML/HR: 9 INJECTION, SOLUTION INTRAVENOUS at 15:10

## 2022-01-01 RX ADMIN — SODIUM CHLORIDE 200 MG: 9 INJECTION, SOLUTION INTRAVENOUS at 14:35

## 2022-01-01 RX ADMIN — PANTOPRAZOLE SODIUM 40 MG: 40 TABLET, DELAYED RELEASE ORAL at 16:18

## 2022-01-01 RX ADMIN — IRINOTECAN HYDROCHLORIDE 240 MG: 20 INJECTION, SOLUTION INTRAVENOUS at 12:55

## 2022-01-01 RX ADMIN — LIDOCAINE HYDROCHLORIDE AND EPINEPHRINE 10 ML: 10; 10 INJECTION, SOLUTION INFILTRATION; PERINEURAL at 11:58

## 2022-01-01 RX ADMIN — LORAZEPAM 1 MG: 1 TABLET ORAL at 21:30

## 2022-01-01 RX ADMIN — SODIUM CHLORIDE, PRESERVATIVE FREE 10 ML: 5 INJECTION INTRAVENOUS at 21:15

## 2022-01-01 RX ADMIN — GABAPENTIN 300 MG: 300 CAPSULE ORAL at 09:10

## 2022-01-01 RX ADMIN — PROCHLORPERAZINE EDISYLATE 10 MG: 5 INJECTION INTRAMUSCULAR; INTRAVENOUS at 13:03

## 2022-01-01 RX ADMIN — Medication 100 MG: at 13:29

## 2022-01-01 RX ADMIN — PEGFILGRASTIM-CBQV 6 MG: 6 INJECTION, SOLUTION SUBCUTANEOUS at 14:35

## 2022-01-01 RX ADMIN — DIPHENHYDRAMINE HYDROCHLORIDE 25 MG: 25 CAPSULE ORAL at 00:26

## 2022-01-01 RX ADMIN — MIRTAZAPINE 30 MG: 15 TABLET, FILM COATED ORAL at 21:15

## 2022-01-01 RX ADMIN — CEFTRIAXONE 1000 MG: 1 INJECTION, POWDER, FOR SOLUTION INTRAMUSCULAR; INTRAVENOUS at 15:34

## 2022-01-01 RX ADMIN — SODIUM BICARBONATE 650 MG TABLET 650 MG: at 22:46

## 2022-01-01 RX ADMIN — ALBUMIN (HUMAN) 25 G: 0.25 INJECTION, SOLUTION INTRAVENOUS at 03:05

## 2022-01-01 RX ADMIN — LORAZEPAM 1 MG: 1 TABLET ORAL at 21:24

## 2022-01-01 RX ADMIN — GABAPENTIN 300 MG: 300 CAPSULE ORAL at 09:56

## 2022-01-01 RX ADMIN — HEPARIN 500 UNITS: 100 SYRINGE at 14:04

## 2022-01-01 RX ADMIN — SODIUM CHLORIDE 200 MG: 9 INJECTION, SOLUTION INTRAVENOUS at 17:30

## 2022-01-01 RX ADMIN — PHENYLEPHRINE HYDROCHLORIDE 100 MCG: 0.1 INJECTION, SOLUTION INTRAVENOUS at 15:48

## 2022-01-01 RX ADMIN — POTASSIUM BICARBONATE 40 MEQ: 782 TABLET, EFFERVESCENT ORAL at 12:40

## 2022-01-01 RX ADMIN — VANCOMYCIN HYDROCHLORIDE 1000 MG: 1 INJECTION, POWDER, LYOPHILIZED, FOR SOLUTION INTRAVENOUS at 09:44

## 2022-01-01 RX ADMIN — LEUCOVORIN CALCIUM 750 MG: 350 INJECTION, POWDER, LYOPHILIZED, FOR SOLUTION INTRAMUSCULAR; INTRAVENOUS at 13:01

## 2022-01-01 RX ADMIN — ALTEPLASE 1 MG: 2.2 INJECTION, POWDER, LYOPHILIZED, FOR SOLUTION INTRAVENOUS at 01:11

## 2022-01-01 RX ADMIN — SODIUM CHLORIDE, PRESERVATIVE FREE 10 ML: 5 INJECTION INTRAVENOUS at 09:47

## 2022-01-01 RX ADMIN — ATROPINE SULFATE 0.4 MG: 0.4 INJECTION, SOLUTION INTRAMUSCULAR; INTRAVENOUS; SUBCUTANEOUS at 12:43

## 2022-01-01 RX ADMIN — GABAPENTIN 300 MG: 300 CAPSULE ORAL at 10:36

## 2022-01-01 RX ADMIN — ATROPINE SULFATE 0.4 MG: 0.4 INJECTION, SOLUTION INTRAMUSCULAR; INTRAVENOUS; SUBCUTANEOUS at 12:48

## 2022-01-01 RX ADMIN — SODIUM CHLORIDE, PRESERVATIVE FREE 10 ML: 5 INJECTION INTRAVENOUS at 07:56

## 2022-01-01 RX ADMIN — ONDANSETRON 8 MG: 2 INJECTION INTRAMUSCULAR; INTRAVENOUS at 10:00

## 2022-01-01 RX ADMIN — ALTEPLASE 1 MG: 2.2 INJECTION, POWDER, LYOPHILIZED, FOR SOLUTION INTRAVENOUS at 03:30

## 2022-01-01 RX ADMIN — SODIUM CHLORIDE: 9 INJECTION, SOLUTION INTRAVENOUS at 02:19

## 2022-01-01 RX ADMIN — HYDROCODONE BITARTRATE AND ACETAMINOPHEN 1 TABLET: 10; 325 TABLET ORAL at 10:42

## 2022-01-01 RX ADMIN — SODIUM CHLORIDE, SODIUM LACTATE, POTASSIUM CHLORIDE, AND CALCIUM CHLORIDE: 600; 310; 30; 20 INJECTION, SOLUTION INTRAVENOUS at 12:25

## 2022-01-01 RX ADMIN — LEVOTHYROXINE SODIUM 50 MCG: 0.05 TABLET ORAL at 09:45

## 2022-01-01 RX ADMIN — VANCOMYCIN HYDROCHLORIDE 1500 MG: 10 INJECTION, POWDER, LYOPHILIZED, FOR SOLUTION INTRAVENOUS at 18:11

## 2022-01-01 RX ADMIN — PANTOPRAZOLE SODIUM 40 MG: 40 TABLET, DELAYED RELEASE ORAL at 08:30

## 2022-01-01 RX ADMIN — GABAPENTIN 300 MG: 300 CAPSULE ORAL at 21:00

## 2022-01-01 RX ADMIN — SODIUM CHLORIDE 999 ML/HR: 900 INJECTION, SOLUTION INTRAVENOUS at 11:38

## 2022-01-01 RX ADMIN — GABAPENTIN 300 MG: 300 CAPSULE ORAL at 10:28

## 2022-01-01 RX ADMIN — SODIUM CHLORIDE 1000 ML: 9 INJECTION, SOLUTION INTRAVENOUS at 13:01

## 2022-01-01 RX ADMIN — LORAZEPAM 1 MG: 1 TABLET ORAL at 02:13

## 2022-01-01 RX ADMIN — FOSAPREPITANT 150 MG: 150 INJECTION, POWDER, LYOPHILIZED, FOR SOLUTION INTRAVENOUS at 10:20

## 2022-01-01 RX ADMIN — LEVOTHYROXINE SODIUM 50 MCG: 0.05 TABLET ORAL at 09:56

## 2022-01-01 RX ADMIN — SODIUM CHLORIDE, PRESERVATIVE FREE 10 ML: 5 INJECTION INTRAVENOUS at 08:28

## 2022-01-01 RX ADMIN — ACETAMINOPHEN 650 MG: 325 TABLET, FILM COATED ORAL at 20:10

## 2022-01-01 RX ADMIN — PROPOFOL 20 MG: 10 INJECTION, EMULSION INTRAVENOUS at 12:29

## 2022-01-01 RX ADMIN — PHENYLEPHRINE HYDROCHLORIDE 100 MCG: 0.1 INJECTION, SOLUTION INTRAVENOUS at 15:55

## 2022-01-01 RX ADMIN — AZITHROMYCIN MONOHYDRATE 500 MG: 500 INJECTION, POWDER, LYOPHILIZED, FOR SOLUTION INTRAVENOUS at 16:42

## 2022-01-01 RX ADMIN — GABAPENTIN 300 MG: 300 CAPSULE ORAL at 09:07

## 2022-01-01 RX ADMIN — SODIUM CHLORIDE, PRESERVATIVE FREE 10 ML: 5 INJECTION INTRAVENOUS at 18:25

## 2022-01-01 RX ADMIN — DEXTROSE MONOHYDRATE 100 ML/HR: 5 INJECTION, SOLUTION INTRAVENOUS at 10:42

## 2022-01-01 RX ADMIN — Medication 10 ML: at 13:38

## 2022-01-01 RX ADMIN — ONDANSETRON 8 MG: 2 INJECTION INTRAMUSCULAR; INTRAVENOUS at 13:49

## 2022-01-01 RX ADMIN — PROPOFOL 20 MG: 10 INJECTION, EMULSION INTRAVENOUS at 12:25

## 2022-01-01 RX ADMIN — GABAPENTIN 300 MG: 300 CAPSULE ORAL at 08:54

## 2022-01-01 RX ADMIN — PHENYLEPHRINE HYDROCHLORIDE 100 MCG: 0.1 INJECTION, SOLUTION INTRAVENOUS at 15:51

## 2022-01-01 RX ADMIN — PHENYLEPHRINE HYDROCHLORIDE 100 MCG: 0.1 INJECTION, SOLUTION INTRAVENOUS at 16:00

## 2022-01-01 RX ADMIN — VANCOMYCIN HYDROCHLORIDE 1000 MG: 1 INJECTION, POWDER, LYOPHILIZED, FOR SOLUTION INTRAVENOUS at 09:47

## 2022-01-01 RX ADMIN — GABAPENTIN 300 MG: 300 CAPSULE ORAL at 08:38

## 2022-01-01 RX ADMIN — SODIUM CHLORIDE, PRESERVATIVE FREE 10 ML: 5 INJECTION INTRAVENOUS at 13:35

## 2022-01-01 RX ADMIN — PANTOPRAZOLE SODIUM 40 MG: 40 TABLET, DELAYED RELEASE ORAL at 18:08

## 2022-01-01 RX ADMIN — MORPHINE SULFATE 2 MG: 2 INJECTION, SOLUTION INTRAMUSCULAR; INTRAVENOUS at 03:00

## 2022-01-01 RX ADMIN — SODIUM BICARBONATE 650 MG TABLET 650 MG: at 17:22

## 2022-01-01 RX ADMIN — SODIUM CHLORIDE, PRESERVATIVE FREE 10 ML: 5 INJECTION INTRAVENOUS at 20:15

## 2022-01-01 RX ADMIN — ALBUMIN (HUMAN) 12.5 G: 0.25 INJECTION, SOLUTION INTRAVENOUS at 12:19

## 2022-01-01 RX ADMIN — SODIUM CHLORIDE, PRESERVATIVE FREE 10 ML: 5 INJECTION INTRAVENOUS at 09:59

## 2022-01-01 RX ADMIN — ACETAMINOPHEN 650 MG: 325 TABLET, FILM COATED ORAL at 23:18

## 2022-01-01 RX ADMIN — PANTOPRAZOLE SODIUM 40 MG: 40 TABLET, DELAYED RELEASE ORAL at 10:28

## 2022-01-01 RX ADMIN — MIRTAZAPINE 30 MG: 15 TABLET, FILM COATED ORAL at 23:10

## 2022-01-01 RX ADMIN — SODIUM CHLORIDE, POTASSIUM CHLORIDE, SODIUM LACTATE AND CALCIUM CHLORIDE: 600; 310; 30; 20 INJECTION, SOLUTION INTRAVENOUS at 11:17

## 2022-01-01 RX ADMIN — SODIUM CHLORIDE 20 ML/HR: 9 INJECTION, SOLUTION INTRAVENOUS at 09:00

## 2022-01-01 RX ADMIN — GABAPENTIN 300 MG: 300 CAPSULE ORAL at 20:10

## 2022-01-01 RX ADMIN — GABAPENTIN 300 MG: 300 CAPSULE ORAL at 09:45

## 2022-01-01 RX ADMIN — METRONIDAZOLE 500 MG: 500 INJECTION, SOLUTION INTRAVENOUS at 16:57

## 2022-01-01 RX ADMIN — PROPOFOL 50 MG: 10 INJECTION, EMULSION INTRAVENOUS at 15:45

## 2022-01-01 RX ADMIN — DIATRIZOATE MEGLUMINE AND DIATRIZOATE SODIUM 15 ML: 660; 100 LIQUID ORAL; RECTAL at 05:17

## 2022-01-01 RX ADMIN — LORAZEPAM 1 MG: 1 TABLET ORAL at 00:26

## 2022-01-01 RX ADMIN — LEVOTHYROXINE SODIUM 50 MCG: 0.05 TABLET ORAL at 05:44

## 2022-01-01 RX ADMIN — POTASSIUM CHLORIDE 20 MEQ: 14.9 INJECTION, SOLUTION INTRAVENOUS at 15:27

## 2022-01-01 RX ADMIN — PANTOPRAZOLE SODIUM 40 MG: 40 TABLET, DELAYED RELEASE ORAL at 16:44

## 2022-01-01 RX ADMIN — SODIUM CHLORIDE, PRESERVATIVE FREE 10 ML: 5 INJECTION INTRAVENOUS at 10:07

## 2022-01-01 RX ADMIN — METRONIDAZOLE 500 MG: 500 INJECTION, SOLUTION INTRAVENOUS at 09:07

## 2022-01-01 RX ADMIN — Medication 2000 MG: at 11:41

## 2022-01-01 RX ADMIN — Medication 10 ML: at 13:35

## 2022-01-01 RX ADMIN — METRONIDAZOLE 500 MG: 500 INJECTION, SOLUTION INTRAVENOUS at 15:33

## 2022-01-01 RX ADMIN — LORAZEPAM 1 MG: 1 TABLET ORAL at 22:17

## 2022-01-01 RX ADMIN — LEVOTHYROXINE SODIUM 50 MCG: 0.05 TABLET ORAL at 06:12

## 2022-01-01 RX ADMIN — PANTOPRAZOLE SODIUM 40 MG: 40 TABLET, DELAYED RELEASE ORAL at 08:26

## 2022-01-01 RX ADMIN — VANCOMYCIN HYDROCHLORIDE 1000 MG: 1 INJECTION, POWDER, LYOPHILIZED, FOR SOLUTION INTRAVENOUS at 21:35

## 2022-01-01 RX ADMIN — AZITHROMYCIN MONOHYDRATE 500 MG: 500 INJECTION, POWDER, LYOPHILIZED, FOR SOLUTION INTRAVENOUS at 16:18

## 2022-01-01 RX ADMIN — SODIUM CHLORIDE: 9 INJECTION, SOLUTION INTRAVENOUS at 20:46

## 2022-01-01 RX ADMIN — CEFTRIAXONE 1000 MG: 1 INJECTION, POWDER, FOR SOLUTION INTRAMUSCULAR; INTRAVENOUS at 14:26

## 2022-01-01 RX ADMIN — LORAZEPAM 1 MG: 1 TABLET ORAL at 22:12

## 2022-01-01 RX ADMIN — DIPHENHYDRAMINE HYDROCHLORIDE 25 MG: 25 CAPSULE ORAL at 20:10

## 2022-01-01 RX ADMIN — AZITHROMYCIN MONOHYDRATE 500 MG: 500 INJECTION, POWDER, LYOPHILIZED, FOR SOLUTION INTRAVENOUS at 15:29

## 2022-01-01 RX ADMIN — Medication 10 MG: at 12:33

## 2022-01-01 RX ADMIN — FOSAPREPITANT 150 MG: 150 INJECTION, POWDER, LYOPHILIZED, FOR SOLUTION INTRAVENOUS at 10:25

## 2022-01-01 RX ADMIN — LEVOTHYROXINE SODIUM 50 MCG: 0.05 TABLET ORAL at 09:47

## 2022-01-01 RX ADMIN — IRON SUCROSE 300 MG: 20 INJECTION, SOLUTION INTRAVENOUS at 15:16

## 2022-01-01 RX ADMIN — LORAZEPAM 2 MG: 1 TABLET ORAL at 23:10

## 2022-01-01 RX ADMIN — Medication 10 ML: at 15:20

## 2022-01-01 RX ADMIN — SODIUM BICARBONATE 650 MG TABLET 650 MG: at 10:28

## 2022-01-01 RX ADMIN — FLUOROURACIL 575 MG: 50 INJECTION, SOLUTION INTRAVENOUS at 14:53

## 2022-01-01 RX ADMIN — PROPOFOL 20 MG: 10 INJECTION, EMULSION INTRAVENOUS at 12:30

## 2022-01-01 RX ADMIN — SODIUM CHLORIDE 20 ML/HR: 9 INJECTION, SOLUTION INTRAVENOUS at 15:29

## 2022-01-01 RX ADMIN — GABAPENTIN 300 MG: 300 CAPSULE ORAL at 22:46

## 2022-01-01 RX ADMIN — LIDOCAINE HYDROCHLORIDE 8 ML: 10 INJECTION, SOLUTION EPIDURAL; INFILTRATION; INTRACAUDAL; PERINEURAL at 08:28

## 2022-01-01 RX ADMIN — DEXTROSE MONOHYDRATE 50 ML/HR: 50 INJECTION, SOLUTION INTRAVENOUS at 09:05

## 2022-01-01 RX ADMIN — HEPARIN 300 UNITS: 100 SYRINGE at 14:57

## 2022-01-01 RX ADMIN — ONDANSETRON 8 MG: 2 INJECTION INTRAMUSCULAR; INTRAVENOUS at 09:53

## 2022-01-01 RX ADMIN — MIRTAZAPINE 30 MG: 15 TABLET, FILM COATED ORAL at 21:30

## 2022-01-01 RX ADMIN — GABAPENTIN 300 MG: 300 CAPSULE ORAL at 08:30

## 2022-01-01 RX ADMIN — GABAPENTIN 300 MG: 300 CAPSULE ORAL at 20:17

## 2022-01-01 RX ADMIN — SODIUM CHLORIDE, PRESERVATIVE FREE 10 ML: 5 INJECTION INTRAVENOUS at 23:43

## 2022-01-01 RX ADMIN — LEVOTHYROXINE SODIUM 50 MCG: 0.05 TABLET ORAL at 05:55

## 2022-01-01 RX ADMIN — SODIUM BICARBONATE 650 MG TABLET 650 MG: at 20:10

## 2022-01-01 RX ADMIN — GABAPENTIN 300 MG: 300 CAPSULE ORAL at 09:26

## 2022-01-01 RX ADMIN — Medication 10 MG: at 15:58

## 2022-01-01 RX ADMIN — PANTOPRAZOLE SODIUM 40 MG: 40 TABLET, DELAYED RELEASE ORAL at 16:16

## 2022-01-01 RX ADMIN — LEUCOVORIN CALCIUM 750 MG: 350 INJECTION, POWDER, LYOPHILIZED, FOR SOLUTION INTRAMUSCULAR; INTRAVENOUS at 12:50

## 2022-01-01 RX ADMIN — SODIUM CHLORIDE: 9 INJECTION, SOLUTION INTRAVENOUS at 06:10

## 2022-01-01 RX ADMIN — PANTOPRAZOLE SODIUM 40 MG: 40 TABLET, DELAYED RELEASE ORAL at 09:45

## 2022-01-01 RX ADMIN — LIDOCAINE HYDROCHLORIDE 2 ML: 20 INJECTION, SOLUTION EPIDURAL; INFILTRATION; INTRACAUDAL; PERINEURAL at 20:42

## 2022-01-01 RX ADMIN — GABAPENTIN 300 MG: 300 CAPSULE ORAL at 09:46

## 2022-01-01 RX ADMIN — FAMOTIDINE 20 MG: 10 INJECTION, SOLUTION INTRAVENOUS at 12:43

## 2022-01-01 RX ADMIN — FLUOROURACIL 575 MG: 50 INJECTION, SOLUTION INTRAVENOUS at 14:38

## 2022-01-01 RX ADMIN — ACETAMINOPHEN 650 MG: 325 TABLET ORAL at 14:54

## 2022-01-01 RX ADMIN — CEFTRIAXONE 1000 MG: 1 INJECTION, POWDER, FOR SOLUTION INTRAMUSCULAR; INTRAVENOUS at 00:28

## 2022-01-01 RX ADMIN — SODIUM CHLORIDE: 9 INJECTION, SOLUTION INTRAVENOUS at 16:22

## 2022-01-01 RX ADMIN — LEUCOVORIN CALCIUM 750 MG: 350 INJECTION, POWDER, LYOPHILIZED, FOR SOLUTION INTRAMUSCULAR; INTRAVENOUS at 13:06

## 2022-01-01 RX ADMIN — FLUOROURACIL 3850 MG: 50 INJECTION, SOLUTION INTRAVENOUS at 15:47

## 2022-01-01 RX ADMIN — SODIUM CHLORIDE, PRESERVATIVE FREE 10 ML: 5 INJECTION INTRAVENOUS at 14:13

## 2022-01-01 RX ADMIN — SODIUM CHLORIDE, PRESERVATIVE FREE 10 ML: 5 INJECTION INTRAVENOUS at 21:09

## 2022-01-01 RX ADMIN — AMOXICILLIN AND CLAVULANATE POTASSIUM 1 TABLET: 875; 125 TABLET, FILM COATED ORAL at 09:56

## 2022-01-01 RX ADMIN — LEVOTHYROXINE SODIUM 50 MCG: 0.05 TABLET ORAL at 09:07

## 2022-01-01 RX ADMIN — FLUOROURACIL 575 MG: 50 INJECTION, SOLUTION INTRAVENOUS at 14:23

## 2022-01-01 RX ADMIN — FUROSEMIDE 40 MG: 10 INJECTION, SOLUTION INTRAMUSCULAR; INTRAVENOUS at 21:54

## 2022-01-01 RX ADMIN — SODIUM CHLORIDE, PRESERVATIVE FREE 10 ML: 5 INJECTION INTRAVENOUS at 20:45

## 2022-01-01 RX ADMIN — SODIUM CHLORIDE, PRESERVATIVE FREE 10 ML: 5 INJECTION INTRAVENOUS at 21:54

## 2022-01-01 RX ADMIN — PROPOFOL 250 MCG/KG/MIN: 10 INJECTION, EMULSION INTRAVENOUS at 15:46

## 2022-01-01 RX ADMIN — GABAPENTIN 300 MG: 300 CAPSULE ORAL at 21:45

## 2022-01-01 RX ADMIN — PANTOPRAZOLE SODIUM 40 MG: 40 TABLET, DELAYED RELEASE ORAL at 09:26

## 2022-01-01 RX ADMIN — PHENYLEPHRINE HYDROCHLORIDE 100 MCG: 10 INJECTION INTRAVENOUS at 13:41

## 2022-01-01 RX ADMIN — SODIUM CHLORIDE: 9 INJECTION, SOLUTION INTRAVENOUS at 03:47

## 2022-01-01 RX ADMIN — SODIUM CHLORIDE, PRESERVATIVE FREE 10 ML: 5 INJECTION INTRAVENOUS at 22:12

## 2022-01-01 RX ADMIN — PANTOPRAZOLE SODIUM 40 MG: 40 TABLET, DELAYED RELEASE ORAL at 05:55

## 2022-01-01 RX ADMIN — GABAPENTIN 300 MG: 300 CAPSULE ORAL at 23:10

## 2022-01-01 RX ADMIN — GABAPENTIN 300 MG: 300 CAPSULE ORAL at 09:38

## 2022-01-01 RX ADMIN — OXALIPLATIN 95 MG: 5 INJECTION, SOLUTION INTRAVENOUS at 10:43

## 2022-01-01 RX ADMIN — PANTOPRAZOLE SODIUM 40 MG: 40 TABLET, DELAYED RELEASE ORAL at 07:55

## 2022-01-01 RX ADMIN — GABAPENTIN 300 MG: 300 CAPSULE ORAL at 07:54

## 2022-01-01 RX ADMIN — SODIUM CHLORIDE, PRESERVATIVE FREE 10 ML: 5 INJECTION INTRAVENOUS at 08:54

## 2022-01-01 RX ADMIN — SODIUM CHLORIDE, PRESERVATIVE FREE 10 ML: 5 INJECTION INTRAVENOUS at 21:30

## 2022-01-01 RX ADMIN — PANTOPRAZOLE SODIUM 40 MG: 40 TABLET, DELAYED RELEASE ORAL at 09:07

## 2022-01-01 RX ADMIN — SODIUM BICARBONATE 650 MG TABLET 650 MG: at 13:03

## 2022-01-01 RX ADMIN — CEFTRIAXONE 1000 MG: 1 INJECTION, POWDER, FOR SOLUTION INTRAMUSCULAR; INTRAVENOUS at 16:16

## 2022-01-01 RX ADMIN — POTASSIUM & SODIUM PHOSPHATES POWDER PACK 280-160-250 MG 500 MG: 280-160-250 PACK at 12:25

## 2022-01-01 RX ADMIN — PANTOPRAZOLE SODIUM 40 MG: 40 TABLET, DELAYED RELEASE ORAL at 09:56

## 2022-01-01 RX ADMIN — SODIUM CHLORIDE, PRESERVATIVE FREE 10 ML: 5 INJECTION INTRAVENOUS at 20:48

## 2022-01-01 RX ADMIN — SODIUM CHLORIDE: 9 INJECTION, SOLUTION INTRAVENOUS at 22:03

## 2022-01-01 RX ADMIN — LIDOCAINE HYDROCHLORIDE 100 MG: 20 INJECTION, SOLUTION EPIDURAL; INFILTRATION; INTRACAUDAL; PERINEURAL at 13:28

## 2022-01-01 ASSESSMENT — ENCOUNTER SYMPTOMS
CONSTIPATION: 0
CHEST TIGHTNESS: 0
NAUSEA: 1
CHEST TIGHTNESS: 0
FACIAL SWELLING: 0
PHOTOPHOBIA: 0
ABDOMINAL PAIN: 0
EYE PAIN: 0
VOMITING: 0
COUGH: 0
NAUSEA: 0
RHINORRHEA: 0
ABDOMINAL DISTENTION: 0
EYE REDNESS: 0
BLOOD IN STOOL: 0
DIARRHEA: 0
WHEEZING: 0
FACIAL SWELLING: 0
BACK PAIN: 0
CONSTIPATION: 0
COUGH: 0
SORE THROAT: 0
EYE PAIN: 0
SHORTNESS OF BREATH: 0
VOMITING: 0
EYE ITCHING: 0
SHORTNESS OF BREATH: 0
ABDOMINAL PAIN: 0
BLOOD IN STOOL: 0
CHEST TIGHTNESS: 0
ABDOMINAL PAIN: 0
EYE PAIN: 0
EYE REDNESS: 0
ABDOMINAL PAIN: 1
NAUSEA: 0
COUGH: 0
NAUSEA: 0
DIARRHEA: 0
COLOR CHANGE: 0
COLOR CHANGE: 0
CONSTIPATION: 0
EYE DISCHARGE: 0
COLOR CHANGE: 0
WHEEZING: 0
APNEA: 0
ABDOMINAL DISTENTION: 0
EYE ITCHING: 0
RHINORRHEA: 0
COUGH: 0
COUGH: 0
DIARRHEA: 0
VOMITING: 0
DIARRHEA: 0
NAUSEA: 0
VOMITING: 0
EYE REDNESS: 0
SHORTNESS OF BREATH: 0
SHORTNESS OF BREATH: 0
WHEEZING: 0
SORE THROAT: 0
CONSTIPATION: 0
VOMITING: 0
ABDOMINAL PAIN: 0
DIARRHEA: 0
SHORTNESS OF BREATH: 0
ABDOMINAL DISTENTION: 1
EYE PAIN: 0
FACIAL SWELLING: 0
BACK PAIN: 0
VOICE CHANGE: 0
BLOOD IN STOOL: 0
ABDOMINAL PAIN: 0
ABDOMINAL DISTENTION: 1
EYE DISCHARGE: 0
SINUS PRESSURE: 0
STRIDOR: 0

## 2022-01-01 ASSESSMENT — PAIN - FUNCTIONAL ASSESSMENT
PAIN_FUNCTIONAL_ASSESSMENT: NONE - DENIES PAIN
PAIN_FUNCTIONAL_ASSESSMENT: 0-10
PAIN_FUNCTIONAL_ASSESSMENT: NONE - DENIES PAIN
PAIN_FUNCTIONAL_ASSESSMENT: ACTIVITIES ARE NOT PREVENTED
PAIN_FUNCTIONAL_ASSESSMENT: NONE - DENIES PAIN
PAIN_FUNCTIONAL_ASSESSMENT: ACTIVITIES ARE NOT PREVENTED
PAIN_FUNCTIONAL_ASSESSMENT: NONE - DENIES PAIN
PAIN_FUNCTIONAL_ASSESSMENT: NONE - DENIES PAIN

## 2022-01-01 ASSESSMENT — PAIN DESCRIPTION - LOCATION
LOCATION: ABDOMEN
LOCATION: HEAD
LOCATION: BACK
LOCATION: ABDOMEN
LOCATION: HEAD
LOCATION: HEAD

## 2022-01-01 ASSESSMENT — PAIN SCALES - GENERAL
PAINLEVEL_OUTOF10: 7
PAINLEVEL_OUTOF10: 0
PAINLEVEL_OUTOF10: 7
PAINLEVEL_OUTOF10: 0
PAINLEVEL_OUTOF10: 8
PAINLEVEL_OUTOF10: 0
PAINLEVEL_OUTOF10: 7
PAINLEVEL_OUTOF10: 2
PAINLEVEL_OUTOF10: 1
PAINLEVEL_OUTOF10: 0
PAINLEVEL_OUTOF10: 8
PAINLEVEL_OUTOF10: 7
PAINLEVEL_OUTOF10: 0
PAINLEVEL_OUTOF10: 8
PAINLEVEL_OUTOF10: 0
PAINLEVEL_OUTOF10: 2

## 2022-01-01 ASSESSMENT — PATIENT HEALTH QUESTIONNAIRE - PHQ9
SUM OF ALL RESPONSES TO PHQ9 QUESTIONS 1 & 2: 0
1. LITTLE INTEREST OR PLEASURE IN DOING THINGS: 0
SUM OF ALL RESPONSES TO PHQ QUESTIONS 1-9: 0
SUM OF ALL RESPONSES TO PHQ QUESTIONS 1-9: 0
2. FEELING DOWN, DEPRESSED OR HOPELESS: 0
SUM OF ALL RESPONSES TO PHQ QUESTIONS 1-9: 0
SUM OF ALL RESPONSES TO PHQ9 QUESTIONS 1 & 2: 0
SUM OF ALL RESPONSES TO PHQ QUESTIONS 1-9: 0
1. LITTLE INTEREST OR PLEASURE IN DOING THINGS: 0
1. LITTLE INTEREST OR PLEASURE IN DOING THINGS: 0
SUM OF ALL RESPONSES TO PHQ QUESTIONS 1-9: 0
2. FEELING DOWN, DEPRESSED OR HOPELESS: 0
SUM OF ALL RESPONSES TO PHQ9 QUESTIONS 1 & 2: 0
2. FEELING DOWN, DEPRESSED OR HOPELESS: 0
SUM OF ALL RESPONSES TO PHQ QUESTIONS 1-9: 0
SUM OF ALL RESPONSES TO PHQ QUESTIONS 1-9: 0
SUM OF ALL RESPONSES TO PHQ9 QUESTIONS 1 & 2: 0
1. LITTLE INTEREST OR PLEASURE IN DOING THINGS: 0
SUM OF ALL RESPONSES TO PHQ QUESTIONS 1-9: 0
2. FEELING DOWN, DEPRESSED OR HOPELESS: 0
SUM OF ALL RESPONSES TO PHQ QUESTIONS 1-9: 0
SUM OF ALL RESPONSES TO PHQ QUESTIONS 1-9: 0
2. FEELING DOWN, DEPRESSED OR HOPELESS: 0
SUM OF ALL RESPONSES TO PHQ QUESTIONS 1-9: 0
SUM OF ALL RESPONSES TO PHQ QUESTIONS 1-9: 0

## 2022-01-01 ASSESSMENT — PAIN DESCRIPTION - PAIN TYPE
TYPE: ACUTE PAIN

## 2022-01-01 ASSESSMENT — PAIN DESCRIPTION - ORIENTATION
ORIENTATION: RIGHT
ORIENTATION: ANTERIOR;RIGHT

## 2022-01-01 ASSESSMENT — PAIN DESCRIPTION - DESCRIPTORS
DESCRIPTORS: ACHING;POUNDING
DESCRIPTORS: DULL
DESCRIPTORS: STABBING
DESCRIPTORS: DISCOMFORT;SORE
DESCRIPTORS: ACHING
DESCRIPTORS: ACHING

## 2022-01-01 ASSESSMENT — PAIN DESCRIPTION - FREQUENCY
FREQUENCY: INTERMITTENT

## 2022-01-01 ASSESSMENT — PAIN DESCRIPTION - ONSET
ONSET: OTHER (COMMENT)
ONSET: SUDDEN
ONSET: ON-GOING

## 2022-01-03 ENCOUNTER — HOSPITAL ENCOUNTER (OUTPATIENT)
Dept: LAB | Age: 47
Discharge: HOME | End: 2022-01-03

## 2022-01-03 DIAGNOSIS — C16.8 MALIGNANT NEOPLASM OF OVERLAPPING SITES OF STOMACH (HCC): ICD-10-CM

## 2022-01-03 LAB
ALBUMIN SERPL-MCNC: 3.7 G/DL (ref 3.5–5)
ALBUMIN/GLOB SERPL: 1 {RATIO} (ref 1.2–3.5)
ALP SERPL-CCNC: 170 U/L (ref 50–136)
ALT SERPL-CCNC: 31 U/L (ref 12–65)
ANION GAP SERPL CALC-SCNC: 6 MMOL/L (ref 7–16)
AST SERPL-CCNC: 37 U/L (ref 15–37)
BASOPHILS # BLD: 0.1 K/UL (ref 0–0.2)
BASOPHILS NFR BLD: 1 % (ref 0–2)
BILIRUB SERPL-MCNC: 0.3 MG/DL (ref 0.2–1.1)
BUN SERPL-MCNC: 10 MG/DL (ref 6–23)
CALCIUM SERPL-MCNC: 9.3 MG/DL (ref 8.3–10.4)
CEA SERPL-MCNC: 39.8 NG/ML (ref 0–3)
CHLORIDE SERPL-SCNC: 107 MMOL/L (ref 98–107)
CO2 SERPL-SCNC: 28 MMOL/L (ref 21–32)
CREAT SERPL-MCNC: 1 MG/DL (ref 0.8–1.5)
DIFFERENTIAL METHOD BLD: ABNORMAL
EOSINOPHIL # BLD: 0.1 K/UL (ref 0–0.8)
EOSINOPHIL NFR BLD: 1 % (ref 0.5–7.8)
ERYTHROCYTE [DISTWIDTH] IN BLOOD BY AUTOMATED COUNT: 17.2 % (ref 11.9–14.6)
GLOBULIN SER CALC-MCNC: 3.6 G/DL (ref 2.3–3.5)
GLUCOSE SERPL-MCNC: 93 MG/DL (ref 65–100)
HCT VFR BLD AUTO: 30.6 %
HGB BLD-MCNC: 10.2 G/DL (ref 13.6–17.2)
IMM GRANULOCYTES # BLD AUTO: 0.1 K/UL (ref 0–0.5)
IMM GRANULOCYTES NFR BLD AUTO: 1 % (ref 0–5)
LYMPHOCYTES # BLD: 0.4 K/UL (ref 0.5–4.6)
LYMPHOCYTES NFR BLD: 5 % (ref 13–44)
MAGNESIUM SERPL-MCNC: 2.6 MG/DL (ref 1.8–2.4)
MCH RBC QN AUTO: 34.8 PG (ref 26.1–32.9)
MCHC RBC AUTO-ENTMCNC: 33.3 G/DL (ref 31.4–35)
MCV RBC AUTO: 104.4 FL (ref 79.6–97.8)
MONOCYTES # BLD: 0.8 K/UL (ref 0.1–1.3)
MONOCYTES NFR BLD: 11 % (ref 4–12)
NEUTS SEG # BLD: 6.3 K/UL (ref 1.7–8.2)
NEUTS SEG NFR BLD: 82 % (ref 43–78)
NRBC # BLD: 0 K/UL (ref 0–0.2)
PLATELET # BLD AUTO: 93 K/UL (ref 150–450)
PMV BLD AUTO: 9.8 FL (ref 9.4–12.3)
POTASSIUM SERPL-SCNC: 4 MMOL/L (ref 3.5–5.1)
PROT SERPL-MCNC: 7.3 G/DL (ref 6.3–8.2)
RBC # BLD AUTO: 2.93 M/UL (ref 4.23–5.6)
SODIUM SERPL-SCNC: 141 MMOL/L (ref 136–145)
WBC # BLD AUTO: 7.8 K/UL (ref 4.3–11.1)

## 2022-01-03 PROCEDURE — 36415 COLL VENOUS BLD VENIPUNCTURE: CPT

## 2022-01-03 PROCEDURE — 83735 ASSAY OF MAGNESIUM: CPT

## 2022-01-03 PROCEDURE — 82378 CARCINOEMBRYONIC ANTIGEN: CPT

## 2022-01-03 PROCEDURE — 85025 COMPLETE CBC W/AUTO DIFF WBC: CPT

## 2022-01-03 PROCEDURE — 80053 COMPREHEN METABOLIC PANEL: CPT

## 2022-01-04 ENCOUNTER — HOSPITAL ENCOUNTER (OUTPATIENT)
Dept: INFUSION THERAPY | Age: 47
Discharge: HOME | End: 2022-01-04

## 2022-01-04 VITALS
OXYGEN SATURATION: 97 % | WEIGHT: 177 LBS | TEMPERATURE: 98.5 F | SYSTOLIC BLOOD PRESSURE: 106 MMHG | BODY MASS INDEX: 24.34 KG/M2 | DIASTOLIC BLOOD PRESSURE: 79 MMHG | RESPIRATION RATE: 16 BRPM | HEART RATE: 115 BPM

## 2022-01-04 DIAGNOSIS — T45.1X5A CINV (CHEMOTHERAPY-INDUCED NAUSEA AND VOMITING): ICD-10-CM

## 2022-01-04 DIAGNOSIS — R11.2 CINV (CHEMOTHERAPY-INDUCED NAUSEA AND VOMITING): ICD-10-CM

## 2022-01-04 DIAGNOSIS — C16.8 MALIGNANT NEOPLASM OF OVERLAPPING SITES OF STOMACH (HCC): ICD-10-CM

## 2022-01-04 DIAGNOSIS — R39.15 URGENCY OF URINATION: Primary | ICD-10-CM

## 2022-01-04 PROCEDURE — 96413 CHEMO IV INFUSION 1 HR: CPT

## 2022-01-04 PROCEDURE — 96411 CHEMO IV PUSH ADDL DRUG: CPT

## 2022-01-04 PROCEDURE — 74011000258 HC RX REV CODE- 258: Performed by: INTERNAL MEDICINE

## 2022-01-04 PROCEDURE — 96367 TX/PROPH/DG ADDL SEQ IV INF: CPT

## 2022-01-04 PROCEDURE — G0498 CHEMO EXTEND IV INFUS W/PUMP: HCPCS

## 2022-01-04 PROCEDURE — 74011250636 HC RX REV CODE- 250/636: Performed by: INTERNAL MEDICINE

## 2022-01-04 PROCEDURE — 96375 TX/PRO/DX INJ NEW DRUG ADDON: CPT

## 2022-01-04 PROCEDURE — 96368 THER/DIAG CONCURRENT INF: CPT

## 2022-01-04 PROCEDURE — 96415 CHEMO IV INFUSION ADDL HR: CPT

## 2022-01-04 PROCEDURE — 74011000250 HC RX REV CODE- 250: Performed by: INTERNAL MEDICINE

## 2022-01-04 PROCEDURE — 96417 CHEMO IV INFUS EACH ADDL SEQ: CPT

## 2022-01-04 PROCEDURE — 96372 THER/PROPH/DIAG INJ SC/IM: CPT

## 2022-01-04 RX ORDER — ATROPINE SULFATE 0.4 MG/ML
0.4 INJECTION, SOLUTION ENDOTRACHEAL; INTRAMEDULLARY; INTRAMUSCULAR; INTRAVENOUS; SUBCUTANEOUS ONCE
Status: COMPLETED | OUTPATIENT
Start: 2022-01-04 | End: 2022-01-04

## 2022-01-04 RX ORDER — SODIUM CHLORIDE 0.9 % (FLUSH) 0.9 %
10 SYRINGE (ML) INJECTION AS NEEDED
Status: ACTIVE | OUTPATIENT
Start: 2022-01-04 | End: 2022-01-04

## 2022-01-04 RX ORDER — ONDANSETRON 2 MG/ML
8 INJECTION INTRAMUSCULAR; INTRAVENOUS ONCE
Status: COMPLETED | OUTPATIENT
Start: 2022-01-04 | End: 2022-01-04

## 2022-01-04 RX ORDER — FLUOROURACIL 50 MG/ML
300 INJECTION, SOLUTION INTRAVENOUS ONCE
Status: COMPLETED | OUTPATIENT
Start: 2022-01-04 | End: 2022-01-04

## 2022-01-04 RX ORDER — DEXTROSE MONOHYDRATE 50 MG/ML
25 INJECTION, SOLUTION INTRAVENOUS CONTINUOUS
Status: ACTIVE | OUTPATIENT
Start: 2022-01-04 | End: 2022-01-04

## 2022-01-04 RX ADMIN — IRINOTECAN HYDROCHLORIDE 240 MG: 20 INJECTION, SOLUTION INTRAVENOUS at 11:48

## 2022-01-04 RX ADMIN — LEUCOVORIN CALCIUM 816 MG: 350 INJECTION, POWDER, LYOPHILIZED, FOR SOLUTION INTRAMUSCULAR; INTRAVENOUS at 11:47

## 2022-01-04 RX ADMIN — FLUOROURACIL 4000 MG: 50 INJECTION, SOLUTION INTRAVENOUS at 13:35

## 2022-01-04 RX ADMIN — DEXTROSE MONOHYDRATE 25 ML/HR: 5 INJECTION, SOLUTION INTRAVENOUS at 08:35

## 2022-01-04 RX ADMIN — SODIUM CHLORIDE, PRESERVATIVE FREE 10 ML: 5 INJECTION INTRAVENOUS at 08:45

## 2022-01-04 RX ADMIN — DEXAMETHASONE SODIUM PHOSPHATE 12 MG: 4 INJECTION INTRA-ARTICULAR; INTRALESIONAL; INTRAMUSCULAR; INTRAVENOUS; SOFT TISSUE at 08:50

## 2022-01-04 RX ADMIN — ATROPINE SULFATE 0.4 MG: 0.4 INJECTION, SOLUTION INTRAMUSCULAR; INTRAVENOUS; SUBCUTANEOUS at 11:03

## 2022-01-04 RX ADMIN — FLUOROURACIL 612 MG: 50 INJECTION, SOLUTION INTRAVENOUS at 13:32

## 2022-01-04 RX ADMIN — ONDANSETRON 8 MG: 2 INJECTION INTRAMUSCULAR; INTRAVENOUS at 08:47

## 2022-01-04 RX ADMIN — FOSAPREPITANT 150 MG: 150 INJECTION, POWDER, LYOPHILIZED, FOR SOLUTION INTRAVENOUS at 09:06

## 2022-01-04 RX ADMIN — OXALIPLATIN 100 MG: 5 INJECTION, SOLUTION INTRAVENOUS at 09:39

## 2022-01-04 NOTE — PROGRESS NOTES
Arrived to the [facility]. Folfirinox completed. Patient tolerated well. Any issues or concerns during appointment: none. Patient aware of next infusion appointment on 1/6/22 (date) at 1400 (time). Patient aware of next lab and [facility] office visit on 1/17/22 (date) at 0930 (time). Patient instructed to call provider with temperature of 100.4 or greater or nausea/vomiting/ diarrhea or pain not controlled by medications  Discharged ambulatory with adrucil pump infusing.

## 2022-01-06 ENCOUNTER — HOSPITAL ENCOUNTER (OUTPATIENT)
Dept: INFUSION THERAPY | Age: 47
Discharge: HOME | End: 2022-01-06

## 2022-01-06 VITALS
TEMPERATURE: 98.3 F | RESPIRATION RATE: 16 BRPM | HEART RATE: 78 BPM | OXYGEN SATURATION: 100 % | SYSTOLIC BLOOD PRESSURE: 111 MMHG | DIASTOLIC BLOOD PRESSURE: 53 MMHG

## 2022-01-06 DIAGNOSIS — T45.1X5A CINV (CHEMOTHERAPY-INDUCED NAUSEA AND VOMITING): ICD-10-CM

## 2022-01-06 DIAGNOSIS — C16.8 MALIGNANT NEOPLASM OF OVERLAPPING SITES OF STOMACH (HCC): ICD-10-CM

## 2022-01-06 DIAGNOSIS — R39.15 URGENCY OF URINATION: Primary | ICD-10-CM

## 2022-01-06 DIAGNOSIS — R11.2 CINV (CHEMOTHERAPY-INDUCED NAUSEA AND VOMITING): ICD-10-CM

## 2022-01-06 PROCEDURE — 96523 IRRIG DRUG DELIVERY DEVICE: CPT

## 2022-01-06 PROCEDURE — 74011000250 HC RX REV CODE- 250: Performed by: INTERNAL MEDICINE

## 2022-01-06 RX ORDER — SODIUM CHLORIDE 9 MG/ML
10 INJECTION INTRAMUSCULAR; INTRAVENOUS; SUBCUTANEOUS AS NEEDED
Status: DISCONTINUED | OUTPATIENT
Start: 2022-01-06 | End: 2022-01-07 | Stop reason: HOSPADM

## 2022-01-06 RX ADMIN — SODIUM CHLORIDE, PRESERVATIVE FREE 10 ML: 5 INJECTION INTRAVENOUS at 14:05

## 2022-01-06 NOTE — PROGRESS NOTES
Arrived to the [facility]. Assessment completed . 5 FU Elastomeric pump completed. Patient tolerated without problems. Any issues or concerns during appointment: None  Patient instructed to call provider with temperature of 100.4 or greater or nausea/vomiting/ diarrhea or pain not controlled by medications    Instructed to call Dr Lurdes Zamudio  with any side effects or concerns  Patient aware of next infusion appointment on 1/7/22(date) at 4 PM (time).   Discharged ambulatory

## 2022-01-18 ENCOUNTER — HOSPITAL ENCOUNTER (OUTPATIENT)
Dept: INFUSION THERAPY | Age: 47
End: 2022-01-18

## 2022-01-19 ENCOUNTER — DOCUMENTATION ONLY (OUTPATIENT)
Dept: HEMATOLOGY | Age: 47
End: 2022-01-19

## 2022-01-19 ENCOUNTER — HOSPITAL ENCOUNTER (OUTPATIENT)
Dept: LAB | Age: 47
Discharge: HOME | End: 2022-01-19

## 2022-01-19 DIAGNOSIS — C16.9 MALIGNANT NEOPLASM OF STOMACH, UNSPECIFIED LOCATION (HCC): ICD-10-CM

## 2022-01-19 LAB
ALBUMIN SERPL-MCNC: 3.3 G/DL (ref 3.5–5)
ALBUMIN/GLOB SERPL: 1.1 {RATIO} (ref 1.2–3.5)
ALP SERPL-CCNC: 147 U/L (ref 50–136)
ALT SERPL-CCNC: 66 U/L (ref 12–65)
ANION GAP SERPL CALC-SCNC: 6 MMOL/L (ref 7–16)
AST SERPL-CCNC: 49 U/L (ref 15–37)
BASOPHILS # BLD: 0 K/UL (ref 0–0.2)
BASOPHILS NFR BLD: 1 % (ref 0–2)
BILIRUB SERPL-MCNC: 0.4 MG/DL (ref 0.2–1.1)
BUN SERPL-MCNC: 9 MG/DL (ref 6–23)
CALCIUM SERPL-MCNC: 8.9 MG/DL (ref 8.3–10.4)
CEA SERPL-MCNC: 36.1 NG/ML (ref 0–3)
CHLORIDE SERPL-SCNC: 108 MMOL/L (ref 98–107)
CO2 SERPL-SCNC: 28 MMOL/L (ref 21–32)
CREAT SERPL-MCNC: 0.9 MG/DL (ref 0.8–1.5)
DIFFERENTIAL METHOD BLD: ABNORMAL
EOSINOPHIL # BLD: 0.1 K/UL (ref 0–0.8)
EOSINOPHIL NFR BLD: 6 % (ref 0.5–7.8)
ERYTHROCYTE [DISTWIDTH] IN BLOOD BY AUTOMATED COUNT: 17.2 % (ref 11.9–14.6)
GLOBULIN SER CALC-MCNC: 3.1 G/DL (ref 2.3–3.5)
GLUCOSE SERPL-MCNC: 85 MG/DL (ref 65–100)
HCT VFR BLD AUTO: 27.2 %
HGB BLD-MCNC: 9.2 G/DL (ref 13.6–17.2)
IMM GRANULOCYTES # BLD AUTO: 0 K/UL (ref 0–0.5)
IMM GRANULOCYTES NFR BLD AUTO: 1 % (ref 0–5)
LYMPHOCYTES # BLD: 0.2 K/UL (ref 0.5–4.6)
LYMPHOCYTES NFR BLD: 17 % (ref 13–44)
MAGNESIUM SERPL-MCNC: 2.4 MG/DL (ref 1.8–2.4)
MCH RBC QN AUTO: 35.7 PG (ref 26.1–32.9)
MCHC RBC AUTO-ENTMCNC: 33.8 G/DL (ref 31.4–35)
MCV RBC AUTO: 105.4 FL (ref 79.6–97.8)
MONOCYTES # BLD: 0.4 K/UL (ref 0.1–1.3)
MONOCYTES NFR BLD: 25 % (ref 4–12)
NEUTS SEG # BLD: 0.7 K/UL (ref 1.7–8.2)
NEUTS SEG NFR BLD: 49 % (ref 43–78)
NRBC # BLD: 0 K/UL (ref 0–0.2)
PLATELET # BLD AUTO: 53 K/UL (ref 150–450)
PMV BLD AUTO: 9.4 FL (ref 9.4–12.3)
POTASSIUM SERPL-SCNC: 3.9 MMOL/L (ref 3.5–5.1)
PROT SERPL-MCNC: 6.4 G/DL (ref 6.3–8.2)
RBC # BLD AUTO: 2.58 M/UL (ref 4.23–5.6)
SODIUM SERPL-SCNC: 142 MMOL/L (ref 136–145)
WBC # BLD AUTO: 1.4 K/UL (ref 4.3–11.1)

## 2022-01-19 PROCEDURE — 85025 COMPLETE CBC W/AUTO DIFF WBC: CPT

## 2022-01-19 PROCEDURE — 80053 COMPREHEN METABOLIC PANEL: CPT

## 2022-01-19 PROCEDURE — 83735 ASSAY OF MAGNESIUM: CPT

## 2022-01-19 PROCEDURE — 82378 CARCINOEMBRYONIC ANTIGEN: CPT

## 2022-01-19 PROCEDURE — 36415 COLL VENOUS BLD VENIPUNCTURE: CPT

## 2022-01-19 NOTE — PROGRESS NOTES
I spoke with Mr. Lia Gowers regarding re-enrolling into the free [facility] program with the pharmaceutical  [employer]. I went over the application with Mr. Lia Gowers and he signed where indicated. I also spoke with Mr. Lia Gowers about his hospital assistance. He asked about the balances owed after the 81% discount he was approved for. I told him that he can appeal the determination based on the high balances owed even after the discount is applied. Balances currently owed during the time of the assistance current effective dates is over $31,000. I recommended Mr. Lia Gowers file the appeal with the assistance department to see if they can approve him to 100% viky.

## 2022-01-20 ENCOUNTER — HOSPITAL ENCOUNTER (OUTPATIENT)
Dept: INFUSION THERAPY | Age: 47
End: 2022-01-20

## 2022-01-20 ENCOUNTER — DOCUMENTATION ONLY (OUTPATIENT)
Dept: HEMATOLOGY | Age: 47
End: 2022-01-20

## 2022-01-20 ENCOUNTER — PATIENT OUTREACH (OUTPATIENT)
Dept: CASE MANAGEMENT | Age: 47
End: 2022-01-20

## 2022-01-20 NOTE — PROGRESS NOTES
Saw pt on 1-19-22 with Dr Lurdes Zamudio. Pt missed Udenyca injection last cycle so ANC is 700 today so will hold one week and rechallenge next week. Pt otherwise denies any complaints.  Nurse navigation is following

## 2022-01-23 ENCOUNTER — HOSPITAL ENCOUNTER (OUTPATIENT)
Dept: INFUSION THERAPY | Age: 47
End: 2022-01-23

## 2022-01-24 ENCOUNTER — PATIENT OUTREACH (OUTPATIENT)
Dept: CASE MANAGEMENT | Age: 47
End: 2022-01-24

## 2022-01-24 NOTE — PROGRESS NOTES
Pt Messaged and stated they have cold symptoms, fever and general malaise. We will have pt covid tested.

## 2022-01-27 ENCOUNTER — HOSPITAL ENCOUNTER (OUTPATIENT)
Dept: INFUSION THERAPY | Age: 47
Discharge: HOME | End: 2022-01-27

## 2022-01-27 DIAGNOSIS — R39.15 URGENCY OF URINATION: ICD-10-CM

## 2022-01-27 DIAGNOSIS — R11.2 CINV (CHEMOTHERAPY-INDUCED NAUSEA AND VOMITING): ICD-10-CM

## 2022-01-27 DIAGNOSIS — C16.8 MALIGNANT NEOPLASM OF OVERLAPPING SITES OF STOMACH (HCC): ICD-10-CM

## 2022-01-27 DIAGNOSIS — T45.1X5A CINV (CHEMOTHERAPY-INDUCED NAUSEA AND VOMITING): ICD-10-CM

## 2022-01-30 ENCOUNTER — HOSPITAL ENCOUNTER (OUTPATIENT)
Dept: INFUSION THERAPY | Age: 47
End: 2022-01-30

## 2022-02-16 ENCOUNTER — HOSPITAL ENCOUNTER (OUTPATIENT)
Dept: LAB | Age: 47
Discharge: HOME | End: 2022-02-16

## 2022-02-16 DIAGNOSIS — C16.9 MALIGNANT NEOPLASM OF STOMACH, UNSPECIFIED LOCATION (HCC): ICD-10-CM

## 2022-02-16 LAB
ALBUMIN SERPL-MCNC: 3.3 G/DL (ref 3.5–5)
ALBUMIN/GLOB SERPL: 1 {RATIO} (ref 1.2–3.5)
ALP SERPL-CCNC: 153 U/L (ref 50–136)
ALT SERPL-CCNC: 32 U/L (ref 12–65)
ANION GAP SERPL CALC-SCNC: 2 MMOL/L (ref 7–16)
AST SERPL-CCNC: 33 U/L (ref 15–37)
BASOPHILS # BLD: 0 K/UL (ref 0–0.2)
BASOPHILS NFR BLD: 0 % (ref 0–2)
BILIRUB SERPL-MCNC: 0.4 MG/DL (ref 0.2–1.1)
BUN SERPL-MCNC: 14 MG/DL (ref 6–23)
CALCIUM SERPL-MCNC: 8.5 MG/DL (ref 8.3–10.4)
CEA SERPL-MCNC: 30 NG/ML (ref 0–3)
CHLORIDE SERPL-SCNC: 108 MMOL/L (ref 98–107)
CO2 SERPL-SCNC: 31 MMOL/L (ref 21–32)
CREAT SERPL-MCNC: 0.8 MG/DL (ref 0.8–1.5)
DIFFERENTIAL METHOD BLD: ABNORMAL
EOSINOPHIL # BLD: 0.1 K/UL (ref 0–0.8)
EOSINOPHIL NFR BLD: 4 % (ref 0.5–7.8)
ERYTHROCYTE [DISTWIDTH] IN BLOOD BY AUTOMATED COUNT: 14.3 % (ref 11.9–14.6)
GLOBULIN SER CALC-MCNC: 3.4 G/DL (ref 2.3–3.5)
GLUCOSE SERPL-MCNC: 92 MG/DL (ref 65–100)
HCT VFR BLD AUTO: 29.5 %
HGB BLD-MCNC: 9.6 G/DL (ref 13.6–17.2)
IMM GRANULOCYTES # BLD AUTO: 0 K/UL (ref 0–0.5)
IMM GRANULOCYTES NFR BLD AUTO: 0 % (ref 0–5)
LYMPHOCYTES # BLD: 0.3 K/UL (ref 0.5–4.6)
LYMPHOCYTES NFR BLD: 10 % (ref 13–44)
MAGNESIUM SERPL-MCNC: 2.3 MG/DL (ref 1.8–2.4)
MCH RBC QN AUTO: 34.3 PG (ref 26.1–32.9)
MCHC RBC AUTO-ENTMCNC: 32.5 G/DL (ref 31.4–35)
MCV RBC AUTO: 105.4 FL (ref 79.6–97.8)
MONOCYTES # BLD: 0.4 K/UL (ref 0.1–1.3)
MONOCYTES NFR BLD: 13 % (ref 4–12)
NEUTS SEG # BLD: 2.3 K/UL (ref 1.7–8.2)
NEUTS SEG NFR BLD: 72 % (ref 43–78)
NRBC # BLD: 0 K/UL (ref 0–0.2)
PLATELET # BLD AUTO: 75 K/UL (ref 150–450)
PMV BLD AUTO: 9.4 FL (ref 9.4–12.3)
POTASSIUM SERPL-SCNC: 4 MMOL/L (ref 3.5–5.1)
PROT SERPL-MCNC: 6.7 G/DL (ref 6.3–8.2)
RBC # BLD AUTO: 2.8 M/UL (ref 4.23–5.6)
SODIUM SERPL-SCNC: 141 MMOL/L (ref 136–145)
WBC # BLD AUTO: 3.1 K/UL (ref 4.3–11.1)

## 2022-02-16 PROCEDURE — 36415 COLL VENOUS BLD VENIPUNCTURE: CPT

## 2022-02-16 PROCEDURE — 80053 COMPREHEN METABOLIC PANEL: CPT

## 2022-02-16 PROCEDURE — 85025 COMPLETE CBC W/AUTO DIFF WBC: CPT

## 2022-02-16 PROCEDURE — 82378 CARCINOEMBRYONIC ANTIGEN: CPT

## 2022-02-16 PROCEDURE — 83735 ASSAY OF MAGNESIUM: CPT

## 2022-02-17 ENCOUNTER — HOSPITAL ENCOUNTER (OUTPATIENT)
Dept: INFUSION THERAPY | Age: 47
Discharge: HOME | End: 2022-02-17

## 2022-02-17 VITALS
SYSTOLIC BLOOD PRESSURE: 126 MMHG | OXYGEN SATURATION: 100 % | DIASTOLIC BLOOD PRESSURE: 65 MMHG | WEIGHT: 172.6 LBS | RESPIRATION RATE: 16 BRPM | BODY MASS INDEX: 23.74 KG/M2 | TEMPERATURE: 98.6 F | HEART RATE: 72 BPM

## 2022-02-17 DIAGNOSIS — R11.2 CINV (CHEMOTHERAPY-INDUCED NAUSEA AND VOMITING): Primary | ICD-10-CM

## 2022-02-17 DIAGNOSIS — R39.15 URGENCY OF URINATION: ICD-10-CM

## 2022-02-17 DIAGNOSIS — T45.1X5A CINV (CHEMOTHERAPY-INDUCED NAUSEA AND VOMITING): Primary | ICD-10-CM

## 2022-02-17 DIAGNOSIS — C16.8 MALIGNANT NEOPLASM OF OVERLAPPING SITES OF STOMACH (HCC): ICD-10-CM

## 2022-02-17 PROCEDURE — 74011000250 HC RX REV CODE- 250: Performed by: INTERNAL MEDICINE

## 2022-02-17 PROCEDURE — 96375 TX/PRO/DX INJ NEW DRUG ADDON: CPT

## 2022-02-17 PROCEDURE — 96372 THER/PROPH/DIAG INJ SC/IM: CPT

## 2022-02-17 PROCEDURE — 96413 CHEMO IV INFUSION 1 HR: CPT

## 2022-02-17 PROCEDURE — 96417 CHEMO IV INFUS EACH ADDL SEQ: CPT

## 2022-02-17 PROCEDURE — 96411 CHEMO IV PUSH ADDL DRUG: CPT

## 2022-02-17 PROCEDURE — 74011250636 HC RX REV CODE- 250/636: Performed by: INTERNAL MEDICINE

## 2022-02-17 PROCEDURE — 96367 TX/PROPH/DG ADDL SEQ IV INF: CPT

## 2022-02-17 PROCEDURE — 96415 CHEMO IV INFUSION ADDL HR: CPT

## 2022-02-17 PROCEDURE — 74011000258 HC RX REV CODE- 258: Performed by: INTERNAL MEDICINE

## 2022-02-17 PROCEDURE — G0498 CHEMO EXTEND IV INFUS W/PUMP: HCPCS

## 2022-02-17 PROCEDURE — 96368 THER/DIAG CONCURRENT INF: CPT

## 2022-02-17 RX ORDER — ATROPINE SULFATE 0.4 MG/ML
0.4 INJECTION, SOLUTION ENDOTRACHEAL; INTRAMEDULLARY; INTRAMUSCULAR; INTRAVENOUS; SUBCUTANEOUS ONCE
Status: COMPLETED | OUTPATIENT
Start: 2022-02-17 | End: 2022-02-17

## 2022-02-17 RX ORDER — FLUOROURACIL 50 MG/ML
300 INJECTION, SOLUTION INTRAVENOUS ONCE
Status: COMPLETED | OUTPATIENT
Start: 2022-02-17 | End: 2022-02-17

## 2022-02-17 RX ORDER — SODIUM CHLORIDE 0.9 % (FLUSH) 0.9 %
10 SYRINGE (ML) INJECTION AS NEEDED
Status: ACTIVE | OUTPATIENT
Start: 2022-02-17 | End: 2022-02-17

## 2022-02-17 RX ORDER — ONDANSETRON 2 MG/ML
8 INJECTION INTRAMUSCULAR; INTRAVENOUS ONCE
Status: COMPLETED | OUTPATIENT
Start: 2022-02-17 | End: 2022-02-17

## 2022-02-17 RX ORDER — DEXTROSE MONOHYDRATE 50 MG/ML
25 INJECTION, SOLUTION INTRAVENOUS CONTINUOUS
Status: ACTIVE | OUTPATIENT
Start: 2022-02-17 | End: 2022-02-17

## 2022-02-17 RX ADMIN — FOSAPREPITANT 150 MG: 150 INJECTION, POWDER, LYOPHILIZED, FOR SOLUTION INTRAVENOUS at 10:47

## 2022-02-17 RX ADMIN — ATROPINE SULFATE 0.4 MG: 0.4 INJECTION, SOLUTION INTRAMUSCULAR; INTRAVENOUS; SUBCUTANEOUS at 12:36

## 2022-02-17 RX ADMIN — FLUOROURACIL 612 MG: 50 INJECTION, SOLUTION INTRAVENOUS at 15:02

## 2022-02-17 RX ADMIN — LEUCOVORIN CALCIUM 816 MG: 350 INJECTION, POWDER, LYOPHILIZED, FOR SOLUTION INTRAMUSCULAR; INTRAVENOUS at 13:16

## 2022-02-17 RX ADMIN — ONDANSETRON 8 MG: 2 INJECTION INTRAMUSCULAR; INTRAVENOUS at 10:19

## 2022-02-17 RX ADMIN — SODIUM CHLORIDE, PRESERVATIVE FREE 10 ML: 5 INJECTION INTRAVENOUS at 10:21

## 2022-02-17 RX ADMIN — DEXAMETHASONE SODIUM PHOSPHATE 12 MG: 4 INJECTION, SOLUTION INTRAMUSCULAR; INTRAVENOUS at 10:24

## 2022-02-17 RX ADMIN — IRINOTECAN HYDROCHLORIDE 240 MG: 20 INJECTION, SOLUTION INTRAVENOUS at 13:16

## 2022-02-17 RX ADMIN — OXALIPLATIN 100 MG: 5 INJECTION, SOLUTION INTRAVENOUS at 11:10

## 2022-02-17 RX ADMIN — DEXTROSE MONOHYDRATE 25 ML/HR: 50 INJECTION, SOLUTION INTRAVENOUS at 10:00

## 2022-02-17 RX ADMIN — FLUOROURACIL 4000 MG: 50 INJECTION, SOLUTION INTRAVENOUS at 15:05

## 2022-02-17 NOTE — ADDENDUM NOTE
Encounter addended by: SHELLY Amor [facility] on: 2/17/2022 9:59 AM   Actions taken: i-Vent created or edited

## 2022-02-17 NOTE — PROGRESS NOTES
Arrived to the [facility]. Folfirinox completed. Patient tolerated well. Any issues or concerns during appointment: none. Patient aware of next infusion appointment on 2/19/22 (date) at 1400 (time). Patient aware of next lab and [facility] office visit on 3/2/22 (date) at 0900 (time). Patient instructed to call provider with temperature of 100.4 or greater or nausea/vomiting/ diarrhea or pain not controlled by medications  Discharged ambulatory. Pt sent home with infusion pump attached.

## 2022-02-19 ENCOUNTER — HOSPITAL ENCOUNTER (OUTPATIENT)
Dept: INFUSION THERAPY | Age: 47
Discharge: HOME | End: 2022-02-19

## 2022-02-19 VITALS
HEART RATE: 72 BPM | OXYGEN SATURATION: 99 % | DIASTOLIC BLOOD PRESSURE: 69 MMHG | TEMPERATURE: 98 F | RESPIRATION RATE: 18 BRPM | SYSTOLIC BLOOD PRESSURE: 111 MMHG

## 2022-02-19 DIAGNOSIS — R39.15 URGENCY OF URINATION: Primary | ICD-10-CM

## 2022-02-19 DIAGNOSIS — C16.8 MALIGNANT NEOPLASM OF OVERLAPPING SITES OF STOMACH (HCC): ICD-10-CM

## 2022-02-19 DIAGNOSIS — T45.1X5A CINV (CHEMOTHERAPY-INDUCED NAUSEA AND VOMITING): ICD-10-CM

## 2022-02-19 DIAGNOSIS — R11.2 CINV (CHEMOTHERAPY-INDUCED NAUSEA AND VOMITING): ICD-10-CM

## 2022-02-19 PROCEDURE — 74011000250 HC RX REV CODE- 250: Performed by: INTERNAL MEDICINE

## 2022-02-19 PROCEDURE — 96523 IRRIG DRUG DELIVERY DEVICE: CPT

## 2022-02-19 RX ORDER — SODIUM CHLORIDE 9 MG/ML
10 INJECTION INTRAMUSCULAR; INTRAVENOUS; SUBCUTANEOUS AS NEEDED
Status: DISCONTINUED | OUTPATIENT
Start: 2022-02-19 | End: 2022-02-20 | Stop reason: HOSPADM

## 2022-02-19 RX ADMIN — SODIUM CHLORIDE, PRESERVATIVE FREE 10 ML: 5 INJECTION INTRAVENOUS at 13:40

## 2022-02-19 NOTE — PROGRESS NOTES
Arrived to the [facility]. Assessment completed . 5 FU Elastomeric pump completed. Patient tolerated without problems. Any issues or concerns during appointment: None  Patient instructed to call provider with temperature of 100.4 or greater or nausea/vomiting/ diarrhea or pain not controlled by medications  Instructed to call Dr Claire Cid  with any side effects or concerns  Patient aware of next infusion appointment on 2/20/22(date) at 2 30 PM (time).   Discharged ambulatory

## 2022-02-20 ENCOUNTER — HOSPITAL ENCOUNTER (OUTPATIENT)
Dept: INFUSION THERAPY | Age: 47
Discharge: HOME | End: 2022-02-20

## 2022-02-20 VITALS
SYSTOLIC BLOOD PRESSURE: 111 MMHG | TEMPERATURE: 98.4 F | OXYGEN SATURATION: 99 % | DIASTOLIC BLOOD PRESSURE: 54 MMHG | HEART RATE: 80 BPM | RESPIRATION RATE: 18 BRPM

## 2022-02-20 DIAGNOSIS — R11.2 CINV (CHEMOTHERAPY-INDUCED NAUSEA AND VOMITING): ICD-10-CM

## 2022-02-20 DIAGNOSIS — T45.1X5A CINV (CHEMOTHERAPY-INDUCED NAUSEA AND VOMITING): ICD-10-CM

## 2022-02-20 DIAGNOSIS — R39.15 URGENCY OF URINATION: Primary | ICD-10-CM

## 2022-02-20 DIAGNOSIS — C16.8 MALIGNANT NEOPLASM OF OVERLAPPING SITES OF STOMACH (HCC): ICD-10-CM

## 2022-02-20 PROCEDURE — 96372 THER/PROPH/DIAG INJ SC/IM: CPT

## 2022-02-20 PROCEDURE — 74011250636 HC RX REV CODE- 250/636: Performed by: INTERNAL MEDICINE

## 2022-02-20 RX ADMIN — PEGFILGRASTIM-CBQV 6 MG: 6 INJECTION, SOLUTION SUBCUTANEOUS at 14:09

## 2022-02-20 NOTE — PROGRESS NOTES
Arrived to the [facility]. Udenyca completed. Provided education on Udenyca    Patient instructed to report any side affects to ordering provider. Patient tolerated well. Any issues or concerns during appointment: none. Patient aware of next infusion appointment on 3/3 (date) at 0900 (time). Discharged ambulatory,   Patient instructed to call provider with temperature of 100.4 or greater or nausea/vomiting/ diarrhea or pain not controlled by medications  .

## 2022-03-01 ENCOUNTER — HOSPITAL ENCOUNTER (OUTPATIENT)
Dept: CT IMAGING | Age: 47
Discharge: HOME | End: 2022-03-01
Attending: INTERNAL MEDICINE

## 2022-03-01 DIAGNOSIS — C79.72 MALIGNANT NEOPLASM METASTATIC TO LEFT ADRENAL GLAND (HCC): ICD-10-CM

## 2022-03-01 RX ORDER — SODIUM CHLORIDE 0.9 % (FLUSH) 0.9 %
10 SYRINGE (ML) INJECTION
Status: COMPLETED | OUTPATIENT
Start: 2022-03-01 | End: 2022-03-01

## 2022-03-01 RX ADMIN — Medication 10 ML: at 11:25

## 2022-03-02 ENCOUNTER — PATIENT OUTREACH (OUTPATIENT)
Dept: CASE MANAGEMENT | Age: 47
End: 2022-03-02

## 2022-03-02 ENCOUNTER — HOSPITAL ENCOUNTER (OUTPATIENT)
Dept: LAB | Age: 47
Discharge: HOME | End: 2022-03-02

## 2022-03-02 DIAGNOSIS — C16.9 MALIGNANT NEOPLASM OF STOMACH, UNSPECIFIED LOCATION (HCC): ICD-10-CM

## 2022-03-02 LAB
ALBUMIN SERPL-MCNC: 3.5 G/DL (ref 3.5–5)
ALBUMIN/GLOB SERPL: 1.1 {RATIO} (ref 1.2–3.5)
ALP SERPL-CCNC: 195 U/L (ref 50–136)
ALT SERPL-CCNC: 74 U/L (ref 12–65)
ANION GAP SERPL CALC-SCNC: 5 MMOL/L (ref 7–16)
AST SERPL-CCNC: 49 U/L (ref 15–37)
BASOPHILS # BLD: 0 K/UL (ref 0–0.2)
BASOPHILS NFR BLD: 0 % (ref 0–2)
BILIRUB SERPL-MCNC: 0.3 MG/DL (ref 0.2–1.1)
BUN SERPL-MCNC: 11 MG/DL (ref 6–23)
CALCIUM SERPL-MCNC: 8.8 MG/DL (ref 8.3–10.4)
CEA SERPL-MCNC: 36.3 NG/ML (ref 0–3)
CHLORIDE SERPL-SCNC: 108 MMOL/L (ref 98–107)
CO2 SERPL-SCNC: 27 MMOL/L (ref 21–32)
CREAT SERPL-MCNC: 0.9 MG/DL (ref 0.8–1.5)
DIFFERENTIAL METHOD BLD: ABNORMAL
EOSINOPHIL # BLD: 0.2 K/UL (ref 0–0.8)
EOSINOPHIL NFR BLD: 3 % (ref 0.5–7.8)
ERYTHROCYTE [DISTWIDTH] IN BLOOD BY AUTOMATED COUNT: 14.6 % (ref 11.9–14.6)
GLOBULIN SER CALC-MCNC: 3.3 G/DL (ref 2.3–3.5)
GLUCOSE SERPL-MCNC: 122 MG/DL (ref 65–100)
HCT VFR BLD AUTO: 30.8 %
HGB BLD-MCNC: 10.1 G/DL (ref 13.6–17.2)
IMM GRANULOCYTES # BLD AUTO: 0.1 K/UL (ref 0–0.5)
IMM GRANULOCYTES NFR BLD AUTO: 1 % (ref 0–5)
LYMPHOCYTES # BLD: 0.4 K/UL (ref 0.5–4.6)
LYMPHOCYTES NFR BLD: 8 % (ref 13–44)
MAGNESIUM SERPL-MCNC: 2.2 MG/DL (ref 1.8–2.4)
MCH RBC QN AUTO: 34.7 PG (ref 26.1–32.9)
MCHC RBC AUTO-ENTMCNC: 32.8 G/DL (ref 31.4–35)
MCV RBC AUTO: 105.8 FL (ref 79.6–97.8)
MONOCYTES # BLD: 0.5 K/UL (ref 0.1–1.3)
MONOCYTES NFR BLD: 9 % (ref 4–12)
NEUTS SEG # BLD: 4.2 K/UL (ref 1.7–8.2)
NEUTS SEG NFR BLD: 79 % (ref 43–78)
NRBC # BLD: 0 K/UL (ref 0–0.2)
PLATELET # BLD AUTO: 78 K/UL (ref 150–450)
PLATELET COMMENTS,PCOM: ABNORMAL
PMV BLD AUTO: 10.1 FL (ref 9.4–12.3)
POTASSIUM SERPL-SCNC: 3.8 MMOL/L (ref 3.5–5.1)
PROT SERPL-MCNC: 6.8 G/DL (ref 6.3–8.2)
RBC # BLD AUTO: 2.91 M/UL (ref 4.23–5.6)
RBC MORPH BLD: ABNORMAL
SODIUM SERPL-SCNC: 140 MMOL/L (ref 136–145)
WBC # BLD AUTO: 5.4 K/UL (ref 4.3–11.1)
WBC MORPH BLD: ABNORMAL

## 2022-03-02 PROCEDURE — 80053 COMPREHEN METABOLIC PANEL: CPT

## 2022-03-02 PROCEDURE — 82378 CARCINOEMBRYONIC ANTIGEN: CPT

## 2022-03-02 PROCEDURE — 85025 COMPLETE CBC W/AUTO DIFF WBC: CPT

## 2022-03-02 PROCEDURE — 36415 COLL VENOUS BLD VENIPUNCTURE: CPT

## 2022-03-02 PROCEDURE — 83735 ASSAY OF MAGNESIUM: CPT

## 2022-03-02 NOTE — PROGRESS NOTES
I saw patient today with Dr Pearl Tellez prior to [facility] Folfirinox. Dr Pearl Tellez reviewed scans w patient and noted new liver metastasis. Pt will be sent to radiation but will continue Folfirinox for now with options to go to immunotherapy in future if needed.  Navigation is following

## 2022-03-03 ENCOUNTER — HOSPITAL ENCOUNTER (OUTPATIENT)
Dept: INFUSION THERAPY | Age: 47
Discharge: HOME | End: 2022-03-03

## 2022-03-03 VITALS
DIASTOLIC BLOOD PRESSURE: 59 MMHG | WEIGHT: 175.6 LBS | HEART RATE: 76 BPM | TEMPERATURE: 98.9 F | SYSTOLIC BLOOD PRESSURE: 103 MMHG | RESPIRATION RATE: 16 BRPM | OXYGEN SATURATION: 100 % | BODY MASS INDEX: 24.15 KG/M2

## 2022-03-03 DIAGNOSIS — C16.8 MALIGNANT NEOPLASM OF OVERLAPPING SITES OF STOMACH (HCC): ICD-10-CM

## 2022-03-03 DIAGNOSIS — R39.15 URGENCY OF URINATION: Primary | ICD-10-CM

## 2022-03-03 DIAGNOSIS — T45.1X5A CINV (CHEMOTHERAPY-INDUCED NAUSEA AND VOMITING): ICD-10-CM

## 2022-03-03 DIAGNOSIS — R11.2 CINV (CHEMOTHERAPY-INDUCED NAUSEA AND VOMITING): ICD-10-CM

## 2022-03-03 PROCEDURE — 74011000250 HC RX REV CODE- 250: Performed by: INTERNAL MEDICINE

## 2022-03-03 PROCEDURE — 74011250636 HC RX REV CODE- 250/636: Performed by: INTERNAL MEDICINE

## 2022-03-03 PROCEDURE — 96368 THER/DIAG CONCURRENT INF: CPT

## 2022-03-03 PROCEDURE — 96372 THER/PROPH/DIAG INJ SC/IM: CPT

## 2022-03-03 PROCEDURE — 96367 TX/PROPH/DG ADDL SEQ IV INF: CPT

## 2022-03-03 PROCEDURE — 96417 CHEMO IV INFUS EACH ADDL SEQ: CPT

## 2022-03-03 PROCEDURE — 96375 TX/PRO/DX INJ NEW DRUG ADDON: CPT

## 2022-03-03 PROCEDURE — G0498 CHEMO EXTEND IV INFUS W/PUMP: HCPCS

## 2022-03-03 PROCEDURE — 96413 CHEMO IV INFUSION 1 HR: CPT

## 2022-03-03 PROCEDURE — 74011000258 HC RX REV CODE- 258: Performed by: INTERNAL MEDICINE

## 2022-03-03 PROCEDURE — 96411 CHEMO IV PUSH ADDL DRUG: CPT

## 2022-03-03 PROCEDURE — 96415 CHEMO IV INFUSION ADDL HR: CPT

## 2022-03-03 RX ORDER — ATROPINE SULFATE 0.4 MG/ML
0.4 INJECTION, SOLUTION ENDOTRACHEAL; INTRAMEDULLARY; INTRAMUSCULAR; INTRAVENOUS; SUBCUTANEOUS ONCE
Status: COMPLETED | OUTPATIENT
Start: 2022-03-03 | End: 2022-03-03

## 2022-03-03 RX ORDER — DEXTROSE MONOHYDRATE 50 MG/ML
25 INJECTION, SOLUTION INTRAVENOUS CONTINUOUS
Status: ACTIVE | OUTPATIENT
Start: 2022-03-03 | End: 2022-03-03

## 2022-03-03 RX ORDER — FLUOROURACIL 50 MG/ML
300 INJECTION, SOLUTION INTRAVENOUS ONCE
Status: COMPLETED | OUTPATIENT
Start: 2022-03-03 | End: 2022-03-03

## 2022-03-03 RX ORDER — ONDANSETRON 2 MG/ML
8 INJECTION INTRAMUSCULAR; INTRAVENOUS ONCE
Status: COMPLETED | OUTPATIENT
Start: 2022-03-03 | End: 2022-03-03

## 2022-03-03 RX ORDER — SODIUM CHLORIDE 0.9 % (FLUSH) 0.9 %
10 SYRINGE (ML) INJECTION AS NEEDED
Status: ACTIVE | OUTPATIENT
Start: 2022-03-03 | End: 2022-03-03

## 2022-03-03 RX ADMIN — OXALIPLATIN 100 MG: 5 INJECTION, SOLUTION INTRAVENOUS at 11:14

## 2022-03-03 RX ADMIN — SODIUM CHLORIDE, PRESERVATIVE FREE 10 ML: 5 INJECTION INTRAVENOUS at 15:05

## 2022-03-03 RX ADMIN — LEUCOVORIN CALCIUM 816 MG: 350 INJECTION, POWDER, LYOPHILIZED, FOR SOLUTION INTRAMUSCULAR; INTRAVENOUS at 13:29

## 2022-03-03 RX ADMIN — DEXAMETHASONE SODIUM PHOSPHATE 12 MG: 4 INJECTION, SOLUTION INTRAMUSCULAR; INTRAVENOUS at 10:09

## 2022-03-03 RX ADMIN — IRINOTECAN HYDROCHLORIDE 240 MG: 20 INJECTION, SOLUTION INTRAVENOUS at 13:29

## 2022-03-03 RX ADMIN — FLUOROURACIL 4000 MG: 50 INJECTION, SOLUTION INTRAVENOUS at 15:08

## 2022-03-03 RX ADMIN — FLUOROURACIL 612 MG: 50 INJECTION, SOLUTION INTRAVENOUS at 15:07

## 2022-03-03 RX ADMIN — ONDANSETRON 8 MG: 2 INJECTION INTRAMUSCULAR; INTRAVENOUS at 10:03

## 2022-03-03 RX ADMIN — DEXTROSE MONOHYDRATE 25 ML/HR: 50 INJECTION, SOLUTION INTRAVENOUS at 09:45

## 2022-03-03 RX ADMIN — FOSAPREPITANT 150 MG: 150 INJECTION, POWDER, LYOPHILIZED, FOR SOLUTION INTRAVENOUS at 10:32

## 2022-03-03 RX ADMIN — ATROPINE SULFATE 0.4 MG: 0.4 INJECTION, SOLUTION INTRAMUSCULAR; INTRAVENOUS; SUBCUTANEOUS at 13:19

## 2022-03-03 NOTE — ADDENDUM NOTE
Encounter addended by: Rashel Quintana [facility] on: 3/3/2022 9:59 AM   Actions taken: i-Vent created or edited

## 2022-03-03 NOTE — PROGRESS NOTES
Arrived to the infusion center. Assessment done and labs reviewed. Tolerated Folfirinox without signs of adverse reaction. No new issues or concerns voiced  during  the visit. Aware of his next visit on 3/5 at 1530 to have pump dc/d. Discharged ambulatory in satisfactory condition.

## 2022-03-03 NOTE — PROGRESS NOTES
Problem: Knowledge Deficit  Goal: *Participate in the learning process  Outcome: Progressing Towards Goal  Goal: *Verbalize description of procedure  Outcome: Progressing Towards Goal  Goal: *Verbalizes understanding and describes medication purposes and frequencies  Outcome: Progressing Towards Goal  Goal: *Knowledge of discharge instructions  Outcome: Progressing Towards Goal     Problem: Airway Clearance - Ineffective  Goal: Achieve or maintain patent airway  Outcome: Progressing Towards Goal     Problem: Gas Exchange - Impaired  Goal: Absence of hypoxia  Outcome: Progressing Towards Goal  Goal: Promote optimal lung function  Outcome: Progressing Towards Goal     Problem: Breathing Pattern - Ineffective  Goal: Ability to achieve and maintain a regular respiratory rate  Outcome: Progressing Towards Goal     Problem:  Body Temperature -  Risk of, Imbalanced  Goal: Ability to maintain a body temperature within defined limits  Outcome: Progressing Towards Goal  Goal: Will regain or maintain usual level of consciousness  Outcome: Progressing Towards Goal  Goal: Complications related to the disease process, condition or treatment will be avoided or minimized  Outcome: Progressing Towards Goal     Problem: Isolation Precautions - Risk of Spread of Infection  Goal: Prevent transmission of infectious organism to others  Outcome: Progressing Towards Goal     Problem: Nutrition Deficits  Goal: Optimize nutrtional status  Outcome: Progressing Towards Goal     Problem: Risk for Fluid Volume Deficit  Goal: Maintain normal heart rhythm  Outcome: Progressing Towards Goal  Goal: Maintain absence of muscle cramping  Outcome: Progressing Towards Goal  Goal: Maintain normal serum potassium, sodium, calcium, phosphorus, and pH  Outcome: Progressing Towards Goal     Problem: Fatigue  Goal: Verbalize increase energy and improved vitality  Outcome: Progressing Towards Goal     Problem: Patient Education: Go to Patient Education Activity  Goal: Patient/Family Education  Outcome: Progressing Towards Goal

## 2022-03-03 NOTE — ADDENDUM NOTE
Encounter addended by: Brayan Arreaga on: 3/3/2022 10:00 AM   Actions taken: i-Vent created or edited

## 2022-03-05 ENCOUNTER — HOSPITAL ENCOUNTER (OUTPATIENT)
Dept: INFUSION THERAPY | Age: 47
Discharge: HOME | End: 2022-03-05

## 2022-03-05 VITALS
SYSTOLIC BLOOD PRESSURE: 99 MMHG | TEMPERATURE: 99.2 F | DIASTOLIC BLOOD PRESSURE: 66 MMHG | HEART RATE: 76 BPM | OXYGEN SATURATION: 100 %

## 2022-03-05 PROCEDURE — 96523 IRRIG DRUG DELIVERY DEVICE: CPT

## 2022-03-05 RX ORDER — SODIUM CHLORIDE 0.9 % (FLUSH) 0.9 %
10 SYRINGE (ML) INJECTION EVERY 8 HOURS
Status: DISCONTINUED | OUTPATIENT
Start: 2022-03-05 | End: 2022-03-07 | Stop reason: HOSPADM

## 2022-03-05 RX ADMIN — Medication 10 ML: at 14:10

## 2022-03-05 NOTE — PROGRESS NOTES
Arrived to the infusion center. No complaints. Pump dc/d and port tdeaccessed. Aware of his next appointment on 3/6 at 1300 for Udenyca. Discharged ambulatory in satisfactory condition.

## 2022-03-06 ENCOUNTER — HOSPITAL ENCOUNTER (OUTPATIENT)
Dept: INFUSION THERAPY | Age: 47
Discharge: HOME | End: 2022-03-06

## 2022-03-06 VITALS
SYSTOLIC BLOOD PRESSURE: 101 MMHG | HEART RATE: 91 BPM | OXYGEN SATURATION: 98 % | DIASTOLIC BLOOD PRESSURE: 52 MMHG | RESPIRATION RATE: 16 BRPM | TEMPERATURE: 99.1 F

## 2022-03-06 DIAGNOSIS — T45.1X5A CINV (CHEMOTHERAPY-INDUCED NAUSEA AND VOMITING): ICD-10-CM

## 2022-03-06 DIAGNOSIS — C16.8 MALIGNANT NEOPLASM OF OVERLAPPING SITES OF STOMACH (HCC): ICD-10-CM

## 2022-03-06 DIAGNOSIS — R11.2 CINV (CHEMOTHERAPY-INDUCED NAUSEA AND VOMITING): ICD-10-CM

## 2022-03-06 DIAGNOSIS — R39.15 URGENCY OF URINATION: Primary | ICD-10-CM

## 2022-03-06 PROCEDURE — 74011250636 HC RX REV CODE- 250/636: Performed by: INTERNAL MEDICINE

## 2022-03-06 PROCEDURE — 96372 THER/PROPH/DIAG INJ SC/IM: CPT

## 2022-03-06 RX ADMIN — PEGFILGRASTIM-CBQV 6 MG: 6 INJECTION, SOLUTION SUBCUTANEOUS at 14:45

## 2022-03-06 NOTE — PROGRESS NOTES
Arrived to the [facility]. Carlie completed. Provided education on SE to report to MD    Patient instructed to report any side affects to ordering provider. Patient tolerated well. Any issues or concerns during appointment: none. Patient aware of next infusion appointment on 03/17/22 (date) at 0900 (time). Discharged ambulatory.

## 2022-03-07 ENCOUNTER — HOSPITAL ENCOUNTER (OUTPATIENT)
Dept: RADIATION ONCOLOGY | Age: 47
Discharge: HOME | End: 2022-03-07

## 2022-03-07 VITALS
HEART RATE: 86 BPM | DIASTOLIC BLOOD PRESSURE: 72 MMHG | TEMPERATURE: 98.3 F | BODY MASS INDEX: 23.74 KG/M2 | WEIGHT: 172.6 LBS | SYSTOLIC BLOOD PRESSURE: 113 MMHG | OXYGEN SATURATION: 99 %

## 2022-03-07 PROCEDURE — 99211 OFF/OP EST MAY X REQ PHY/QHP: CPT

## 2022-03-07 NOTE — NURSE NAVIGATOR
Consult liver mets. Previous SBRT abdomen x 5 fractions ending 6-11-21. Radiation left adrenal and left mediastinum x 5 ending 12-27-21. He states he had covid after last RT and chemo was on hold. He noticed sensation of food getting stuck during that time. He has had several chemo cycles now and is not noticing it anymore. Pt denies pain. CT C/A/P 3-1-22. Pt will be presented at [place] [id number] on 3/24/22. No f/u was  made today.      Cassandra Price RN

## 2022-03-08 NOTE — CONSULTS
Patient: Angel Luis Resendiz MRN: [id number]  SSN: [id number]    YOB: 1975  Age: 56 y.o. Sex: male      Other Providers:  Dr. Alfonzo Pena: fatigue    DIAGNOSIS: metastatic (oligoprogressive) gastric cancer    PREVIOUS TREATMENT:  1. Nov 2020 - present: FOLFIRINOX  2. 06/11/2021: SBRT to L para-aortic LN  3. 12/27/2021: SBRT to L adrenal and posterior mediastinum, 4000 cGy in 5 fractions and 5000 cGy, respectively    HISTORY OF PRESENT ILLNESS:  Angel Luis Resendiz is a 56 y.o. male who I am seeing at the request of Dr. Ryley Gonzales. His oncologic history is outlined above. Most recently, he had CT C/A/P which revealed a new R liver lesion as well as possible progression in L adrenal and the gastric mass looks slightly larger. My opinion is that the L adrenal mass is enlarged due to treatment effect but certainly would want to keep a close eye on it. Of note, he was on a break from chemo but has recently restarted FOLFIRINOX. Symptomatically, he has no pain or N/V. PAST MEDICAL HISTORY:    Past Medical History:   Diagnosis Date    Gastric cancer ([facility] [address]     Lazy eye, left        The patient denies history of collagen vascular diseases, pacemaker insertion. PAST SURGICAL HISTORY:   Past Surgical History:   Procedure Laterality Date    HX ORTHOPAEDIC      ankle    HX TONSILLECTOMY      IR INSERT TUNL CVC W PORT OVER 5 YEARS  11/3/2020       MEDICATIONS:     Current Outpatient Medications:     mirtazapine (REMERON) 30 mg tablet, Take 1 Tablet by mouth nightly., Disp: 30 Tablet, Rfl: 5    LORazepam (Ativan) 1 mg tablet, Take 1 Tablet by mouth every six (6) hours as needed (nausea). Max Daily Amount: 4 mg., Disp: 90 Tablet, Rfl: 1    zolpidem CR (AMBIEN CR) 12.5 mg tablet, Take 1 Tablet by mouth nightly as needed for Sleep.  Max Daily Amount: 12.5 mg., Disp: 30 Tablet, Rfl: 0    gabapentin (NEURONTIN) 300 mg capsule, Take 1 Capsule by mouth two (2) times a day., Disp: 60 Capsule, Rfl: 2   diphenoxylate-atropine (LomotiL) 2.5-0.025 mg per tablet, Take 1 Tablet by mouth four (4) times daily as needed for Diarrhea. Max Daily Amount: 4 Tablets. Indications: diarrhea caused by chemotherapy, Disp: 60 Tablet, Rfl: 0    potassium chloride (K-DUR, KLOR-CON) 20 mEq tablet, Take 1 Tablet by mouth daily. , Disp: 30 Tablet, Rfl: 2    lidocaine-prilocaine (EMLA) topical cream, Apply  to affected area as needed for Pain., Disp: 30 g, Rfl: 1    prochlorperazine (Compazine) 10 mg tablet, Take 1 Tab by mouth every six (6) hours as needed for Nausea. Indications: nausea and vomiting caused by cancer drugs, nausea and vomiting, Disp: 90 Tab, Rfl: 3    ondansetron hcl (Zofran) 8 mg tablet, Take 1 Tab by mouth every eight (8) hours as needed for Nausea. Indications: nausea and vomiting caused by cancer drugs, Disp: 60 Tab, Rfl: 3  No current facility-administered medications for this encounter.     Facility-Administered Medications Ordered in Other Encounters:     central line flush (saline) syringe 10 mL, 10 mL, InterCATHeter, Q8H, Layla De La Rosa MD, 10 mL at 11/06/21 1024    central line flush (saline) syringe 10 mL, 10 mL, InterCATHeter, Q8H, Layla De La Rosa MD    central line flush (saline) syringe 10 mL, 10 mL, InterCATHeter, Q8H, Layla De La Rosa MD    ALLERGIES:   No Known Allergies    SOCIAL HISTORY:   Social History     Socioeconomic History    Marital status:      Spouse name: Not on file    Number of children: Not on file    Years of education: Not on file    Highest education level: Not on file   Occupational History    Not on file   Tobacco Use    Smoking status: Never Smoker    Smokeless tobacco: Never Used   Substance and Sexual Activity    Alcohol use: Yes     Comment: occasional    Drug use: Never    Sexual activity: Not on file   Other Topics Concern     Service Not Asked    Blood Transfusions Not Asked    Caffeine Concern Not Asked    Occupational Exposure Not Asked    Hobby Hazards Not Asked    Sleep Concern Not Asked    Stress Concern Not Asked    Weight Concern Not Asked    Special Diet Not Asked    Back Care Not Asked    Exercise Not Asked    Bike Helmet Not Asked   [address] Not Asked    Self-Exams Not Asked   Social History Narrative    Not on file     Social Determinants of Health     Financial Resource Strain:     Difficulty of Paying Living Expenses: Not on file   Food Insecurity:     Worried About Running Out of Food in the Last Year: Not on file    Abena of Food in the Last Year: Not on file   Transportation Needs:     Lack of Transportation (Medical): Not on file    Lack of Transportation (Non-Medical): Not on file   Physical Activity:     Days of Exercise per Week: Not on file    Minutes of Exercise per Session: Not on file   Stress:     Feeling of Stress : Not on file   Social Connections:     Frequency of Communication with Friends and Family: Not on file    Frequency of Social Gatherings with Friends and Family: Not on file    Attends [employer] Services: Not on file    Active Member of [address] [place] [address] or Organizations: Not on file    Attends Club or Organization Meetings: Not on file    Marital Status: Not on file   Intimate Partner Violence:     Fear of Current or Ex-Partner: Not on file    Emotionally Abused: Not on file    Physically Abused: Not on file    Sexually Abused: Not on file   Housing Stability:     Unable to Pay for Housing in the Last Year: Not on file    Number of [place] in the Last Year: Not on file    Unstable Housing in the Last Year: Not on file       FAMILY HISTORY:   Family History   Problem Relation Age of Onset    Dementia Mother     Depression Mother     Diabetes Mother     Schizophrenia Mother        REVIEW OF SYSTEMS: Please see the completed review of systems sheet in the chart that I have reviewed today.       PHYSICAL EXAMINATION:   ECOG Performance status 1  VITAL SIGNS:   Visit Vitals  /72 (BP 1 Location: Left upper arm, BP Patient Position: Sitting)   Pulse 86   Temp 98.3 °F (36.8 °C)   Wt 78.3 kg (172 lb 9.6 oz)   SpO2 99%   BMI 23.74 kg/m²      General: well developed/nourished adult Male in no acute distress; appears stated age  [de-identified]: normocephalic, atraumatic; EOMI  Neck: supple with full ROM; Respiratory: normal inspiratory effort, no audible wheezes  Extremities: no cyanosis, clubbing, or edema  Musculoskeletal: mobility intact x4; normal ROM in all joints  Skin: no skin lesions identified  Neuro: AOx3; sensation intact x 4; CNII-XII grossly intact  Psych: appropriate affect, insight, and judgement  GI: abdomen soft, non-distended    PATHOLOGY:    No new pathology    LABORATORY:   Lab Results   Component Value Date/Time    Sodium 140 03/02/2022 09:22 AM    Potassium 3.8 03/02/2022 09:22 AM    Chloride 108 (H) 03/02/2022 09:22 AM    CO2 27 03/02/2022 09:22 AM    Anion gap 5 (L) 03/02/2022 09:22 AM    Glucose 122 (H) 03/02/2022 09:22 AM    BUN 11 03/02/2022 09:22 AM    Creatinine 0.90 03/02/2022 09:22 AM    GFR est AA >60 03/02/2022 09:22 AM    GFR est non-AA >60 03/02/2022 09:22 AM    Calcium 8.8 03/02/2022 09:22 AM    Magnesium 2.2 03/02/2022 09:22 AM    Albumin 3.5 03/02/2022 09:22 AM    Protein, total 6.8 03/02/2022 09:22 AM    Globulin 3.3 03/02/2022 09:22 AM    A-G Ratio 1.1 (L) 03/02/2022 09:22 AM    ALT (SGPT) 74 (H) 03/02/2022 09:22 AM     Lab Results   Component Value Date/Time    WBC 5.4 03/02/2022 09:22 AM    HGB 10.1 (L) 03/02/2022 09:22 AM    HCT 30.8 03/02/2022 09:22 AM    PLATELET 78 (L) 60/33/8800 09:22 AM       RADIOLOGY:    CT CHEST ABD PELV W CONT    Result Date: 3/1/2022  CT CHEST, ABDOMEN AND PELVIS WITH INTRAVENOUS CONTRAST DATED 3/1/2022. History: Metastatic gastric cancer. Comparison: CT chest, abdomen, and pelvis 11/16/2021.  Technique:   Multiple contiguous helical CT images reconstructed at 5 mm were obtained from the base of the neck to the ischial tuberosities following oral and 100 cc Isovue-370 without acute complication. All CT scans performed at this facility use one or all of the following: Automated exposure control, adjustment of the mA and/or kVp according to patient's size, iterative reconstruction. Findings: CT Chest: The base of the neck is unremarkable in appearance. No evolving axillary, mediastinal, or hilar lymphadenopathy is seen. The prior rounded fluid attenuation structure adjacent to the descending thoracic aorta has not significantly changed in the interim once again measuring 2 cm in diameter as measured on axial image 41. The thoracic aorta is normal in caliber. A new trace pericardial effusion is seen. Evaluation with lung windows demonstrates no new suspicious pulmonary lesion. New small areas of focal ill-defined parenchymal densities are seen most evident in the lower lobes which appear inflammatory or atelectatic. No pleural effusion is seen. Lungs are expanded without evidence for pneumothorax. No evolving aggressive osseous lesion is seen. CT ABDOMEN:  The Liver demonstrate a persistent large metastasis in the posterior right lobe now seen on image 54 which is not significantly changed in size from the prior study now measuring 8.6 cm x 7.2 cm (previously measured 8.7 cm x 7.1 cm). However, there is a new hypodense lesion just lateral and inferior to this seen on axial image 58 measuring 2.2 cm x 2.1 cm whose appearance raises concern for a new hepatic metastasis The spleen demonstrates stable moderate enlargement measuring 15.1 cm in length. Heterogeneity is seen favored to result from flow artifact. The right adrenal gland is unremarkable. An enlarging left adrenal mass is seen now measuring 4.2 cm x 3.3 cm (previously measured 3.4 cm x 2.5 cm). Abnormal mass once again extends down to and is inseparable from the distal pancreatic body with potential invasion of the pancreatic body not excluded. Similar changes were noted on the prior comparison. No suspicious pancreatic changes are otherwise seen. The gallbladder has an unremarkable CT appearance without radioopaque stones or pericholecystic fluid/inflammatory changes. The kidneys enhance symmetrically and no evidence of hydronephrosis is seen. There is once again a large partially calcified mass centered at the GE junction. This is not clearly changed over multiple studies measuring 7.2 cm x 5.8 cm in greatest transverse dimensions. This demonstrates an asymmetric solid enhancing component abutting the proximal lesser curvature of the stomach seen on image 55 measuring 4.4 cm x 4 cm in size which may be slightly increased in size measuring 3.9 cm x 3.8 cm on the more recent comparison study. Retroperitoneal extension of tumor extending around the celiac axis and origin of the SMA is not clearly changed with these vascular structures remain patent. The visualized loops of small bowel and colon are normal in caliber. No free fluid and no free air is seen in the abdomen. No evolving adenopathy is seen of the abdomen. Only a stable enlarged left periaortic retroperitoneal lymph node is now seen on axial image 72 measuring 2.3 cm x 1.8 cm The abdominal aorta is unremarkable in appearance. No evolving aggressive osseous lesion is seen. CT PELVIS: No abnormal pelvic fluid collections are present. No pelvic adenopathy is seen. The urinary bladder is unremarkable. No evolving aggressive osseous lesion is seen. There is a dysplastic appearance of the right hip unchanged from the prior comparison study. 1.  New 2.2 cm x 2.1 cm right lobe hepatic hypodense lesion and enlarging left adrenal mass now measuring 4.2 cm x 3.3 cm in size is seen concerning for sites of disease progression.  No clear changes seen in the patient's gastric tumor although this demonstrates a more solid and enhancing masslike component abutting the lesser curvature of the proximal stomach which a be slightly increased in size now measuring 4.4 cm x 4 cm in size and previously measured 3.9 cm x 3.8 cm. The additional large right lobe hepatic metastasis, 2 cm periaortic fluid fluid attenuation structure adjacent to the descending thoracic aorta, and left periaortic retroperitoneal lymph node is unchanged. This report was made using voice transcription. Despite my best efforts to avoid any, transcription errors may persist. If there is any question about the accuracy of the report or need for clarification, then please call [phone], or text me through [employer] for clarification or correction. IMPRESSION:  Soumya Martinez is a 56 y.o. male with oligo-progressive gastric cancer. PLAN:    Given his changes I'm concerned he may have more occult disease. I can see him back after his next scan and can re-evaluate treating with consolidative RT. Will discuss with Dr. Vance Parr.       Monica Reyes MD   March 8, 2022

## 2022-03-16 ENCOUNTER — HOSPITAL ENCOUNTER (OUTPATIENT)
Dept: LAB | Age: 47
Discharge: HOME | End: 2022-03-16

## 2022-03-16 DIAGNOSIS — C16.8 MALIGNANT NEOPLASM OF OVERLAPPING SITES OF STOMACH (HCC): ICD-10-CM

## 2022-03-16 LAB
ALBUMIN SERPL-MCNC: 3.7 G/DL (ref 3.5–5)
ALBUMIN/GLOB SERPL: 1.2 {RATIO} (ref 1.2–3.5)
ALP SERPL-CCNC: 173 U/L (ref 50–136)
ALT SERPL-CCNC: 50 U/L (ref 12–65)
ANION GAP SERPL CALC-SCNC: 5 MMOL/L (ref 7–16)
AST SERPL-CCNC: 38 U/L (ref 15–37)
BASOPHILS # BLD: 0.1 K/UL (ref 0–0.2)
BASOPHILS NFR BLD: 1 % (ref 0–2)
BILIRUB SERPL-MCNC: 0.2 MG/DL (ref 0.2–1.1)
BUN SERPL-MCNC: 12 MG/DL (ref 6–23)
CALCIUM SERPL-MCNC: 9 MG/DL (ref 8.3–10.4)
CEA SERPL-MCNC: 35 NG/ML (ref 0–3)
CHLORIDE SERPL-SCNC: 106 MMOL/L (ref 98–107)
CO2 SERPL-SCNC: 27 MMOL/L (ref 21–32)
CREAT SERPL-MCNC: 0.9 MG/DL (ref 0.8–1.5)
DIFFERENTIAL METHOD BLD: ABNORMAL
EOSINOPHIL # BLD: 0.1 K/UL (ref 0–0.8)
EOSINOPHIL NFR BLD: 1 % (ref 0.5–7.8)
ERYTHROCYTE [DISTWIDTH] IN BLOOD BY AUTOMATED COUNT: 15.4 % (ref 11.9–14.6)
GLOBULIN SER CALC-MCNC: 3.1 G/DL (ref 2.3–3.5)
GLUCOSE SERPL-MCNC: 84 MG/DL (ref 65–100)
HCT VFR BLD AUTO: 28.2 %
HGB BLD-MCNC: 9.3 G/DL (ref 13.6–17.2)
IMM GRANULOCYTES # BLD AUTO: 0.1 K/UL (ref 0–0.5)
IMM GRANULOCYTES NFR BLD AUTO: 2 % (ref 0–5)
LYMPHOCYTES # BLD: 0.5 K/UL (ref 0.5–4.6)
LYMPHOCYTES NFR BLD: 7 % (ref 13–44)
MCH RBC QN AUTO: 34.6 PG (ref 26.1–32.9)
MCHC RBC AUTO-ENTMCNC: 33 G/DL (ref 31.4–35)
MCV RBC AUTO: 104.8 FL (ref 79.6–97.8)
MONOCYTES # BLD: 0.9 K/UL (ref 0.1–1.3)
MONOCYTES NFR BLD: 12 % (ref 4–12)
NEUTS SEG # BLD: 5.6 K/UL (ref 1.7–8.2)
NEUTS SEG NFR BLD: 77 % (ref 43–78)
NRBC # BLD: 0 K/UL (ref 0–0.2)
PLATELET # BLD AUTO: 70 K/UL (ref 150–450)
PLATELET COMMENTS,PCOM: SLIGHT
PMV BLD AUTO: 10.5 FL (ref 9.4–12.3)
POTASSIUM SERPL-SCNC: 3.7 MMOL/L (ref 3.5–5.1)
PROT SERPL-MCNC: 6.8 G/DL (ref 6.3–8.2)
RBC # BLD AUTO: 2.69 M/UL (ref 4.23–5.6)
RBC MORPH BLD: ABNORMAL
SODIUM SERPL-SCNC: 138 MMOL/L (ref 136–145)
WBC # BLD AUTO: 7.3 K/UL (ref 4.3–11.1)
WBC MORPH BLD: ABNORMAL

## 2022-03-16 PROCEDURE — 36415 COLL VENOUS BLD VENIPUNCTURE: CPT

## 2022-03-16 PROCEDURE — 82378 CARCINOEMBRYONIC ANTIGEN: CPT

## 2022-03-16 PROCEDURE — 85025 COMPLETE CBC W/AUTO DIFF WBC: CPT

## 2022-03-16 PROCEDURE — 80053 COMPREHEN METABOLIC PANEL: CPT

## 2022-03-17 ENCOUNTER — HOSPITAL ENCOUNTER (OUTPATIENT)
Dept: INFUSION THERAPY | Age: 47
Discharge: HOME | End: 2022-03-17

## 2022-03-17 VITALS
WEIGHT: 177.2 LBS | HEART RATE: 74 BPM | SYSTOLIC BLOOD PRESSURE: 107 MMHG | OXYGEN SATURATION: 99 % | DIASTOLIC BLOOD PRESSURE: 61 MMHG | BODY MASS INDEX: 24.37 KG/M2 | TEMPERATURE: 98.8 F | RESPIRATION RATE: 16 BRPM

## 2022-03-17 DIAGNOSIS — R11.2 CINV (CHEMOTHERAPY-INDUCED NAUSEA AND VOMITING): ICD-10-CM

## 2022-03-17 DIAGNOSIS — T45.1X5A CINV (CHEMOTHERAPY-INDUCED NAUSEA AND VOMITING): ICD-10-CM

## 2022-03-17 DIAGNOSIS — C16.8 MALIGNANT NEOPLASM OF OVERLAPPING SITES OF STOMACH (HCC): ICD-10-CM

## 2022-03-17 DIAGNOSIS — R39.15 URGENCY OF URINATION: Primary | ICD-10-CM

## 2022-03-17 PROCEDURE — 74011000250 HC RX REV CODE- 250: Performed by: INTERNAL MEDICINE

## 2022-03-17 PROCEDURE — 96367 TX/PROPH/DG ADDL SEQ IV INF: CPT

## 2022-03-17 PROCEDURE — 96368 THER/DIAG CONCURRENT INF: CPT

## 2022-03-17 PROCEDURE — 96413 CHEMO IV INFUSION 1 HR: CPT

## 2022-03-17 PROCEDURE — 74011250636 HC RX REV CODE- 250/636: Performed by: INTERNAL MEDICINE

## 2022-03-17 PROCEDURE — 96415 CHEMO IV INFUSION ADDL HR: CPT

## 2022-03-17 PROCEDURE — G0498 CHEMO EXTEND IV INFUS W/PUMP: HCPCS

## 2022-03-17 PROCEDURE — 96372 THER/PROPH/DIAG INJ SC/IM: CPT

## 2022-03-17 PROCEDURE — 96411 CHEMO IV PUSH ADDL DRUG: CPT

## 2022-03-17 PROCEDURE — 74011000258 HC RX REV CODE- 258: Performed by: INTERNAL MEDICINE

## 2022-03-17 PROCEDURE — 96417 CHEMO IV INFUS EACH ADDL SEQ: CPT

## 2022-03-17 PROCEDURE — 96375 TX/PRO/DX INJ NEW DRUG ADDON: CPT

## 2022-03-17 RX ORDER — FLUOROURACIL 50 MG/ML
300 INJECTION, SOLUTION INTRAVENOUS ONCE
Status: COMPLETED | OUTPATIENT
Start: 2022-03-17 | End: 2022-03-17

## 2022-03-17 RX ORDER — ATROPINE SULFATE 0.4 MG/ML
0.4 INJECTION, SOLUTION ENDOTRACHEAL; INTRAMEDULLARY; INTRAMUSCULAR; INTRAVENOUS; SUBCUTANEOUS ONCE
Status: COMPLETED | OUTPATIENT
Start: 2022-03-17 | End: 2022-03-17

## 2022-03-17 RX ORDER — SODIUM CHLORIDE 0.9 % (FLUSH) 0.9 %
10 SYRINGE (ML) INJECTION AS NEEDED
Status: ACTIVE | OUTPATIENT
Start: 2022-03-17 | End: 2022-03-17

## 2022-03-17 RX ORDER — DEXTROSE MONOHYDRATE 50 MG/ML
25 INJECTION, SOLUTION INTRAVENOUS CONTINUOUS
Status: ACTIVE | OUTPATIENT
Start: 2022-03-17 | End: 2022-03-17

## 2022-03-17 RX ORDER — ONDANSETRON 2 MG/ML
8 INJECTION INTRAMUSCULAR; INTRAVENOUS ONCE
Status: COMPLETED | OUTPATIENT
Start: 2022-03-17 | End: 2022-03-17

## 2022-03-17 RX ADMIN — IRINOTECAN HYDROCHLORIDE 240 MG: 20 INJECTION, SOLUTION INTRAVENOUS at 12:50

## 2022-03-17 RX ADMIN — FOSAPREPITANT 150 MG: 150 INJECTION, POWDER, LYOPHILIZED, FOR SOLUTION INTRAVENOUS at 10:15

## 2022-03-17 RX ADMIN — DEXAMETHASONE SODIUM PHOSPHATE 12 MG: 4 INJECTION, SOLUTION INTRAMUSCULAR; INTRAVENOUS at 09:54

## 2022-03-17 RX ADMIN — DEXTROSE MONOHYDRATE 100 ML/HR: 50 INJECTION, SOLUTION INTRAVENOUS at 09:15

## 2022-03-17 RX ADMIN — ATROPINE SULFATE 0.4 MG: 0.4 INJECTION, SOLUTION INTRAMUSCULAR; INTRAVENOUS; SUBCUTANEOUS at 12:42

## 2022-03-17 RX ADMIN — ONDANSETRON 8 MG: 2 INJECTION INTRAMUSCULAR; INTRAVENOUS at 09:52

## 2022-03-17 RX ADMIN — SODIUM CHLORIDE, PRESERVATIVE FREE 10 ML: 5 INJECTION INTRAVENOUS at 09:15

## 2022-03-17 RX ADMIN — LEUCOVORIN CALCIUM 816 MG: 350 INJECTION, POWDER, LYOPHILIZED, FOR SOLUTION INTRAMUSCULAR; INTRAVENOUS at 12:46

## 2022-03-17 RX ADMIN — SODIUM CHLORIDE, PRESERVATIVE FREE 10 ML: 5 INJECTION INTRAVENOUS at 14:35

## 2022-03-17 RX ADMIN — FLUOROURACIL 4000 MG: 50 INJECTION, SOLUTION INTRAVENOUS at 14:35

## 2022-03-17 RX ADMIN — FLUOROURACIL 612 MG: 50 INJECTION, SOLUTION INTRAVENOUS at 14:30

## 2022-03-17 RX ADMIN — OXALIPLATIN 100 MG: 5 INJECTION, SOLUTION INTRAVENOUS at 10:50

## 2022-03-17 NOTE — ADDENDUM NOTE
Encounter addended by: SHELLY Darnell [facility] [place] on: 3/17/2022 9:35 AM   Actions taken: i-Vent created or edited

## 2022-03-17 NOTE — PROGRESS NOTES
Pt arrived ambulatory today at 0904, to receive IV FOLFIRINOX regimen. Pt tolerated without difficulty. Patient discharged via ambulatory accompanied by self. Instructed to notify physician of any problems, questions or concerns. Allowed opportunity for patient/family to ask questions. Verbalized understanding. Next appointment is March 19 at 36 with Shan Ortiz.

## 2022-03-19 ENCOUNTER — HOSPITAL ENCOUNTER (OUTPATIENT)
Dept: INFUSION THERAPY | Age: 47
Discharge: HOME | End: 2022-03-19

## 2022-03-19 VITALS
RESPIRATION RATE: 18 BRPM | OXYGEN SATURATION: 97 % | TEMPERATURE: 98.6 F | DIASTOLIC BLOOD PRESSURE: 69 MMHG | HEART RATE: 77 BPM | SYSTOLIC BLOOD PRESSURE: 105 MMHG

## 2022-03-19 DIAGNOSIS — R39.15 URGENCY OF URINATION: Primary | ICD-10-CM

## 2022-03-19 DIAGNOSIS — R11.2 CINV (CHEMOTHERAPY-INDUCED NAUSEA AND VOMITING): ICD-10-CM

## 2022-03-19 DIAGNOSIS — C16.8 MALIGNANT NEOPLASM OF OVERLAPPING SITES OF STOMACH (HCC): ICD-10-CM

## 2022-03-19 DIAGNOSIS — T45.1X5A CINV (CHEMOTHERAPY-INDUCED NAUSEA AND VOMITING): ICD-10-CM

## 2022-03-19 PROCEDURE — 74011000250 HC RX REV CODE- 250: Performed by: INTERNAL MEDICINE

## 2022-03-19 PROCEDURE — 96523 IRRIG DRUG DELIVERY DEVICE: CPT

## 2022-03-19 RX ORDER — SODIUM CHLORIDE 0.9 % (FLUSH) 0.9 %
10 SYRINGE (ML) INJECTION AS NEEDED
Status: DISCONTINUED | OUTPATIENT
Start: 2022-03-19 | End: 2022-03-20 | Stop reason: HOSPADM

## 2022-03-19 RX ADMIN — SODIUM CHLORIDE, PRESERVATIVE FREE 10 ML: 5 INJECTION INTRAVENOUS at 13:40

## 2022-03-19 NOTE — PROGRESS NOTES
Patient arrived for chemo pump removal.  Chemo pump completed and empty in appearance. Port flushed with good blood return. Patient reports he does not use Claritin for bone pain prevention with Udenyca injections. Patient discharged via ambulation accompanied by self. Instructed to notify physician for fever 100.4 or greater, nausea, vomiting, diarrhea, or pain unrelieved with usual medications. Next appointment is 03/20/2022 at [phone] with Infusion for Udenyca injection.

## 2022-03-20 ENCOUNTER — HOSPITAL ENCOUNTER (OUTPATIENT)
Dept: INFUSION THERAPY | Age: 47
Discharge: HOME | End: 2022-03-20

## 2022-03-20 VITALS
RESPIRATION RATE: 18 BRPM | SYSTOLIC BLOOD PRESSURE: 107 MMHG | HEART RATE: 82 BPM | DIASTOLIC BLOOD PRESSURE: 66 MMHG | TEMPERATURE: 98.1 F | OXYGEN SATURATION: 97 %

## 2022-03-20 DIAGNOSIS — C16.8 MALIGNANT NEOPLASM OF OVERLAPPING SITES OF STOMACH (HCC): ICD-10-CM

## 2022-03-20 DIAGNOSIS — R39.15 URGENCY OF URINATION: Primary | ICD-10-CM

## 2022-03-20 DIAGNOSIS — T45.1X5A CINV (CHEMOTHERAPY-INDUCED NAUSEA AND VOMITING): ICD-10-CM

## 2022-03-20 DIAGNOSIS — R11.2 CINV (CHEMOTHERAPY-INDUCED NAUSEA AND VOMITING): ICD-10-CM

## 2022-03-20 PROCEDURE — 96372 THER/PROPH/DIAG INJ SC/IM: CPT

## 2022-03-20 PROCEDURE — 74011250636 HC RX REV CODE- 250/636: Performed by: INTERNAL MEDICINE

## 2022-03-20 RX ADMIN — PEGFILGRASTIM-CBQV 6 MG: 6 INJECTION, SOLUTION SUBCUTANEOUS at 14:35

## 2022-03-20 NOTE — PROGRESS NOTES
Arrived to the [facility]. Udenyca completed. Provided education on Udenyca    Patient instructed to report any side affects to ordering provider. Patient tolerated well. Any issues or concerns during appointment: none. Patient aware of next infusion appointment on 3/31 (date) at 80 (time). Discharged ambulatory, no distress noted.   Patient instructed to call provider with temperature of 100.4 or greater or nausea/vomiting/ diarrhea or pain not controlled by medications

## 2022-03-30 ENCOUNTER — HOSPITAL ENCOUNTER (OUTPATIENT)
Dept: LAB | Age: 47
Discharge: HOME | End: 2022-03-30

## 2022-03-30 DIAGNOSIS — C16.9 MALIGNANT NEOPLASM OF STOMACH, UNSPECIFIED LOCATION (HCC): ICD-10-CM

## 2022-03-30 LAB
ALBUMIN SERPL-MCNC: 3.7 G/DL (ref 3.5–5)
ALBUMIN/GLOB SERPL: 1.1 {RATIO} (ref 1.2–3.5)
ALP SERPL-CCNC: 192 U/L (ref 50–136)
ALT SERPL-CCNC: 84 U/L (ref 12–65)
ANION GAP SERPL CALC-SCNC: 4 MMOL/L (ref 7–16)
AST SERPL-CCNC: 72 U/L (ref 15–37)
BASOPHILS # BLD: 0.1 K/UL (ref 0–0.2)
BASOPHILS NFR BLD: 1 % (ref 0–2)
BILIRUB SERPL-MCNC: 0.3 MG/DL (ref 0.2–1.1)
BUN SERPL-MCNC: 11 MG/DL (ref 6–23)
CALCIUM SERPL-MCNC: 8.8 MG/DL (ref 8.3–10.4)
CEA SERPL-MCNC: 36.7 NG/ML (ref 0–3)
CHLORIDE SERPL-SCNC: 108 MMOL/L (ref 98–107)
CO2 SERPL-SCNC: 29 MMOL/L (ref 21–32)
CREAT SERPL-MCNC: 0.9 MG/DL (ref 0.8–1.5)
DIFFERENTIAL METHOD BLD: ABNORMAL
EOSINOPHIL # BLD: 0.1 K/UL (ref 0–0.8)
EOSINOPHIL NFR BLD: 1 % (ref 0.5–7.8)
ERYTHROCYTE [DISTWIDTH] IN BLOOD BY AUTOMATED COUNT: 16.6 % (ref 11.9–14.6)
GLOBULIN SER CALC-MCNC: 3.3 G/DL (ref 2.3–3.5)
GLUCOSE SERPL-MCNC: 99 MG/DL (ref 65–100)
HCT VFR BLD AUTO: 25.6 %
HGB BLD-MCNC: 8.6 G/DL (ref 13.6–17.2)
IMM GRANULOCYTES # BLD AUTO: 0.2 K/UL (ref 0–0.5)
IMM GRANULOCYTES NFR BLD AUTO: 4 % (ref 0–5)
LYMPHOCYTES # BLD: 0.4 K/UL (ref 0.5–4.6)
LYMPHOCYTES NFR BLD: 8 % (ref 13–44)
MAGNESIUM SERPL-MCNC: 2.3 MG/DL (ref 1.8–2.4)
MCH RBC QN AUTO: 35 PG (ref 26.1–32.9)
MCHC RBC AUTO-ENTMCNC: 33.6 G/DL (ref 31.4–35)
MCV RBC AUTO: 104.1 FL (ref 79.6–97.8)
MONOCYTES # BLD: 0.8 K/UL (ref 0.1–1.3)
MONOCYTES NFR BLD: 15 % (ref 4–12)
NEUTS SEG # BLD: 4 K/UL (ref 1.7–8.2)
NEUTS SEG NFR BLD: 71 % (ref 43–78)
NRBC # BLD: 0.02 K/UL (ref 0–0.2)
PLATELET # BLD AUTO: 63 K/UL (ref 150–450)
PLATELET COMMENTS,PCOM: ABNORMAL
PMV BLD AUTO: 9.2 FL (ref 9.4–12.3)
POTASSIUM SERPL-SCNC: 3.8 MMOL/L (ref 3.5–5.1)
PROT SERPL-MCNC: 7 G/DL (ref 6.3–8.2)
RBC # BLD AUTO: 2.46 M/UL (ref 4.23–5.6)
RBC MORPH BLD: ABNORMAL
SODIUM SERPL-SCNC: 141 MMOL/L (ref 136–145)
WBC # BLD AUTO: 5.6 K/UL (ref 4.3–11.1)
WBC MORPH BLD: ABNORMAL

## 2022-03-30 PROCEDURE — 36415 COLL VENOUS BLD VENIPUNCTURE: CPT

## 2022-03-30 PROCEDURE — 85025 COMPLETE CBC W/AUTO DIFF WBC: CPT

## 2022-03-30 PROCEDURE — 83735 ASSAY OF MAGNESIUM: CPT

## 2022-03-30 PROCEDURE — 80053 COMPREHEN METABOLIC PANEL: CPT

## 2022-03-30 PROCEDURE — 82378 CARCINOEMBRYONIC ANTIGEN: CPT

## 2022-03-31 ENCOUNTER — HOSPITAL ENCOUNTER (OUTPATIENT)
Dept: INFUSION THERAPY | Age: 47
Discharge: HOME | End: 2022-03-31

## 2022-03-31 VITALS
DIASTOLIC BLOOD PRESSURE: 67 MMHG | TEMPERATURE: 98.4 F | HEART RATE: 83 BPM | RESPIRATION RATE: 16 BRPM | WEIGHT: 176 LBS | SYSTOLIC BLOOD PRESSURE: 125 MMHG | OXYGEN SATURATION: 99 % | BODY MASS INDEX: 24.2 KG/M2

## 2022-03-31 DIAGNOSIS — R11.2 CINV (CHEMOTHERAPY-INDUCED NAUSEA AND VOMITING): ICD-10-CM

## 2022-03-31 DIAGNOSIS — R39.15 URGENCY OF URINATION: Primary | ICD-10-CM

## 2022-03-31 DIAGNOSIS — T45.1X5A CINV (CHEMOTHERAPY-INDUCED NAUSEA AND VOMITING): ICD-10-CM

## 2022-03-31 DIAGNOSIS — C16.8 MALIGNANT NEOPLASM OF OVERLAPPING SITES OF STOMACH (HCC): ICD-10-CM

## 2022-03-31 PROCEDURE — 96367 TX/PROPH/DG ADDL SEQ IV INF: CPT

## 2022-03-31 PROCEDURE — 96413 CHEMO IV INFUSION 1 HR: CPT

## 2022-03-31 PROCEDURE — 74011000258 HC RX REV CODE- 258: Performed by: INTERNAL MEDICINE

## 2022-03-31 PROCEDURE — 96372 THER/PROPH/DIAG INJ SC/IM: CPT

## 2022-03-31 PROCEDURE — 96411 CHEMO IV PUSH ADDL DRUG: CPT

## 2022-03-31 PROCEDURE — G0498 CHEMO EXTEND IV INFUS W/PUMP: HCPCS

## 2022-03-31 PROCEDURE — 96375 TX/PRO/DX INJ NEW DRUG ADDON: CPT

## 2022-03-31 PROCEDURE — 96417 CHEMO IV INFUS EACH ADDL SEQ: CPT

## 2022-03-31 PROCEDURE — 74011250636 HC RX REV CODE- 250/636: Performed by: INTERNAL MEDICINE

## 2022-03-31 PROCEDURE — 96415 CHEMO IV INFUSION ADDL HR: CPT

## 2022-03-31 PROCEDURE — 74011000250 HC RX REV CODE- 250: Performed by: INTERNAL MEDICINE

## 2022-03-31 PROCEDURE — 96368 THER/DIAG CONCURRENT INF: CPT

## 2022-03-31 RX ORDER — DEXTROSE MONOHYDRATE 50 MG/ML
25 INJECTION, SOLUTION INTRAVENOUS CONTINUOUS
Status: ACTIVE | OUTPATIENT
Start: 2022-03-31 | End: 2022-03-31

## 2022-03-31 RX ORDER — FLUOROURACIL 50 MG/ML
200 INJECTION, SOLUTION INTRAVENOUS ONCE
Status: COMPLETED | OUTPATIENT
Start: 2022-03-31 | End: 2022-03-31

## 2022-03-31 RX ORDER — ATROPINE SULFATE 0.4 MG/ML
0.4 INJECTION, SOLUTION ENDOTRACHEAL; INTRAMEDULLARY; INTRAMUSCULAR; INTRAVENOUS; SUBCUTANEOUS ONCE
Status: COMPLETED | OUTPATIENT
Start: 2022-03-31 | End: 2022-03-31

## 2022-03-31 RX ORDER — ONDANSETRON 2 MG/ML
8 INJECTION INTRAMUSCULAR; INTRAVENOUS ONCE
Status: COMPLETED | OUTPATIENT
Start: 2022-03-31 | End: 2022-03-31

## 2022-03-31 RX ORDER — SODIUM CHLORIDE 0.9 % (FLUSH) 0.9 %
10 SYRINGE (ML) INJECTION AS NEEDED
Status: ACTIVE | OUTPATIENT
Start: 2022-03-31 | End: 2022-03-31

## 2022-03-31 RX ADMIN — FLUOROURACIL 4000 MG: 50 INJECTION, SOLUTION INTRAVENOUS at 16:08

## 2022-03-31 RX ADMIN — DEXAMETHASONE SODIUM PHOSPHATE 12 MG: 4 INJECTION, SOLUTION INTRAMUSCULAR; INTRAVENOUS at 11:37

## 2022-03-31 RX ADMIN — ONDANSETRON 8 MG: 2 INJECTION INTRAMUSCULAR; INTRAVENOUS at 11:34

## 2022-03-31 RX ADMIN — FOSAPREPITANT 150 MG: 150 INJECTION, POWDER, LYOPHILIZED, FOR SOLUTION INTRAVENOUS at 11:55

## 2022-03-31 RX ADMIN — IRINOTECAN HYDROCHLORIDE 240 MG: 20 INJECTION, SOLUTION INTRAVENOUS at 14:30

## 2022-03-31 RX ADMIN — DEXTROSE MONOHYDRATE 25 ML/HR: 50 INJECTION, SOLUTION INTRAVENOUS at 11:27

## 2022-03-31 RX ADMIN — FLUOROURACIL 408 MG: 50 INJECTION, SOLUTION INTRAVENOUS at 16:05

## 2022-03-31 RX ADMIN — SODIUM CHLORIDE, PRESERVATIVE FREE 10 ML: 5 INJECTION INTRAVENOUS at 11:27

## 2022-03-31 RX ADMIN — ATROPINE SULFATE 0.4 MG: 0.4 INJECTION, SOLUTION INTRAMUSCULAR; INTRAVENOUS; SUBCUTANEOUS at 14:28

## 2022-03-31 RX ADMIN — LEUCOVORIN CALCIUM 816 MG: 350 INJECTION, POWDER, LYOPHILIZED, FOR SOLUTION INTRAMUSCULAR; INTRAVENOUS at 14:30

## 2022-03-31 RX ADMIN — OXALIPLATIN 100 MG: 5 INJECTION, SOLUTION INTRAVENOUS at 12:22

## 2022-03-31 NOTE — PROGRESS NOTES
Patient arrived ambulatory to infusion area. C31D1 FOLFIRINOX completed. Patient tolerated well. Discharged home ambulatory with elastomeric pump. Tubing unclamped. Patient aware of pump d/c appt on 4/2.

## 2022-04-02 ENCOUNTER — HOSPITAL ENCOUNTER (OUTPATIENT)
Dept: INFUSION THERAPY | Age: 47
Discharge: HOME | End: 2022-04-02

## 2022-04-02 VITALS
TEMPERATURE: 98 F | DIASTOLIC BLOOD PRESSURE: 66 MMHG | OXYGEN SATURATION: 99 % | HEART RATE: 78 BPM | SYSTOLIC BLOOD PRESSURE: 112 MMHG

## 2022-04-02 DIAGNOSIS — R11.2 CINV (CHEMOTHERAPY-INDUCED NAUSEA AND VOMITING): ICD-10-CM

## 2022-04-02 DIAGNOSIS — C16.8 MALIGNANT NEOPLASM OF OVERLAPPING SITES OF STOMACH (HCC): ICD-10-CM

## 2022-04-02 DIAGNOSIS — T45.1X5A CINV (CHEMOTHERAPY-INDUCED NAUSEA AND VOMITING): ICD-10-CM

## 2022-04-02 DIAGNOSIS — R39.15 URGENCY OF URINATION: Primary | ICD-10-CM

## 2022-04-02 PROCEDURE — 96523 IRRIG DRUG DELIVERY DEVICE: CPT

## 2022-04-02 PROCEDURE — 74011000250 HC RX REV CODE- 250: Performed by: INTERNAL MEDICINE

## 2022-04-02 RX ORDER — SODIUM CHLORIDE 0.9 % (FLUSH) 0.9 %
10 SYRINGE (ML) INJECTION AS NEEDED
Status: DISCONTINUED | OUTPATIENT
Start: 2022-04-02 | End: 2022-04-03 | Stop reason: HOSPADM

## 2022-04-02 RX ADMIN — SODIUM CHLORIDE, PRESERVATIVE FREE 10 ML: 5 INJECTION INTRAVENOUS at 14:15

## 2022-04-02 NOTE — PROGRESS NOTES
Arrived to the [facility]. Assessment and continuous chemotherapy pump completed. Patient tolerated well. Any issues or concerns during appointment: No.  Patient aware of next infusion appointment on 4/03/22 @1430. Patient instructed to call provider with temperature of 100.4 or greater or nausea/vomiting/ diarrhea or pain not controlled by medications  Discharged ambulatory.

## 2022-04-03 ENCOUNTER — HOSPITAL ENCOUNTER (OUTPATIENT)
Dept: INFUSION THERAPY | Age: 47
Discharge: HOME | End: 2022-04-03

## 2022-04-03 VITALS
TEMPERATURE: 98.2 F | SYSTOLIC BLOOD PRESSURE: 121 MMHG | RESPIRATION RATE: 16 BRPM | OXYGEN SATURATION: 100 % | HEART RATE: 69 BPM | DIASTOLIC BLOOD PRESSURE: 69 MMHG

## 2022-04-03 DIAGNOSIS — R39.15 URGENCY OF URINATION: Primary | ICD-10-CM

## 2022-04-03 DIAGNOSIS — R11.2 CINV (CHEMOTHERAPY-INDUCED NAUSEA AND VOMITING): ICD-10-CM

## 2022-04-03 DIAGNOSIS — C16.8 MALIGNANT NEOPLASM OF OVERLAPPING SITES OF STOMACH (HCC): ICD-10-CM

## 2022-04-03 DIAGNOSIS — T45.1X5A CINV (CHEMOTHERAPY-INDUCED NAUSEA AND VOMITING): ICD-10-CM

## 2022-04-03 PROCEDURE — 96372 THER/PROPH/DIAG INJ SC/IM: CPT

## 2022-04-03 PROCEDURE — 74011250636 HC RX REV CODE- 250/636: Performed by: INTERNAL MEDICINE

## 2022-04-03 RX ADMIN — PEGFILGRASTIM-CBQV 6 MG: 6 INJECTION, SOLUTION SUBCUTANEOUS at 14:35

## 2022-04-03 NOTE — PROGRESS NOTES
Arrived to the [facility]. Udenyca injection completed. Provided education on injection    Patient instructed to report any side affects to ordering provider. Patient tolerated well. Any issues or concerns during appointment: none. Patient aware of next infusion appointment on 4/21/2022 at 0800. Discharged ambulatory.

## 2022-04-20 ENCOUNTER — HOSPITAL ENCOUNTER (OUTPATIENT)
Dept: LAB | Age: 47
Discharge: HOME | End: 2022-04-20

## 2022-04-20 ENCOUNTER — PATIENT OUTREACH (OUTPATIENT)
Dept: CASE MANAGEMENT | Age: 47
End: 2022-04-20

## 2022-04-20 DIAGNOSIS — C16.8 MALIGNANT NEOPLASM OF OVERLAPPING SITES OF STOMACH (HCC): ICD-10-CM

## 2022-04-20 LAB
ALBUMIN SERPL-MCNC: 3.4 G/DL (ref 3.5–5)
ALBUMIN/GLOB SERPL: 1.1 {RATIO} (ref 1.2–3.5)
ALP SERPL-CCNC: 165 U/L (ref 50–136)
ALT SERPL-CCNC: 30 U/L (ref 12–65)
ANION GAP SERPL CALC-SCNC: 4 MMOL/L (ref 7–16)
AST SERPL-CCNC: 38 U/L (ref 15–37)
BASOPHILS # BLD: 0 K/UL (ref 0–0.2)
BASOPHILS NFR BLD: 0 % (ref 0–2)
BILIRUB SERPL-MCNC: 0.3 MG/DL (ref 0.2–1.1)
BUN SERPL-MCNC: 11 MG/DL (ref 6–23)
CALCIUM SERPL-MCNC: 8.7 MG/DL (ref 8.3–10.4)
CEA SERPL-MCNC: 34.2 NG/ML (ref 0–3)
CHLORIDE SERPL-SCNC: 108 MMOL/L (ref 98–107)
CO2 SERPL-SCNC: 29 MMOL/L (ref 21–32)
CREAT SERPL-MCNC: 0.9 MG/DL (ref 0.8–1.5)
DIFFERENTIAL METHOD BLD: ABNORMAL
EOSINOPHIL # BLD: 0 K/UL (ref 0–0.8)
EOSINOPHIL NFR BLD: 0 % (ref 0.5–7.8)
ERYTHROCYTE [DISTWIDTH] IN BLOOD BY AUTOMATED COUNT: 19.5 % (ref 11.9–14.6)
GLOBULIN SER CALC-MCNC: 3.2 G/DL (ref 2.3–3.5)
GLUCOSE SERPL-MCNC: 88 MG/DL (ref 65–100)
HCT VFR BLD AUTO: 25.7 %
HGB BLD-MCNC: 8.3 G/DL (ref 13.6–17.2)
IMM GRANULOCYTES # BLD AUTO: 0.1 K/UL (ref 0–0.5)
IMM GRANULOCYTES NFR BLD AUTO: 1 % (ref 0–5)
LYMPHOCYTES # BLD: 0.5 K/UL (ref 0.5–4.6)
LYMPHOCYTES NFR BLD: 5 % (ref 13–44)
MCH RBC QN AUTO: 35.3 PG (ref 26.1–32.9)
MCHC RBC AUTO-ENTMCNC: 32.3 G/DL (ref 31.4–35)
MCV RBC AUTO: 109.4 FL (ref 79.6–97.8)
MONOCYTES # BLD: 0.8 K/UL (ref 0.1–1.3)
MONOCYTES NFR BLD: 9 % (ref 4–12)
NEUTS SEG # BLD: 8.1 K/UL (ref 1.7–8.2)
NEUTS SEG NFR BLD: 85 % (ref 43–78)
NRBC # BLD: 0 K/UL (ref 0–0.2)
PLATELET # BLD AUTO: 84 K/UL (ref 150–450)
PMV BLD AUTO: 9.8 FL (ref 9.4–12.3)
POTASSIUM SERPL-SCNC: 4 MMOL/L (ref 3.5–5.1)
PROT SERPL-MCNC: 6.6 G/DL (ref 6.3–8.2)
RBC # BLD AUTO: 2.35 M/UL (ref 4.23–5.6)
SODIUM SERPL-SCNC: 141 MMOL/L (ref 136–145)
WBC # BLD AUTO: 9.6 K/UL (ref 4.3–11.1)

## 2022-04-20 PROCEDURE — 36415 COLL VENOUS BLD VENIPUNCTURE: CPT

## 2022-04-20 PROCEDURE — 85025 COMPLETE CBC W/AUTO DIFF WBC: CPT

## 2022-04-20 PROCEDURE — 82378 CARCINOEMBRYONIC ANTIGEN: CPT

## 2022-04-20 PROCEDURE — 80053 COMPREHEN METABOLIC PANEL: CPT

## 2022-04-20 NOTE — PROGRESS NOTES
4/20/22 saw pt today with Dr. Enrique Campos for pre chemo FOLFIRINOX. He is tolerating chemo well. PO intake is good. Denies any issues. Infusion tomorrow. Follow up in 2 weeks. Encouraged to call with any concerns. Navigation will sign off.

## 2022-04-21 ENCOUNTER — HOSPITAL ENCOUNTER (OUTPATIENT)
Dept: INFUSION THERAPY | Age: 47
Discharge: HOME | End: 2022-04-21

## 2022-04-21 VITALS
OXYGEN SATURATION: 100 % | DIASTOLIC BLOOD PRESSURE: 64 MMHG | TEMPERATURE: 98.1 F | BODY MASS INDEX: 23.93 KG/M2 | SYSTOLIC BLOOD PRESSURE: 119 MMHG | WEIGHT: 174 LBS | RESPIRATION RATE: 16 BRPM | HEART RATE: 83 BPM

## 2022-04-21 DIAGNOSIS — T45.1X5A CINV (CHEMOTHERAPY-INDUCED NAUSEA AND VOMITING): ICD-10-CM

## 2022-04-21 DIAGNOSIS — R39.15 URGENCY OF URINATION: Primary | ICD-10-CM

## 2022-04-21 DIAGNOSIS — C16.8 MALIGNANT NEOPLASM OF OVERLAPPING SITES OF STOMACH (HCC): ICD-10-CM

## 2022-04-21 DIAGNOSIS — R11.2 CINV (CHEMOTHERAPY-INDUCED NAUSEA AND VOMITING): ICD-10-CM

## 2022-04-21 PROCEDURE — 96375 TX/PRO/DX INJ NEW DRUG ADDON: CPT

## 2022-04-21 PROCEDURE — 74011250636 HC RX REV CODE- 250/636: Performed by: INTERNAL MEDICINE

## 2022-04-21 PROCEDURE — 96417 CHEMO IV INFUS EACH ADDL SEQ: CPT

## 2022-04-21 PROCEDURE — 96368 THER/DIAG CONCURRENT INF: CPT

## 2022-04-21 PROCEDURE — 74011000250 HC RX REV CODE- 250: Performed by: INTERNAL MEDICINE

## 2022-04-21 PROCEDURE — 96415 CHEMO IV INFUSION ADDL HR: CPT

## 2022-04-21 PROCEDURE — 96413 CHEMO IV INFUSION 1 HR: CPT

## 2022-04-21 PROCEDURE — 96367 TX/PROPH/DG ADDL SEQ IV INF: CPT

## 2022-04-21 PROCEDURE — G0498 CHEMO EXTEND IV INFUS W/PUMP: HCPCS

## 2022-04-21 PROCEDURE — 96372 THER/PROPH/DIAG INJ SC/IM: CPT

## 2022-04-21 PROCEDURE — 74011000258 HC RX REV CODE- 258: Performed by: INTERNAL MEDICINE

## 2022-04-21 PROCEDURE — 96411 CHEMO IV PUSH ADDL DRUG: CPT

## 2022-04-21 RX ORDER — ATROPINE SULFATE 0.4 MG/ML
0.4 INJECTION, SOLUTION ENDOTRACHEAL; INTRAMEDULLARY; INTRAMUSCULAR; INTRAVENOUS; SUBCUTANEOUS ONCE
Status: COMPLETED | OUTPATIENT
Start: 2022-04-21 | End: 2022-04-21

## 2022-04-21 RX ORDER — DEXTROSE MONOHYDRATE 50 MG/ML
25 INJECTION, SOLUTION INTRAVENOUS CONTINUOUS
Status: ACTIVE | OUTPATIENT
Start: 2022-04-21 | End: 2022-04-21

## 2022-04-21 RX ORDER — ONDANSETRON 2 MG/ML
8 INJECTION INTRAMUSCULAR; INTRAVENOUS ONCE
Status: COMPLETED | OUTPATIENT
Start: 2022-04-21 | End: 2022-04-21

## 2022-04-21 RX ORDER — FLUOROURACIL 50 MG/ML
300 INJECTION, SOLUTION INTRAVENOUS ONCE
Status: COMPLETED | OUTPATIENT
Start: 2022-04-21 | End: 2022-04-21

## 2022-04-21 RX ORDER — SODIUM CHLORIDE 9 MG/ML
10 INJECTION INTRAMUSCULAR; INTRAVENOUS; SUBCUTANEOUS AS NEEDED
Status: ACTIVE | OUTPATIENT
Start: 2022-04-21 | End: 2022-04-21

## 2022-04-21 RX ADMIN — LEUCOVORIN CALCIUM 816 MG: 500 INJECTION, POWDER, LYOPHILIZED, FOR SOLUTION INTRAMUSCULAR; INTRAVENOUS at 11:55

## 2022-04-21 RX ADMIN — FLUOROURACIL 612 MG: 50 INJECTION, SOLUTION INTRAVENOUS at 13:30

## 2022-04-21 RX ADMIN — FLUOROURACIL 4000 MG: 50 INJECTION, SOLUTION INTRAVENOUS at 13:35

## 2022-04-21 RX ADMIN — FOSAPREPITANT 150 MG: 150 INJECTION, POWDER, LYOPHILIZED, FOR SOLUTION INTRAVENOUS at 09:19

## 2022-04-21 RX ADMIN — OXALIPLATIN 100 MG: 5 INJECTION, SOLUTION INTRAVENOUS at 09:46

## 2022-04-21 RX ADMIN — DEXAMETHASONE SODIUM PHOSPHATE 12 MG: 4 INJECTION, SOLUTION INTRAMUSCULAR; INTRAVENOUS at 08:57

## 2022-04-21 RX ADMIN — DEXTROSE MONOHYDRATE 25 ML/HR: 5 INJECTION, SOLUTION INTRAVENOUS at 08:35

## 2022-04-21 RX ADMIN — SODIUM CHLORIDE, PRESERVATIVE FREE 10 ML: 5 INJECTION INTRAVENOUS at 13:33

## 2022-04-21 RX ADMIN — ONDANSETRON 8 MG: 2 INJECTION INTRAMUSCULAR; INTRAVENOUS at 08:54

## 2022-04-21 RX ADMIN — ATROPINE SULFATE 0.4 MG: 0.4 INJECTION, SOLUTION INTRAMUSCULAR; INTRAVENOUS; SUBCUTANEOUS at 11:50

## 2022-04-21 RX ADMIN — IRINOTECAN HYDROCHLORIDE 240 MG: 20 INJECTION, SOLUTION INTRAVENOUS at 11:55

## 2022-04-21 NOTE — PROGRESS NOTES
Arrived to the [facility]. Assessment complete, labs reviewed. Folfirinox completed. Patient tolerated without problems. . Flurouracil  elastomeric pump connected to infuse over 46 hours at 5ml/hr. Clamps unclamped x 2 and verified with Crystal Chaney RN  Pump placed at 1335  Any issues or concerns during appointment: None  Patient instructed to call provider with temperature of 100.4 or greater or nausea/vomiting/ diarrhea or pain not controlled by medications  Instructed to call provider with any side effects or other concerns  Patient aware of next infusion appointment on 4/23/22(date) at 2 PM (time).   Discharged ambulatory

## 2022-04-23 ENCOUNTER — HOSPITAL ENCOUNTER (OUTPATIENT)
Dept: INFUSION THERAPY | Age: 47
Discharge: HOME | End: 2022-04-23

## 2022-04-23 VITALS
RESPIRATION RATE: 18 BRPM | SYSTOLIC BLOOD PRESSURE: 101 MMHG | WEIGHT: 175.4 LBS | OXYGEN SATURATION: 97 % | BODY MASS INDEX: 24.12 KG/M2 | HEART RATE: 88 BPM | DIASTOLIC BLOOD PRESSURE: 58 MMHG | TEMPERATURE: 98.7 F

## 2022-04-23 DIAGNOSIS — R11.2 CINV (CHEMOTHERAPY-INDUCED NAUSEA AND VOMITING): ICD-10-CM

## 2022-04-23 DIAGNOSIS — C16.8 MALIGNANT NEOPLASM OF OVERLAPPING SITES OF STOMACH (HCC): ICD-10-CM

## 2022-04-23 DIAGNOSIS — R39.15 URGENCY OF URINATION: Primary | ICD-10-CM

## 2022-04-23 DIAGNOSIS — T45.1X5A CINV (CHEMOTHERAPY-INDUCED NAUSEA AND VOMITING): ICD-10-CM

## 2022-04-23 PROCEDURE — 74011000250 HC RX REV CODE- 250: Performed by: INTERNAL MEDICINE

## 2022-04-23 PROCEDURE — 96523 IRRIG DRUG DELIVERY DEVICE: CPT

## 2022-04-23 RX ORDER — SODIUM CHLORIDE 0.9 % (FLUSH) 0.9 %
10 SYRINGE (ML) INJECTION AS NEEDED
Status: DISCONTINUED | OUTPATIENT
Start: 2022-04-23 | End: 2022-04-24 | Stop reason: HOSPADM

## 2022-04-23 RX ADMIN — SODIUM CHLORIDE, PRESERVATIVE FREE 10 ML: 5 INJECTION INTRAVENOUS at 13:35

## 2022-04-23 NOTE — PROGRESS NOTES
Arrived to the [facility]. Chemo pump completed prior to arrival.  Port flushed and de-access completed. Patient tolerated well. Any issues or concerns during appointment: None. Patient aware of next infusion appointment on 4/24 (date) at 1330 (time). Patient aware of next lab and [facility] office visit on 5/4 (date) at 1440 (time). Patient instructed to call provider with temperature of 100.4 or greater or nausea/vomiting/ diarrhea or pain not controlled by medications  Discharged ambulatory in stable condition.

## 2022-04-24 ENCOUNTER — HOSPITAL ENCOUNTER (OUTPATIENT)
Dept: INFUSION THERAPY | Age: 47
Discharge: HOME | End: 2022-04-24

## 2022-04-24 VITALS
SYSTOLIC BLOOD PRESSURE: 105 MMHG | HEART RATE: 98 BPM | TEMPERATURE: 98.4 F | DIASTOLIC BLOOD PRESSURE: 54 MMHG | RESPIRATION RATE: 18 BRPM | OXYGEN SATURATION: 99 %

## 2022-04-24 DIAGNOSIS — R39.15 URGENCY OF URINATION: Primary | ICD-10-CM

## 2022-04-24 DIAGNOSIS — T45.1X5A CINV (CHEMOTHERAPY-INDUCED NAUSEA AND VOMITING): ICD-10-CM

## 2022-04-24 DIAGNOSIS — C16.8 MALIGNANT NEOPLASM OF OVERLAPPING SITES OF STOMACH (HCC): ICD-10-CM

## 2022-04-24 DIAGNOSIS — R11.2 CINV (CHEMOTHERAPY-INDUCED NAUSEA AND VOMITING): ICD-10-CM

## 2022-04-24 PROCEDURE — 74011250636 HC RX REV CODE- 250/636: Performed by: INTERNAL MEDICINE

## 2022-04-24 PROCEDURE — 96372 THER/PROPH/DIAG INJ SC/IM: CPT

## 2022-04-24 RX ADMIN — PEGFILGRASTIM-CBQV 6 MG: 6 INJECTION, SOLUTION SUBCUTANEOUS at 13:46

## 2022-04-24 NOTE — PROGRESS NOTES
Arrived to the [facility]. Udenyca injection completed. Provided education on injection. Patient instructed to report any side affects to ordering provider. Patient tolerated well. Any issues or concerns during appointment: none. Patient aware of next infusion appointment on 05/05/2022 at 0830. Discharged ambulatory.

## 2022-05-04 ENCOUNTER — HOSPITAL ENCOUNTER (OUTPATIENT)
Dept: LAB | Age: 47
Discharge: HOME | End: 2022-05-04

## 2022-05-04 DIAGNOSIS — C16.9 MALIGNANT NEOPLASM OF STOMACH, UNSPECIFIED LOCATION (HCC): ICD-10-CM

## 2022-05-04 LAB
ALBUMIN SERPL-MCNC: 3.5 G/DL (ref 3.5–5)
ALBUMIN/GLOB SERPL: 1 {RATIO} (ref 1.2–3.5)
ALP SERPL-CCNC: 223 U/L (ref 50–136)
ALT SERPL-CCNC: 49 U/L (ref 12–65)
ANION GAP SERPL CALC-SCNC: 4 MMOL/L (ref 7–16)
AST SERPL-CCNC: 37 U/L (ref 15–37)
BASOPHILS # BLD: 0 K/UL (ref 0–0.2)
BASOPHILS NFR BLD: 0 % (ref 0–2)
BILIRUB SERPL-MCNC: 0.3 MG/DL (ref 0.2–1.1)
BUN SERPL-MCNC: 12 MG/DL (ref 6–23)
CALCIUM SERPL-MCNC: 8.7 MG/DL (ref 8.3–10.4)
CHLORIDE SERPL-SCNC: 105 MMOL/L (ref 98–107)
CO2 SERPL-SCNC: 30 MMOL/L (ref 21–32)
CREAT SERPL-MCNC: 1.1 MG/DL (ref 0.8–1.5)
DIFFERENTIAL METHOD BLD: ABNORMAL
EOSINOPHIL # BLD: 0.1 K/UL (ref 0–0.8)
EOSINOPHIL NFR BLD: 1 % (ref 0.5–7.8)
ERYTHROCYTE [DISTWIDTH] IN BLOOD BY AUTOMATED COUNT: 17.6 % (ref 11.9–14.6)
GLOBULIN SER CALC-MCNC: 3.5 G/DL (ref 2.3–3.5)
GLUCOSE SERPL-MCNC: 153 MG/DL (ref 65–100)
HCT VFR BLD AUTO: 25.3 %
HGB BLD-MCNC: 8.3 G/DL (ref 13.6–17.2)
IMM GRANULOCYTES # BLD AUTO: 0.1 K/UL (ref 0–0.5)
IMM GRANULOCYTES NFR BLD AUTO: 1 % (ref 0–5)
LYMPHOCYTES # BLD: 0.4 K/UL (ref 0.5–4.6)
LYMPHOCYTES NFR BLD: 6 % (ref 13–44)
MAGNESIUM SERPL-MCNC: 2.1 MG/DL (ref 1.8–2.4)
MCH RBC QN AUTO: 35.8 PG (ref 26.1–32.9)
MCHC RBC AUTO-ENTMCNC: 32.8 G/DL (ref 31.4–35)
MCV RBC AUTO: 109.1 FL (ref 79.6–97.8)
MONOCYTES # BLD: 0.8 K/UL (ref 0.1–1.3)
MONOCYTES NFR BLD: 13 % (ref 4–12)
NEUTS SEG # BLD: 4.6 K/UL (ref 1.7–8.2)
NEUTS SEG NFR BLD: 79 % (ref 43–78)
NRBC # BLD: 0 K/UL (ref 0–0.2)
PLATELET # BLD AUTO: 83 K/UL (ref 150–450)
PLATELET COMMENTS,PCOM: SLIGHT
PMV BLD AUTO: 9.6 FL (ref 9.4–12.3)
POTASSIUM SERPL-SCNC: 3.8 MMOL/L (ref 3.5–5.1)
PROT SERPL-MCNC: 7 G/DL (ref 6.3–8.2)
RBC # BLD AUTO: 2.32 M/UL (ref 4.23–5.6)
RBC MORPH BLD: ABNORMAL
SODIUM SERPL-SCNC: 139 MMOL/L (ref 136–145)
WBC # BLD AUTO: 6 K/UL (ref 4.3–11.1)
WBC MORPH BLD: ABNORMAL

## 2022-05-04 PROCEDURE — 80053 COMPREHEN METABOLIC PANEL: CPT

## 2022-05-04 PROCEDURE — 85025 COMPLETE CBC W/AUTO DIFF WBC: CPT

## 2022-05-04 PROCEDURE — 36415 COLL VENOUS BLD VENIPUNCTURE: CPT

## 2022-05-04 PROCEDURE — 83735 ASSAY OF MAGNESIUM: CPT

## 2022-05-05 ENCOUNTER — HOSPITAL ENCOUNTER (OUTPATIENT)
Dept: INFUSION THERAPY | Age: 47
Discharge: HOME | End: 2022-05-05

## 2022-05-05 VITALS
OXYGEN SATURATION: 100 % | RESPIRATION RATE: 16 BRPM | HEART RATE: 85 BPM | BODY MASS INDEX: 23.1 KG/M2 | SYSTOLIC BLOOD PRESSURE: 123 MMHG | DIASTOLIC BLOOD PRESSURE: 59 MMHG | TEMPERATURE: 98.3 F | WEIGHT: 168 LBS

## 2022-05-05 DIAGNOSIS — R39.15 URGENCY OF URINATION: Primary | ICD-10-CM

## 2022-05-05 DIAGNOSIS — T45.1X5A CINV (CHEMOTHERAPY-INDUCED NAUSEA AND VOMITING): ICD-10-CM

## 2022-05-05 DIAGNOSIS — R11.2 CINV (CHEMOTHERAPY-INDUCED NAUSEA AND VOMITING): ICD-10-CM

## 2022-05-05 DIAGNOSIS — C16.8 MALIGNANT NEOPLASM OF OVERLAPPING SITES OF STOMACH (HCC): ICD-10-CM

## 2022-05-05 PROCEDURE — 96367 TX/PROPH/DG ADDL SEQ IV INF: CPT

## 2022-05-05 PROCEDURE — 96372 THER/PROPH/DIAG INJ SC/IM: CPT

## 2022-05-05 PROCEDURE — 96368 THER/DIAG CONCURRENT INF: CPT

## 2022-05-05 PROCEDURE — 74011250636 HC RX REV CODE- 250/636: Performed by: NURSE PRACTITIONER

## 2022-05-05 PROCEDURE — 96415 CHEMO IV INFUSION ADDL HR: CPT

## 2022-05-05 PROCEDURE — G0498 CHEMO EXTEND IV INFUS W/PUMP: HCPCS

## 2022-05-05 PROCEDURE — 74011000258 HC RX REV CODE- 258: Performed by: NURSE PRACTITIONER

## 2022-05-05 PROCEDURE — 96375 TX/PRO/DX INJ NEW DRUG ADDON: CPT

## 2022-05-05 PROCEDURE — 96413 CHEMO IV INFUSION 1 HR: CPT

## 2022-05-05 PROCEDURE — 96411 CHEMO IV PUSH ADDL DRUG: CPT

## 2022-05-05 PROCEDURE — 96417 CHEMO IV INFUS EACH ADDL SEQ: CPT

## 2022-05-05 PROCEDURE — 74011000250 HC RX REV CODE- 250: Performed by: NURSE PRACTITIONER

## 2022-05-05 RX ORDER — DEXTROSE MONOHYDRATE 50 MG/ML
25 INJECTION, SOLUTION INTRAVENOUS CONTINUOUS
Status: ACTIVE | OUTPATIENT
Start: 2022-05-05 | End: 2022-05-05

## 2022-05-05 RX ORDER — FLUOROURACIL 50 MG/ML
300 INJECTION, SOLUTION INTRAVENOUS ONCE
Status: COMPLETED | OUTPATIENT
Start: 2022-05-05 | End: 2022-05-05

## 2022-05-05 RX ORDER — ONDANSETRON 2 MG/ML
8 INJECTION INTRAMUSCULAR; INTRAVENOUS ONCE
Status: COMPLETED | OUTPATIENT
Start: 2022-05-05 | End: 2022-05-05

## 2022-05-05 RX ORDER — ATROPINE SULFATE 0.4 MG/ML
0.4 INJECTION, SOLUTION ENDOTRACHEAL; INTRAMEDULLARY; INTRAMUSCULAR; INTRAVENOUS; SUBCUTANEOUS ONCE
Status: COMPLETED | OUTPATIENT
Start: 2022-05-05 | End: 2022-05-05

## 2022-05-05 RX ORDER — SODIUM CHLORIDE 9 MG/ML
10 INJECTION INTRAMUSCULAR; INTRAVENOUS; SUBCUTANEOUS AS NEEDED
Status: ACTIVE | OUTPATIENT
Start: 2022-05-05 | End: 2022-05-05

## 2022-05-05 RX ADMIN — OXALIPLATIN 100 MG: 5 INJECTION, SOLUTION INTRAVENOUS at 10:26

## 2022-05-05 RX ADMIN — FOSAPREPITANT 150 MG: 150 INJECTION, POWDER, LYOPHILIZED, FOR SOLUTION INTRAVENOUS at 10:00

## 2022-05-05 RX ADMIN — ONDANSETRON 8 MG: 2 INJECTION INTRAMUSCULAR; INTRAVENOUS at 09:36

## 2022-05-05 RX ADMIN — DEXTROSE MONOHYDRATE 25 ML/HR: 5 INJECTION, SOLUTION INTRAVENOUS at 09:00

## 2022-05-05 RX ADMIN — LEUCOVORIN CALCIUM 816 MG: 500 INJECTION, POWDER, LYOPHILIZED, FOR SOLUTION INTRAMUSCULAR; INTRAVENOUS at 12:36

## 2022-05-05 RX ADMIN — IRINOTECAN HYDROCHLORIDE 240 MG: 20 INJECTION, SOLUTION INTRAVENOUS at 12:36

## 2022-05-05 RX ADMIN — FLUOROURACIL 612 MG: 50 INJECTION, SOLUTION INTRAVENOUS at 14:16

## 2022-05-05 RX ADMIN — FLUOROURACIL 4000 MG: 50 INJECTION, SOLUTION INTRAVENOUS at 14:19

## 2022-05-05 RX ADMIN — DEXAMETHASONE SODIUM PHOSPHATE 12 MG: 4 INJECTION, SOLUTION INTRAMUSCULAR; INTRAVENOUS at 09:39

## 2022-05-05 RX ADMIN — SODIUM CHLORIDE, PRESERVATIVE FREE 10 ML: 5 INJECTION INTRAVENOUS at 14:18

## 2022-05-05 RX ADMIN — ATROPINE SULFATE 0.4 MG: 0.4 INJECTION, SOLUTION INTRAMUSCULAR; INTRAVENOUS; SUBCUTANEOUS at 12:32

## 2022-05-05 NOTE — ADDENDUM NOTE
Encounter addended by: SHELLY Guajardo [facility] [place] on: 5/5/2022 9:02 AM   Actions taken: iLa created or edited

## 2022-05-05 NOTE — PROGRESS NOTES
Arrived to the [facility]. Assessment complete, labs reviewed. Folfrinox completed. Patient tolerated without problems. . Flurouracil  elastomeric pump connected to infuse over 46 hours at 5ml/hr. Clamps unclamped x 2 and verified with Chace Moss RN  Pump placed at 1419  Any issues or concerns during appointment: None  Patient instructed to call provider with temperature of 100.4 or greater or nausea/vomiting/ diarrhea or pain not controlled by medications  Instructed to call provider with any side effects or other concerns  Patient aware of next infusion appointment on 5/7/22(date) at 1 30 (time).   Discharged ambulatory

## 2022-05-07 ENCOUNTER — HOSPITAL ENCOUNTER (OUTPATIENT)
Dept: INFUSION THERAPY | Age: 47
Discharge: HOME | End: 2022-05-07

## 2022-05-07 VITALS
OXYGEN SATURATION: 98 % | RESPIRATION RATE: 18 BRPM | SYSTOLIC BLOOD PRESSURE: 98 MMHG | HEART RATE: 92 BPM | DIASTOLIC BLOOD PRESSURE: 62 MMHG | TEMPERATURE: 98.3 F

## 2022-05-07 DIAGNOSIS — R39.15 URGENCY OF URINATION: Primary | ICD-10-CM

## 2022-05-07 DIAGNOSIS — T45.1X5A CINV (CHEMOTHERAPY-INDUCED NAUSEA AND VOMITING): ICD-10-CM

## 2022-05-07 DIAGNOSIS — C16.8 MALIGNANT NEOPLASM OF OVERLAPPING SITES OF STOMACH (HCC): ICD-10-CM

## 2022-05-07 DIAGNOSIS — R11.2 CINV (CHEMOTHERAPY-INDUCED NAUSEA AND VOMITING): ICD-10-CM

## 2022-05-07 PROCEDURE — 74011000250 HC RX REV CODE- 250: Performed by: NURSE PRACTITIONER

## 2022-05-07 PROCEDURE — 96523 IRRIG DRUG DELIVERY DEVICE: CPT

## 2022-05-07 RX ORDER — SODIUM CHLORIDE 9 MG/ML
10 INJECTION INTRAMUSCULAR; INTRAVENOUS; SUBCUTANEOUS AS NEEDED
Status: DISCONTINUED | OUTPATIENT
Start: 2022-05-07 | End: 2022-05-08 | Stop reason: HOSPADM

## 2022-05-07 RX ADMIN — SODIUM CHLORIDE, PRESERVATIVE FREE 10 ML: 5 INJECTION INTRAVENOUS at 13:45

## 2022-05-07 NOTE — PROGRESS NOTES
Arrived to the [facility]. DC chemo pump completed. Provided education on adequate hydration. Patient instructed to report any side affects to ordering provider. Patient tolerated well. Any issues or concerns during appointment: none. Patient aware of next infusion appointment on 5/8 at 2:15pm.  Discharged ambulatory to home.

## 2022-05-08 ENCOUNTER — HOSPITAL ENCOUNTER (OUTPATIENT)
Dept: INFUSION THERAPY | Age: 47
Discharge: HOME | End: 2022-05-08

## 2022-05-08 VITALS
RESPIRATION RATE: 17 BRPM | TEMPERATURE: 98.7 F | SYSTOLIC BLOOD PRESSURE: 116 MMHG | OXYGEN SATURATION: 98 % | HEART RATE: 79 BPM | DIASTOLIC BLOOD PRESSURE: 63 MMHG

## 2022-05-08 DIAGNOSIS — C16.8 MALIGNANT NEOPLASM OF OVERLAPPING SITES OF STOMACH (HCC): Primary | ICD-10-CM

## 2022-05-08 PROCEDURE — 74011250636 HC RX REV CODE- 250/636: Performed by: NURSE PRACTITIONER

## 2022-05-08 PROCEDURE — 96372 THER/PROPH/DIAG INJ SC/IM: CPT

## 2022-05-08 RX ADMIN — PEGFILGRASTIM-CBQV 6 MG: 6 INJECTION, SOLUTION SUBCUTANEOUS at 13:55

## 2022-05-08 NOTE — PROGRESS NOTES
Arrived to the [facility]. Udenyca injection completed. Patient tolerated well. Any issues or concerns during appointment: none. Patient aware of next infusion appointment on 05/19/2022 (date) at 0930 (time). Discharged ambulatory.

## 2022-05-18 ENCOUNTER — HOSPITAL ENCOUNTER (OUTPATIENT)
Dept: LAB | Age: 47
Discharge: HOME | End: 2022-05-18

## 2022-05-18 DIAGNOSIS — D69.6 THROMBOCYTOPENIA (HCC): ICD-10-CM

## 2022-05-18 LAB
ALBUMIN SERPL-MCNC: 3.3 G/DL (ref 3.5–5)
ALBUMIN/GLOB SERPL: 1.1 {RATIO} (ref 1.2–3.5)
ALP SERPL-CCNC: 191 U/L (ref 50–136)
ALT SERPL-CCNC: 41 U/L (ref 12–65)
ANION GAP SERPL CALC-SCNC: 4 MMOL/L (ref 7–16)
AST SERPL-CCNC: 47 U/L (ref 15–37)
BASOPHILS # BLD: 0 K/UL (ref 0–0.2)
BASOPHILS NFR BLD: 0 % (ref 0–2)
BILIRUB SERPL-MCNC: 0.2 MG/DL (ref 0.2–1.1)
BUN SERPL-MCNC: 12 MG/DL (ref 6–23)
CALCIUM SERPL-MCNC: 8.5 MG/DL (ref 8.3–10.4)
CHLORIDE SERPL-SCNC: 105 MMOL/L (ref 98–107)
CO2 SERPL-SCNC: 31 MMOL/L (ref 21–32)
CREAT SERPL-MCNC: 0.9 MG/DL (ref 0.8–1.5)
DIFFERENTIAL METHOD BLD: ABNORMAL
EOSINOPHIL # BLD: 0.1 K/UL (ref 0–0.8)
EOSINOPHIL NFR BLD: 1 % (ref 0.5–7.8)
ERYTHROCYTE [DISTWIDTH] IN BLOOD BY AUTOMATED COUNT: 16.9 % (ref 11.9–14.6)
GLOBULIN SER CALC-MCNC: 3.1 G/DL (ref 2.3–3.5)
GLUCOSE SERPL-MCNC: 94 MG/DL (ref 65–100)
HCT VFR BLD AUTO: 21.1 %
HGB BLD-MCNC: 7 G/DL (ref 13.6–17.2)
IMM GRANULOCYTES # BLD AUTO: 0.1 K/UL (ref 0–0.5)
IMM GRANULOCYTES NFR BLD AUTO: 1 % (ref 0–5)
LYMPHOCYTES # BLD: 0.4 K/UL (ref 0.5–4.6)
LYMPHOCYTES NFR BLD: 7 % (ref 13–44)
MCH RBC QN AUTO: 36.1 PG (ref 26.1–32.9)
MCHC RBC AUTO-ENTMCNC: 33.2 G/DL (ref 31.4–35)
MCV RBC AUTO: 108.8 FL (ref 79.6–97.8)
MONOCYTES # BLD: 0.7 K/UL (ref 0.1–1.3)
MONOCYTES NFR BLD: 13 % (ref 4–12)
NEUTS SEG # BLD: 4.3 K/UL (ref 1.7–8.2)
NEUTS SEG NFR BLD: 78 % (ref 43–78)
NRBC # BLD: 0.02 K/UL (ref 0–0.2)
PLATELET # BLD AUTO: 52 K/UL (ref 150–450)
PLATELET COMMENTS,PCOM: ABNORMAL
PMV BLD AUTO: 10.2 FL (ref 9.4–12.3)
POTASSIUM SERPL-SCNC: 3.5 MMOL/L (ref 3.5–5.1)
PROT SERPL-MCNC: 6.4 G/DL (ref 6.3–8.2)
RBC # BLD AUTO: 1.94 M/UL (ref 4.23–5.6)
RBC MORPH BLD: ABNORMAL
SODIUM SERPL-SCNC: 140 MMOL/L (ref 136–145)
WBC # BLD AUTO: 5.6 K/UL (ref 4.3–11.1)
WBC MORPH BLD: ABNORMAL

## 2022-05-18 PROCEDURE — 80053 COMPREHEN METABOLIC PANEL: CPT

## 2022-05-18 PROCEDURE — 36415 COLL VENOUS BLD VENIPUNCTURE: CPT

## 2022-05-18 PROCEDURE — 85025 COMPLETE CBC W/AUTO DIFF WBC: CPT

## 2022-05-19 ENCOUNTER — HOSPITAL ENCOUNTER (OUTPATIENT)
Dept: INFUSION THERAPY | Age: 47
End: 2022-05-19

## 2022-05-26 NOTE — PROGRESS NOTES
Arrived to the [facility]. Labs and chemo completed. Patient tolerated well. Any issues or concerns during appointment: no.  Chemo pump connected and unclamped  Patient aware of next infusion appointment on 5/28 (date) at 1330 (time). Patient instructed to call provider with temperature of 100.4 or greater or nausea/vomiting/ diarrhea or pain not controlled by medications  Discharged ambulatory.

## 2022-05-28 NOTE — PLAN OF CARE
Arrived to the [facility]. Assessment and continuous chemotherapy pump completed. Patient tolerated well. Any issues or concerns during appointment: No.  Patient aware of next infusion appointment on 5/29/22 @1300. Patient instructed to call provider with temperature of 100.4 or greater or nausea/vomiting/ diarrhea or pain not controlled by medications  Discharged ambulatory.

## 2022-05-31 NOTE — PROGRESS NOTES
Arrived to the [facility]. Alex completed. Provided education on purpose of medication    Patient instructed to report any side affects to ordering provider. Patient tolerated without problems. Any issues or concerns during appointment: no.  Patient aware of next infusion appointment on 6/9/22 (date) at 0900 (time). Discharged ambulatory.

## 2022-06-02 NOTE — ADDENDUM NOTE
Encounter addended by: Heather Bullard RN on: 6/2/2022 4:08 PM   Actions taken: Charge Capture section accepted

## 2022-06-08 NOTE — PROGRESS NOTES
[facility] Hematology & Oncology: Office Visit Progress Note      Chief Complaint:     Gastric cancer   Liver mets      History of Present Illness:   Reason for Referral: Stomach cancer with metastasis to the liver       Referring Provider:  Dr. Carmella Nugent       Primary Care Provider: None       Family History of Cancer/Hematologic Disorders: None reported       Presenting Symptoms: Weight loss, trouble swallowing, right flank/lower quadrant abdominal pain, gross hematuria, and abnormal CT urogram        Mr. Cuba Pritchett is a 56 y.o. white male with no pertinent medical history who presented to the [facility] ED on 10/15/20 reporting right flank/ lower quadrant  abdominal pain and gross hematuria. CT urogram was obtained in the ED with findings of a large mass centered in the lesser curvature the stomach measuring up to 8.6 cm invading into the lesser omentum concerning for a primary gastric cancer; multiple  large hypoattenuating hepatic metastases; retroperitoneal metastatic adenopathy; and small non-obstructing bilateral renal calculi measuring up to 4 mm in the right with mild right hydroureter but no obstructing stone evident (findings could indicate  a recently passed or nonradiopaque stone). Upon discussion of imaging results, patient endorsed recent history of weight loss and trouble swallowing. Case was discussed with Oncologist, Dr. Temo Martínez, who agreed to see patient in outpatient clinic on 10/19.        CT OF THE ABDOMEN AND PELVIS WITHOUT IV CONTRAST 10/15/2020   FINDINGS:   Visualized thorax: Normal.   Liver: The liver demonstrates multiple large hypoattenuating masses, the largest centered in segment 6 measuring 10.0 x 8.7 cm. Partially imaged lesion in the left hepatic lobe measuring 6.6 x 2.8 cm.    Gallbladder/biliary: Normal gallbladder.  No biliary dilatation.    Pancreas: Normal.    Spleen: Normal.    Adrenals: Normal.    Kidneys: Small bilateral nonobstructing renal calculi measuring up to 4 mm in the right inferior pole. There is mild right hydroureter but no definite radiopaque ureteral stone is evident. A calcification in the right pelvis likely reflects an intravascular  phleboliths.     Bladder: Normal.   Pelvic organs: Unremarkable prostate and seminal vesicles.    Gastrointestinal: There is a large mass partially imaged, which appears to be centered along the greater curvature of the stomach, involving the gastric body, fundus, and cardia in likely the gastroesophageal junction. The mass is ill-defined but measures  approximately 8.6 x 7.5 cm. The mass invades into the lesser omentum and may focally contacts the left diaphragmatic hans.    Peritoneum/retroperitoneum: No free fluid or worrisome peritoneal nodule. Invasion of the lesser omentum by the gastric mass, as above. Small bilateral fat-containing inguinal hernias. Lymph nodes: Retroperitoneal lymphadenopathy, including a lesser omentum metastatic node measuring 6.4 x 3.7 cm, a 2.2 x 1.5 cm retrocaval lymph node, and a 1.7 cm short axis left para-aortic lymph node.    Vessels: Unremarkable noncontrast appearance. Bones/Soft tissues: No aggressive osseous lesion. IMPRESSION:    1.  Large mass centered in the lesser curvature the stomach measuring up to 8.6 cm invading into the lesser omentum concerning for a primary gastric cancer. 2.  Multiple large hypoattenuating hepatic metastases. 3.  Retroperitoneal metastatic adenopathy. 4.  Small nonobstructing bilateral renal calculi measuring up to 4 mm in the right. There is mild right hydroureter but no obstructing stone is evident. Findings could indicate a recently passed or nonradiopaque stone.       Liver biopsy reported metastatic carcinoma c/w upper GI origin:              CT 10/21/20: IMPRESSION:     1.  Large mass involving the gastric cardia and lesser curvature measuring 7 cm   x 5 cm in size. Some involvement of the most distal esophagus cannot be   excluded.  Direct tumor spread is seen in the retroperitoneum which appears to   encase the distal celiac axis and its proximal major branches.       2. Clear hepatic metastatic disease. In addition, retroperitoneal adenopathy is   seen concerning for metastatic lymph nodes. No evidence for metastatic disease   is otherwise seen. A nonspecific 4 mm x 2 mm left lower lobe nodule is seen. Although an early metastasis is not excluded with certainty, the appearance is   felt to be nonspecific and not clearly suggestive of pulmonary metastatic   disease. Attention on follow-up studies is recommended. He started first line mFOLFIRINOX 11/5/20. Interim history update in A/P. Review of Systems:      Constitutional  Weight loss. Anorexia. Denies fever or chills. Insomnia. HEENT  Denies trauma, bluring vision, hearing loss, ear pain, nosebleeds, sore throat, neck pain and ear discharge. Skin  Denies lesions or rashes. Lungs  Denies shortness of breath, cough, sputum production or hemoptysis. Cardiovascular  Denies chest pain, palpitations, orthopnea, claudication and leg swelling. Gastrointestinal  Nausea. Diarrhea. Denies vomiting. Denies bloody or black stools. Abdominal pain.   Denies dysuria, frequency or hesitancy of urination     Neuro  Finger numb. Denies headaches, visual changes or ataxia. Denies dizziness, speech change, focal weakness and headaches. Hematology  Denies nasal/gum bleeding, denies easy bruise     Endo  Denies heat/cold intolerance, denies diabetes. MSK  Denies back pain, swollen legs, myalgias and falls. Psychiatric/Behavioral   insomnia. Denies depression and substance abuse. The patient is not nervous/anxious.           No Known Allergies  Past Medical History:   Diagnosis Date    Gastric cancer ([facility] [address]     Lazy eye, left      Past Surgical History:   Procedure Laterality Date    IR PORT PLACEMENT EQUAL OR GREATER THAN 5 YEARS 11/3/2020    IR PORT PLACEMENT EQUAL OR GREATER THAN 5 YEARS  11/3/2020    IR PORT PLACEMENT EQUAL OR GREATER THAN 5 YEARS 11/3/2020 [facility]    ORTHOPEDIC SURGERY      ankle    TONSILLECTOMY      US GUIDED LIVER BIOPSY PERCUTANEOUS  10/22/2020    US GUIDED LIVER BIOPSY PERCUTANEOUS 10/22/2020 [facility]     Family History   Problem Relation Age of Onset    Schizophrenia Mother     Diabetes Mother     Dementia Mother     Depression Mother      Social History     Socioeconomic History    Marital status:      Spouse name: Not on file    Number of children: Not on file    Years of education: Not on file    Highest education level: Not on file   Occupational History    Not on file   Tobacco Use    Smoking status: Never Smoker    Smokeless tobacco: Never Used   Substance and Sexual Activity    Alcohol use: Yes    Drug use: Never    Sexual activity: Not on file   Other Topics Concern    Not on file   Social History Narrative    Not on file     Social Determinants of Health     Financial Resource Strain:     Difficulty of Paying Living Expenses: Not on file   Food Insecurity:     Worried About Running Out of Food in the Last Year: Not on file    Diana of Food in the Last Year: Not on file   Transportation Needs:     Lack of Transportation (Medical): Not on file    Lack of Transportation (Non-Medical):  Not on file   Physical Activity:     Days of Exercise per Week: Not on file    Minutes of Exercise per Session: Not on file   Stress:     Feeling of Stress : Not on file   Social Connections:     Frequency of Communication with Friends and Family: Not on file    Frequency of Social Gatherings with Friends and Family: Not on file    Attends Confucianist Services: Not on file    Active Member of Clubs or Organizations: Not on file    Attends Club or Organization Meetings: Not on file    Marital Status: Not on file   Intimate Partner Violence:     Fear of Current or Ex-Partner: Not on file    Emotionally Abused: Not on file    Physically Abused: Not on file    Sexually Abused: Not on file   Housing Stability:     Unable to Pay for Housing in the Last Year: Not on file    Number of Places Lived in the Last Year: Not on file    Unstable Housing in the Last Year: Not on file     Current Outpatient Medications   Medication Sig Dispense Refill    diphenoxylate-atropine (LOMOTIL) 2.5-0.025 MG per tablet Take 1 tablet by mouth 4 times daily as needed.  gabapentin (NEURONTIN) 300 MG capsule Take 300 mg by mouth 2 times daily.  lidocaine-prilocaine (EMLA) 2.5-2.5 % cream Apply topically as needed      LORazepam (ATIVAN) 1 MG tablet Take 1 mg by mouth every 6 hours as needed.  mirtazapine (REMERON) 30 MG tablet Take 30 mg by mouth      ondansetron (ZOFRAN) 8 MG tablet Take 8 mg by mouth every 8 hours as needed      potassium chloride (KLOR-CON M) 20 MEQ extended release tablet Take 20 mEq by mouth daily      prochlorperazine (COMPAZINE) 10 MG tablet Take 10 mg by mouth every 6 hours as needed      zolpidem (AMBIEN CR) 12.5 MG extended release tablet Take 12.5 mg by mouth. No current facility-administered medications for this visit. Facility-Administered Medications Ordered in Other Visits   Medication Dose Route Frequency Provider Last Rate Last Admin    atropine injection 0.4 mg  0.4 mg IntraVENous Once Margaret Boswell MD           OBJECTIVE:  /67 (Site: Right Upper Arm, Position: Sitting, Cuff Size: Medium Adult)   Pulse 86   Temp 97.9 °F (36.6 °C) (Oral)   Resp 21   Ht 5' 11.5\" (1.816 m)   Wt 156 lb 9.6 oz (71 kg)   SpO2 100%   BMI 21.54 kg/m²     Physical Exam:  Constitutional: Oriented to person, place, and time. Well-developed and well-nourished. HEENT: Normocephalic and atraumatic. Oropharynx is clear and moist.   Conjunctivae and EOM are normal. Pupils are equal, round, and reactive to light. No scleral icterus.  Neck supple. No JVD present. No tracheal deviation present. No thyromegaly present. Lymph node No palpable submandibular, cervical, supraclavicular, axillary and inguinal lymph nodes. Skin Warm and dry. No bruising and no rash noted. No erythema. No pallor. Respiratory Effort normal and breath sounds normal.  No respiratory distress. No wheezes. No rales. No tenderness. CVS Normal rate, regular rhythm and normal heart sounds. Exam reveals no gallop, no friction and no rub. No murmur heard. Abdomen Soft. Bowel sounds are normal. Exhibits no distension. There is no tenderness. There is no rebound and no guarding. Neuro Normal reflexes. No cranial nerve deficit. Exhibits normal muscle tone, 5 of 5 strength of all extremities. MSK Normal range of motion in general.  No edema and no tenderness.    Psych Normal mood, affect, behavior, judgment and thought content      Labs:  Recent Results (from the past 24 hour(s))   CBC with Auto Differential    Collection Time: 06/08/22 10:42 AM   Result Value Ref Range    WBC 4.7 4.3 - 11.1 K/uL    RBC 2.30 (L) 4.23 - 5.6 M/uL    Hemoglobin 8.2 (L) 13.6 - 17.2 g/dL    Hematocrit 25.2 %    .6 (H) 79.6 - 97.8 FL    MCH 35.7 (H) 26.1 - 32.9 PG    MCHC 32.5 31.4 - 35.0 g/dL    RDW 15.5 (H) 11.9 - 14.6 %    Platelets 95 (L) 665 - 450 K/uL    MPV 10.0 9.4 - 12.3 FL    nRBC 0.00 0.0 - 0.2 K/uL    Seg Neutrophils 75 43 - 78 %    Lymphocytes 11 (L) 13 - 44 %    Monocytes 12 4.0 - 12.0 %    Eosinophils % 1 0.5 - 7.8 %    Basophils 0 0.0 - 2.0 %    Immature Granulocytes 1 0.0 - 5.0 %    Segs Absolute 3.6 1.7 - 8.2 K/UL    Absolute Lymph # 0.5 0.5 - 4.6 K/UL    Absolute Mono # 0.6 0.1 - 1.3 K/UL    Absolute Eos # 0.0 0.0 - 0.8 K/UL    Basophils Absolute 0.0 0.0 - 0.2 K/UL    Absolute Immature Granulocyte 0.0 0.0 - 0.5 K/UL    RBC Comment SLIGHT  ANISOCYTOSIS + POIKILOCYTOSIS        RBC Comment MODERATE  TEARDROP CELLS        RBC Comment SLIGHT  POLYCHROMASIA RBC Comment OCCASIONAL  MACROCYTOSIS        WBC Comment Result Confirmed By Smear      Platelet Comment SLIGHT      Differential Type AUTOMATED     TYPE AND SCREEN    Collection Time: 06/08/22 10:42 AM   Result Value Ref Range    Crossmatch expiration date 06/11/2022,2358     ABO/Rh A POSITIVE     Antibody Screen NEG    Comprehensive Metabolic Panel    Collection Time: 06/08/22 10:42 AM   Result Value Ref Range    Sodium 139 136 - 145 mmol/L    Potassium 3.5 3.5 - 5.1 mmol/L    Chloride 104 98 - 107 mmol/L    CO2 27 21 - 32 mmol/L    Anion Gap 8 7 - 16 mmol/L    Glucose 89 65 - 100 mg/dL    BUN 10 6 - 23 MG/DL    CREATININE 1.00 0.8 - 1.5 MG/DL    GFR African American >60 >60 ml/min/1.73m2    GFR Non- >60 >60 ml/min/1.73m2    Calcium 8.9 8.3 - 10.4 MG/DL    Total Bilirubin 0.3 0.2 - 1.1 MG/DL    ALT 25 12 - 65 U/L    AST 36 15 - 37 U/L    Alk Phosphatase 180 (H) 50 - 136 U/L    Total Protein 7.2 6.3 - 8.2 g/dL    Albumin 3.5 3.5 - 5.0 g/dL    Globulin 3.7 (H) 2.3 - 3.5 g/dL    Albumin/Globulin Ratio 0.9 (L) 1.2 - 3.5     CEA    Collection Time: 06/08/22 10:42 AM   Result Value Ref Range    CEA 38.5 (H) 0.0 - 3.0 ng/mL       Imaging:  No results found for this or any previous visit. ASSESSMENT/PLAN:   Diagnosis Orders   1. Malignant neoplasm of overlapping sites of stomach ([facility] [address]  CBC With Auto Differential    Comprehensive metabolic panel    CT CHEST ABDOMEN PELVIS W CONTRAST    CAMILA Spear MD, Gastroenterology    [address] [place] [address] Hematology & Oncology Nutrition   2. CINV (chemotherapy-induced nausea and vomiting)  CBC With Auto Differential    Comprehensive metabolic panel    [address] [place] [address] Hematology & Oncology Nutrition   3. Dysphagia, unspecified type  CAMILA Spear MD, Gastroenterology    [address] [place] [address] Hematology & Oncology Nutrition   4. Weight loss     5. Thrombocytopenia ([address]     6. Chemotherapy-induced neutropenia (HCC)     7.  High risk medication use 56 y.o. M consulted for gastric cancer and liver mets in 10/2020. 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 He presented to [facility] on 10[facility]15[facility]20 for pain and suspect kidney stone, CT urogram showed large gastric mass and liver masses, urgently  presented to [facility] on 10/19/20 with wife, and we discussed this appeared almost certain of metastatic malignancy, I showed CT images to pt and wife per request, arrange staging CT C/A/P, liver mass biopsy, consult FC and SW for sponsorship, discussed nutrition  and manage dysphagia, refer to [facility] cancer society for Ensure.       Liver biopsy reported metastatic carcinoma c/w upper GI origin, and I discussed with pt and wife this together with the CT finding of large gastric mass is consistent with stage IV gastric cancer, not expected to be curable but palliative rx may prolong  life and improve symptoms, both were motivated to pursue and rec start mFOLFIRINOX aiming for rapid response given the symptoms of dysphagia and rapid weight loss, check Caris pending/HER2 -, need to defer [address] trip, arrange port and antiemetics, remeron  for insomnia/anorexia, ativan as needed, educate for toxicities and management, can not tolerated Ensure and he attributed to sugar, try glucerna and consult nutritionist, started cycle 1 palliative FOLFIRINOX 11/5/20, tumor marker down and bloating improved,  encouraging clinical response, increase remeron and use ativan for insomnia, laxative prn diarrhea and find out particular association with diet, due for cycle 4 and neutropenic again, add GCSF and keep schedule for his birthday arrangement, c/o low libido  and check testosterone level normal, continue remeron managing insomnia/depression, Emend for nausea, titrate imodium for proper rx of diarrhea, prepH for hemorroid, due for cycle 7 and deferred for neutropenia, returned 2/10/21 and ANC 1.3, he was reluctant  to use GCSF for the cost, discussed with FC and obtained eudenyca assistance, diarrhea responded to imodium, CT 2/10/2021 showed further shrinking of liver and the gastric mass, a periaortic lymph node will be monitored for change, reported neuropathy  worsened after cycle 9 with persistent numbness in the fingertips, discussed that the risk of worse neuropathy and hold oxaliplatin for cycle 10, neuropathy improved and proceed to cycle 12 with lower dose of oxaliplatin at 50 mg/m², monitor mild anemia  and thrombocytopenia, report mild neuropathy stable, repeat CT 5/12/2021 showed generally stable or improved disease except for the retroperitoneal lymph node next to left kidney continues to grow, which explains the increase of the CEA, we discussed  that since this appeared to be the solitary progression of disease, I referred him to radiation oncology to consider SBRT done in 6/2021, which may be cause of the CEA spike and monitor, we continued the systemic therapy with modified FOLFIRINOX with  dose modification of oxaliplatin and monitor neuropathy reportedly stable, on gabapentin, arranged for Covid vaccination now that he finally would accept it, reschedule following chemo's to give 2 weeks of response time to each vaccine dose, repeat CT  8/12/21 showed stable disease, meanwhile concerned of the CEA trending up for early disease progression versus the result of treatment delay due to coordination of Covid vaccine, he will complete second dose of vaccine on time and return in 2 weeks for  next cycle, also discussed further systemic treatment may be limited to IO versus Taxol/ramucirumab and his persistent neuropathy would be a concern, Enhertu for HER2 1+ on IHC has been proven effective in breast cancer but may need replacement if using  in gastric cancer, also discussed surgical resection given there is no new disease developed over the past 10 months although the quality of life expected to change significantly if pursuing surgery which would not be considered curative after all. CT  after cycle 22 showed disease progression of a para-aortic lymph node and a new lesion in the left adrenal gland: We discussed the options to add SBRT to the 2 oligo progression, versus changing to Riky Mering, his neuropathy remains a major obstacle for changing chemotherapy, discussed with Dr. Jabari Duncan and completed SBRT in 5 fractions for both sites, return on 1/19/2022,  platelet 48 and , he forgot coming for [facility] and last cycle, we had to hold off chemo and come back next week for potentially cycle 28, however he got COVID with fever for 10 days, arranged to receive monoclonal antibody, returned on 2/16/2022,  still with left lung crackles but generally recovered, started having sense of dysphagia again after off chemo for a month and half, restart FOLFIRINOX with Udenyca for neutropenia, repeat CEA, continue Remeron, Ambien for insomnia, repeat CT 3/1/2022  showed a small amount of disease progression:       We discussed that this is generally similar disease burden after 6 weeks of chemo break, discussed with Dr. Jabari Duncan for SBRT of the small new liver lesion and the left adrenal mass growth, Dr. Jabari Duncan would like to hold off to see whether more metastasis  develops, trend CEA, continue FOLFIRINOX with Udenyca for neutropenia, platelet 95 and okay to proceed with cycle 35 with dose reduced bolus 5-FU, but complaining of dysphagia and weight loss, urgent referral for GI evaluation with EGD/dilation if possible, discussed the nutrition and he reports intolerance of Ensure and appear to having lactose intolerance, try lactose-free for diet, plan to go to vacation in [place] and defer next cycle after return, call as needed.      Problem complexity: high   Treatment risk of complication/morbidity: high   Irinotecan:   Cardiovascular: Vasodilation (9% to 11%)   Central nervous system: Cholinergic syndrome (47%; includes diaphoresis, flushing, increased peristalsis, lacrimation, miosis, rhinitis, sialorrhea), pain (23% to 24%), dizziness (15% to 21%), insomnia (19%), headache (17%), chills (14%)   Dermatologic: Alopecia (46% to 72%), diaphoresis (16%), skin rash (13% to 14%)   Endocrine & metabolic: Weight loss (01%), dehydration (15%)   Gastrointestinal: Diarrhea (late: 83% to 88%, grades 3/4: 14% to 31%; early: 43% to 51%, grades 3/4: 7% to 22%), nausea (70% to 86%), abdominal pain (57% to 68%), vomiting (62% to 67%), abdominal cramps (57%), anorexia (44% to 55%), constipation (30%  to 32%), mucositis (30%), flatulence (12%), stomatitis (12%)   Hematologic & oncologic: Anemia (60% to 97%; grades 3/4: 5% to 7%), leukopenia (63% to 96%, grades 3/4: 14% to 28%), thrombocytopenia (96%, grades 3/4: 1% to 4%), neutropenia (30% to 96%; grades 3/4: 14% to 31%)   Hepatic: Increased serum bilirubin (84%), increased serum alkaline phosphatase (13%)   Infection: Infection (14%)   Neuromuscular & skeletal: Weakness (69% to 76%), back pain (14%)   Respiratory: Dyspnea (22%), cough (17% to 20%), rhinitis (16%)   Miscellaneous: Fever (44% to 45%)   Oxaliplatin:   Gastrointestinal: Abdominal pain (31%), anorexia (20%), constipation (31%), diarrhea (46%), nausea (64%), stomatitis (2% to 14%), vomiting (37%)   Hematologic & oncologic: Anemia (64%; grades 3/4: 1%), leukopenia (13%).  thrombocytopenia (30%; grades 3/4: 3%)   Hepatic: Increased serum alanine aminotransferase (36%), increased serum aspartate aminotransferase (54%), increased serum bilirubin (13%)   Nervous system: Fatigue (61%), headache (13%), insomnia (11%), pain (14%), peripheral neuropathy (may be dose limiting; 76%, grades 3/4: 7%; acute 65%; grades 3/4: 5%; persistent 43%; grades 3/4: 3%)   Neuromuscular & skeletal: Back pain (11%)   Respiratory: Cough (11%), dyspnea (13%)   Miscellaneous: Fever (25%)   5FU:   Cardiovascular: Angina pectoris, cardiac arrhythmia, cardiac failure, cerebrovascular accident, ischemic heart disease, local thrombophlebitis, myocardial infarction, vasospasm, ventricular ectopy   Central nervous system: Cerebellar syndrome (acute), confusion, disorientation, euphoria, headache   Dermatologic: Alopecia, changes in nails (including nail loss), dermatitis, hyperpigmentation (supravenous), maculopapular rash (pruritic), palmar-plantar erythrodysesthesia, skin fissure, skin photosensitivity, Young-Sushant syndrome, toxic epidermal  necrolysis, xeroderma   Gastrointestinal: Anorexia, diarrhea, esophagopharyngitis, gastrointestinal hemorrhage, gastrointestinal ulcer, mesenteric ischemia (acute), nausea, stomatitis, tissue sloughing (gastrointestinal), vomiting   Hematologic & oncologic: Agranulocytosis, anemia, leukopenia (sally: days 9 to 14; recovery by day 30), pancytopenia, thrombocytopenia   Hypersensitivity: Anaphylaxis, hypersensitivity reaction (generalized)   Ophthalmic: Lacrimal stenosis, lacrimation, nystagmus, photophobia, visual disturbance   Respiratory: Epistaxis    All questions are answered to their satisfaction. They will call for further questions and concerns. ECOG PERFORMANCE STATUS - 1- Restricted in physically strenuous activity but ambulatory and able to carry out work of a light or sedentary nature such as light house work, office work. Pain - 0 - No pain/10. None/Minimal pain - not affecting QOL     Fatigue - No flowsheet data found. Distress - No flowsheet data found. Elements of this note have been dictated via voice recognition software. Text and phrases may be limited by the accuracy and autoconversion of the software. The chart has been reviewed, but errors may still be present. Ginger Roy M.D.   [address] [place], [address] [place] [address]  Office : [phone]  Fax : [phone]

## 2022-06-08 NOTE — PATIENT INSTRUCTIONS
Patient Instructions from Today's Visit    Reason for Visit:  Follow-up visit for gastric cancer    Diagnosis Information:  [email or web address]-[email or web address]-[email or web address]-[email or web address]-sheets      Plan:  -You should aim to consume 6 cans of Ensure daily at least.   -We will send an urgent referral for EGD for dilation.   -Repeat CT prior to your next visit.   -Proceed with treatment tomorrow.      Follow Up:  -3 weeks    Recent Lab Results:  Hospital Outpatient Visit on 06/08/2022   Component Date Value Ref Range Status    WBC 06/08/2022 4.7  4.3 - 11.1 K/uL Final    Comment: RESULTS CHECKED X 2  PERIPHERAL REVIEW TO FOLLOW      RBC 06/08/2022 2.30* 4.23 - 5.6 M/uL Final    Hemoglobin 06/08/2022 8.2* 13.6 - 17.2 g/dL Final    Hematocrit 06/08/2022 25.2  % Final    MCV 06/08/2022 109.6* 79.6 - 97.8 FL Final    MCH 06/08/2022 35.7* 26.1 - 32.9 PG Final    MCHC 06/08/2022 32.5  31.4 - 35.0 g/dL Final    RDW 06/08/2022 15.5* 11.9 - 14.6 % Final    Platelets 73/11/2661 95* 150 - 450 K/uL Final    MPV 06/08/2022 10.0  9.4 - 12.3 FL Final    nRBC 06/08/2022 0.00  0.0 - 0.2 K/uL Final    **Note: Absolute NRBC parameter is now reported with Hemogram**    Seg Neutrophils 06/08/2022 75  43 - 78 % Final    Lymphocytes 06/08/2022 11* 13 - 44 % Final    Monocytes 06/08/2022 12  4.0 - 12.0 % Final    Eosinophils % 06/08/2022 1  0.5 - 7.8 % Final    Basophils 06/08/2022 0  0.0 - 2.0 % Final    Immature Granulocytes 06/08/2022 1  0.0 - 5.0 % Final    Segs Absolute 06/08/2022 3.6  1.7 - 8.2 K/UL Final    Absolute Lymph # 06/08/2022 0.5  0.5 - 4.6 K/UL Final    Absolute Mono # 06/08/2022 0.6  0.1 - 1.3 K/UL Final    Absolute Eos # 06/08/2022 0.0  0.0 - 0.8 K/UL Final    Basophils Absolute 06/08/2022 0.0  0.0 - 0.2 K/UL Final    Absolute Immature Granulocyte 06/08/2022 0.0  0.0 - 0.5 K/UL Final    RBC Comment 06/08/2022     Final                    Value:SLIGHT  ANISOCYTOSIS + Alk Phosphatase 06/08/2022 180* 50 - 136 U/L Final    Total Protein 06/08/2022 7.2  6.3 - 8.2 g/dL Final    Albumin 06/08/2022 3.5  3.5 - 5.0 g/dL Final    Globulin 06/08/2022 3.7* 2.3 - 3.5 g/dL Final    Albumin/Globulin Ratio 06/08/2022 0.9* 1.2 - 3.5   Final    CEA 06/08/2022 38.5* 0.0 - 3.0 ng/mL Final    Comment: Nonsmoker:  <3.0 ng/mL  Smoker:     <5.0 ng/mL  [facility]. Patient's results of tumor marker testing may not be comparable to labs using different manufacturers/methods. Treatment Summary has been discussed and given to patient: N/A      -------------------------------------------------------------------------------------------------------------------  Please call our office at [phone] if you have any  of the following symptoms:   · Fever of 100.5 or greater  · Chills  · Shortness of breath  · Swelling or pain in one leg    After office hours an answering service is available and will contact a provider for emergencies or if you are experiencing any of the above symptoms.  Patient does express an interest in My Chart. My Chart log in information explained on the after visit summary printout at the [facility] desk.     Marisol Lovell RN

## 2022-06-09 NOTE — PROGRESS NOTES
Pt arrived ambulatory, chemo completed, 5FU pump connected and unclamped, pt discharged home ambulatory, aware of appointment on Saturday at 1:30

## 2022-06-11 NOTE — PROGRESS NOTES
Arrived to the [facility]. Continuous 5FU completed; elastomeric pump removed. Patient tolerated well. Any issues or concerns during appointment: none. Patient aware of next infusion appointment on 6/12 at 1300. Patient instructed to call provider with temperature of 100.4 or greater or nausea/vomiting/ diarrhea or pain not controlled by medications. Discharged ambulatory.

## 2022-06-12 NOTE — PROGRESS NOTES
Arrived to the [facility]. Assessment and Udenyca injection completed. Patient tolerated well. Any issues or concerns during appointment: No.  Patient aware of next infusion appointment on 6/30/22 @0830. Patient instructed to call provider with temperature of 100.4 or greater or nausea/vomiting/ diarrhea or pain not controlled by medications  Discharged ambulatory.

## 2022-06-29 NOTE — PATIENT INSTRUCTIONS
Patient Instructions from Today's Visit    Reason for Visit:  Prechemo visit-Folfirinox tomorrow--will start [facility] soon    Diagnosis Information:  [email or web address]-answers-[email or web address]-education-[email or web address]-[email or web address]-[email or web address]-sheets       Plan:-  Plan is to get blood and Folfirinox chemo on 6-30-22 and start Keytruda in 2-3 weeks after approval  Type and Screen today  -CT shows cancer progression  -Follow up with GI for appointment for possible dilation (their office should contact you)  -1 unit blood tomorrow  -We will add on iron studies today  -Dr Ghanshyam Mina recommends immunotherapy-will start [place] available    Follow Up:  As scheduled    Recent Lab Results:  Hospital Outpatient Visit on 06/29/2022   Component Date Value Ref Range Status    WBC 06/29/2022 8.9  4.3 - 11.1 K/uL Final    RESULTS CHECKED X 2    RBC 06/29/2022 1.87* 4.23 - 5.6 M/uL Final    Hemoglobin 06/29/2022 6.6* 13.6 - 17.2 g/dL Final    Comment: RESULTS VERIFIED, PHONED TO AND READ BACK BY  PRANEETH MATHIS RN AT 1556 6/29/22 [facility]      Hematocrit 06/29/2022 20.6  % Final    MCV 06/29/2022 110.2* 79.6 - 97.8 FL Final    MCH 06/29/2022 35.3* 26.1 - 32.9 PG Final    MCHC 06/29/2022 32.0  31.4 - 35.0 g/dL Final    RDW 06/29/2022 17.8* 11.9 - 14.6 % Final    Platelets 33/38/5818 94* 150 - 450 K/uL Final    MPV 06/29/2022 9.7  9.4 - 12.3 FL Final    nRBC 06/29/2022 0.00  0.0 - 0.2 K/uL Final    **Note: Absolute NRBC parameter is now reported with Hemogram**    Seg Neutrophils 06/29/2022 89* 43 - 78 % Final    Lymphocytes 06/29/2022 4* 13 - 44 % Final    Monocytes 06/29/2022 6  4.0 - 12.0 % Final    Eosinophils % 06/29/2022 0* 0.5 - 7.8 % Final    Basophils 06/29/2022 0  0.0 - 2.0 % Final    Immature Granulocytes 06/29/2022 1  0.0 - 5.0 % Final    Segs Absolute 06/29/2022 7.8  1.7 - 8.2 K/UL Final    Absolute Lymph # 06/29/2022 0.4* 0.5 - 4.6 K/UL Final    Absolute Mono # 06/29/2022 0.6  0.1 - 1.3 K/UL Final    Absolute Eos # 06/29/2022 0.0  0.0 - 0.8 K/UL Final    Basophils Absolute 06/29/2022 0.0  0.0 - 0.2 K/UL Final    Absolute Immature Granulocyte 06/29/2022 0.1  0.0 - 0.5 K/UL Final    Differential Type 06/29/2022 AUTOMATED    Final         Treatment Summary has been discussed and given to patient: n/a        -------------------------------------------------------------------------------------------------------------------  Please call our office at [phone] if you have any  of the following symptoms:   · Fever of 100.5 or greater  · Chills  · Shortness of breath  · Swelling or pain in one leg    After office hours an answering service is available and will contact a provider for emergencies or if you are experiencing any of the above symptoms.  Patient does express an interest in My Chart. My Chart log in information explained on the after visit summary printout at the [facility] [employer] desk.     Chemotherapy/Agent Information:     Diagnosis:     Goal of Therapy: _x Palliative             Name of Chemotherapy medications: Pemboluzimab to start after this chemo    Number of Cycles Planned: 12                  Length of Cycle/Keytruda        Chemotherapy plan is subject to change at your provider's discretion    A copy of this plan has been discussed and given to you by Luisa Johnson RN      Chemo Education:   Scheduled  Completed prior to chemo start  Previously reviewed 6-29-22    Consent for therapy:   Completed on today 6-29-22              Lewis Whitehead RN  Nurse Navigator  [address] [facility] [address] [phone]

## 2022-06-29 NOTE — PROGRESS NOTES
[facility] Hematology & Oncology: Office Visit Progress Note      Chief Complaint:     Gastric cancer   Liver mets      History of Present Illness:   Reason for Referral: Stomach cancer with metastasis to the liver       Referring Provider:  Dr. Malka Krueger       Primary Care Provider: None       Family History of Cancer/Hematologic Disorders: None reported       Presenting Symptoms: Weight loss, trouble swallowing, right flank/lower quadrant abdominal pain, gross hematuria, and abnormal CT urogram        Mr. Luis Barrera is a 56 y.o. white male with no pertinent medical history who presented to the [facility] ED on 10/15/20 reporting right flank/ lower quadrant  abdominal pain and gross hematuria. CT urogram was obtained in the ED with findings of a large mass centered in the lesser curvature the stomach measuring up to 8.6 cm invading into the lesser omentum concerning for a primary gastric cancer; multiple  large hypoattenuating hepatic metastases; retroperitoneal metastatic adenopathy; and small non-obstructing bilateral renal calculi measuring up to 4 mm in the right with mild right hydroureter but no obstructing stone evident (findings could indicate  a recently passed or nonradiopaque stone). Upon discussion of imaging results, patient endorsed recent history of weight loss and trouble swallowing. Case was discussed with Oncologist, Dr. Jonathan Coe, who agreed to see patient in outpatient clinic on 10/19.        CT OF THE ABDOMEN AND PELVIS WITHOUT IV CONTRAST 10/15/2020   FINDINGS:   Visualized thorax: Normal.   Liver: The liver demonstrates multiple large hypoattenuating masses, the largest centered in segment 6 measuring 10.0 x 8.7 cm. Partially imaged lesion in the left hepatic lobe measuring 6.6 x 2.8 cm.    Gallbladder/biliary: Normal gallbladder.  No biliary dilatation.    Pancreas: Normal.    Spleen: Normal.    Adrenals: Normal.    Kidneys: Small bilateral nonobstructing renal calculi measuring up to 4 mm in the right inferior pole. There is mild right hydroureter but no definite radiopaque ureteral stone is evident. A calcification in the right pelvis likely reflects an intravascular  phleboliths.     Bladder: Normal.   Pelvic organs: Unremarkable prostate and seminal vesicles.    Gastrointestinal: There is a large mass partially imaged, which appears to be centered along the greater curvature of the stomach, involving the gastric body, fundus, and cardia in likely the gastroesophageal junction. The mass is ill-defined but measures  approximately 8.6 x 7.5 cm. The mass invades into the lesser omentum and may focally contacts the left diaphragmatic hans.    Peritoneum/retroperitoneum: No free fluid or worrisome peritoneal nodule. Invasion of the lesser omentum by the gastric mass, as above. Small bilateral fat-containing inguinal hernias. Lymph nodes: Retroperitoneal lymphadenopathy, including a lesser omentum metastatic node measuring 6.4 x 3.7 cm, a 2.2 x 1.5 cm retrocaval lymph node, and a 1.7 cm short axis left para-aortic lymph node.    Vessels: Unremarkable noncontrast appearance. Bones/Soft tissues: No aggressive osseous lesion. IMPRESSION:    1.  Large mass centered in the lesser curvature the stomach measuring up to 8.6 cm invading into the lesser omentum concerning for a primary gastric cancer. 2.  Multiple large hypoattenuating hepatic metastases. 3.  Retroperitoneal metastatic adenopathy. 4.  Small nonobstructing bilateral renal calculi measuring up to 4 mm in the right. There is mild right hydroureter but no obstructing stone is evident. Findings could indicate a recently passed or nonradiopaque stone.       Liver biopsy reported metastatic carcinoma c/w upper GI origin:              CT 10/21/20: IMPRESSION:     1.  Large mass involving the gastric cardia and lesser curvature measuring 7 cm   x 5 cm in size. Some involvement of the most distal esophagus cannot be   excluded.  Direct tumor spread is seen in the retroperitoneum which appears to   encase the distal celiac axis and its proximal major branches.       2. Clear hepatic metastatic disease. In addition, retroperitoneal adenopathy is   seen concerning for metastatic lymph nodes. No evidence for metastatic disease   is otherwise seen. A nonspecific 4 mm x 2 mm left lower lobe nodule is seen. Although an early metastasis is not excluded with certainty, the appearance is   felt to be nonspecific and not clearly suggestive of pulmonary metastatic   disease. Attention on follow-up studies is recommended. He started first line mFOLFIRINOX 11/5/20. Interim history update in A/P. Review of Systems:      Constitutional  Weight loss. Anorexia. Denies fever or chills. Insomnia. HEENT  Denies trauma, bluring vision, hearing loss, ear pain, nosebleeds, sore throat, neck pain and ear discharge. Skin  Denies lesions or rashes. Lungs  Denies shortness of breath, cough, sputum production or hemoptysis. Cardiovascular  Denies chest pain, palpitations, orthopnea, claudication and leg swelling. Gastrointestinal  Nausea. Diarrhea. Denies vomiting. Denies bloody or black stools. Abdominal pain.   Denies dysuria, frequency or hesitancy of urination     Neuro  Finger numb. Denies headaches, visual changes or ataxia. Denies dizziness, speech change, focal weakness and headaches. Hematology  Denies nasal/gum bleeding, denies easy bruise     Endo  Denies heat/cold intolerance, denies diabetes. MSK  Denies back pain, swollen legs, myalgias and falls. Psychiatric/Behavioral   insomnia. Denies depression and substance abuse. The patient is not nervous/anxious.           No Known Allergies  Past Medical History:   Diagnosis Date    Gastric cancer ([facility] [address]     Lazy eye, left      Past Surgical History:   Procedure Laterality Date    IR PORT PLACEMENT EQUAL OR GREATER THAN 5 YEARS 11/3/2020    IR PORT PLACEMENT EQUAL OR GREATER THAN 5 YEARS  11/3/2020    IR PORT PLACEMENT EQUAL OR GREATER THAN 5 YEARS 11/3/2020 [facility]    ORTHOPEDIC SURGERY      ankle    TONSILLECTOMY      US GUIDED LIVER BIOPSY PERCUTANEOUS  10/22/2020    US GUIDED LIVER BIOPSY PERCUTANEOUS 10/22/2020 [facility]     Family History   Problem Relation Age of Onset    Schizophrenia Mother     Diabetes Mother     Dementia Mother     Depression Mother      Social History     Socioeconomic History    Marital status:      Spouse name: Not on file    Number of children: Not on file    Years of education: Not on file    Highest education level: Not on file   Occupational History    Not on file   Tobacco Use    Smoking status: Never Smoker    Smokeless tobacco: Never Used   Substance and Sexual Activity    Alcohol use: Yes    Drug use: Never    Sexual activity: Not on file   Other Topics Concern    Not on file   Social History Narrative    Not on file     Social Determinants of Health     Financial Resource Strain:     Difficulty of Paying Living Expenses: Not on file   Food Insecurity:     Worried About Running Out of Food in the Last Year: Not on file    Diana of Food in the Last Year: Not on file   Transportation Needs:     Lack of Transportation (Medical): Not on file    Lack of Transportation (Non-Medical):  Not on file   Physical Activity:     Days of Exercise per Week: Not on file    Minutes of Exercise per Session: Not on file   Stress:     Feeling of Stress : Not on file   Social Connections:     Frequency of Communication with Friends and Family: Not on file    Frequency of Social Gatherings with Friends and Family: Not on file    Attends Judaism Services: Not on file    Active Member of Clubs or Organizations: Not on file    Attends Club or Organization Meetings: Not on file    Marital Status: Not on file   Intimate Partner Violence:     Fear of Current or Ex-Partner: Not on file    Emotionally Abused: Not on file    Physically Abused: Not on file    Sexually Abused: Not on file   Housing Stability:     Unable to Pay for Housing in the Last Year: Not on file    Number of Places Lived in the Last Year: Not on file    Unstable Housing in the Last Year: Not on file     Current Outpatient Medications   Medication Sig Dispense Refill    diphenoxylate-atropine (LOMOTIL) 2.5-0.025 MG per tablet Take 1 tablet by mouth 4 times daily as needed.  gabapentin (NEURONTIN) 300 MG capsule Take 300 mg by mouth 2 times daily.  lidocaine-prilocaine (EMLA) 2.5-2.5 % cream Apply topically as needed      LORazepam (ATIVAN) 1 MG tablet Take 1 mg by mouth every 6 hours as needed.  mirtazapine (REMERON) 30 MG tablet Take 30 mg by mouth      ondansetron (ZOFRAN) 8 MG tablet Take 8 mg by mouth every 8 hours as needed      potassium chloride (KLOR-CON M) 20 MEQ extended release tablet Take 20 mEq by mouth daily      prochlorperazine (COMPAZINE) 10 MG tablet Take 10 mg by mouth every 6 hours as needed      zolpidem (AMBIEN CR) 12.5 MG extended release tablet Take 12.5 mg by mouth. No current facility-administered medications for this visit. Facility-Administered Medications Ordered in Other Visits   Medication Dose Route Frequency Provider Last Rate Last Admin    0.9 % sodium chloride infusion   IntraVENous PRN Mega Martinez MD        diatrizoate meglumine-sodium (GASTROGRAFIN) 66-10 % solution 15 mL  15 mL Oral ONCE PRN Mega Martinez MD   15 mL at 06/28/22 1441    atropine injection 0.4 mg  0.4 mg IntraVENous Once Mega Martinez MD           OBJECTIVE:  BP (!) 88/50 (Site: Right Upper Arm, Position: Standing)   Pulse 90   Temp 99.1 °F (37.3 °C) (Oral)   Resp 18   Ht 5' 11.5\" (1.816 m)   Wt 154 lb 3.2 oz (69.9 kg)   SpO2 99%   BMI 21.21 kg/m²     Physical Exam:  Constitutional: Oriented to person, place, and time.    Well-developed and 0.2 K/UL    Absolute Immature Granulocyte 0.1 0.0 - 0.5 K/UL    Differential Type AUTOMATED     CEA    Collection Time: 06/29/22  3:37 PM   Result Value Ref Range    CEA 43.8 (H) 0.0 - 3.0 ng/mL   Comprehensive Metabolic Panel    Collection Time: 06/29/22  3:37 PM   Result Value Ref Range    Sodium 139 136 - 145 mmol/L    Potassium 3.3 (L) 3.5 - 5.1 mmol/L    Chloride 104 98 - 107 mmol/L    CO2 28 21 - 32 mmol/L    Anion Gap 7 7 - 16 mmol/L    Glucose 137 (H) 65 - 100 mg/dL    BUN 12 6 - 23 MG/DL    CREATININE 0.90 0.8 - 1.5 MG/DL    GFR African American >60 >60 ml/min/1.73m2    GFR Non- >60 >60 ml/min/1.73m2    Calcium 8.2 (L) 8.3 - 10.4 MG/DL    Total Bilirubin 0.2 0.2 - 1.1 MG/DL    ALT 20 12 - 65 U/L    AST 41 (H) 15 - 37 U/L    Alk Phosphatase 147 (H) 50 - 136 U/L    Total Protein 6.2 (L) 6.3 - 8.2 g/dL    Albumin 3.0 (L) 3.5 - 5.0 g/dL    Globulin 3.2 2.3 - 3.5 g/dL    Albumin/Globulin Ratio 0.9 (L) 1.2 - 3.5     Magnesium    Collection Time: 06/29/22  3:37 PM   Result Value Ref Range    Magnesium 2.2 1.8 - 2.4 mg/dL   Ferritin    Collection Time: 06/29/22  3:37 PM   Result Value Ref Range    Ferritin 183 8 - 388 NG/ML   Reticulocytes    Collection Time: 06/29/22  3:37 PM   Result Value Ref Range    Reticulocyte Count,Automated 6.4 (H) 0.3 - 2.0 %    Absolute Retic # 0.1186 (H) 0.026 - 0.095 M/ul    Immature Retic Fraction 24.3 (H) 2.3 - 13.4 %    Retic Hemoglobin conc. 35 29 - 35 pg   Transferrin Saturation    Collection Time: 06/29/22  3:37 PM   Result Value Ref Range    Iron 57 35 - 150 ug/dL    TIBC 269 250 - 450 ug/dL    TRANSFERRIN SATURATION 21 >20 %       Imaging:  No results found for this or any previous visit. ASSESSMENT/PLAN:   Diagnosis Orders   1. Malignant neoplasm of overlapping sites of stomach ([facility] [address]     2. Anemia, unspecified type  Type and Screen    Vitamin B12    Folate    Ferritin    Reticulocytes    Transferrin Saturation   3. Dysphagia, unspecified type     4.  Weight loss     5. Thrombocytopenia ([facility] [address]     6. High risk medication use          56 y.o. M consulted for gastric cancer and liver mets in 10/2020. 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 He presented to [facility] on 10/15/20 for pain and suspect kidney stone, CT urogram showed large gastric mass and liver masses, urgently  presented to [facility] on 10/19/20 with wife, and we discussed this appeared almost certain of metastatic malignancy, I showed CT images to pt and wife per request, arrange staging CT C/A/P, liver mass biopsy, consult FC and SW for sponsorship, discussed nutrition  and manage dysphagia, refer to [facility] cancer society for Ensure.       Liver biopsy reported metastatic carcinoma c/w upper GI origin, and I discussed with pt and wife this together with the CT finding of large gastric mass is consistent with stage IV gastric cancer, not expected to be curable but palliative rx may prolong  life and improve symptoms, both were motivated to pursue and rec start mFOLFIRINOX aiming for rapid response given the symptoms of dysphagia and rapid weight loss, check Caris pending/HER2 -, need to defer [address] trip, arrange port and antiemetics, remeron  for insomnia/anorexia, ativan as needed, educate for toxicities and management, can not tolerated Ensure and he attributed to sugar, try glucerna and consult nutritionist, started cycle 1 palliative FOLFIRINOX 11/5/20, tumor marker down and bloating improved,  encouraging clinical response, increase remeron and use ativan for insomnia, laxative prn diarrhea and find out particular association with diet, due for cycle 4 and neutropenic again, add GCSF and keep schedule for his birthday arrangement, c/o low libido  and check testosterone level normal, continue remeron managing insomnia/depression, Emend for nausea, titrate imodium for proper rx of diarrhea, prepH for hemorroid, due for cycle 7 and deferred for neutropenia, returned 2/10/21 and ANC 1.3, he was reluctant  to use GCSF for the cost, discussed with FC and obtained eudenyca assistance, diarrhea responded to imodium, CT 2/10/2021 showed further shrinking of liver and the gastric mass, a periaortic lymph node will be monitored for change, reported neuropathy  worsened after cycle 9 with persistent numbness in the fingertips, discussed that the risk of worse neuropathy and hold oxaliplatin for cycle 10, neuropathy improved and proceed to cycle 12 with lower dose of oxaliplatin at 50 mg/m², monitor mild anemia  and thrombocytopenia, report mild neuropathy stable, repeat CT 5/12/2021 showed generally stable or improved disease except for the retroperitoneal lymph node next to left kidney continues to grow, which explains the increase of the CEA, we discussed  that since this appeared to be the solitary progression of disease, I referred him to radiation oncology to consider SBRT done in 6/2021, which may be cause of the CEA spike and monitor, we continued the systemic therapy with modified FOLFIRINOX with  dose modification of oxaliplatin and monitor neuropathy reportedly stable, on gabapentin, arranged for Covid vaccination now that he finally would accept it, reschedule following chemo's to give 2 weeks of response time to each vaccine dose, repeat CT  8/12/21 showed stable disease, meanwhile concerned of the CEA trending up for early disease progression versus the result of treatment delay due to coordination of Covid vaccine, he will complete second dose of vaccine on time and return in 2 weeks for  next cycle, also discussed further systemic treatment may be limited to IO versus Taxol/ramucirumab and his persistent neuropathy would be a concern, Enhertu for HER2 1+ on IHC has been proven effective in breast cancer but may need replacement if using  in gastric cancer, also discussed surgical resection given there is no new disease developed over the the radiographical progression and his clinical dysphagia and weight loss indicate change of therapy, discussed further options of chemotherapy with or without chemo given his CPS 30%, versus Taxol/ramucirumab which would be challenging to his neuropathy, decided to pursue authorization of single agent Keytruda, finish next cycle FOLFIRINOX while waiting for authorization, discussed increasing Ensure to 5 cans a day, give 1 unit PRBC, navigator to follow, call as needed.      Problem complexity: high   Treatment risk of complication/morbidity: high   Irinotecan:   Cardiovascular: Vasodilation (9% to 11%)   Central nervous system: Cholinergic syndrome (47%; includes diaphoresis, flushing, increased peristalsis, lacrimation, miosis, rhinitis, sialorrhea), pain (23% to 24%), dizziness (15% to 21%), insomnia (19%), headache (17%), chills (14%)   Dermatologic: Alopecia (46% to 72%), diaphoresis (16%), skin rash (13% to 14%)   Endocrine & metabolic: Weight loss (87%), dehydration (15%)   Gastrointestinal: Diarrhea (late: 83% to 88%, grades 3/4: 14% to 31%; early: 43% to 51%, grades 3/4: 7% to 22%), nausea (70% to 86%), abdominal pain (57% to 68%), vomiting (62% to 67%), abdominal cramps (57%), anorexia (44% to 55%), constipation (30%  to 32%), mucositis (30%), flatulence (12%), stomatitis (12%)   Hematologic & oncologic: Anemia (60% to 97%; grades 3/4: 5% to 7%), leukopenia (63% to 96%, grades 3/4: 14% to 28%), thrombocytopenia (96%, grades 3/4: 1% to 4%), neutropenia (30% to 96%; grades 3/4: 14% to 31%)   Hepatic: Increased serum bilirubin (84%), increased serum alkaline phosphatase (13%)   Infection: Infection (14%)   Neuromuscular & skeletal: Weakness (69% to 76%), back pain (14%)   Respiratory: Dyspnea (22%), cough (17% to 20%), rhinitis (16%)   Miscellaneous: Fever (44% to 45%)   Oxaliplatin:   Gastrointestinal: Abdominal pain (31%), anorexia (20%), constipation (31%), diarrhea (46%), nausea (64%), stomatitis (2% to 14%), vomiting (37%)   Hematologic & oncologic: Anemia (64%; grades 3/4: 1%), leukopenia (13%). thrombocytopenia (30%; grades 3/4: 3%)   Hepatic: Increased serum alanine aminotransferase (36%), increased serum aspartate aminotransferase (54%), increased serum bilirubin (13%)   Nervous system: Fatigue (61%), headache (13%), insomnia (11%), pain (14%), peripheral neuropathy (may be dose limiting; 76%, grades 3/4: 7%; acute 65%; grades 3/4: 5%; persistent 43%; grades 3/4: 3%)   Neuromuscular & skeletal: Back pain (11%)   Respiratory: Cough (11%), dyspnea (13%)   Miscellaneous: Fever (25%)   5FU:   Cardiovascular: Angina pectoris, cardiac arrhythmia, cardiac failure, cerebrovascular accident, ischemic heart disease, local thrombophlebitis, myocardial infarction, vasospasm, ventricular ectopy   Central nervous system: Cerebellar syndrome (acute), confusion, disorientation, euphoria, headache   Dermatologic: Alopecia, changes in nails (including nail loss), dermatitis, hyperpigmentation (supravenous), maculopapular rash (pruritic), palmar-plantar erythrodysesthesia, skin fissure, skin photosensitivity, Young-Sushant syndrome, toxic epidermal  necrolysis, xeroderma   Gastrointestinal: Anorexia, diarrhea, esophagopharyngitis, gastrointestinal hemorrhage, gastrointestinal ulcer, mesenteric ischemia (acute), nausea, stomatitis, tissue sloughing (gastrointestinal), vomiting   Hematologic & oncologic: Agranulocytosis, anemia, leukopenia (sally: days 9 to 14; recovery by day 30), pancytopenia, thrombocytopenia   Hypersensitivity: Anaphylaxis, hypersensitivity reaction (generalized)   Ophthalmic: Lacrimal stenosis, lacrimation, nystagmus, photophobia, visual disturbance   Respiratory: Epistaxis    All questions are answered to their satisfaction. They will call for further questions and concerns.         ECOG PERFORMANCE STATUS - 1- Restricted in physically strenuous activity but ambulatory and able to carry out work of a light or sedentary nature such as light house work, office work. Pain - 0 - No pain/10. None/Minimal pain - not affecting QOL     Fatigue - No flowsheet data found. Distress - No flowsheet data found. Elements of this note have been dictated via voice recognition software. Text and phrases may be limited by the accuracy and autoconversion of the software. The chart has been reviewed, but errors may still be present. Gwen Hatch M.D.   [address]  Office : [phone]  Fax : [phone]

## 2022-06-30 NOTE — PROGRESS NOTES
Report from Kings Kincaid RN and care of pt assumed. Specimen collected 1 hour post transfusion per order and pt discharged ambulatory.

## 2022-07-01 NOTE — PERIOP NOTE
Dear Mr. Hugo,      Thank you for completing your phone assessment with me today. Here are your requested procedure instructions. Please call #[phone] with any questions/concerns. Your procedure is scheduled at [id number] [place] [address], [id number]. Please arrive at [facility]; GI Lab (#[phone]) will call you on the business day before your surgery with your arrival time. If you have any questions on the day of procedure, please call the GI dept. at the telephone number above. Follow procedure instructions for nothing by mouth or colonoscopy prep received from the GI/Surgeon office. Please take these medications on the morning of the procedure with a small sip of water: gabapentin, ativan if needed. Please stop all vitamins/supplements 7 days prior to the procedure and stop all NSAIDS (ibuprofen, naproxen, aleve, motrin, advil) 5 days before your procedure. A responsible adult must drive you to the hospital, remain in the building during the procedure and you will need adult supervision for 24 hours after anesthesia. Please use a non-moisturizing soap the night before the procedure and on the morning of the procedure. Do NOT wear: make-up, nail polish, lotions, cologne, perfumes, powders or oil on your skin. All piercings/metal/jewelry must be removed prior to arrival.  If you wear contacts then you will need to bring a case to store them in or wear your glasses. Deodorant is acceptable with all EGDs and/or colonoscopies. Medication given during procedure may cause drowsiness for several hours, therefore, do not drive or operate machinery for remainder of the day. You may not drink alcohol on the day of your procedure, please resume regular diet and activity unless otherwise directed. You may experience abdominal distention for several hours that is relieved by the passage of gas.  Contact your physician if you have any of the following: fever or chills, severe abdominal pain or excessive amount of bleeding or a large amount when having a bowel movement. Occasional specks of blood with bowel movement would not be unusual.     Please leave all your valuables at home but be sure to bring your insurance card and ID on the day of surgery for registration/identification. Our Guide to Surgery with additional information can be found:  [email or web address]    Emailed to: [email or web address]

## 2022-07-01 NOTE — PERIOP NOTE
Patient verified name, , and procedure. Type: 1a; abbreviated assessment per anesthesia guidelines  Labs per surgeon:  Unknown  Labs per anesthesia:  None per protocol      Instructed pt that they will be notified by the doctor office for time of arrival to GI lab. If any questions please call the GI lab at [phone]. Follow diet and prep instructions per office. May have clear liquids until 4 hours prior to time of arrival.    [place] R[place] or shower the night before and the am of surgery with antibacterial soap. No lotions, oils, powders, cologne on skin. No make up, eye make up or jewelry. Wear loose fitting comfortable, clean clothing. Must have adult present in building the entire time . Medications for the day of procedure: gabapentin, ativan    The following discharge instructions reviewed with patient: medication given during procedure may cause drowsiness for several hours, therefore, do not drive or operate machinery for remainder of the day, no alcohol on the day of your procedure, resume regular diet and activity unless otherwise directed, for mild sore throat you may use Cepacol throat lozenges or warm salt water gargles as needed, call your physician for any problems or questions. Patient verbalizes understanding.

## 2022-07-01 NOTE — PROGRESS NOTES
Nutrition:  Assessment based on referral for Nutrition Services per Dr. Yohan Jacobsen. Food/Nutrition and Pertinent Medical History:  Diet: Regular  Pt with hx of metastatic gastric cancer, disease progression noted on most recent scan, receiving FOLFIRINOX - will receive 1 last cycle tomorrow and then start Keytruda q 3 weeks. Pt having dysphagia - referral made to GI at last visit, pt not scheduled until August, called today and pt's appointment to be moved up, Hgb (6.6) - will need blood transfusion and pt cannot be seen in GI office w/ Hgb < 7.  Pt's dysphagia is intermittent and not w/ specific food consistencies - can regurgitate yogurt, but eat a chicken finger. Weight loss. Anthropometrics:  Ht: 71.5 inches, Current BW: 156#, 89% IBW, 89% UBW (175#, ~20# wt loss x 2 months), BMI = 21.5 (normal BW)     Estimated Nutrition Needs:  EER: 6159-6003 kcal (28-32 kcal/kg BW)   EPR: 70-95 gm protein (1-1.3 gm/kg BW)  H2O: 1 ml/kcal or per MD    Nutrition Diagnosis:  Swallowing difficulty R/T metastatic gastric cancer as evidenced by intermittent dysphagia, wt loss of ~20# x 2 months. Intervention:  1. Continue w/ Regular diet, modify for soft solids/ full liquids prn. Pt does not like Ensure or comparable, drinks Klondike milk and likes yogurt - discussed adding protein powder and making high calorie/protein smoothies instead. Try adding 2-3 snacks in addition to meals each day. 2. Call placed to GI to move appointment up given decreased Hgb and dysphagia - EGD scheduled (7/5)     Monitoring/Evaluation:  1. RD to follow up during next office visit - follow up wt status, tolerance/intake of po diet, symptom management.       [address] [facility], [address], [address] [facility]

## 2022-07-01 NOTE — PROGRESS NOTES
Called and confirmed procedure with patient for 7/5. All questions and concerns answered at this time.

## 2022-07-02 NOTE — PROGRESS NOTES
Arrived to the [facility]. Adrucil pump completed. Provided education on oral hydration. Patient instructed to report any side affects to ordering provider. Patient tolerated without problems. Any issues or concerns during appointment: no, patient denies dizziness or need for IVF. Patient aware of next infusion appointment on 7/3/22 (date) at [phone] (time). Discharged ambulatory.

## 2022-07-03 NOTE — PROGRESS NOTES
Arrived to the [facility]. Udenyca injection completed. Patient tolerated well. Any issues or concerns during appointment: no  Patient aware of next infusion appointment on 7/21/2022 at 1400. Patient instructed to call provider with temperature of 100.4 or greater or nausea/vomiting/ diarrhea or pain not controlled by medications  Discharged ambulatory.

## 2022-07-05 NOTE — PROCEDURES
Endoscopic Gastroduodenoscopy Procedure Note    Indications:  Gastric cancer. H/O dysphagia    Anesthesia/Sedation: MAC IV     Pre-Procedure Physical:    Current Facility-Administered Medications   Medication Dose Route Frequency    acetaminophen (TYLENOL) tablet 1,000 mg  1,000 mg Oral Once    fentaNYL (SUBLIMAZE) injection 100 mcg  100 mcg IntraVENous Once PRN    lactated ringers infusion   IntraVENous Continuous    sodium chloride flush 0.9 % injection 5-40 mL  5-40 mL IntraVENous 2 times per day    sodium chloride flush 0.9 % injection 5-40 mL  5-40 mL IntraVENous PRN    midazolam PF (VERSED) injection 2 mg  2 mg IntraVENous Once PRN    scopolamine (TRANSDERM-SCOP) transdermal patch 1 patch  1 patch TransDERmal Once     Facility-Administered Medications Ordered in Other Encounters   Medication Dose Route Frequency    atropine injection 0.4 mg  0.4 mg IntraVENous Once      Patient has no known allergies. Patient Vitals for the past 8 hrs:   BP Temp Temp src Pulse Resp SpO2 Height Weight   07/05/22 1243 (!) 96/55 96.8 °F (36 °C) Tympanic 84 16 99 % -- --   07/05/22 1054 (!) 99/54 97.8 °F (36.6 °C) Oral 67 18 99 % 5' 11\" (1.803 m) 155 lb (70.3 kg)       Exam      Airway: clear   Heart: normal S1and S2    Lungs: clear bilateral  Abdomen: soft, nontender, bowel sounds present and normal in all quads   Mental Status: awake, alert and oriented to person, place and time          Procedure Details     Informed consent was obtained for the procedure, including conscious sedation. Risks of pancreatitis, infection, perforation, hemorrhage, adverse drug reaction and aspiration were discussed. The patient was placed in the left lateral decubitus position.   Based on the pre-procedure assessment, including review of the patient's medical history, medications, allergies, and review of systems, he had been deemed to be an appropriate candidate for conscious sedation; he was therefore sedated with the medications listed below. He was monitored continuously with ECG tracing, pulse oximetry, blood pressure monitoring, and direct observation. The EGD gastroscope was inserted into the mouth and advanced under direct vision to the second portion of the duodenum. A careful inspection was made as the gastroscope was withdrawn, including a retroflexed view of the proximal stomach; findings and interventions are described below. Appropriate photodocumentation was obtained. Findings:   Esophagus- was abnormal. There appeared to be just above the EG junction an ulcer with erosion from his gastric mass. To the left was a friable malignant stricture that the scope dilated. At the end of the procedure I dilated with a CRE to a 54 Fr/18 mm balloon. Stomach- A large friable mass was seen along the lesser curvature and cardia. Likely the source of anemia and etiology of the esophageal stricture. The distal body and antrum was normal.      Duodenum- Normal.    Therapies: Esophageal dilation as above    Specimens: None    Estimated Blood Loss: 2 cc           Complications:   None; patient tolerated the procedure well. Attending Attestation:  I performed the procedure. Impression:  Gastric friable mass with esophageal stricture. Esophageal distortion and ulceration. S/P dilation as above. Recommendations: Add carafate slurry ac and hs. Renew Protonix 40 mg QD. Prescriptions sent. Avoid NSAIDs. Start with liquids and softer foods. May need an EGD with stent if no improvement or even a PEG. Defer to Dr Hakeem Mueller. An OV with be made, moved up.     Kain Flores MD

## 2022-07-05 NOTE — PROGRESS NOTES
Port accessed on right chest wall with sterile technique and positive blood return. Patient tolerated well.

## 2022-07-05 NOTE — ANESTHESIA POSTPROCEDURE EVALUATION
Department of Anesthesiology  Postprocedure Note    Patient: Alma Chan  MRN: [id number]  YOB: 1975  Date of evaluation: 7/5/2022      Procedure Summary     Date: 07/05/22 Room / Location: [facility] ENDO 04 / [facility] ENDOSCOPY    Anesthesia Start: 3371 Anesthesia Stop: 4636    Procedure: EGD DILATION BALLOON (N/A Upper GI Region) Diagnosis:       Malignant neoplasm of fundus of stomach ([address]      (Malignant neoplasm of fundus of stomach ([address] [C16.1])    Surgeons: Zaki Aj MD Responsible Provider: Mukul Chirinos MD    Anesthesia Type: TIVA ASA Status: 2          Anesthesia Type: No value filed.     Ian Phase I: Ian Score: 10    Ian Phase II: Ian Score: 9      Anesthesia Post Evaluation    Patient location during evaluation: PACU  Patient participation: complete - patient participated  Level of consciousness: awake and awake and alert  Airway patency: patent  Nausea & Vomiting: no nausea  Complications: no  Cardiovascular status: hemodynamically stable  Respiratory status: acceptable  Hydration status: euvolemic

## 2022-07-05 NOTE — H&P
Gastroenterology Outpatient History and Physical    Patient: Jono Dobbs    Physician: Alayna Daniel MD    Vital Signs: Blood pressure (!) 99/54, pulse 67, temperature 97.8 °F (36.6 °C), temperature source Oral, resp. rate 18, height 5' 11\" (1.803 m), weight 155 lb (70.3 kg), SpO2 99 %. Allergies: No Known Allergies    Chief Complaint:  Hx of gastric malignancy. C/O dysphagia.     History of Present Illness: As Above    Justification for Procedure: As Above    History:  Past Medical History:   Diagnosis Date    [facility] [address]     being treated currently - chemo and radiation    History of blood transfusion     6/30/2022 no rxn to this transfusion    Lazy eye, left       Past Surgical History:   Procedure Laterality Date    IR PORT PLACEMENT EQUAL OR GREATER THAN 5 YEARS  11/3/2020    IR PORT PLACEMENT EQUAL OR GREATER THAN 5 YEARS  11/3/2020    IR PORT PLACEMENT EQUAL OR GREATER THAN 5 YEARS 11/3/2020 [facility]    ORTHOPEDIC SURGERY      ankle    TONSILLECTOMY      US GUIDED LIVER BIOPSY PERCUTANEOUS  10/22/2020    US GUIDED LIVER BIOPSY PERCUTANEOUS 10/22/2020 [facility] US      Social History     Socioeconomic History    Marital status:      Spouse name: None    Number of children: None    Years of education: None    Highest education level: None   Occupational History    None   Tobacco Use    Smoking status: Never Smoker    Smokeless tobacco: Never Used   Vaping Use    Vaping Use: Never used   Substance and Sexual Activity    Alcohol use: Yes     Comment: rarely    Drug use: Never    Sexual activity: None   Other Topics Concern    None   Social History Narrative    None     Social Determinants of Health     Financial Resource Strain:     Difficulty of Paying Living Expenses: Not on file   Food Insecurity:     Worried About Running Out of Food in the Last Year: Not on file    Diana of Food in the Last Year: Not on file   Transportation Needs:     Lack of Transportation (Medical): Not on file    Lack of Transportation (Non-Medical): Not on file   Physical Activity:     Days of Exercise per Week: Not on file    Minutes of Exercise per Session: Not on file   Stress:     Feeling of Stress : Not on file   Social Connections:     Frequency of Communication with Friends and Family: Not on file    Frequency of Social Gatherings with Friends and Family: Not on file    Attends Evangelical Services: Not on file    Active Member of Clubs or Organizations: Not on file    Attends Club or Organization Meetings: Not on file    Marital Status: Not on file   Intimate Partner Violence:     Fear of Current or Ex-Partner: Not on file    Emotionally Abused: Not on file    Physically Abused: Not on file    Sexually Abused: Not on file   Housing Stability:     Unable to Pay for Housing in the Last Year: Not on file    Number of [place] in the Last Year: Not on file    Unstable Housing in the Last Year: Not on file      Family History   Problem Relation Age of Onset    Schizophrenia Mother     Diabetes Mother     Dementia Mother     Depression Mother        Medications:   Prior to Admission medications    Medication Sig Start Date End Date Taking? Authorizing Provider   UDENYCA 6 MG/0.6ML injection INJECT 6MG SUBCUTANEOUSLY EVERY 14 DAYS AS DIRECTED 6/27/22   Historical Provider, MD   diphenoxylate-atropine (LOMOTIL) 2.5-0.025 MG per tablet Take 1 tablet by mouth 4 times daily as needed. 9/16/21   Ar Automatic Reconciliation   gabapentin (NEURONTIN) 300 MG capsule Take 300 mg by mouth 2 times daily. 3/21/22   Ar Automatic Reconciliation   lidocaine-prilocaine (EMLA) 2.5-2.5 % cream Apply topically as needed 2/24/21   Ar Automatic Reconciliation   LORazepam (ATIVAN) 1 MG tablet Take 1 mg by mouth every 6 hours as needed.  1/26/22   Ar Automatic Reconciliation   mirtazapine (REMERON) 30 MG tablet Take 30 mg by mouth nightly  2/21/22   Ar Automatic Reconciliation ondansetron (ZOFRAN) 8 MG tablet Take 8 mg by mouth every 8 hours as needed 10/28/20   Ar Automatic Reconciliation   prochlorperazine (COMPAZINE) 10 MG tablet Take 10 mg by mouth every 6 hours as needed 10/28/20   Ar Automatic Reconciliation   zolpidem (AMBIEN CR) 12.5 MG extended release tablet Take 12.5 mg by mouth.  1/3/22   Ar Automatic Reconciliation       Physical Exam:         Heart: regular rate and rhythm, S1, S2 normal, no murmur, click, rub or gallop   Lungs: chest clear, no wheezing, ralles   Abdominal: Bowel sounds are normal, liver is not enlarged, spleen is not enlarged           Findings/Diagnosis: Hx of gastric malignancy, dysphagia and anemia    Plan EGD with possible dilation and treatment of bleeding        Signed:  Gina Tilley MD 7/5/2022

## 2022-07-05 NOTE — ANESTHESIA PRE PROCEDURE
Department of Anesthesiology  Preprocedure Note       Name:  Declan Rivera   Age:  56 y.o.  :  1975                                          MRN:  [id number]         Date:  2022      Surgeon: Ifrah Teresa):  Bo Aguilera MD    Procedure: Procedure(s):  EGD ESOPHAGOGASTRODUODENOSCOPY/22    Medications prior to admission:   Prior to Admission medications    Medication Sig Start Date End Date Taking? Authorizing Provider   UDENYCA 6 MG/0.6ML injection INJECT 6MG SUBCUTANEOUSLY EVERY 14 DAYS AS DIRECTED 22   Historical Provider, MD   diphenoxylate-atropine (LOMOTIL) 2.5-0.025 MG per tablet Take 1 tablet by mouth 4 times daily as needed. 21   Ar Automatic Reconciliation   gabapentin (NEURONTIN) 300 MG capsule Take 300 mg by mouth 2 times daily. 3/21/22   Ar Automatic Reconciliation   lidocaine-prilocaine (EMLA) 2.5-2.5 % cream Apply topically as needed 21   Ar Automatic Reconciliation   LORazepam (ATIVAN) 1 MG tablet Take 1 mg by mouth every 6 hours as needed. 22   Ar Automatic Reconciliation   mirtazapine (REMERON) 30 MG tablet Take 30 mg by mouth nightly  22   Ar Automatic Reconciliation   ondansetron (ZOFRAN) 8 MG tablet Take 8 mg by mouth every 8 hours as needed 10/28/20   Ar Automatic Reconciliation   prochlorperazine (COMPAZINE) 10 MG tablet Take 10 mg by mouth every 6 hours as needed 10/28/20   Ar Automatic Reconciliation   zolpidem (AMBIEN CR) 12.5 MG extended release tablet Take 12.5 mg by mouth. 1/3/22   Ar Automatic Reconciliation       Current medications:    No current facility-administered medications for this encounter.      Facility-Administered Medications Ordered in Other Encounters   Medication Dose Route Frequency Provider Last Rate Last Admin    atropine injection 0.4 mg  0.4 mg IntraVENous Once Alise Nguyen MD           Allergies:  No Known Allergies    Problem List:    Patient Active Problem List   Diagnosis Code    Urgency of urination R39.15    Malignant ne[facility] of overlapping sites of stomach ([facility] C16.8    CINV (chemotherapy-induced nausea and vomiting) R11.2, T45.1X5A       Past Medical History:        Diagnosis Date    [facility]     being treated currently - chemo and radiation    History of blood transfusion     6/30/2022 no rxn to this transfusion    Lazy eye, left        Past Surgical History:        Procedure Laterality Date    IR PORT PLACEMENT EQUAL OR GREATER THAN 5 YEARS  11/3/2020    IR PORT PLACEMENT EQUAL OR GREATER THAN 5 YEARS  11/3/2020    IR PORT PLACEMENT EQUAL OR GREATER THAN 5 YEARS 11/3/2020 [facility]    ORTHOPEDIC SURGERY      ankle    TONSILLECTOMY      US GUIDED LIVER BIOPSY PERCUTANEOUS  10/22/2020    US GUIDED LIVER BIOPSY PERCUTANEOUS 10/22/2020 [facility] US       Social History:    Social History     Tobacco Use    Smoking status: Never Smoker    Smokeless tobacco: Never Used   Substance Use Topics    Alcohol use: Yes     Comment: rarely                                Counseling given: Not Answered      Vital Signs (Current):   Vitals:    07/01/22 1406   Weight: 160 lb (72.6 kg)   Height: 5' 11\" (1.803 m)                                              BP Readings from Last 3 Encounters:   07/03/22 (!) 120/50   07/02/22 93/62   06/30/22 (!) 87/48       NPO Status: Time of last liquid consumption: 0730                        Time of last solid consumption: 2200                        Date of last liquid consumption: 07/05/22                        Date of last solid food consumption: 07/04/22    BMI:   Wt Readings from Last 3 Encounters:   07/01/22 160 lb (72.6 kg)   06/30/22 156 lb (70.8 kg)   06/29/22 154 lb 3.2 oz (69.9 kg)     Body mass index is 22.32 kg/m².     CBC:   Lab Results   Component Value Date/Time    WBC 9.1 06/30/2022 06:19 PM    RBC 2.16 06/30/2022 06:19 PM    HGB 7.4 06/30/2022 06:19 PM    HCT 22.6 06/30/2022 06:19 PM    .6 06/30/2022 06:19 PM    RDW 19.3 06/30/2022 06:19 PM    PLT 86 06/30/2022 06:19 PM       CMP:   Lab Results   Component Value Date/Time     06/29/2022 03:37 PM    K 3.3 06/29/2022 03:37 PM     06/29/2022 03:37 PM    CO2 28 06/29/2022 03:37 PM    BUN 12 06/29/2022 03:37 PM    CREATININE 0.90 06/29/2022 03:37 PM    GFRAA >60 06/29/2022 03:37 PM    AGRATIO 1.1 05/18/2022 03:21 PM    LABGLOM >60 06/29/2022 03:37 PM    GLUCOSE 137 06/29/2022 03:37 PM    PROT 6.2 06/29/2022 03:37 PM    CALCIUM 8.2 06/29/2022 03:37 PM    BILITOT 0.2 06/29/2022 03:37 PM    ALKPHOS 147 06/29/2022 03:37 PM    ALKPHOS 191 05/18/2022 03:21 PM    AST 41 06/29/2022 03:37 PM    ALT 20 06/29/2022 03:37 PM       POC Tests: No results for input(s): POCGLU, POCNA, POCK, POCCL, POCBUN, POCHEMO, POCHCT in the last 72 hours. Coags: No results found for: PROTIME, INR, APTT    HCG (If Applicable): No results found for: PREGTESTUR, PREGSERUM, HCG, HCGQUANT     ABGs: No results found for: PHART, PO2ART, RDX3KOR, OER9VFO, BEART, P2XECHFS     Type & Screen (If Applicable):  No results found for: LABABO, LABRH    Drug/Infectious Status (If Applicable):  No results found for: HIV, HEPCAB    COVID-19 Screening (If Applicable):   Lab Results   Component Value Date/Time    COVID19 Not detected 11/20/2020 06:32 AM    COVID19 Negative 11/20/2020 06:32 AM           Anesthesia Evaluation  Patient summary reviewed and Nursing notes reviewed  Airway: Mallampati: II  TM distance: >3 FB   Neck ROM: full     Dental:          Pulmonary:Negative Pulmonary ROS and normal exam                               Cardiovascular:  Exercise tolerance: good (>4 METS),           Rhythm: regular  Rate: normal                    Neuro/Psych:   Negative Neuro/Psych ROS              GI/Hepatic/Renal: Neg GI/Hepatic/Renal ROS  (+) liver disease:,           Endo/Other: Negative Endo/Other ROS             Pt had no PAT visit       Abdominal:             Vascular: negative vascular ROS.          Other Findings: Anesthesia Plan      TIVA     ASA 2       Induction: intravenous. Anesthetic plan and risks discussed with patient.       Plan discussed with surgical team.                    Joselyn Oliva MD   7/5/2022

## 2022-07-11 NOTE — TELEPHONE ENCOUNTER
I have reviewed the patient's controlled substance prescription history, as maintained in the [place] prescription monitoring program, so that the prescriptions(s) for a controlled substance can be given.   Last Date Reviewed: 7/11/22

## 2022-07-12 PROBLEM — D64.9 SEVERE ANEMIA: Status: ACTIVE | Noted: 2022-01-01

## 2022-07-12 PROBLEM — D69.6 THROMBOCYTOPENIA (HCC): Status: ACTIVE | Noted: 2022-01-01

## 2022-07-12 NOTE — PROGRESS NOTES
[facility]: Office Visit Progress Note      Chief Complaint:     Gastric cancer   Liver mets      History of Present Illness:   Reason for Referral: Stomach cancer with metastasis to the liver       Referring Provider:  Dr. Sylvester Westbrook       Primary Care Provider: None       Family History of Cancer/Hematologic Disorders: None reported       Presenting Symptoms: Weight loss, trouble swallowing, right flank/lower quadrant abdominal pain, gross hematuria, and abnormal CT urogram        Mr. Blanca Haro is a 56 y.o. white male with no pertinent medical history who presented to the [facility] ED on 10/15/20 reporting right flank/ lower quadrant  abdominal pain and gross hematuria. CT urogram was obtained in the ED with findings of a large mass centered in the lesser curvature the stomach measuring up to 8.6 cm invading into the lesser omentum concerning for a primary gastric cancer; multiple  large hypoattenuating hepatic metastases; retroperitoneal metastatic adenopathy; and small non-obstructing bilateral renal calculi measuring up to 4 mm in the right with mild right hydroureter but no obstructing stone evident (findings could indicate  a recently passed or nonradiopaque stone). Upon discussion of imaging results, patient endorsed recent history of weight loss and trouble swallowing. Case was discussed with Oncologist, Dr. Thanh Escobar, who agreed to see patient in outpatient clinic on 10/19.        CT OF THE ABDOMEN AND PELVIS WITHOUT IV CONTRAST 10/15/2020   FINDINGS:   Visualized thorax: Normal.   Liver: The liver demonstrates multiple large hypoattenuating masses, the largest centered in segment 6 measuring 10.0 x 8.7 cm. Partially imaged lesion in the left hepatic lobe measuring 6.6 x 2.8 cm.    Gallbladder/biliary: Normal gallbladder.  No biliary dilatation.    Pancreas: Normal.    Spleen: Normal.    Adrenals: Normal.    Kidneys: Small bilateral nonobstructing renal calculi measuring up to 4 mm in the right inferior pole. There is mild right hydroureter but no definite radiopaque ureteral stone is evident. A calcification in the right pelvis likely reflects an intravascular  phleboliths.     Bladder: Normal.   Pelvic organs: Unremarkable prostate and seminal vesicles.    Gastrointestinal: There is a large mass partially imaged, which appears to be centered along the greater curvature of the stomach, involving the gastric body, fundus, and cardia in likely the gastroesophageal junction. The mass is ill-defined but measures  approximately 8.6 x 7.5 cm. The mass invades into the lesser omentum and may focally contacts the left diaphragmatic hans.    Peritoneum/retroperitoneum: No free fluid or worrisome peritoneal nodule. Invasion of the lesser omentum by the gastric mass, as above. Small bilateral fat-containing inguinal hernias. Lymph nodes: Retroperitoneal lymphadenopathy, including a lesser omentum metastatic node measuring 6.4 x 3.7 cm, a 2.2 x 1.5 cm retrocaval lymph node, and a 1.7 cm short axis left para-aortic lymph node.    Vessels: Unremarkable noncontrast appearance. Bones/Soft tissues: No aggressive osseous lesion. IMPRESSION:    1.  Large mass centered in the lesser curvature the stomach measuring up to 8.6 cm invading into the lesser omentum concerning for a primary gastric cancer. 2.  Multiple large hypoattenuating hepatic metastases. 3.  Retroperitoneal metastatic adenopathy. 4.  Small nonobstructing bilateral renal calculi measuring up to 4 mm in the right. There is mild right hydroureter but no obstructing stone is evident. Findings could indicate a recently passed or nonradiopaque stone.       Liver biopsy reported metastatic carcinoma c/w upper GI origin:              CT 10/21/20: IMPRESSION:     1.  Large mass involving the gastric cardia and lesser curvature measuring 7 cm   x 5 cm in size. Some involvement of the most distal esophagus cannot be   excluded.  Direct tumor spread is seen in the retroperitoneum which appears to   encase the distal celiac axis and its proximal major branches.       2. Clear hepatic metastatic disease. In addition, retroperitoneal adenopathy is   seen concerning for metastatic lymph nodes. No evidence for metastatic disease   is otherwise seen. A nonspecific 4 mm x 2 mm left lower lobe nodule is seen. Although an early metastasis is not excluded with certainty, the appearance is   felt to be nonspecific and not clearly suggestive of pulmonary metastatic   disease. Attention on follow-up studies is recommended. He started first line mFOLFIRINOX 11/5/20. Interim history update in A/P. Review of Systems:      Constitutional  Weight loss. Anorexia. Denies fever or chills. Insomnia. HEENT  Denies trauma, bluring vision, hearing loss, ear pain, nosebleeds, sore throat, neck pain and ear discharge. Skin  Denies lesions or rashes. Lungs  Denies shortness of breath, cough, sputum production or hemoptysis. Cardiovascular  Denies chest pain, palpitations, orthopnea, claudication and leg swelling. Gastrointestinal  Nausea. Diarrhea. Denies vomiting. Dark stools. Abdominal pain.   Denies dysuria, frequency or hesitancy of urination     Neuro  Finger numb. Denies headaches, visual changes or ataxia. Denies dizziness, speech change, focal weakness and headaches. Hematology  Denies nasal/gum bleeding, denies easy bruise     Endo  Denies heat/cold intolerance, denies diabetes. MSK  Denies back pain, swollen legs, myalgias and falls. Psychiatric/Behavioral   insomnia. Denies depression and substance abuse. The patient is not nervous/anxious.           No Known Allergies  Past Medical History:   Diagnosis Date    Gastric cancer ([facility] [address]     being treated currently - chemo and radiation    History of blood transfusion     6/30/2022 no rxn to this transfusion    Lazy eye, left Past Surgical History:   Procedure Laterality Date    IR PORT PLACEMENT EQUAL OR GREATER THAN 5 YEARS  11/3/2020    IR PORT PLACEMENT EQUAL OR GREATER THAN 5 YEARS  11/3/2020    IR PORT PLACEMENT EQUAL OR GREATER THAN 5 YEARS 11/3/2020 [facility]CI[facility]    ORTHOPEDIC SURGERY      ankle    TONSILLECTOMY      UPPER GASTROINTESTINAL ENDOSCOPY N/A 7/5/2022    EGD DILATION BALLOON performed by Timbo Michael MD at [address] LIVER BIOPSY PERCUTANEOUS  10/22/2020    US GUIDED LIVER BIOPSY PERCUTANEOUS 10/22/2020 D [address]     Family History   Problem Relation Age of Onset    Schizophrenia Mother     Diabetes Mother     Dementia Mother     Depression Mother      Social History     Socioeconomic History    Marital status:      Spouse name: Not on file    Number of children: Not on file    Years of education: Not on file    Highest education level: Not on file   Occupational History    Not on file   Tobacco Use    Smoking status: Never Smoker    Smokeless tobacco: Never Used   Vaping Use    Vaping Use: Never used   Substance and Sexual Activity    Alcohol use: Yes     Comment: rarely    Drug use: Never    Sexual activity: Not on file   Other Topics Concern    Not on file   Social History Narrative    Not on file     Social Determinants of Health     Financial Resource Strain:     Difficulty of Paying Living Expenses: Not on file   Food Insecurity:     Worried About [address] [employer] in the Last Year: Not on file    [address] in the Last Year: Not on file   Transportation Needs:     Lack of Transportation (Medical): Not on file    Lack of Transportation (Non-Medical):  Not on file   Physical Activity:     Days of Exercise per Week: Not on file    Minutes of Exercise per Session: Not on file   Stress:     Feeling of Stress : Not on file   Social Connections:     Frequency of Communication with Friends and Family: Not on file    Frequency of Social Gatherings with Friends and Family: Not on file    Attends [employer] Services: Not on file    Active Member of Clubs or Organizations: Not on file    Attends Club or Organization Meetings: Not on file    Marital Status: Not on file   Intimate Partner Violence:     Fear of Current or Ex-Partner: Not on file    Emotionally Abused: Not on file    Physically Abused: Not on file    Sexually Abused: Not on file   Housing Stability:     Unable to Pay for Housing in the Last Year: Not on file    Number of [place] in the Last Year: Not on file    Unstable Housing in the Last Year: Not on file     Current Outpatient Medications   Medication Sig Dispense Refill    LORazepam (ATIVAN) 1 MG tablet take 1 tablet by mouth every 6 hours as needed for nausea 90 tablet 0    UDENYCA 6 MG/0.6ML injection INJECT 6MG SUBCUTANEOUSLY EVERY 14 DAYS AS DIRECTED      diphenoxylate-atropine (LOMOTIL) 2.5-0.025 MG per tablet Take 1 tablet by mouth 4 times daily as needed.  gabapentin (NEURONTIN) 300 MG capsule Take 300 mg by mouth 2 times daily.  lidocaine-prilocaine (EMLA) 2.5-2.5 % cream Apply topically as needed      mirtazapine (REMERON) 30 MG tablet Take 30 mg by mouth nightly       ondansetron (ZOFRAN) 8 MG tablet Take 8 mg by mouth every 8 hours as needed      prochlorperazine (COMPAZINE) 10 MG tablet Take 10 mg by mouth every 6 hours as needed      zolpidem (AMBIEN CR) 12.5 MG extended release tablet Take 12.5 mg by mouth. No current facility-administered medications for this visit.      Facility-Administered Medications Ordered in Other Visits   Medication Dose Route Frequency Provider Last Rate Last Admin    atropine injection 0.4 mg  0.4 mg IntraVENous Once Barbi Hill MD           OBJECTIVE:  BP 93/63 (Site: Right Upper Arm, Position: Sitting, Cuff Size: Medium Adult)   Pulse (!) 104   Temp 97.8 °F (36.6 °C) (Oral)   Resp 15   Ht 5' 11.5\" (1.816 m)   Wt 147 lb 6.4 oz (66.9 kg)   SpO2 98%   BMI 20.27 kg/m²     Physical Exam:  Constitutional: Oriented to person, place, and time. Well-developed. Thin. HEENT: Normocephalic and atraumatic. Oropharynx is clear and moist.   Conjunctivae and EOM are normal. Pupils are equal, round, and reactive to light. No scleral icterus. Neck supple. No JVD present. No tracheal deviation present. No thyromegaly present. Lymph node No palpable submandibular, cervical, supraclavicular, axillary and inguinal lymph nodes. Skin Warm and dry. No bruising and no rash noted. No erythema. No pallor. Respiratory Effort normal and breath sounds normal.  No respiratory distress. No wheezes. No rales. No tenderness. CVS Normal rate, regular rhythm and normal heart sounds. Exam reveals no gallop, no friction and no rub. No murmur heard. Abdomen Soft. Bowel sounds are normal. Exhibits no distension. There is no tenderness. There is no rebound and no guarding. Neuro Normal reflexes. No cranial nerve deficit. Exhibits normal muscle tone, 5 of 5 strength of all extremities. MSK Normal range of motion in general.  No edema and no tenderness.    Psych Normal mood, affect, behavior, judgment and thought content      Labs:  Recent Results (from the past 24 hour(s))   CBC with Auto Differential    Collection Time: 07/12/22 11:07 AM   Result Value Ref Range    WBC 5.0 4.3 - 11.1 K/uL    RBC 1.51 (L) 4.23 - 5.6 M/uL    Hemoglobin 5.3 (LL) 13.6 - 17.2 g/dL    Hematocrit 16.2 %    .3 (H) 79.6 - 97.8 FL    MCH 35.1 (H) 26.1 - 32.9 PG    MCHC 32.7 31.4 - 35.0 g/dL    RDW 19.0 (H) 11.9 - 14.6 %    Platelets 65 (L) 746 - 450 K/uL    MPV 10.5 9.4 - 12.3 FL    nRBC 0.00 0.0 - 0.2 K/uL    Seg Neutrophils 75 43 - 78 %    Lymphocytes 14 13 - 44 %    Monocytes 10 4.0 - 12.0 %    Eosinophils % 0 (L) 0.5 - 7.8 %    Basophils 0 0.0 - 2.0 %    Immature Granulocytes 1 0.0 - 5.0 %    Segs Absolute 3.7 1.7 - 8.2 K/UL    Absolute Lymph # 0.7 0.5 - 4.6 K/UL    Absolute Mono # 0.5 0.1 - 1.3 K/UL    Absolute Eos # 0.0 0.0 - 0.8 K/UL    Basophils Absolute 0.0 0.0 - 0.2 K/UL    Absolute Immature Granulocyte 0.1 0.0 - 0.5 K/UL    RBC Comment MODERATE  ANISOCYTOSIS + POIKILOCYTOSIS        RBC Comment OCCASIONAL  OVALOCYTES        RBC Comment OCCASIONAL  TEARDROP CELLS        RBC Comment MODERATE  MACROCYTOSIS        RBC Comment SLIGHT  POLYCHROMASIA        WBC Comment MODERATE      Platelet Comment DECREASED      Differential Type AUTOMATED     Comprehensive Metabolic Panel    Collection Time: 07/12/22 11:07 AM   Result Value Ref Range    Sodium 137 136 - 145 mmol/L    Potassium 4.0 3.5 - 5.1 mmol/L    Chloride 104 98 - 107 mmol/L    CO2 26 21 - 32 mmol/L    Anion Gap 7 7 - 16 mmol/L    Glucose 103 (H) 65 - 100 mg/dL    BUN 17 6 - 23 MG/DL    CREATININE 1.00 0.8 - 1.5 MG/DL    GFR African American >60 >60 ml/min/1.73m2    GFR Non- >60 >60 ml/min/1.73m2    Calcium 8.4 8.3 - 10.4 MG/DL    Total Bilirubin 0.3 0.2 - 1.1 MG/DL    ALT 23 12 - 65 U/L    AST 43 (H) 15 - 37 U/L    Alk Phosphatase 196 (H) 50 - 136 U/L    Total Protein 6.6 6.3 - 8.2 g/dL    Albumin 3.0 (L) 3.5 - 5.0 g/dL    Globulin 3.6 (H) 2.3 - 3.5 g/dL    Albumin/Globulin Ratio 0.8 (L) 1.2 - 3.5         Imaging:  No results found for this or any previous visit. ASSESSMENT/PLAN:   Diagnosis Orders   1. Malignant neoplasm of overlapping sites of stomach (HCC)  Type and Screen   2. Severe anemia     3. Dysphagia, unspecified type     4. Thrombocytopenia ([address]     5. Hematochezia          56 y.o. M consulted for gastric cancer and liver mets in 10/2020.  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 He presented to [facility] on 10/15[facility]20 for pain and suspect kidney stone, CT urogram showed large gastric mass and liver masses, urgently  presented to  on 10/19/20 with wife, and we discussed this appeared almost certain of metastatic malignancy, I showed CT images to pt and wife per request, arrange staging CT C/A/P, liver mass biopsy, consult FC and SW for sponsorship, discussed nutrition  and manage dysphagia, refer to [facility] for Ensure.       Liver biopsy reported metastatic carcinoma c/w upper GI origin, and I discussed with pt and wife this together with the CT finding of large gastric mass is consistent with stage IV gastric cancer, not expected to be curable but palliative rx may prolong  life and improve symptoms, both were motivated to pursue and rec start mFOLFIRINOX aiming for rapid response given the symptoms of dysphagia and rapid weight loss, check Caris pending/HER2 -, need to defer [address] trip, arrange port and antiemetics, remeron  for insomnia/anorexia, ativan as needed, educate for toxicities and management, can not tolerated Ensure and he attributed to sugar, try glucerna and consult nutritionist, started cycle 1 palliative FOLFIRINOX 11/5/20, tumor marker down and bloating improved,  encouraging clinical response, increase remeron and use ativan for insomnia, laxative prn diarrhea and find out particular association with diet, due for cycle 4 and neutropenic again, add GCSF and keep schedule for his birthday arrangement, c/o low libido  and check testosterone level normal, continue remeron managing insomnia/depression, Emend for nausea, titrate imodium for proper rx of diarrhea, prepH for hemorroid, due for cycle 7 and deferred for neutropenia, returned 2/10/21 and ANC 1.3, he was reluctant  to use GCSF for the cost, discussed with FC and obtained eudenyca assistance, diarrhea responded to imodium, CT 2/10/2021 showed further shrinking of liver and the gastric mass, a periaortic lymph node will be monitored for change, reported neuropathy  worsened after cycle 9 with persistent numbness in the fingertips, discussed that the risk of worse neuropathy and hold oxaliplatin for cycle 10, neuropathy improved and proceed to cycle 12 with lower dose of oxaliplatin at 50 mg/m², monitor mild anemia  and thrombocytopenia, report mild neuropathy stable, repeat CT 5/12/2021 showed generally stable or improved disease except for the retroperitoneal lymph node next to left kidney continues to grow, which explains the increase of the CEA, we discussed  that since this appeared to be the solitary progression of disease, I referred him to radiation oncology to consider SBRT done in 6/2021, which may be cause of the CEA spike and monitor, we continued the systemic therapy with modified FOLFIRINOX with  dose modification of oxaliplatin and monitor neuropathy reportedly stable, on gabapentin, arranged for Covid vaccination now that he finally would accept it, reschedule following chemo's to give 2 weeks of response time to each vaccine dose, repeat CT  8/12/21 showed stable disease, meanwhile concerned of the CEA trending up for early disease progression versus the result of treatment delay due to coordination of Covid vaccine, he will complete second dose of vaccine on time and return in 2 weeks for  next cycle, also discussed further systemic treatment may be limited to IO versus Taxol/ramucirumab and his persistent neuropathy would be a concern, Enhertu for HER2 1+ on IHC has been proven effective in breast cancer but may need replacement if using  in gastric cancer, also discussed surgical resection given there is no new disease developed over the past 10 months although the quality of life expected to change significantly if pursuing surgery which would not be considered curative after all. CT  after cycle 22 showed disease progression of a para-aortic lymph node and a new lesion in the left adrenal gland:                      We discussed the options to add SBRT to the 2 oligo progression, versus changing to [facility], his neuropathy remains a major obstacle for changing chemotherapy, discussed with Dr. Ruslan Block and completed SBRT in 5 fractions for both sites, return on 1/19/2022,  platelet 53 and , he forgot coming for [facility] and last cycle, we had to hold off chemo and come back next week for potentially cycle 28, however he got COVID with fever for 10 days, arranged to receive monoclonal antibody, returned on 2/16/2022,  still with left lung crackles but generally recovered, started having sense of dysphagia again after off chemo for a month and half, restart FOLFIRINOX with Udenyca for neutropenia, repeat CEA, continue Remeron, Ambien for insomnia, repeat CT 3/1/2022  showed a small amount of disease progression:       We discussed that this is generally similar disease burden after 6 weeks of chemo break, discussed with Dr. Juancho Palmer for SBRT of the small new liver lesion and the left adrenal mass growth, Dr. Juancho Palmer would like to hold off to see whether more metastasis  develops, trend CEA, continue FOLFIRINOX with Udenyca for neutropenia, platelet 95 and okay to proceed with cycle 35 with dose reduced bolus 5-FU, but complaining of dysphagia and weight loss, urgent referral for GI evaluation with EGD/dilation if possible, discussed the nutrition and he reports intolerance of Ensure and appear to having lactose intolerance, try lactose-free for diet, plan to go to vacation in [place] and defer next cycle after return, call as needed.     He returned on 6/29/2022, discussed that the restaging CT yesterday showed disease progression:    Discussed that the radiographical progression and his clinical dysphagia and weight loss indicate change of therapy, discussed further options of chemotherapy with or without chemo given his CPS 30%, versus Taxol/ramucirumab which would be challenging to his neuropathy, decided to pursue authorization of single agent Keytruda, had a EGD 7/5/2022 showed GE junction friable malignant stricture that was dilated but no stent was placed, worked in on 7/12/2022 still complaining of no improvement of dysphagia and need to discuss with GI for stent placement, very tired and hemoglobin 5.3, dark stool, arrange PRBC 2 units and platelet 1 unit, return next week for [facility], call as needed. Problem complexity: high   Treatment risk of complication/morbidity: high   Irinotecan:   Cardiovascular: Vasodilation (9% to 11%)   Central nervous system: Cholinergic syndrome (47%; includes diaphoresis, flushing, increased peristalsis, lacrimation, miosis, rhinitis, sialorrhea), pain (23% to 24%), dizziness (15% to 21%), insomnia (19%), headache (17%), chills (14%)   Dermatologic: Alopecia (46% to 72%), diaphoresis (16%), skin rash (13% to 14%)   Endocrine & metabolic: Weight loss (68%), dehydration (15%)   Gastrointestinal: Diarrhea (late: 83% to 88%, grades 3/4: 14% to 31%; early: 43% to 51%, grades 3/4: 7% to 22%), nausea (70% to 86%), abdominal pain (57% to 68%), vomiting (62% to 67%), abdominal cramps (57%), anorexia (44% to 55%), constipation (30%  to 32%), mucositis (30%), flatulence (12%), stomatitis (12%)   Hematologic & oncologic: Anemia (60% to 97%; grades 3/4: 5% to 7%), leukopenia (63% to 96%, grades 3/4: 14% to 28%), thrombocytopenia (96%, grades 3/4: 1% to 4%), neutropenia (30% to 96%; grades 3/4: 14% to 31%)   Hepatic: Increased serum bilirubin (84%), increased serum alkaline phosphatase (13%)   Infection: Infection (14%)   Neuromuscular & skeletal: Weakness (69% to 76%), back pain (14%)   Respiratory: Dyspnea (22%), cough (17% to 20%), rhinitis (16%)   Miscellaneous: Fever (44% to 45%)   Oxaliplatin:   Gastrointestinal: Abdominal pain (31%), anorexia (20%), constipation (31%), diarrhea (46%), nausea (64%), stomatitis (2% to 14%), vomiting (37%)   Hematologic & oncologic: Anemia (64%; grades 3/4: 1%), leukopenia (13%).  thrombocytopenia (30%; grades 3/4: 3%)   Hepatic: Increased serum alanine aminotransferase (36%), increased serum aspartate aminotransferase (54%), increased serum bilirubin (13%)   Nervous system: Fatigue (61%), headache (13%), insomnia (11%), pain (14%), peripheral neuropathy (may be dose limiting; 76%, grades 3/4: 7%; acute 65%; grades 3/4: 5%; persistent 43%; grades 3/4: 3%)   Neuromuscular & skeletal: Back pain (11%)   Respiratory: Cough (11%), dyspnea (13%)   Miscellaneous: Fever (25%)   5FU:   Cardiovascular: Angina pectoris, cardiac arrhythmia, cardiac failure, cerebrovascular accident, ischemic heart disease, local thrombophlebitis, myocardial infarction, vasospasm, ventricular ectopy   Central nervous system: Cerebellar syndrome (acute), confusion, disorientation, euphoria, headache   Dermatologic: Alopecia, changes in nails (including nail loss), dermatitis, hyperpigmentation (supravenous), maculopapular rash (pruritic), palmar-plantar erythrodysesthesia, skin fissure, skin photosensitivity, Young-Sushant syndrome, toxic epidermal  necrolysis, xeroderma   Gastrointestinal: Anorexia, diarrhea, esophagopharyngitis, gastrointestinal hemorrhage, gastrointestinal ulcer, mesenteric ischemia (acute), nausea, stomatitis, tissue sloughing (gastrointestinal), vomiting   Hematologic & oncologic: Agranulocytosis, anemia, leukopenia (sally: days 9 to 14; recovery by day 30), pancytopenia, thrombocytopenia   Hypersensitivity: Anaphylaxis, hypersensitivity reaction (generalized)   Ophthalmic: Lacrimal stenosis, lacrimation, nystagmus, photophobia, visual disturbance   Respiratory: Epistaxis    All questions are answered to their satisfaction. They will call for further questions and concerns. ECOG PERFORMANCE STATUS - 1- Restricted in physically strenuous activity but ambulatory and able to carry out work of a light or sedentary nature such as light house work, office work. Pain - 0 - No pain/10. None/Minimal pain - not affecting QOL     Fatigue - No flowsheet data found. Distress - No flowsheet data found. Elements of this note have been dictated via voice recognition software.   Text and phrases may be limited by the accuracy and autoconversion of the software. The chart has been reviewed, but errors may still be present. Alice Jauregui M.D.   [address]  Office : [phone]  Fax : [phone]

## 2022-07-13 NOTE — PROGRESS NOTES
[facility] 2 units PRBC's and 1 unit Plt's completed. Patient tolerated well. 1 liter NS given IV for low blood pressure. Pt's blood pressure increased after IV fluids. Blood pressure 98/54. Any issues or concerns during appointment: none. Patient aware of next infusion appointment on 7-21-22 (date) at 1400 (time). Patient instructed to call provider with temperature of 100.4 or greater or nausea/vomiting/ diarrhea or pain not controlled by medications  Discharged via w/c.

## 2022-07-13 NOTE — PERIOP NOTE
Dear Mr. Hugo,      Thank you for completing your phone assessment with me today. Here are your requested procedure instructions. Please call #[phone] with any questions/concerns. Your procedure is scheduled at [address] [facility], [place], [address]. Please arrive at MAIN Entrance; GI Lab (#[phone]) will call you on the business day before your surgery with your arrival time. If you have any questions on the day of procedure, please call the GI dept. at the telephone number above. Follow procedure instructions for nothing by mouth or colonoscopy prep received from the GI/Surgeon office. Please take these medications on the morning of the procedure with a small sip of water: gabapentin, protonix and ativan if needed. Please stop all vitamins/supplements 7 days prior to the procedure and stop all NSAIDS (ibuprofen, naproxen, aleve, motrin, advil) 5 days before your procedure. A responsible adult must drive you to the hospital, remain in the building during the procedure and you will need adult supervision for 24 hours after anesthesia. Please use a non-moisturizing soap the night before the procedure and on the morning of the procedure. Do NOT wear: make-up, nail polish, lotions, cologne, perfumes, powders or oil on your skin. All piercings/metal/jewelry must be removed prior to arrival.  If you wear contacts then you will need to bring a case to store them in or wear your glasses. Deodorant is acceptable with all EGDs and/or colonoscopies. Medication given during procedure may cause drowsiness for several hours, therefore, do not drive or operate machinery for remainder of the day. You may not drink alcohol on the day of your procedure, please resume regular diet and activity unless otherwise directed. You may experience abdominal distention for several hours that is relieved by the passage of gas.  Contact your physician if you have any of the

## 2022-07-13 NOTE — PROGRESS NOTES
Clinical Social Work Note  Name: Heather Norman    : 1975    MRN: [id number]    Date of Service: 2022    Type of Service: Health and Behavior Intervention     Length of Service: 16 minutes    Patient Diagnosis:   1. Severe anemia    2. Malignant neoplasm of overlapping sites of stomach ([facility] [address]    3. Thrombocytopenia [facility])         Referral Source: Infusion RN    Reason for Visit: F/U    Seen patient with his wife of 15 years, provided therapeutic reassurance. EPI-CP gave information on [facility], Cancer and Mental Health. Mini Mental Status Exam: Heather Norman was dressed properly. No abnormal psychomotor movements observed. Intellectual functioning appeared to be intact. Insight was adequate. Judgment was adequate. Patient did not report suicidal ideations, intent or plans. Speech was coherent. Thought process was clear. Patient did not report homicidal ideations, intent or plans. Patient was oriented to self, place, time and situation. Protective Factors: Current care for physical and mental illness, adequate insight and judgment, family support, cultural and [employer] beliefs and values that support self-care. Next Steps: EPI-EMORY gave contact information and encouraged pt to call should any needs arise. Pt verbalized understanding. EPI -CP intends to follow up as needed. No flowsheet data found.         Electronically Signed By:  EPI Tsai

## 2022-07-13 NOTE — PERIOP NOTE
Patient verified name, , and procedure. Type: 1a; abbreviated assessment per anesthesia guidelines  Labs per surgeon: Unknown  Labs per anesthesia: None per protocol      Instructed pt that they will be notified by the doctor office for time of arrival to GI lab. If any questions please call the GI lab at [phone]. Follow diet and prep instructions per office. May have clear liquids until 4 hours prior to time of arrival.    Ambrose Roxo or shower the night before and the am of surgery with antibacterial soap. No lotions, oils, powders, cologne on skin. No make up, eye make up or jewelry. Wear loose fitting comfortable, clean clothing. Must have adult present in building the entire time . Medications for the day of procedure: gabapentin, ativan If needed and protonix    The following discharge instructions reviewed with patient: medication given during procedure may cause drowsiness for several hours, therefore, do not drive or operate machinery for remainder of the day, no alcohol on the day of your procedure, resume regular diet and activity unless otherwise directed, for mild sore throat you may use Cepacol throat lozenges or warm salt water gargles as needed, call your physician for any problems or questions. Patient verbalizes understanding.

## 2022-07-14 NOTE — H&P
[facility] H&P (Admission)        Date:  7/14/2022    Chief Complaint:  Failure to thrive, difficulty swallowing, food regurgitation     Subjective:     History of Present Illness:  Patient is a 56 y.o. being admitted for EGD with possible stent, PEG placement or both. PMH:  Past Medical History:   Diagnosis Date    Gastric cancer ([facility] [address]     being treated currently - chemo and radiation    History of blood transfusion     6/30/2022 no rxn to this transfusion    Lazy eye, left        PSH:  Past Surgical History:   Procedure Laterality Date    IR PORT PLACEMENT EQUAL OR GREATER THAN 5 YEARS  11/3/2020    IR PORT PLACEMENT EQUAL OR GREATER THAN 5 YEARS  11/3/2020    IR PORT PLACEMENT EQUAL OR GREATER THAN 5 YEARS 11/3/2020 [facility]    ORTHOPEDIC SURGERY      ankle    TONSILLECTOMY      UPPER GASTROINTESTINAL ENDOSCOPY N/A 7/5/2022    EGD DILATION BALLOON performed by Lynda Rueda MD at [address]  10/22/2020    US GUIDED LIVER BIOPSY PERCUTANEOUS 10/22/2020 [facility]       Allergies:  No Known Allergies    Home Medications:  Prior to Admission medications    Medication Sig Start Date End Date Taking? Authorizing Provider   pantoprazole (PROTONIX) 40 MG tablet Take 40 mg by mouth daily   Yes Historical Provider, MD   LORazepam (ATIVAN) 1 MG tablet take 1 tablet by mouth every 6 hours as needed for nausea 7/11/22 8/10/22  Karina Evans MD   UDENYCA 6 MG/0.6ML injection INJECT 6MG SUBCUTANEOUSLY EVERY 14 DAYS AS DIRECTED 6/27/22   Historical Provider, MD   diphenoxylate-atropine (LOMOTIL) 2.5-0.025 MG per tablet Take 1 tablet by mouth 4 times daily as needed. 9/16/21   Ar Automatic Reconciliation   gabapentin (NEURONTIN) 300 MG capsule Take 300 mg by mouth 2 times daily.  3/21/22   Ar Automatic Reconciliation   lidocaine-prilocaine (EMLA) 2.5-2.5 % cream Apply topically as needed 2/24/21   Ar Automatic Reconciliation mirtazapine (REMERON) 30 MG tablet Take 30 mg by mouth nightly  2/21/22   Ar Automatic Reconciliation   ondansetron (ZOFRAN) 8 MG tablet Take 8 mg by mouth every 8 hours as needed 10/28/20   Ar Automatic Reconciliation   prochlorperazine (COMPAZINE) 10 MG tablet Take 10 mg by mouth every 6 hours as needed 10/28/20   Ar Automatic Reconciliation   zolpidem (AMBIEN CR) 12.5 MG extended release tablet Take 12.5 mg by mouth. Patient not taking: Reported on 7/13/2022 1/3/22   Ar Automatic Reconciliation       Hospital Medications:  Current Facility-Administered Medications   Medication Dose Route Frequency    lactated ringers infusion   IntraVENous Continuous    sodium chloride flush 0.9 % injection 5-40 mL  5-40 mL IntraVENous 2 times per day    sodium chloride flush 0.9 % injection 5-40 mL  5-40 mL IntraVENous PRN    0.9 % sodium chloride infusion   IntraVENous PRN    famotidine (PEPCID) 20 mg in sodium chloride (PF) 10 mL injection  20 mg IntraVENous Q12H     Facility-Administered Medications Ordered in Other Encounters   Medication Dose Route Frequency    lactated ringers infusion   IntraVENous Continuous PRN    atropine injection 0.4 mg  0.4 mg IntraVENous Once       Social History:  Social History     Tobacco Use    Smoking status: Never Smoker    Smokeless tobacco: Never Used   Substance Use Topics    Alcohol use: Yes     Comment: rarely         Family History:  Family History   Problem Relation Age of Onset    Schizophrenia Mother     Diabetes Mother     Dementia Mother     Depression Mother        Review of Systems:  A detailed 10 system ROS is obtained, with pertinent positives as listed above. All others are negative. Objective:     Physical Exam:  Vitals:  BP (!) 88/53   Pulse 76   Temp 98.7 °F (37.1 °C) (Oral)   Resp 18   Ht 5' 11\" (1.803 m)   Wt 175 lb (79.4 kg)   SpO2 97%   BMI 24.41 kg/m²   Gen: Ill of hearing. Muscle wasting.    Skin: no large lesions  HEENT: MMM  Cardiovascular: Regular rate and rhythm. No murmurs, gallops, or rubs. Respiratory:  Comfortable breathing with no accessory muscle use. Clear breath sounds with no wheezes, rales, or rhonchi. GI:  Abdomen nondistended, soft, and nontender. Normal active bowel sounds. Neurological:  Gross memory appears intact. Patient is alert and oriented. Psychiatric:  Mood appears appropriate with judgement intact. Laboratory:    Recent Labs     07/12/22  1107   WBC 5.0   HGB 5.3*   HCT 16.2   PLT 65*   .3*      K 4.0      CO2 26   BUN 17       Assessment:       Active Problems:    * No active hospital problems. *  Resolved Problems:    * No resolved hospital problems. *      Plan:     Endoscopy and risks explained to the patient. Risks including reaction to sedation, cardiopulmonary events, infection, bleeding, perforation, requirement for surgery if complications should occur, death were explained to patient who expressed understanding and agreed to proceed with endoscopy.         Parag Jaquez MD  [facility], [place]

## 2022-07-14 NOTE — ANESTHESIA PROCEDURE NOTES
Airway  Date/Time: 7/14/2022 1:29 PM  Urgency: elective    Airway not difficult    General Information and Staff    Patient location during procedure: OR  Performed: resident/CRNA     Indications and Patient Condition  Indications for airway management: anesthesia  Spontaneous Ventilation: absent  Sedation level: deep  Preoxygenated: yes  Patient position: sniffing  MILS not maintained throughout  Mask difficulty assessment: not attempted    Final Airway Details  Final airway type: endotracheal airway      Successful airway: ETT  Cuffed: yes   Successful intubation technique: direct laryngoscopy  Facilitating devices/methods: intubating stylet  Endotracheal tube insertion site: oral  Blade: Watts  Blade size: #2  ETT size (mm): 8.0  Cormack-Lehane Classification: grade I - full view of glottis  Placement verified by: chest auscultation and capnometry   Inital cuff pressure (cm H2O): 10  Measured from: lips  ETT to lips (cm): 22  Number of attempts at approach: 1  Ventilation between attempts: bag mask  Number of other approaches attempted: 0    Additional Comments  DL x 1 attempt as noted above. Atraumatic. Teeth, lips, gums intact.   no

## 2022-07-14 NOTE — ANESTHESIA PRE PROCEDURE
Department of Anesthesiology  Preprocedure Note       Name:  Eva Berg   Age:  56 y.o.  :  1975                                          MRN:  [id number]         Date:  2022      Surgeon: Janel Reese):  Luna Saunders MD    Procedure: Procedure(s):  EGD STENT PLACEMENT/ POSS PEG/ 20    Medications prior to admission:   Prior to Admission medications    Medication Sig Start Date End Date Taking? Authorizing Provider   pantoprazole (PROTONIX) 40 MG tablet Take 40 mg by mouth daily   Yes Historical Provider, MD   LORazepam (ATIVAN) 1 MG tablet take 1 tablet by mouth every 6 hours as needed for nausea 7/11/22 8/10/22  Agnieszka Sung MD   UDENYCA 6 MG/0.6ML injection INJECT 6MG SUBCUTANEOUSLY EVERY 14 DAYS AS DIRECTED 22   Historical Provider, MD   diphenoxylate-atropine (LOMOTIL) 2.5-0.025 MG per tablet Take 1 tablet by mouth 4 times daily as needed. 21   Ar Automatic Reconciliation   gabapentin (NEURONTIN) 300 MG capsule Take 300 mg by mouth 2 times daily. 3/21/22   Ar Automatic Reconciliation   lidocaine-prilocaine (EMLA) 2.5-2.5 % cream Apply topically as needed 21   Ar Automatic Reconciliation   mirtazapine (REMERON) 30 MG tablet Take 30 mg by mouth nightly  22   Ar Automatic Reconciliation   ondansetron (ZOFRAN) 8 MG tablet Take 8 mg by mouth every 8 hours as needed 10/28/20   Ar Automatic Reconciliation   prochlorperazine (COMPAZINE) 10 MG tablet Take 10 mg by mouth every 6 hours as needed 10/28/20   Ar Automatic Reconciliation   zolpidem (AMBIEN CR) 12.5 MG extended release tablet Take 12.5 mg by mouth. Patient not taking: Reported on 2022 1/3/22   Ar Automatic Reconciliation       Current medications:    No current facility-administered medications for this encounter.      Current Outpatient Medications   Medication Sig Dispense Refill    pantoprazole (PROTONIX) 40 MG tablet Take 40 mg by mouth daily      LORazepam (ATIVAN) 1 MG tablet take 1 tablet by mouth every 6 hours as needed for nausea 90 tablet 0    UDENYCA 6 MG/0.6ML injection INJECT 6MG SUBCUTANEOUSLY EVERY 14 DAYS AS DIRECTED      diphenoxylate-atropine (LOMOTIL) 2.5-0.025 MG per tablet Take 1 tablet by mouth 4 times daily as needed.  gabapentin (NEURONTIN) 300 MG capsule Take 300 mg by mouth 2 times daily.  lidocaine-prilocaine (EMLA) 2.5-2.5 % cream Apply topically as needed      mirtazapine (REMERON) 30 MG tablet Take 30 mg by mouth nightly       ondansetron (ZOFRAN) 8 MG tablet Take 8 mg by mouth every 8 hours as needed      prochlorperazine (COMPAZINE) 10 MG tablet Take 10 mg by mouth every 6 hours as needed      zolpidem (AMBIEN CR) 12.5 MG extended release tablet Take 12.5 mg by mouth.  (Patient not taking: Reported on 7/13/2022)       Facility-Administered Medications Ordered in Other Encounters   Medication Dose Route Frequency Provider Last Rate Last Admin    atropine injection 0.4 mg  0.4 mg IntraVENous Once Robert Cheng MD           Allergies:  No Known Allergies    Problem List:    Patient Active Problem List   Diagnosis Code    Urgency of urination R39.15    Malignant neoplasm of overlapping sites of stomach ([address] C16.8    CINV (chemotherapy-induced nausea and vomiting) R11.2, T45.1X5A    Severe anemia D64.9    Thrombocytopenia ([address] D69.6       Past Medical History:        Diagnosis Date    Gastric cancer ([address]     being treated currently - chemo and radiation    History of blood transfusion     6/30/2022 no rxn to this transfusion    Lazy eye, left        Past Surgical History:        Procedure Laterality Date    IR PORT PLACEMENT EQUAL OR GREATER THAN 5 YEARS  11/3/2020    IR PORT PLACEMENT EQUAL OR GREATER THAN 5 YEARS  11/3/2020    IR PORT PLACEMENT EQUAL OR GREATER THAN 5 YEARS 11/3/2020 [facility]    ORTHOPEDIC SURGERY      ankle    TONSILLECTOMY      UPPER GASTROINTESTINAL ENDOSCOPY N/A 7/5/2022    EGD DILATION BALLOON performed by Monie Fraser MD at [address] LIVER BIOPSY PERCUTANEOUS  10/22/2020    US GUIDED LIVER BIOPSY PERCUTANEOUS 10/22/2020 [facility]       Social History:    Social History     Tobacco Use    Smoking status: Never Smoker    Smokeless tobacco: Never Used   Substance Use Topics    Alcohol use: Yes     Comment: rarely                                Counseling given: Not Answered      Vital Signs (Current):   Vitals:    07/13/22 1131   Weight: 147 lb (66.7 kg)   Height: 5' 11.5\" (1.816 m)                                              BP Readings from Last 3 Encounters:   07/13/22 (!) 98/54   07/12/22 93/63   07/05/22 (!) 96/55       NPO Status:                                                                                 BMI:   Wt Readings from Last 3 Encounters:   07/13/22 147 lb (66.7 kg)   07/12/22 147 lb 6.4 oz (66.9 kg)   07/05/22 155 lb (70.3 kg)     Body mass index is 20.22 kg/m². CBC:   Lab Results   Component Value Date/Time    WBC 5.0 07/12/2022 11:07 AM    RBC 1.51 07/12/2022 11:07 AM    HGB 5.3 07/12/2022 11:07 AM    HCT 16.2 07/12/2022 11:07 AM    .3 07/12/2022 11:07 AM    RDW 19.0 07/12/2022 11:07 AM    PLT 65 07/12/2022 11:07 AM       CMP:   Lab Results   Component Value Date/Time     07/12/2022 11:07 AM    K 4.0 07/12/2022 11:07 AM     07/12/2022 11:07 AM    CO2 26 07/12/2022 11:07 AM    BUN 17 07/12/2022 11:07 AM    CREATININE 1.00 07/12/2022 11:07 AM    GFRAA >60 07/12/2022 11:07 AM    AGRATIO 1.1 05/18/2022 03:21 PM    LABGLOM >60 07/12/2022 11:07 AM    GLUCOSE 103 07/12/2022 11:07 AM    PROT 6.6 07/12/2022 11:07 AM    CALCIUM 8.4 07/12/2022 11:07 AM    BILITOT 0.3 07/12/2022 11:07 AM    ALKPHOS 196 07/12/2022 11:07 AM    ALKPHOS 191 05/18/2022 03:21 PM    AST 43 07/12/2022 11:07 AM    ALT 23 07/12/2022 11:07 AM       POC Tests: No results for input(s): POCGLU, POCNA, POCK, POCCL, POCBUN, POCHEMO, POCHCT in the last 72 hours.     Coags: No results found for: PROTIME, INR, APTT    HCG (If Applicable): No results found for: PREGTESTUR, PREGSERUM, HCG, HCGQUANT     ABGs: No results found for: PHART, PO2ART, PLH6KAV, JLD8XAI, BEART, W9HWHCNF     Type & Screen (If Applicable):  No results found for: LABABO, LABRH    Drug/Infectious Status (If Applicable):  No results found for: HIV, HEPCAB    COVID-19 Screening (If Applicable):   Lab Results   Component Value Date/Time    COVID19 Not detected 11/20/2020 06:32 AM    COVID19 Negative 11/20/2020 06:32 AM           Anesthesia Evaluation  Patient summary reviewed  Airway: Mallampati: II  TM distance: >3 FB   Neck ROM: full  Comment: Full beard     Dental: normal exam         Pulmonary:Negative Pulmonary ROS breath sounds clear to auscultation                             Cardiovascular:  Exercise tolerance: good (>4 METS),           Rhythm: regular  Rate: normal                    Neuro/Psych:                ROS comment: Lazy eye, left GI/Hepatic/Renal:            ROS comment: Gastric cancer    Esophageal dysphagia   No nausea today. Endo/Other:    (+) blood dyscrasia (Recent blood transfusion 7/12/22): anemia and thrombocytopenia:., malignancy/cancer. ROS comment: Malignant neoplasm of overlapping sites of stomach   CINV (chemotherapy-induced nausea and vomiting)  Severe anemia  Thrombocytopenia     Received 2 units PRBCs and platelets yesterday for hgb 5.3     Abdominal:             Vascular: negative vascular ROS. Other Findings:           Anesthesia Plan      TIVA and general     ASA 3     (Initially TIVA - converted to GETA due to contents within the stomach including blood/clot and tumor)  Induction: intravenous. Anesthetic plan and risks discussed with patient.                         Melynda Carrel, MD   7/14/2022

## 2022-07-14 NOTE — PROCEDURES
Esophagogastroduodenoscopy    DATE of PROCEDURE: 7/14/2022    INDICATIONS:  1. Dysphagia  2. Food regurgitation  3. Failure to thrive    MEDICATIONS ADMINISTERED: MAC    INSTRUMENT: GIF    PROCEDURE:  After obtaining informed consent, the patient was placed in the left lateral position and sedated. The endoscope was advanced under direct vision without difficulty. The esophagus, stomach (including retroflexed views) and duodenum were evaluated. The patient was taken to the recovery area in stable condition. FINDINGS:  ESOPHAGUS: obstructing tumor starting 5 cm above the GE junction and 1 cm distally. Stent options were limited, and only one 23 mm stent was available. A 23 x 155 mm stent was placed across the tumor under fluoroscopy. Fluoroscopy and endoscopy used to confirm placement. Distal end was in the stomach and unobstructed. STOMACH: non obstructing tumor and blood present  DUODENUM: normal    Estimated blood loss: 0-minimal     PLAN:  1. Proceed with diet, take small bites and chew well  2.  PEG if unable to thrive    Gerald Caldwell MD  [facility], [place]

## 2022-07-14 NOTE — ANESTHESIA POSTPROCEDURE EVALUATION
Department of Anesthesiology  Postprocedure Note    Patient: Allan Salas  MRN: [id number]  YOB: 1975  Date of evaluation: 7/14/2022      Procedure Summary     Date: 07/14/22 Room / Location: [facility] ENDO FLOURO 1 / [facility] ENDOSCOPY    Anesthesia Start: 7564 Anesthesia Stop: 6478    Procedure: EGD STENT PLACEMENT (N/A Upper GI Region) Diagnosis:       Esophageal dysphagia      Malignant neoplasm of fundus of stomach ([address]      (Esophageal dysphagia [R13.19])      (Malignant neoplasm of fundus of stomach ([address] [C16.1])    Surgeons: Shady Parrish MD Responsible Provider: Mayda Sainz MD    Anesthesia Type: General ASA Status: 3          Anesthesia Type: General    Ina Phase I: Ian Score: 8    Ian Phase II:        Anesthesia Post Evaluation    Patient location during evaluation: PACU  Patient participation: complete - patient participated  Level of consciousness: awake and alert  Pain score: 1  Airway patency: patent  Nausea & Vomiting: no nausea and no vomiting  Complications: no  Cardiovascular status: hemodynamically stable  Respiratory status: acceptable, nonlabored ventilation and spontaneous ventilation  Hydration status: euvolemic  Comments: BP (!) 108/59   Pulse 84   Temp 97.3 °F (36.3 °C) (Tympanic)   Resp 20   Ht 5' 11\" (1.803 m)   Wt 175 lb (79.4 kg)   SpO2 100%   BMI 24.41 kg/m²     Multimodal analgesia pain management approach

## 2022-07-17 PROBLEM — E77.8 HYPOPROTEINEMIA (HCC): Status: ACTIVE | Noted: 2022-01-01

## 2022-07-17 PROBLEM — C16.8 MALIGNANT NEOPLASM OF OVERLAPPING SITES OF STOMACH (HCC): Status: ACTIVE | Noted: 2020-10-28

## 2022-07-17 PROBLEM — Z86.16 PERSONAL HISTORY OF COVID-19: Status: ACTIVE | Noted: 2022-01-01

## 2022-07-17 PROBLEM — K92.2 ACUTE GASTROINTESTINAL BLEEDING: Status: ACTIVE | Noted: 2022-01-01

## 2022-07-17 PROBLEM — U07.1 COVID: Status: RESOLVED | Noted: 2022-01-01 | Resolved: 2022-01-01

## 2022-07-17 PROBLEM — U07.1 COVID: Status: ACTIVE | Noted: 2022-01-01

## 2022-07-17 NOTE — ED PROVIDER NOTES
[facility] Provider Note                   PCP:                None Provider               Age: 56 y.o. Sex: male     No diagnosis found. DISPOSITION          MDM  Number of Diagnoses or Management Options  Anemia, unspecified type  Diagnosis management comments: We will plan to transfuse him blood         Orders Placed This Encounter   Procedures    CBC with Auto Differential    Comprehensive Metabolic Panel    Hemoglobin and Hematocrit    Verify hospital blood product consent form has been signed and witnessed    Vital Signs For Blood Product Transfusion    Transfusion Reaction Management    TYPE AND SCREEN    PREPARE RBC (CROSSMATCH), 1 Units        Luiz Jeffries is a 56 y.o. male who presents to the Emergency Department with chief complaint of    Chief Complaint   Patient presents with    Fatigue      50-year-old gentleman presents with concerns about feeling weak and fatigued. He has a history of gastric cancer for which she is either losing blood or not making blood. Said to 3 weeks ago he needed 2 units of blood and platelets because his hemoglobin was only 5.3. He said when he was out doing some grocery shopping he felt very weak and fatigued and was worried that he was once again very anemic. Today's hemoglobin appears to be 5.4. He denies any pain and says he had some mild nausea vomiting but nothing significant. No other associated symptoms. Elements of this note were created using speech recognition software. As such, errors of speech recognition may be present. Review of Systems   Constitutional:  Positive for fatigue. Negative for activity change, chills and fever. HENT:  Negative for congestion and sore throat. Eyes:  Negative for redness and visual disturbance. Respiratory:  Negative for cough, shortness of breath and wheezing. Cardiovascular:  Negative for chest pain and palpitations.    Gastrointestinal:  Negative for abdominal pain, diarrhea, nausea and vomiting. Endocrine: Negative for polydipsia and polyuria. Genitourinary:  Negative for flank pain and hematuria. Musculoskeletal:  Negative for joint swelling and myalgias. Skin:  Negative for color change and rash. Allergic/Immunologic: Negative for immunocompromised state. Neurological:  Positive for light-headedness. Negative for dizziness and headaches. Hematological:  Negative for adenopathy. Psychiatric/Behavioral:  Negative for confusion. Past Medical History:   Diagnosis Date    Gastric cancer [facility])     being treated currently - chemo and radiation    History of blood transfusion     6/30/2022 no rxn to this transfusion    Lazy eye, left         Past Surgical History:   Procedure Laterality Date    IR PORT PLACEMENT EQUAL OR GREATER THAN 5 YEARS  11/3/2020    IR PORT PLACEMENT EQUAL OR GREATER THAN 5 YEARS  11/3/2020    IR PORT PLACEMENT EQUAL OR GREATER THAN 5 YEARS 11/3/2020 [facility] RADIOLOGY SPECIALS    ORTHOPEDIC SURGERY      ankle    TONSILLECTOMY      UPPER GASTROINTESTINAL ENDOSCOPY N/A 7/5/2022    EGD DILATION BALLOON performed by Jason Mathis MD at [address] N/A 7/14/2022    EGD STENT PLACEMENT performed by Dannie Romo MD at [address]  10/22/2020    US GUIDED LIVER BIOPSY PERCUTANEOUS 10/22/2020 [facility] [address]        Family History   Problem Relation Age of Onset    Schizophrenia Mother     Diabetes Mother     Dementia Mother     Depression Mother         Social Connections: Not on file        No Known Allergies     Vitals signs and nursing note reviewed. Patient Vitals for the past 4 hrs:   Temp Pulse Resp BP SpO2   07/17/22 1315 98.7 °F (37.1 °C) (!) 116 16 96/63 98 %          Physical Exam  Vitals and nursing note reviewed. Constitutional:       Appearance: Normal appearance. HENT:      Head: Normocephalic and atraumatic.    Cardiovascular: Rate and Rhythm: Regular rhythm. Comments: Tachycardic  Pulmonary:      Effort: Pulmonary effort is normal.      Breath sounds: Normal breath sounds. Abdominal:      General: Bowel sounds are normal.      Palpations: Abdomen is soft. Tenderness: There is no abdominal tenderness. There is no rebound. Neurological:      General: No focal deficit present. Mental Status: He is alert and oriented to person, place, and time. Procedures      Labs Reviewed   CBC WITH AUTO DIFFERENTIAL - Abnormal; Notable for the following components:       Result Value    WBC 13.9 (*)     RBC 1.68 (*)     Hemoglobin 5.4 (*)     Hematocrit 16.4 (*)     RDW 21.5 (*)     Platelets 99 (*)     Seg Neutrophils 84 (*)     Lymphocytes 7 (*)     Eosinophils % 0 (*)     Segs Absolute 11.6 (*)     All other components within normal limits   COMPREHENSIVE METABOLIC PANEL   TYPE AND SCREEN   PREPARE RBC (CROSSMATCH)        No orders to display                            Voice dictation software was used during the making of this note. This software is not perfect and grammatical and other typographical errors may be present. This note has not been completely proofread for errors.        Noris Kovacs MD  07/17/22 2553

## 2022-07-17 NOTE — PROGRESS NOTES
TRANSFER - IN REPORT:    Verbal report received from [facility] on [place]  being received from ER for routine progression of patient care      Report consisted of patient's Situation, Background, Assessment and   Recommendations(SBAR). Information from the following report(s) Nurse Handoff Report and Recent Results was reviewed with the receiving nurse. Opportunity for questions and clarification was provided. Assessment completed upon patient's arrival to unit and care assumed.

## 2022-07-17 NOTE — ED NOTES
TRANSFER - OUT REPORT:    Verbal report given to Jose Mata RN on Allan Salas  being transferred to [address] [place] [address] for routine progression of patient care       Report consisted of patient's Situation, Background, Assessment and   Recommendations(SBAR). Information from the following report(s) ED Encounter Summary was reviewed with the receiving nurse. Lines:   Single Lumen Implantable Port Right Subclavian (Active)       Peripheral IV 07/17/22 Right Antecubital (Active)   Site Assessment Clean, dry & intact 07/17/22 1332   Line Status Blood return noted;Normal saline locked;Specimen collected; Flushed;Capped 07/17/22 1332   Phlebitis Assessment No symptoms 07/17/22 1332   Infiltration Assessment 0 07/17/22 1332   Dressing Status New dressing applied;Clean, dry & intact 07/17/22 1332   Dressing Type Transparent 07/17/22 1332   Dressing Intervention New 07/17/22 1332       Peripheral IV 07/17/22 Left Forearm (Active)   Site Assessment Clean, dry & intact 07/17/22 1415   Line Status Blood return noted; Flushed 07/17/22 1415   Phlebitis Assessment No symptoms 07/17/22 1415   Infiltration Assessment 0 07/17/22 1415        Opportunity for questions and clarification was provided.       Patient transported with:  Registered NurseJohn Armas RN  07/17/22 5167

## 2022-07-17 NOTE — H&P
Hospitalist History and Physical   Admit Date:  2022  1:54 PM   Name:  Mat Aragon   Age:  56 y.o. Sex:  male  :  1975   MRN:  [id number]   Room:  [facility]/    Presenting Complaint: Fatigue     Reason(s) for Admission: Acute gastrointestinal bleeding [K92.2]  Anemia, unspecified type [D64.9]     History of Present Illness:   Mat Aragon is a 56 y.o. male with medical history of stomach cancer with metastasis to liver who presented with weakness. Gastric cancer had been obstructing his esophagus and preventing him from eating until his stent was placed few days ago. The mass was noted to be highly friable. He is required blood transfusions recently. Because of his history and his weakness he felt it was necessary to be evaluated in the emergency department. He has not had a bowel movement since resumption of diet. He is passing flatus. In the emergency department he was found to have a hemoglobin of 5.4. He was admitted for further evaluation and management. Review of Systems:  10 systems reviewed and negative except as noted in HPI. Assessment & Plan:   Acute gastrointestinal bleeding  Severe anemia  Admission Hgb 5.4; transfuse PRBC 1U ;  -Transfuse Hgb < 7  -Serial CBC    Thrombocytopenia  -Transfuse Plt < 50    Hypoproteinemia   -Nutrition consult    Malignant neoplasm of overlapping sites of stomach  -Courtesy call to oncology in a.m. Patient is critically ill. Without intervention, there is a high probability of acute organ impairment or life-threatening deterioration in the patient's condition from: Symptomatic anemia  Critical care interventions: Blood transfusion  Total critical care time spent: 37 minutes. Time is indicative of direct patient attendance at bedside and on the patient's floor nearby.   Includes time spent at bedside performing history and exam, performing chart review, discussing findings and treatment plan with patient and/or family, discussing patient with nursing staff, consultants and colleagues, and ordering/reviewing pertinent laboratory and radiographic evaluations. Time excludes procedures. CPT:  61192: First 30-74 minutes  47699: Each block of 30 min. beyond 76          Dispo/Discharge Planning:   Pending clinical stability    Diet: ADULT DIET; Regular  VTE ppx: SCDs  Code status: Full Code    Hospital Problems:  Principal Problem:    Acute gastrointestinal bleeding  Active Problems:    Severe anemia    Thrombocytopenia (HCC)    Hypoproteinemia (HCC)    Malignant neoplasm of overlapping sites of stomach (HCC)  Resolved Problems:    * No resolved hospital problems.  *       Past History:     Past Medical History:   Diagnosis Date    COVID 1/30/2022    Gastric cancer ([facility] [address]     being treated currently - chemo and radiation    History of blood transfusion     6/30/2022 no rxn to this transfusion    Lazy eye, left      Past Surgical History:   Procedure Laterality Date    IR PORT PLACEMENT EQUAL OR GREATER THAN 5 YEARS  11/3/2020    IR PORT PLACEMENT EQUAL OR GREATER THAN 5 YEARS  11/3/2020    IR PORT PLACEMENT EQUAL OR GREATER THAN 5 YEARS 11/3/2020 [facility] RADIOLOGY SPECIALS    ORTHOPEDIC SURGERY      ankle    TONSILLECTOMY      UPPER GASTROINTESTINAL ENDOSCOPY N/A 7/5/2022    EGD DILATION BALLOON performed by Smooth Roberts MD at [address] [place] [address] 7/14/2022    EGD STENT PLACEMENT performed by Steven Galo MD at [address]  10/22/2020    US GUIDED LIVER BIOPSY PERCUTANEOUS 10/22/2020 [facility] RADIOLOGY US      Social History     Tobacco Use    Smoking status: Never    Smokeless tobacco: Never   Substance Use Topics    Alcohol use: Yes     Comment: rarely      Social History     Substance and Sexual Activity   Drug Use Never     Family History   Problem Relation Age of Onset    Schizophrenia Mother     Diabetes Mother     Dementia Mother     Depression Mother Family history reviewed and negative except as noted above. Immunization History   Administered Date(s) Administered    COVID-19, PFIZER PURPLE top, DILUTE for use, (age 15 y+), 30mcg/0.3mL 08/06/2021, 08/27/2021     No Known Allergies  Prior to Admit Medications:  Current Outpatient Medications   Medication Instructions    diphenoxylate-atropine (LOMOTIL) 2.5-0.025 MG per tablet 1 tablet, Oral, 4 TIMES DAILY PRN    gabapentin (NEURONTIN) 300 mg, Oral, 2 TIMES DAILY    lidocaine-prilocaine (EMLA) 2.5-2.5 % cream Topical, PRN    LORazepam (ATIVAN) 1 MG tablet take 1 tablet by mouth every 6 hours as needed for nausea    mirtazapine (REMERON) 30 mg, Oral, NIGHTLY    ondansetron (ZOFRAN) 8 mg, Oral, EVERY 8 HOURS PRN    pantoprazole (PROTONIX) 40 mg, Oral, DAILY    prochlorperazine (COMPAZINE) 10 mg, Oral, EVERY 6 HOURS PRN    UDENYCA 6 MG/0.6ML injection INJECT 6MG SUBCUTANEOUSLY EVERY 14 DAYS AS DIRECTED         Objective:   Patient Vitals for the past 24 hrs:   Temp Pulse Resp BP SpO2   07/17/22 1753 98.2 °F (36.8 °C) 91 -- (!) 89/57 --   07/17/22 1721 98.2 °F (36.8 °C) 85 -- (!) 92/55 96 %   07/17/22 1657 99.2 °F (37.3 °C) 85 16 (!) 88/54 97 %   07/17/22 1630 -- 83 17 (!) 88/55 98 %   07/17/22 1615 -- 82 16 -- --   07/17/22 1600 -- 85 18 -- --   07/17/22 1545 -- 84 18 -- --   07/17/22 1530 -- 85 16 -- --   07/17/22 1517 -- 86 16 (!) 93/53 97 %   07/17/22 1419 -- -- -- (!) 92/51 98 %   07/17/22 1315 98.7 °F (37.1 °C) (!) 116 16 96/63 98 %       Oxygen Therapy  SpO2: 96 %  Pulse Oximeter Device Mode: Intermittent  O2 Device: None (Room air)    Estimated body mass index is 20.92 kg/m² as calculated from the following:    Height as of this encounter: 5' 11\" (1.803 m). Weight as of this encounter: 150 lb (68 kg). No intake or output data in the 24 hours ending 07/17/22 1834      Blood pressure (!) 89/57, pulse 91, temperature 98.2 °F (36.8 °C), temperature source Oral, resp.  rate 16, height 5' 11\" (1.803 m), Lymph # 1.0 0.5 - 4.6 K/UL    Absolute Mono # 1.0 0.1 - 1.3 K/UL    Absolute Eos # 0.0 0.0 - 0.8 K/UL    Basophils Absolute 0.0 0.0 - 0.2 K/UL    Absolute Immature Granulocyte 0.2 0.0 - 0.5 K/UL   Comprehensive Metabolic Panel    Collection Time: 07/17/22  1:31 PM   Result Value Ref Range    Sodium 138 136 - 145 mmol/L    Potassium 3.7 3.5 - 5.1 mmol/L    Chloride 106 98 - 107 mmol/L    CO2 26 21 - 32 mmol/L    Anion Gap 6 (L) 7 - 16 mmol/L    Glucose 163 (H) 65 - 100 mg/dL    BUN 14 6 - 23 MG/DL    CREATININE 0.88 0.8 - 1.5 MG/DL    GFR African American >60 >60 ml/min/1.73m2    GFR Non- >60 >60 ml/min/1.73m2    Calcium 8.2 (L) 8.3 - 10.4 MG/DL    Total Bilirubin 0.3 0.2 - 1.1 MG/DL    ALT 26 12 - 65 U/L    AST 47 (H) 15 - 37 U/L    Alk Phosphatase 169 (H) 50 - 136 U/L    Total Protein 5.9 (L) 6.3 - 8.2 g/dL    Albumin 2.6 (L) 3.5 - 5.0 g/dL    Globulin 3.3 2.3 - 3.5 g/dL    Albumin/Globulin Ratio 0.8 (L) 1.2 - 3.5     TYPE AND SCREEN    Collection Time: 07/17/22  1:33 PM   Result Value Ref Range    Crossmatch expiration date 07/20/2022,4048     ABO/Rh A POSITIVE     Antibody Screen NEG     Unit Number [id number]     Product Code Blood Bank [facility] LRIR     Unit Divison 00     Dispense Status Blood Bank ISSUED     Crossmatch Result Compatible    PREPARE RBC (CROSSMATCH), 1 Units    Collection Time: 07/17/22  2:00 PM   Result Value Ref Range    History Check Historical check performed        Other Studies:  No results found. Echocardiogram:  No results found for this or any previous visit.       Meds previously ordered:  Orders Placed This Encounter   Medications    0.9 % sodium chloride infusion    LORazepam (ATIVAN) tablet 1 mg    mirtazapine (REMERON) tablet 30 mg    pantoprazole (PROTONIX) tablet 40 mg    ondansetron (ZOFRAN-ODT) disintegrating tablet 8 mg    prochlorperazine (COMPAZINE) tablet 10 mg    gabapentin (NEURONTIN) capsule 300 mg    sodium chloride flush 0.9 % injection 5-40 mL sodium chloride flush 0.9 % injection 5-40 mL    0.9 % sodium chloride infusion    OR Linked Order Group     ondansetron (ZOFRAN-ODT) disintegrating tablet 4 mg     ondansetron (ZOFRAN) injection 4 mg    polyethylene glycol (GLYCOLAX) packet 17 g    OR Linked Order Group     acetaminophen (TYLENOL) tablet 650 mg     acetaminophen (TYLENOL) suppository 650 mg    OR Linked Order Group     potassium chloride (KLOR-CON M) extended release tablet 40 mEq     potassium bicarb-citric acid (EFFER-K) effervescent tablet 40 mEq     potassium chloride 10 mEq/100 mL IVPB (Peripheral Line)    magnesium sulfate 2000 mg in 50 mL IVPB premix     Signed:  Estevan Stovall MD

## 2022-07-18 NOTE — PLAN OF CARE
Problem: Discharge Planning  Goal: Discharge to home or other facility with appropriate resources  7/17/2022 1748 by Julio Baires RN  Outcome: Progressing     Problem: Safety - Adult  Goal: Free from fall injury  7/18/2022 0341 by Cade Ritter RN  Outcome: Progressing  7/17/2022 1748 by Julio Baires RN  Outcome: Progressing     Problem: ABCDS Injury Assessment  Goal: Absence of physical injury  7/18/2022 0341 by Cade Ritter RN  Outcome: Progressing  7/17/2022 1748 by Julio Baires RN  Outcome: Progressing

## 2022-07-18 NOTE — PROGRESS NOTES
Hospitalist Progress Note   Admit Date:  2022  1:54 PM   Name:  Jon Leo   Age:  56 y.o. Sex:  male  :  1975   MRN:  [id number]   Room:  359/01    Presenting Complaint: Fatigue     Reason(s) for Admission: Acute gastrointestinal bleeding [K92.2]  Anemia, unspecified type [D64.9]     Hospital Course & Interval History:   56 y.o. male with medical history of stomach cancer with metastasis to liver who presented with weakness. Gastric cancer had been obstructing his esophagus and preventing him from eating until his stent was placed few days ago. The mass was noted to be highly friable. He is required blood transfusions recently. Because of his history and his weakness he felt it was necessary to be evaluated in the emergency department. He has not had a bowel movement since resumption of diet. He is passing flatus. In the emergency department he was found to have a hemoglobin of 5.4. He was admitted for further evaluation and management. Subjective/24hr Events (22): Feels much better. Tolerating food without pain. No evidence of new bleeding. Hgb improved on recheck. Agrees to Fe IV and monitor for refeeding      Assessment & Plan:   Acute gastrointestinal bleeding  Severe anemia  Admission Hgb 5.4; transfuse PRBC 2U ;  -Transfuse Hgb < 7  -Serial CBC  2022: stable on recheck, Fe  mg (renal dose)    Malnutrition  -monitor for refeeding syndrome  -nutrition consult     Thrombocytopenia  -Transfuse Plt < 50     Hypoproteinemia   -Nutrition consult     Malignant neoplasm of overlapping sites of stomach  -oncology at outpatient follow up      Discharge Planning:      Home in 1-3 days    Diet:  ADULT DIET;  Regular  DVT PPx: SCDs  Code status: Full Code    Hospital Problems:  Principal Problem:    Acute gastrointestinal bleeding  Active Problems:    Severe anemia    Thrombocytopenia (HCC)    Hypoproteinemia (HCC)    Malignant neoplasm of overlapping sites of stomach ([address]  Resolved Problems:    * No resolved hospital problems. *      Objective:   Patient Vitals for the past 24 hrs:   Temp Pulse Resp BP SpO2   07/18/22 1142 99 °F (37.2 °C) 82 18 97/70 96 %   07/18/22 0749 98.4 °F (36.9 °C) 80 18 100/63 96 %   07/18/22 0456 (P) 99.9 °F (37.7 °C) (P) 80 (P) 18 (P) 99/66 (P) 97 %   07/18/22 0255 (P) 100.1 °F (37.8 °C) (P) 80 (P) 18 (P) 97/68 (P) 97 %   07/18/22 0240 99.9 °F (37.7 °C) 88 18 96/62 96 %   07/18/22 0238 99.9 °F (37.7 °C) 88 18 96/62 96 %   07/18/22 0158 100 °F (37.8 °C) 88 -- 93/60 97 %   07/17/22 2348 -- 93 16 93/62 97 %   07/17/22 2330 99.1 °F (37.3 °C) 88 16 (!) 87/59 96 %   07/17/22 2328 99.1 °F (37.3 °C) 88 -- (!) 87/59 96 %   07/17/22 1959 99.5 °F (37.5 °C) 83 -- 93/65 100 %   07/17/22 1753 98.2 °F (36.8 °C) 91 -- (!) 89/57 --   07/17/22 1721 98.2 °F (36.8 °C) 85 -- (!) 92/55 96 %   07/17/22 1657 99.2 °F (37.3 °C) 85 16 (!) 88/54 97 %   07/17/22 1630 -- 83 17 (!) 88/55 98 %   07/17/22 1615 -- 82 16 -- --   07/17/22 1600 -- 85 18 -- --   07/17/22 1545 -- 84 18 -- --   07/17/22 1530 -- 85 16 -- --   07/17/22 1517 -- 86 16 (!) 93/53 97 %   07/17/22 1419 -- -- -- (!) 92/51 98 %       Oxygen Therapy  SpO2: 96 %  Pulse Oximeter Device Mode: Intermittent  O2 Device: None (Room air)    Estimated body mass index is 20.92 kg/m² as calculated from the following:    Height as of this encounter: 5' 11\" (1.803 m). Weight as of this encounter: 150 lb (68 kg). Intake/Output Summary (Last 24 hours) at 7/18/2022 1330  Last data filed at 7/18/2022 0456  Gross per 24 hour   Intake 998 ml   Output --   Net 998 ml     Blood pressure 97/70, pulse 82, temperature 99 °F (37.2 °C), temperature source Oral, resp. rate 18, height 5' 11\" (1.803 m), weight 150 lb (68 kg), SpO2 96 %, peak flow (!) 18 L/min. Physical Exam  Vitals and nursing note reviewed. Constitutional:       General: He is not in acute distress. Appearance: He is not ill-appearing or diaphoretic.    HENT: Head: Normocephalic and atraumatic. Eyes:      Extraocular Movements: Extraocular movements intact. Cardiovascular:      Rate and Rhythm: Normal rate and regular rhythm. Pulmonary:      Effort: Pulmonary effort is normal. No respiratory distress. Breath sounds: No wheezing or rales. Abdominal:      General: Bowel sounds are normal. There is no distension. Palpations: Abdomen is soft. Tenderness: There is no abdominal tenderness. There is no guarding. Musculoskeletal:         General: No deformity. Skin:     Coloration: Skin is pale. Skin is not jaundiced. Neurological:      General: No focal deficit present. Mental Status: He is alert and oriented to person, place, and time. Psychiatric:         Mood and Affect: Mood normal.         Behavior: Behavior normal. Behavior is cooperative.          I have reviewed ordered lab tests and independently visualized imaging below:    Recent Labs:  Recent Results (from the past 48 hour(s))   CBC with Auto Differential    Collection Time: 07/17/22  1:31 PM   Result Value Ref Range    WBC 13.9 (H) 4.3 - 11.1 K/uL    RBC 1.68 (L) 4.23 - 5.6 M/uL    Hemoglobin 5.4 (LL) 13.6 - 17.2 g/dL    Hematocrit 16.4 (L) 41.1 - 50.3 %    MCV 97.6 79.6 - 97.8 FL    MCH 32.1 26.1 - 32.9 PG    MCHC 32.9 31.4 - 35.0 g/dL    RDW 21.5 (H) 11.9 - 14.6 %    Platelets 99 (L) 171 - 450 K/uL    MPV 10.1 9.4 - 12.3 FL    nRBC 0.00 0.0 - 0.2 K/uL    Differential Type AUTOMATED      Seg Neutrophils 84 (H) 43 - 78 %    Lymphocytes 7 (L) 13 - 44 %    Monocytes 8 4.0 - 12.0 %    Eosinophils % 0 (L) 0.5 - 7.8 %    Basophils 0 0.0 - 2.0 %    Immature Granulocytes 1 0.0 - 5.0 %    Segs Absolute 11.6 (H) 1.7 - 8.2 K/UL    Absolute Lymph # 1.0 0.5 - 4.6 K/UL    Absolute Mono # 1.0 0.1 - 1.3 K/UL    Absolute Eos # 0.0 0.0 - 0.8 K/UL    Basophils Absolute 0.0 0.0 - 0.2 K/UL    Absolute Immature Granulocyte 0.2 0.0 - 0.5 K/UL   Comprehensive Metabolic Panel    Collection Time: 07/17/22  1:31 PM   Result Value Ref Range    Sodium 138 136 - 145 mmol/L    Potassium 3.7 3.5 - 5.1 mmol/L    Chloride 106 98 - 107 mmol/L    CO2 26 21 - 32 mmol/L    Anion Gap 6 (L) 7 - 16 mmol/L    Glucose 163 (H) 65 - 100 mg/dL    BUN 14 6 - 23 MG/DL    CREATININE 0.88 0.8 - 1.5 MG/DL    GFR African American >60 >60 ml/min/1.73m2    GFR Non- >60 >60 ml/min/1.73m2    Calcium 8.2 (L) 8.3 - 10.4 MG/DL    Total Bilirubin 0.3 0.2 - 1.1 MG/DL    ALT 26 12 - 65 U/L    AST 47 (H) 15 - 37 U/L    Alk Phosphatase 169 (H) 50 - 136 U/L    Total Protein 5.9 (L) 6.3 - 8.2 g/dL    Albumin 2.6 (L) 3.5 - 5.0 g/dL    Globulin 3.3 2.3 - 3.5 g/dL    Albumin/Globulin Ratio 0.8 (L) 1.2 - 3.5     TYPE AND SCREEN    Collection Time: 07/17/22  1:33 PM   Result Value Ref Range    Crossmatch expiration date 07/20/2022,2359     ABO/Rh A POSITIVE     Antibody Screen NEG     Unit Number [id number]     Product Code Blood [facility]     Unit Divison 00     Dispense Status Blood Bank TRANSFUSED     Crossmatch Result Compatible     Unit Number [id number]     Product Code Blood [facility]     Unit Divison 00     Dispense Status Blood Bank ISSUED     Crossmatch Result Compatible     Unit Number [id number]     Product Code Blood [facility]     Unit Divison 00     Dispense Status Blood Bank TRANSFUSED     Crossmatch Result Compatible    PREPARE RBC (CROSSMATCH), 1 Units    Collection Time: 07/17/22  2:00 PM   Result Value Ref Range    History Check Historical check performed    Hemoglobin and Hematocrit    Collection Time: 07/17/22  9:03 PM   Result Value Ref Range    Hemoglobin 5.7 (LL) 13.6 - 17.2 g/dL    Hematocrit 17.0 (L) 41.1 - 50.3 %   PREPARE RBC (CROSSMATCH), 2 Units    Collection Time: 07/17/22  9:30 PM   Result Value Ref Range    History Check Historical check performed    Hemoglobin and Hematocrit    Collection Time: 07/18/22  5:14 AM   Result Value Ref Range    Hemoglobin 7.5 (L) 13.6 - 17.2 g/dL Hematocrit 22.4 (L) 41.1 - 50.3 %   Hemoglobin and Hematocrit    Collection Time: 07/18/22 12:25 PM   Result Value Ref Range    Hemoglobin 8.4 (L) 13.6 - 17.2 g/dL    Hematocrit 24.8 (L) 41.1 - 50.3 %       Other Studies:  No orders to display       Current Meds:  Current Facility-Administered Medications   Medication Dose Route Frequency    iron sucrose (VENOFER) 500 mg in sodium chloride 0.9 % 250 mL IVPB  500 mg IntraVENous Once    0.9 % sodium chloride infusion   IntraVENous PRN    LORazepam (ATIVAN) tablet 1 mg  1 mg Oral Q4H PRN    mirtazapine (REMERON) tablet 30 mg  30 mg Oral Nightly    pantoprazole (PROTONIX) tablet 40 mg  40 mg Oral Daily    ondansetron (ZOFRAN-ODT) disintegrating tablet 8 mg  8 mg Oral Q8H PRN    prochlorperazine (COMPAZINE) tablet 10 mg  10 mg Oral Q6H PRN    gabapentin (NEURONTIN) capsule 300 mg  300 mg Oral BID    sodium chloride flush 0.9 % injection 5-40 mL  5-40 mL IntraVENous 2 times per day    sodium chloride flush 0.9 % injection 5-40 mL  5-40 mL IntraVENous PRN    0.9 % sodium chloride infusion   IntraVENous PRN    ondansetron (ZOFRAN-ODT) disintegrating tablet 4 mg  4 mg Oral Q8H PRN    Or    ondansetron (ZOFRAN) injection 4 mg  4 mg IntraVENous Q6H PRN    polyethylene glycol (GLYCOLAX) packet 17 g  17 g Oral Daily PRN    acetaminophen (TYLENOL) tablet 650 mg  650 mg Oral Q6H PRN    Or    acetaminophen (TYLENOL) suppository 650 mg  650 mg Rectal Q6H PRN    potassium chloride (KLOR-CON M) extended release tablet 40 mEq  40 mEq Oral PRN    Or    potassium bicarb-citric acid (EFFER-K) effervescent tablet 40 mEq  40 mEq Oral PRN    Or    potassium chloride 10 mEq/100 mL IVPB (Peripheral Line)  10 mEq IntraVENous PRN    magnesium sulfate 2000 mg in 50 mL IVPB premix  2,000 mg IntraVENous PRN    0.9 % sodium chloride infusion   IntraVENous PRN     Facility-Administered Medications Ordered in Other Encounters   Medication Dose Route Frequency    atropine injection 0.4 mg  0.4 mg IntraVENous Once       Signed:  Amisha Ya MD

## 2022-07-18 NOTE — PROGRESS NOTES
PHYSICAL THERAPY Initial Assessment, Daily Note, and AM  (Link to Caseload Tracking: PT Visit Days : 1  Acknowledge Orders  Time In/Out  PT Charge Capture  Rehab Caseload Tracker    Shanae Dougherty is a 56 y.o. male   PRIMARY DIAGNOSIS: Acute gastrointestinal bleeding  Acute gastrointestinal bleeding [K92.2]  Anemia, unspecified type [D64.9]       Reason for Referral: Generalized Muscle Weakness (M62.81)  Other lack of cordination (R27.8)  Difficulty in walking, Not elsewhere classified (R26.2)  Inpatient: Payor: Aliya Potetr FUNDED / Plan: Aliya Potter FUNDED PLAN / Product Type: *No Product type* /     ASSESSMENT:     REHAB RECOMMENDATIONS:   Recommendation to date pending progress:  Setting:  None - pt declining HHPT    Equipment:    None     ASSESSMENT:  Mr. Juan Doyle is a 56 y.o. male with hx of metastatic stomach cancer admitted with GIB and weakness. Upon PT evaluation, pt exhibits gross weakness, impaired balance, and reduced activity tolerance resulting in limited independence with functional mobility. At baseline, pt is independent for all mobility and denies hx of falls. Pt is now requiring CGA to ambulate safely without AD. Pt will require HHPT at discharge and states he has a RW at home. Pt will continue to benefit from skilled PT to address above impairments and maximize functional independence prior to discharge.   .     [address] [facility] [address] [address] [facility] 6 Clicks Basic Mobility Inpatient Short Form  AM-PAC Mobility Inpatient   How much difficulty turning over in bed?: None  How much difficulty sitting down on / standing up from a chair with arms?: None  How much difficulty moving from lying on back to sitting on side of bed?: None  How much help from another person moving to and from a bed to a chair?: None  How much help from another person needed to walk in hospital room?: A Little  How much help from another person for climbing 3-5 steps with a railing?: A Little  AM-PAC Inpatient Mobility Raw Score : 22  AM-PAC Inpatient T-Scale Score : 53.28  Mobility Inpatient CMS 0-100% Score: 20.91  Mobility Inpatient CMS G-Code Modifier : CJ    SUBJECTIVE:   Mr. Eusebia Narayan states, \"I can walk fine. \"     Social/Functional Lives With: Spouse, Family  Type of Home: House  Home Layout: One level  Home Access: Stairs to enter with rails  Entrance Stairs - Number of Steps: 1  Home Equipment: Anika Rhein, rolling, Wheelchair-manual  Has the patient had two or more falls in the past year or any fall with injury in the past year?: No  Receives Help From: Family, Friend(s)  ADL Assistance: Independent  Homemaking Assistance: Independent  Ambulation Assistance: Independent  Transfer Assistance: Independent    OBJECTIVE:     PAIN: Geraline Puller / O2: PRECAUTION / Elridge Kebede / DRAINS:   Pre Treatment:          Post Treatment: 0   Vitals        Oxygen      IV    RESTRICTIONS/PRECAUTIONS:  Restrictions/Precautions: Fall Risk                 GROSS EVALUATION: Intact Impaired (Comments):   AROM [x]     PROM [x]    Strength []  BLEs grossly 4-/5 throughout. Balance []  Unsteady without AD. Occasional scissoring of BLEs, deviated path and moderate trunk sway. Posture [] Thoracic Kyphosis  Trunk Flexion   Sensation [x]     Coordination [x]      Tone [x]     Edema [x]    Activity Tolerance []  Limited due to fatigue.     []      COGNITION/  PERCEPTION: Intact Impaired (Comments):   Orientation [x]     Vision [x]     Hearing [x]     Cognition  [x]       MOBILITY: I Mod I S SBA CGA Min Mod Max Total  NT x2 Comments:   Bed Mobility    Rolling [] [] [x] [] [] [] [] [] [] [] []    Supine to Sit [] [] [x] [] [] [] [] [] [] [] []    Scooting [] [] [x] [] [] [] [] [] [] [] []    Sit to Supine [] [] [x] [] [] [] [] [] [] [] []    Transfers    Sit to Stand [] [] [x] [] [] [] [] [] [] [] []    Bed to Chair [] [] [x] [] [] [] [] [] [] [] []    Stand to Sit [] [] [x] [] [] [] [] [] [] [] []     [] [] [] [] [] [] [] [] [] [] []    I=Independent, Mod I=Modified Independent, S=Supervision, SBA=Standby Assistance, CGA=Contact Guard Assistance,   Min=Minimal Assistance, Mod=Moderate Assistance, Max=Maximal Assistance, Total=Total Assistance, NT=Not Tested    GAIT: I Mod I S SBA CGA Min Mod Max Total  NT x2 Comments:   Level of Assistance [] [] [] [x] [x] [] [] [] [] [] []    Distance 200 feet    DME Rolling Walker    Gait Quality Path deviations , Scissoring, and Trunk sway increased    Weightbearing Status Restrictions/Precautions  Restrictions/Precautions: Fall Risk    Stairs      I=Independent, Mod I=Modified Independent, S=Supervision, SBA=Standby Assistance, CGA=Contact Guard Assistance,   Min=Minimal Assistance, Mod=Moderate Assistance, Max=Maximal Assistance, Total=Total Assistance, NT=Not Tested    PLAN:   ACUTE PHYSICAL THERAPY GOALS:   (Developed with and agreed upon by patient and/or caregiver.)  Pt will perform supine to/from sit with mod I in 7 treatment days. Pt will perform sit to/from stand with mod I and LRAD in 7 treatment days. Pt will ambulate 150 ft with mod I and LRAD in 7 treatment days. Pt will negotiate 1 stairs with mod I and rail in 7 treatment days. Pt will be independent with HEP in 7 days. FREQUENCY AND DURATION: Daily for duration of hospital stay or until stated goals are met, whichever comes first.    THERAPY PROGNOSIS: Good    PROBLEM LIST:   (Skilled intervention is medically necessary to address:)  Decreased ADL/Functional Activities  Decreased Activity Tolerance  Decreased Balance  Decreased Gait Ability  Decreased Strength INTERVENTIONS PLANNED:   (Benefits and precautions of physical therapy have been discussed with the patient.)  Self Care Training  Therapeutic Activity  Therapeutic Exercise/HEP  Neuromuscular Re-education  Gait Training  Education       TREATMENT:   EVALUATION: LOW COMPLEXITY: (Untimed Charge)    TREATMENT:   Therapeutic Activity (23 Minutes):  Therapeutic activity included Supine to Sit, Sit to Supine, Scooting, Transfer Training, Ambulation on level ground, Sitting balance , Standing balance, and education to improve functional Activity tolerance, Balance, Mobility, Strength, and safety. Therex: Ankle pumps, quad sets, and glut sets for DVT prophylaxis, to be performed hourly. Education: Educated pt regarding role of PT, PT POC, fall risk, safety, AD use, need for OOB mobility, consequences of bedrest, fall risk, need to use RW at home. Pt verbalized understanding and agreed. Declining HHPT.     TREATMENT GRID:  N/A    AFTER TREATMENT PRECAUTIONS: Bed, Bed/Chair Locked, Call light within reach, Needs within reach, and RN notified    INTERDISCIPLINARY COLLABORATION:  RN/ PCT, PT/ PTA, and OT/ CAMPOS    EDUCATION:      TIME IN/OUT:  Time In: 9258  Time Out: 0745  Minutes: [address] [facility], PT

## 2022-07-18 NOTE — PLAN OF CARE
Problem: Discharge Planning  Goal: Discharge to home or other facility with appropriate resources  7/17/2022 1748 by Renata Austin RN  Outcome: Progressing     Problem: Safety - Adult  Goal: Free from fall injury  7/18/2022 0348 by Delmi Haas RN  Outcome: Progressing  7/18/2022 0341 by Delmi Haas RN  Outcome: Progressing  7/17/2022 1748 by Renata Austin RN  Outcome: Progressing     Problem: ABCDS Injury Assessment  Goal: Absence of physical injury  7/18/2022 0348 by Delmi Haas RN  Outcome: Progressing  7/18/2022 0341 by Delmi Haas RN  Outcome: Progressing  7/17/2022 1748 by Renata Austin RN  Outcome: Progressing

## 2022-07-18 NOTE — CARE COORDINATION
07/18/22 1443   Service Assessment   Patient Orientation Alert and Oriented   Cognition Alert   History Provided By Patient   Primary Caregiver Self   Support Systems Spouse/Significant Other  (Spouse/Larissa #[phone])   PCP Verified by CM No  (SeesOncology, not interested in a PCP)   Prior Functional Level Independent in ADLs/IADLs   Current Functional Level Independent in ADLs/IADLs   Can patient return to prior living arrangement Yes   Ability to make needs known: Good   Family able to assist with home care needs: Yes   Would you like for me to discuss the discharge plan with any other family members/significant others, and if so, who? No   Social/Functional History   Lives With Spouse   Type of Home House   ADL Assistance Independent   Homemaking Assistance Independent   Ambulation Assistance Independent   Transfer Assistance Independent   Active  Yes   Mode of Transportation Car   Discharge Planning   Current Services Prior To Admission None   Services At/After Discharge   [address] Discharge None   Visited with pt to establish prior to admission baseline and discuss their anticipated goals at time of d/c. Pt came in c/o low HGB, brought in for a GI bleed. Pt states was seen at [facility] last week and received blood products tat that time. Pt goal is to return home with spouse, denies any needs, does fill Rx at [facility].

## 2022-07-18 NOTE — PROGRESS NOTES
OCCUPATIONAL THERAPY Initial Assessment, Discharge, and PM       OT Visit Days: 1  Acknowledge Orders  [id number]  OT Charge Capture  Rehab Caseload Tracker      Jono Dobbs is a 56 y.o. male   PRIMARY DIAGNOSIS: Acute gastrointestinal bleeding  Acute gastrointestinal bleeding [K92.2]  Anemia, unspecified type [D64.9]       Reason for Referral: Generalized Muscle Weakness (M62.81)  Inpatient: Payor: Amena Garcia SELF FUNDED / Plan: [facility] FUNDED PLAN / Product Type: *No Product type* /     ASSESSMENT:     REHAB RECOMMENDATIONS:   Recommendation to date pending progress:  Setting:  No further skilled therapy after discharge from hospital    Equipment:     Recommend shower chair, he reported he  has one if needed     ASSESSMENT:  Mr. Eusebia Narayan supine in bed s/p eating lunch. He is  and lives with his wife and his mom. He has a two story home with walk in shower. He was independent prior to admit. He has been feeling weak had blood and some infusions felt better but then felt weak again and came to the hospital. HGB 5's. He is feeling better hgb 7.5 after transfusions. He was independent with bed  mobility. He donned his sneakers and ambulated in the room with SBA to CGA. He is not at his baseline but appears to know his limitations well. He plans to discharge home with family assist. NO skilled OT indicated at this time. Patient in agreement with POC. Will discharge OT.      [address] [facility] [address] [address] [facility]ks Daily Activity Inpatient Short Form:    AM-PAC Daily Activity Inpatient   How much help for putting on and taking off regular lower body clothing?: A Little  How much help for Bathing?: A Little  How much help for Toileting?: A Little  How much help for putting on and taking off regular upper body clothing?: None  How much help for taking care of personal grooming?: None  How much help for eating meals?: None  AM-PAC Inpatient Daily Activity Raw Score: 21  AM-PAC Inpatient ADL T-Scale Score : 44.27  ADL Inpatient CMS 0-100% Score: 32.79  ADL Inpatient CMS G-Code Modifier : CJ           SUBJECTIVE:     Mr. Hira Jenkins stated he has a shower chair if he needs one, his mom lives with them she has one    Social/Functional Lives With: Spouse, Family  Type of Home: House  Home Layout: Two level  Home Access: Stairs to enter with rails  Entrance Stairs - Number of Steps: full flight to his bedroom upstairs  Entrance Stairs - Rails: Right  Bathroom Shower/Tub: Walk-in shower  Home Equipment: Nancy Cleverly, rolling, Wheelchair-manual  Has the patient had two or more falls in the past year or any fall with injury in the past year?: No  Receives Help From: Family, Friend(s)  ADL Assistance: Independent  Homemaking Assistance: Independent  Ambulation Assistance: Independent  Transfer Assistance: Independent    OBJECTIVE:     Shashank Vazquez / Alison Cates / Nilesh Bassett: NA    RESTRICTIONS/PRECAUTIONS:  Restrictions/Precautions: Fall Risk    PAIN: VITALS / O2:   Pre Treatment:      0/10    Post Treatment:0/10       Vitals          Oxygen            GROSS EVALUATION: INTACT IMPAIRED   (See Comments)   UE AROM [x] []   UE PROM [] []   Strength [] Fair + with adl tasks     Posture / Balance []  CGA to SBA no loss of balance in room no device   Sensation []     Coordination []       Tone [x]       Edema []    Activity Tolerance []  Fair + with ADL tasks     Hand Dominance R [] L []      COGNITION/  PERCEPTION: INTACT IMPAIRED   (See Comments)   Orientation [x]     Vision ^     Hearing [x][x]     Cognition  [x]     Perception [x]       MOBILITY: I Mod I S SBA CGA Min Mod Max Total  NT x2 Comments:   Bed Mobility    Rolling [] [] [] [] [] [] [] [] [] [] []    Supine to Sit [x] [] [] [] [] [] [] [] [] [] []    Scooting [x] [] [] [] [] [] [] [] [] [] []    Sit to Supine [x] [] [] [] [] [] [] [] [] [] []    Transfers    Sit to Stand [] [] [] [x] [x] [] [] [] [] [] []    Bed to Chair [] [] [] [] [] [] [] [] [] [] []    Stand to Sit [] [] [] [x] [x] [] [] [] [] [] []    Tub/Shower [] [] [] [] [] [] [] [] [] [] []     Toilet [] [] [] [] [] [] [] [] [] [] []      [] [] [] [] [] [] [] [] [] [] []    I=Independent, Mod I=Modified Independent, S=Supervision/Setup, SBA=Standby Assistance, CGA=Contact Guard Assistance, Min=Minimal Assistance, Mod=Moderate Assistance, Max=Maximal Assistance, Total=Total Assistance, NT=Not Tested    ACTIVITIES OF DAILY LIVING: I Mod I S SBA CGA Min Mod Max Total NT Comments   BASIC ADLs:              Upper Body Bathing  [] [] [] [] [] [] [] [] [] []    Lower Body Bathing [] [] [] [] [] [] [] [] [] []    Toileting [] [] [] [] [] [] [] [] [] []    Upper Body Dressing [] [] [] [] [] [] [] [] [] []    Lower Body Dressing [] [] [x] [x] [] [] [] [] [] []    Feeding [] [] [] [] [] [] [] [] [] []    Grooming [] [] [] [] [] [] [] [] [] []    Personal Device Care [] [] [] [] [] [] [] [] [] []    Functional Mobility [] [] [] [x] [x] [] [] [] [] [] No device   I=Independent, Mod I=Modified Independent, S=Supervision/Setup, SBA=Standby Assistance, CGA=Contact Guard Assistance, Min=Minimal Assistance, Mod=Moderate Assistance, Max=Maximal Assistance, Total=Total Assistance, NT=Not Tested    PLAN:     FREQUENCY/DURATION   OT Plan of Care:  (eval and discharge)          TREATMENT:     EVALUATION: LOW COMPLEXITY: (Untimed Charge)            AFTER TREATMENT PRECAUTIONS: Bed, Bed/Chair Locked, Call light within reach, and Side rails x3    INTERDISCIPLINARY COLLABORATION:  PT/ PTA and OT/ CAMPOS    EDUCATION:  Education Given To: Patient  Education Provided: Plan of Care;ADL Adaptive Strategies; Energy Conservation;Transfer Training;Equipment; Fall Prevention Strategies  Education Method: Demonstration;Verbal  Barriers to Learning: None  Education Outcome: Verbalized understanding;Demonstrated understanding    TOTAL TREATMENT DURATION AND TIME:  Time In: 1250  Time Out: [address]  Minutes: Amandeep Munguia OT

## 2022-07-19 PROBLEM — K92.2 ACUTE GASTROINTESTINAL BLEEDING: Status: RESOLVED | Noted: 2022-01-01 | Resolved: 2022-01-01

## 2022-07-19 NOTE — CARE COORDINATION
07/19/22 1048   Service Assessment   Support Systems Spouse/Significant Other   Prior Functional Level Independent in ADLs/IADLs   Current Functional Level Independent in ADLs/IADLs   Can patient return to prior living arrangement Yes   Social/Functional History   Lives With Spouse   Type of [address] [place];Walker, rolling; [address]   ADL Assistance Independent   Discharge Planning   Living Arrangements Spouse/Significant Other   Current Services Prior To Admission None   Potential Assistance Needed N/A   DME Ordered? No   Services At/After Discharge   Services At/After Discharge None     The patient is discharging home with his spouse. No case management needs were identified. RN CM met with the patient prior to discharge and offered him a GoodRx card. He stated he already has one and is active with hospital sponsorship. RN CM encouraged him to ask questions. He verbalized understanding and agreement with the discharge plan.

## 2022-07-19 NOTE — PLAN OF CARE
Problem: Discharge Planning  Goal: Discharge to home or other facility with appropriate resources  7/19/2022 0851 by Simon Altamirano RN  Outcome: Progressing Towards Goal  7/19/2022 0519 by Amish Yeboah RN  Outcome: Progressing Towards Goal     No new complaints this am. Reports BM overnight. Rhianna diet. Potassium supplement PO.

## 2022-07-19 NOTE — DISCHARGE SUMMARY
Hospitalist Discharge Summary   Admit Date:  2022  1:54 PM   DC Note date: 2022  Name:  Sugar Steele   Age:  56 y.o. Sex:  male  :  1975   MRN:  [id number]   Room:  [facility]  PCP:  None Provider    Presenting Complaint: Fatigue     Initial Admission Diagnosis: Acute gastrointestinal bleeding [K92.2]  Anemia, unspecified type [D64.9]     Problem List for this Hospitalization (present on admission):    Principal Problem (Resolved):    Acute gastrointestinal bleeding  Active Problems:    Severe anemia    Thrombocytopenia (HCC)    Hypoproteinemia (HCC)    Malignant neoplasm of overlapping sites of stomach [facility])    Did Patient have Sepsis (YES OR NO): No    Hospital Course:  47M PMHx stomach cancer with metastasis to liver who presented with weakness. Gastric cancer had been obstructing his esophagus and preventing him from eating until his stent was placed few days ago. The mass was noted to be highly friable. He is required blood transfusions recently. Because of his history and his weakness he felt it was necessary to be evaluated in the emergency department. He has not had a bowel movement since resumption of diet. He is passing flatus. In the emergency department he was found to have a hemoglobin of 5.4. He was admitted for further evaluation and management. He was transfused pRBC 2U. His Hgb remained > 7. He was determined to be appropriate for discharge  to follow up with Dr Marck Gaming on . Disposition: Home with close follow up  Diet: ADULT DIET; Regular  Code Status: Full Code    Follow Ups:   Follow-up Information     Charlotte Ornelas MD, MD. Go in 2 day(s). Specialties: Internal Medicine, Oncology  Why: Transition of Care Management  Contact information:  [facility] [address]                       Time spent in patient discharge and coordination 36 minutes.         Follow up labs/diagnostics (ultimately defer to outpatient provider):  Hgb  Electrolytes for refeeding    Plan was discussed with patient, nurse, . All questions answered. Patient was stable at time of discharge. Instructions given to call a physician or return if any concerns. Current Discharge Medication List        START taking these medications    Details   ferrous sulfate (FE TABS 325) 325 (65 Fe) MG EC tablet Take 1 tablet by mouth daily (with breakfast)  Qty: 30 tablet, Refills: 0           CONTINUE these medications which have NOT CHANGED    Details   pantoprazole (PROTONIX) 40 MG tablet Take 40 mg by mouth daily      LORazepam (ATIVAN) 1 MG tablet take 1 tablet by mouth every 6 hours as needed for nausea  Qty: 90 tablet, Refills: 0    Associated Diagnoses: CINV (chemotherapy-induced nausea and vomiting)      UDENYCA 6 MG/0.6ML injection INJECT 6MG SUBCUTANEOUSLY EVERY 14 DAYS AS DIRECTED      diphenoxylate-atropine (LOMOTIL) 2.5-0.025 MG per tablet Take 1 tablet by mouth 4 times daily as needed. gabapentin (NEURONTIN) 300 MG capsule Take 300 mg by mouth 2 times daily. lidocaine-prilocaine (EMLA) 2.5-2.5 % cream Apply topically as needed      mirtazapine (REMERON) 30 MG tablet Take 30 mg by mouth nightly       ondansetron (ZOFRAN) 8 MG tablet Take 8 mg by mouth every 8 hours as needed      prochlorperazine (COMPAZINE) 10 MG tablet Take 10 mg by mouth every 6 hours as needed           STOP taking these medications       zolpidem (AMBIEN CR) 12.5 MG extended release tablet Comments:   Reason for Stopping:               Procedures done this admission:  * No surgery found *    Consults this admission:  IP CONSULT TO DIETITIAN    Echocardiogram results:  No results found for this or any previous visit. Diagnostic Imaging/Tests:   CT CHEST ABDOMEN PELVIS W CONTRAST    Result Date: 6/28/2022  1.   Interval disease progression evidenced by multiple new and enlarging hepatic metastases in the upper abdominal partially calcified mass surrounding the GE junction and gastric cardia. 2.  Newly enlarged paraesophageal mediastinal lymph node. No results for input(s): CULTURE in the last 720 hours. Labs: Results:       BMP, Mg, Phos Recent Labs     07/17/22  1331 07/19/22  0514    138   K 3.7 3.1*    108*   CO2 26 26   ANIONGAP 6* 4*   BUN 14 11   CREATININE 0.88 0.68*   LABGLOM >60 >60   GFRAA >60 >60   CALCIUM 8.2* 7.7*   GLUCOSE 163* 94   MG  --  1.9   PHOS  --  2.5      CBC Recent Labs     07/17/22  1331 07/17/22  2103 07/18/22  0514 07/18/22  1225 07/19/22  0514   WBC 13.9*  --   --   --  9.7   RBC 1.68*  --   --   --  2.57*   HGB 5.4*   < > 7.5* 8.4* 7.8*   HCT 16.4*   < > 22.4* 24.8* 22.6*   MCV 97.6  --   --   --  87.9   MCH 32.1  --   --   --  30.4   MCHC 32.9  --   --   --  34.5   RDW 21.5*  --   --   --  19.5*   PLT 99*  --   --   --  72*   MPV 10.1  --   --   --  10.2   NRBC 0.00  --   --   --  0.02   SEGS 84*  --   --   --  85*   LYMPHOPCT 7*  --   --   --  4*   EOSRELPCT 0*  --   --   --  0*   MONOPCT 8  --   --   --  9   BASOPCT 0  --   --   --  0   IMMGRAN 1  --   --   --  1   SEGSABS 11.6*  --   --   --  8.2   LYMPHSABS 1.0  --   --   --  0.4*   EOSABS 0.0  --   --   --  0.0   MONOSABS 1.0  --   --   --  0.9   BASOSABS 0.0  --   --   --  0.0   ABSIMMGRAN 0.2  --   --   --  0.1    < > = values in this interval not displayed.       LFT Recent Labs     07/17/22  1331 07/19/22  0514   BILITOT 0.3 0.4   ALKPHOS 169* 177*   AST 47* 67*   ALT 26 40   PROT 5.9* 4.9*   LABALBU 2.6* 2.1*   GLOB 3.3 2.8      Cardiac  No results found for: NTPROBNP, TROPHS   Coags No results found for: PROTIME, INR, APTT   A1c No results found for: LABA1C, EAG   Lipids No results found for: CHOL, LDLCALC, LABVLDL, HDL, CHOLHDLRATIO, TRIG   Thyroid  No results found for: Bushra Montez     Most Recent UA Lab Results   Component Value Date/Time    COLORU ORANGE 04/08/2021 10:30 AM    SPECGRAV 1.010 04/08/2021 10:30 AM    PROTEINU 30 04/08/2021 10:30 AM    GLUCOSEU 250 04/08/2021 10:30 AM    KETUA TRACE 04/08/2021 10:30 AM    BILIRUBINUR SMALL 04/08/2021 10:30 AM    BLOODU MODERATE 04/08/2021 10:30 AM    NITRU Positive 04/08/2021 10:30 AM    LEUKOCYTESUR Negative 04/08/2021 10:30 AM    WBCUA 0-3 04/08/2021 10:30 AM    RBCUA 20-50 04/08/2021 10:30 AM    BACTERIA TRACE 04/08/2021 10:30 AM    MUCUS 1+ 04/08/2021 10:30 AM          All Labs from Last 24 Hrs:  Recent Results (from the past 24 hour(s))   Hemoglobin and Hematocrit    Collection Time: 07/18/22 12:25 PM   Result Value Ref Range    Hemoglobin 8.4 (L) 13.6 - 17.2 g/dL    Hematocrit 24.8 (L) 41.1 - 50.3 %   Comprehensive Metabolic Panel w/ Reflex to MG    Collection Time: 07/19/22  5:14 AM   Result Value Ref Range    Sodium 138 136 - 145 mmol/L    Potassium 3.1 (L) 3.5 - 5.1 mmol/L    Chloride 108 (H) 98 - 107 mmol/L    CO2 26 21 - 32 mmol/L    Anion Gap 4 (L) 7 - 16 mmol/L    Glucose 94 65 - 100 mg/dL    BUN 11 6 - 23 MG/DL    CREATININE 0.68 (L) 0.8 - 1.5 MG/DL    GFR African American >60 >60 ml/min/1.73m2    GFR Non- >60 >60 ml/min/1.73m2    Calcium 7.7 (L) 8.3 - 10.4 MG/DL    Total Bilirubin 0.4 0.2 - 1.1 MG/DL    ALT 40 12 - 65 U/L    AST 67 (H) 15 - 37 U/L    Alk Phosphatase 177 (H) 50 - 136 U/L    Total Protein 4.9 (L) 6.3 - 8.2 g/dL    Albumin 2.1 (L) 3.5 - 5.0 g/dL    Globulin 2.8 2.3 - 3.5 g/dL    Albumin/Globulin Ratio 0.8 (L) 1.2 - 3.5     CBC with Auto Differential    Collection Time: 07/19/22  5:14 AM   Result Value Ref Range    WBC 9.7 4.3 - 11.1 K/uL    RBC 2.57 (L) 4.23 - 5.6 M/uL    Hemoglobin 7.8 (L) 13.6 - 17.2 g/dL    Hematocrit 22.6 (L) 41.1 - 50.3 %    MCV 87.9 79.6 - 97.8 FL    MCH 30.4 26.1 - 32.9 PG    MCHC 34.5 31.4 - 35.0 g/dL    RDW 19.5 (H) 11.9 - 14.6 %    Platelets 72 (L) 924 - 450 K/uL    MPV 10.2 9.4 - 12.3 FL    nRBC 0.02 0.0 - 0.2 K/uL    Differential Type AUTOMATED      Seg Neutrophils 85 (H) 43 - 78 %    Lymphocytes 4 (L) 13 - 44 %    Monocytes 9 4.0 - 12.0 %    Eosinophils % 0 (L) 0.5 - 7.8 %    Basophils 0 0.0 - 2.0 %    Immature Granulocytes 1 0.0 - 5.0 %    Segs Absolute 8.2 1.7 - 8.2 K/UL    Absolute Lymph # 0.4 (L) 0.5 - 4.6 K/UL    Absolute Mono # 0.9 0.1 - 1.3 K/UL    Absolute Eos # 0.0 0.0 - 0.8 K/UL    Basophils Absolute 0.0 0.0 - 0.2 K/UL    Absolute Immature Granulocyte 0.1 0.0 - 0.5 K/UL   Phosphorus    Collection Time: 07/19/22  5:14 AM   Result Value Ref Range    Phosphorus 2.5 2.5 - 4.5 MG/DL   Reticulocytes    Collection Time: 07/19/22  5:14 AM   Result Value Ref Range    Reticulocyte Count,Automated 3.0 (H) 0.3 - 2.0 %    Absolute Retic # 0.0774 0.026 - 0.095 M/ul    Immature Retic Fraction 34.6 (H) 2.3 - 13.4 %    Retic Hemoglobin conc. 33 29 - 35 pg   Magnesium    Collection Time: 07/19/22  5:14 AM   Result Value Ref Range    Magnesium 1.9 1.8 - 2.4 mg/dL       No Known Allergies  Immunization History   Administered Date(s) Administered    COVID-19, PFIZER PURPLE top, DILUTE for use, (age 15 y+), 30mcg/0.3mL 08/06/2021, 08/27/2021       Recent Vital Data:  Patient Vitals for the past 24 hrs:   Temp Pulse Resp BP SpO2   07/19/22 0716 98.7 °F (37.1 °C) 91 18 102/64 91 %   07/19/22 0408 98.8 °F (37.1 °C) 88 16 98/61 94 %   07/19/22 0002 (!) 100.6 °F (38.1 °C) 97 17 96/65 94 %   07/18/22 1959 100.4 °F (38 °C) 89 17 103/68 94 %   07/18/22 1606 99.1 °F (37.3 °C) 80 -- 102/70 95 %   07/18/22 1142 99 °F (37.2 °C) 82 18 97/70 96 %       Oxygen Therapy  SpO2: 91 %  Pulse Oximeter Device Mode: Intermittent  O2 Device: None (Room air)    Estimated body mass index is 20.92 kg/m² as calculated from the following:    Height as of this encounter: 5' 11\" (1.803 m). Weight as of this encounter: 150 lb (68 kg). Intake/Output Summary (Last 24 hours) at 7/19/2022 1039  Last data filed at 7/19/2022 0920  Gross per 24 hour   Intake 320 ml   Output --   Net 320 ml       Physical Exam  Vitals and nursing note reviewed.    Constitutional:       General: He is not in acute distress. Appearance: He is not ill-appearing or diaphoretic. HENT:      Head: Normocephalic and atraumatic. Eyes:      Extraocular Movements: Extraocular movements intact. Cardiovascular:      Rate and Rhythm: Normal rate and regular rhythm. Pulmonary:      Effort: Pulmonary effort is normal. No respiratory distress. Breath sounds: No wheezing or rales. Abdominal:      General: Bowel sounds are normal. There is no distension. Palpations: Abdomen is soft. Tenderness: There is no abdominal tenderness. There is no guarding. Musculoskeletal:         General: No deformity. Skin:     Coloration: Skin is pale. Skin is not jaundiced. Neurological:      General: No focal deficit present. Mental Status: He is alert and oriented to person, place, and time. Psychiatric:         Mood and Affect: Mood normal.         Behavior: Behavior normal. Behavior is cooperative.          Signed:  Georgie Mercedes MD

## 2022-07-20 NOTE — TELEPHONE ENCOUNTER
Called patient to schedule TCV appt following discharge from [facility] 07-, Dx Anemia    Patient states he has no questions or concerns and ddoes not have a PCP and does not want to schedule with a PCP.

## 2022-07-21 PROBLEM — E87.6 HYPOKALEMIA: Status: ACTIVE | Noted: 2022-01-01

## 2022-07-21 NOTE — PATIENT INSTRUCTIONS
Patient Instructions from Today's Visit    Reason for Visit:  Follow-up visit for gastric cancer    Diagnosis Information:  [email or web address]    Plan:  -Keytruda today. We may add radiation to the stomach in the future. -Focus on increasing protein in your diet.      Follow Up:  -3 weeks    Recent Lab Results:  Hospital Outpatient Visit on 07/21/2022   Component Date Value Ref Range Status    WBC 07/21/2022 13.1 (A) 4.3 - 11.1 K/uL Final    RBC 07/21/2022 3.04 (A) 4.23 - 5.6 M/uL Final    Hemoglobin 07/21/2022 9.4 (A) 13.6 - 17.2 g/dL Final    Hematocrit 07/21/2022 28.3  % Final    MCV 07/21/2022 93.1  79.6 - 97.8 FL Final    MCH 07/21/2022 30.9  26.1 - 32.9 PG Final    MCHC 07/21/2022 33.2  31.4 - 35.0 g/dL Final    RDW 07/21/2022 21.2 (A) 11.9 - 14.6 % Final    Platelets 92/50/0991 118 (A) 150 - 450 K/uL Final    MPV 07/21/2022 10.4  9.4 - 12.3 FL Final    nRBC 07/21/2022 0.00  0.0 - 0.2 K/uL Final    **Note: Absolute NRBC parameter is now reported with Hemogram**    Differential Type 07/21/2022 AUTOMATED    Final    Seg Neutrophils 07/21/2022 86 (A) 43 - 78 % Final    Lymphocytes 07/21/2022 4 (A) 13 - 44 % Final    Monocytes 07/21/2022 8  4.0 - 12.0 % Final    Eosinophils % 07/21/2022 0 (A) 0.5 - 7.8 % Final    Basophils 07/21/2022 1  0.0 - 2.0 % Final    Immature Granulocytes 07/21/2022 1  0.0 - 5.0 % Final    Segs Absolute 07/21/2022 11.2 (A) 1.7 - 8.2 K/UL Final    Absolute Lymph # 07/21/2022 0.6  0.5 - 4.6 K/UL Final    Absolute Mono # 07/21/2022 1.1  0.1 - 1.3 K/UL Final    Absolute Eos # 07/21/2022 0.0  0.0 - 0.8 K/UL Final    Basophils Absolute 07/21/2022 0.1  0.0 - 0.2 K/UL Final    Absolute Immature Granulocyte 07/21/2022 0.2  0.0 - 0.5 K/UL Final    Sodium 07/21/2022 138  136 - 145 mmol/L Final    Potassium 07/21/2022 3.0 (A) 3.5 - 5.1 mmol/L Final    Chloride 07/21/2022 106  98 - 107 mmol/L Final    CO2 07/21/2022 23  21 greater  Chills  Shortness of breath  Swelling or pain in one leg    After office hours an answering service is available and will contact a provider for emergencies or if you are experiencing any of the above symptoms. Patient does express an interest in My Chart. My Chart log in information explained on the after visit summary printout at the [facility] [address] desk.     Hugh Jackson RN

## 2022-07-21 NOTE — PROGRESS NOTES
Treatment completed. Patient refused full 30 minute observation period. No signs of rxn noted. Patient discharged ambulatory.

## 2022-07-21 NOTE — PROGRESS NOTES
Patient arrived to [facility] for D1C1 Keytruda/K+. Assessment completed. No needs voiced at this time. Patient tolerating infusion well. Patient report given to Maddie Lemos RN for continuation of care. Patient is aware of next appointment on 8/15/2022 @1300.

## 2022-07-21 NOTE — PROGRESS NOTES
Patient arrived to port lab for port access and lab draw   Taz Orozco 45 accessed and labs drawn per protocol   *Port remains accessed   Patient discharged from port lab ambualtory*

## 2022-07-21 NOTE — PROGRESS NOTES
[facility] Hematology & Oncology: Office Visit Progress Note      Chief Complaint:     Gastric cancer   Liver mets      History of Present Illness:   Reason for Referral: Stomach cancer with metastasis to the liver       Referring Provider:  Dr. Rios Mccall       Primary Care Provider: None       Family History of Cancer/Hematologic Disorders: None reported       Presenting Symptoms: Weight loss, trouble swallowing, right flank/lower quadrant abdominal pain, gross hematuria, and abnormal CT urogram        Mr. Lia Gowers is a 56 y.o. white male with no pertinent medical history who presented to the [facility] ED on 10/15/20 reporting right flank/ lower quadrant  abdominal pain and gross hematuria. CT urogram was obtained in the ED with findings of a large mass centered in the lesser curvature the stomach measuring up to 8.6 cm invading into the lesser omentum concerning for a primary gastric cancer; multiple  large hypoattenuating hepatic metastases; retroperitoneal metastatic adenopathy; and small non-obstructing bilateral renal calculi measuring up to 4 mm in the right with mild right hydroureter but no obstructing stone evident (findings could indicate  a recently passed or nonradiopaque stone). Upon discussion of imaging results, patient endorsed recent history of weight loss and trouble swallowing. Case was discussed with Oncologist, Dr. Derian Melo, who agreed to see patient in outpatient clinic on 10/19. CT OF THE ABDOMEN AND PELVIS WITHOUT IV CONTRAST 10/15/2020   FINDINGS:   Visualized thorax: Normal.   Liver: The liver demonstrates multiple large hypoattenuating masses, the largest centered in segment 6 measuring 10.0 x 8.7 cm. Partially imaged lesion in the left hepatic lobe measuring 6.6 x 2.8 cm. Gallbladder/biliary: Normal gallbladder. No biliary dilatation.     Pancreas: Normal.    Spleen: Normal.    Adrenals: Normal.    Kidneys: Small bilateral nonobstructing renal calculi measuring up to 4 mm in the right inferior pole. There is mild right hydroureter but no definite radiopaque ureteral stone is evident. A calcification in the right pelvis likely reflects an intravascular  phleboliths. Bladder: Normal.   Pelvic organs: Unremarkable prostate and seminal vesicles. Gastrointestinal: There is a large mass partially imaged, which appears to be centered along the greater curvature of the stomach, involving the gastric body, fundus, and cardia in likely the gastroesophageal junction. The mass is ill-defined but measures  approximately 8.6 x 7.5 cm. The mass invades into the lesser omentum and may focally contacts the left diaphragmatic hans. Peritoneum/retroperitoneum: No free fluid or worrisome peritoneal nodule. Invasion of the lesser omentum by the gastric mass, as above. Small bilateral fat-containing inguinal hernias. Lymph nodes: Retroperitoneal lymphadenopathy, including a lesser omentum metastatic node measuring 6.4 x 3.7 cm, a 2.2 x 1.5 cm retrocaval lymph node, and a 1.7 cm short axis left para-aortic lymph node. Vessels: Unremarkable noncontrast appearance. Bones/Soft tissues: No aggressive osseous lesion. IMPRESSION:    1. Large mass centered in the lesser curvature the stomach measuring up to 8.6 cm invading into the lesser omentum concerning for a primary gastric cancer. 2.  Multiple large hypoattenuating hepatic metastases. 3.  Retroperitoneal metastatic adenopathy. 4.  Small nonobstructing bilateral renal calculi measuring up to 4 mm in the right. There is mild right hydroureter but no obstructing stone is evident. Findings could indicate a recently passed or nonradiopaque stone. Liver biopsy reported metastatic carcinoma c/w upper GI origin:              CT 10/21/20: IMPRESSION:     1. Large mass involving the gastric cardia and lesser curvature measuring 7 cm   x 5 cm in size. Some involvement of the most distal esophagus cannot be   excluded.  Direct tumor spread is seen in the retroperitoneum which appears to   encase the distal celiac axis and its proximal major branches. 2. Clear hepatic metastatic disease. In addition, retroperitoneal adenopathy is   seen concerning for metastatic lymph nodes. No evidence for metastatic disease   is otherwise seen. A nonspecific 4 mm x 2 mm left lower lobe nodule is seen. Although an early metastasis is not excluded with certainty, the appearance is   felt to be nonspecific and not clearly suggestive of pulmonary metastatic   disease. Attention on follow-up studies is recommended. He started first line mFOLFIRINOX 11/5/20. Interim history update in A/P. Review of Systems:      Constitutional  Weight loss. Anorexia. Denies fever or chills. Insomnia. HEENT  Denies trauma, bluring vision, hearing loss, ear pain, nosebleeds, sore throat, neck pain and ear discharge. Skin  Denies lesions or rashes. Lungs  Denies shortness of breath, cough, sputum production or hemoptysis. Cardiovascular  Denies chest pain, palpitations, orthopnea, claudication and leg swelling. Gastrointestinal  Nausea. Diarrhea. Denies vomiting. Dark stools. Abdominal pain.   Denies dysuria, frequency or hesitancy of urination     Neuro  Finger numb. Denies headaches, visual changes or ataxia. Denies dizziness, speech change, focal weakness and headaches. Hematology  Denies nasal/gum bleeding, denies easy bruise     Endo  Denies heat/cold intolerance, denies diabetes. MSK  Denies back pain, swollen legs, myalgias and falls. Psychiatric/Behavioral   insomnia. Denies depression and substance abuse. The patient is not nervous/anxious.           No Known Allergies  Past Medical History:   Diagnosis Date    Acute gastrointestinal bleeding 7/17/2022    COVID 1/30/2022    Gastric cancer ([facility] [address]     being treated currently - chemo and radiation    History of blood transfusion 6/30/2022 no rxn to this transfusion    Lazy eye, left     Severe anemia 7/12/2022     Past Surgical History:   Procedure Laterality Date    IR PORT PLACEMENT EQUAL OR GREATER THAN 5 YEARS  11/3/2020    IR PORT PLACEMENT EQUAL OR GREATER THAN 5 YEARS  11/3/2020    IR PORT PLACEMENT EQUAL OR GREATER THAN 5 YEARS 11/3/2020 [facility]    ORTHOPEDIC SURGERY      ankle    TONSILLECTOMY      UPPER GASTROINTESTINAL ENDOSCOPY N/A 7/5/2022    EGD DILATION BALLOON performed by Natasha Hyatt MD at [address] N/A 7/14/2022    EGD STENT PLACEMENT performed by Xi Castillo MD at [address]  10/22/2020    US GUIDED LIVER BIOPSY PERCUTANEOUS 10/22/2020 [facility] [address]     Family History   Problem Relation Age of Onset    Schizophrenia Mother     Diabetes Mother     Dementia Mother     Depression Mother      Social History     Socioeconomic History    Marital status:      Spouse name: Not on file    Number of children: Not on file    Years of education: Not on file    Highest education level: Not on file   Occupational History    Not on file   Tobacco Use    Smoking status: Never    Smokeless tobacco: Never   Vaping Use    Vaping Use: Never used   Substance and Sexual Activity    Alcohol use: Yes     Comment: rarely    Drug use: Never    Sexual activity: Not on file   Other Topics Concern    Not on file   Social History Narrative    Not on file     Social Determinants of Health     Financial Resource Strain: Not on file   Food Insecurity: Not on file   Transportation Needs: Not on file   Physical Activity: Not on file   Stress: Not on file   Social Connections: Not on file   Intimate Partner Violence: Not on file   Housing Stability: Not on file     Current Outpatient Medications   Medication Sig Dispense Refill    ferrous sulfate (FE TABS 325) 325 (65 Fe) MG EC tablet Take 1 tablet by mouth daily (with breakfast) 30 tablet 0 pantoprazole (PROTONIX) 40 MG tablet Take 40 mg by mouth daily      LORazepam (ATIVAN) 1 MG tablet take 1 tablet by mouth every 6 hours as needed for nausea 90 tablet 0    UDENYCA 6 MG/0.6ML injection INJECT 6MG SUBCUTANEOUSLY EVERY 14 DAYS AS DIRECTED      diphenoxylate-atropine (LOMOTIL) 2.5-0.025 MG per tablet Take 1 tablet by mouth 4 times daily as needed. gabapentin (NEURONTIN) 300 MG capsule Take 300 mg by mouth 2 times daily. lidocaine-prilocaine (EMLA) 2.5-2.5 % cream Apply topically as needed      mirtazapine (REMERON) 30 MG tablet Take 30 mg by mouth nightly       ondansetron (ZOFRAN) 8 MG tablet Take 8 mg by mouth every 8 hours as needed      prochlorperazine (COMPAZINE) 10 MG tablet Take 10 mg by mouth every 6 hours as needed       No current facility-administered medications for this visit. Facility-Administered Medications Ordered in Other Visits   Medication Dose Route Frequency Provider Last Rate Last Admin    sodium chloride flush 0.9 % injection 10 mL  10 mL IntraVENous PRN Aryan Cornejo MD   10 mL at 07/21/22 1335    atropine injection 0.4 mg  0.4 mg IntraVENous Once Aryan Cornejo MD           OBJECTIVE:  /76 (Site: Left Upper Arm, Position: Sitting, Cuff Size: Medium Adult)   Pulse (!) 105   Temp 98.3 °F (36.8 °C)   Resp 16   Ht 5' 11.5\" (1.816 m)   Wt 157 lb (71.2 kg)   SpO2 97%   BMI 21.59 kg/m²     Physical Exam:  Constitutional: Oriented to person, place, and time. Well-developed. Thin. HEENT: Normocephalic and atraumatic. Oropharynx is clear and moist.   Conjunctivae and EOM are normal. Pupils are equal, round, and reactive to light. No scleral icterus. Neck supple. No JVD present. No tracheal deviation present. No thyromegaly present. Lymph node No palpable submandibular, cervical, supraclavicular, axillary and inguinal lymph nodes. Skin Warm and dry. No bruising and no rash noted. No erythema. No pallor.     Respiratory Effort normal and breath sounds normal.  No respiratory distress. No wheezes. No rales. No tenderness. CVS Normal rate, regular rhythm and normal heart sounds. Exam reveals no gallop, no friction and no rub. No murmur heard. Abdomen Soft. Bowel sounds are normal. Exhibits no distension. There is no tenderness. There is no rebound and no guarding. Neuro Normal reflexes. No cranial nerve deficit. Exhibits normal muscle tone, 5 of 5 strength of all extremities. MSK Normal range of motion in general.  No edema and no tenderness.    Psych Normal mood, affect, behavior, judgment and thought content      Labs:  Recent Results (from the past 24 hour(s))   CBC with Auto Differential    Collection Time: 07/21/22  1:26 PM   Result Value Ref Range    WBC 13.1 (H) 4.3 - 11.1 K/uL    RBC 3.04 (L) 4.23 - 5.6 M/uL    Hemoglobin 9.4 (L) 13.6 - 17.2 g/dL    Hematocrit 28.3 %    MCV 93.1 79.6 - 97.8 FL    MCH 30.9 26.1 - 32.9 PG    MCHC 33.2 31.4 - 35.0 g/dL    RDW 21.2 (H) 11.9 - 14.6 %    Platelets 610 (L) 868 - 450 K/uL    MPV 10.4 9.4 - 12.3 FL    nRBC 0.00 0.0 - 0.2 K/uL    Differential Type AUTOMATED      Seg Neutrophils 86 (H) 43 - 78 %    Lymphocytes 4 (L) 13 - 44 %    Monocytes 8 4.0 - 12.0 %    Eosinophils % 0 (L) 0.5 - 7.8 %    Basophils 1 0.0 - 2.0 %    Immature Granulocytes 1 0.0 - 5.0 %    Segs Absolute 11.2 (H) 1.7 - 8.2 K/UL    Absolute Lymph # 0.6 0.5 - 4.6 K/UL    Absolute Mono # 1.1 0.1 - 1.3 K/UL    Absolute Eos # 0.0 0.0 - 0.8 K/UL    Basophils Absolute 0.1 0.0 - 0.2 K/UL    Absolute Immature Granulocyte 0.2 0.0 - 0.5 K/UL   Comprehensive Metabolic Panel    Collection Time: 07/21/22  1:26 PM   Result Value Ref Range    Sodium 138 136 - 145 mmol/L    Potassium 3.0 (L) 3.5 - 5.1 mmol/L    Chloride 106 98 - 107 mmol/L    CO2 23 21 - 32 mmol/L    Anion Gap 9 7 - 16 mmol/L    Glucose 204 (H) 65 - 100 mg/dL    BUN 10 6 - 23 MG/DL    Creatinine 0.90 0.8 - 1.5 MG/DL    GFR African American >60 >60 ml/min/1.73m2    GFR Non-African American >60 >60 ml/min/1.73m2    Calcium 7.7 (L) 8.3 - 10.4 MG/DL    Total Bilirubin 0.3 0.2 - 1.1 MG/DL    ALT 43 12 - 65 U/L    AST 64 (H) 15 - 37 U/L    Alk Phosphatase 204 (H) 50 - 136 U/L    Total Protein 5.8 (L) 6.3 - 8.2 g/dL    Albumin 2.3 (L) 3.5 - 5.0 g/dL    Globulin 3.5 2.3 - 3.5 g/dL    Albumin/Globulin Ratio 0.7 (L) 1.2 - 3.5     TSH    Collection Time: 07/21/22  1:26 PM   Result Value Ref Range    TSH, 3RD GENERATION 111.000 (H) 0.358 - 3.740 uIU/mL   CEA    Collection Time: 07/21/22  1:26 PM   Result Value Ref Range    CEA 50.9 (H) 0.0 - 3.0 ng/mL       Imaging:  No results found for this or any previous visit. ASSESSMENT/PLAN:   Diagnosis Orders   1. Malignant neoplasm of overlapping sites of stomach (HCC)  CBC With Auto Differential    Comprehensive metabolic panel    TSH without Reflex    CORTISOL    HEPATITIS B SURFACE ANTIGEN    Hepatitis B core antibody, total    Hepatitis B surface antibody    CBC With Auto Differential    Comprehensive metabolic panel    CBC With Auto Differential    Comprehensive metabolic panel    TSH without Reflex    CBC With Auto Differential    Comprehensive metabolic panel    Comprehensive Metabolic Panel    CBC with Auto Differential    CEA      2. CINV (chemotherapy-induced nausea and vomiting)  CBC With Auto Differential    Comprehensive metabolic panel    TSH without Reflex    CORTISOL    HEPATITIS B SURFACE ANTIGEN    Hepatitis B core antibody, total    Hepatitis B surface antibody    CBC With Auto Differential    Comprehensive metabolic panel    CBC With Auto Differential    Comprehensive metabolic panel    TSH without Reflex    CBC With Auto Differential    Comprehensive metabolic panel      3. Hypokalemia        4. High risk medication use        5. Hematochezia        6. Hospital discharge follow-up        7. Anemia, unspecified type  Ferritin    Transferrin Saturation      8.  Hypothyroidism, unspecified type               56 y.o. M consulted for gastric cancer and liver mets in 10/2020. 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 He presented to [facility] on 10[facility]20 for pain and suspect kidney stone, CT urogram showed large gastric mass and liver masses, urgently  presented to [facility] on 10/19/20 with wife, and we discussed this appeared almost certain of metastatic malignancy, I showed CT images to pt and wife per request, arrange staging CT C/A/P, liver mass biopsy, consult FC and SW for sponsorship, discussed nutrition  and manage dysphagia, refer to [facility] cancer society for Ensure.       Liver biopsy reported metastatic carcinoma c/w upper GI origin, and I discussed with pt and wife this together with the CT finding of large gastric mass is consistent with stage IV gastric cancer, not expected to be curable but palliative rx may prolong  life and improve symptoms, both were motivated to pursue and rec start mFOLFIRINOX aiming for rapid response given the symptoms of dysphagia and rapid weight loss, check Caris pending/HER2 -, need to defer [address] trip, arrange port and antiemetics, remeron  for insomnia/anorexia, ativan as needed, educate for toxicities and management, can not tolerated Ensure and he attributed to sugar, try glucerna and consult nutritionist, started cycle 1 palliative FOLFIRINOX 11/5/20, tumor marker down and bloating improved,  encouraging clinical response, increase remeron and use ativan for insomnia, laxative prn diarrhea and find out particular association with diet, due for cycle 4 and neutropenic again, add GCSF and keep schedule for his birthday arrangement, c/o low libido  and check testosterone level normal, continue remeron managing insomnia/depression, Emend for nausea, titrate imodium for proper rx of diarrhea, prepH for hemorroid, due for cycle 7 and deferred for neutropenia, returned 2/10/21 and ANC 1.3, he was reluctant  to use GCSF for the cost, discussed with FC and obtained eudena assistance, diarrhea responded to imodium, CT 2/10/2021 showed further shrinking of liver and the gastric mass, a periaortic lymph node will be monitored for change, reported neuropathy  worsened after cycle 9 with persistent numbness in the fingertips, discussed that the risk of worse neuropathy and hold oxaliplatin for cycle 10, neuropathy improved and proceed to cycle 12 with lower dose of oxaliplatin at 50 mg/m², monitor mild anemia  and thrombocytopenia, report mild neuropathy stable, repeat CT 5/12/2021 showed generally stable or improved disease except for the retroperitoneal lymph node next to left kidney continues to grow, which explains the increase of the CEA, we discussed  that since this appeared to be the solitary progression of disease, I referred him to radiation oncology to consider SBRT done in 6/2021, which may be cause of the CEA spike and monitor, we continued the systemic therapy with modified FOLFIRINOX with  dose modification of oxaliplatin and monitor neuropathy reportedly stable, on gabapentin, arranged for Covid vaccination now that he finally would accept it, reschedule following chemo's to give 2 weeks of response time to each vaccine dose, repeat CT  8/12/21 showed stable disease, meanwhile concerned of the CEA trending up for early disease progression versus the result of treatment delay due to coordination of Covid vaccine, he will complete second dose of vaccine on time and return in 2 weeks for  next cycle, also discussed further systemic treatment may be limited to IO versus Taxol/ramucirumab and his persistent neuropathy would be a concern, Enhertu for HER2 1+ on IHC has been proven effective in breast cancer but may need replacement if using  in gastric cancer, also discussed surgical resection given there is no new disease developed over the past 10 months although the quality of life expected to change significantly if pursuing surgery which would not be considered curative after all. CT  after cycle 22 showed disease progression of a para-aortic lymph node and a new lesion in the left adrenal gland: We discussed the options to add SBRT to the 2 oligo progression, versus changing to Ebenezerabby Mata, his neuropathy remains a major obstacle for changing chemotherapy, discussed with Dr. Pinky Lopez and completed SBRT in 5 fractions for both sites, return on 1/19/2022,  platelet 48 and , he forgot coming for [facility] and last cycle, we had to hold off chemo and come back next week for potentially cycle 28, however he got COVID with fever for 10 days, arranged to receive monoclonal antibody, returned on 2/16/2022,  still with left lung crackles but generally recovered, started having sense of dysphagia again after off chemo for a month and half, restart FOLFIRINOX with Udenyca for neutropenia, repeat CEA, continue Remeron, Ambien for insomnia, repeat CT 3/1/2022  showed a small amount of disease progression:       We discussed that this is generally similar disease burden after 6 weeks of chemo break, discussed with Dr. Pinky Lopez for SBRT of the small new liver lesion and the left adrenal mass growth, Dr. Pinky Lopez would like to hold off to see whether more metastasis  develops, trend CEA, continue FOLFIRINOX with Udenyca for neutropenia, platelet 95 and okay to proceed with cycle 35 with dose reduced bolus 5-FU, but complaining of dysphagia and weight loss, urgent referral for GI evaluation with EGD/dilation if possible, discussed the nutrition and he reports intolerance of Ensure and appear to having lactose intolerance, try lactose-free for diet, plan to go to vacation in [place] and defer next cycle after return, call as needed.     He returned on 6/29/2022, discussed that the restaging CT yesterday showed disease progression:    Discussed that the radiographical progression and his clinical dysphagia and weight loss indicate change of therapy, discussed further options of chemotherapy with or without chemo given his CPS 30%, versus Taxol/ramucirumab which would be challenging to his neuropathy, decided to pursue authorization of single agent Keytruda, had a EGD 7/5/2022 showed GE junction friable malignant stricture that was dilated but no stent was placed, worked in on 7/12/2022 still complaining of no improvement of dysphagia and need to discuss with GI for stent placement, very tired and hemoglobin 5.3, dark stool, arrange PRBC 2 units and platelet 1 unit, admitted on 7/14 and received GE junction stent placement, discharged on 7/17 and returned on 7/21, hemoglobin improved, baseline TSH surprisingly 111, start Synthroid 50 mcg daily and trend TSH while proceed to [facility], would add radiation if continues to have GI bleeding, return in 3 weeks but call as needed.      Problem complexity: high   Treatment risk of complication/morbidity: high   Keytruda:  Cardiovascular: Peripheral edema (11% to 15%), cardiac arrhythmia (11%)  Dermatologic: Pruritus (11% to 28%), skin rash (13% to 24%), vitiligo (13%)  Endocrine & metabolic: Hyperglycemia (72% to 59%), hyponatremia (10% to 46%), hypoalbuminemia (24% to 44%), hypertriglyceridemia (33% to 43%), hypophosphatemia (19% to 29%), hypocalcemia (15% to 27%), hyperkalemia (13% to 23%), decreased serum bicarbonate (22%), hypercalcemia (14% to 22%), hypercholesterolemia (20%), hypokalemia (15% to 20%), hypoglycemia (13% to 19%), hypothyroidism (9% to 18%), hypomagnesemia (16%), weight loss (10% to 15%)  Gastrointestinal: Diarrhea (12% to 28%), decreased appetite (15% to 25%), constipation (12% to 22%), abdominal pain (13% to 22%), nausea (11% to 22%), vomiting (11% to 19%)  Genitourinary: Hematuria (12% to 19%), urinary tract infection (12% to 19%)  Hematologic & oncologic: Lymphocytopenia (24% to 54%; grades 3/4: 1% to 25%), anemia (17% to 54%; grades 3/4: 1% to 24%), leukopenia (35%; grades 3/4: 9%),

## 2022-07-25 NOTE — PROGRESS NOTES
Nutrition F/U:  Assessment:  Pt seen prior to office visit w/ Dr. Alva Payment, receiving Izabelal Villarreal today. Pt s/p EGD w/ dilation - was unsuccessful and had esophageal stent placed by Dr. Hakeem Mueller on (7/14). Pt reports tolerating a Regular diet w/ good appetite/po intake since stent placement, still avoiding dairy as this causes diarrhea, Hgb trending up. Current BW: 157#, stable over the past 3 weeks. Intervention:  1. Continue w/ Regular diet, 3 meals + 2-3 snacks per day, smoothies/protein shakes w/ plant based protein powder and almond milk. Monitoring/Evaluation:  1. Acute nutrition problem resolved, RD to sign off - please re-consult if change in status and further nutrition intervention is warranted.      3 [facility], [address], [address] [facility]

## 2022-08-01 PROBLEM — J18.9 PNA (PNEUMONIA): Status: ACTIVE | Noted: 2022-01-01

## 2022-08-01 PROBLEM — K92.2 GIB (GASTROINTESTINAL BLEEDING): Status: ACTIVE | Noted: 2022-01-01

## 2022-08-01 PROBLEM — E03.9 HYPOTHYROIDISM: Status: ACTIVE | Noted: 2022-01-01

## 2022-08-01 PROBLEM — T45.1X5A CINV (CHEMOTHERAPY-INDUCED NAUSEA AND VOMITING): Status: ACTIVE | Noted: 2020-11-19

## 2022-08-01 PROBLEM — R11.2 CINV (CHEMOTHERAPY-INDUCED NAUSEA AND VOMITING): Status: ACTIVE | Noted: 2020-11-19

## 2022-08-01 NOTE — ED NOTES
TRANSFER - OUT REPORT:    Verbal report given to cindy on [facility]  being transferred to [id number] for routine progression of patient care       Report consisted of patient's Situation, Background, Assessment and   Recommendations(SBAR). Information from the following report(s) ED SBAR was reviewed with the receiving nurse. Lines:   Single Lumen Implantable Port Right Subclavian (Active)       Peripheral IV 08/01/22 Left Antecubital (Active)       Peripheral IV 08/01/22 Left Forearm (Active)        Opportunity for questions and clarification was provided.       Patient transported with:  Lorenza Mcpherson RN  08/01/22 5624

## 2022-08-01 NOTE — CONSULTS
[facility] Consult Note           Referring Provider:  Pramod Tavera DO    Consult Date:  8/1/2022    Admit Date:  8/1/2022    Chief Complaint:  GI bleed/melena, anemia    Subjective:     History of Present Illness:  Patient is a 56 y.o. male present to the ER with fatigue, darker than usual stools yesterday, melena and anemia with a hgb of 9.0. Last prior hgb was 9.4 on 7/21/22. He denies N/V or abdominal pain. Patient was diagnosed with metastatic gastric cancer to liver in 10/20 and is followed by Dr Joleen Acuna. Recently was started on Marie Carry with RT possibly a future consideration. I performed and EGD on 7-5 for FTT and dysphagia. An distal esophageal stricture from his gastric tumor and an ulcer was noted. He was dilated to a 18 mm balloon and his meds were maximized but his swallowing did not improve. On 7/14/22 Dr Nicholas Ugarte placed a 23 X 155 mm stent under flouro with improvement in his ability to eat etc.    Notably his CXR shows that the stent may have advanced some with the proximal edge at the EG junction. There is also a retrocardiac opacity and an infection cannot be excluded. His WBC is 15K with a left shift. Patient on Rocephin and Zithromax.       PMH:  Past Medical History:   Diagnosis Date    Acute gastrointestinal bleeding 7/17/2022    COVID 1/30/2022    Gastric cancer ([facility] [address]     being treated currently - chemo and radiation    History of blood transfusion     6/30/2022 no rxn to this transfusion    Lazy eye, left     Severe anemia 7/12/2022       PSH:  Past Surgical History:   Procedure Laterality Date    IR PORT PLACEMENT EQUAL OR GREATER THAN 5 YEARS  11/3/2020    IR PORT PLACEMENT EQUAL OR GREATER THAN 5 YEARS  11/3/2020    IR PORT PLACEMENT EQUAL OR GREATER THAN 5 YEARS 11/3/2020 [facility]    ORTHOPEDIC SURGERY      ankle    TONSILLECTOMY      UPPER GASTROINTESTINAL ENDOSCOPY N/A 7/5/2022    EGD DILATION BALLOON performed by Lisa Centeno MD at [facility] ENDOSCOPY    UPPER GASTROINTESTINAL ENDOSCOPY N/A 7/14/2022    EGD STENT PLACEMENT performed by Laury Toribio MD at [address]  10/22/2020    US GUIDED LIVER BIOPSY PERCUTANEOUS 10/22/2020 [facility] US       Allergies:  No Known Allergies    Home Medications:  Prior to Admission medications    Medication Sig Start Date End Date Taking? Authorizing Provider   levothyroxine (SYNTHROID) 50 MCG tablet Take 1 tablet by mouth in the morning. Patient not taking: Reported on 8/1/2022 7/21/22   Charlotte Ornelas MD   ferrous sulfate (FE TABS 325) 325 (65 Fe) MG EC tablet Take 1 tablet by mouth daily (with breakfast)  Patient not taking: Reported on 8/1/2022 7/19/22   Molly Pham MD   pantoprazole (PROTONIX) 40 MG tablet Take 40 mg by mouth daily    Historical Provider, MD   LORazepam (ATIVAN) 1 MG tablet take 1 tablet by mouth every 6 hours as needed for nausea 7/11/22 8/10/22  Charlotte Ornelas MD   UDENYCA 6 MG/0.6ML injection INJECT 6MG SUBCUTANEOUSLY EVERY 14 DAYS AS DIRECTED 6/27/22   Historical Provider, MD   diphenoxylate-atropine (LOMOTIL) 2.5-0.025 MG per tablet Take 1 tablet by mouth 4 times daily as needed. Patient not taking: Reported on 8/1/2022 9/16/21   Ar Automatic Reconciliation   gabapentin (NEURONTIN) 300 MG capsule Take 300 mg by mouth 2 times daily.  3/21/22   Ar Automatic Reconciliation   lidocaine-prilocaine (EMLA) 2.5-2.5 % cream Apply topically as needed  Patient not taking: Reported on 8/1/2022 2/24/21   Ar Automatic Reconciliation   mirtazapine (REMERON) 30 MG tablet Take 30 mg by mouth nightly  2/21/22   Ar Automatic Reconciliation   ondansetron (ZOFRAN) 8 MG tablet Take 8 mg by mouth every 8 hours as needed  Patient not taking: Reported on 8/1/2022 10/28/20   Ar Automatic Reconciliation   prochlorperazine (COMPAZINE) 10 MG tablet Take 10 mg by mouth every 6 hours as needed  Patient not taking: Reported on 8/1/2022 10/28/20   Ar Automatic Reconciliation       Hospital Medications:  Current Facility-Administered Medications   Medication Dose Route Frequency    [START ON 8/2/2022] cefTRIAXone (ROCEPHIN) 1,000 mg in sodium chloride 0.9 % 50 mL IVPB mini-bag  1,000 mg IntraVENous Q24H    azithromycin (ZITHROMAX) 500 mg in sodium chloride 0.9 % 250 mL IVPB (Semm1Lpf)  500 mg IntraVENous Q24H    sodium chloride flush 0.9 % injection 5-40 mL  5-40 mL IntraVENous 2 times per day    sodium chloride flush 0.9 % injection 5-40 mL  5-40 mL IntraVENous PRN    0.9 % sodium chloride infusion   IntraVENous PRN    ondansetron (ZOFRAN-ODT) disintegrating tablet 4 mg  4 mg Oral Q8H PRN    Or    ondansetron (ZOFRAN) injection 4 mg  4 mg IntraVENous Q6H PRN    polyethylene glycol (GLYCOLAX) packet 17 g  17 g Oral Daily PRN    acetaminophen (TYLENOL) tablet 650 mg  650 mg Oral Q6H PRN    Or    acetaminophen (TYLENOL) suppository 650 mg  650 mg Rectal Q6H PRN    pantoprazole (PROTONIX) tablet 40 mg  40 mg Oral BID AC    0.9 % sodium chloride infusion   IntraVENous Continuous    gabapentin (NEURONTIN) capsule 300 mg  300 mg Oral BID    [START ON 8/2/2022] levothyroxine (SYNTHROID) tablet 50 mcg  50 mcg Oral QAM AC    LORazepam (ATIVAN) tablet 1 mg  1 mg Oral Q4H PRN    mirtazapine (REMERON) tablet 30 mg  30 mg Oral Nightly     Facility-Administered Medications Ordered in Other Encounters   Medication Dose Route Frequency    atropine injection 0.4 mg  0.4 mg IntraVENous Once       Social History:  Social History     Tobacco Use    Smoking status: Never    Smokeless tobacco: Never   Substance Use Topics    Alcohol use: Yes     Comment: rarely       Pt denies any history of drug use, blood transfusions, or tattoos. Family History:  Family History   Problem Relation Age of Onset    Schizophrenia Mother     Diabetes Mother     Dementia Mother     Depression Mother        Review of Systems:  A detailed 10 system ROS is obtained, with pertinent positives as listed above.   All others are negative. Diet:      Objective:     Physical Exam:  Vitals:  /71   Pulse 92   Temp 99.1 °F (37.3 °C) (Oral)   Resp 16   Ht 5' 11.5\" (1.816 m)   Wt 157 lb (71.2 kg)   SpO2 96%   BMI 21.59 kg/m²   Gen:  Pt is alert, cooperative, no acute distress  Skin:  Extremities and face reveal no rashes. HEENT: Sclerae anicteric. Cardiovascular: Regular rate and rhythm. No murmurs, gallops, or rubs. Respiratory:  Clear breath sounds anteriorly with no wheezes, rales, or rhonchi. GI:  Abdomen distended, soft, and nontender. Normal active bowel sounds. No enlargement of the liver or spleen. No masses palpable. Rectal:  Deferred  Musculoskeletal:  No pitting edema of the lower legs. Neurological:  Gross memory appears intact. Patient is alert and oriented. Laboratory:    Recent Labs     08/01/22  1211   WBC 15.8*   HGB 9.0*   HCT 27.6*      MCV 96.2   *   K 3.6      CO2 24   BUN 12   AST 87*   ALT 40          Assessment:     Principal Problem:    GIB (gastrointestinal bleeding)  Active Problems:    Hypothyroidism    PNA (pneumonia)    Malignant neoplasm of overlapping sites of stomach (HCC)    CINV (chemotherapy-induced nausea and vomiting)  Resolved Problems:    * No resolved hospital problems. *      Plan:     -IV PPIs, Antibiotics started  -May need repeat EGD to assess ? change or reposition stent and evaluate for source of bleeding.  -If tumor bleeding may need to consider RT etc.  -Consider discussing PEG with patient as an alternative to stent although he seemed to be eating well with present stent.  -Tentatively keep NPO in am for possible procedures etc.  Of note he ate a regular diet today and tolerated it.     Jillian Hammans, MD

## 2022-08-01 NOTE — TELEPHONE ENCOUNTER
Pt's wife states she found home hemoglobin checkers on [address]. I told her this is not something that we prescribe and I am unaware of the accuracy of these. Recommend that if pt is feeling weak/wanting hemoglobin checked then that is something that our office would like to know so we could check the hgb. Pt and wife are currently in ED being evaluated.

## 2022-08-01 NOTE — ED TRIAGE NOTES
Pt c/o generalized weakness and fatigue that started 4-5 days ago. Pt c/o intermittent shortness of breath. Pt denies chest pain. Pt states he had a blood transfusion here two weeks ago. Pt in wheelchair from lobby to triage.

## 2022-08-01 NOTE — H&P
Hospitalist History and Physical   Admit Date:  2022 11:57 AM   Name:  Sugar Steele   Age:  56 y.o. Sex:  male  :  1975   MRN:  [id number]   Room:  ER07/    Presenting Complaint: Fatigue     Reason(s) for Admission: GIB (gastrointestinal bleeding) [K92.2]     History of Present Illness:   Sugar Steele is a 56 y.o. male with medical history of gastric cancer with mets to liver, hypothyroidism, GIB,  status post EGD with dilation that was unsuccessful with esophageal stent placed by Dr. Nancy Mccullough on 2022 who presented with fatigue and dark stools past 2 days. CXR atelectasis versus PNA, hemoglobin stable at 9, leukocytosis present. He did receive Rocephin in the ED, 2 sets [facility] SYSTEM drawn in ED. FOB + in ED. Review of Systems:  10 systems reviewed and negative except as noted in HPI. Assessment & Plan:     Principal Problem:    GIB (gastrointestinal bleeding)  Plan: GI consult  PPI bid  Trend CBC    Questionable PNA noted on CXR:  Continue with Rocephin/Zithromax  Check PNA labs to include procal (procal can be elevated due to cancer, however)  Trend BC x 2 drawn in ED    Hypothyroidism:  Home meds    Hyponatremia, acute:   ml/h  Trend    Gastric Cancer with mets to liver:  Hadley Meckel every 14 days, last received 2022            Dispo/Discharge Plannin-3 days    Diet: ADULT DIET; Regular  VTE ppx: SCDs  Code status: Full Code    Hospital Problems:  Principal Problem:    GIB (gastrointestinal bleeding)  Resolved Problems:    * No resolved hospital problems.  *       Past History:     Past Medical History:   Diagnosis Date    Acute gastrointestinal bleeding 2022    COVID 2022    Gastric cancer ([facility] [address]     being treated currently - chemo and radiation    History of blood transfusion     2022 no rxn to this transfusion    Lazy eye, left     Severe anemia 2022       Past Surgical History:   Procedure Laterality Date    IR PORT PLACEMENT EQUAL OR GREATER THAN 5 YEARS  11/3/2020    IR PORT PLACEMENT EQUAL OR GREATER THAN 5 YEARS  11/3/2020    IR PORT PLACEMENT EQUAL OR GREATER THAN 5 YEARS 11/3/2020 [facility] SPE[facility]    ORTHOPEDIC SURGERY      ankle    TONSILLECTOMY      UPPER GASTROINTESTINAL ENDOSCOPY N/A 7/5/2022    EGD DILATION BALLOON performed by Chase Collins MD at [facility] N/A 7/14/2022    EGD STENT PLACEMENT performed by Soumya Ruiz MD at [facility]  10/22/2020    US GUIDED LIVER BIOPSY PERCUTANEOUS 10/22/2020 [facility] US        Social History     Tobacco Use    Smoking status: Never    Smokeless tobacco: Never   Substance Use Topics    Alcohol use: Yes     Comment: rarely      Social History     Substance and Sexual Activity   Drug Use Never       Family History   Problem Relation Age of Onset    Schizophrenia Mother     Diabetes Mother     Dementia Mother     Depression Mother           Immunization History   Administered Date(s) Administered    COVID-19, PFIZER PURPLE top, DILUTE for use, (age 15 y+), 30mcg/0.3mL 08/06/2021, 08/27/2021     No Known Allergies  Prior to Admit Medications:  Current Outpatient Medications   Medication Instructions    diphenoxylate-atropine (LOMOTIL) 2.5-0.025 MG per tablet 1 tablet, Oral, 4 TIMES DAILY PRN    ferrous sulfate (FE TABS 325) 325 mg, Oral, DAILY WITH BREAKFAST    gabapentin (NEURONTIN) 300 mg, Oral, 2 TIMES DAILY    levothyroxine (SYNTHROID) 50 mcg, Oral, DAILY    lidocaine-prilocaine (EMLA) 2.5-2.5 % cream Topical, PRN    LORazepam (ATIVAN) 1 MG tablet take 1 tablet by mouth every 6 hours as needed for nausea    mirtazapine (REMERON) 30 mg, Oral, NIGHTLY    ondansetron (ZOFRAN) 8 mg, Oral, EVERY 8 HOURS PRN    pantoprazole (PROTONIX) 40 mg, Oral, DAILY    prochlorperazine (COMPAZINE) 10 mg, Oral, EVERY 6 HOURS PRN    UDENYCA 6 MG/0.6ML injection INJECT 6MG SUBCUTANEOUSLY EVERY 14 DAYS AS DIRECTED NoraMinesh) 404 ms    P Axis 62 degrees    R Axis 25 degrees    T Axis 58 degrees    Diagnosis       !!! Poor data quality, interpretation may be adversely affected   CBC with Auto Differential    Collection Time: 08/01/22 12:11 PM   Result Value Ref Range    WBC 15.8 (H) 4.3 - 11.1 K/uL    RBC 2.87 (L) 4.23 - 5.6 M/uL    Hemoglobin 9.0 (L) 13.6 - 17.2 g/dL    Hematocrit 27.6 (L) 41.1 - 50.3 %    MCV 96.2 79.6 - 97.8 FL    MCH 31.4 26.1 - 32.9 PG    MCHC 32.6 31.4 - 35.0 g/dL    RDW 21.0 (H) 11.9 - 14.6 %    Platelets 756 857 - 413 K/uL    MPV 9.5 9.4 - 12.3 FL    nRBC 0.00 0.0 - 0.2 K/uL    Differential Type AUTOMATED      Seg Neutrophils 84 (H) 43 - 78 %    Lymphocytes 7 (L) 13 - 44 %    Monocytes 8 4.0 - 12.0 %    Eosinophils % 0 (L) 0.5 - 7.8 %    Basophils 0 0.0 - 2.0 %    Immature Granulocytes 1 0.0 - 5.0 %    Segs Absolute 13.2 (H) 1.7 - 8.2 K/UL    Absolute Lymph # 1.1 0.5 - 4.6 K/UL    Absolute Mono # 1.2 0.1 - 1.3 K/UL    Absolute Eos # 0.0 0.0 - 0.8 K/UL    Basophils Absolute 0.1 0.0 - 0.2 K/UL    Absolute Immature Granulocyte 0.1 0.0 - 0.5 K/UL   Comprehensive Metabolic Panel    Collection Time: 08/01/22 12:11 PM   Result Value Ref Range    Sodium 132 (L) 136 - 145 mmol/L    Potassium 3.6 3.5 - 5.1 mmol/L    Chloride 101 98 - 107 mmol/L    CO2 24 21 - 32 mmol/L    Anion Gap 7 7 - 16 mmol/L    Glucose 109 (H) 65 - 100 mg/dL    BUN 12 6 - 23 MG/DL    Creatinine 0.81 0.8 - 1.5 MG/DL    GFR African American >60 >60 ml/min/1.73m2    GFR Non- >60 >60 ml/min/1.73m2    Calcium 8.0 (L) 8.3 - 10.4 MG/DL    Total Bilirubin 0.6 0.2 - 1.1 MG/DL    ALT 40 12 - 65 U/L    AST 87 (H) 15 - 37 U/L    Alk Phosphatase 373 (H) 50 - 136 U/L    Total Protein 6.3 6.3 - 8.2 g/dL    Albumin 2.1 (L) 3.5 - 5.0 g/dL    Globulin 4.2 (H) 2.3 - 3.5 g/dL    Albumin/Globulin Ratio 0.5 (L) 1.2 - 3.5     Troponin    Collection Time: 08/01/22 12:11 PM   Result Value Ref Range    Troponin, High Sensitivity <3.0 0 - 14 pg/mL Lactic Acid    Collection Time: 08/01/22 12:16 PM   Result Value Ref Range    Lactic Acid, Plasma 2.0 0.4 - 2.0 MMOL/L   TYPE AND SCREEN    Collection Time: 08/01/22 12:24 PM   Result Value Ref Range    Crossmatch expiration date 08/04/2022,2359     ABO/Rh A POSITIVE     Antibody Screen NEG    Urinalysis    Collection Time: 08/01/22  2:59 PM   Result Value Ref Range    Color, UA YELLOW/STRAW      Appearance CLEAR      Specific Gravity, UA 1.017 1.001 - 1.023      pH, Urine 6.0 5.0 - 9.0      Protein, UA Negative NEG mg/dL    Glucose, UA Negative mg/dL    Ketones, Urine 15 (A) NEG mg/dL    Bilirubin Urine Negative NEG      Blood, Urine Negative NEG      Urobilinogen, Urine 1.0 0.2 - 1.0 EU/dL    Nitrite, Urine Negative NEG      Leukocyte Esterase, Urine TRACE (A) NEG      WBC, UA 0-4 0 /hpf    RBC, UA 0-5 0 /hpf    Epithelial Cells UA 0-5 0 /hpf    BACTERIA, URINE Negative 0 /hpf    Casts 0-2 0 /lpf       I have personally reviewed imaging studies showing:  XR CHEST PORTABLE    Result Date: 8/1/2022  EXAMINATION: XR CHEST PORTABLE 8/1/2022 1:13 PM ACCESSION NUMBER: [id number] COMPARISON: Chest CT 6/28/2022. INDICATION: weakness TECHNIQUE: A single view of the chest was obtained. FINDINGS: Right chest wall Port-A-Cath with tip terminating over the right atrium. The esophagogastric stent appears advanced from previous intraoperative images, now with the proximal aspect of the stent overlying level of the GE junction and most of the stent overlying the stomach. Lung hypoinflation with dense retrograde cardiac opacity. No pneumothorax or sizable pleural effusion. Stable cardiomediastinal silhouette. Intrerval advancement of the esophagogastric stent since prior intraoperative fluoroscopic images dated 7/14/2022, with the proximal aspect of the stent overlying the region of the GE junction. Retrocardiac opacity, which may reflect atelectasis versus infection/aspiration.  The findings of this exam were discussed

## 2022-08-01 NOTE — ACP (ADVANCE CARE PLANNING)
[facility] Hospitalist [facility]  At the heart of better care     Advance Care Planning   Admit Date:  2022 11:57 AM   Name:  Tati Bonilla   Age:  56 y.o. Sex:  male  :  1975   MRN:  [id number]   Room:  [place] [address]    Tati Bonilla is able to make his own decisions:   Yes    If pt unable to make decisions, POA/surrogate decision maker:  Larissa Hugo-spouse    Other people present:   NA    Patient / surrogate decision-maker directed code status:  Full code    Other ACP topics discussed, if applicable:   GI consult, PPI    Patient or surrogate consented to discussion of the current conditions, workup, management plans, prognosis, and the risk for further deterioration. Time spent: 30 minutes in direct discussion.       Signed:  ARY Farrell CNP

## 2022-08-01 NOTE — ED NOTES
Patient resting on stretcher  Reminded of need for urine specimen.   No new complaints of at present     Lupis Flowers RN  08/01/22 [id number]

## 2022-08-01 NOTE — ED PROVIDER NOTES
the past, last being a few weeks ago. Patient states he has had some dark stools but no nithin blood. Patient denies any headache or chest pain or shortness of breath or cold cough or congestion. Patient states he has generalized weakness. Patient does have a history of severe anemia requiring transfusions, thrombocytopenia, chemotherapy-induced nausea and vomiting. The history is provided by the patient. Fatigue  Severity:  Mild  Onset quality:  Gradual  Duration:  1 week  Timing:  Intermittent  Progression:  Waxing and waning  Chronicity:  Recurrent  Context: dehydration    Context: not alcohol use, not change in medication and not drug use    Relieved by:  Nothing  Worsened by:  Nothing  Ineffective treatments:  None tried  Associated symptoms: no abdominal pain, no aphasia, no falls and no fever    Risk factors: anemia      All other systems reviewed and are negative. Review of Systems   Constitutional:  Positive for fatigue. Negative for fever. Gastrointestinal:  Negative for abdominal pain. Musculoskeletal:  Negative for falls. All other systems reviewed and are negative.     Past Medical History:   Diagnosis Date    Acute gastrointestinal bleeding 7/17/2022    COVID 1/30/2022    Gastric cancer ([facility] [address]     being treated currently - chemo and radiation    History of blood transfusion     6/30/2022 no rxn to this transfusion    Lazy eye, left     Severe anemia 7/12/2022        Past Surgical History:   Procedure Laterality Date    IR PORT PLACEMENT EQUAL OR GREATER THAN 5 YEARS  11/3/2020    IR PORT PLACEMENT EQUAL OR GREATER THAN 5 YEARS  11/3/2020    IR PORT PLACEMENT EQUAL OR GREATER THAN 5 YEARS 11/3/2020 [facility] SPE[facility]    ORTHOPEDIC SURGERY      ankle    TONSILLECTOMY      UPPER GASTROINTESTINAL ENDOSCOPY N/A 7/5/2022    EGD DILATION BALLOON performed by Monie Fraser MD at [facility] 7/14/2022    EGD STENT PLACEMENT performed by Vashti Daniel MD Jayde at [address]  10/22/2020    US GUIDED LIVER BIOPSY PERCUTANEOUS 10/22/2020 [facility]OLOG[facility] US        Family History   Problem Relation Age of Onset    Schizophrenia Mother     Diabetes Mother     Dementia Mother     Depression Mother         Social History     Socioeconomic History    Marital status:    Tobacco Use    Smoking status: Never    Smokeless tobacco: Never   Vaping Use    Vaping Use: Never used   Substance and Sexual Activity    Alcohol use: Yes     Comment: rarely    Drug use: Never        Allergies: Patient has no known allergies. Previous Medications    DIPHENOXYLATE-ATROPINE (LOMOTIL) 2.5-0.025 MG PER TABLET    Take 1 tablet by mouth 4 times daily as needed. FERROUS SULFATE (FE TABS 325) 325 (65 FE) MG EC TABLET    Take 1 tablet by mouth daily (with breakfast)    GABAPENTIN (NEURONTIN) 300 MG CAPSULE    Take 300 mg by mouth 2 times daily. LEVOTHYROXINE (SYNTHROID) 50 MCG TABLET    Take 1 tablet by mouth in the morning. LIDOCAINE-PRILOCAINE (EMLA) 2.5-2.5 % CREAM    Apply topically as needed    LORAZEPAM (ATIVAN) 1 MG TABLET    take 1 tablet by mouth every 6 hours as needed for nausea    MIRTAZAPINE (REMERON) 30 MG TABLET    Take 30 mg by mouth nightly     ONDANSETRON (ZOFRAN) 8 MG TABLET    Take 8 mg by mouth every 8 hours as needed    PANTOPRAZOLE (PROTONIX) 40 MG TABLET    Take 40 mg by mouth daily    PROCHLORPERAZINE (COMPAZINE) 10 MG TABLET    Take 10 mg by mouth every 6 hours as needed    UDENYCA 6 MG/0.6ML INJECTION    INJECT 6MG SUBCUTANEOUSLY EVERY 14 DAYS AS DIRECTED        Vitals signs and nursing note reviewed. Patient Vitals for the past 4 hrs:   Temp Pulse Resp BP SpO2   08/01/22 1243 -- 98 16 105/65 100 %   08/01/22 1226 -- (!) 102 16 104/68 100 %   08/01/22 1150 99.7 °F (37.6 °C) (!) 119 16 92/60 96 %          Physical Exam  Vitals and nursing note reviewed. Constitutional:       Appearance: Normal appearance. HENT:      Head: Normocephalic and atraumatic. Nose: Nose normal.      Mouth/Throat:      Mouth: Mucous membranes are dry. Eyes:      Extraocular Movements: Extraocular movements intact. Conjunctiva/sclera: Conjunctivae normal.      Pupils: Pupils are equal, round, and reactive to light. Cardiovascular:      Rate and Rhythm: Normal rate. Pulses: Normal pulses. Heart sounds: Normal heart sounds. Pulmonary:      Effort: Pulmonary effort is normal.      Breath sounds: Normal breath sounds. Abdominal:      General: Abdomen is flat. Genitourinary:     Rectum: Guaiac result positive (dark stools). Musculoskeletal:         General: Normal range of motion. Cervical back: Normal range of motion and neck supple. Skin:     General: Skin is warm. Capillary Refill: Capillary refill takes less than 2 seconds. Neurological:      General: No focal deficit present. Mental Status: He is alert and oriented to person, place, and time. Psychiatric:         Mood and Affect: Mood normal.         Behavior: Behavior normal.         Thought Content:  Thought content normal.         Judgment: Judgment normal.        Procedures    ED EKG Interpretation  EKG was interpreted in the absence of a cardiologist.    Rate: Rate: Tachycardia  EKG Interpretation: EKG Interpretation: no acute changes  ST Segments: Normal ST segments - NO STEMI    Labs Reviewed   CBC WITH AUTO DIFFERENTIAL - Abnormal; Notable for the following components:       Result Value    WBC 15.8 (*)     RBC 2.87 (*)     Hemoglobin 9.0 (*)     Hematocrit 27.6 (*)     RDW 21.0 (*)     Seg Neutrophils 84 (*)     Lymphocytes 7 (*)     Eosinophils % 0 (*)     Segs Absolute 13.2 (*)     All other components within normal limits   COMPREHENSIVE METABOLIC PANEL - Abnormal; Notable for the following components:    Sodium 132 (*)     Glucose 109 (*)     Calcium 8.0 (*)     AST 87 (*)     Alk Phosphatase 373 (*)     Albumin 2.1 (*) Globulin 4.2 (*)     Albumin/Globulin Ratio 0.5 (*)     All other components within normal limits   CULTURE, BLOOD 1   CULTURE, BLOOD 1   LACTIC ACID   TROPONIN   URINALYSIS   TYPE AND SCREEN        XR CHEST PORTABLE    (Results Pending)                            Voice dictation software was used during the making of this note. This software is not perfect and grammatical and other typographical errors may be present. This note has not been completely proofread for errors.       Lawrence Parry MD  08/02/22 [id number]

## 2022-08-01 NOTE — TELEPHONE ENCOUNTER
Calling to ask if a home hemoglobin check could be ordered for pt to have on hand. Spouse says he is in the er right now due to feeling so weak and she feels that if they had a home hgb  they would know to go to the hospital or not , for the future.

## 2022-08-02 NOTE — PROGRESS NOTES
kg/m²   Intake/Output:  No intake/output data recorded. 07/31 1901 - 08/02 0700  In: 1290 [P.O.:240]  Out: -   Exam:  General appearance: alert, cooperative, NAD. PALE. Lungs: CTA ant  Heart: RRR  Abdomen: PROTUBERANT, but soft & non-tender. NABS. Extremities: extremities normal, atraumatic, no cyanosis or edema  Neuro:  alert and oriented    Data Review (Labs):    Recent Labs     08/01/22  1211 08/02/22  0347   WBC 15.8* 8.2   HGB 9.0* 6.6*   HCT 27.6* 20.8*    112*   MCV 96.2 95.9   * 137   K 3.6 3.4*    106   CO2 24 25   BUN 12 12   MG  --  1.8   AST 87*  --    ALT 40  --        Assessment:     Principal Problem:    GIB (gastrointestinal bleeding)    Active Problems:    Hypothyroidism    PNA (pneumonia)    Malignant neoplasm of overlapping sites of stomach (HCC)    CINV (chemotherapy-induced nausea and vomiting)    56 y.o. male who presented with fatigue, darker than usual stools, melena and anemia with a hgb of 9.0, down to 6.6. Likely oozing from known tumor/ulcer, but not demonstrating active/overt bleeding at this time. Hemodynamically stable. Patient was diagnosed with metastatic gastric cancer to liver in 10/20 and is followed by Dr Yohan Jacobsen. EGD on 7/5 for FTT and dysphagia revealed distal esophageal stricture from his gastric tumor and an ulcer was noted. He was dilated to a 18 mm balloon and his meds were maximized but his swallowing did not improve. On 7/14/22 Dr Ashly Johnson placed a 23 X 155 mm stent under flouro with improvement in his ability to eat etc. CXR here showed that the stent may have advanced some with the proximal edge at the EG junction. There is also a retrocardiac opacity and an infection cannot be excluded. Leukocytosis resolved. Plan:     Agree with blood transfusion. Monitor H/H. Clear liquid diet. NPO at midnight. Reassess in AM for possible EGD to evaluate/reposition esophageal stent and evaluate/treat source of UGIB. Continue PPIs. On abx.      Dr. Bridgett Tyler to FU on patient later today and make further recommendations. Huong Carreno PA-C  [facility]    Patient seen and examined by me. Agree with note per JA Huffman. I personally reviewed imaging. NPO for EGD in AM.  Will perform procedure in OR with C-ARM if available. If not, can reposition without fluoroscopy in regular endoscopy room. Will also assess for upper GI bleeding that is felt to be secondary to malignancy.     Will follow    Lilli Huynh MD

## 2022-08-02 NOTE — PROGRESS NOTES
Hospitalist Progress Note   Admit Date:  2022 11:57 AM   Name:  Nikia Gold   Age:  56 y.o. Sex:  male  :  1975   MRN:  [id number]   Room:  366/01    Presenting Complaint: Fatigue     Reason(s) for Admission: GIB (gastrointestinal bleeding) [K92.2]  General weakness [R53.1]  Gastrointestinal hemorrhage, unspecified gastrointestinal hemorrhage type [facility] Course & Interval History:    56 y.o. male with medical history of gastric cancer with mets to liver, hypothyroidism, GIB,  status post EGD with dilation that was unsuccessful with esophageal stent placed by Dr. Catherine Chau on 2022 who presented with fatigue and dark stools past 2 days. CXR atelectasis versus PNA, hemoglobin stable at 9, leukocytosis present. He did receive Rocephin in the ED, 2 sets [facility] drawn in ED. FOB + in ED. 8/2 hemoglobin noted 6.6, 1 unit PRBC ordered. GI consulted. Subjective/24hr Events (22):   8/2 hemoglobin noted 6.6, 1 unit PRBC ordered. A&O x3. Denies any melena, no BM today, however.       Assessment & Plan:   Principal Problem:    GIB (gastrointestinal bleeding)  Plan: GI consult  PPI bid  Trend CBC  8  Clear liquid diet, NPO after midnight for potential EGD in am  No BM today     Questionable PNA noted on CXR:  Continue with Rocephin/Zithromax  Check PNA labs to include procal (procal can be elevated due to cancer, however)  Trend BC x 2 drawn in ED    Procal 0.45, continue to trend  PNA labs still pending  BC NGTD  Day 2 Rocephin/Zithromax  Leukocytosis resolved     Hypothyroidism:  Home meds     Hyponatremia, acute:  Resolved, continue with IVF     Gastric Cancer with mets to liver:  [facility] every 14 days, last received 2022    Thrombocytopenia:  112 with baseline 70s-100s  Likely related to Aurora Rasheed, gastric Ca  Trend  No anticoagulation    UTI:  Trace leukocytes in urine  On Rocephin for PNA  Urine culture            Dispo/Discharge Plannin-3 days     Diet: ADULT DIET; clear liquid diet, NPO after midnight  VTE ppx: SCDs  Code status: Full Code    Hospital Problems:  Principal Problem:    GIB (gastrointestinal bleeding)  Active Problems:    Hypothyroidism    PNA (pneumonia)    Malignant neoplasm of overlapping sites of stomach (HCC)    CINV (chemotherapy-induced nausea and vomiting)  Resolved Problems:    * No resolved hospital problems. *      Objective:   Patient Vitals for the past 24 hrs:   Temp Pulse Resp BP SpO2   08/02/22 0949 98.8 °F (37.1 °C) 92 17 113/75 92 %   08/02/22 0930 -- -- 18 -- --   08/02/22 0914 98.8 °F (37.1 °C) 90 -- 97/65 92 %   08/02/22 0718 98.6 °F (37 °C) 95 16 103/76 91 %   08/02/22 0321 99.7 °F (37.6 °C) 96 17 84/60 90 %   08/01/22 2310 99.5 °F (37.5 °C) 93 17 102/69 90 %   08/01/22 2022 99.3 °F (37.4 °C) 92 17 110/68 92 %   08/01/22 1917 -- 90 -- -- --   08/01/22 1608 99.1 °F (37.3 °C) 92 16 111/71 96 %   08/01/22 1545 98.8 °F (37.1 °C) 88 22 102/65 97 %   08/01/22 1512 -- 89 24 101/65 97 %   08/01/22 1441 -- (!) 106 20 109/67 96 %   08/01/22 1412 -- 89 21 102/66 96 %   08/01/22 1341 -- 96 18 99/64 97 %   08/01/22 1300 -- 93 16 100/63 95 %   08/01/22 1243 -- 98 16 105/65 100 %   08/01/22 1226 -- (!) 102 16 104/68 100 %   08/01/22 1150 99.7 °F (37.6 °C) (!) 119 16 92/60 96 %       Oxygen Therapy  SpO2: 92 %  O2 Device: None (Room air)    Estimated body mass index is 21.59 kg/m² as calculated from the following:    Height as of this encounter: 5' 11.5\" (1.816 m). Weight as of this encounter: 157 lb (71.2 kg). Intake/Output Summary (Last 24 hours) at 8/2/2022 1115  Last data filed at 8/2/2022 2077  Gross per 24 hour   Intake 1290 ml   Output 400 ml   Net 890 ml         Physical Exam:   Blood pressure 113/75, pulse 92, temperature 98.8 °F (37.1 °C), temperature source Oral, resp. rate 17, height 5' 11.5\" (1.816 m), weight 157 lb (71.2 kg), SpO2 92 %. General:          Thin, frail. BMI 21.59.   Head:               Normocephalic, atraumatic  Eyes:               Sclerae appear normal.  Pupils equally round. ENT:                Nares appear normal, no drainage. Moist oral mucosa  Neck:               No restricted ROM. Trachea midline   CV:                  RRR. No m/r/g. No jugular venous distension. Lungs:             CTAB. No wheezing, rhonchi, or rales. Symmetric expansion. Abdomen: Bowel sounds present. Soft, nontender, nondistended. Extremities:     No cyanosis or clubbing. No edema  Skin:                No rashes and normal coloration. Warm and dry. Neuro:             CN II-XII grossly intact. Sensation intact. A&Ox3  Psych:             Normal mood and affect.     I have personally reviewed labs and tests showing:  Recent Labs:  Recent Results (from the past 48 hour(s))   EKG 12 Lead    Collection Time: 08/01/22 12:08 PM   Result Value Ref Range    Ventricular Rate 102 BPM    Atrial Rate 102 BPM    P-R Interval 114 ms    QRS Duration 86 ms    Q-T Interval 310 ms    QTc Calculation (Bazett) 404 ms    P Axis 62 degrees    R Axis 25 degrees    T Axis 58 degrees    Diagnosis       !!! Poor data quality, interpretation may be adversely affected   CBC with Auto Differential    Collection Time: 08/01/22 12:11 PM   Result Value Ref Range    WBC 15.8 (H) 4.3 - 11.1 K/uL    RBC 2.87 (L) 4.23 - 5.6 M/uL    Hemoglobin 9.0 (L) 13.6 - 17.2 g/dL    Hematocrit 27.6 (L) 41.1 - 50.3 %    MCV 96.2 79.6 - 97.8 FL    MCH 31.4 26.1 - 32.9 PG    MCHC 32.6 31.4 - 35.0 g/dL    RDW 21.0 (H) 11.9 - 14.6 %    Platelets 416 989 - 626 K/uL    MPV 9.5 9.4 - 12.3 FL    nRBC 0.00 0.0 - 0.2 K/uL    Differential Type AUTOMATED      Seg Neutrophils 84 (H) 43 - 78 %    Lymphocytes 7 (L) 13 - 44 %    Monocytes 8 4.0 - 12.0 %    Eosinophils % 0 (L) 0.5 - 7.8 %    Basophils 0 0.0 - 2.0 %    Immature Granulocytes 1 0.0 - 5.0 %    Segs Absolute 13.2 (H) 1.7 - 8.2 K/UL    Absolute Lymph # 1.1 0.5 - 4.6 K/UL    Absolute Mono # 1.2 0.1 - 1.3 K/UL Absolute Eos # 0.0 0.0 - 0.8 K/UL    Basophils Absolute 0.1 0.0 - 0.2 K/UL    Absolute Immature Granulocyte 0.1 0.0 - 0.5 K/UL   Comprehensive Metabolic Panel    Collection Time: 08/01/22 12:11 PM   Result Value Ref Range    Sodium 132 (L) 136 - 145 mmol/L    Potassium 3.6 3.5 - 5.1 mmol/L    Chloride 101 98 - 107 mmol/L    CO2 24 21 - 32 mmol/L    Anion Gap 7 7 - 16 mmol/L    Glucose 109 (H) 65 - 100 mg/dL    BUN 12 6 - 23 MG/DL    Creatinine 0.81 0.8 - 1.5 MG/DL    GFR African American >60 >60 ml/min/1.73m2    GFR Non- >60 >60 ml/min/1.73m2    Calcium 8.0 (L) 8.3 - 10.4 MG/DL    Total Bilirubin 0.6 0.2 - 1.1 MG/DL    ALT 40 12 - 65 U/L    AST 87 (H) 15 - 37 U/L    Alk Phosphatase 373 (H) 50 - 136 U/L    Total Protein 6.3 6.3 - 8.2 g/dL    Albumin 2.1 (L) 3.5 - 5.0 g/dL    Globulin 4.2 (H) 2.3 - 3.5 g/dL    Albumin/Globulin Ratio 0.5 (L) 1.2 - 3.5     Troponin    Collection Time: 08/01/22 12:11 PM   Result Value Ref Range    Troponin, High Sensitivity <3.0 0 - 14 pg/mL   Culture, Blood 1    Collection Time: 08/01/22 12:16 PM    Specimen: Blood   Result Value Ref Range    Special Requests NO SPECIAL REQUESTS  LEFT  Antecubital        Culture NO GROWTH AFTER 20 HOURS     Lactic Acid    Collection Time: 08/01/22 12:16 PM   Result Value Ref Range    Lactic Acid, Plasma 2.0 0.4 - 2.0 MMOL/L   Culture, Blood 1    Collection Time: 08/01/22 12:24 PM    Specimen: Blood   Result Value Ref Range    Special Requests NO SPECIAL REQUESTS  LEFT  FOREARM        Culture NO GROWTH AFTER 20 HOURS     TYPE AND SCREEN    Collection Time: 08/01/22 12:24 PM   Result Value Ref Range    Crossmatch expiration date 08/04/2022,7541     ABO/Rh A POSITIVE     Antibody Screen NEG     Unit Number [id number]     Product Code Blood Bank [facility] LRIR     Unit Divison 00     Dispense Status Blood Bank ISSUED     Crossmatch Result Compatible    Urinalysis    Collection Time: 08/01/22  2:59 PM   Result Value Ref Range    Color, UA YELLOW/STRAW      Appearance CLEAR      Specific Gravity, UA 1.017 1.001 - 1.023      pH, Urine 6.0 5.0 - 9.0      Protein, UA Negative NEG mg/dL    Glucose, UA Negative mg/dL    Ketones, Urine 15 (A) NEG mg/dL    Bilirubin Urine Negative NEG      Blood, Urine Negative NEG      Urobilinogen, Urine 1.0 0.2 - 1.0 EU/dL    Nitrite, Urine Negative NEG      Leukocyte Esterase, Urine TRACE (A) NEG      WBC, UA 0-4 0 /hpf    RBC, UA 0-5 0 /hpf    Epithelial Cells UA 0-5 0 /hpf    BACTERIA, URINE Negative 0 /hpf    Casts 0-2 0 /lpf   Procalcitonin    Collection Time: 08/01/22  4:25 PM   Result Value Ref Range    Procalcitonin 0.45 0.00 - 0.49 ng/mL   Basic Metabolic Panel w/ Reflex to MG    Collection Time: 08/02/22  3:47 AM   Result Value Ref Range    Sodium 137 136 - 145 mmol/L    Potassium 3.4 (L) 3.5 - 5.1 mmol/L    Chloride 106 98 - 107 mmol/L    CO2 25 21 - 32 mmol/L    Anion Gap 6 (L) 7 - 16 mmol/L    Glucose 86 65 - 100 mg/dL    BUN 12 6 - 23 MG/DL    Creatinine 0.70 (L) 0.8 - 1.5 MG/DL    GFR African American >60 >60 ml/min/1.73m2    GFR Non- >60 >60 ml/min/1.73m2    Calcium 7.3 (L) 8.3 - 10.4 MG/DL   CBC with Auto Differential    Collection Time: 08/02/22  3:47 AM   Result Value Ref Range    WBC 8.2 4.3 - 11.1 K/uL    RBC 2.17 (L) 4.23 - 5.6 M/uL    Hemoglobin 6.6 (LL) 13.6 - 17.2 g/dL    Hematocrit 20.8 (L) 41.1 - 50.3 %    MCV 95.9 79.6 - 97.8 FL    MCH 30.4 26.1 - 32.9 PG    MCHC 31.7 31.4 - 35.0 g/dL    RDW 21.0 (H) 11.9 - 14.6 %    Platelets 873 (L) 833 - 450 K/uL    MPV 9.8 9.4 - 12.3 FL    nRBC 0.00 0.0 - 0.2 K/uL    Differential Type AUTOMATED      Seg Neutrophils 83 (H) 43 - 78 %    Lymphocytes 6 (L) 13 - 44 %    Monocytes 9 4.0 - 12.0 %    Eosinophils % 0 (L) 0.5 - 7.8 %    Basophils 0 0.0 - 2.0 %    Immature Granulocytes 1 0.0 - 5.0 %    Segs Absolute 6.8 1.7 - 8.2 K/UL    Absolute Lymph # 0.5 0.5 - 4.6 K/UL    Absolute Mono # 0.7 0.1 - 1.3 K/UL    Absolute Eos # 0.0 0.0 - 0.8 K/UL Basophils Absolute 0.0 0.0 - 0.2 K/UL    Absolute Immature Granulocyte 0.1 0.0 - 0.5 K/UL   Magnesium    Collection Time: 08/02/22  3:47 AM   Result Value Ref Range    Magnesium 1.8 1.8 - 2.4 mg/dL   PREPARE RBC (CROSSMATCH), 1 Units    Collection Time: 08/02/22  8:15 AM   Result Value Ref Range    History Check Historical check performed        I have personally reviewed imaging studies showing: Other Studies:  XR CHEST PORTABLE   Final Result   Intrerval advancement of the esophagogastric stent since prior intraoperative   fluoroscopic images dated 7/14/2022, with the proximal aspect of the stent   overlying the region of the GE junction. Retrocardiac opacity, which may reflect atelectasis versus infection/aspiration. The findings of this exam were discussed with  Dr. Marko Gandhi by Dr. Yuli Melgar at 1:39 PM on 8/1/2022.              Current Meds:  Current Facility-Administered Medications   Medication Dose Route Frequency    0.9 % sodium chloride infusion   IntraVENous PRN    magnesium sulfate 2000 mg in 50 mL IVPB premix  2,000 mg IntraVENous PRN    potassium chloride (KLOR-CON M) extended release tablet 40 mEq  40 mEq Oral PRN    Or    potassium bicarb-citric acid (EFFER-K) effervescent tablet 40 mEq  40 mEq Oral PRN    Or    potassium chloride 10 mEq/100 mL IVPB (Peripheral Line)  10 mEq IntraVENous PRN    cefTRIAXone (ROCEPHIN) 1,000 mg in sodium chloride 0.9 % 50 mL IVPB mini-bag  1,000 mg IntraVENous Q24H    azithromycin (ZITHROMAX) 500 mg in sodium chloride 0.9 % 250 mL IVPB ([id number])  500 mg IntraVENous Q24H    sodium chloride flush 0.9 % injection 5-40 mL  5-40 mL IntraVENous 2 times per day    sodium chloride flush 0.9 % injection 5-40 mL  5-40 mL IntraVENous PRN    0.9 % sodium chloride infusion   IntraVENous PRN    ondansetron (ZOFRAN-ODT) disintegrating tablet 4 mg  4 mg Oral Q8H PRN    Or    ondansetron (ZOFRAN) injection 4 mg  4 mg IntraVENous Q6H PRN    polyethylene glycol (GLYCOLAX) packet 17 g  17 g Oral Daily PRN    acetaminophen (TYLENOL) tablet 650 mg  650 mg Oral Q6H PRN    Or    acetaminophen (TYLENOL) suppository 650 mg  650 mg Rectal Q6H PRN    pantoprazole (PROTONIX) tablet 40 mg  40 mg Oral BID AC    0.9 % sodium chloride infusion   IntraVENous Continuous    gabapentin (NEURONTIN) capsule 300 mg  300 mg Oral BID    levothyroxine (SYNTHROID) tablet 50 mcg  50 mcg Oral QAM AC    LORazepam (ATIVAN) tablet 1 mg  1 mg Oral Q4H PRN    mirtazapine (REMERON) tablet 30 mg  30 mg Oral Nightly     Facility-Administered Medications Ordered in Other Encounters   Medication Dose Route Frequency    atropine injection 0.4 mg  0.4 mg IntraVENous Once       Signed:  ARY Pérez - CNP

## 2022-08-02 NOTE — CARE COORDINATION
08/02/22 9037   Service Assessment   Patient Orientation Alert and Oriented;Place;Person;Situation   History Provided By Patient   Primary Caregiver Self   Support Systems Spouse/Significant Other   PCP Verified by CM No  (Uses Dr. Laury Doan and doesn't want a PCP)   Current Functional Level Independent in ADLs/IADLs   Can patient return to prior living arrangement Yes   Ability to make needs known: Good   Would you like for me to discuss the discharge plan with any other family members/significant others, and if so, who? No   Social/Functional History   Lives With Spouse   ADL Assistance Independent   Ambulation Assistance Independent   Transfer Assistance Independent   Discharge Planning   Type of [address] Medications No   Patient expects to be discharged to: House     In to see patient. Patient is an oncology patient of Dr. Rafia Carrillo. Does not want a regular PCP--wants to use Dr. Laury Doan. No discharge needs anticipated at this time. Lives at home with his wife and they care for his mother who has dementia. CM following.

## 2022-08-02 NOTE — CONSENT
Informed Consent for Blood Component Transfusion Note    I have discussed with the patient the rationale for blood component transfusion; its benefits in treating or preventing fatigue, organ damage, or death; and its risk which includes mild transfusion reactions, rare risk of blood borne infection, or more serious but rare reactions. I have discussed the alternatives to transfusion, including the risk and consequences of not receiving transfusion. The patient had an opportunity to ask questions and had agreed to proceed with transfusion of blood components.     Electronically signed by Tucker Rogers DO, DO on 8/2/22 at 8:36 AM EDT

## 2022-08-02 NOTE — PROGRESS NOTES
PT/OT note:  Therapy orders received and chart reviewed. Spoke with RN re: therapy evaluations. Patient getting blood but ok to be seen if patient agreeable per RN. Spoke with patient and he declined therapy services. Patient generally weak but is mobile and did not feel therapy was needed. He was evaluated at last admission and HHPT was recommended but he did not pursue. Patient follows with oncology services and HHPT or OP oncology rehab available at any time if patient chooses. Asked patient to let RN know if he feels acute therapy needs to be reordered during his admission and he agreed. Will complete therapy orders at this time.       Lilia Macias, PT

## 2022-08-02 NOTE — PROGRESS NOTES
Patient requesting Ativan, for anxiety; Also, now that it's bedtime, \"take 2 tablets when it's betime. \"  This RN informed patient, the current orders are written Ativan 1 mg tablet every 4 hours PRN; then reviewed Home Medication list which is written Ativan 1 mg tablet every 6 hours PRN. Patient given Ativan 1 mg tablet po, will monitor for relief.

## 2022-08-03 NOTE — ANESTHESIA POSTPROCEDURE EVALUATION
Department of Anesthesiology  Postprocedure Note    Patient: Jono Dobbs  MRN: [id number]  YOB: 1975  Date of evaluation: 8/3/2022      Procedure Summary     Date: 08/03/22 Room / Location: [address] ENDOSCOPY    Anesthesia Start: 0139 Anesthesia Stop: 0325    Procedure: EGD ESOPHAGOGASTRODUODENOSCOPY (Upper GI Region) Diagnosis:       Migration of esophageal stent, initial encounter      (Migration of esophageal stent, initial encounter [T85.528A])    Providers: Houston Goldberg, MD Responsible Provider: Joana Reich MD    Anesthesia Type: TIVA ASA Status: 3          Anesthesia Type: TIVA    Ian Phase I: Ian Score: 9    Ian Phase II:        Anesthesia Post Evaluation    Patient location during evaluation: PACU  Patient participation: complete - patient participated  Level of consciousness: awake and alert  Airway patency: patent  Nausea & Vomiting: no nausea and no vomiting  Complications: no  Cardiovascular status: hemodynamically stable  Respiratory status: acceptable, spontaneous ventilation and nasal cannula  Hydration status: euvolemic  Comments: /68   Pulse (!) 102   Temp 98.6 °F (37 °C) (Oral)   Resp 16   Ht 5' 11.5\" (1.816 m)   Wt 157 lb (71.2 kg)   SpO2 92%   BMI 21.59 kg/m²     Has required supplemental O2 in PACU to maintain acceptable O2 saturation.   Will d/c to floor on 3 L NC and overnight continuous SpO2 monitoring    Multimodal analgesia pain management approach

## 2022-08-03 NOTE — PROGRESS NOTES
Hospitalist Progress Note   Admit Date:  2022 11:57 AM   Name:  Susan Lake   Age:  56 y.o. Sex:  male  :  1975   MRN:  [id number]   Room:  [facility]/01    Presenting Complaint: Fatigue     Reason(s) for Admission: GIB (gastrointestinal bleeding) [K92.2]  General weakness [R53.1]  Gastrointestinal hemorrhage, unspecified gastrointestinal hemorrhage type [facility] Course & Interval History:   56 y.o. male with medical history of gastric cancer with mets to liver, hypothyroidism, GIB,  status post EGD with dilation that was unsuccessful with esophageal stent placed by Dr. Va Bess on 2022 who presented with fatigue and dark stools past 2 days. CXR atelectasis versus PNA, hemoglobin stable at 9, leukocytosis present. He did receive Rocephin in the ED, 2 sets [facility] drawn in ED. FOB + in ED.  hemoglobin noted 6.6, 1 unit PRBC ordered. GI consulted. Subjective/24hr Events (22):   EGD pending. Pt denies CP, SOB, n/v/d, abd pain.         Assessment & Plan:      GIB (gastrointestinal bleeding)  GI consult  PPI bid  Trend CBC  8/  Clear liquid diet, NPO after midnight for potential EGD in am  No BM today  8/3 plans for EGD today     Questionable PNA noted on CXR:  Continue with Rocephin/Zithromax  Check PNA labs to include procal (procal can be elevated due to cancer, however)  Trend BC x 2 drawn in ED    Procal 0.45, continue to trend  PNA labs still pending  BC NGTD  Day 2 Rocephin/Zithromax  Leukocytosis resolved     Hypothyroidism:  Home meds     Hyponatremia, acute:  Resolved, continue with IVF     Gastric Cancer with mets to liver:  Kareem Huynh every 14 days, last received 2022     Thrombocytopenia:  112 with baseline 70s-100s  Likely related to [facility], gastric Ca  Trend  No anticoagulation     UTI:  Trace leukocytes in urine  On Rocephin for PNA  Urine culture            Dispo/Discharge Plannin-3 days     Diet: ADULT DIET; clear liquid diet, NPO after midnight  VTE ppx: SCDs  Code status: Full Code    Hospital Problems:  Principal Problem:    GIB (gastrointestinal bleeding)  Active Problems:    Hypothyroidism    PNA (pneumonia)    Malignant neoplasm of overlapping sites of stomach (HCC)    CINV (chemotherapy-induced nausea and vomiting)  Resolved Problems:    * No resolved hospital problems. *      Objective:   Patient Vitals for the past 24 hrs:   Temp Pulse Resp BP SpO2   08/03/22 1146 98.6 °F (37 °C) 89 16 114/79 94 %   08/03/22 0746 99 °F (37.2 °C) 91 18 110/76 90 %   08/03/22 0405 99.1 °F (37.3 °C) 87 17 112/75 91 %   08/02/22 2343 99.9 °F (37.7 °C) 95 14 105/75 91 %   08/02/22 2027 99.3 °F (37.4 °C) 90 18 108/74 93 %   08/02/22 1514 99.1 °F (37.3 °C) 89 16 109/76 93 %       Oxygen Therapy  SpO2: 94 %  O2 Device: None (Room air)    Estimated body mass index is 21.59 kg/m² as calculated from the following:    Height as of this encounter: 5' 11.5\" (1.816 m). Weight as of this encounter: 157 lb (71.2 kg). No intake or output data in the 24 hours ending 08/03/22 1451      Physical Exam:     Blood pressure 114/79, pulse 89, temperature 98.6 °F (37 °C), temperature source Oral, resp. rate 16, height 5' 11.5\" (1.816 m), weight 157 lb (71.2 kg), SpO2 94 %. General:          Thin, frail. BMI 21.59. appears chronically ill  Head:               Normocephalic, atraumatic  Eyes:               Sclerae appear normal.  Pupils equally round. ENT:                Nares appear normal, no drainage. Moist oral mucosa  Neck:               No restricted ROM. Trachea midline   CV:                  RRR. No m/r/g. No jugular venous distension. Lungs:             CTAB. No wheezing, rhonchi, or rales. Symmetric expansion. Abdomen: Bowel sounds present. Soft, nontender, nondistended. Extremities:     No cyanosis or clubbing. No edema  Skin:                No rashes, jaundiced. Warm and dry. Neuro:             CN II-XII grossly intact. Sensation intact. A&Ox3  Psych:             Normal mood and affect.     I have personally reviewed labs and tests showing:  Recent Labs:  Recent Results (from the past 48 hour(s))   Urinalysis    Collection Time: 08/01/22  2:59 PM   Result Value Ref Range    Color, UA YELLOW/STRAW      Appearance CLEAR      Specific Gravity, UA 1.017 1.001 - 1.023      pH, Urine 6.0 5.0 - 9.0      Protein, UA Negative NEG mg/dL    Glucose, UA Negative mg/dL    Ketones, Urine 15 (A) NEG mg/dL    Bilirubin Urine Negative NEG      Blood, Urine Negative NEG      Urobilinogen, Urine 1.0 0.2 - 1.0 EU/dL    Nitrite, Urine Negative NEG      Leukocyte Esterase, Urine TRACE (A) NEG      WBC, UA 0-4 0 /hpf    RBC, UA 0-5 0 /hpf    Epithelial Cells UA 0-5 0 /hpf    BACTERIA, URINE Negative 0 /hpf    Casts 0-2 0 /lpf   Procalcitonin    Collection Time: 08/01/22  4:25 PM   Result Value Ref Range    Procalcitonin 0.45 0.00 - 0.49 ng/mL   Basic Metabolic Panel w/ Reflex to MG    Collection Time: 08/02/22  3:47 AM   Result Value Ref Range    Sodium 137 136 - 145 mmol/L    Potassium 3.4 (L) 3.5 - 5.1 mmol/L    Chloride 106 98 - 107 mmol/L    CO2 25 21 - 32 mmol/L    Anion Gap 6 (L) 7 - 16 mmol/L    Glucose 86 65 - 100 mg/dL    BUN 12 6 - 23 MG/DL    Creatinine 0.70 (L) 0.8 - 1.5 MG/DL    GFR African American >60 >60 ml/min/1.73m2    GFR Non- >60 >60 ml/min/1.73m2    Calcium 7.3 (L) 8.3 - 10.4 MG/DL   CBC with Auto Differential    Collection Time: 08/02/22  3:47 AM   Result Value Ref Range    WBC 8.2 4.3 - 11.1 K/uL    RBC 2.17 (L) 4.23 - 5.6 M/uL    Hemoglobin 6.6 (LL) 13.6 - 17.2 g/dL    Hematocrit 20.8 (L) 41.1 - 50.3 %    MCV 95.9 79.6 - 97.8 FL    MCH 30.4 26.1 - 32.9 PG    MCHC 31.7 31.4 - 35.0 g/dL    RDW 21.0 (H) 11.9 - 14.6 %    Platelets 914 (L) 404 - 450 K/uL    MPV 9.8 9.4 - 12.3 FL    nRBC 0.00 0.0 - 0.2 K/uL    Differential Type AUTOMATED      Seg Neutrophils 83 (H) 43 - 78 %    Lymphocytes 6 (L) 13 - 44 %    Monocytes 9 4.0 - 12.0 % Eosinophils % 0 (L) 0.5 - 7.8 %    Basophils 0 0.0 - 2.0 %    Immature Granulocytes 1 0.0 - 5.0 %    Segs Absolute 6.8 1.7 - 8.2 K/UL    Absolute Lymph # 0.5 0.5 - 4.6 K/UL    Absolute Mono # 0.7 0.1 - 1.3 K/UL    Absolute Eos # 0.0 0.0 - 0.8 K/UL    Basophils Absolute 0.0 0.0 - 0.2 K/UL    Absolute Immature Granulocyte 0.1 0.0 - 0.5 K/UL   Magnesium    Collection Time: 08/02/22  3:47 AM   Result Value Ref Range    Magnesium 1.8 1.8 - 2.4 mg/dL   PREPARE RBC (CROSSMATCH), 1 Units    Collection Time: 08/02/22  8:15 AM   Result Value Ref Range    History Check Historical check performed    Hemoglobin and Hematocrit    Collection Time: 08/02/22  2:22 PM   Result Value Ref Range    Hemoglobin 8.1 (L) 13.6 - 17.2 g/dL    Hematocrit 24.6 (L) 41.1 - 50.3 %   Procalcitonin    Collection Time: 08/03/22  3:52 AM   Result Value Ref Range    Procalcitonin 0.55 (H) 0.00 - 0.49 ng/mL   CBC with Auto Differential    Collection Time: 08/03/22  3:52 AM   Result Value Ref Range    WBC 8.2 4.3 - 11.1 K/uL    RBC 2.82 (L) 4.23 - 5.6 M/uL    Hemoglobin 8.6 (L) 13.6 - 17.2 g/dL    Hematocrit 26.5 (L) 41.1 - 50.3 %    MCV 94.0 79.6 - 97.8 FL    MCH 30.5 26.1 - 32.9 PG    MCHC 32.5 31.4 - 35.0 g/dL    RDW 21.6 (H) 11.9 - 14.6 %    Platelets 707 (L) 693 - 450 K/uL    MPV 9.8 9.4 - 12.3 FL    nRBC 0.00 0.0 - 0.2 K/uL    Differential Type AUTOMATED      Seg Neutrophils 83 (H) 43 - 78 %    Lymphocytes 7 (L) 13 - 44 %    Monocytes 8 4.0 - 12.0 %    Eosinophils % 1 0.5 - 7.8 %    Basophils 0 0.0 - 2.0 %    Immature Granulocytes 1 0.0 - 5.0 %    Segs Absolute 6.9 1.7 - 8.2 K/UL    Absolute Lymph # 0.6 0.5 - 4.6 K/UL    Absolute Mono # 0.7 0.1 - 1.3 K/UL    Absolute Eos # 0.1 0.0 - 0.8 K/UL    Basophils Absolute 0.0 0.0 - 0.2 K/UL    Absolute Immature Granulocyte 0.1 0.0 - 0.5 K/UL   Basic Metabolic Panel w/ Reflex to MG    Collection Time: 08/03/22  3:52 AM   Result Value Ref Range    Sodium 138 136 - 145 mmol/L    Potassium 3.4 (L) 3.5 - 5.1 mmol/L    Chloride 108 (H) 98 - 107 mmol/L    CO2 24 21 - 32 mmol/L    Anion Gap 6 (L) 7 - 16 mmol/L    Glucose 74 65 - 100 mg/dL    BUN 8 6 - 23 MG/DL    Creatinine 0.65 (L) 0.8 - 1.5 MG/DL    GFR African American >60 >60 ml/min/1.73m2    GFR Non- >60 >60 ml/min/1.73m2    Calcium 7.3 (L) 8.3 - 10.4 MG/DL   Magnesium    Collection Time: 08/03/22  3:52 AM   Result Value Ref Range    Magnesium 1.8 1.8 - 2.4 mg/dL       I have personally reviewed imaging studies showing: Other Studies:  XR CHEST PORTABLE   Final Result   Intrerval advancement of the esophagogastric stent since prior intraoperative   fluoroscopic images dated 7/14/2022, with the proximal aspect of the stent   overlying the region of the GE junction. Retrocardiac opacity, which may reflect atelectasis versus infection/aspiration. The findings of this exam were discussed with  Dr. Kayla Montes by Dr. Kei Monatno at 1:39 PM on 8/1/2022.              Current Meds:  Current Facility-Administered Medications   Medication Dose Route Frequency    0.9 % sodium chloride infusion   IntraVENous PRN    magnesium sulfate 2000 mg in 50 mL IVPB premix  2,000 mg IntraVENous PRN    potassium chloride (KLOR-CON M) extended release tablet 40 mEq  40 mEq Oral PRN    Or    potassium bicarb-citric acid (EFFER-K) effervescent tablet 40 mEq  40 mEq Oral PRN    Or    potassium chloride 10 mEq/100 mL IVPB (Peripheral Line)  10 mEq IntraVENous PRN    cefTRIAXone (ROCEPHIN) 1,000 mg in sodium chloride 0.9 % 50 mL IVPB mini-bag  1,000 mg IntraVENous Q24H    azithromycin (ZITHROMAX) 500 mg in sodium chloride 0.9 % 250 mL IVPB ([id number])  500 mg IntraVENous Q24H    sodium chloride flush 0.9 % injection 5-40 mL  5-40 mL IntraVENous 2 times per day    sodium chloride flush 0.9 % injection 5-40 mL  5-40 mL IntraVENous PRN    0.9 % sodium chloride infusion   IntraVENous PRN    ondansetron (ZOFRAN-ODT) disintegrating tablet 4 mg  4 mg Oral Q8H PRN    Or ondansetron (ZOFRAN) injection 4 mg  4 mg IntraVENous Q6H PRN    polyethylene glycol (GLYCOLAX) packet 17 g  17 g Oral Daily PRN    acetaminophen (TYLENOL) tablet 650 mg  650 mg Oral Q6H PRN    Or    acetaminophen (TYLENOL) suppository 650 mg  650 mg Rectal Q6H PRN    pantoprazole (PROTONIX) tablet 40 mg  40 mg Oral BID AC    0.9 % sodium chloride infusion   IntraVENous Continuous    gabapentin (NEURONTIN) capsule 300 mg  300 mg Oral BID    levothyroxine (SYNTHROID) tablet 50 mcg  50 mcg Oral QAM AC    LORazepam (ATIVAN) tablet 1 mg  1 mg Oral Q4H PRN    mirtazapine (REMERON) tablet 30 mg  30 mg Oral Nightly     Facility-Administered Medications Ordered in Other Encounters   Medication Dose Route Frequency    atropine injection 0.4 mg  0.4 mg IntraVENous Once       Signed:  Bruno Lawson, APRN - CNP    Notes, labs, VS, diagnostic testing reviewed  Case discussed with pt, care team ,Dr. An Lea

## 2022-08-03 NOTE — ANESTHESIA PRE PROCEDURE
Department of Anesthesiology  Preprocedure Note       Name:  Yamilka Chaudhary   Age:  56 y.o.  :  1975                                          MRN:  [id number]         Date:  8/3/2022      Surgeon: Joce Lacy):  Red Sultana MD    Procedure: Procedure(s):  EGD ESOPHAGOGASTRODUODENOSCOPY    Medications prior to admission:   Prior to Admission medications    Medication Sig Start Date End Date Taking? Authorizing Provider   levothyroxine (SYNTHROID) 50 MCG tablet Take 1 tablet by mouth in the morning. Patient not taking: Reported on 2022   Arelis Brooks MD   ferrous sulfate (FE TABS 325) 325 (65 Fe) MG EC tablet Take 1 tablet by mouth daily (with breakfast)  Patient not taking: Reported on 2022   Harriet Thomas MD   pantoprazole (PROTONIX) 40 MG tablet Take 40 mg by mouth daily    Historical Provider, MD   LORazepam (ATIVAN) 1 MG tablet take 1 tablet by mouth every 6 hours as needed for nausea 7/11/22 8/10/22  Arelis Brooks MD   UDENYCA 6 MG/0.6ML injection INJECT 6MG SUBCUTANEOUSLY EVERY 14 DAYS AS DIRECTED 22   Historical Provider, MD   diphenoxylate-atropine (LOMOTIL) 2.5-0.025 MG per tablet Take 1 tablet by mouth 4 times daily as needed. Patient not taking: Reported on 2022   Ar Automatic Reconciliation   gabapentin (NEURONTIN) 300 MG capsule Take 300 mg by mouth 2 times daily.  3/21/22   Ar Automatic Reconciliation   lidocaine-prilocaine (EMLA) 2.5-2.5 % cream Apply topically as needed  Patient not taking: Reported on 2022   Ar Automatic Reconciliation   mirtazapine (REMERON) 30 MG tablet Take 30 mg by mouth nightly  22   Ar Automatic Reconciliation   ondansetron (ZOFRAN) 8 MG tablet Take 8 mg by mouth every 8 hours as needed  Patient not taking: Reported on 2022 10/28/20   Ar Automatic Reconciliation   prochlorperazine (COMPAZINE) 10 MG tablet Take 10 mg by mouth every 6 hours as needed  Patient not taking: Reported on 2022 10/28/20   Ar Automatic Reconciliation       Current medications:    No current facility-administered medications for this visit. No current outpatient medications on file.      Facility-Administered Medications Ordered in Other Visits   Medication Dose Route Frequency Provider Last Rate Last Admin    0.9 % sodium chloride infusion   IntraVENous PRN Janey Baseman, DO        magnesium sulfate 2000 mg in 50 mL IVPB premix  2,000 mg IntraVENous PRN Janey Baseman, DO        potassium chloride (KLOR-CON M) extended release tablet 40 mEq  40 mEq Oral PRN Janey Baseman, DO        Or    potassium bicarb-citric acid (EFFER-K) effervescent tablet 40 mEq  40 mEq Oral PRN Janey Baseman, DO   40 mEq at 08/02/22 1240    Or    potassium chloride 10 mEq/100 mL IVPB (Peripheral Line)  10 mEq IntraVENous PRN Janey Baseman, DO        cefTRIAXone (ROCEPHIN) 1,000 mg in sodium chloride 0.9 % 50 mL IVPB mini-bag  1,000 mg IntraVENous Q24H Clydie Plan, APRN - CNP   Stopped at 08/02/22 1500    azithromycin (ZITHROMAX) 500 mg in sodium chloride 0.9 % 250 mL IVPB ([id number])  500 mg IntraVENous Q24H Clydie Plan, APRN - CNP   Stopped at 08/02/22 1742    sodium chloride flush 0.9 % injection 5-40 mL  5-40 mL IntraVENous 2 times per day Clydie Plan, APRN - CNP   10 mL at 08/02/22 0938    sodium chloride flush 0.9 % injection 5-40 mL  5-40 mL IntraVENous PRN Clydie Plan, APRN - CNP        0.9 % sodium chloride infusion   IntraVENous PRN Clydie Plan, APRN - CNP        ondansetron (ZOFRAN-ODT) disintegrating tablet 4 mg  4 mg Oral Q8H PRN Clydie Plan, APRN - CNP        Or    ondansetron [facility]) injection 4 mg  4 mg IntraVENous Q6H PRN Clydie Plan, APRN - CNP        polyethylene glycol (GLYCOLAX) packet 17 g  17 g Oral Daily PRN Clydie Plan, APRN - CNP        acetaminophen (TYLENOL) tablet 650 mg  650 mg Oral Q6H PRN Clydie Plan, APRN - CNP        Or    acetaminophen (TYLENOL) suppository 650 mg  650 mg Rectal Q6H PRN Clydie Plan, APRN - CNP        pantoprazole (PROTONIX) tablet 40 mg  40 mg Oral BID AC Cheryle Bridgewater, APRN - CNP   40 mg at 08/03/22 0555    0.9 % sodium chloride infusion   IntraVENous Continuous Cheryle Bridgewater, APRN -  mL/hr at 08/03/22 0347 New Bag at 08/03/22 0347    gabapentin (NEURONTIN) capsule 300 mg  300 mg Oral BID Cheryle Bridgewater, APRN - CNP   300 mg at 08/03/22 1036    levothyroxine (SYNTHROID) tablet 50 mcg  50 mcg Oral QAM AC Cheryle Bridgewater, APRN - CNP   50 mcg at 08/03/22 0555    LORazepam (ATIVAN) tablet 1 mg  1 mg Oral Q4H PRN Cheryle Bridgewater, APRN - CNP   1 mg at 08/02/22 2130    mirtazapine (REMERON) tablet 30 mg  30 mg Oral Nightly Cheryle Bridgewater, APRN - CNP   30 mg at 08/02/22 2124    atropine injection 0.4 mg  0.4 mg IntraVENous Once Renee Durant MD           Allergies:  No Known Allergies    Problem List:    Patient Active Problem List   Diagnosis Code    Urgency of urination R39.15    Malignant neoplasm of overlapping sites of stomach ([address] C16.8    CINV (chemotherapy-induced nausea and vomiting) R11.2, T45.1X5A    Severe anemia D64.9    Thrombocytopenia ([address] D69.6    Personal history of COVID-19 Z86.16    Hypoproteinemia ([address] E77.8    Hypokalemia E87.6    GIB (gastrointestinal bleeding) K92.2    Hypothyroidism E03.9    PNA (pneumonia) J18.9       Past Medical History:        Diagnosis Date    Acute gastrointestinal bleeding 7/17/2022    COVID 1/30/2022    Gastric [facility] ([facility]     being treated currently - chemo and radiation    History of blood transfusion     6/30/2022 no rxn to this transfusion    Lazy eye, left     Severe anemia 7/12/2022       Past Surgical History:        Procedure Laterality Date    IR PORT PLACEMENT EQUAL OR GREATER THAN 5 YEARS  11/3/2020    IR PORT PLACEMENT EQUAL OR GREATER THAN 5 YEARS  11/3/2020    IR PORT PLACEMENT EQUAL OR GREATER THAN 5 YEARS 11/3/2020 [facility]    ORTHOPEDIC SURGERY      ankle    TONSILLECTOMY      UPPER GASTROINTESTINAL ENDOSCOPY N/A 7/5/2022    EGD DILATION BALLOON performed by Ariel Flower MD at [address] [place] [address] Dr Hernandez N/A 7/14/2022    EGD STENT PLACEMENT performed by Laury Toribio MD at [address]  10/22/2020    US GUIDED LIVER BIOPSY PERCUTANEOUS 10/22/2020 [facility]       Social History:    Social History     Tobacco Use    Smoking status: Never    Smokeless tobacco: Never   Substance Use Topics    Alcohol use: Yes     Comment: rarely                                Counseling given: Not Answered      Vital Signs (Current): There were no vitals filed for this visit.                                            BP Readings from Last 3 Encounters:   08/03/22 114/79   07/21/22 108/76   07/19/22 97/60       NPO Status:                                                                                 BMI:   Wt Readings from Last 3 Encounters:   08/01/22 157 lb (71.2 kg)   07/21/22 157 lb (71.2 kg)   07/17/22 150 lb (68 kg)     There is no height or weight on file to calculate BMI.    CBC:   Lab Results   Component Value Date/Time    WBC 8.2 08/03/2022 03:52 AM    RBC 2.82 08/03/2022 03:52 AM    HGB 8.6 08/03/2022 03:52 AM    HCT 26.5 08/03/2022 03:52 AM    MCV 94.0 08/03/2022 03:52 AM    RDW 21.6 08/03/2022 03:52 AM     08/03/2022 03:52 AM       CMP:   Lab Results   Component Value Date/Time     08/03/2022 03:52 AM    K 3.4 08/03/2022 03:52 AM     08/03/2022 03:52 AM    CO2 24 08/03/2022 03:52 AM    BUN 8 08/03/2022 03:52 AM    CREATININE 0.65 08/03/2022 03:52 AM    GFRAA >60 08/03/2022 03:52 AM    AGRATIO 1.1 05/18/2022 03:21 PM    LABGLOM >60 08/03/2022 03:52 AM    GLUCOSE 74 08/03/2022 03:52 AM    PROT 6.3 08/01/2022 12:11 PM    CALCIUM 7.3 08/03/2022 03:52 AM    BILITOT 0.6 08/01/2022 12:11 PM    ALKPHOS 373 08/01/2022 12:11 PM    ALKPHOS 191 05/18/2022 03:21 PM    AST 87 08/01/2022 12:11 PM    ALT 40 08/01/2022 12:11 PM       POC Tests: No results for input(s): POCGLU, POCNA, POCK, POCCL, POCBUN, POCHEMO, POCHCT in the last 72 hours. Coags: No results found for: PROTIME, INR, APTT    HCG (If Applicable): No results found for: PREGTESTUR, PREGSERUM, HCG, HCGQUANT     ABGs: No results found for: PHART, PO2ART, VZW5FBR, QZR6JUG, BEART, [id number]     Type & Screen (If Applicable):  No results found for: LABABO, LABRH    Drug/Infectious Status (If Applicable):  No results found for: HIV, HEPCAB    COVID-19 Screening (If Applicable):   Lab Results   Component Value Date/Time    COVID19 Not detected 11/20/2020 06:32 AM    COVID19 Negative 11/20/2020 06:32 AM           Anesthesia Evaluation  Patient summary reviewed and Nursing notes reviewed no history of anesthetic complications:   Airway: Mallampati: II  TM distance: >3 FB   Neck ROM: full  Comment: Full beard     Dental: normal exam         Pulmonary:Negative Pulmonary ROS breath sounds clear to auscultation                            ROS comment: Infiltrate on CXR, presumptively treated withantibiotics   Cardiovascular:  Exercise tolerance: good (>4 METS),           Rhythm: regular  Rate: normal                    Neuro/Psych:   Negative Neuro/Psych ROS               ROS comment: Lazy eye, left GI/Hepatic/Renal:            ROS comment: Gastric carcinoma. Endo/Other: Negative Endo/Other ROS   (+) blood dyscrasia (Recent blood transfusion 7/12/22): anemia and thrombocytopenia:., malignancy/cancer. ROS comment: Thrombocytopenia Abdominal:             Vascular: negative vascular ROS. Other Findings:             Anesthesia Plan      TIVA     ASA 3     (Initially TIVA - converted to GETA due to contents within the stomach including blood/clot and tumor)  Induction: intravenous. Anesthetic plan and risks discussed with patient.                         Rosanna Schreiber MD   8/3/2022

## 2022-08-03 NOTE — PERIOP NOTE
TRANSFER - OUT REPORT:    Verbal report given to Gaurav Trinh RN on [address]  being transferred to [facility] for routine progression of patient care       Report consisted of patient's Situation, Background, Assessment and   Recommendations(SBAR). Information from the following report(s) Nurse Handoff Report and Cardiac Rhythm ST  was reviewed with the receiving nurse. Lines:   Single Lumen Implantable Port Right Subclavian (Active)       Peripheral IV 08/01/22 Left Antecubital (Active)   Site Assessment Clean, dry & intact 08/03/22 1643   Line Status Flushed;Capped 08/03/22 1643   Line Care Connections checked and tightened 08/02/22 1940   Phlebitis Assessment No symptoms 08/03/22 1643   Infiltration Assessment 0 08/03/22 1643   Alcohol Cap Used Yes 08/03/22 1643   Dressing Status Clean, dry & intact 08/03/22 1643   Dressing Type Transparent 08/03/22 1643       Peripheral IV 08/01/22 Left Forearm (Active)   Site Assessment Clean, dry & intact 08/03/22 1643   Line Status Infusing 08/03/22 [place] Connections checked and tightened;Ports disinfected 08/02/22 1940   Phlebitis Assessment No symptoms 08/03/22 1643   Infiltration Assessment 0 08/03/22 1643   Alcohol Cap Used No 08/03/22 1643   Dressing Status Clean, dry & intact 08/03/22 1643   Dressing Type Transparent 08/03/22 1643        Opportunity for questions and clarification was provided.       Patient transported with:  O2 @ 3lpm

## 2022-08-04 NOTE — PROGRESS NOTES
[facility] Progress Note         Admit Date:  8/1/2022  Today's Date:  8/4/2022    CC:  Melena / anemia    Subjective:     Patient denies N/V or abdominal pain. No BM for 3 days. No CP, cough, or SOB. Tolerate clears.      Medications:   Current Facility-Administered Medications   Medication Dose Route Frequency    metronidazole (FLAGYL) 500 mg in 0.9% NaCl 100 mL IVPB premix  500 mg IntraVENous Q8H    0.9 % sodium chloride infusion   IntraVENous PRN    magnesium sulfate 2000 mg in 50 mL IVPB premix  2,000 mg IntraVENous PRN    potassium chloride (KLOR-CON M) extended release tablet 40 mEq  40 mEq Oral PRN    Or    potassium bicarb-citric acid (EFFER-K) effervescent tablet 40 mEq  40 mEq Oral PRN    Or    potassium chloride 10 mEq/100 mL IVPB (Peripheral Line)  10 mEq IntraVENous PRN    cefTRIAXone (ROCEPHIN) 1,000 mg in sodium chloride 0.9 % 50 mL IVPB mini-bag  1,000 mg IntraVENous Q24H    azithromycin (ZITHROMAX) 500 mg in sodium chloride 0.9 % 250 mL IVPB (Pbgn2Ziq)  500 mg IntraVENous Q24H    sodium chloride flush 0.9 % injection 5-40 mL  5-40 mL IntraVENous 2 times per day    sodium chloride flush 0.9 % injection 5-40 mL  5-40 mL IntraVENous PRN    0.9 % sodium chloride infusion   IntraVENous PRN    ondansetron (ZOFRAN-ODT) disintegrating tablet 4 mg  4 mg Oral Q8H PRN    Or    ondansetron (ZOFRAN) injection 4 mg  4 mg IntraVENous Q6H PRN    polyethylene glycol (GLYCOLAX) packet 17 g  17 g Oral Daily PRN    acetaminophen (TYLENOL) tablet 650 mg  650 mg Oral Q6H PRN    Or    acetaminophen (TYLENOL) suppository 650 mg  650 mg Rectal Q6H PRN    pantoprazole (PROTONIX) tablet 40 mg  40 mg Oral BID AC    0.9 % sodium chloride infusion   IntraVENous Continuous    gabapentin (NEURONTIN) capsule 300 mg  300 mg Oral BID    levothyroxine (SYNTHROID) tablet 50 mcg  50 mcg Oral QAM AC    LORazepam (ATIVAN) tablet 1 mg  1 mg Oral Q4H PRN    mirtazapine (REMERON) tablet 30 mg  30 mg Oral Nightly Facility-Administered Medications Ordered in Other Encounters   Medication Dose Route Frequency    atropine injection 0.4 mg  0.4 mg IntraVENous Once       Review of Systems:  ROS was obtained, with pertinent positives as listed above. No chest pain or SOB. Diet: CLD    Objective:   Vitals:  /71   Pulse 91   Temp 98.8 °F (37.1 °C) (Oral)   Resp 16   Ht 5' 11.5\" (1.816 m)   Wt 157 lb (71.2 kg)   SpO2 93%   BMI 21.59 kg/m²   Intake/Output:  No intake/output data recorded. 08/02 1901 - 08/04 0700  In: 500 [I.V.:500]  Out: 1700 [Urine:1700]  Exam:  General appearance: alert, cooperative, NAD. PALE. Lungs: CTA ant  Heart: RRR  Abdomen: PROTUBERANT, but soft & non-tender. NABS. Extremities: extremities normal, atraumatic, no cyanosis or edema  Neuro:  alert and oriented    Data Review (Labs):    Recent Labs     08/01/22  1211 08/02/22  0347 08/02/22  1422 08/03/22  0352 08/03/22  2228 08/04/22  0404   WBC 15.8* 8.2  --  8.2  --  19.5*   HGB 9.0* 6.6* 8.1* 8.6* 10.4* 8.4*   HCT 27.6* 20.8* 24.6* 26.5* 31.2* 25.6*    112*  --  115*  --  114*   MCV 96.2 95.9  --  94.0  --  93.8   * 137  --  138  --  136   K 3.6 3.4*  --  3.4*  --  4.1    106  --  108*  --  105   CO2 24 25  --  24  --  23   BUN 12 12  --  8  --  7   MG  --  1.8  --  1.8  --   --    AST 87*  --   --   --   --  70*   ALT 40  --   --   --   --  28         Assessment:     Principal Problem:    GIB (gastrointestinal bleeding)    Active Problems:    Hypothyroidism    PNA (pneumonia)    Malignant neoplasm of overlapping sites of stomach (HCC)    CINV (chemotherapy-induced nausea and vomiting)    56 y.o. male who presented with fatigue, darker than usual stools, melena and anemia with a hgb of 9.0, down to 6.6. Likely oozing from known tumor/ulcer, but not demonstrating active/overt bleeding at this time. Hemodynamically stable.  Patient was diagnosed with metastatic gastric cancer to liver in 10/20 and is followed by  Kriss Esme.  EGD on 7/5 for FTT and dysphagia revealed distal esophageal stricture from his gastric tumor and an ulcer was noted. He was dilated to a 18 mm balloon and his meds were maximized but his swallowing did not improve. On 7/14/22 Dr Romelia Klein placed a 23 X 155 mm stent under flouro with improvement in his ability to eat etc. CXR here showed that the stent may have advanced some with the proximal edge at the EG junction. There is also a retrocardiac opacity and an infection cannot be excluded. Leukocytosis resolved. EGD 8/3 by Dr. Sharifa Bartholomew:  ESOPHAGUS: Esophageal stent has migrated with proximal edge located at 38 cm from the incisors. EG junction estimated to be located at 40 cm. The 23 x 155 mm stent was pulled proximally resulting in placement of the proximal edge of stent at 28 cm from incisors. The stent moved easily suggesting that it will be prone to migration again. Otherwise normal exam.  STOMACH:  Proximal stomach with bulking malignancy of the cardia. Gastric cancer with stigmata of recent bleeding. Not amendable to endoscopic treatment. Otherwise normal exam    CXR 8/3  1. Esophageal stent with distal portion along the greater curvature of the  stomach and proximal portion in the gastroesophageal junction/distal esophagus. Dictated. 2. Prominent skin fold along the left chest wall with skin markings extending  beyond this skin fold. CXR 8/3  FINDINGS:    Stable right-sided MediPort and distal esophageal stent. Mild increase in the left mid and lower lung infiltrate/atelectasis. There is no significant pneumothorax. There is no pleural effusion. The heart is unchanged. No other significant interval changes. Hgb stable post transfusion. No active GI bleeding. Plan:     Advanced to pureed diet as tolerated given h/o esophageal stent. Monitor H/H. Transfuse as needed. Consider PEG placement if patient is unable to maintain adequate nutrition. Continue PPI.    Gastric cancer and anemia management per Heme/Onc      Javan Mo PA-C  [facility]    Note findings and plan per [facility] reviewed and agree. Advance diet to pureed diet as tolerated. Consider PEG tube placement if unable to maintain adequate nutrition. Bleeding and anemia are related to known gastric cancer. Treatment per Heme/onc. Continue PPI. No further interventions from a GI standpoint at this time. Will be available as needed. Please call if determined patient needs PEG placement.     Nicki Meng MD

## 2022-08-04 NOTE — CARE COORDINATION
Discussed patient in MD rounds with PT/OT, Nursing, Nutrition/Dietary, and Case Management/Social Work. Planning and discussing discharge plans as well as any barriers to discharge. Discussed patient being transferred downtown to continued care. CM following.

## 2022-08-04 NOTE — PROGRESS NOTES
[facility] 80 CRITICAL CARE OUTREACH NURSE PROGRESS REPORT      SUBJECTIVE: Called to assess patient secondary to physician concern/hypoxia. [id number]  Vitals:    08/03/22 1705 08/03/22 2023 08/03/22 2136 08/03/22 2305   BP: 117/68 122/79  109/81   Pulse: (!) 102 (!) 116 (!) 111 (!) 122   Resp: 16 18  20   Temp:  99.7 °F (37.6 °C)  (!) 101.1 °F (38.4 °C)   TempSrc:  Oral  Oral   SpO2: 92% 90% 95% 94%   Weight:       Height:          EKG: . LAB DATA:    Recent Labs     08/01/22  1211 08/02/22  0347 08/03/22  0352   * 137 138   K 3.6 3.4* 3.4*    106 108*   CO2 24 25 24   BUN 12 12 8   GFRAA >60 >60 >60   MG  --  1.8 1.8   GLOB 4.2*  --   --    ALT 40  --   --         Recent Labs     08/01/22  1211 08/02/22  0347 08/02/22  1422 08/03/22  0352 08/03/22  2228   WBC 15.8* 8.2  --  8.2  --    HGB 9.0* 6.6* 8.1* 8.6* 10.4*   HCT 27.6* 20.8* 24.6* 26.5* 31.2*    112*  --  115*  --           OBJECTIVE: On arrival to room, I found patient to be sitting up in the bed, A&O times 4 and in no distress. Patient is tachycardic with an elevated temperature of 101 orally. Upper lung sounds are clear but bases are slightly diminished. Pain Assessment  @[facility]([id number])@             @[facility](1180)@             @[facility](1188)@              ASSESSMENT:  ABG, chest x-ray and H&H were obtained. Patient placed on heated high flow nasal cannula     PLAN: Tylenol to treat fever. Continue on heated high flow for O2 requirements.  Will continue to monitor per outreach protocols

## 2022-08-04 NOTE — PROGRESS NOTES
TRANSFER - OUT REPORT:    Verbal report given to MECHE Sofia on Consuelo Savage  being transferred to [facility]  for routine progression of patient care       Report consisted of patient's Situation, Background, Assessment and   Recommendations(SBAR). Information from the following report(s) Nurse Handoff Report was reviewed with the receiving nurse. Lines:   Single Lumen Implantable Port Right Subclavian (Active)   Port A Cath Status Not Accessed 08/04/22 0735   Criteria for Appropriate Use Long term IV/antibiotic administration 07/21/22 1334   Site Assessment Clean, dry & intact 07/21/22 1334   Alcohol Cap Used No 07/14/22 1235   Date of Last Dressing Change 07/14/22 07/14/22 1235   Dressing Type Transparent; Antimicrobial 07/21/22 1334   Dressing Status New dressing applied 07/21/22 1334   Dressing Intervention New 07/21/22 1334   Date Accessed  07/21/22 07/21/22 1334   Access Attempts  1 07/21/22 1334   Access Needle Gauge 20 G 07/21/22 1334   Access Needle Length 0.75 inches 07/21/22 1334   Accessed By: Bossman Munguia RN 07/14/22 1235   Single Lumen Status Blood return noted; Flushed; Infusing 07/21/22 1517   De-Access Date 07/21/22 07/21/22 1825   De-Access Time 3601 07/21/22 1825   De-Accessed By MECHE COLEMAN 07/21/22 1825       Peripheral IV 08/01/22 Left Antecubital (Active)   Site Assessment Clean, dry & intact 08/04/22 0735   Line Status Capped 08/04/22 0735   Line Care Connections checked and tightened 08/02/22 1940   Phlebitis Assessment No symptoms 08/04/22 0735   Infiltration Assessment 0 08/04/22 0735   Alcohol Cap Used Yes 08/03/22 1928   Dressing Status Clean, dry & intact 08/04/22 0735   Dressing Type Transparent 08/04/22 0735       Peripheral IV 08/01/22 Left Forearm (Active)   Site Assessment Clean, dry & intact 08/04/22 0735   Line Status Infusing 08/04/22 [address] Connections checked and tightened;Ports disinfected 08/02/22 1940   Phlebitis Assessment No symptoms 08/04/22 0735 Infiltration Assessment 0 08/04/22 0735   Alcohol Cap Used No 08/03/22 1928   Dressing Status Clean, dry & intact 08/04/22 0735   Dressing Type Transparent 08/04/22 0735        Opportunity for questions and clarification was provided.       Patient transported with:  O2 @ 2lpm  2 peripheral IV's

## 2022-08-04 NOTE — CARE COORDINATION
08/04/22 1524   Readmission Assessment   Number of Days since last admission? 8-30 days   Previous Disposition Home with Family   Who is being Interviewed Patient   What was the patient's/caregiver's perception as to why they think they needed to return back to the hospital? Other (Comment)  (Unable to keep anything down)   Did you visit your Primary Care Physician after you left the hospital, before you returned this time? Yes   Did you see a specialist, such as Cardiac, Pulmonary, Orthopedic Physician, etc. after you left the hospital? Yes   Who advised the patient to return to the hospital? Physician   Does the patient report anything that got in the way of taking their medications?  No

## 2022-08-04 NOTE — PROGRESS NOTES
Patient placed Airvo Heated High-Flow nasal cannula post AB/03/22 2135   Oxygen Therapy/Pulse Ox   O2 Device Heated high flow cannula   O2 Flow Rate (L/min) 30 L/min   FiO2  55 %   Heart Rate (!) 111   SpO2 95 %

## 2022-08-04 NOTE — PROGRESS NOTES
Outreach Follow Up Note    Hakeem Locus was seen and assessed. Discussed pt status with RN. Pt comfortable with adequate oxygenation on 2L n/c. Pt will now be discharged from the outreach program.  Primary RN advised to call the [facility] with any needs or concerns.       Vitals:    08/04/22 0641 08/04/22 0725 08/04/22 1130 08/04/22 1133   BP:  101/71  104/74   Pulse: 94 91  91   Resp:  16  16   Temp:  98.8 °F (37.1 °C)  99 °F (37.2 °C)   TempSrc:  Oral  Oral   SpO2: 94% 93% 94% 95%   Weight:       Height:                           Signed By:   Marcin Brink RN    August 4, 2022 1:23 PM

## 2022-08-04 NOTE — PROGRESS NOTES
At approximately 2216 on 08/03/2022, Patient was satting mid 76s on room air. Patient was placed on 10L NC and [place] to 90%. RT and MD were notified; patient was placed on AIRVO, ABG, H&H, and CXR were obtained. IV ABX were missed on dayshift due to EGD procedure; so Rocephin and Zithromax were given tonight. Flagyl was added and administered as well. Tylenol 650mg given for fever.

## 2022-08-04 NOTE — PROGRESS NOTES
Patient became febrile despite of antibiotics with ceftriaxone, azithromycin, chest x-ray shows infiltrate, patient was hypoxic needing oxygen, suspect aspiration pneumonia, patient also had a episode of fever, I will admit and resolved further anaerobic coverage, but gram-negative+ strep pneumonia coverage patient is on ceftriaxone atypical coverage with azithromycin, added metronidazole for anaerobic coverage as well.

## 2022-08-04 NOTE — PROGRESS NOTES
Patient requested to be taken off AIRVO Heated High-Flow nasal cannula. Patient saturation improved post Lasix and antibiotics per RN and placed on 2L nasal cannula. Respiratory will continue to monitor closely.        08/04/22 0641   Oxygen Therapy/Pulse Ox   O2 Device High flow nasal cannula   O2 Flow Rate (L/min) 2 L/min   Heart Rate 94   SpO2 94 %

## 2022-08-04 NOTE — PROGRESS NOTES
Hospitalist Progress Note   Admit Date:  2022 11:57 AM   Name:  Sarahi Route   Age:  56 y.o. Sex:  male  :  1975   MRN:  [id number]   Room:  [facility]/01    Presenting Complaint: Fatigue     Reason(s) for Admission: GIB (gastrointestinal bleeding) [K92.2]  General weakness [R53.1]  Gastrointestinal hemorrhage, unspecified gastrointestinal hemorrhage type [facility] Course & Interval History:   56 y.o. male with medical history of gastric cancer with mets to liver, hypothyroidism, GIB,  status post EGD with dilation that was unsuccessful with esophageal stent placed by Dr. Riva Spatz on 2022 who presented with fatigue and dark stools past 2 days. CXR atelectasis versus PNA, hemoglobin stable at 9, leukocytosis present. He did receive Rocephin in the ED, 2 sets [facility] drawn in ED. FOB + in ED.  hemoglobin noted 6.6, 1 unit PRBC ordered. GI consulted. Subjective/24hr Events (22): Had hypoxia and fever last night. CXR showed infiltrate. Flagyl added to abx per nocturnist.  Will transfer to DT for further care. Denies CP, SOB, n/v/d, abd pain. Assessment & Plan:     GIB (gastrointestinal bleeding)  GI consult  PPI bid  Trend CBC    Clear liquid diet, NPO after midnight for potential EGD in am  No BM today  8/3 plans for EGD today   EGD yesterday with esophageal stent repositioned due to migration  Advance to pureed diet, PEG tube if not able to tolerate.      Questionable PNA noted on CXR/acute resp failure with hypoxia  Continue with Rocephin/Zithromax  Check PNA labs to include procal (procal can be elevated due to cancer, however)  Trend BC x 2 drawn in ED    Procal 0.45, continue to trend  PNA labs still pending  BC NGTD  Day 2 Rocephin/Zithromax  Leukocytosis resolved   became hypoxic last night, requiring O2  Flagyl added to azithromycin and rocephin     Hypothyroidism:  Home meds     Hyponatremia, acute:  Resolved, continue with IVF     Gastric Cancer with mets to liver:  Taiwo Rash every 14 days, last received 2022 consult Oncology     Thrombocytopenia:  112 with baseline 70s-100s  Likely related to [facility], gastric Ca  Trend  No anticoagulation     UTI:  Trace leukocytes in urine  On Rocephin for PNA  Urine culture            Dispo/Discharge Plannin-3 days     Diet: ADULT DIET; pureed diet  VTE ppx: SCDs  Code status: Full Code    Hospital Problems:  Principal Problem:    GIB (gastrointestinal bleeding)  Active Problems:    Hypothyroidism    PNA (pneumonia)    Malignant neoplasm of overlapping sites of stomach (HCC)    CINV (chemotherapy-induced nausea and vomiting)  Resolved Problems:    * No resolved hospital problems.  *      Objective:   Patient Vitals for the past 24 hrs:   Temp Pulse Resp BP SpO2   22 1133 99 °F (37.2 °C) 91 16 104/74 95 %   22 1130 -- -- -- -- 94 %   22 0725 98.8 °F (37.1 °C) 91 16 101/71 93 %   22 0641 -- 94 -- -- 94 %   22 0632 -- 94 -- -- 99 %   22 0306 99.7 °F (37.6 °C) 100 17 102/73 96 %   22 0029 98.8 °F (37.1 °C) (!) 108 16 104/78 95 %   22 2305 (!) 101.1 °F (38.4 °C) (!) 122 20 109/81 94 %   22 2136 -- (!) 111 -- -- 95 %   22 99.7 °F (37.6 °C) (!) 116 18 122/79 90 %   22 1705 -- (!) 102 16 117/68 92 %   22 1700 -- (!) 104 16 116/70 92 %   22 1655 -- (!) 104 16 116/68 92 %   22 1650 -- (!) 108 16 115/69 92 %   22 1645 -- (!) 105 16 113/71 94 %   22 1640 -- (!) 107 16 111/70 93 %   22 1635 -- (!) 110 16 114/70 92 %   22 1633 -- (!) 106 17 114/70 95 %   22 1630 -- (!) 107 16 107/71 94 %       Oxygen Therapy  SpO2: 95 %  Pulse via Oximetry: 102 beats per minute  Pulse Oximeter Device Mode: Intermittent  Pulse Oximeter Device Location: Right, Finger  O2 Device: High flow nasal cannula  FiO2 : 55 %  O2 Flow Rate (L/min): 2 L/min  Blood Gas  Performed?: Yes  Deepak's Test #1: No collateral flow detected  Site #1: Right Brachial  Site Prepped #1: Yes  Number of Attempts #1: 1  Pressure Held #1: Yes  Complications #1: None  Post-procedure #1: Standard  Specimen Status #1: Point of care  How Tolerated?: Tolerated well    Estimated body mass index is 21.59 kg/m² as calculated from the following:    Height as of this encounter: 5' 11.5\" (1.816 m). Weight as of this encounter: 157 lb (71.2 kg). Intake/Output Summary (Last 24 hours) at 8/4/2022 1425  Last data filed at 8/4/2022 0306  Gross per 24 hour   Intake 500 ml   Output 1700 ml   Net -1200 ml         Physical Exam:     Blood pressure 104/74, pulse 91, temperature 99 °F (37.2 °C), temperature source Oral, resp. rate 16, height 5' 11.5\" (1.816 m), weight 157 lb (71.2 kg), SpO2 95 %. General:          Thin, frail. BMI 21.59. appears chronically ill  Head:               Normocephalic, atraumatic  Eyes:               Sclerae appear normal.  Pupils equally round. ENT:                Nares appear normal, no drainage. Moist oral mucosa  Neck:               No restricted ROM. Trachea midline   CV:                  RRR. No m/r/g. No jugular venous distension. Lungs:             CTAB. No wheezing, rhonchi, or rales. Symmetric expansion. Abdomen: Bowel sounds present. Soft, nontender, nondistended. Extremities:     No cyanosis or clubbing. No edema  Skin:                No rashes, jaundiced. Warm and dry. Neuro:             CN II-XII grossly intact. Sensation intact. A&Ox3  Psych:             Normal mood and affect.     I have personally reviewed labs and tests showing:  Recent Labs:  Recent Results (from the past 48 hour(s))   Procalcitonin    Collection Time: 08/03/22  3:52 AM   Result Value Ref Range    Procalcitonin 0.55 (H) 0.00 - 0.49 ng/mL   CBC with Auto Differential    Collection Time: 08/03/22  3:52 AM   Result Value Ref Range    WBC 8.2 4.3 - 11.1 K/uL    RBC 2.82 (L) 4.23 - 5.6 M/uL    Hemoglobin 8.6 (L) 13.6 - 17.2 g/dL Hematocrit 26.5 (L) 41.1 - 50.3 %    MCV 94.0 79.6 - 97.8 FL    MCH 30.5 26.1 - 32.9 PG    MCHC 32.5 31.4 - 35.0 g/dL    RDW 21.6 (H) 11.9 - 14.6 %    Platelets 790 (L) 947 - 450 K/uL    MPV 9.8 9.4 - 12.3 FL    nRBC 0.00 0.0 - 0.2 K/uL    Differential Type AUTOMATED      Seg Neutrophils 83 (H) 43 - 78 %    Lymphocytes 7 (L) 13 - 44 %    Monocytes 8 4.0 - 12.0 %    Eosinophils % 1 0.5 - 7.8 %    Basophils 0 0.0 - 2.0 %    Immature Granulocytes 1 0.0 - 5.0 %    Segs Absolute 6.9 1.7 - 8.2 K/UL    Absolute Lymph # 0.6 0.5 - 4.6 K/UL    Absolute Mono # 0.7 0.1 - 1.3 K/UL    Absolute Eos # 0.1 0.0 - 0.8 K/UL    Basophils Absolute 0.0 0.0 - 0.2 K/UL    Absolute Immature Granulocyte 0.1 0.0 - 0.5 K/UL   Basic Metabolic Panel w/ Reflex to MG    Collection Time: 08/03/22  3:52 AM   Result Value Ref Range    Sodium 138 136 - 145 mmol/L    Potassium 3.4 (L) 3.5 - 5.1 mmol/L    Chloride 108 (H) 98 - 107 mmol/L    CO2 24 21 - 32 mmol/L    Anion Gap 6 (L) 7 - 16 mmol/L    Glucose 74 65 - 100 mg/dL    BUN 8 6 - 23 MG/DL    Creatinine 0.65 (L) 0.8 - 1.5 MG/DL    GFR African American >60 >60 ml/min/1.73m2    GFR Non- >60 >60 ml/min/1.73m2    Calcium 7.3 (L) 8.3 - 10.4 MG/DL   Magnesium    Collection Time: 08/03/22  3:52 AM   Result Value Ref Range    Magnesium 1.8 1.8 - 2.4 mg/dL   POC Blood, Cord, Arterial    Collection Time: 08/03/22  9:02 PM   Result Value Ref Range    DEVICE NASAL CANNULA      pH, Arterial, POC 7.48 (H) 7.35 - 7.45      pCO2, Arterial, POC 26.4 (L) 35 - 45 MMHG    pO2, Arterial, POC 61 (L) 75 - 100 MMHG    HCO3, Mixed 19.6 (L) 22 - 26 MMOL/L    SO2c, Arterial, POC 93.1 (L) 95 - 98 %    BASE DEFICIT (POC) 2.9 mmol/L    POC Deepak's Test NOT APPLICABLE      Site RIGHT BRACHIAL      Specimen type: ARTERIAL      Performed by: Beto     POC TCO2 20 13 - 23 MMOL/L    RESPIRATORY COMMENT: 10L_Nasal_Cannula    Hemoglobin and Hematocrit    Collection Time: 08/03/22 10:28 PM   Result Value Ref Range    Hemoglobin 10.4 (L) 13.6 - 17.2 g/dL    Hematocrit 31.2 (L) 41.1 - 50.3 %   CBC with Auto Differential    Collection Time: 08/04/22  4:04 AM   Result Value Ref Range    WBC 19.5 (H) 4.3 - 11.1 K/uL    RBC 2.73 (L) 4.23 - 5.6 M/uL    Hemoglobin 8.4 (L) 13.6 - 17.2 g/dL    Hematocrit 25.6 (L) 41.1 - 50.3 %    MCV 93.8 79.6 - 97.8 FL    MCH 30.8 26.1 - 32.9 PG    MCHC 32.8 31.4 - 35.0 g/dL    RDW 21.3 (H) 11.9 - 14.6 %    Platelets 400 (L) 079 - 450 K/uL    MPV 9.9 9.4 - 12.3 FL    nRBC 0.00 0.0 - 0.2 K/uL    Differential Type AUTOMATED      Seg Neutrophils 90 (H) 43 - 78 %    Lymphocytes 3 (L) 13 - 44 %    Monocytes 5 4.0 - 12.0 %    Eosinophils % 0 (L) 0.5 - 7.8 %    Basophils 0 0.0 - 2.0 %    Immature Granulocytes 1 0.0 - 5.0 %    Segs Absolute 17.7 (H) 1.7 - 8.2 K/UL    Absolute Lymph # 0.7 0.5 - 4.6 K/UL    Absolute Mono # 1.1 0.1 - 1.3 K/UL    Absolute Eos # 0.0 0.0 - 0.8 K/UL    Basophils Absolute 0.1 0.0 - 0.2 K/UL    Absolute Immature Granulocyte 0.1 0.0 - 0.5 K/UL   Comprehensive Metabolic Panel    Collection Time: 08/04/22  4:04 AM   Result Value Ref Range    Sodium 136 136 - 145 mmol/L    Potassium 4.1 3.5 - 5.1 mmol/L    Chloride 105 98 - 107 mmol/L    CO2 23 21 - 32 mmol/L    Anion Gap 8 7 - 16 mmol/L    Glucose 83 65 - 100 mg/dL    BUN 7 6 - 23 MG/DL    Creatinine 0.76 (L) 0.8 - 1.5 MG/DL    GFR African American >60 >60 ml/min/1.73m2    GFR Non- >60 >60 ml/min/1.73m2    Calcium 7.4 (L) 8.3 - 10.4 MG/DL    Total Bilirubin 0.6 0.2 - 1.1 MG/DL    ALT 28 12 - 65 U/L    AST 70 (H) 15 - 37 U/L    Alk Phosphatase 265 (H) 50 - 136 U/L    Total Protein 5.0 (L) 6.3 - 8.2 g/dL    Albumin 1.7 (L) 3.5 - 5.0 g/dL    Globulin 3.3 2.3 - 3.5 g/dL    Albumin/Globulin Ratio 0.5 (L) 1.2 - 3.5     Respiratory Panel, Molecular, with COVID-19 (Restricted: peds pts or suitable admitted adults)    Collection Time: 08/04/22  4:08 AM    Specimen: Nasopharyngeal   Result Value Ref Range    Adenovirus by PCR NOT DETECTED NOTDET      Coronavirus 229E by PCR NOT DETECTED NOTDET      Coronavirus HKU1 by PCR NOT DETECTED NOTDET      Coronavirus NL63 by PCR NOT DETECTED NOTDET      Coronavirus OC43 by PCR NOT DETECTED NOTDET      SARS-CoV-2, PCR NOT DETECTED NOTDET      Human Metapneumovirus by PCR NOT DETECTED NOTDET      Rhinovirus Enterovirus PCR NOT DETECTED NOTDET      Influenza A by PCR NOT DETECTED NOTDET      INFLUENZA B PCR NOT DETECTED NOTDET      Parainfluenza 1 PCR NOT DETECTED NOTDET      Parainfluenza 2 PCR NOT DETECTED NOTDET      Parainfluenza 3 PCR NOT DETECTED NOTDET      Parainfluenza 4 PCR NOT DETECTED NOTDET      Respiratory Syncytial Virus by PCR NOT DETECTED NOTDET      Bordetella parapertussis by PCR NOT DETECTED NOTDET      Bordetella pertussis by PCR NOT DETECTED NOTDET      Chlamydophila Pneumonia PCR NOT DETECTED NOTDET      Mycoplasma pneumo by PCR NOT DETECTED NOTDET     COVID-19, Rapid    Collection Time: 08/04/22  4:21 AM    Specimen: Nasopharyngeal   Result Value Ref Range    Source Nasopharyngeal      SARS-CoV-2, Rapid Not detected NOTD         I have personally reviewed imaging studies showing: Other Studies:  XR CHEST PORTABLE   Final Result      1. Findings as described above. XR CHEST 1 VIEW   Final Result   1. Esophageal stent with distal portion along the greater curvature of the   stomach and proximal portion in the gastroesophageal junction/distal esophagus. Dictated. 2. Prominent skin fold along the left chest wall with skin markings extending   beyond this skin fold. XR CHEST PORTABLE   Final Result   Intrerval advancement of the esophagogastric stent since prior intraoperative   fluoroscopic images dated 7/14/2022, with the proximal aspect of the stent   overlying the region of the GE junction. Retrocardiac opacity, which may reflect atelectasis versus infection/aspiration.       The findings of this exam were discussed with  Dr. Brian Viveros by  Susanabrandi Herbert at 1:39 PM on 8/1/2022.              Current Meds:  Current Facility-Administered Medications   Medication Dose Route Frequency    metronidazole (FLAGYL) 500 mg in 0.9% NaCl 100 mL IVPB premix  500 mg IntraVENous Q8H    0.9 % sodium chloride infusion   IntraVENous PRN    magnesium sulfate 2000 mg in 50 mL IVPB premix  2,000 mg IntraVENous PRN    potassium chloride (KLOR-CON M) extended release tablet 40 mEq  40 mEq Oral PRN    Or    potassium bicarb-citric acid (EFFER-K) effervescent tablet 40 mEq  40 mEq Oral PRN    Or    potassium chloride 10 mEq/100 mL IVPB (Peripheral Line)  10 mEq IntraVENous PRN    cefTRIAXone (ROCEPHIN) 1,000 mg in sodium chloride 0.9 % 50 mL IVPB mini-bag  1,000 mg IntraVENous Q24H    azithromycin (ZITHROMAX) 500 mg in sodium chloride 0.9 % 250 mL IVPB ([id number])  500 mg IntraVENous Q24H    sodium chloride flush 0.9 % injection 5-40 mL  5-40 mL IntraVENous 2 times per day    sodium chloride flush 0.9 % injection 5-40 mL  5-40 mL IntraVENous PRN    0.9 % sodium chloride infusion   IntraVENous PRN    ondansetron (ZOFRAN-ODT) disintegrating tablet 4 mg  4 mg Oral Q8H PRN    Or    ondansetron (ZOFRAN) injection 4 mg  4 mg IntraVENous Q6H PRN    polyethylene glycol (GLYCOLAX) packet 17 g  17 g Oral Daily PRN    acetaminophen (TYLENOL) tablet 650 mg  650 mg Oral Q6H PRN    Or    acetaminophen (TYLENOL) suppository 650 mg  650 mg Rectal Q6H PRN    pantoprazole (PROTONIX) tablet 40 mg  40 mg Oral BID AC    0.9 % sodium chloride infusion   IntraVENous Continuous    gabapentin (NEURONTIN) capsule 300 mg  300 mg Oral BID    levothyroxine (SYNTHROID) tablet 50 mcg  50 mcg Oral QAM AC    LORazepam (ATIVAN) tablet 1 mg  1 mg Oral Q4H PRN    mirtazapine (REMERON) tablet 30 mg  30 mg Oral Nightly     Facility-Administered Medications Ordered in Other Encounters   Medication Dose Route Frequency    atropine injection 0.4 mg  0.4 mg IntraVENous Once       Signed:  April Coreas, ARY - CNP    Notes, labs, VS, diagnostic testing reviewed  Case discussed with pt, care team ,Dr. Tommy Quinones, wife via phone

## 2022-08-05 PROBLEM — K92.2 ACUTE GI BLEEDING: Status: ACTIVE | Noted: 2022-01-01

## 2022-08-05 NOTE — PROGRESS NOTES
Patient refused to have his port accessed. Tolerating clear liquids well. Patient stated that they had one loose stool. UA sent and pending

## 2022-08-05 NOTE — CARE COORDINATION
Chart screened by CM for d/c planning. Pt was transferred on 8/4 from [facility] to [facility] for continuation of care. Please see previous following S[facility] CM notes for initial assessment information. Pt resides with his spouse Jonathan Holland [phone] and cares for his mother who has Dx of Dementia. Pt does not have insurance nor a PCP. Pt refuses a referral for a PCP stating he will use his oncologist, Dr. Ale Jones, for any medical needs. Pt has home DME available if needed. There have been no PT/OT/SLP consults ordered. Pt admitted with Dx of GI bleed, Severe anemia, Thrombocytopenia, Hypothyroidism, and Questionable PNA. Pt underwent an EGD today. Anticipate pt will d/c home in the next 24-48 hours. No d/c needs identified at the present time. CM will continue to follow and remain available if any needs arise. 08/05/22 0046   Service Assessment   Patient Orientation Alert and Oriented;Person;Place; Self   Cognition Alert   History Provided By Medical Record   Primary Caregiver Self   Support Systems Spouse/Significant Other;Family Members   Patient's Healthcare Decision Maker is: Legal Next of [place] 70   PCP Verified by CM Yes  (Pt refuses a referral for a PCP. He states he will use his oncologist, Dr. Ale Jones, for any medical needs.)   Last Visit to PCP Within last 3 months   Prior Functional Level Independent in ADLs/IADLs   Current Functional Level Independent in ADLs/IADLs   Can patient return to prior living arrangement Yes   Ability to make needs known: Good   Family able to assist with home care needs: Yes   Would you like for me to discuss the discharge plan with any other family members/significant others, and if so, who?  No   Financial Resources Financial Counseling;Hospital Care Assurance Program  (Pt does not have insurance)   Social/Functional History   Lives With Spouse   Type of [address] Two level   [address] [place]; [address] Independent   Ambulation Assistance Independent   Transfer Assistance Independent   Active  Yes   Mode of Transportation Car   Occupation On disability   Discharge Planning   Type of Residence [employer]   Living Arrangements Spouse/Significant Other;Parent   Current Services Prior To Admission Durable Medical Equipment   Potential Assistance Needed N/A   DME Ordered? No   Potential Assistance Purchasing Medications Yes  (Pt does not have insurance. If he does request Rx assistance CM will contact the [address] None   Patient expects to be discharged to: [address] Discharge   Transition of Care Consult (CM Consult) Discharge [facility] [address] Discharge None   [employer] Information Provided? No   Mode of Transport at Discharge Other (see comment)  (Family)   Confirm Follow Up Transport Family   Condition of Participation: Discharge Planning   The Plan for Transition of Care is related to the following treatment goals: Pt to obtain care to become medically stable and to return with a safe transition. The Patient and/or Patient Representative was provided with a Choice of Provider? Patient   Freedom of Choice list was provided with basic dialogue that supports the patient's individualized plan of care/goals, treatment preferences, and shares the quality data associated with the providers?   Yes

## 2022-08-05 NOTE — PROGRESS NOTES
Hospitalist Progress Note   Admit Date:  2022  6:18 PM   Name:  Ulisses Shah   Age:  56 y.o. Sex:  male  :  1975   MRN:  [id number]   Room:  [facility]/    Presenting Complaint: fatigue    Reason(s) for Admission: [facility]     Hospital Course & Interval History:     Patient with past medical history of  Gastric cancer which metastasized to the liver  Hypothyroidism  GI bleeding status post EGD with dilation that was unsuccessful. So patient had esophageal stent placement by Dr. Florence Mackey on 2022. Patient came to hospital with fatigue and having dark stool in the past 2 days prior to admission. Patient was found to have hemoglobin of 9. Chest x-ray shows either atelectasis or pneumonia. Initial hemoglobin was 6.6 in emergency room. Patient received blood transfusion and GI is consulted. Patient had EGD. It showed esophageal stent has migrated with possible H located at 38 cm from the incisors. The stent was pulled approximately, so result is that stent is now placed at 28 cm from incisors. There was finding of gastric cancer with stigmata of recent bleeding. Subjective/24hr Events (22):     22   Patient is afebrile. No shortness of breath  No chest pain. No nausea. No vomiting. Assessment & Plan: Active Problems:  GI bleeding  Likely from gastric cancer which showed stigmata for recent bleeding  Continue with pantoprazole  Monitor symptoms and hemoglobin.    Symptomatic treatment    Severe anemia  Improved with packed red cell transfusion  Monitor hemoglobin and bleeding symptoms   may require additional transfusion    Thrombocytopenia  Noted  Monitor  If platelet drops further may require transfusion    Hypothyroidism  Continue with supplementation    Questionable pneumonia  On empiric antibiotics  Monitor closely and follow-up on culture results  May stop antibiotics early if there is no evidence of ongoing infection    I have discussed the plan of personally reviewed labs and tests showing:  Recent Labs:  Recent Results (from the past 48 hour(s))   POC Blood, Cord, Arterial    Collection Time: 08/03/22  9:02 PM   Result Value Ref Range    DEVICE NASAL CANNULA      pH, Arterial, POC 7.48 (H) 7.35 - 7.45      pCO2, Arterial, POC 26.4 (L) 35 - 45 MMHG    pO2, Arterial, POC 61 (L) 75 - 100 MMHG    HCO3, Mixed 19.6 (L) 22 - 26 MMOL/L    SO2c, Arterial, POC 93.1 (L) 95 - 98 %    BASE DEFICIT (POC) 2.9 mmol/L    POC Deepak's Test NOT APPLICABLE      Site RIGHT BRACHIAL      Specimen type: ARTERIAL      Performed by: Beto     POC TCO2 20 13 - 23 MMOL/L    RESPIRATORY COMMENT: 10L_Nasal_Cannula    Hemoglobin and Hematocrit    Collection Time: 08/03/22 10:28 PM   Result Value Ref Range    Hemoglobin 10.4 (L) 13.6 - 17.2 g/dL    Hematocrit 31.2 (L) 41.1 - 50.3 %   CBC with Auto Differential    Collection Time: 08/04/22  4:04 AM   Result Value Ref Range    WBC 19.5 (H) 4.3 - 11.1 K/uL    RBC 2.73 (L) 4.23 - 5.6 M/uL    Hemoglobin 8.4 (L) 13.6 - 17.2 g/dL    Hematocrit 25.6 (L) 41.1 - 50.3 %    MCV 93.8 79.6 - 97.8 FL    MCH 30.8 26.1 - 32.9 PG    MCHC 32.8 31.4 - 35.0 g/dL    RDW 21.3 (H) 11.9 - 14.6 %    Platelets 621 (L) 527 - 450 K/uL    MPV 9.9 9.4 - 12.3 FL    nRBC 0.00 0.0 - 0.2 K/uL    Differential Type AUTOMATED      Seg Neutrophils 90 (H) 43 - 78 %    Lymphocytes 3 (L) 13 - 44 %    Monocytes 5 4.0 - 12.0 %    Eosinophils % 0 (L) 0.5 - 7.8 %    Basophils 0 0.0 - 2.0 %    Immature Granulocytes 1 0.0 - 5.0 %    Segs Absolute 17.7 (H) 1.7 - 8.2 K/UL    Absolute Lymph # 0.7 0.5 - 4.6 K/UL    Absolute Mono # 1.1 0.1 - 1.3 K/UL    Absolute Eos # 0.0 0.0 - 0.8 K/UL    Basophils Absolute 0.1 0.0 - 0.2 K/UL    Absolute Immature Granulocyte 0.1 0.0 - 0.5 K/UL   Comprehensive Metabolic Panel    Collection Time: 08/04/22  4:04 AM   Result Value Ref Range    Sodium 136 136 - 145 mmol/L    Potassium 4.1 3.5 - 5.1 mmol/L    Chloride 105 98 - 107 mmol/L    CO2 23 21 - 32 mmol/L    Anion Gap 8 7 - 16 mmol/L    Glucose 83 65 - 100 mg/dL    BUN 7 6 - 23 MG/DL    Creatinine 0.76 (L) 0.8 - 1.5 MG/DL    GFR African American >60 >60 ml/min/1.73m2    GFR Non- >60 >60 ml/min/1.73m2    Calcium 7.4 (L) 8.3 - 10.4 MG/DL    Total Bilirubin 0.6 0.2 - 1.1 MG/DL    ALT 28 12 - 65 U/L    AST 70 (H) 15 - 37 U/L    Alk Phosphatase 265 (H) 50 - 136 U/L    Total Protein 5.0 (L) 6.3 - 8.2 g/dL    Albumin 1.7 (L) 3.5 - 5.0 g/dL    Globulin 3.3 2.3 - 3.5 g/dL    Albumin/Globulin Ratio 0.5 (L) 1.2 - 3.5     Respiratory Panel, Molecular, with COVID-19 (Restricted: peds pts or suitable admitted adults)    Collection Time: 08/04/22  4:08 AM    Specimen: Nasopharyngeal   Result Value Ref Range    Adenovirus by PCR NOT DETECTED NOTDET      Coronavirus 229E by PCR NOT DETECTED NOTDET      Coronavirus HKU1 by PCR NOT DETECTED NOTDET      Coronavirus NL63 by PCR NOT DETECTED NOTDET      Coronavirus OC43 by PCR NOT DETECTED NOTDET      SARS-CoV-2, PCR NOT DETECTED NOTDET      Human Metapneumovirus by PCR NOT DETECTED NOTDET      Rhinovirus Enterovirus PCR NOT DETECTED NOTDET      Influenza A by PCR NOT DETECTED NOTDET      INFLUENZA B PCR NOT DETECTED NOTDET      Parainfluenza 1 PCR NOT DETECTED NOTDET      Parainfluenza 2 PCR NOT DETECTED NOTDET      Parainfluenza 3 PCR NOT DETECTED NOTDET      Parainfluenza 4 PCR NOT DETECTED NOTDET      Respiratory Syncytial Virus by PCR NOT DETECTED NOTDET      Bordetella parapertussis by PCR NOT DETECTED NOTDET      Bordetella pertussis by PCR NOT DETECTED NOTDET      Chlamydophila Pneumonia PCR NOT DETECTED NOTDET      Mycoplasma pneumo by PCR NOT DETECTED NOTDET     COVID-19, Rapid    Collection Time: 08/04/22  4:21 AM    Specimen: Nasopharyngeal   Result Value Ref Range    Source Nasopharyngeal      SARS-CoV-2, Rapid Not detected NOTD     POC Blood, Cord, Arterial    Collection Time: 08/04/22  8:08 PM   Result Value Ref Range    DEVICE ROOM AIR      pH, Arterial, POC 7.45 7.35 - 7.45      pCO2, Arterial, POC 26.5 (L) 35 - 45 MMHG    pO2, Arterial, POC 71 (L) 75 - 100 MMHG    HCO3, Mixed 18.6 (L) 22 - 26 MMOL/L    SO2c, Arterial, POC 95.2 95 - 98 %    BASE DEFICIT (POC) 4.6 mmol/L    POC Deepak's Test Positive      Site RIGHT RADIAL      Specimen type: ARTERIAL      Performed by: Kristyn    Culture, Urine    Collection Time: 08/04/22 10:56 PM    Specimen: Urine   Result Value Ref Range    Special Requests NO SPECIAL REQUESTS      Culture        No growth after short period of incubation. Further results to follow after overnight incubation. I have personally reviewed imaging studies showing:   Other Studies:  No orders to display       Current Meds:  Current Facility-Administered Medications   Medication Dose Route Frequency    cefTRIAXone (ROCEPHIN) 1,000 mg in sodium chloride 0.9 % 50 mL IVPB mini-bag  1,000 mg IntraVENous Q24H    azithromycin (ZITHROMAX) 500 mg in sodium chloride 0.9 % 250 mL IVPB ([id number])  500 mg IntraVENous Q24H    sodium chloride flush 0.9 % injection 5-40 mL  5-40 mL IntraVENous 2 times per day    sodium chloride flush 0.9 % injection 5-40 mL  5-40 mL IntraVENous PRN    0.9 % sodium chloride infusion   IntraVENous PRN    ondansetron (ZOFRAN-ODT) disintegrating tablet 4 mg  4 mg Oral Q8H PRN    Or    ondansetron (ZOFRAN) injection 4 mg  4 mg IntraVENous Q6H PRN    polyethylene glycol (GLYCOLAX) packet 17 g  17 g Oral Daily PRN    acetaminophen (TYLENOL) tablet 650 mg  650 mg Oral Q6H PRN    Or    acetaminophen (TYLENOL) suppository 650 mg  650 mg Rectal Q6H PRN    pantoprazole (PROTONIX) tablet 40 mg  40 mg Oral BID AC    [Held by provider] 0.9 % sodium chloride infusion   IntraVENous Continuous    gabapentin (NEURONTIN) capsule 300 mg  300 mg Oral BID    levothyroxine (SYNTHROID) tablet 50 mcg  50 mcg Oral QAM AC    LORazepam (ATIVAN) tablet 1 mg  1 mg Oral Q4H PRN    mirtazapine (REMERON) tablet 30 mg  30 mg Oral Nightly    0.9 % sodium chloride infusion   IntraVENous PRN    magnesium sulfate 2000 mg in 50 mL IVPB premix  2,000 mg IntraVENous PRN    potassium chloride (KLOR-CON M) extended release tablet 40 mEq  40 mEq Oral PRN    Or    potassium bicarb-citric acid (EFFER-K) effervescent tablet 40 mEq  40 mEq Oral PRN    Or    potassium chloride 10 mEq/100 mL IVPB (Peripheral Line)  10 mEq IntraVENous PRN    metronidazole (FLAGYL) 500 mg in 0.9% NaCl 100 mL IVPB premix  500 mg IntraVENous Q8H     Facility-Administered Medications Ordered in Other Encounters   Medication Dose Route Frequency    atropine injection 0.4 mg  0.4 mg IntraVENous Once       Signed:  Meryle Bott, MD    Part of this note may have been written by using a voice dictation software. The note has been proof read but may still contain some grammatical/other typographical errors.

## 2022-08-06 NOTE — PROGRESS NOTES
Patient is frustrated and irritated about his swelling and his situation. Patient may possibly go home today. Patient started to sat in high 80s overnight but came up to 90s. Patient refused oxygen and stated he cannot wear it to sleep.      Sanaz Ricketts

## 2022-08-06 NOTE — PROGRESS NOTES
Hospitalist Progress Note   Admit Date:  2022  6:18 PM   Name:  Alena Cleveland   Age:  56 y.o. Sex:  male  :  1975   MRN:  [id number]   Room:  [facility]/    Presenting Complaint: fatigue    Reason(s) for Admission: Acute GI bleeding [facility] Course & Interval History:     Patient with past medical history of  Gastric cancer which metastasized to the liver  Hypothyroidism  GI bleeding status post EGD with dilation that was unsuccessful. So patient had esophageal stent placement by Dr. Jayde Bird on 2022. Patient came to hospital with fatigue and having dark stool in the past 2 days prior to admission. Patient was found to have hemoglobin of 9. Chest x-ray shows either atelectasis or pneumonia. Initial hemoglobin was 6.6 in emergency room. Patient received blood transfusion and GI is consulted. Patient had EGD. It showed esophageal stent has migrated with possible H located at 38 cm from the incisors. The stent was pulled approximately, so result is that stent is now placed at 28 cm from incisors. There was finding of gastric cancer with stigmata of recent bleeding. Subjective/24hr Events (22):     22   Patient is afebrile. No shortness of breath  No chest pain. No nausea. No vomiting. 22   Patient is concerned that he has had more swelling on the ankles of both legs. He would like to change IV metronidazole to oral form to limit the intake of fluid. Also he was taking Lasix as needed for swelling and is requesting it. No chest pain. No nausea. No vomiting. No abdominal pain       Assessment & Plan: Active Problems:  GI bleeding  Likely from gastric cancer which showed stigmata for recent bleeding  Continue with pantoprazole  Monitor symptoms and hemoglobin. Symptomatic treatment  Check CBC today.      Severe anemia  Improved with packed red cell transfusion  Monitor hemoglobin and bleeding symptoms   may require additional transfusion    Thrombocytopenia  Noted  Monitor  If platelet drops further may require transfusion    Hypothyroidism  Continue with supplementation    Questionable pneumonia  On empiric antibiotics  Monitor closely and follow-up on culture results  May stop antibiotics early if there is no evidence of ongoing infection    Patient has swelling of both ankles. Change Metronidazole to oral form. Give Lasix 40 mg po once for patient's comfort. I have discussed the plan of care with patient and care team.      Discharge Planning:      to be determined     Diet:  ADULT DIET; Dysphagia - Pureed  DVT PPx: SCD  Code status: Full Code    Hospital Problems:  Principal Problem:    GIB (gastrointestinal bleeding)  Active Problems:    Severe anemia    Thrombocytopenia (HCC)    Hypothyroidism    Acute GI bleeding    Malignant neoplasm of overlapping sites of stomach ([facility] [address]  Resolved Problems:    * No resolved hospital problems. *      Objective:   Patient Vitals for the past 24 hrs:   Temp Pulse Resp BP SpO2   08/06/22 0750 98.7 °F (37.1 °C) 97 18 111/75 94 %   08/06/22 0350 98.2 °F (36.8 °C) 92 17 109/70 92 %   08/05/22 2244 98.8 °F (37.1 °C) 91 18 108/74 92 %   08/05/22 1937 98.5 °F (36.9 °C) 88 16 100/74 91 %   08/05/22 1433 98.6 °F (37 °C) 96 18 112/75 94 %   08/05/22 1035 98.3 °F (36.8 °C) 88 18 106/71 93 %         Oxygen Therapy  SpO2: 94 %  O2 Device: None (Room air)    Estimated body mass index is 24.2 kg/m² as calculated from the following:    Height as of this encounter: 5' 11\" (1.803 m). Weight as of this encounter: 173 lb 8 oz (78.7 kg). Intake/Output Summary (Last 24 hours) at 8/6/2022 0948  Last data filed at 8/6/2022 4141  Gross per 24 hour   Intake 2207 ml   Output 150 ml   Net 2057 ml           Physical Exam:     Blood pressure 111/75, pulse 97, temperature 98.7 °F (37.1 °C), temperature source Oral, resp. rate 18, height 5' 11\" (1.803 m), weight 173 lb 8 oz (78.7 kg), SpO2 94 %.   General:    Well nourished. Patient is sitting up in bed. He is talking well. Head:  Normocephalic, atraumatic  Eyes:  Sclerae appear normal.  Pupils equally round. ENT:  Nares appear normal, no drainage. Moist oral mucosa  Neck:  No restricted ROM. Trachea midline   CV:   RRR. No m/r/g. No jugular venous distension. Lungs:   CTAB. No wheezing, rhonchi, or rales. Symmetric expansion. Abdomen: Bowel sounds present. Soft, nontender, nondistended. Extremities: No cyanosis or clubbing. Edema at both ankles  Skin:     No rashes and normal coloration. Warm and dry. Neuro:  CN II-XII grossly intact. Sensation intact. A&Ox3  Psych:  Normal mood and affect. I have personally reviewed labs and tests showing:  Recent Labs:  Recent Results (from the past 48 hour(s))   POC Blood, Cord, Arterial    Collection Time: 08/04/22  8:08 PM   Result Value Ref Range    DEVICE ROOM AIR      pH, Arterial, POC 7.45 7.35 - 7.45      pCO2, Arterial, POC 26.5 (L) 35 - 45 MMHG    pO2, Arterial, POC 71 (L) 75 - 100 MMHG    HCO3, Mixed 18.6 (L) 22 - 26 MMOL/L    SO2c, Arterial, POC 95.2 95 - 98 %    BASE DEFICIT (POC) 4.6 mmol/L    POC Deepak's Test Positive      Site RIGHT RADIAL      Specimen type: ARTERIAL      Performed by: Kristyn    Culture, Urine    Collection Time: 08/04/22 10:56 PM    Specimen: Urine   Result Value Ref Range    Special Requests NO SPECIAL REQUESTS      Culture        No growth after short period of incubation. Further results to follow after overnight incubation. I have personally reviewed imaging studies showing:   Other Studies:  No orders to display       Current Meds:  Current Facility-Administered Medications   Medication Dose Route Frequency    metroNIDAZOLE (FLAGYL) tablet 500 mg  500 mg Oral 3 times per day    furosemide (LASIX) tablet 40 mg  40 mg Oral Once    cefTRIAXone (ROCEPHIN) 1,000 mg in sodium chloride 0.9 % 50 mL IVPB mini-bag  1,000 mg IntraVENous Q24H    azithromycin (ZITHROMAX) 500 mg in sodium chloride 0.9 % 250 mL IVPB ([id number])  500 mg IntraVENous Q24H    sodium chloride flush 0.9 % injection 5-40 mL  5-40 mL IntraVENous 2 times per day    sodium chloride flush 0.9 % injection 5-40 mL  5-40 mL IntraVENous PRN    0.9 % sodium chloride infusion   IntraVENous PRN    ondansetron (ZOFRAN-ODT) disintegrating tablet 4 mg  4 mg Oral Q8H PRN    Or    ondansetron (ZOFRAN) injection 4 mg  4 mg IntraVENous Q6H PRN    polyethylene glycol (GLYCOLAX) packet 17 g  17 g Oral Daily PRN    acetaminophen (TYLENOL) tablet 650 mg  650 mg Oral Q6H PRN    Or    acetaminophen (TYLENOL) suppository 650 mg  650 mg Rectal Q6H PRN    pantoprazole (PROTONIX) tablet 40 mg  40 mg Oral BID AC    [Held by provider] 0.9 % sodium chloride infusion   IntraVENous Continuous    gabapentin (NEURONTIN) capsule 300 mg  300 mg Oral BID    levothyroxine (SYNTHROID) tablet 50 mcg  50 mcg Oral QAM AC    LORazepam (ATIVAN) tablet 1 mg  1 mg Oral Q4H PRN    mirtazapine (REMERON) tablet 30 mg  30 mg Oral Nightly    0.9 % sodium chloride infusion   IntraVENous PRN    magnesium sulfate 2000 mg in 50 mL IVPB premix  2,000 mg IntraVENous PRN    potassium chloride (KLOR-CON M) extended release tablet 40 mEq  40 mEq Oral PRN    Or    potassium bicarb-citric acid (EFFER-K) effervescent tablet 40 mEq  40 mEq Oral PRN    Or    potassium chloride 10 mEq/100 mL IVPB (Peripheral Line)  10 mEq IntraVENous PRN     Facility-Administered Medications Ordered in Other Encounters   Medication Dose Route Frequency    atropine injection 0.4 mg  0.4 mg IntraVENous Once       Signed:  Moshe Rodríguez MD    Part of this note may have been written by using a voice dictation software. The note has been proof read but may still contain some grammatical/other typographical errors.

## 2022-08-06 NOTE — DISCHARGE SUMMARY
Hospitalist Discharge Summary   Admit Date:  2022  6:18 PM   DC Note date: 2022  Name:  Sil Jane   Age:  56 y.o. Sex:  male  :  1975   MRN:  [id number]   Room:  [facility]  PCP:  None Provider    Presenting Complaint: fatigue and having dark stool    Initial Admission Diagnosis: Acute GI bleeding [K92.2]     Problem List for this Hospitalization (present on admission):    Principal Problem:    GIB (gastrointestinal bleeding)  Active Problems:    Severe anemia    Thrombocytopenia (HCC)    Hypothyroidism    Acute GI bleeding    Malignant neoplasm of overlapping sites of stomach ([address]  Resolved Problems:    * No resolved hospital problems. [facility] Course:    Patient with past medical history of  Gastric cancer which metastasized to the liver  Hypothyroidism  GI bleeding status post EGD with dilation that was unsuccessful. So patient had esophageal stent placement by Dr. Romelia Klein on 2022. Patient came to hospital with fatigue and having dark stool in the past 2 days prior to admission. Patient was found to have hemoglobin of 9. Chest x-ray shows either atelectasis or pneumonia. Initial hemoglobin was 6.6 in emergency room. Patient received blood transfusion and GI is consulted. Patient had EGD. It showed esophageal stent has migrated with possible H located at 38 cm from the incisors. The stent was pulled approximately, so result is that stent is now placed at 28 cm from incisors. There was finding of gastric cancer with stigmata of recent bleeding. Patient has been doing OK with eating. Hb is holding steady. No fever. No shaking. No chills. Patient would like to be discharged home. Disposition: home   Diet: ADULT DIET; Dysphagia - Pureed  Code Status: Full Code    Follow Ups:  See primary doctor   See oncologist     Time spent in patient discharge and coordination 36 minutes. Plan was discussed with patient. All questions answered.   Patient was stable at time of discharge. Instructions given to call a physician or return if any concerns. Current Discharge Medication List        START taking these medications    Details   cefdinir (OMNICEF) 300 MG capsule Take 1 capsule by mouth in the morning and 1 capsule before bedtime. Do all this for 10 days. Qty: 20 capsule, Refills: 0           CONTINUE these medications which have CHANGED    Details   pantoprazole (PROTONIX) 40 MG tablet Take 1 tablet by mouth in the morning. Qty: 30 tablet, Refills: 0           CONTINUE these medications which have NOT CHANGED    Details   levothyroxine (SYNTHROID) 50 MCG tablet Take 1 tablet by mouth in the morning. Qty: 90 tablet, Refills: 1      LORazepam (ATIVAN) 1 MG tablet take 1 tablet by mouth every 6 hours as needed for nausea  Qty: 90 tablet, Refills: 0    Associated Diagnoses: CINV (chemotherapy-induced nausea and vomiting)      gabapentin (NEURONTIN) 300 MG capsule Take 300 mg by mouth 2 times daily. mirtazapine (REMERON) 30 MG tablet Take 30 mg by mouth nightly            STOP taking these medications       ferrous sulfate (FE TABS 325) 325 (65 Fe) MG EC tablet Comments:   Reason for Stopping:         UDENYCA 6 MG/0.6ML injection Comments:   Reason for Stopping:         diphenoxylate-atropine (LOMOTIL) 2.5-0.025 MG per tablet Comments:   Reason for Stopping:         lidocaine-prilocaine (EMLA) 2.5-2.5 % cream Comments:   Reason for Stopping:         ondansetron (ZOFRAN) 8 MG tablet Comments:   Reason for Stopping:         prochlorperazine (COMPAZINE) 10 MG tablet Comments:   Reason for Stopping:               Procedures done this admission:  * No surgery found *    Consults this admission:  FOLLOW UP VISIT    Echocardiogram results:  No results found for this or any previous visit. Diagnostic Imaging/Tests:   XR CHEST PORTABLE    Result Date: 8/3/2022  1. Findings as described above.      XR CHEST PORTABLE    Result Date: 8/1/2022  Intrerval advancement of the esophagogastric stent since prior intraoperative fluoroscopic images dated 7/14/2022, with the proximal aspect of the stent overlying the region of the GE junction. Retrocardiac opacity, which may reflect atelectasis versus infection/aspiration. The findings of this exam were discussed with  Dr. Robin Oliveira by Dr. Sharyle Hazel at 1:39 PM on 8/1/2022. XR CHEST 1 VIEW    Result Date: 8/3/2022  1. Esophageal stent with distal portion along the greater curvature of the stomach and proximal portion in the gastroesophageal junction/distal esophagus. Dictated. 2. Prominent skin fold along the left chest wall with skin markings extending beyond this skin fold.         Recent Labs     08/04/22  2256 08/01/22  1224 08/01/22  1216   CULTURE NO GROWTH 1 DAY NO GROWTH 4 DAYS NO GROWTH 4 DAYS       Labs: Results:       BMP, Mg, Phos Recent Labs     08/04/22  0404      K 4.1      CO2 23   ANIONGAP 8   BUN 7   CREATININE 0.76*   LABGLOM >60   GFRAA >60   CALCIUM 7.4*   GLUCOSE 83      CBC Recent Labs     08/03/22  2228 08/04/22  0404 08/06/22  1155   WBC  --  19.5* 13.1*   RBC  --  2.73* 2.52*   HGB 10.4* 8.4* 7.8*   HCT 31.2* 25.6* 24.0*   MCV  --  93.8 95.2   MCH  --  30.8 31.0   MCHC  --  32.8 32.5   RDW  --  21.3* 21.1*   PLT  --  114* 102*   MPV  --  9.9 9.7   NRBC  --  0.00 0.00   SEGS  --  90* 87*   LYMPHOPCT  --  3* 4*   EOSRELPCT  --  0* 1   MONOPCT  --  5 7   BASOPCT  --  0 0   IMMGRAN  --  1 1   SEGSABS  --  17.7* 11.4*   LYMPHSABS  --  0.7 0.5   EOSABS  --  0.0 0.1   MONOSABS  --  1.1 0.9   BASOSABS  --  0.1 0.0   ABSIMMGRAN  --  0.1 0.1      LFT Recent Labs     08/04/22  0404   BILITOT 0.6   ALKPHOS 265*   AST 70*   ALT 28   PROT 5.0*   LABALBU 1.7*   GLOB 3.3      Cardiac  Lab Results   Component Value Date/Time    TROPHS <3.0 08/01/2022 12:11 PM      Coags No results found for: PROTIME, INR, APTT   A1c No results found for: LABA1C, EAG   Lipids No results found for: CHOL, LDLCALC, LABVLDL, HDL, CHOLHDLRATIO, TRIG   Thyroid  Lab Results   Component Value Date/Time    ZRB0USR 111.000 07/21/2022 01:26 PM        Most Recent UA Lab Results   Component Value Date/Time    COLORU YELLOW/STRAW 08/01/2022 02:59 PM    APPEARANCE CLEAR 08/01/2022 02:59 PM    SPECGRAV 1.017 08/01/2022 02:59 PM    LABPH 6.0 08/01/2022 02:59 PM    PROTEINU Negative 08/01/2022 02:59 PM    GLUCOSEU Negative 08/01/2022 02:59 PM    KETUA 15 08/01/2022 02:59 PM    BILIRUBINUR Negative 08/01/2022 02:59 PM    BILIRUBINUR SMALL 04/08/2021 10:30 AM    BLOODU Negative 08/01/2022 02:59 PM    UROBILINOGEN 1.0 08/01/2022 02:59 PM    NITRU Negative 08/01/2022 02:59 PM    LEUKOCYTESUR TRACE 08/01/2022 02:59 PM    WBCUA 0-4 08/01/2022 02:59 PM    RBCUA 0-5 08/01/2022 02:59 PM    EPITHUA 0-5 08/01/2022 02:59 PM    BACTERIA Negative 08/01/2022 02:59 PM    LABCAST 0-2 08/01/2022 02:59 PM    MUCUS 1+ 04/08/2021 10:30 AM          All Labs from Last 24 Hrs:  Recent Results (from the past 24 hour(s))   CBC with Auto Differential    Collection Time: 08/06/22 11:55 AM   Result Value Ref Range    WBC 13.1 (H) 4.3 - 11.1 K/uL    RBC 2.52 (L) 4.23 - 5.6 M/uL    Hemoglobin 7.8 (L) 13.6 - 17.2 g/dL    Hematocrit 24.0 (L) 41.1 - 50.3 %    MCV 95.2 79.6 - 97.8 FL    MCH 31.0 26.1 - 32.9 PG    MCHC 32.5 31.4 - 35.0 g/dL    RDW 21.1 (H) 11.9 - 14.6 %    Platelets 613 (L) 855 - 450 K/uL    MPV 9.7 9.4 - 12.3 FL    nRBC 0.00 0.0 - 0.2 K/uL    Differential Type AUTOMATED      Seg Neutrophils 87 (H) 43 - 78 %    Lymphocytes 4 (L) 13 - 44 %    Monocytes 7 4.0 - 12.0 %    Eosinophils % 1 0.5 - 7.8 %    Basophils 0 0.0 - 2.0 %    Immature Granulocytes 1 0.0 - 5.0 %    Segs Absolute 11.4 (H) 1.7 - 8.2 K/UL    Absolute Lymph # 0.5 0.5 - 4.6 K/UL    Absolute Mono # 0.9 0.1 - 1.3 K/UL    Absolute Eos # 0.1 0.0 - 0.8 K/UL    Basophils Absolute 0.0 0.0 - 0.2 K/UL    Absolute Immature Granulocyte 0.1 0.0 - 0.5 K/UL       No Known Allergies  Immunization History   Administered Date(s) Administered    COVID-19, PFIZER PURPLE top, DILUTE for use, (age 15 y+), 30mcg/0.3mL 08/06/2021, 08/27/2021       Recent Vital Data:  Patient Vitals for the past 24 hrs:   Temp Pulse Resp BP SpO2   08/06/22 1122 98.2 °F (36.8 °C) 91 18 104/73 92 %   08/06/22 0750 98.7 °F (37.1 °C) 97 18 111/75 94 %   08/06/22 0350 98.2 °F (36.8 °C) 92 17 109/70 92 %   08/05/22 2244 98.8 °F (37.1 °C) 91 18 108/74 92 %   08/05/22 1937 98.5 °F (36.9 °C) 88 16 100/74 91 %   08/05/22 1433 98.6 °F (37 °C) 96 18 112/75 94 %       Oxygen Therapy  SpO2: 92 %  O2 Device: None (Room air)    Estimated body mass index is 24.2 kg/m² as calculated from the following:    Height as of this encounter: 5' 11\" (1.803 m). Weight as of this encounter: 173 lb 8 oz (78.7 kg). Intake/Output Summary (Last 24 hours) at 8/6/2022 1226  Last data filed at 8/6/2022 0918  Gross per 24 hour   Intake 2207 ml   Output 150 ml   Net 2057 ml         Physical Exam:  /73   Pulse 91   Temp 98.2 °F (36.8 °C) (Oral)   Resp 18   Ht 5' 11\" (1.803 m)   Wt 173 lb 8 oz (78.7 kg)   SpO2 92%   BMI 24.20 kg/m²      General:          Well nourished. Patient is sitting up in bed. He is talking well. Head:               Normocephalic, atraumatic  Eyes:               Sclerae appear normal.  Pupils equally round. ENT:                Nares appear normal, no drainage. Moist oral mucosa  Neck:               No restricted ROM. Trachea midline   CV:                  RRR. No m/r/g. No jugular venous distension. Lungs:             CTAB. No wheezing, rhonchi, or rales. Symmetric expansion. Abdomen: Bowel sounds present. Soft, nontender, nondistended. Extremities:     No cyanosis or clubbing. Edema at both ankles  Skin:                No rashes and normal coloration. Warm and dry. Neuro:             CN II-XII grossly intact. Sensation intact. A&Ox3  Psych:             Normal mood and affect.       Signed:  Jose Roberto Mclaughlin MD    Part of this note may have been written by using a voice dictation software. The note has been proof read but may still contain some grammatical/other typographical errors.

## 2022-08-06 NOTE — DISCHARGE SUMMARY
Pt was given the discharge packet and thoroughly reviewed. Pt given a change to ask questions and had no questions. Pt's IV was removed. Waiting on pt's ride for completion of discharge.    Alec Hansen RN

## 2022-08-06 NOTE — CARE COORDINATION
Pt to d/c home with spouse today. Family to provide transportation home. There were no PT/OT/SLP consult ordered. Pt is independent with ADLs at baseline. No supportive care needs identified. Pt agrees with d/c plan. Milestones met. 08/05/22    Service Assessment   Patient Orientation Alert and Oriented;Person;Place; Self   Cognition Alert   History Provided By Medical Record   Primary Caregiver Self   Support Systems Spouse/Significant Other;Family Members   Patient's Healthcare Decision Maker is: Legal Next of Gonzales Wheeler   PCP Verified by CM Yes  (Pt refuses a referral for a PCP. He states he will use his oncologist, Dr. Bob Buck, for any medical needs.)   Last Visit to PCP Within last 3 months   Prior Functional Level Independent in ADLs/IADLs   Current Functional Level Independent in ADLs/IADLs   Can patient return to prior living arrangement Yes   Ability to make needs known: Good   Family able to assist with home care needs: Yes   Would you like for me to discuss the discharge plan with any other family members/significant others, and if so, who? No   Financial Resources Financial Counseling;Hospital Care Assurance Program  (Pt does not have insurance)   Social/Functional History   Lives With Spouse   Type of [address] [facility] Two level   Bathroom Accessibility Accessible   Home Equipment Cane;Walker, rolling; [address] Independent   Ambulation Assistance Independent   Transfer Assistance Independent   Active  Yes   Mode of Transportation Car   Occupation On disability   Discharge Planning   Type of Residence [employer]   Living Arrangements Spouse/Significant Other;Parent   Current Services Prior To Admission Durable Medical Equipment   Potential Assistance Needed N/A   DME Ordered? No   Potential Assistance Purchasing Medications Yes  (Pt does not have insurance.   If he does request Rx assistance CM will contact the [facility].) Type of Home Care Services None   Patient expects to be discharged to: House   One/Two Story Residence Two story   Services At/After Discharge   Transition of Care Consult (CM Consult) Discharge [address] Discharge None   [employer] Information Provided? No   Mode of Transport at Discharge Other (see comment)  (Family)   Confirm Follow Up Transport Family   Condition of Participation: Discharge Planning   The Plan for Transition of Care is related to the following treatment goals: Pt to obtain care to become medically stable and to return with a safe transition. The Patient and/or Patient Representative was provided with a Choice of Provider? Patient   The Patient and/Or Patient Representative agree with the Discharge Plan? Yes   Freedom of Choice list was provided with basic dialogue that supports the patient's individualized plan of care/goals, treatment preferences, and shares the quality data associated with the providers?   Yes

## 2022-08-08 NOTE — TELEPHONE ENCOUNTER
Called patient to schedule TCV appt following discharge from [facility] 08/06/2022. Dx Gastric Ca.     Patient states will have follow up appt with Oncologist.

## 2022-08-15 NOTE — PROGRESS NOTES
Patient arrived to port lab for port access and lab draw   T[address] accessed and labs drawn per protocol   *Port remains accessed   Patient discharged from port lab via w/c*

## 2022-08-15 NOTE — PROGRESS NOTES
[facility] Hematology & Oncology: Office Visit Progress Note      Chief Complaint:     Gastric cancer   Liver mets      History of Present Illness:   Reason for Referral: Stomach cancer with metastasis to the liver       Referring Provider:  Dr. Oscar Castro       Primary Care Provider: None       Family History of Cancer/Hematologic Disorders: None reported       Presenting Symptoms: Weight loss, trouble swallowing, right flank/lower quadrant abdominal pain, gross hematuria, and abnormal CT urogram        Mr. Juan Doyle is a 56 y.o. white male with no pertinent medical history who presented to the [facility] ED on 10/15/20 reporting right flank/ lower quadrant  abdominal pain and gross hematuria. CT urogram was obtained in the ED with findings of a large mass centered in the lesser curvature the stomach measuring up to 8.6 cm invading into the lesser omentum concerning for a primary gastric cancer; multiple  large hypoattenuating hepatic metastases; retroperitoneal metastatic adenopathy; and small non-obstructing bilateral renal calculi measuring up to 4 mm in the right with mild right hydroureter but no obstructing stone evident (findings could indicate  a recently passed or nonradiopaque stone). Upon discussion of imaging results, patient endorsed recent history of weight loss and trouble swallowing. Case was discussed with Oncologist, Dr. Arron Meade, who agreed to see patient in outpatient clinic on 10/19. CT OF THE ABDOMEN AND PELVIS WITHOUT IV CONTRAST 10/15/2020   FINDINGS:   Visualized thorax: Normal.   Liver: The liver demonstrates multiple large hypoattenuating masses, the largest centered in segment 6 measuring 10.0 x 8.7 cm. Partially imaged lesion in the left hepatic lobe measuring 6.6 x 2.8 cm. Gallbladder/biliary: Normal gallbladder. No biliary dilatation.     Pancreas: Normal.    Spleen: Normal.    Adrenals: Normal.    Kidneys: Small bilateral nonobstructing renal calculi measuring up to 4 mm in the right inferior pole. There is mild right hydroureter but no definite radiopaque ureteral stone is evident. A calcification in the right pelvis likely reflects an intravascular  phleboliths. Bladder: Normal.   Pelvic organs: Unremarkable prostate and seminal vesicles. Gastrointestinal: There is a large mass partially imaged, which appears to be centered along the greater curvature of the stomach, involving the gastric body, fundus, and cardia in likely the gastroesophageal junction. The mass is ill-defined but measures  approximately 8.6 x 7.5 cm. The mass invades into the lesser omentum and may focally contacts the left diaphragmatic hans. Peritoneum/retroperitoneum: No free fluid or worrisome peritoneal nodule. Invasion of the lesser omentum by the gastric mass, as above. Small bilateral fat-containing inguinal hernias. Lymph nodes: Retroperitoneal lymphadenopathy, including a lesser omentum metastatic node measuring 6.4 x 3.7 cm, a 2.2 x 1.5 cm retrocaval lymph node, and a 1.7 cm short axis left para-aortic lymph node. Vessels: Unremarkable noncontrast appearance. Bones/Soft tissues: No aggressive osseous lesion. IMPRESSION:    1. Large mass centered in the lesser curvature the stomach measuring up to 8.6 cm invading into the lesser omentum concerning for a primary gastric cancer. 2.  Multiple large hypoattenuating hepatic metastases. 3.  Retroperitoneal metastatic adenopathy. 4.  Small nonobstructing bilateral renal calculi measuring up to 4 mm in the right. There is mild right hydroureter but no obstructing stone is evident. Findings could indicate a recently passed or nonradiopaque stone. Liver biopsy reported metastatic carcinoma c/w upper GI origin:              CT 10/21/20: IMPRESSION:     1. Large mass involving the gastric cardia and lesser curvature measuring 7 cm   x 5 cm in size. Some involvement of the most distal esophagus cannot be   excluded.  Direct tumor spread is seen in the retroperitoneum which appears to   encase the distal celiac axis and its proximal major branches. 2. Clear hepatic metastatic disease. In addition, retroperitoneal adenopathy is   seen concerning for metastatic lymph nodes. No evidence for metastatic disease   is otherwise seen. A nonspecific 4 mm x 2 mm left lower lobe nodule is seen. Although an early metastasis is not excluded with certainty, the appearance is   felt to be nonspecific and not clearly suggestive of pulmonary metastatic   disease. Attention on follow-up studies is recommended. He started first line mFOLFIRINOX 11/5/20. Interim history update in A/P. Review of Systems:      Constitutional  Weight loss. Anorexia. Denies fever or chills. Insomnia. HEENT  Denies trauma, bluring vision, hearing loss, ear pain, nosebleeds, sore throat, neck pain and ear discharge. Skin  Denies lesions or rashes. Lungs  Denies shortness of breath, cough, sputum production or hemoptysis. Cardiovascular  Denies chest pain, palpitations, orthopnea, claudication and leg swelling. Gastrointestinal  Nausea. Diarrhea. Denies vomiting. Dark stools. Abdominal pain.   Denies dysuria, frequency or hesitancy of urination     Neuro  Finger numb. Denies headaches, visual changes or ataxia. Denies dizziness, speech change, focal weakness and headaches. Hematology  Denies nasal/gum bleeding, denies easy bruise     Endo  Denies heat/cold intolerance, denies diabetes. MSK  Denies back pain, swollen legs, myalgias and falls. Psychiatric/Behavioral   insomnia. Denies depression and substance abuse. The patient is not nervous/anxious.           No Known Allergies  Past Medical History:   Diagnosis Date    Acute gastrointestinal bleeding 7/17/2022    COVID 1/30/2022    Gastric cancer ([facility] [address]     being treated currently - chemo and radiation    History of blood transfusion 6/30/2022 no rxn to this transfusion    Lazy eye, left     Severe anemia 7/12/2022     Past Surgical History:   Procedure Laterality Date    IR PORT PLACEMENT EQUAL OR GREATER THAN 5 YEARS  11/3/2020    IR PORT PLACEMENT EQUAL OR GREATER THAN 5 YEARS  11/3/2020    IR PORT PLACEMENT EQUAL OR GREATER THAN 5 YEARS 11/3/2020 [facility] RADIOLOGY SPECIALS    ORTHOPEDIC SURGERY      ankle    TONSILLECTOMY      UPPER GASTROINTESTINAL ENDOSCOPY N/A 7/5/2022    EGD DILATION BALLOON performed by Ariel Flower MD at [facility] ENDOSCOPY    UPPER GASTROINTESTINAL ENDOSCOPY N/A 7/14/2022    EGD STENT PLACEMENT performed by Laury Toribio MD at [address] [place] [address] N/A 8/3/2022    EGD ESOPHAGOGASTRODUODENOSCOPY performed by Cathi Pichardo MD at [address] [place] [address]  10/22/2020    US GUIDED LIVER BIOPSY PERCUTANEOUS 10/22/2020 [address]     Family History   Problem Relation Age of Onset    Schizophrenia Mother     Diabetes Mother     Dementia Mother     Depression Mother      Social History     Socioeconomic History    Marital status:      Spouse name: Not on file    Number of children: Not on file    Years of education: Not on file    Highest education level: Not on file   Occupational History    Not on file   Tobacco Use    Smoking status: Never    Smokeless tobacco: Never   Vaping Use    Vaping Use: Never used   Substance and Sexual Activity    Alcohol use: Yes     Comment: rarely    Drug use: Never    Sexual activity: Not on file   Other Topics Concern    Not on file   Social History Narrative    Not on file     Social Determinants of Health     Financial Resource Strain: Not on file   Food Insecurity: Not on file   Transportation Needs: Not on file   Physical Activity: Not on file   Stress: Not on file   Social Connections: Not on file   Intimate Partner Violence: Not on file   Housing Stability: Not on file     Current Outpatient Medications   Medication Sig Dispense Refill    sucralfate (CARAFATE) 1 GM tablet take 1 tablet dissolved in 4 ounces of H2O 4 times every day on an empty stomach 1 hour before meals and at bedtime for Bleeding      potassium chloride (KLOR-CON M) 20 MEQ extended release tablet Take 1 tablet by mouth in the morning and 1 tablet before bedtime. Do all this for 7 days. 14 tablet 0    gabapentin (NEURONTIN) 300 MG capsule Take 1 capsule by mouth in the morning and 1 capsule before bedtime. Do all this for 90 days. 180 capsule 2    pantoprazole (PROTONIX) 40 MG tablet Take 1 tablet by mouth in the morning. 30 tablet 0    cefdinir (OMNICEF) 300 MG capsule Take 1 capsule by mouth in the morning and 1 capsule before bedtime. Do all this for 10 days. 20 capsule 0    mirtazapine (REMERON) 30 MG tablet Take 30 mg by mouth nightly       levothyroxine (SYNTHROID) 50 MCG tablet Take 1 tablet by mouth in the morning. 90 tablet 1     No current facility-administered medications for this visit.      Facility-Administered Medications Ordered in Other Visits   Medication Dose Route Frequency Provider Last Rate Last Admin    sodium chloride flush 0.9 % injection 10 mL  10 mL IntraVENous PRN Cindy Markham MD   10 mL at 08/15/22 1338    0.9 % sodium chloride infusion  5-250 mL/hr IntraVENous PRN Cindy Markham MD        pembrolizumab [facility]) 200 mg in sodium chloride 0.9 % 100 mL chemo IVPB  200 mg IntraVENous Once Cindy Markham  mL/hr at 08/15/22 1435 200 mg at 08/15/22 1435    sodium chloride flush 0.9 % injection 5-40 mL  5-40 mL IntraVENous PRN Cindy Markham MD   10 mL at 08/15/22 1413    atropine injection 0.4 mg  0.4 mg IntraVENous Once Cindy Markham MD           OBJECTIVE:  /74 (Site: Right Upper Arm, Position: Sitting, Cuff Size: Medium Adult)   Pulse 100   Temp 98.1 °F (36.7 °C) (Oral)   Resp 21   Ht 5' 11.5\" (1.816 m)   Wt 175 lb 4.8 oz (79.5 kg)   SpO2 97%   BMI 24.11 kg/m²     Physical Exam:  Constitutional: Oriented to person, place, and time. Well-developed. Thin. HEENT: Normocephalic and atraumatic. Oropharynx is clear and moist.   Conjunctivae and EOM are normal. Pupils are equal, round, and reactive to light. No scleral icterus. Neck supple. No JVD present. No tracheal deviation present. No thyromegaly present. Lymph node No palpable submandibular, cervical, supraclavicular, axillary and inguinal lymph nodes. Skin Warm and dry. No bruising and no rash noted. No erythema. No pallor. Respiratory Effort normal and breath sounds normal.  No respiratory distress. No wheezes. No rales. No tenderness. CVS Normal rate, regular rhythm and normal heart sounds. Exam reveals no gallop, no friction and no rub. No murmur heard. Abdomen Soft. Bowel sounds are normal. Exhibits no distension. There is no tenderness. There is no rebound and no guarding. Neuro Normal reflexes. No cranial nerve deficit. Exhibits normal muscle tone, 5 of 5 strength of all extremities. MSK Normal range of motion in general.  No edema and no tenderness.    Psych Normal mood, affect, behavior, judgment and thought content      Labs:  Recent Results (from the past 24 hour(s))   Ferritin    Collection Time: 08/15/22  1:25 PM   Result Value Ref Range    Ferritin 307 8 - 388 NG/ML   Transferrin Saturation    Collection Time: 08/15/22  1:25 PM   Result Value Ref Range    Iron 83 35 - 150 ug/dL    TIBC 202 (L) 250 - 450 ug/dL    TRANSFERRIN SATURATION 41 >20 %   Comprehensive Metabolic Panel    Collection Time: 08/15/22  1:25 PM   Result Value Ref Range    Sodium 133 (L) 136 - 145 mmol/L    Potassium 3.1 (L) 3.5 - 5.1 mmol/L    Chloride 100 98 - 107 mmol/L    CO2 23 21 - 32 mmol/L    Anion Gap 10 7 - 16 mmol/L    Glucose 75 65 - 100 mg/dL    BUN 9 6 - 23 MG/DL    Creatinine 0.70 (L) 0.8 - 1.5 MG/DL    GFR African American >60 >60 ml/min/1.73m2    GFR Non- >60 >60 ml/min/1.73m2    Calcium 7.8 (L) 8.3 - 10.4 MG/DL    Total Bilirubin 0.7 0.2 - 1.1 MG/DL    ALT 23 12 - 65 U/L    AST 95 (H) 15 - 37 U/L    Alk Phosphatase 256 (H) 50 - 136 U/L    Total Protein 5.8 (L) 6.3 - 8.2 g/dL    Albumin 2.1 (L) 3.5 - 5.0 g/dL    Globulin 3.7 (H) 2.3 - 3.5 g/dL    Albumin/Globulin Ratio 0.6 (L) 1.2 - 3.5     CBC with Auto Differential    Collection Time: 08/15/22  1:25 PM   Result Value Ref Range    WBC 10.6 4.3 - 11.1 K/uL    RBC 2.85 (L) 4.23 - 5.6 M/uL    Hemoglobin 8.9 (L) 13.6 - 17.2 g/dL    Hematocrit 27.5 %    MCV 96.5 79.6 - 97.8 FL    MCH 31.2 26.1 - 32.9 PG    MCHC 32.4 31.4 - 35.0 g/dL    RDW 21.6 (H) 11.9 - 14.6 %    Platelets 250 (L) 973 - 450 K/uL    MPV 9.8 9.4 - 12.3 FL    nRBC 0.00 0.0 - 0.2 K/uL    Seg Neutrophils 82 (H) 43 - 78 %    Lymphocytes 7 (L) 13 - 44 %    Monocytes 9 4.0 - 12.0 %    Eosinophils % 1 0.5 - 7.8 %    Basophils 0 0.0 - 2.0 %    Immature Granulocytes 1 0.0 - 5.0 %    Segs Absolute 8.8 (H) 1.7 - 8.2 K/UL    Absolute Lymph # 0.7 0.5 - 4.6 K/UL    Absolute Mono # 0.9 0.1 - 1.3 K/UL    Absolute Eos # 0.1 0.0 - 0.8 K/UL    Basophils Absolute 0.0 0.0 - 0.2 K/UL    Absolute Immature Granulocyte 0.1 0.0 - 0.5 K/UL    Differential Type AUTOMATED     CEA    Collection Time: 08/15/22  1:25 PM   Result Value Ref Range    CEA 80.7 (H) 0.0 - 3.0 ng/mL   TSH    Collection Time: 08/15/22  1:25 PM   Result Value Ref Range    TSH, 3RD GENERATION 110.000 (H) 0.358 - 3 uIU/mL       Imaging:  No results found for this or any previous visit. ASSESSMENT/PLAN:   Diagnosis Orders   1. Malignant neoplasm of overlapping sites of stomach ([address]        2. Hypokalemia  potassium chloride (KLOR-CON M) 20 MEQ extended release tablet      3. Fatigue due to treatment        4. Hypothyroidism, unspecified type        5. Polyneuropathy, unspecified               56 y.o. M consulted for gastric cancer and liver mets in 10/2020.  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 He presented to [facility] on [facility]/15[facility]20 for pain and suspect kidney stone, CT urogram showed large gastric mass and liver masses, urgently  presented to [facility] on 10[facility]19/20 with wife, and we discussed this appeared almost certain of metastatic malignancy, I showed CT images to pt and wife per request, arrange staging CT C/A/P, liver mass biopsy, consult FC and  for sponsorship, discussed nutrition  and manage dysphagia, refer to [facility] cancer society for Ensure.       Liver biopsy reported metastatic carcinoma c/w upper GI origin, and I discussed with pt and wife this together with the CT finding of large gastric mass is consistent with stage IV gastric cancer, not expected to be curable but palliative rx may prolong  life and improve symptoms, both were motivated to pursue and rec start mFOLFIRINOX aiming for rapid response given the symptoms of dysphagia and rapid weight loss, check Caris pending/HER2 -, need to defer [address] trip, arrange port and antiemetics, remeron  for insomnia/anorexia, ativan as needed, educate for toxicities and management, can not tolerated Ensure and he attributed to sugar, try glucerna and consult nutritionist, started cycle 1 palliative FOLFIRINOX 11/5/20, tumor marker down and bloating improved,  encouraging clinical response, increase remeron and use ativan for insomnia, laxative prn diarrhea and find out particular association with diet, due for cycle 4 and neutropenic again, add GCSF and keep schedule for his birthday arrangement, c/o low libido  and check testosterone level normal, continue remeron managing insomnia/depression, Emend for nausea, titrate imodium for proper rx of diarrhea, prepH for hemorroid, due for cycle 7 and deferred for neutropenia, returned 2/10/21 and ANC 1.3, he was reluctant  to use GCSF for the cost, discussed with FC and obtained eudenyca assistance, diarrhea responded to imodium, CT 2/10/2021 showed further shrinking of liver and the gastric mass, a periaortic lymph node will be monitored for change, reported neuropathy  worsened after cycle 9 with persistent numbness in the fingertips, discussed that the risk of worse neuropathy and hold oxaliplatin for cycle 10, neuropathy improved and proceed to cycle 12 with lower dose of oxaliplatin at 50 mg/m², monitor mild anemia  and thrombocytopenia, report mild neuropathy stable, repeat CT 5/12/2021 showed generally stable or improved disease except for the retroperitoneal lymph node next to left kidney continues to grow, which explains the increase of the CEA, we discussed  that since this appeared to be the solitary progression of disease, I referred him to radiation oncology to consider SBRT done in 6/2021, which may be cause of the CEA spike and monitor, we continued the systemic therapy with modified FOLFIRINOX with  dose modification of oxaliplatin and monitor neuropathy reportedly stable, on gabapentin, arranged for Covid vaccination now that he finally would accept it, reschedule following chemo's to give 2 weeks of response time to each vaccine dose, repeat CT  8/12/21 showed stable disease, meanwhile concerned of the CEA trending up for early disease progression versus the result of treatment delay due to coordination of Covid vaccine, he will complete second dose of vaccine on time and return in 2 weeks for  next cycle, also discussed further systemic treatment may be limited to IO versus Taxol/ramucirumab and his persistent neuropathy would be a concern, Enhertu for HER2 1+ on IHC has been proven effective in breast cancer but may need replacement if using  in gastric cancer, also discussed surgical resection given there is no new disease developed over the past 10 months although the quality of life expected to change significantly if pursuing surgery which would not be considered curative after all.   CT  after cycle 22 showed disease progression of a para-aortic lymph node and a new lesion in the left adrenal gland: We discussed the options to add SBRT to the 2 oligo progression, versus changing to [facility], his neuropathy remains a major obstacle for changing chemotherapy, discussed with Dr. Jose G Redmond and completed SBRT in 5 fractions for both sites, return on 1/19/2022,  platelet 48 and , he forgot coming for [facility] and last cycle, we had to hold off chemo and come back next week for potentially cycle 28, however he got COVID with fever for 10 days, arranged to receive monoclonal antibody, returned on 2/16/2022,  still with left lung crackles but generally recovered, started having sense of dysphagia again after off chemo for a month and half, restart FOLFIRINOX with Udenyca for neutropenia, repeat CEA, continue Remeron, Ambien for insomnia, repeat CT 3/1/2022  showed a small amount of disease progression:       We discussed that this is generally similar disease burden after 6 weeks of chemo break, discussed with Dr. Jose G Redmond for SBRT of the small new liver lesion and the left adrenal mass growth, Dr. Jose G Redmond would like to hold off to see whether more metastasis  develops, trend CEA, continue FOLFIRINOX with Udenyca for neutropenia, platelet 95 and okay to proceed with cycle 35 with dose reduced bolus 5-FU, but complaining of dysphagia and weight loss, urgent referral for GI evaluation with EGD/dilation if possible, discussed the nutrition and he reports intolerance of Ensure and appear to having lactose intolerance, try lactose-free for diet, plan to go to vacation in [place] and defer next cycle after return, call as needed.     He returned on 6/29/2022, discussed that the restaging CT yesterday showed disease progression:    Discussed that the radiographical progression and his clinical dysphagia and weight loss indicate change of therapy, discussed further options of chemotherapy with or without chemo given his CPS 30%, versus Taxol/ramucirumab which would be challenging to his neuropathy, decided to pursue authorization of single agent Keytruda, had a EGD 7/5/2022 showed GE junction friable malignant stricture that was dilated but no stent was placed, worked in on 7/12/2022 still complaining of no improvement of dysphagia and need to discuss with GI for stent placement, very tired and hemoglobin 5.3, dark stool, arrange PRBC 2 units and platelet 1 unit, admitted on 7/14 and received GE junction stent placement, discharged on 7/17 and returned on 7/21, hemoglobin improved, baseline TSH surprisingly 111, start Synthroid 50 mcg daily and trend TSH while proceed to [facility], would add radiation if continues to have GI bleeding. Here for 3 week follow up and cycle 2 Keytruda. Since last seen he was admitted again for GI bleed and migration of his esophageal stent which was replaced. Hgb was 9.0 on arrival to ER for fatigue and dark stools but dropped to 6.6 with hydration/dilution. He did receive one unit of PRBC's while admitted. EGD was performed and showed stigmata of recent bleeding. We discussed referral to radiation to help cauterize bleed but he would like to hold off on this. He tolerated cycle 1 Keytruda okay overall. He had no changes in energy or appetite. No nausea or bowel issues. He is having one loose stool a day and denies black stools or bleeding. He denies any significant pain. No recent fevers ,chills or other infectious symptoms. He will continue gabapentin 300 mg BID for his polyneuropathy - refill provided. Labs reviewed. CBC stable with Hgb 8.9, iron stores are acceptable. Platelets 498L. , return in 3 weeks but call as needed. K+ 3.1 today - will take potassium chloride 20 meq BID x 7 days, Rx given. His TSH is 110 - he has yet to start Synthroid 50 mcg but will start now and he will take on an empty stomach. Continue good oral nutrition and hydration. Encouraged frequent activity throughout the day and rest as needed to combat fatigue.  Call with any fevers, uncontrolled side effects from treatment or any other worrisome/concerning symptoms. Follow up in 3 weeks or sooner if needed.           Problem complexity: high   Treatment risk of complication/morbidity: high   Keytruda:  Cardiovascular: Peripheral edema (11% to 15%), cardiac arrhythmia (11%)  Dermatologic: Pruritus (11% to 28%), skin rash (13% to 24%), vitiligo (13%)  Endocrine & metabolic: Hyperglycemia (42% to 59%), hyponatremia (10% to 46%), hypoalbuminemia (24% to 44%), hypertriglyceridemia (33% to 43%), hypophosphatemia (19% to 29%), hypocalcemia (15% to 27%), hyperkalemia (13% to 23%), decreased serum bicarbonate (22%), hypercalcemia (14% to 22%), hypercholesterolemia (20%), hypokalemia (15% to 20%), hypoglycemia (13% to 19%), hypothyroidism (9% to 18%), hypomagnesemia (16%), weight loss (10% to 15%)  Gastrointestinal: Diarrhea (12% to 28%), decreased appetite (15% to 25%), constipation (12% to 22%), abdominal pain (13% to 22%), nausea (11% to 22%), vomiting (11% to 19%)  Genitourinary: Hematuria (12% to 19%), urinary tract infection (12% to 19%)  Hematologic & oncologic: Lymphocytopenia (24% to 54%; grades 3/4: 1% to 25%), anemia (17% to 54%; grades 3/4: 1% to 24%), leukopenia (35%; grades 3/4: 9%), neutropenia (7% to 30%; grades 3/4: 1% to 11%), thrombocytopenia (12% to 27%; grades 3/4: 4%), increased INR (19% to 21%), hemorrhage (19%; grades 3/4: 5%), prolonged partial thromboplastin time (14%)  Hepatic: Increased serum alkaline phosphatase (17% to 42%), increased serum transaminases (27% to 34%), increased serum aspartate aminotransferase (20% to 34%), increased serum alanine aminotransferase (9% to 33%), increased liver enzymes (13%)  Immunologic: Graft versus host disease (followed by allogeneic hematopoietic stem cell transplantation: 26%)  Infection: Infection (16%)  Nervous system: Fatigue (23% to 43%), pain (22%), headache (11% to 14%)  Neuromuscular & skeletal: Musculoskeletal pain (19% to 32%), arthralgia (10% to 18%), myalgia (12%), back pain (11% to 12%), asthenia (10% to 11%)  Renal: Increased serum creatinine (11% to 35%)  Respiratory: Upper respiratory tract infection (13% to 28%), cough (14% to 26%), dyspnea (10% to 23%), pneumonia (12%), flu-like symptoms (11%)  Miscellaneous: Fever (10% to 28%)    All questions are answered to their satisfaction. They will call for further questions and concerns. ECOG PERFORMANCE STATUS - 1- Restricted in physically strenuous activity but ambulatory and able to carry out work of a light or sedentary nature such as light house work, office work. Pain - 0 - No pain/10. None/Minimal pain - not affecting QOL     Fatigue - No flowsheet data found. Distress - No flowsheet data found. Elements of this note have been dictated via voice recognition software. Text and phrases may be limited by the accuracy and autoconversion of the software. The chart has been reviewed, but errors may still be present.           Burtis Dunnings, NP-C  [place]  [address] [place] [address]  Office : [phone]  Fax : [phone]

## 2022-08-15 NOTE — PROGRESS NOTES
Arrived to the [facility]. Keytruda infusion completed. Patient tolerated well. Any issues or concerns during appointment: none. Patient aware of next infusion appointment on 09/06/2022 (date) at  (time). Discharged in wheelchair.

## 2022-08-23 PROBLEM — A41.9 SEVERE SEPSIS (HCC): Status: ACTIVE | Noted: 2022-01-01

## 2022-08-23 PROBLEM — R65.20 SEVERE SEPSIS (HCC): Status: ACTIVE | Noted: 2022-01-01

## 2022-08-23 NOTE — PROGRESS NOTES
TRANSFER - IN REPORT:    Verbal report received from ED on Consuelo Savage  being received from Sirena RN for routine progression of patient care      Report consisted of patient's Situation, Background, Assessment and   Recommendations(SBAR). Information from the following report(s) Nurse Handoff Report, ED SBAR, Surgery Report, MAR, and Recent Results was reviewed with the receiving nurse. Opportunity for questions and clarification was provided. Assessment completed upon patient's arrival to unit and care assumed.

## 2022-08-23 NOTE — H&P
Hospitalist History and Physical   Admit Date:  2022  1:10 AM   Name:  Susan Lake   Age:  56 y.o. Sex:  male  :  1975   MRN:  [id number]   Room:  ER/    Presenting Complaint: Altered Mental Status     Reason(s) for Admission: Severe sepsis ([facility] [address] [A41.9, R65.20]     History of Present Illness:   Susan Lake is a 56 y.o. male with medical history of hypothyroidism, GERD, stage IV gastric cancer who presented with lethargy, nausea, abdominal distention. Patient with history of stage IV gastric cancer status post 28 cycles of FOLFIRINOX with disease progression now on Keytruda cycle 2 on 8/15. Patient presented to the ED with lactic acid of 3.2, WBC of 14, hemoglobin 8.4, sodium of 129. CT abdomen pelvis showed large volume ascites. Large bilateral pleural effusions. Increase in the size of known gastric lesion. Metastatic lesions also seen throughout the liver and within the left adrenal gland. Patient is now status post paracentesis with almost 7 L removed and thoracentesis with 850 milliliters removed. Review of Systems:  10 systems reviewed and negative except as noted in HPI. Assessment & Plan:   Severe sepsis ([facility] [address]  -Patient presented with WBC of 14, lactic acid of 3.2.  - Patient has history of gastric cancer with mets to liver and esophagus with chemo no longer working  - Patient status post albumin  - Started on Rocephin  - Repeat lactic acid pending  - Blood cultures pending    Malignant ascites  -CT abdomen pelvis showed large volume ascites.   Large bilateral pleural effusions  - IR consulted for paracentesis, thoracentesis  - Patient status post paracentesis with almost 7 L removed  - Thoracentesis with 850 mL removed  - Albumin 25% x 4    Gastric cancer with mets to liver and esophagus  - Patient status post 28 cycles of FOLFIRINOX  - Currently on Keytruda cycle 2 started on 8/15  - Oncology consulted, appreciate recommendations    Hyponatremia  - Sodium of 129  - We will continue to monitor off fluids secondary to ascites and anasarca  -Follow-up daily BMP    Severe anemia  - Continue to monitor H&H  - May require transfusion    Hypothyroidism  - Continue with home Synthroid      Dispo/Discharge Planning:   Dispo pending clinical course    Diet: Diet NPO  VTE ppx: SCDs  Code status: Prior    Hospital Problems:  Principal Problem:    Severe sepsis ([facility] [address]  Resolved Problems:    * No resolved hospital problems.  *       Past History:     Past Medical History:   Diagnosis Date    Acute gastrointestinal bleeding 7/17/2022    COVID 1/30/2022    Gastric cancer ([facility] [address]     being treated currently - chemo and radiation    History of blood transfusion     6/30/2022 no rxn to this transfusion    Lazy eye, left     Severe anemia 7/12/2022       Past Surgical History:   Procedure Laterality Date    IR PORT PLACEMENT EQUAL OR GREATER THAN 5 YEARS  11/3/2020    IR PORT PLACEMENT EQUAL OR GREATER THAN 5 YEARS  11/3/2020    IR PORT PLACEMENT EQUAL OR GREATER THAN 5 YEARS 11/3/2020 [facility]    ORTHOPEDIC SURGERY      ankle    TONSILLECTOMY      UPPER GASTROINTESTINAL ENDOSCOPY N/A 7/5/2022    EGD DILATION BALLOON performed by Chase Collins MD at [address] [facility] 7/14/2022    EGD STENT PLACEMENT performed by Soumya Ruiz MD at [address] [facility] N/A 8/3/2022    EGD ESOPHAGOGASTRODUODENOSCOPY performed by Luis Moy MD at [address] [facility]  10/22/2020    US GUIDED LIVER BIOPSY PERCUTANEOUS 10/22/2020 [facility] US        Social History     Tobacco Use    Smoking status: Never    Smokeless tobacco: Never   Substance Use Topics    Alcohol use: Yes     Comment: rarely      Social History     Substance and Sexual Activity   Drug Use Never       Family History   Problem Relation Age of Onset    Schizophrenia Mother     Diabetes Mother     Dementia Mother     Depression Mother         Immunization History   Administered Date(s) Administered    COVID-19, PFIZER PURPLE top, DILUTE for use, (age 15 y+), 30mcg/0.3mL 08/06/2021, 08/27/2021     No Known Allergies  Prior to Admit Medications:  Current Outpatient Medications   Medication Instructions    gabapentin (NEURONTIN) 300 mg, Oral, 2 TIMES DAILY    levothyroxine (SYNTHROID) 50 mcg, Oral, DAILY    mirtazapine (REMERON) 30 mg, Oral, NIGHTLY    pantoprazole (PROTONIX) 40 mg, Oral, DAILY    potassium chloride (KLOR-CON M) 20 MEQ extended release tablet 20 mEq, Oral, 2 TIMES DAILY    sucralfate (CARAFATE) 1 GM tablet take 1 tablet dissolved in 4 ounces of H2O 4 times every day on an empty stomach 1 hour before meals and at bedtime for Bleeding         Objective:   Patient Vitals for the past 24 hrs:   Temp Pulse Resp BP SpO2   08/23/22 1047 -- 98 -- 105/64 93 %   08/23/22 0934 98.3 °F (36.8 °C) 98 18 110/65 91 %   08/23/22 0530 -- -- -- 103/78 94 %   08/23/22 0500 -- -- -- 101/71 93 %   08/23/22 0440 -- -- -- 99/76 94 %   08/23/22 0400 -- -- -- 97/74 95 %   08/23/22 0300 -- -- -- 97/78 96 %   08/23/22 0230 -- -- -- 94/71 96 %   08/23/22 0200 -- -- -- 92/70 96 %   08/22/22 2226 97.6 °F (36.4 °C) (!) 116 18 100/77 98 %       Oxygen Therapy  SpO2: 93 %  O2 Device: None (Room air)    Estimated body mass index is 20.92 kg/m² as calculated from the following:    Height as of this encounter: 5' 11\" (1.803 m). Weight as of this encounter: 150 lb (68 kg). Intake/Output Summary (Last 24 hours) at 8/23/2022 1101  Last data filed at 8/23/2022 0405  Gross per 24 hour   Intake 100 ml   Output --   Net 100 ml         Physical Exam:    Blood pressure 105/64, pulse 98, temperature 98.3 °F (36.8 °C), temperature source Oral, resp. rate 18, height 5' 11\" (1.803 m), weight 150 lb (68 kg), SpO2 93 %. General:    Appearing. Head:  Normocephalic, atraumatic  Eyes:  Sclerae appear normal.  Pupils equally round. ENT:  Nares appear normal, no drainage. (L) 136 - 145 mmol/L    Potassium 5.3 (H) 3.5 - 5.1 mmol/L    Chloride 105 98 - 107 mmol/L    CO2 17 (L) 21 - 32 mmol/L    Anion Gap 7 7 - 16 mmol/L    Glucose 160 (H) 65 - 100 mg/dL    BUN 16 6 - 23 MG/DL    Creatinine 1.20 0.8 - 1.5 MG/DL    GFR African American >60 >60 ml/min/1.73m2    GFR Non- >60 >60 ml/min/1.73m2    Calcium 5.2 (LL) 8.3 - 10.4 MG/DL    Total Bilirubin 1.0 0.2 - 1.1 MG/DL    ALT 32 12 - 65 U/L     (H) 15 - 37 U/L    Alk Phosphatase 261 (H) 50 - 136 U/L    Total Protein 5.8 (L) 6.3 - 8.2 g/dL    Albumin 1.9 (L) 3.5 - 5.0 g/dL    Globulin 3.9 (H) 2.3 - 3.5 g/dL    Albumin/Globulin Ratio 0.5 (L) 1.2 - 3.5     Lipase    Collection Time: 08/22/22 10:34 PM   Result Value Ref Range    Lipase 36 (L) 73 - 393 U/L   Basic Metabolic Panel    Collection Time: 08/23/22  2:38 AM   Result Value Ref Range    Sodium 129 (L) 136 - 145 mmol/L    Potassium 5.0 3.5 - 5.1 mmol/L    Chloride 105 98 - 107 mmol/L    CO2 20 (L) 21 - 32 mmol/L    Anion Gap 4 (L) 7 - 16 mmol/L    Glucose 150 (H) 65 - 100 mg/dL    BUN 18 6 - 23 MG/DL    Creatinine 1.30 0.8 - 1.5 MG/DL    GFR African American >60 >60 ml/min/1.73m2    GFR Non- >60 >60 ml/min/1.73m2    Calcium 8.1 (L) 8.3 - 10.4 MG/DL   Magnesium    Collection Time: 08/23/22  2:38 AM   Result Value Ref Range    Magnesium 2.5 (H) 1.8 - 2.4 mg/dL   Procalcitonin    Collection Time: 08/23/22  3:56 AM   Result Value Ref Range    Procalcitonin 1.35 (H) 0.00 - 0.49 ng/mL   Lactic Acid    Collection Time: 08/23/22  3:56 AM   Result Value Ref Range    Lactic Acid, Plasma 3.2 (H) 0.4 - 2.0 MMOL/L   Ammonia    Collection Time: 08/23/22  3:56 AM   Result Value Ref Range    Ammonia <10 (L) 11 - 32 UMOL/L   POCT INR    Collection Time: 08/23/22 10:08 AM   Result Value Ref Range    POC Protime 15.4 (H) 9.6 - 11.6 SECS    POC INR 1.3 (H) 0.9 - 1.2         I have personally reviewed imaging studies showing:  CT ABDOMEN PELVIS W IV CONTRAST Additional Contrast? Oral    Result Date: 8/23/2022  EXAM: CT ABDOMEN PELVIS W IV CONTRAST HISTORY: Abdominal pain, decreased BM, abdominal distention. Stomach cancer with mets to esoph and liver. . TECHNIQUE: Axial images of the abdomen and pelvis were performed following the administration of intravenous contrast. Images were obtained in the axial plane and coronal reformatted images were created and reviewed. Dose reduction technique used: Automated exposure control/Adjustment of the mA and/or kV according to patient size/Use of iterative reconstruction technique. 100 mL of Isovue-370 were utilized. COMPARISON: 6/28/2022 FINDINGS: Interval development of a moderate right pleural effusion and a large effusion on the left. Bibasilar atelectasis. Visualized heart is unremarkable. The spleen, right adrenal gland, gallbladder, and right kidney are within normal limits. The bladder appears grossly normal as seen. There is been significant interval increase in the number of the known liver metastatic lesions. The largest of these is again seen in the right lobe and measures 6.7 x 8.9 cm. This has not changed significantly. There is a persistent low density lesion in the left adrenal gland. The known gastric lesion has increased in size by measurement currently measuring 9.0 x 5.8 cm and previously it measured 7.5 x 5.0 cm. Again the gastric mass is either displacing or has infiltrated the body of the pancreas. There is been interval development of mild left hydronephrosis and hydroureter. The point of obstruction is not clearly visible. There has been interval placement of an esophageal/gastric stent. Oral contrast is noted in the distal esophagus and within the body of the stomach with no evidence of extravasation. No evidence of bowel obstruction. There is a large volume of ascites. There is retroperitoneal and elver hepatic lymphadenopathy. No evidence of free air. No abdominal aortic aneurysm.  Osseous structures show no evidence of acute fracture or obvious suspicious lesion. The large volume of ascites. Large bilateral pleural effusions. Interval increase in the size of the known gastric lesion. Again it is either displacing or has infiltrated the distal pancreas. Metastatic lesions are again seen throughout the liver and within the left adrenal gland. Lymphadenopathy as above. Echocardiogram:  No results found for this or any previous visit. Meds previously ordered:  Orders Placed This Encounter   Medications    albumin human 25 % IV solution 25 g    morphine injection 2 mg    ondansetron (ZOFRAN) injection 4 mg    DISCONTD: calcium gluconate 1000 mg in sodium chloride 50 mL    iopamidol (ISOVUE-370) 76 % injection 100 mL    diatrizoate meglumine-sodium (GASTROGRAFIN) 66-10 % solution 15 mL    albumin human 25 % IV solution 25 g    cefTRIAXone (ROCEPHIN) 1,000 mg in sodium chloride 0.9 % 50 mL IVPB mini-bag     Order Specific Question:   Antimicrobial Indications     Answer:   Sepsis of Unknown Etiology    lidocaine PF 1 % injection         Signed:  Diego Rodriguez MD    Part of this note may have been written by using a voice dictation software. The note has been proof read but may still contain some grammatical/other typographical errors.

## 2022-08-23 NOTE — ED NOTES
TRANSFER - OUT REPORT:    Verbal report given to Rupa Goldman RN on Alicia Back  being transferred to 6th floor for routine progression of patient care       Report consisted of patient's Situation, Background, Assessment and   Recommendations(SBAR). Information from the following report(s) ED Encounter Summary was reviewed with the receiving nurse. Lines:   Single Lumen Implantable Port Right Subclavian (Active)       Peripheral IV 08/23/22 Left; Anterior Forearm (Active)        Opportunity for questions and clarification was provided.       Patient transported with:  Patient-specific medications from [address] [place] [address], RN  08/23/22 1795

## 2022-08-23 NOTE — MANAGEMENT PLAN
[facility] Hematology & Oncology        Inpatient Hematology / Oncology Plan of Care    Reason for Consult:  Malignant ascites [R18.0]  Severe sepsis ([address] [A41.9, R65.20]  Referring Physician:  Amanda Gillespie MD    History of Present Illness:   Mr. Blanca Haro is a 57 yo patient known to Dr. Thanh Escobar with stage IV gastric cancer sp~28 cycles of FOLFIRINOX with disease progression now on Keytruda cycle 2 on 8/15/2022. He presented to ED with complaint of lethargy, abdominal distention and pain, and nausea. He denies bloody or coffee ground emesis and states that he typically has good appetite and has been eating well. Labs revealed Na 129, K+5.0, procal 1.35, LA 3.2,  Cr 1.30, Ca+5.2, Cca+6.88, WBC 14.0, HGB 8.4, platelets 724P. CT AP large volume of ascites. Large bilateral pleural effusions. Interval increase in the size of the known gastric lesion. Again it is either displacing or has infiltrated the distal pancreas. Metastatic lesions are again seen throughout the liver and within the left adrenal gland. Patient was sent ot IR for thora removed 850 mls on left and para removed 6760 ml bloody fluids. He has received IV albumin.x 4. He has received ~l liters NS. Day 2 cefe/vanc. Today his HGB is 5.9 (8.4), platelets 97C (396U). LA is improved 1.9 as well as Na+ 133 and  Cr 0.9. VS today are improved 98.2, , /68. Cultures are pending. We are asked to see patient for recommendations.      No Known Allergies  Past Medical History:   Diagnosis Date    Acute gastrointestinal bleeding 7/17/2022    COVID 1/30/2022    Gastric cancer ([address]     being treated currently - chemo and radiation    History of blood transfusion     6/30/2022 no rxn to this transfusion    Lazy eye, left     Severe anemia 7/12/2022     Past Surgical History:   Procedure Laterality Date    IR PORT PLACEMENT EQUAL OR GREATER THAN 5 YEARS  11/3/2020    IR PORT PLACEMENT EQUAL OR GREATER THAN 5 YEARS  11/3/2020    IR PORT PLACEMENT EQUAL OR GREATER THAN 5 YEARS 11/3/2020 D RADIOLOGY SPECIALS    ORTHOPEDIC SURGERY      ankle    TONSILLECTOMY      UPPER GASTROINTESTINAL ENDOSCOPY N/A 7/5/2022    EGD DILATION BALLOON performed by Vilma Mullen MD at [facility] 13 N/A 7/14/2022    EGD STENT PLACEMENT performed by Bela Thomas MD at [facility] 13 N/A 8/3/2022    EGD ESOPHAGOGASTRODUODENOSCOPY performed by Renetta Leavitt MD at [address] [place] [address]  10/22/2020    US GUIDED LIVER BIOPSY PERCUTANEOUS 10/22/2020 [facility] [address]     Family History   Problem Relation Age of Onset    Schizophrenia Mother     Diabetes Mother     Dementia Mother     Depression Mother      Social History     Socioeconomic History    Marital status:      Spouse name: Not on file    Number of children: Not on file    Years of education: Not on file    Highest education level: Not on file   Occupational History    Not on file   Tobacco Use    Smoking status: Never    Smokeless tobacco: Never   Vaping Use    Vaping Use: Never used   Substance and Sexual Activity    Alcohol use: Yes     Comment: rarely    Drug use: Never    Sexual activity: Not on file   Other Topics Concern    Not on file   Social History Narrative    Not on file     Social Determinants of Health     Financial Resource Strain: Not on file   Food Insecurity: Not on file   Transportation Needs: Not on file   Physical Activity: Not on file   Stress: Not on file   Social Connections: Not on file   Intimate Partner Violence: Not on file   Housing Stability: Not on file     Current Facility-Administered Medications   Medication Dose Route Frequency Provider Last Rate Last Admin    0.9 % sodium chloride infusion   IntraVENous PRN Andres Bowman MD        0.9 % sodium chloride infusion   IntraVENous PRN ARY Reyes - NP        vancomycin (VANCOCIN) 1,000 mg in sodium chloride 0.9 % 250 mL IVPB ([id number])  1,000 mg IntraVENous Q12H [place] Blake  mL/hr at 08/24/22 0947 1,000 mg at 08/24/22 0947    diatrizoate meglumine-sodium (GASTROGRAFIN) 66-10 % solution 15 mL  15 mL Oral ONCE PRN Silvia Kaur MD   15 mL at 08/23/22 0517    gabapentin (NEURONTIN) capsule 300 mg  300 mg Oral BID Omari Lyle MD   300 mg at 08/24/22 0946    levothyroxine (SYNTHROID) tablet 50 mcg  50 mcg Oral Daily Omari Lyle MD   50 mcg at 08/24/22 0947    mirtazapine (REMERON) tablet 30 mg  30 mg Oral Nightly Omari Lyle MD   30 mg at 08/23/22 2310    pantoprazole (PROTONIX) tablet 40 mg  40 mg Oral Daily Omari Lyle MD   40 mg at 08/24/22 0948    sucralfate (CARAFATE) tablet 1 g  1 g Oral 3 times per day Omari Lyle MD        sodium chloride flush 0.9 % injection 5-40 mL  5-40 mL IntraVENous 2 times per day Omari Lyle MD   10 mL at 08/24/22 0947    sodium chloride flush 0.9 % injection 5-40 mL  5-40 mL IntraVENous PRN Omari Lyle MD        0.9 % sodium chloride infusion   IntraVENous PRN Omari Lyle MD        ondansetron (ZOFRAN-ODT) disintegrating tablet 4 mg  4 mg Oral Q8H PRN Omari Lyle MD        Or    ondansetron [facility]) injection 4 mg  4 mg IntraVENous Q6H PRN Omari Lyle MD        polyethylene glycol (GLYCOLAX) packet 17 g  17 g Oral Daily PRN Omari Lyle MD        acetaminophen (TYLENOL) tablet 650 mg  650 mg Oral Q6H PRN Omari Lyle MD        Or    acetaminophen (TYLENOL) suppository 650 mg  650 mg Rectal Q6H PRN Omari Lyle MD        magnesium sulfate 2000 mg in 50 mL IVPB premix  2,000 mg IntraVENous PRN Omari Lyle MD        potassium chloride 10 mEq/100 mL IVPB (Peripheral Line)  10 mEq IntraVENous PRN Omari Lyle MD        potassium chloride (KLOR-CON M) extended release tablet 40 mEq  40 mEq Oral PRN Omari Lyle MD        Or    potassium bicarb-citric acid (EFFER-K) effervescent tablet 40 mEq  40 mEq Oral PRN Omari Lyle MD        Or potassium chloride 10 mEq/100 mL IVPB (Peripheral Line)  10 mEq IntraVENous PRN Palma Blanco MD        cefepime (MAXIPIME) 2,000 mg in sodium chloride 0.9 % 50 mL IVPB mini-bag  2,000 mg IntraVENous Q8H Antonio Horta MD   Stopped at 22 0946    LORazepam (ATIVAN) tablet 2 mg  2 mg Oral Nightly PRN Yehuda Krueger MD   2 mg at 22 2310     Facility-Administered Medications Ordered in Other Encounters   Medication Dose Route Frequency Provider Last Rate Last Admin    atropine injection 0.4 mg  0.4 mg IntraVENous Once Chrissy Baeza MD           OBJECTIVE:  Patient Vitals for the past 8 hrs:   BP Temp Temp src Pulse Resp SpO2 Weight   22 0757 103/64 99 °F (37.2 °C) Oral (!) 105 16 93 % --   22 0432 109/68 98.2 °F (36.8 °C) Oral (!) 101 18 91 % 167 lb 12.3 oz (76.1 kg)     Temp (24hrs), Av.5 °F (36.9 °C), Min:98.2 °F (36.8 °C), Max:99 °F (37.2 °C)     0701 -  1900  In: 90 [P.O.:90]  Out: -     Physical Exam:  Constitutional: Cachectic male in no acute distress, sitting comfortably in the hospital bed. Temporal waisting   HEENT: Normocephalic and atraumatic. Oropharynx is clear, mucous membranes are moist.  Pupils are equal, round, and reactive to light. Extraocular muscles are intact. Sclerae anicteric. Skin Warm and dry. No bruising and no rash noted. No erythema. No pallor. Respiratory Lungs are clear to auscultation bilaterally without wheezes, rales or rhonchi, normal air exchange without accessory muscle use. CVS Normal rate, regular rhythm and normal S1 and S2. No murmurs, gallops, or rubs. Abdomen Soft, nontender and nondistended, normoactive bowel sounds. Neuro Grossly nonfocal with no obvious sensory or motor deficits. MSK Normal range of motion in general.  BLE 4 + edema   Psych Appropriate mood and affect.         Labs:    Recent Results (from the past 24 hour(s))   Albumin, Body Fluid    Collection Time: 22 11:58 AM   Result Value Ref Range Fluid Type Positive      Albumin, Fluid 1.1 g/dL   Amylase, Body Fluid    Collection Time: 08/23/22 11:58 AM   Result Value Ref Range    Fluid Type PARACENTESIS FLUID      Amylase, Fluid 9 U/L   Culture, Body Fluid    Collection Time: 08/23/22 11:58 AM    Specimen: Thoracentesis;  Body Fluid   Result Value Ref Range    Special Requests NO SPECIAL REQUESTS      Gram stain PENDING     Culture NO GROWTH 1 DAY     Glucose, Body Fluid    Collection Time: 08/23/22 11:58 AM   Result Value Ref Range    Fluid Type PARACENTESIS FLUID      Glucose, Body Fluid 121 MG/DL   Lactate Dehydrogenase, Body Fluid    Collection Time: 08/23/22 11:58 AM   Result Value Ref Range    Fluid Type PARACENTESIS FLUID      LD, Fluid 1,101 U/L   Protein, Body Fluid    Collection Time: 08/23/22 11:58 AM   Result Value Ref Range    Fluid Type PARACENTESIS FLUID      Total Protein, Body Fluid 2.3 g/dL   Body fluid cell count    Collection Time: 08/23/22 11:58 AM   Result Value Ref Range    Specimen PARACENTESIS FLUID      Color, Fluid YELLOW      Appearance, Fluid HAZY      RBC, Fluid 6,000 /cu mm    WBC, Fluid 158 /cu mm    Segmented Neutrophils, BAL 15 %    Lymphocytes, BAL 85 %   Lactic Acid    Collection Time: 08/23/22  1:11 PM   Result Value Ref Range    Lactic Acid, Plasma 1.9 0.4 - 2.0 MMOL/L   Comprehensive Metabolic Panel w/ Reflex to MG    Collection Time: 08/24/22  5:18 AM   Result Value Ref Range    Sodium 133 (L) 138 - 145 mmol/L    Potassium 4.1 3.5 - 5.1 mmol/L    Chloride 105 98 - 107 mmol/L    CO2 22 21 - 32 mmol/L    Anion Gap 6 (L) 7 - 16 mmol/L    Glucose 85 65 - 100 mg/dL    BUN 16 6 - 23 MG/DL    Creatinine 0.90 0.8 - 1.5 MG/DL    GFR African American >60 >60 ml/min/1.73m2    GFR Non- >60 >60 ml/min/1.73m2    Calcium 7.5 (L) 8.3 - 10.4 MG/DL    Total Bilirubin 0.6 0.2 - 1.1 MG/DL    ALT 24 12 - 65 U/L    AST 92 (H) 15 - 37 U/L    Alk Phosphatase 188 (H) 50 - 136 U/L    Total Protein 5.1 (L) 6.3 - 8.2 g/dL Albumin 2.2 (L) 3.5 - 5.0 g/dL    Globulin 2.9 2.3 - 3.5 g/dL    Albumin/Globulin Ratio 0.8 (L) 1.2 - 3.5     CBC with Auto Differential    Collection Time: 08/24/22  5:18 AM   Result Value Ref Range    WBC 6.4 4.3 - 11.1 K/uL    RBC 1.86 (L) 4.23 - 5.6 M/uL    Hemoglobin 5.9 (LL) 13.6 - 17.2 g/dL    Hematocrit 19.0 (L) 41.1 - 50.3 %    .2 (H) 79.6 - 97.8 FL    MCH 31.7 26.1 - 32.9 PG    MCHC 31.1 (L) 31.4 - 35.0 g/dL    RDW 21.2 (H) 11.9 - 14.6 %    Platelets 66 (L) 187 - 450 K/uL    MPV 9.9 9.4 - 12.3 FL    nRBC 0.00 0.0 - 0.2 K/uL    Differential Type AUTOMATED      Seg Neutrophils 80 (H) 43 - 78 %    Lymphocytes 7 (L) 13 - 44 %    Monocytes 11 4.0 - 12.0 %    Eosinophils % 2 0.5 - 7.8 %    Basophils 1 0.0 - 2.0 %    Immature Granulocytes 1 0.0 - 5.0 %    Segs Absolute 5.1 1.7 - 8.2 K/UL    Absolute Lymph # 0.4 (L) 0.5 - 4.6 K/UL    Absolute Mono # 0.7 0.1 - 1.3 K/UL    Absolute Eos # 0.1 0.0 - 0.8 K/UL    Basophils Absolute 0.0 0.0 - 0.2 K/UL    Absolute Immature Granulocyte 0.0 0.0 - 0.5 K/UL       RECOMMENDATIONS:  Stage IV gastric cancer  -sp cycle 2 Keytruda 8/15     History of GI bleed   -EGD 7/2022 GE junction large friable malignant stricture with stent placed-at risk for bleed-monitor  -Consult GI     Anemia/thrombocytopenia  -drop in Hgb to 5.9 (8.4) and platelets 64I (047T)  -check DIC/Hemolysis labs  -check B12, folate, iron studies, D  -transfuses 2 units PRBCs     Sepsis  -WBC 14.0, LA 3.2/Procal 1.35 on admission LA today 1.9  -D2 cefe/vanc. Off fluids per primary    Lab studies and imaging studies  were personally reviewed. Pertinent old records were reviewed. Thank you for allowing us to participate in the care of Mr. Lia Gowers. Formal consult note by Dr. Duaret Cerna to follow.          ARY Tinoco - NP   [facility] Hematology & Oncology  [address] [place] [address]  Office : [phone]  Fax : [phone]

## 2022-08-23 NOTE — OR NURSING
TRANSFER - OUT REPORT:           Verbal report given to Joaquina Kumar RN on Tati Bonilla  being transferred to ER for routine progression of patient care              Report consisted of patients Situation, Background, Assessment and      Recommendations(SBAR). Information from the following report(s) SBAR and Procedure Summary was reviewed with the receiving nurse. Opportunity for questions and clarification was provided. Conscious Sedation:    0 Mcg of Fentanyl administered   0 Mg of Versed administered   0 Mg of Benadryl administered        Pt tolerated procedure well.            VITALS:  BP (!) 95/56   Pulse (!) 102   Temp 98.3 °F (36.8 °C) (Oral)   Resp 16   Ht 5' 11\" (1.803 m)   Wt 150 lb (68 kg)   SpO2 96%   BMI 20.92 kg/m²   PAIN SCORE:  0/10

## 2022-08-23 NOTE — OR NURSING
Interventional Radiology Post Paracentesis/Thoracentesis Note    8/23/2022    Procedure(s): Ultrasound guided Diagnostic Paracentesis Performed with 8 Czech catheter total volume 6760 ml. Samples sent to lab. Preliminary Findings: large dark red. Complications: None    Plan:  Observation, check labs if drawn. Chest X-Ray:  no    Full dictated report to follow      Catheter removed from the abdomen. Pressure applied and hemostasis obtained. Skin closed with skin glue.

## 2022-08-23 NOTE — PROGRESS NOTES
Wife of patient called to report he is lethargic and slow to respond. Has increased ascites, shortness of breath, poor appetite and minimal hydration or oral intake. Wife encouraged to take him to ED for further evaluation including labs, ammonia level, abdominal imaging and potentially a paracentesis. She agreed and states will take him downtown tonight once a sitter arrives for patients mother for whom she is also caring for.

## 2022-08-23 NOTE — PROGRESS NOTES
VANCO DAILY FOLLOW UP NOTE  [facility] Pharmacokinetic Monitoring Service - Vancomycin    Consulting Provider: Kianna Pritchard   Indication: Sepsis  Target Concentration: Goal AUC/RUSS 400-600 mg*hr/L  Day of Therapy: 1   Additional Antimicrobials: cefepime    Pertinent Laboratory Values: Wt Readings from Last 1 Encounters:   08/23/22 150 lb (68 kg)     Temp Readings from Last 1 Encounters:   08/23/22 98.3 °F (36.8 °C) (Oral)     Recent Labs     08/22/22  2234 08/23/22  0238 08/23/22  0356 08/23/22  1311   BUN 16 18  --   --    CREATININE 1.20 1.30  --   --    WBC 14.0*  --   --   --    PROCAL  --   --  1.35*  --    LACACIDPL  --   --  3.2* 1.9     Estimated Creatinine Clearance: 68 mL/min (based on SCr of 1.3 mg/dL). No results found for: Haim Ballard    MRSA Nasal Swab: N/A. Non-respiratory infection.       Assessment:  Date/Time Dose Concentration AUC         Note: Serum concentrations collected for AUC dosing may appear elevated if collected in close proximity to the dose administered, this is not necessarily an indication of toxicity    Plan:  Dosing recommendations based on Bayesian software  Start vancomycin 1500 mg q 24h  Anticipated AUC of 527 and trough concentration of 14.5 at steady state  Renal labs as indicated   Vancomycin concentrations will be ordered as clinically appropriate   Pharmacy will continue to monitor patient and adjust therapy as indicated    Thank you for the consult,  Elizabeth Welch, [facility]

## 2022-08-23 NOTE — OP NOTE
Department of Interventional Radiology  [phone]        Interventional Radiology Brief Procedure Note    Patient: Alena Cleveland MRN: [id number]  SSN: [id number]    YOB: 1975  Age: 56 y.o. Sex: male      Date of Procedure: 8/23/2022    Pre-Procedure Diagnosis: metastatic gastric cancer, ascites, left pleural effusion    Post-Procedure Diagnosis: SAME    Procedure(s): Paracentesis  Thoracentesis    Brief Description of Procedure: as above    Performed By: Basilio Hughes PA-C     Assistants: None    Anesthesia:Lidocaine    Estimated Blood Loss: None    Specimens:   ascites, pleural fluid    Implants:  None    Findings: large volume blood tinged ascites.   Thin yellow pleural fluid    Complications: None    Recommendations: routine wound care     Follow Up: prn    Signed By: Basilio Hughes PA-C     August 23, 2022

## 2022-08-23 NOTE — ED PROVIDER NOTES
[facility] Provider Note                   PCP:                None Provider               Age: 56 y.o. Sex: male       ICD-10-CM    1. Severe sepsis ([facility] [address]  A41.9     R65.20       2. Malignant ascites  R18.0           DISPOSITION Decision To Admit 08/23/2022 07:17:19 AM       New Prescriptions    No medications on file       Orders Placed This Encounter   Procedures    CT ABDOMEN PELVIS W IV CONTRAST Additional Contrast? Oral    CBC with Diff    CMP    Lipase    Procalcitonin    Lactic Acid    Ammonia    Basic Metabolic Panel    Magnesium    Diet NPO    POCT Urine Dipstick    EKG 12 Lead (Select if Upper Abd Pain, or SOB, Diaphoresis or Tachy)    Saline lock IV         Aliya Flood is a 56 y.o. male who presents to the Emergency Department with chief complaint of    Chief Complaint   Patient presents with    Altered Mental Status      Mr. 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 Review of Systems   Constitutional:  Positive for activity change, appetite change, chills, fatigue and unexpected weight change. Negative for diaphoresis and fever. HENT:  Negative for congestion, facial swelling and rhinorrhea. Eyes:  Negative for pain, discharge and redness. Respiratory:  Negative for cough, shortness of breath, wheezing and stridor. Cardiovascular:  Negative for chest pain, palpitations and leg swelling. Gastrointestinal:  Positive for abdominal distention, abdominal pain and nausea. Negative for constipation, diarrhea and vomiting. Genitourinary:  Negative for difficulty urinating, dysuria, flank pain and hematuria. Musculoskeletal:  Negative for gait problem, joint swelling, myalgias and neck pain.    Skin: Negative for color change, pallor, rash and wound. Neurological:  Negative for dizziness, facial asymmetry, light-headedness, numbness and headaches. Psychiatric/Behavioral:  Negative for behavioral problems, confusion and hallucinations. The patient is not nervous/anxious and is not hyperactive. All other systems reviewed and are negative. Past Medical History:   Diagnosis Date    Acute gastrointestinal bleeding 7/17/2022    COVID 1/30/2022    Gastric cancer ([facility] [address]     being treated currently - chemo and radiation    History of blood transfusion     6/30/2022 no rxn to this transfusion    Lazy eye, left     Severe anemia 7/12/2022        Past Surgical History:   Procedure Laterality Date    IR PORT PLACEMENT EQUAL OR GREATER THAN 5 YEARS  11/3/2020    IR PORT PLACEMENT EQUAL OR GREATER THAN 5 YEARS  11/3/2020    IR PORT PLACEMENT EQUAL OR GREATER THAN 5 YEARS 11/3/2020 [facility]    ORTHOPEDIC SURGERY      ankle    TONSILLECTOMY      UPPER GASTROINTESTINAL ENDOSCOPY N/A 7/5/2022    EGD DILATION BALLOON performed by Geneva Higgins MD at [address] [place] N/A 7/14/2022    EGD STENT PLACEMENT performed by Jimmy Henriquez MD at [address] [place] N/A 8/3/2022    EGD ESOPHAGOGASTRODUODENOSCOPY performed by Tone Turner MD at [address] [facility] [place]  10/22/2020    US GUIDED LIVER BIOPSY PERCUTANEOUS 10/22/2020 [facility]        Family History   Problem Relation Age of Onset    Schizophrenia Mother     Diabetes Mother     Dementia Mother     Depression Mother         Social Connections: Not on file        No Known Allergies     Vitals signs and nursing note reviewed. Patient Vitals for the past 4 hrs:   BP SpO2   08/23/22 0530 103/78 94 %   08/23/22 0500 101/71 93 %   08/23/22 0440 99/76 94 %   08/23/22 0400 97/74 95 %          Physical Exam  Vitals and nursing note reviewed.    Constitutional: General: He is not in acute distress. Appearance: Normal appearance. He is ill-appearing. He is not toxic-appearing or diaphoretic. Comments: Stretcher no acute distress. Appears uncomfortable. Appears both acutely and chronically ill-appearing. Not specifically toxic appearing at the moment. Cachectic. HENT:      Head: Normocephalic and atraumatic. Right Ear: External ear normal.      Left Ear: External ear normal.      Nose: Nose normal. No congestion or rhinorrhea. Mouth/Throat:      Mouth: Mucous membranes are moist.      Pharynx: Oropharynx is clear. No oropharyngeal exudate or posterior oropharyngeal erythema. Eyes:      General: No scleral icterus. Right eye: No discharge. Left eye: No discharge. Extraocular Movements: Extraocular movements intact. Conjunctiva/sclera: Conjunctivae normal.      Pupils: Pupils are equal, round, and reactive to light. Cardiovascular:      Rate and Rhythm: Normal rate. Pulses: Normal pulses. Heart sounds: Normal heart sounds. No murmur heard. No friction rub. No gallop. Pulmonary:      Effort: Pulmonary effort is normal. No respiratory distress. Breath sounds: Normal breath sounds. No stridor. No wheezing, rhonchi or rales. Abdominal:      General: Abdomen is flat. Bowel sounds are normal. There is distension. Palpations: There is no mass. Tenderness: There is abdominal tenderness (diffusely). There is no guarding or rebound. Musculoskeletal:         General: No swelling, tenderness, deformity or signs of injury. Normal range of motion. Cervical back: Normal range of motion and neck supple. No rigidity or tenderness. Right lower leg: No edema. Left lower leg: No edema. Skin:     General: Skin is warm and dry. Capillary Refill: Capillary refill takes less than 2 seconds. Coloration: Skin is not jaundiced or pale. Findings: No bruising or erythema. Neurological:      General: No focal deficit present. Mental Status: He is alert and oriented to person, place, and time. GCS: GCS eye subscore is 4. GCS verbal subscore is 5. GCS motor subscore is 6. Motor: No weakness. Coordination: Coordination normal.      Gait: Gait normal.   Psychiatric:         Mood and Affect: Mood normal.         Behavior: Behavior normal.         Thought Content: Thought content normal.        MDM  Number of Diagnoses or Management Options  Diagnosis management comments: Appears to have sepsis with unclear source. Metastatic stomach cancer with mets to the liver that appears to be worsening. Ascites. Albumin given. Requires admission. Rocephin given as well for sepsis. Does not appear to be in septic shock. Qualifies as severe sepsis due to lactic of 3.2. I will discuss with attending hospitalist on-call to request admission. I sincerely appreciate their involvement in the ongoing care of this patient.        Amount and/or Complexity of Data Reviewed  Clinical lab tests: ordered and reviewed  Tests in the radiology section of CPT®: ordered and reviewed  Tests in the medicine section of CPT®: ordered and reviewed  Decide to obtain previous medical records or to obtain history from someone other than the patient: yes  Obtain history from someone other than the patient: yes  Review and summarize past medical records: yes  Discuss the patient with other providers: yes  Independent visualization of images, tracings, or specimens: yes        Procedures    Labs Reviewed   CBC WITH AUTO DIFFERENTIAL - Abnormal; Notable for the following components:       Result Value    WBC 14.0 (*)     RBC 2.59 (*)     Hemoglobin 8.4 (*)     Hematocrit 26.8 (*)     .5 (*)     MCHC 31.3 (*)     RDW 21.7 (*)     Seg Neutrophils 84 (*)     Lymphocytes 5 (*)     Eosinophils % 0 (*)     Segs Absolute 11.7 (*)     All other components within normal limits   COMPREHENSIVE METABOLIC PANEL - Abnormal; Notable for the following components:    Sodium 129 (*)     Potassium 5.3 (*)     CO2 17 (*)     Glucose 160 (*)     Calcium 5.2 (*)      (*)     Alk Phosphatase 261 (*)     Total Protein 5.8 (*)     Albumin 1.9 (*)     Globulin 3.9 (*)     Albumin/Globulin Ratio 0.5 (*)     All other components within normal limits   LIPASE - Abnormal; Notable for the following components:    Lipase 36 (*)     All other components within normal limits   PROCALCITONIN - Abnormal; Notable for the following components:    Procalcitonin 1.35 (*)     All other components within normal limits   LACTIC ACID - Abnormal; Notable for the following components:    Lactic Acid, Plasma 3.2 (*)     All other components within normal limits   AMMONIA - Abnormal; Notable for the following components:    Ammonia <10 (*)     All other components within normal limits   BASIC METABOLIC PANEL - Abnormal; Notable for the following components:    Sodium 129 (*)     CO2 20 (*)     Anion Gap 4 (*)     Glucose 150 (*)     Calcium 8.1 (*)     All other components within normal limits   MAGNESIUM - Abnormal; Notable for the following components:    Magnesium 2.5 (*)     All other components within normal limits        CT ABDOMEN PELVIS W IV CONTRAST Additional Contrast? Oral   Final Result   The large volume of ascites. Large bilateral pleural effusions. Interval increase in the size of the known gastric lesion. Again it is either   displacing or has infiltrated the distal pancreas. Metastatic lesions are again seen throughout the liver and within the left   adrenal gland. Lymphadenopathy as above.                Jacquelin Coma Scale  Eye Opening: Spontaneous  Best Verbal Response: Oriented  Best Motor Response: Obeys commands  Jacquelin Coma Scale Score: 15               ED Course as of 08/23/22 0717   Tue Aug 23, 2022   0147 CALCIUM, SERUM, [id number](!!): 5.2 [ET]      ED Course User Index  [ET] Maria Eugenia Savage MD        Voice

## 2022-08-23 NOTE — OR NURSING
Interventional Radiology Post Paracentesis/Thoracentesis Note    8/23/2022    Procedure(s): Ultrasound guided Therapeutic Left Thoracentesis Performed with 8 [place] catheter total volume 850 ml. Samples sent to lab. Preliminary Findings: moderate clear and yellow. Complications: None    Plan:  Observation, check labs if drawn. Chest X-Ray:  no    Full dictated report to follow      Catheter removed, pressure applied and hemostasis obtained. Site closed with skin glue.

## 2022-08-24 PROBLEM — R18.0 MALIGNANT ASCITES: Status: ACTIVE | Noted: 2022-01-01

## 2022-08-24 PROBLEM — C16.9 GASTRIC CARCINOMA (HCC): Status: ACTIVE | Noted: 2022-01-01

## 2022-08-24 PROBLEM — J90 PLEURAL EFFUSION: Status: ACTIVE | Noted: 2022-01-01

## 2022-08-24 NOTE — PROGRESS NOTES
Progress Note    Patient: Perlita Schneider MRN: [id number]  SSN: [id number]    YOB: 1975  Age: 56 y.o. Sex: male      Admit Date: 8/23/2022    LOS: 1 day     Assessment and Plan:   Severe sepsis ([facility] [address]  -Patient presented with WBC of 14, lactic acid of 3.2.  - Patient has history of gastric cancer with mets to liver and esophagus with chemo no longer working  - Patient status post albumin  - Continue cefepime and vancomycin  - Repeat lactic acid pending  - Blood cultures pending    Malignant ascites  -CT abdomen pelvis showed large volume ascites. Large bilateral pleural effusions  - IR consulted for paracentesis, thoracentesis  - Patient status post paracentesis with almost 7 L removed  - Thoracentesis with 850 mL removed  - Albumin 25% x 4    Gastric cancer with mets to liver and esophagus  - Patient status post 28 cycles of FOLFIRINOX  - Currently on Keytruda cycle 2 started on 8/15  - Oncology consulted, appreciate recommendations    Hyponatremia  - Follow-up daily BMP  -Avoid rapid correction     Severe anemia  - Continue to monitor H&H  - Transfuse 1 unit of PRBC  - Monitor H&H    Hypothyroidism  - Continue with home Synthroid       Dispo/Discharge Planning:   Dispo pending clinical course     VTE ppx: SCDs    Subjective:   56 y.o. male with medical history of hypothyroidism, GERD, stage IV gastric cancer who presented with lethargy, nausea, abdominal distention. Patient with history of stage IV gastric cancer status post 28 cycles of FOLFIRINOX with disease progression now on Keytruda cycle 2 on 8/15. Patient presented to the ED with lactic acid of 3.2, WBC of 14, hemoglobin 8.4, sodium of 129. CT abdomen pelvis showed large volume ascites. Large bilateral pleural effusions. Increase in the size of known gastric lesion. Metastatic lesions also seen throughout the liver and within the left adrenal gland.   Patient is now status post paracentesis with almost 7 L removed and thoracentesis with 850 milliliters removed. Patient seen and examined at bedside. This morning the patient feeling a little weak. Otherwise denies any chest pain, no shortness of breath, no nausea or vomiting.     Objective:     Vitals:    08/24/22 0000 08/24/22 0108 08/24/22 0432 08/24/22 0757   BP: 105/69  109/68 103/64   Pulse: 99 98 (!) 101 (!) 105   Resp: 18  18 16   Temp: 98.4 °F (36.9 °C)  98.2 °F (36.8 °C) 99 °F (37.2 °C)   TempSrc: Oral  Oral Oral   SpO2: 93%  91% 93%   Weight:   167 lb 12.3 oz (76.1 kg)    Height:            Intake and Output:  Current Shift: 08/24 0701 - 08/24 1900  In: 90 [P.O.:90]  Out: -   Last three shifts: 08/22 1901 - 08/24 0700  In: 1056 [I.V.:956]  Out: 450 [Urine:450]    ROS  10 ROS negative except from stated on subjective    Physical Exam:   General: Alert, oriented, NAD  HEENT: NC/AT, EOM are intact  Neck: supple, no JVD  Cardiovascular: RRR, S1, S2, no murmurs  Respiratory: Lungs are clear, no wheezes or rales  Abdomen: Soft, NT, ND  Back: No CVA tenderness, no paraspinal tenderness  Extremities: LE without pedal edema, no erythema  Neuro: A&O, CN are intact, no focal deficits  Skin: no rash or ulcers  Psych: good mood and affect    Lab/Data Review:  I have personally reviewed patients laboratory data showing  Recent Results (from the past 24 hour(s))   POCT INR    Collection Time: 08/23/22 10:08 AM   Result Value Ref Range    POC Protime 15.4 (H) 9.6 - 11.6 SECS    POC INR 1.3 (H) 0.9 - 1.2     Albumin, Body Fluid    Collection Time: 08/23/22 10:40 AM   Result Value Ref Range    Fluid Type ASCITIC FLUID       Albumin, Fluid 0.6 g/dL   Glucose, Body Fluid    Collection Time: 08/23/22 10:40 AM   Result Value Ref Range    Fluid Type ASCITIC FLUID       Glucose, Body Fluid 120 MG/DL   Lactate Dehydrogenase, Body Fluid    Collection Time: 08/23/22 10:40 AM   Result Value Ref Range    Fluid Type ASCITIC FLUID       LD, Fluid 837 U/L   Protein, Body Fluid    Collection Time: 08/23/22 10:40 AM Result Value Ref Range    Fluid Type ASCITIC FLUID       Total Protein, Body Fluid 1.3 g/dL   Amylase, Body Fluid    Collection Time: 08/23/22 10:40 AM   Result Value Ref Range    Fluid Type ASCITIC FLUID       Amylase, Fluid 6 U/L   Body fluid cell count    Collection Time: 08/23/22 10:40 AM   Result Value Ref Range    Specimen ASCITIC FLUID       Color, Fluid BLOODY      Appearance, Fluid RED      RBC, Fluid 286,000 /cu mm    WBC, Fluid 420 /cu mm    Segmented Neutrophils, BAL 69 %    Lymphocytes, BAL 30 %    Macrophages, BAL 1 %   Albumin, Body Fluid    Collection Time: 08/23/22 11:58 AM   Result Value Ref Range    Fluid Type Positive      Albumin, Fluid 1.1 g/dL   Amylase, Body Fluid    Collection Time: 08/23/22 11:58 AM   Result Value Ref Range    Fluid Type PARACENTESIS FLUID      Amylase, Fluid 9 U/L   Culture, Body Fluid    Collection Time: 08/23/22 11:58 AM    Specimen: Thoracentesis;  Body Fluid   Result Value Ref Range    Special Requests NO SPECIAL REQUESTS      Gram stain PENDING     Culture NO GROWTH 1 DAY     Glucose, Body Fluid    Collection Time: 08/23/22 11:58 AM   Result Value Ref Range    Fluid Type PARACENTESIS FLUID      Glucose, Body Fluid 121 MG/DL   Lactate Dehydrogenase, Body Fluid    Collection Time: 08/23/22 11:58 AM   Result Value Ref Range    Fluid Type PARACENTESIS FLUID      LD, Fluid 1,101 U/L   Protein, Body Fluid    Collection Time: 08/23/22 11:58 AM   Result Value Ref Range    Fluid Type PARACENTESIS FLUID      Total Protein, Body Fluid 2.3 g/dL   Body fluid cell count    Collection Time: 08/23/22 11:58 AM   Result Value Ref Range    Specimen PARACENTESIS FLUID      Color, Fluid YELLOW      Appearance, Fluid HAZY      RBC, Fluid 6,000 /cu mm    WBC, Fluid 158 /cu mm    Segmented Neutrophils, BAL 15 %    Lymphocytes, BAL 85 %   Lactic Acid    Collection Time: 08/23/22  1:11 PM   Result Value Ref Range    Lactic Acid, Plasma 1.9 0.4 - 2.0 MMOL/L   Comprehensive Metabolic Panel w/ Reflex to MG    Collection Time: 08/24/22  5:18 AM   Result Value Ref Range    Sodium 133 (L) 138 - 145 mmol/L    Potassium 4.1 3.5 - 5.1 mmol/L    Chloride 105 98 - 107 mmol/L    CO2 22 21 - 32 mmol/L    Anion Gap 6 (L) 7 - 16 mmol/L    Glucose 85 65 - 100 mg/dL    BUN 16 6 - 23 MG/DL    Creatinine 0.90 0.8 - 1.5 MG/DL    GFR African American >60 >60 ml/min/1.73m2    GFR Non- >60 >60 ml/min/1.73m2    Calcium 7.5 (L) 8.3 - 10.4 MG/DL    Total Bilirubin 0.6 0.2 - 1.1 MG/DL    ALT 24 12 - 65 U/L    AST 92 (H) 15 - 37 U/L    Alk Phosphatase 188 (H) 50 - 136 U/L    Total Protein 5.1 (L) 6.3 - 8.2 g/dL    Albumin 2.2 (L) 3.5 - 5.0 g/dL    Globulin 2.9 2.3 - 3.5 g/dL    Albumin/Globulin Ratio 0.8 (L) 1.2 - 3.5     CBC with Auto Differential    Collection Time: 08/24/22  5:18 AM   Result Value Ref Range    WBC 6.4 4.3 - 11.1 K/uL    RBC 1.86 (L) 4.23 - 5.6 M/uL    Hemoglobin 5.9 (LL) 13.6 - 17.2 g/dL    Hematocrit 19.0 (L) 41.1 - 50.3 %    .2 (H) 79.6 - 97.8 FL    MCH 31.7 26.1 - 32.9 PG    MCHC 31.1 (L) 31.4 - 35.0 g/dL    RDW 21.2 (H) 11.9 - 14.6 %    Platelets 66 (L) 836 - 450 K/uL    MPV 9.9 9.4 - 12.3 FL    nRBC 0.00 0.0 - 0.2 K/uL    Differential Type AUTOMATED      Seg Neutrophils 80 (H) 43 - 78 %    Lymphocytes 7 (L) 13 - 44 %    Monocytes 11 4.0 - 12.0 %    Eosinophils % 2 0.5 - 7.8 %    Basophils 1 0.0 - 2.0 %    Immature Granulocytes 1 0.0 - 5.0 %    Segs Absolute 5.1 1.7 - 8.2 K/UL    Absolute Lymph # 0.4 (L) 0.5 - 4.6 K/UL    Absolute Mono # 0.7 0.1 - 1.3 K/UL    Absolute Eos # 0.1 0.0 - 0.8 K/UL    Basophils Absolute 0.0 0.0 - 0.2 K/UL    Absolute Immature Granulocyte 0.0 0.0 - 0.5 K/UL      [unfilled]     Image:  I have personally reviewed patients imaging showing  IR US GUIDED PARACENTESIS   Final Result   Uncomplicated ultrasound guided paracentesis. IR GUIDED THORACENTESIS PLEURAL   Final Result   Uncomplicated ultrasound guided left thoracentesis. CT ABDOMEN PELVIS W IV CONTRAST Additional Contrast? Oral   Final Result   The large volume of ascites. Large bilateral pleural effusions. Interval increase in the size of the known gastric lesion. Again it is either   displacing or has infiltrated the distal pancreas. Metastatic lesions are again seen throughout the liver and within the left   adrenal gland. Lymphadenopathy as above.            Current Facility-Administered Medications   Medication Dose Route Frequency Provider Last Rate Last Admin    0.9 % sodium chloride infusion   IntraVENous PRN Oneida Dias MD        0.9 % sodium chloride infusion   IntraVENous PRN Parish Yang APRN - NP        vancomycin (VANCOCIN) 1,000 mg in sodium chloride 0.9 % 250 mL IVPB (Gzor1Tgr)  1,000 mg IntraVENous Q12H Julio Barry MD        diatrizoate meglumine-sodium (GASTROGRAFIN) 66-10 % solution 15 mL  15 mL Oral ONCE PRN Po Lockett MD   15 mL at 08/23/22 0517    gabapentin (NEURONTIN) capsule 300 mg  300 mg Oral BID Diego Rodriguez MD   300 mg at 08/23/22 2310    levothyroxine (SYNTHROID) tablet 50 mcg  50 mcg Oral Daily Diego Rodriguez MD        mirtazapine (REMERON) tablet 30 mg  30 mg Oral Nightly Diego Rodriguez MD   30 mg at 08/23/22 2310    pantoprazole (PROTONIX) tablet 40 mg  40 mg Oral Daily Diego Rodriguez MD        sucralfate (CARAFATE) tablet 1 g  1 g Oral 3 times per day Diego Rodriguez MD        sodium chloride flush 0.9 % injection 5-40 mL  5-40 mL IntraVENous 2 times per day Diego Rodriguez MD   10 mL at 08/23/22 2343    sodium chloride flush 0.9 % injection 5-40 mL  5-40 mL IntraVENous PRN Diego Rodriguez MD        0.9 % sodium chloride infusion   IntraVENous PRN Diego Rodriguez MD        ondansetron (ZOFRAN-ODT) disintegrating tablet 4 mg  4 mg Oral Q8H PRN Diego Rodriguez MD        Or    ondansetron [facility]) injection 4 mg  4 mg IntraVENous Q6H PRN Diego Rodriguez MD        polyethylene glycol (GLYCOLAX) packet 17 g  17 g Oral Daily PRN Edna Melgoza MD        acetaminophen (TYLENOL) tablet 650 mg  650 mg Oral Q6H PRN Edna Melgoza MD        Or    acetaminophen (TYLENOL) suppository 650 mg  650 mg Rectal Q6H PRN Edna Melgoza MD        magnesium sulfate 2000 mg in 50 mL IVPB premix  2,000 mg IntraVENous PRN Edna Melgoza MD        potassium chloride 10 mEq/100 mL IVPB (Peripheral Line)  10 mEq IntraVENous PRN Edna Melgoza MD        potassium chloride (KLOR-CON M) extended release tablet 40 mEq  40 mEq Oral PRN Edna Melgoza MD        Or    potassium bicarb-citric acid (EFFER-K) effervescent tablet 40 mEq  40 mEq Oral PRN Edna Melgoza MD        Or    potassium chloride 10 mEq/100 mL IVPB (Peripheral Line)  10 mEq IntraVENous PRN Edna Melgoza MD        cefepime (MAXIPIME) 2,000 mg in sodium chloride 0.9 % 50 mL IVPB mini-bag  2,000 mg IntraVENous Q8H Claudene Gibbons, MD 12.5 mL/hr at 08/24/22 0536 2,000 mg at 08/24/22 0536    LORazepam (ATIVAN) tablet 2 mg  2 mg Oral Nightly PRN Pooja Phillips MD   2 mg at 08/23/22 2310     Facility-Administered Medications Ordered in Other Encounters   Medication Dose Route Frequency Provider Last Rate Last Admin    atropine injection 0.4 mg  0.4 mg IntraVENous Once Onel Patterson MD            Hospital problems     Patient Active Problem List   Diagnosis    Urgency of urination    Malignant neoplasm of overlapping sites of stomach (HCC)    CINV (chemotherapy-induced nausea and vomiting)    Severe anemia    Thrombocytopenia (HCC)    Personal history of COVID-19    Hypoproteinemia (HCC)    Hypokalemia    GIB (gastrointestinal bleeding)    Hypothyroidism    PNA (pneumonia)    Acute GI bleeding    Severe sepsis ([address]        I have reviewed, updated, and verified this note's content and spent 38 minutes of my 42 minutes visit performing counseling and coordination of care regarding medical management.        Signed By: Bob Reynaga MD     August 24, 2022

## 2022-08-24 NOTE — PROGRESS NOTES
Occupational Therapy Note:    Attempted to see patient this PM for occupational therapy evaluation session. Patient's hgb is currently 5.9 and per nursing patient has not received any blood yet. Pt not appropriate to participate at this time. Will follow and re-attempt as schedule permits/patient available.  Thank you,    Mireya Russell, OT    [facility]

## 2022-08-24 NOTE — CONSENT
Informed Consent for Blood Component Transfusion Note    I have discussed with the patient the rationale for blood component transfusion; its benefits in treating or preventing fatigue, organ damage, or death; and its risk which includes mild transfusion reactions, rare risk of blood borne infection, or more serious but rare reactions. I have discussed the alternatives to transfusion, including the risk and consequences of not receiving transfusion. The patient had an opportunity to ask questions and had agreed to proceed with transfusion of blood components.     Electronically signed by Andres Bowman MD on 8/24/22 at 9:27 AM EDT

## 2022-08-24 NOTE — PROGRESS NOTES
VANCO DAILY FOLLOW UP NOTE  [address] [facility] Pharmacokinetic Monitoring Service - Vancomycin    Consulting Provider: Satish Jenkins   Indication: Sepsis  Target Concentration: Goal AUC/RUSS 400-600 mg*hr/L  Day of Therapy: 2  Additional Antimicrobials: cefepime    Pertinent Laboratory Values: Wt Readings from Last 1 Encounters:   08/24/22 167 lb 12.3 oz (76.1 kg)     Temp Readings from Last 1 Encounters:   08/24/22 99 °F (37.2 °C) (Oral)     Recent Labs     08/22/22  2234 08/23/22  0238 08/23/22  0356 08/23/22  1311 08/24/22  0518   BUN 16 18  --   --  16   CREATININE 1.20 1.30  --   --  0.90   WBC 14.0*  --   --   --  6.4   PROCAL  --   --  1.35*  --   --    LACACIDPL  --   --  3.2* 1.9  --      Estimated Creatinine Clearance: 109 mL/min (based on SCr of 0.9 mg/dL). No results found for: Jimmy Dye    MRSA Nasal Swab: N/A. Non-respiratory infection.       Assessment:  Date/Time Dose Concentration AUC         Note: Serum concentrations collected for AUC dosing may appear elevated if collected in close proximity to the dose administered, this is not necessarily an indication of toxicity    Plan:  Dosing recommendations based on [address]  Will empirically adjust the dose to 1000 mg every 12 hours, anticipated AUC/Tr of 516/15.9  Renal labs as indicated   Vancomycin concentrations will be ordered as clinically appropriate   Pharmacy will continue to monitor patient and adjust therapy as indicated    Thank you for the consult,  Sharla Dandy, [address] [facility]

## 2022-08-24 NOTE — PROGRESS NOTES
Physical Therapy Note:    Attempted to see patient this AM for physical therapy evaluation session. Patient's hgb is currently 5.9 and per nursing patient has not received any blood yet. Pt not appropriate to participate at this time. . Will follow and re-attempt as schedule permits/patient available.  Thank you,    Jerome Magana, PT

## 2022-08-24 NOTE — CONSULTS
[facility] Consult Note       Primary GI Physician: Dr. Emanuel Muñiz     Referring Provider:  ARY Andrade FNP    Consult Date:  8/24/2022    Admit Date:  8/23/2022    Chief Complaint:  drop in hgb/recent egd with friable mass stent placed/at risk for bleed    Subjective:     History of Present Illness:  Patient is a 56 y.o. male with PMH of stage IV gastric cancer, esophageal stent, and anemia here with drop in hgb, leukocytosis, large volume ascites, pleural effusions, and hyponatremia. At time of admission had complaints of lethargy, abdominal distention with pain and nausea. He denies any melena, bright red blood from rectum, or hematemesis. He denies nausea and endorses good appetite. He endorses \"normal soft\" bowel movements with his last one being today. Hemoglobin trended down from 8.4 on the 22nd to 5.9 today. Patient has known gastric mass that continues to ooze. He is on Keytruda. Tolerating PO intake. Mild to moderate GERD on daily PPI. Abdominal pain resolved after paracentesis. On abx per primary team.   Blood and urine cx's are NGTD. Paracentesis and thoracentesis done 8/23/22 with total of 6760 ml of blood-tinged ascitic fluid removed via paracentesis and 850 ml of thin yellow fluid removed from left lung. He is currently resting comfortably. ROS is otherwise negative.      PMH:  Past Medical History:   Diagnosis Date    Acute gastrointestinal bleeding 7/17/2022    COVID 1/30/2022    Gastric cancer ([address]     being treated currently - chemo and radiation    History of blood transfusion     6/30/2022 no rxn to this transfusion    Lazy eye, left     Severe anemia 7/12/2022       PSH:  Past Surgical History:   Procedure Laterality Date    IR PORT PLACEMENT EQUAL OR GREATER THAN 5 YEARS  11/3/2020    IR PORT PLACEMENT EQUAL OR GREATER THAN 5 YEARS  11/3/2020    IR PORT PLACEMENT EQUAL OR GREATER THAN 5 YEARS 11/3/2020 [facility]    ORTHOPEDIC SURGERY      ankle TONSILLECTOMY      UPPER GASTROINTESTINAL ENDOSCOPY N/A 7/5/2022    EGD DILATION BALLOON performed by Rory Luis MD at [address] [place] N/A 7/14/2022    EGD STENT PLACEMENT performed by Noemí Travis MD at [address] [place] N/A 8/3/2022    EGD ESOPHAGOGASTRODUODENOSCOPY performed by Yvette Leon MD at [address] [facility] [place]  10/22/2020    [facility] 10/22/2020 [facility]       Allergies:  No Known Allergies    Home Medications:  Prior to Admission medications    Medication Sig Start Date End Date Taking? Authorizing Provider   sucralfate (CARAFATE) 1 GM tablet take 1 tablet dissolved in 4 ounces of H2O 4 times every day on an empty stomach 1 hour before meals and at bedtime for Bleeding  Patient not taking: Reported on 8/23/2022 7/5/22 10/5/22  Historical Provider, MD   levothyroxine (SYNTHROID) 50 MCG tablet Take 1 tablet by mouth in the morning. 8/15/22   Jan Montez MD   potassium chloride (KLOR-CON M) 20 MEQ extended release tablet Take 1 tablet by mouth in the morning and 1 tablet before bedtime. Do all this for 7 days. 8/15/22 8/22/22  SHMUEL Rocha   gabapentin (NEURONTIN) 300 MG capsule Take 1 capsule by mouth in the morning and 1 capsule before bedtime. Do all this for 90 days. 8/15/22 11/13/22  SHMUEL Rocha   pantoprazole (PROTONIX) 40 MG tablet Take 1 tablet by mouth in the morning.  8/6/22 9/5/22  Adriana Salinas MD   mirtazapine (REMERON) 30 MG tablet Take 30 mg by mouth nightly  2/21/22   Ar Automatic Reconciliation   prochlorperazine (COMPAZINE) 10 MG tablet Take 10 mg by mouth every 6 hours as needed  Patient not taking: No sig reported 10/28/20 8/6/22  Ar Automatic Reconciliation       [facility] Medications:  Current Facility-Administered Medications   Medication Dose Route Frequency    0.9 % sodium chloride infusion IntraVENous PRN    0.9 % sodium chloride infusion   IntraVENous PRN    vancomycin (VANCOCIN) 1,000 mg in sodium chloride 0.9 % 250 mL IVPB ([id number])  1,000 mg IntraVENous Q12H    diatrizoate meglumine-sodium (GASTROGRAFIN) 66-10 % solution 15 mL  15 mL Oral ONCE PRN    gabapentin (NEURONTIN) capsule 300 mg  300 mg Oral BID    levothyroxine (SYNTHROID) tablet 50 mcg  50 mcg Oral Daily    mirtazapine (REMERON) tablet 30 mg  30 mg Oral Nightly    pantoprazole (PROTONIX) tablet 40 mg  40 mg Oral Daily    sucralfate (CARAFATE) tablet 1 g  1 g Oral 3 times per day    sodium chloride flush 0.9 % injection 5-40 mL  5-40 mL IntraVENous 2 times per day    sodium chloride flush 0.9 % injection 5-40 mL  5-40 mL IntraVENous PRN    0.9 % sodium chloride infusion   IntraVENous PRN    ondansetron (ZOFRAN-ODT) disintegrating tablet 4 mg  4 mg Oral Q8H PRN    Or    ondansetron (ZOFRAN) injection 4 mg  4 mg IntraVENous Q6H PRN    polyethylene glycol (GLYCOLAX) packet 17 g  17 g Oral Daily PRN    acetaminophen (TYLENOL) tablet 650 mg  650 mg Oral Q6H PRN    Or    acetaminophen (TYLENOL) suppository 650 mg  650 mg Rectal Q6H PRN    magnesium sulfate 2000 mg in 50 mL IVPB premix  2,000 mg IntraVENous PRN    potassium chloride 10 mEq/100 mL IVPB (Peripheral Line)  10 mEq IntraVENous PRN    potassium chloride (KLOR-CON M) extended release tablet 40 mEq  40 mEq Oral PRN    Or    potassium bicarb-citric acid (EFFER-K) effervescent tablet 40 mEq  40 mEq Oral PRN    Or    potassium chloride 10 mEq/100 mL IVPB (Peripheral Line)  10 mEq IntraVENous PRN    cefepime (MAXIPIME) 2,000 mg in sodium chloride 0.9 % 50 mL IVPB mini-bag  2,000 mg IntraVENous Q8H    LORazepam (ATIVAN) tablet 2 mg  2 mg Oral Nightly PRN     Facility-Administered Medications Ordered in Other Encounters   Medication Dose Route Frequency    atropine injection 0.4 mg  0.4 mg IntraVENous Once       Social History:  Social History     Tobacco Use    Smoking status: Never Smokeless tobacco: Never   Substance Use Topics    Alcohol use: Yes     Comment: rarely       Pt denies any history of drug use, blood transfusions, or tattoos. Family History:  Family History   Problem Relation Age of Onset    Schizophrenia Mother     Diabetes Mother     Dementia Mother     Depression Mother        Review of Systems:  A detailed 10 system ROS is obtained, with pertinent positives as listed above. All others are negative. Diet:  Regular soft diet    Objective:     Physical Exam:  Vitals:  /64   Pulse (!) 105   Temp 99 °F (37.2 °C) (Oral)   Resp 16   Ht 5' 11\" (1.803 m)   Wt 167 lb 12.3 oz (76.1 kg)   SpO2 93%   BMI 23.40 kg/m²   Gen:  NAD  HEENT: Sclerae anicteric. MMM  Cardiovascular: Regular rate and rhythm, +2 bilateral LE edema. Respiratory:  CTA bilaterally  GI:  soft, NT, mild distention, +BS, +flank dullness  Rectal:  Deferred  Neurological:  AAOx3      Laboratory:    Recent Labs     08/22/22  2234 08/23/22  0238 08/23/22  1008 08/24/22  0518   WBC 14.0*  --   --  6.4   HGB 8.4*  --   --  5.9*   HCT 26.8*  --   --  19.0*     --   --  66*   .5*  --   --  102.2*   * 129*  --  133*   K 5.3* 5.0  --  4.1    105  --  105   CO2 17* 20*  --  22   BUN 16 18  --  16   CREATININE 1.20 1.30  --  0.90   CALCIUM 5.2* 8.1*  --  7.5*   MG  --  2.5*  --   --    GLUCOSE 160* 150*  --  85   ALKPHOS 261*  --   --  188*   *  --   --  92*   ALT 32  --   --  24   BILITOT 1.0  --   --  0.6   ALBUMIN 0.5*  --   --  0.8*   PROT 5.8*  --   --  5.1*   LIPASE 36*  --   --   --    INR  --   --  1.3*  --        Assessment:     Principal Problem:    Severe sepsis (HCC)  Resolved Problems:    * No resolved hospital problems. *  56 y.o. male with h/o gastric cancer, anemia, and esophageal stent here with anemia, leukocytosis, anemia, ascites/pleural effusion. Last EGD with friable mass in gastric cardia with active oozing of blood.    Hgb has trended down from 8.4 on

## 2022-08-24 NOTE — PROGRESS NOTES
Physician Progress Note      Lissett Vigil  [facility] #:                  [id number]  :                       1975  ADMIT DATE:       2022 1:10 AM  DISCH DATE:  RESPONDING  PROVIDER #:        Coco Wright MD        QUERY TEXT:    Type of Anemia: Please provide further specificity, if known. Clinical indicators include: cancer, hemoglobin, chemo, severe anemia, h&h,   transfusion, gastrointestinal bleeding, blood transfusion, bleeding, rbc,   hematocrit, platelets, transfuse 1 unit of prbc, chemotherapy, gi bleeding  Options provided:  -- Anemia due to acute blood loss  -- Anemia due to chronic blood loss  -- Anemia due to iron deficiency  -- Anemia due to postoperative blood loss  -- Anemia due to chronic disease  -- Other - I will add my own diagnosis  -- Disagree - Not applicable / Not valid  -- Disagree - Clinically Unable to determine / Unknown        PROVIDER RESPONSE TEXT:    The patient has anemia due to chronic disease.       Electronically signed by:  Coco Wright MD 2022 12:20 PM

## 2022-08-25 NOTE — PLAN OF CARE
Problem: Pain  Goal: Verbalizes/displays adequate comfort level or baseline comfort level  Outcome: Progressing     Problem: Discharge Planning  Goal: Discharge to home or other facility with appropriate resources  Outcome: Progressing     Problem: ABCDS Injury Assessment  Goal: Absence of physical injury  Outcome: Progressing

## 2022-08-25 NOTE — PROGRESS NOTES
Progress Note    Patient: Veronica Rivas MRN: [id number]  SSN: [id number]    YOB: 1975  Age: 56 y.o. Sex: male      Admit Date: 8/23/2022    LOS: 2 days     Assessment and Plan:   Severe sepsis [facility])  -Patient presented with WBC of 14, lactic acid of 3.2 - suspecting may have been cancer related, not infection but unsure etiology at this point  - Patient has history of gastric cancer with mets to liver and esophagus with chemo no longer working  - Patient status post albumin  - 8/25 Stop cefepime and vancomycin  - 8/25 Start Augmentin  - Repeat lactic acid normal  - Blood cultures NGTD  - CXR clear    Malignant ascites  -CT abdomen pelvis showed large volume ascites. Large bilateral pleural effusions  - IR consulted for paracentesis, thoracentesis  - Patient status post paracentesis with almost 7 L removed  - Thoracentesis with 850 mL removed  - Albumin 25% x 4    Gastric cancer with mets to liver and esophagus  - Patient status post 28 cycles of FOLFIRINOX  - Currently on Keytruda cycle 2 started on 8/15  - Oncology consulted, appreciate recommendations    Hyponatremia  -Stable at 133   - Follow-up daily BMP  -Avoid rapid correction     Severe anemia  - Hgb up to 8.6 post-PRBC   - Continue to monitor H&H  - Transfused 1 unit of PRBC  - Monitor H&H    Hypothyroidism  - Continue with home Synthroid       Dispo/Discharge Planning:   Dispo pending clinical course     VTE ppx: SCDs    Subjective:   56 y.o. male with medical history of hypothyroidism, GERD, stage IV gastric cancer who presented with lethargy, nausea, abdominal distention. Patient with history of stage IV gastric cancer status post 28 cycles of FOLFIRINOX with disease progression now on Keytruda cycle 2 on 8/15. Patient presented to the ED with lactic acid of 3.2, WBC of 14, hemoglobin 8.4, sodium of 129. CT abdomen pelvis showed large volume ascites. Large bilateral pleural effusions.   Increase in the size of known gastric lesion. Metastatic lesions also seen throughout the liver and within the left adrenal gland. Patient is now status post paracentesis with almost 7 L removed and thoracentesis with 850 milliliters removed. Patient seen and examined at bedside. This morning the patient feeling a little weak. Otherwise denies any chest pain, no shortness of breath, no nausea or vomiting. Today's Events (08/25/22): Feels weak and no appetite today. Legs are swollen today. Denies N/V/D. Denies CP/SOB. Denies F/C. Objective:     Vitals:    08/25/22 0129 08/25/22 0440 08/25/22 0605 08/25/22 0710   BP:  104/72  97/63   Pulse: (!) 105 (!) 104  (!) 105   Resp:  18  18   Temp:  99.1 °F (37.3 °C)  98.5 °F (36.9 °C)   TempSrc:  Oral  Oral   SpO2:  92%  92%   Weight:   179 lb 0.2 oz (81.2 kg)    Height:            Intake and Output:  Current Shift: No intake/output data recorded. Last three shifts: 08/23 1901 - 08/25 0700  In: 1555.8 [P.O.:150;  I.V.:956]  Out: 675 [Urine:675]    ROS  10 ROS negative except from stated on subjective    Physical Exam:   General: Alert, oriented, appears weal  HEENT: NC/AT, EOM are intact  Neck: supple, no JVD  Cardiovascular: RRR, S1, S2, no murmurs  Respiratory: Lungs are clear, no wheezes or rales  Abdomen: Soft, NT, ND  Back: No CVA tenderness, no paraspinal tenderness  Extremities: 2+ BLE pitting edema, no erythema  Neuro: A&O, CN are intact, no focal deficits  Skin: no rash or ulcers  Psych: good mood and affect    Lab/Data Review:  I have personally reviewed patients laboratory data showing  Recent Results (from the past 24 hour(s))   Hemoglobin and Hematocrit    Collection Time: 08/24/22 11:56 PM   Result Value Ref Range    Hemoglobin 8.9 (L) 13.6 - 17.2 g/dL    Hematocrit 26.7 (L) 41.1 - 50.3 %   Vancomycin Level, Random    Collection Time: 08/25/22  4:48 AM   Result Value Ref Range    Vancomycin Rm 19.9 UG/ML   Protime-INR    Collection Time: 08/25/22  4:48 AM   Result Value Ref Range Protime 16.1 (H) 12.6 - 14.5 sec    INR 1.2     APTT    Collection Time: 08/25/22  4:48 AM   Result Value Ref Range    PTT 35.3 (H) 24.1 - 35.1 SEC   Fibrinogen    Collection Time: 08/25/22  4:48 AM   Result Value Ref Range    Fibrinogen 209 190 - 501 mg/dL   D-Dimer, Quantitative    Collection Time: 08/25/22  4:48 AM   Result Value Ref Range    D-Dimer, Quant 15.77 (H) <0.56 ug/ml(FEU)   CBC with Auto Differential    Collection Time: 08/25/22  4:48 AM   Result Value Ref Range    WBC 9.2 4.3 - 11.1 K/uL    RBC 2.66 (L) 4.23 - 5.6 M/uL    Hemoglobin 8.6 (L) 13.6 - 17.2 g/dL    Hematocrit 26.5 (L) 41.1 - 50.3 %    MCV 99.6 (H) 79.6 - 97.8 FL    MCH 32.3 26.1 - 32.9 PG    MCHC 32.5 31.4 - 35.0 g/dL    RDW 20.7 (H) 11.9 - 14.6 %    Platelets 81 (L) 286 - 450 K/uL    MPV 10.3 9.4 - 12.3 FL    nRBC 0.00 0.0 - 0.2 K/uL    Differential Type AUTOMATED      Seg Neutrophils 84 (H) 43 - 78 %    Lymphocytes 5 (L) 13 - 44 %    Monocytes 9 4.0 - 12.0 %    Eosinophils % 2 0.5 - 7.8 %    Basophils 0 0.0 - 2.0 %    Immature Granulocytes 0 0.0 - 5.0 %    Segs Absolute 7.7 1.7 - 8.2 K/UL    Absolute Lymph # 0.4 (L) 0.5 - 4.6 K/UL    Absolute Mono # 0.8 0.1 - 1.3 K/UL    Absolute Eos # 0.2 0.0 - 0.8 K/UL    Basophils Absolute 0.0 0.0 - 0.2 K/UL    Absolute Immature Granulocyte 0.0 0.0 - 0.5 K/UL   Comprehensive Metabolic Panel    Collection Time: 08/25/22  4:48 AM   Result Value Ref Range    Sodium 133 (L) 138 - 145 mmol/L    Potassium 3.5 3.5 - 5.1 mmol/L    Chloride 104 98 - 107 mmol/L    CO2 21 21 - 32 mmol/L    Anion Gap 8 7 - 16 mmol/L    Glucose 91 65 - 100 mg/dL    BUN 15 6 - 23 MG/DL    Creatinine 1.00 0.8 - 1.5 MG/DL    GFR African American >60 >60 ml/min/1.73m2    GFR Non- >60 >60 ml/min/1.73m2    Calcium 7.5 (L) 8.3 - 10.4 MG/DL    Total Bilirubin 1.7 (H) 0.2 - 1.1 MG/DL    ALT 40 12 - 65 U/L     (H) 15 - 37 U/L    Alk Phosphatase 285 (H) 50 - 136 U/L    Total Protein 5.1 (L) 6.3 - 8.2 g/dL    Albumin 2.1 (L) 3.5 - 5.0 g/dL    Globulin 3.0 2.3 - 3.5 g/dL    Albumin/Globulin Ratio 0.7 (L) 1.2 - 3.5        [unfilled]     Image:  I have personally reviewed patients imaging showing  IR US GUIDED PARACENTESIS   Final Result   Uncomplicated ultrasound guided paracentesis. IR GUIDED THORACENTESIS PLEURAL   Final Result   Uncomplicated ultrasound guided left thoracentesis. CT ABDOMEN PELVIS W IV CONTRAST Additional Contrast? Oral   Final Result   The large volume of ascites. Large bilateral pleural effusions. Interval increase in the size of the known gastric lesion. Again it is either   displacing or has infiltrated the distal pancreas. Metastatic lesions are again seen throughout the liver and within the left   adrenal gland. Lymphadenopathy as above.            Current Facility-Administered Medications   Medication Dose Route Frequency Provider Last Rate Last Admin    0.9 % sodium chloride infusion   IntraVENous PRN David Finch MD        0.9 % sodium chloride infusion   IntraVENous PRN Roosevelt Ashley, APRN - NP        vancomycin (VANCOCIN) 1,000 mg in sodium chloride 0.9 % 250 mL IVPB ([id number])  1,000 mg IntraVENous Q12H Latonya Nguyen MD   Stopped at 08/25/22 1051    diatrizoate meglumine-sodium (GASTROGRAFIN) 66-10 % solution 15 mL  15 mL Oral ONCE PRN Nilson Leal MD   15 mL at 08/23/22 0517    gabapentin (NEURONTIN) capsule 300 mg  300 mg Oral BID Kristina Cobian MD   300 mg at 08/25/22 0945    levothyroxine (SYNTHROID) tablet 50 mcg  50 mcg Oral Daily Kristina Cobian MD   50 mcg at 08/25/22 0945    mirtazapine (REMERON) tablet 30 mg  30 mg Oral Nightly Kristina Cobian MD   30 mg at 08/24/22 2044    pantoprazole (PROTONIX) tablet 40 mg  40 mg Oral Daily Kristina Cobian MD   40 mg at 08/25/22 0945    sucralfate (CARAFATE) tablet 1 g  1 g Oral 3 times per day Kristina Cobian MD        sodium chloride flush 0.9 % injection 5-40 mL  5-40 mL IntraVENous 2 times per day Noe Clayton MD   10 mL at 08/25/22 0947    sodium chloride flush 0.9 % injection 5-40 mL  5-40 mL IntraVENous PRN Noe Clayton MD        0.9 % sodium chloride infusion   IntraVENous PRN Noe Clayton MD        ondansetron (ZOFRAN-ODT) disintegrating tablet 4 mg  4 mg Oral Q8H PRN Noe Clayton MD        Or    ondansetron [facility]) injection 4 mg  4 mg IntraVENous Q6H PRN Noe Clayton MD        polyethylene glycol (GLYCOLAX) packet 17 g  17 g Oral Daily PRN Noe Clayton MD        acetaminophen (TYLENOL) tablet 650 mg  650 mg Oral Q6H PRN Noe Clayton MD        Or    acetaminophen (TYLENOL) suppository 650 mg  650 mg Rectal Q6H PRN Noe Clayton MD        magnesium sulfate 2000 mg in 50 mL IVPB premix  2,000 mg IntraVENous PRN Noe Clayton MD        potassium chloride 10 mEq/100 mL IVPB (Peripheral Line)  10 mEq IntraVENous PRN Noe Clayton MD        potassium chloride (KLOR-CON M) extended release tablet 40 mEq  40 mEq Oral PRN Noe Clayton MD        Or    potassium bicarb-citric acid (EFFER-K) effervescent tablet 40 mEq  40 mEq Oral PRN Noe Clayton MD        Or    potassium chloride 10 mEq/100 mL IVPB (Peripheral Line)  10 mEq IntraVENous PRN Noe Clayton MD        cefepime (MAXIPIME) 2,000 mg in sodium chloride 0.9 % 50 mL IVPB mini-bag  2,000 mg IntraVENous Q8H Sarita Flores MD   Stopped at 08/25/22 0945    LORazepam (ATIVAN) tablet 2 mg  2 mg Oral Nightly PRN Tejinder Arce MD   2 mg at 08/24/22 2043     Facility-Administered Medications Ordered in Other Encounters   Medication Dose Route Frequency Provider Last Rate Last Admin    atropine injection 0.4 mg  0.4 mg IntraVENous Once Verona Comes, MD            Hospital problems     Patient Active Problem List   Diagnosis    Urgency of urination    Malignant neoplasm of overlapping sites of stomach ([address]    CINV (chemotherapy-induced nausea and vomiting)    Severe anemia    Thrombocytopenia ([address]    Personal history of COVID-19    Hypoproteinemia (HCC)    Hypokalemia    GIB (gastrointestinal bleeding)    Hypothyroidism    PNA (pneumonia)    Acute GI bleeding    Severe sepsis (HCC)    Gastric carcinoma (HCC)    Malignant ascites    Pleural effusion       Signed By: Jayashree Whitaker DO     August 25, 2022

## 2022-08-25 NOTE — PROGRESS NOTES
OCCUPATIONAL THERAPY Initial Assessment, Daily Note, and AM       OT Visit Days: 1  Acknowledge Orders  Time  OT Charge Capture  Rehab Caseload Tracker      Steven Tovar is a 56 y.o. male   PRIMARY DIAGNOSIS: Severe sepsis ([address]  Malignant ascites [R18.0]  Severe sepsis ([address] [A41.9, R65.20]       Reason for Referral: Generalized Muscle Weakness (M62.81)  Other lack of cordination (R27.8)  History of falling (Z91.81)  Inpatient: Payor: /     ASSESSMENT:     REHAB RECOMMENDATIONS:   Recommendation to date pending progress:  Setting:  Home Health Therapy    Equipment:    To Be Determined     ASSESSMENT:  Mr. Becki Mohan presents to the hospital with severe sepsis and malignant ascites. Pt's hx is significant for Stage IV gastric cancer with mets to the liver and esophagus. Pt is supine in the bed upon arrival with supportive family at bedside. Pt presents with a flat affect and most information was gathered from family at bedside. Pt is agreeable for participation with some encouragement. Pt's biggest complaint at the moment is the increased edema in B LE. Pt hasn't been moving much. Pt able to don socks edge of bed with CGA and additional time. Pt also able to stand and complete functional mobility in the room with min A x 1-2. Pt demonstrates decreased activity tolerance, weakness, and impaired balance. Pt will benefit from OT services to address stated goals and plan of care.       [address] [facility] [address] [address] AM-PAC 6 Clicks Daily Activity Inpatient Short Form:    AM-PAC Daily Activity Inpatient   How much help for putting on and taking off regular lower body clothing?: A Little  How much help for Bathing?: A Little  How much help for Toileting?: A Lot  How much help for putting on and taking off regular upper body clothing?: A Little  How much help for taking care of personal grooming?: A Little  How much help for eating meals?: A Little  AM-PAC Inpatient Daily Activity Raw Score: 17  AM-PAC Inpatient ADL T-Scale Score : 37.26  ADL Inpatient CMS 0-100% Score: 50.11  ADL Inpatient CMS G-Code Modifier : CK           SUBJECTIVE:     Mr. Paz Benavides states, \"I don't know how I got into the [employer] \"     Social/Functional Lives With: Spouse  Type of Home: House  Home Layout: Two level (bedroom on 2nd level)  Home Access: Level entry  Bathroom Shower/Tub: Walk-in shower  ADL Assistance: Independent  Ambulation Assistance: Independent    OBJECTIVE:     Tom Chinchilla / Susi Butler / Rolene Koyanagi: IV    RESTRICTIONS/PRECAUTIONS:       PAIN: [employer] / O2:   Pre Treatment:   Pain Assessment: None - Denies Pain      Post Treatment: same       Vitals          Oxygen            GROSS EVALUATION: INTACT IMPAIRED   (See Comments)   UE AROM [x] []    UE PROM [x] []   Strength []  Generalized weakness      Posture / Balance []  Fair+ to fair sitting; fair standing balance   Sensation []     Coordination []  Generally slowed     Tone [x]       Edema [] B LE   Activity Tolerance []  Limited by fatigue      Hand Dominance R [x] L []      COGNITION/  PERCEPTION: INTACT IMPAIRED   (See Comments)   Orientation []  Appears appropriate   Vision []     Hearing []     Cognition  []  Flat affect    Perception [x]       MOBILITY: I Mod I S SBA CGA Min Mod Max Total  NT x2 Comments:   Bed Mobility    Rolling [] [] [] [x] [] [] [] [] [] [] []    Supine to Sit [] [] [] [x] [] [] [] [] [] [] []    Scooting [] [] [] [x] [] [] [] [] [] [] []    Sit to Supine [] [] [] [x] [] [] [] [] [] [] []    Transfers    Sit to Stand [] [] [] [] [] [x] [] [] [] [] []  X 1-2   Bed to Chair [] [] [] [] [] [] [] [] [] [x] []    Stand to Sit [] [] [] [] [] [x] [] [] [] [] [] X 1-2   Tub/Shower [] [] [] [] [] [] [] [] [] [x] []     Toilet [] [] [] [] [] [] [] [] [] [x] []      [] [] [] [] [] [] [] [] [] [] []    I=Independent, Mod I=Modified Independent, S=Supervision/Setup, SBA=Standby Assistance, CGA=Contact Guard Assistance, Min=Minimal Assistance, Mod=Moderate Assistance, Max=Maximal Assistance, Total=Total Assistance, NT=Not Tested    ACTIVITIES OF DAILY LIVING: I Mod I S SBA CGA Min Mod Max Total NT Comments   BASIC ADLs:              Upper Body Bathing  [] [] [] [] [] [] [] [] [] [x]    Lower Body Bathing [] [] [] [] [] [] [] [] [] [x]    Toileting [] [] [] [] [] [] [] [] [] [x]    Upper Body Dressing [] [] [] [] [] [] [] [] [] [x]    Lower Body Dressing [] [] [] [] [x] [] [] [] [] [] For dynamic balance in sitting    Feeding [] [] [] [] [] [] [] [] [] [x]    Grooming [] [] [] [] [] [] [] [] [] [x]    Personal Device Care [] [] [] [] [] [] [] [] [] [x]    Functional Mobility [] [] [] [] [] [x] [] [] [] [] X 1-2   I=Independent, Mod I=Modified Independent, S=Supervision/Setup, SBA=Standby Assistance, CGA=Contact Guard Assistance, Min=Minimal Assistance, Mod=Moderate Assistance, Max=Maximal Assistance, Total=Total Assistance, NT=Not Tested    PLAN:     FREQUENCY/DURATION   OT Plan of Care: 3 times/week for duration of hospital stay or until stated goals are met, whichever comes first.    ACUTE OCCUPATIONAL THERAPY GOALS:   (Developed with and agreed upon by patient and/or caregiver.)  1. Patient will complete lower body bathing and dressing with supervision and adaptive equipment as needed. 2. Patient will complete toileting with supervision . 3. Patient will tolerate 23 minutes of OT treatment with 1-2 rest breaks to increase activity tolerance for ADLs. 4. Patient will complete functional transfers with supervision and adaptive equipment as needed. 5. Patient will complete functional mobility with supervision and appropriate safety awareness.      Timeframe: 7 visits         PROBLEM LIST:   (Skilled intervention is medically necessary to address:)  Decreased ADL/Functional Activities  Decreased Activity Tolerance  Decreased AROM/PROM  Decreased Balance  Decreased Cognition  Decreased Coordination  Decreased Gait Ability  Decreased Strength  Decreased Transfer Abilities  Increased Pain   INTERVENTIONS PLANNED:  (Benefits and precautions of occupational therapy have been discussed with the patient.)  Self Care Training  Therapeutic Activity  Therapeutic Exercise/HEP  Neuromuscular Re-education  Manual Therapy  Education         TREATMENT:     EVALUATION: MODERATE COMPLEXITY: (Untimed Charge)    TREATMENT:   Co-Treatment PT/OT necessary due to patient's decreased overall endurance/tolerance levels, as well as need for high level skilled assistance to complete functional transfers/mobility and functional tasks  Self Care (8 minutes): Patient participated in lower body dressing ADLs in unsupported sitting with minimal visual, verbal, and tactile cueing to increase independence, decrease assistance required, and increase activity tolerance. Patient also participated in functional mobility and functional transfer training to increase independence, decrease assistance required, increase activity tolerance, and increase safety awareness.      TREATMENT GRID:  N/A    AFTER TREATMENT PRECAUTIONS: Bed, Bed/Chair Locked, Call light within reach, Heels floated, Needs within reach, and RN notified    INTERDISCIPLINARY COLLABORATION:  RN/ PCT, PT/ PTA, and OT/ CAMPOS    EDUCATION:  Education Given To: Patient  Education Provided: Role of Therapy;Plan of Care  Education Method: Verbal  Education Outcome: Verbalized understanding;Continued education needed    TOTAL TREATMENT DURATION AND TIME:  Time In: 1052  Time Out: [place] 25  Minutes: Antoine Hill [address] [place] [address]

## 2022-08-25 NOTE — PROGRESS NOTES
PHYSICAL THERAPY Initial Assessment and Daily Note  (Link to Caseload Tracking: PT Visit Days : 1  Acknowledge Orders  Time In/Out  PT Charge Capture  Rehab Caseload Tracker    Steven Tovar is a 56 y.o. male   PRIMARY DIAGNOSIS: Severe sepsis ([facility] [address]  Malignant ascites [R18.0]  Severe sepsis ([facility] [address] [facility] [A41.9, R65.20]       Reason for Referral: Generalized Muscle Weakness (M62.81)  Other lack of cordination (R27.8)  Difficulty in walking, Not elsewhere classified (R26.2)  Other abnormalities of gait and mobility (R26.89)  Inpatient: Payor: /     ASSESSMENT:     REHAB RECOMMENDATIONS:   Recommendation to date pending progress:  Setting:  Home Health Therapy    Equipment:    To Be Determined     ASSESSMENT:  Mr. Becki Mohan presents with a diagnosis of severe sepsis and PMH significant for stage IV gastric cancer with mets, ascites and L pleural effusion. At baseline he lives in a 2 story home with 14 steps to his bedroom and a hand rail, no reported falls or use of AD. Pt is independent with all bathing and dressing. His HgB was low on admission and therapy was held until after a transfusion. Today he states that he is feeling better but he is still fatigued. He is able to perform bed mobility with SBA, HOB raised and use of bed rails. Pt stood at EOB with CGA and ambulated 30' in the room with min A x2 for increased balance and stability. He is demonstrating decreased strength, ROM, activity tolerance and overall functional mobility and would continue to benefit from skilled acute care therapy at this time.  HHPT recommended upon DC to maximize rehab potential.      [address] [facility] [address] [address] AMPAC 6 Clicks Basic Mobility Inpatient Short Form  AM-PAC Mobility Inpatient   How much difficulty turning over in bed?: None  How much difficulty sitting down on / standing up from a chair with arms?: A Little  How much difficulty moving from lying on back to sitting on side of bed?: None  How much help from another person moving to and from a bed to a chair?: A Little  How much help from another person needed to walk in hospital room?: A Little  How much help from another person for climbing 3-5 steps with a railing?: A Lot  AM-PAC Inpatient Mobility Raw Score : 19  AM-PAC Inpatient T-Scale Score : 45.44  Mobility Inpatient CMS 0-100% Score: 41.77  Mobility Inpatient CMS G-Code Modifier : CK    SUBJECTIVE:   Mr. Nohemy Montero states, \"I don't really want to\"     Social/Functional Lives With: Spouse  Type of Home: House  Home Layout: Two level (bedroom on 2nd level)  Home Access: Level entry  Bathroom Shower/Tub: Walk-in shower  ADL Assistance: Independent  Ambulation Assistance: Independent    OBJECTIVE:     PAIN: VITALS / O2: Salvador Shoemaker / Herman Gonzalez / [place] [address]:   Pre Treatment:   Pain Assessment: None - Denies Pain      Post Treatment: 0/10 Vitals        Oxygen      IV    RESTRICTIONS/PRECAUTIONS:                    GROSS EVALUATION: Intact Impaired (Comments):   AROM []  Limited by edema and weakness   PROM []    Strength []  Generalized weakness in BLE   Balance [] Posture: Fair  Sitting - Static: Good  Sitting - Dynamic: Good  Standing - Static: Fair, +  Standing - Dynamic: Fair   Posture [] Forward Head  Rounded Shoulders   Sensation [x]     Coordination []      Tone []     Edema [] Significant edema in B feet   Activity Tolerance [] Patient limited by fatigue    []      COGNITION/  PERCEPTION: Intact Impaired (Comments):   Orientation [x]     Vision []  glasses   Hearing [x]     Cognition  [x]       MOBILITY: I Mod I S SBA CGA Min Mod Max Total  NT x2 Comments:   Bed Mobility    Rolling [] [] [] [x] [] [] [] [] [] [] [] Use of bed rail   Supine to Sit [] [] [] [x] [] [] [] [] [] [] [] HOB raised and use of bed rail   Scooting [] [] [] [x] [] [] [] [] [] [] []    Sit to Supine [] [] [] [x] [] [] [] [] [] [] []    Transfers    Sit to Stand [] [] [] [] [x] [] [] [] [] [] []    Bed to Chair [] [] [] [] [] [] [] [] [] [x] []    Stand to Sit [] [] [] [] [x] [] [] [] [] [] []     [] [] [] [] [] [] [] [] [] [] []    I=Independent, Mod I=Modified Independent, S=Supervision, SBA=Standby Assistance, CGA=Contact Guard Assistance,   Min=Minimal Assistance, Mod=Moderate Assistance, Max=Maximal Assistance, Total=Total Assistance, NT=Not Tested    GAIT: I Mod I S SBA CGA Min Mod Max Total  NT x2 Comments:   Level of Assistance [] [] [] [] [] [x] [] [] [] [] [x] Due to decreased stability   Distance 30 feet    DME None    Gait Quality Path deviations  and Trunk sway increased    Weightbearing Status      Stairs      I=Independent, Mod I=Modified Independent, S=Supervision, SBA=Standby Assistance, CGA=Contact Guard Assistance,   Min=Minimal Assistance, Mod=Moderate Assistance, Max=Maximal Assistance, Total=Total Assistance, NT=Not Tested    PLAN:   ACUTE PHYSICAL THERAPY GOALS:   (Developed with and agreed upon by patient and/or caregiver.)    (1.)Mr. Nohemy Montero will move from supine to sit and sit to supine , scoot up and down, and roll side to side in bed with it flat and with INDEPENDENCE within 7 treatment day(s). (2.)Mr. Nohemy Montero will transfer from bed to chair and chair to bed with INDEPENDENCE using the least restrictive device within 7 treatment day(s). (3.)Mr. Nohemy Montero will ambulate with INDEPENDENCE for a minimum of 250 feet with the least restrictive device within 7 treatment day(s).   ________________________________________________________________________________________________     FREQUENCY AND DURATION: 3 times/week for duration of hospital stay or until stated goals are met, whichever comes first.    THERAPY PROGNOSIS: Fair    PROBLEM LIST:   (Skilled intervention is medically necessary to address:)  Decreased ADL/Functional Activities  Decreased Activity Tolerance  Decreased AROM/PROM  Decreased Balance  Decreased Coordination  Decreased Gait Ability  Decreased Strength  Decreased Transfer Abilities INTERVENTIONS PLANNED:   (Benefits and precautions of physical therapy have been discussed with the patient.)  Self Care Training  Therapeutic Activity  Therapeutic Exercise/HEP  Neuromuscular Re-education  Gait Training  Education       TREATMENT:   EVALUATION: MODERATE COMPLEXITY: (Untimed Charge)    TREATMENT:   Co-Treatment PT/OT necessary due to patient's decreased overall endurance/tolerance levels, as well as need for high level skilled assistance to complete functional transfers/mobility and functional tasks  Therapeutic Activity (12 Minutes): Therapeutic activity included Rolling, Supine to Sit, Sit to Supine, Scooting, Transfer Training, Ambulation on level ground, Sitting balance , and Standing balance to improve functional Activity tolerance, Balance, Coordination, Mobility, and Strength. TREATMENT GRID:  N/A    AFTER TREATMENT PRECAUTIONS: Bed, Bed/Chair Locked, Call light within reach, Needs within reach, and Visitors at bedside    INTERDISCIPLINARY COLLABORATION:  RN/ PCT, PT/ PTA, and OT/ CAMPOS    EDUCATION: Education Given To: Patient; Family  Education Provided: Role of Therapy;Plan of Care;Home Exercise Program  Education Method: Verbal  Barriers to Learning: None  Education Outcome: Verbalized understanding    TIME IN/OUT:  Time In: 1052  Time Out: [place] 25  Minutes: [address], [place]

## 2022-08-25 NOTE — PROGRESS NOTES
Patient voided 125 mL. Bladder scanned patient and 400 mL residual. Patient states he is not having trouble, he just feels like he does not need to. Lauro Arita, DO notified. No new orders placed at this time. I encouraged patient to attempt to void in urinal again.

## 2022-08-25 NOTE — CONSULTS
on [facility]: Inpatient Hematology / Oncology Consult Note    Reason for Consult:    Referring Physician:  Oneida Dias MD    History of Present Illness:  Mr. Eliseo Caba is a 56 y.o. male admitted on 8/23/2022 with a primary diagnosis of lethargy, abd distention, pain and nausea. He is a patient of Dr. Yohan Jacobsen with known metastatic gastric cancer. He completed 28 cycles of FOLFIRINOX with disease progression and most recently started on Keytruda. Cycle 2 completed on 8/15/2022. He presented the emergency room with lethargy, abdominal distention and pain. He denied vomiting of coffee emesis or blood. He stated he has relatively good appetite. Labs on admission with hyponatremia, hyperkalemia, Pro-Biju 1.3, WBC 14, anemic with hemoglobin at 8 and lactic acid of 3.2. CT showed ascites, large volume and bilateral pleural effusions. It also demonstrated increased interval of known gastric lesion and metastatic disease in the liver and adrenal gland. He feels better after paracentesis which removed 7 L of bloody fluid and Thora on the left side with 850 mL removed. He was treated with albumin to prevent volume shifts. Due to volume shifts, he also received 1 L of IV fluids. He was started on broad-spectrum antibiotics and today he is on day 2 of cefepime and vancomycin. Hemoglobin at 5.9 from 8.4 today and the drop in platelets noted. Lactic acid improving. Vitals also improved. We are asked for recommendations. Review of Systems:  Constitutional Denies fever or chills. Denied appetite changes. + fatigue. Denies night sweats. HEENT Denies trauma, blurry vision, hearing loss, ear pain, nosebleeds, sore throat, neck pain and ear discharge. Skin Denies lesions or rashes. Lungs + dyspnea, no sputum production or hemoptysis. Cardiovascular Denies chest pain, palpitations, +  lower extremity edema. Gastrointestinal + nausea or vomiting. Denies changes in bowel habits. Denies bloody or black stools. + abdominal pain.  Denies dysuria, frequency or hesitancy of urination. Neuro Denies headaches, visual changes or ataxia. Denies dizziness, tingling, tremors, sensory change, speech change, focal weakness. Hematology Denies easy bruising or bleeding, denies gingival bleeding or epistaxis. Endo Denies heat/cold intolerance   MSK Denies back pain, arthralgias, myalgias or frequent falls. Psychiatric/Behavioral Denies depression and substance abuse. The patient is not nervous/anxious.          No Known Allergies  Past Medical History:   Diagnosis Date    Acute gastrointestinal bleeding 7/17/2022    COVID 1/30/2022    Gastric cancer ([facility] [address]     being treated currently - chemo and radiation    History of blood transfusion     6/30/2022 no rxn to this transfusion    Lazy eye, left     Severe anemia 7/12/2022     Past Surgical History:   Procedure Laterality Date    IR PORT PLACEMENT EQUAL OR GREATER THAN 5 YEARS  11/3/2020    IR PORT PLACEMENT EQUAL OR GREATER THAN 5 YEARS  11/3/2020    IR PORT PLACEMENT EQUAL OR GREATER THAN 5 YEARS 11/3/2020 D RADIOLOGY SPECIALS    ORTHOPEDIC SURGERY      ankle    TONSILLECTOMY      UPPER GASTROINTESTINAL ENDOSCOPY N/A 7/5/2022    EGD DILATION BALLOON performed by Zeeshan Blackmon MD at [address] [place] [address],[facility] N/A 7/14/2022    EGD STENT PLACEMENT performed by Pati Schmidt MD at [address] [place] [address],[facility] N/A 8/3/2022    EGD ESOPHAGOGASTRODUODENOSCOPY performed by Latina Schirmer, MD at [address] [place] [address]  10/22/2020    US GUIDED LIVER BIOPSY PERCUTANEOUS 10/22/2020 D [address]     Family History   Problem Relation Age of Onset    Schizophrenia Mother     Diabetes Mother     Dementia Mother     Depression Mother      Social History     Socioeconomic History    Marital status:      Spouse name: Not on file    Number of children: Not on file    Years of education: Not on file    Highest education level: Not on file   Occupational History    Not on file   Tobacco Use    Smoking status: Never    Smokeless tobacco: Never   Vaping Use    Vaping Use: Never used   Substance and Sexual Activity    Alcohol use: Yes     Comment: rarely    Drug use: Never    Sexual activity: Not on file   Other Topics Concern    Not on file   Social History Narrative    Not on file     Social Determinants of Health     Financial Resource Strain: Not on file   Food Insecurity: Not on file   Transportation Needs: Not on file   Physical Activity: Not on file   Stress: Not on file   Social Connections: Not on file   Intimate Partner Violence: Not on file   Housing Stability: Not on file     Current Facility-Administered Medications   Medication Dose Route Frequency Provider Last Rate Last Admin    0.9 % sodium chloride infusion   IntraVENous PRN Oneida Dias MD        0.9 % sodium chloride infusion   IntraVENous PRN ARY Michele NP        vancomycin (VANCOCIN) 1,000 mg in sodium chloride 0.9 % 250 mL IVPB ([id number])  1,000 mg IntraVENous Q12H Julio Barry  mL/hr at 08/24/22 2135 1,000 mg at 08/24/22 2135    diatrizoate meglumine-sodium (GASTROGRAFIN) 66-10 % solution 15 mL  15 mL Oral ONCE PRN Po Lockett MD   15 mL at 08/23/22 0517    gabapentin (NEURONTIN) capsule 300 mg  300 mg Oral BID Diego Rodriguez MD   300 mg at 08/24/22 2043    levothyroxine (SYNTHROID) tablet 50 mcg  50 mcg Oral Daily Diego Rodriguez MD   50 mcg at 08/24/22 0947    mirtazapine (REMERON) tablet 30 mg  30 mg Oral Nightly Diego Rodriguez MD   30 mg at 08/24/22 2044    pantoprazole (PROTONIX) tablet 40 mg  40 mg Oral Daily Diego Rodriguez MD   40 mg at 08/24/22 0948    sucralfate (CARAFATE) tablet 1 g  1 g Oral 3 times per day Diego Rodriguez MD        sodium chloride flush 0.9 % injection 5-40 mL  5-40 mL IntraVENous 2 times per day Diego Rodriguez MD   10 mL at 08/24/22 2045    sodium 99/69 98.6 °F (37 °C) Oral (!) 106 16 --   22 1556 99/69 98.6 °F (37 °C) Oral (!) 106 16 --   22 1541 99/69 98.1 °F (36.7 °C) Oral (!) 105 16 --   22 1521 99/69 98.1 °F (36.7 °C) -- (!) 105 12 96 %     Temp (24hrs), Av.5 °F (36.9 °C), Min:98.1 °F (36.7 °C), Max:99 °F (37.2 °C)     1901 -  0700  In: -   Out: 225 [Urine:225]    Physical Exam:  Constitutional: ECOG - 2-3  Well developed, thin male in no acute distress, sitting comfortably in the hospital bed. HEENT: Normocephalic and atraumatic. Oropharynx is clear, mucous membranes are moist.  Pupils are equal, round, and reactive to light. Extraocular muscles are intact. Sclerae anicteric. + bitemporal wasting    Lymph node   Deferred    Skin Warm and dry. No bruising and no rash noted. + pallor    Respiratory Lungs are clear to auscultation bilaterally without wheezes, rales or rhonchi, normal air exchange without accessory muscle use. CVS Normal rate, regular rhythm and normal S1 and S2. Abdomen Soft, nontender and nondistended, normoactive bowel sounds. Neuro Grossly nonfocal with no obvious sensory or motor deficits. MSK Normal range of motion in general.  No edema and no tenderness. Psych Appropriate mood, flat affect      Labs:    Recent Results (from the past 24 hour(s))   Culture, Urine    Collection Time: 22 11:26 PM    Specimen: URINE-CLEAN CATCH   Result Value Ref Range    Special Requests NO SPECIAL REQUESTS      Culture        No growth after short period of incubation. Further results to follow after overnight incubation.    Comprehensive Metabolic Panel w/ Reflex to MG    Collection Time: 22  5:18 AM   Result Value Ref Range    Sodium 133 (L) 138 - 145 mmol/L    Potassium 4.1 3.5 - 5.1 mmol/L    Chloride 105 98 - 107 mmol/L    CO2 22 21 - 32 mmol/L    Anion Gap 6 (L) 7 - 16 mmol/L    Glucose 85 65 - 100 mg/dL    BUN 16 6 - 23 MG/DL    Creatinine 0.90 0.8 - 1.5 MG/DL    GFR African American >60 >60 ml/min/1.73m2    GFR Non- >60 >60 ml/min/1.73m2    Calcium 7.5 (L) 8.3 - 10.4 MG/DL    Total Bilirubin 0.6 0.2 - 1.1 MG/DL    ALT 24 12 - 65 U/L    AST 92 (H) 15 - 37 U/L    Alk Phosphatase 188 (H) 50 - 136 U/L    Total Protein 5.1 (L) 6.3 - 8.2 g/dL    Albumin 2.2 (L) 3.5 - 5.0 g/dL    Globulin 2.9 2.3 - 3.5 g/dL    Albumin/Globulin Ratio 0.8 (L) 1.2 - 3.5     CBC with Auto Differential    Collection Time: 08/24/22  5:18 AM   Result Value Ref Range    WBC 6.4 4.3 - 11.1 K/uL    RBC 1.86 (L) 4.23 - 5.6 M/uL    Hemoglobin 5.9 (LL) 13.6 - 17.2 g/dL    Hematocrit 19.0 (L) 41.1 - 50.3 %    .2 (H) 79.6 - 97.8 FL    MCH 31.7 26.1 - 32.9 PG    MCHC 31.1 (L) 31.4 - 35.0 g/dL    RDW 21.2 (H) 11.9 - 14.6 %    Platelets 66 (L) 201 - 450 K/uL    MPV 9.9 9.4 - 12.3 FL    nRBC 0.00 0.0 - 0.2 K/uL    Differential Type AUTOMATED      Seg Neutrophils 80 (H) 43 - 78 %    Lymphocytes 7 (L) 13 - 44 %    Monocytes 11 4.0 - 12.0 %    Eosinophils % 2 0.5 - 7.8 %    Basophils 1 0.0 - 2.0 %    Immature Granulocytes 1 0.0 - 5.0 %    Segs Absolute 5.1 1.7 - 8.2 K/UL    Absolute Lymph # 0.4 (L) 0.5 - 4.6 K/UL    Absolute Mono # 0.7 0.1 - 1.3 K/UL    Absolute Eos # 0.1 0.0 - 0.8 K/UL    Basophils Absolute 0.0 0.0 - 0.2 K/UL    Absolute Immature Granulocyte 0.0 0.0 - 0.5 K/UL   Reticulocytes    Collection Time: 08/24/22  5:18 AM   Result Value Ref Range    Reticulocyte Count,Automated 8.1 (H) 0.3 - 2.0 %    Absolute Retic # 0.1499 (H) 0.026 - 0.095 M/ul    Immature Retic Fraction 25.1 (H) 2.3 - 13.4 %   PREPARE RBC (CROSSMATCH), 2 Units    Collection Time: 08/24/22  8:00 AM   Result Value Ref Range    History Check Historical check performed    TYPE AND SCREEN    Collection Time: 08/24/22  9:41 AM   Result Value Ref Range    Crossmatch expiration date 08/27/2022,3520     ABO/Rh A POSITIVE     Antibody Screen NEG     Unit Number [id number]     Product Code Blood Bank RC LR     Unit Divison 00     Dispense Status Blood Bank REL FROM [facility]     Crossmatch Result Compatible     Unit Number [id number]     Product Code Blood Bank RC LR,2     Unit Divison 00     Dispense Status Blood Bank REL FROM [facility]     Crossmatch Result Compatible     Unit Number [id number]     Product Code Blood Bank [facility] LRIR     Unit Divison 00     Dispense Status Blood Bank ISSUED     Crossmatch Result Compatible     Unit Number [id number]     Product Code Blood Bank [facility] LRIR     Unit Divison 00     Dispense Status Blood Bank ISSUED     Crossmatch Result Compatible    D-Dimer, Quantitative    Collection Time: 08/24/22  9:41 AM   Result Value Ref Range    D-Dimer, Quant 14.74 (H) <0.56 ug/ml(FEU)   DIRECT ANTIGLOBULIN TEST    Collection Time: 08/24/22  9:41 AM   Result Value Ref Range    Polyspecific Zara NEG    Lactate Dehydrogenase    Collection Time: 08/24/22  9:41 AM   Result Value Ref Range    LD 3,071 (H) 100 - 190 U/L   APTT    Collection Time: 08/24/22  9:41 AM   Result Value Ref Range    PTT 35.3 (H) 24.1 - 35.1 SEC   Protime-INR    Collection Time: 08/24/22  9:41 AM   Result Value Ref Range    Protime 16.0 (H) 12.6 - 14.5 sec    INR 1.2     Vitamin B12    Collection Time: 08/24/22  9:41 AM   Result Value Ref Range    Vitamin B-12 1570 (H) 193 - 986 pg/mL   Ferritin    Collection Time: 08/24/22  9:41 AM   Result Value Ref Range    Ferritin 289 8 - 388 NG/ML   Transferrin Saturation    Collection Time: 08/24/22  9:41 AM   Result Value Ref Range    Iron 29 (L) 35 - 150 ug/dL    TIBC 151 (L) 250 - 450 ug/dL    TRANSFERRIN SATURATION 19 (L) >20 %   Fibrinogen    Collection Time: 08/24/22  9:41 AM   Result Value Ref Range    Fibrinogen 206 190 - 501 mg/dL   Folate    Collection Time: 08/24/22  9:41 AM   Result Value Ref Range    Folate 9.9 3.1 - 17.5 ng/mL   Bilirubin, Total    Collection Time: 08/24/22  9:41 AM   Result Value Ref Range    Total Bilirubin 0.8 0.2 - 1.1 MG/DL   Vitamin D 25 Hydroxy    Collection Time: 08/24/22  9:41 AM   Result Value Ref Range    Vit D, 25-Hydroxy 8.8 (L) 30.0 - 100.0 ng/mL   Transferrin    Collection Time: 08/24/22  9:41 AM   Result Value Ref Range    Transferrin 131 (L) 202 - 364 mg/dL       Imaging:  Reviewed     ASSESSMENT:    Principal Problem:    Severe sepsis ([address]  Resolved Problems:    * No resolved hospital problems. *      PLAN / RECOMMENDATIONS:  Lab studies and imaging studies were personally reviewed. Pertinent old records were reviewed     Stage IV gastric cancer   -Most recently treated with C2 pembrolizumab on 8/15, no plan to resume treatment during this admission  - CT suggestive of progressive disease - s/p para and thora - large volume ascites (bloody fluid drained)    2. Hx of GI bleed   - recent EGD in July w large friable malignant stricture s/p stenting - at high risk for bleeding; GI has been consulted      3. Anemia and acute thrombocytopenia   - sig drops in Hb and plts - checking DIC/hemolysis labs   - in the meantime, transfuse 2 unit RBC and serial cbc; hold AC   - nutritional studies pending      4. Acute illness - being treated for sepsis. D2 cef/vanc; [place] improving        Thank you for allowing me to participate in the care of Mr. Vinod Kohler.         Federico Manning MD, MD [facility]  [address] [place] [address]  Office : [phone]  Fax : [phone]

## 2022-08-26 PROBLEM — E83.39 HYPOPHOSPHATEMIA: Status: ACTIVE | Noted: 2022-01-01

## 2022-08-26 PROBLEM — R65.20 SEVERE SEPSIS (HCC): Status: RESOLVED | Noted: 2022-01-01 | Resolved: 2022-01-01

## 2022-08-26 PROBLEM — A41.9 SEVERE SEPSIS (HCC): Status: RESOLVED | Noted: 2022-01-01 | Resolved: 2022-01-01

## 2022-08-26 NOTE — DISCHARGE SUMMARY
Hospitalist Discharge Summary   Admit Date:  2022  1:10 AM   DC Note date: 2022  Name:  Declan Rivera   Age:  56 y.o. Sex:  male  :  1975   MRN:  [id number]   Room:  [facility]  PCP:  None Provider    Presenting Complaint: Altered Mental Status     Initial Admission Diagnosis: Malignant ascites [R18.0]  Severe sepsis ([address] [A41.9, R65.20]     Problem List for this Hospitalization (present on admission):    Principal Problem (Resolved):    Severe sepsis ([address]  Active Problems:    Severe anemia    Thrombocytopenia (HCC)    Hypoproteinemia (HCC)    GIB (gastrointestinal bleeding)    Gastric carcinoma (HCC)    Malignant ascites    Pleural effusion    Hypophosphatemia      Hospital Course:  56 y.o. male with medical history of hypothyroidism, GERD, stage IV gastric cancer who presented with lethargy, nausea, abdominal distention. Patient with history of stage IV gastric cancer status post 28 cycles of FOLFIRINOX with disease progression now on Keytruda cycle 2 on 8/15. Patient presented to the ED with lactic acid of 3.2, WBC of 14, hemoglobin 8.4, sodium of 129. CT abdomen pelvis showed large volume ascites. Large bilateral pleural effusions. Increase in the size of known gastric lesion. Metastatic lesions also seen throughout the liver and within the left adrenal gland. Patient had a paracentesis with almost 7 L removed and thoracentesis with 850 milliliters removed. He felt much improved. All cultures remained negative. He was placed on Augmentin and continued to improved. Heme/Onc saw him and gave IV iron. His Hgb stabilized. He remained stable and was discharged home. Disposition: Home  Diet: ADULT DIET; Easy to Chew  Code Status: Full Code    Follow Ups:   Follow-up Information     None Provider Follow up in 1 week(s). Alise Nguyen MD, MD Follow up in 1 week(s).     Specialties: Internal Medicine, Oncology  Why: Follow up with Dr. Mary Castillo in 5-7 days  Contact information:  [address]  [phone]                       Time spent in patient discharge and coordination 34 minutes. Follow up labs/diagnostics (ultimately defer to outpatient provider):  CBC + BMP in one week    Plan was discussed with patient, nursing, and case management. All questions answered. Patient was stable at time of discharge. Instructions given to call a physician or return if any concerns. Current Discharge Medication List        START taking these medications    Details   amoxicillin-clavulanate (AUGMENTIN) 875-125 MG per tablet Take 1 tablet by mouth every 12 hours for 8 doses  Qty: 8 tablet, Refills: 0      Ergocalciferol (VITAMIN D) 43688 units CAPS Take 50,000 Units by mouth daily (with breakfast) for 5 doses  Qty: 5 capsule, Refills: 0           CONTINUE these medications which have NOT CHANGED    Details   levothyroxine (SYNTHROID) 50 MCG tablet Take 1 tablet by mouth in the morning. Qty: 90 tablet, Refills: 1      gabapentin (NEURONTIN) 300 MG capsule Take 1 capsule by mouth in the morning and 1 capsule before bedtime. Do all this for 90 days. Qty: 180 capsule, Refills: 2      pantoprazole (PROTONIX) 40 MG tablet Take 1 tablet by mouth in the morning. Qty: 30 tablet, Refills: 0      mirtazapine (REMERON) 30 MG tablet Take 30 mg by mouth nightly            STOP taking these medications       sucralfate (CARAFATE) 1 GM tablet Comments:   Reason for Stopping:         potassium chloride (KLOR-CON M) 20 MEQ extended release tablet Comments:   Reason for Stopping:         prochlorperazine (COMPAZINE) 10 MG tablet Comments:   Reason for Stopping:               Procedures done this admission:  * No surgery found *    Consults this admission:  IP CONSULT TO INTERVENTIONAL RADIOLOGY  IP CONSULT TO ONCOLOGY  IP CONSULT TO GI  IP CONSULT TO DIETITIAN    Echocardiogram results:  No results found for this or any previous visit.       Diagnostic Imaging/Tests:   CT ABDOMEN PELVIS W IV CONTRAST Additional Contrast? Oral    Result Date: 8/23/2022  The large volume of ascites. Large bilateral pleural effusions. Interval increase in the size of the known gastric lesion. Again it is either displacing or has infiltrated the distal pancreas. Metastatic lesions are again seen throughout the liver and within the left adrenal gland. Lymphadenopathy as above. XR CHEST PORTABLE    Result Date: 8/3/2022  1. Findings as described above. XR CHEST PORTABLE    Result Date: 8/1/2022  Intrerval advancement of the esophagogastric stent since prior intraoperative fluoroscopic images dated 7/14/2022, with the proximal aspect of the stent overlying the region of the GE junction. Retrocardiac opacity, which may reflect atelectasis versus infection/aspiration. The findings of this exam were discussed with  Dr. Cruzito Goodman by Dr. Santos Garcia at 1:39 PM on 8/1/2022. XR CHEST 1 VIEW    Result Date: 8/3/2022  1. Esophageal stent with distal portion along the greater curvature of the stomach and proximal portion in the gastroesophageal junction/distal esophagus. Dictated. 2. Prominent skin fold along the left chest wall with skin markings extending beyond this skin fold. IR US GUIDED PARACENTESIS    Result Date: 0/95/2951  Uncomplicated ultrasound guided paracentesis. IR GUIDED THORACENTESIS PLEURAL    Result Date: 2/41/2792  Uncomplicated ultrasound guided left thoracentesis.         Labs: Results:       BMP, Mg, Phos Recent Labs     08/24/22 0518 08/25/22 0448 08/26/22  0852   * 133* 134*   K 4.1 3.5 3.6    104 105   CO2 22 21 20*   ANIONGAP 6* 8 9   BUN 16 15 13   CREATININE 0.90 1.00 0.80   LABGLOM >60 >60 >60   GFRAA >60 >60 >60   CALCIUM 7.5* 7.5* 7.5*   GLUCOSE 85 91 81   PHOS  --   --  1.9*      CBC Recent Labs     08/24/22 0518 08/24/22  2356 08/25/22  0448 08/26/22  0852   WBC 6.4  --  9.2 10.2   RBC 1.86*  --  2.66* 2.76*   HGB 5.9* 8.9* 8.6* 9.1*   HCT 19.0* 26.7* 26.5* 26.9*   .2*  --  99.6* 97.5   MCH 31.7  --  32.3 33.0*   MCHC 31.1*  --  32.5 33.8   RDW 21.2*  --  20.7* 20.1*   PLT 66*  --  81* 83*   MPV 9.9  --  10.3 10.0   NRBC 0.00  --  0.00 0.00   SEGS 80*  --  84* 81*   LYMPHOPCT 7*  --  5* 6*   EOSRELPCT 2  --  2 2   MONOPCT 11  --  9 10   BASOPCT 1  --  0 0   IMMGRAN 1  --  0 0   SEGSABS 5.1  --  7.7 8.3*   LYMPHSABS 0.4*  --  0.4* 0.6   EOSABS 0.1  --  0.2 0.2   MONOSABS 0.7  --  0.8 1.0   BASOSABS 0.0  --  0.0 0.0   ABSIMMGRAN 0.0  --  0.0 0.0      LFT Recent Labs     08/24/22  0518 08/24/22  0941 08/25/22  0448 08/26/22  0852   BILITOT 0.6 0.8 1.7*  --    ALKPHOS 188*  --  285*  --    AST 92*  --  149*  --    ALT 24  --  40  --    PROT 5.1*  --  5.1*  --    LABALBU 2.2*  --  2.1* 2.0*   GLOB 2.9  --  3.0  --       Cardiac  Lab Results   Component Value Date/Time    TROPHS <3.0 08/01/2022 12:11 PM      Coags Lab Results   Component Value Date/Time    PROTIME 16.4 08/26/2022 10:16 AM    PROTIME 16.1 08/25/2022 04:48 AM    PROTIME 16.0 08/24/2022 09:41 AM    INR 1.3 08/26/2022 10:16 AM    INR 1.2 08/25/2022 04:48 AM    INR 1.2 08/24/2022 09:41 AM    APTT 36.2 08/26/2022 10:16 AM    APTT 35.3 08/25/2022 04:48 AM    APTT 35.3 08/24/2022 09:41 AM      A1c No results found for: LABA1C, EAG   Lipids No results found for: CHOL, LDLCALC, LABVLDL, HDL, CHOLHDLRATIO, TRIG   Thyroid  Lab Results   Component Value Date/Time    LAX3TXS 110.000 08/15/2022 01:25 PM        Most Recent UA Lab Results   Component Value Date/Time    COLORU YELLOW/STRAW 08/01/2022 02:59 PM    APPEARANCE CLEAR 08/01/2022 02:59 PM    SPECGRAV 1.017 08/01/2022 02:59 PM    LABPH 6.0 08/01/2022 02:59 PM    PROTEINU Negative 08/01/2022 02:59 PM    GLUCOSEU Negative 08/01/2022 02:59 PM    KETUA 15 08/01/2022 02:59 PM    BILIRUBINUR Negative 08/01/2022 02:59 PM    BILIRUBINUR SMALL 04/08/2021 10:30 AM    BLOODU Negative 08/01/2022 02:59 PM    UROBILINOGEN 1.0 08/01/2022 02:59 PM    NITRU Negative 08/01/2022 02:59 PM    LEUKOCYTESUR TRACE 08/01/2022 02:59 PM    WBCUA 0-4 08/01/2022 02:59 PM    RBCUA 0-5 08/01/2022 02:59 PM    EPITHUA 0-5 08/01/2022 02:59 PM    BACTERIA Negative 08/01/2022 02:59 PM    LABCAST 0-2 08/01/2022 02:59 PM    MUCUS 1+ 04/08/2021 10:30 AM        Recent Labs     08/23/22  2326 08/23/22 2021 08/23/22  1158 08/23/22  1040 08/04/22  2256   CULTURE NO GROWTH 2 DAYS NO GROWTH 3 DAYS NO GROWTH 2 DAYS NO GROWTH 2 DAYS NO GROWTH 2 DAYS       All Labs from Last 24 Hrs:  Recent Results (from the past 24 hour(s))   CBC with Auto Differential    Collection Time: 08/26/22  8:52 AM   Result Value Ref Range    WBC 10.2 4.3 - 11.1 K/uL    RBC 2.76 (L) 4.23 - 5.6 M/uL    Hemoglobin 9.1 (L) 13.6 - 17.2 g/dL    Hematocrit 26.9 (L) 41.1 - 50.3 %    MCV 97.5 79.6 - 97.8 FL    MCH 33.0 (H) 26.1 - 32.9 PG    MCHC 33.8 31.4 - 35.0 g/dL    RDW 20.1 (H) 11.9 - 14.6 %    Platelets 83 (L) 450 - 450 K/uL    MPV 10.0 9.4 - 12.3 FL    nRBC 0.00 0.0 - 0.2 K/uL    Differential Type AUTOMATED      Seg Neutrophils 81 (H) 43 - 78 %    Lymphocytes 6 (L) 13 - 44 %    Monocytes 10 4.0 - 12.0 %    Eosinophils % 2 0.5 - 7.8 %    Basophils 0 0.0 - 2.0 %    Immature Granulocytes 0 0.0 - 5.0 %    Segs Absolute 8.3 (H) 1.7 - 8.2 K/UL    Absolute Lymph # 0.6 0.5 - 4.6 K/UL    Absolute Mono # 1.0 0.1 - 1.3 K/UL    Absolute Eos # 0.2 0.0 - 0.8 K/UL    Basophils Absolute 0.0 0.0 - 0.2 K/UL    Absolute Immature Granulocyte 0.0 0.0 - 0.5 K/UL   Renal Function Panel    Collection Time: 08/26/22  8:52 AM   Result Value Ref Range    Sodium 134 (L) 136 - 145 mmol/L    Potassium 3.6 3.5 - 5.1 mmol/L    Chloride 105 98 - 107 mmol/L    CO2 20 (L) 21 - 32 mmol/L    Anion Gap 9 7 - 16 mmol/L    Glucose 81 65 - 100 mg/dL    BUN 13 6 - 23 MG/DL    Creatinine 0.80 0.8 - 1.5 MG/DL    GFR African American >60 >60 ml/min/1.73m2    GFR Non- >60 >60 ml/min/1.73m2    Calcium 7.5 (L) 8.3 - 10.4 MG/DL    Phosphorus 1.9 (L) 2.5 - 4.5 MG/DL    Albumin 2.0 (L) 3.5 - 5.0 g/dL   Protime-INR    Collection Time: 08/26/22 10:16 AM   Result Value Ref Range    Protime 16.4 (H) 12.6 - 14.5 sec    INR 1.3     APTT    Collection Time: 08/26/22 10:16 AM   Result Value Ref Range    PTT 36.2 (H) 24.1 - 35.1 SEC   D-Dimer, Quantitative    Collection Time: 08/26/22 10:16 AM   Result Value Ref Range    D-Dimer, Quant 16.43 (H) <0.56 ug/ml(FEU)   Fibrinogen    Collection Time: 08/26/22 10:16 AM   Result Value Ref Range    Fibrinogen 228 190 - 501 mg/dL       No Known Allergies  Immunization History   Administered Date(s) Administered    COVID-19, PFIZER PURPLE top, DILUTE for use, (age 15 y+), 30mcg/0.3mL 08/06/2021, 08/27/2021       Recent Vital Data:  Patient Vitals for the past 24 hrs:   Temp Pulse Resp BP SpO2   08/26/22 1142 99 °F (37.2 °C) 98 16 118/84 93 %   08/26/22 0738 98.8 °F (37.1 °C) 99 16 107/75 94 %   08/26/22 0247 97.6 °F (36.4 °C) 99 16 101/71 93 %   08/25/22 2247 98.5 °F (36.9 °C) 100 16 100/64 94 %   08/25/22 1925 97.9 °F (36.6 °C) 99 16 98/70 95 %   08/25/22 1430 98 °F (36.7 °C) 97 16 102/69 --       Oxygen Therapy  SpO2: 93 %  Pulse via Oximetry: 98 beats per minute  O2 Device: None (Room air)    Estimated body mass index is 24.97 kg/m² as calculated from the following:    Height as of this encounter: 5' 11\" (1.803 m). Weight as of this encounter: 179 lb 0.2 oz (81.2 kg). Intake/Output Summary (Last 24 hours) at 8/26/2022 1229  Last data filed at 8/26/2022 0910  Gross per 24 hour   Intake 60 ml   Output 350 ml   Net -290 ml         Physical Exam:  General:    Well nourished. No overt distress. Appears weak and frail. Head:  Normocephalic, atraumatic  Eyes:  Sclerae appear normal.  Pupils equally round. HENT:  Nares appear normal, no drainage. Moist mucous membranes  Neck:  No restricted ROM. Trachea midline  CV:   RRR. No m/r/g. No JVD  Lungs:   CTAB. No wheezing, rhonchi, or rales.   Respirations even, unlabored  Abdomen: Soft, nontender, nondistended. Extremities: Warm and dry. No cyanosis or clubbing. No edema. Skin:     No rashes. Normal coloration  Neuro:  CN II-XII grossly intact. Psych:  Normal mood and affect. Signed:  Ubaldo Vela DO    Part of this note may have been written by using a voice dictation software. The note has been proof read but may still contain some grammatical/other typographical errors.

## 2022-08-26 NOTE — TELEPHONE ENCOUNTER
Wife calls stating he just got out of the hospital and needs a refill of  Remeron 30mg I a day .  Needs a refill sent to [facility]

## 2022-08-26 NOTE — PROGRESS NOTES
Pt's wife expressed concerns about pt's condition. Pt's wife states she would like education on how to determine when a PRN paracentesis is needed before it becomes emergent. Pt's wife also stated she needs information on how to schedule a PRN paracentesis. Pt's wife request a doctor excuse/note on pt's condition and hospital stay for personal records.

## 2022-08-26 NOTE — PLAN OF CARE
Problem: Pain  Goal: Verbalizes/displays adequate comfort level or baseline comfort level  8/26/2022 1235 by Carlita Kincaid RN  Outcome: Completed  8/26/2022 0306 by Roni Nur RN  Outcome: Progressing     Problem: Discharge Planning  Goal: Discharge to home or other facility with appropriate resources  8/26/2022 1235 by Carlita Kincaid RN  Outcome: Completed  8/26/2022 0306 by Roni Nur RN  Outcome: Progressing     Problem: ABCDS Injury Assessment  Goal: Absence of physical injury  8/26/2022 1235 by Carlita Kincaid RN  Outcome: Completed  8/26/2022 0306 by Roni Nur RN  Outcome: Progressing

## 2022-08-29 PROBLEM — R18.8 ASCITES: Status: ACTIVE | Noted: 2022-01-01

## 2022-08-29 PROBLEM — E87.20 METABOLIC ACIDOSIS: Status: ACTIVE | Noted: 2022-01-01

## 2022-08-29 PROBLEM — D62 ACUTE ON CHRONIC BLOOD LOSS ANEMIA: Status: ACTIVE | Noted: 2022-01-01

## 2022-08-29 NOTE — TELEPHONE ENCOUNTER
Call back to wife. She states that patient is lethargic. He is very weak, taking at least 2 people to get him up. His belly is filling up again. He only voided 18 oz yesterday and it was very dark. She is trying to get him to drink but he isnt. He drank maybe about 8 ounces. She is having a hard time taking care of him at home. Have reviewed with NP who advises he go back to the ER today due to his symptoms. Also spoke with Dr. Dawson mack. Patient is advised to head back to the ER. Call to wife to notify. She states he is very stubborn and may not go to the ER. Appointment available here tomorrow morning in case he refuses. . Wife made aware, but again we highly advise for him to go to the ER. Message to  to make appointment.

## 2022-08-29 NOTE — ED PROVIDER NOTES
[facility] Provider Note                   PCP:                None Provider               Age: 56 y.o. Sex: male     No diagnosis found. DISPOSITION          MDM  Number of Diagnoses or Management Options  Diagnosis management comments: Patient history, ROS, physical exam, labs, radiological workup and all pertinent findings were discussed with attending Aurora Mack MD.     6:50 PM  Care transferred to Dr. Latrice Angulo. Still waiting on Labwork to return. Amount and/or Complexity of Data Reviewed  Clinical lab tests: ordered and reviewed  Tests in the radiology section of CPT®: ordered and reviewed  Independent visualization of images, tracings, or specimens: yes    Risk of Complications, Morbidity, and/or Mortality  Presenting problems: moderate  Diagnostic procedures: moderate  Management options: moderate         Orders Placed This Encounter   Procedures    XR ACUTE ABD SERIES CHEST 1 VW    CBC with Diff    CMP    Lipase    Lactic Acid    Diet NPO    POCT Urine Dipstick    EKG 12 Lead (Select if Upper Abd Pain, or SOB, Diaphoresis or Tachy)    Saline lock IV        Medications - No data to display    New Prescriptions    No medications on file        Cinda Vallejo is a 56 y.o. male who presents to the Emergency Department with chief complaint of    Chief Complaint   Patient presents with    Abdominal Pain      50-year-old male patient with history of gastric cancer presents today complaining of abdominal distention. He states that last week he had approximately 14 pounds of ascitic abdominal fluid drained from his abdomen while in the hospital.  He said an additional 1.4 L was pulled off his left lung. He states right now he feels completely normal but that his wife noticed his abdomen seems distended. He has no other complaints at this time. He denies fevers, chills, abdominal pain, nausea, vomiting, chest pain, dyspnea. He denies weakness, syncope.   Denies all other symptoms today. Patient is the primary historian and quality is reliable. The history is provided by the patient. No  was used. Review of Systems   Constitutional:  Negative for appetite change, chills, fatigue, fever and unexpected weight change. HENT:  Negative for congestion, ear pain, hearing loss, postnasal drip, rhinorrhea, sinus pressure, sore throat and voice change. Eyes:  Negative for photophobia, pain, discharge, redness and visual disturbance. Respiratory:  Negative for apnea, cough, chest tightness, shortness of breath and wheezing. Cardiovascular:  Negative for chest pain, palpitations and leg swelling. Gastrointestinal:  Positive for abdominal distention. Negative for abdominal pain, blood in stool, constipation, diarrhea, nausea and vomiting. Endocrine: Negative for polydipsia, polyphagia and polyuria. Genitourinary:  Negative for decreased urine volume, dysuria, flank pain, frequency and hematuria. Musculoskeletal:  Negative for arthralgias, joint swelling, myalgias and neck stiffness. Skin:  Negative for color change, rash and wound. Neurological:  Negative for dizziness, syncope, speech difficulty, weakness, light-headedness and headaches. Hematological:  Negative for adenopathy. Does not bruise/bleed easily. Psychiatric/Behavioral:  Negative for confusion, self-injury, sleep disturbance and suicidal ideas. All other systems reviewed and are negative.     Past Medical History:   Diagnosis Date    Acute gastrointestinal bleeding 7/17/2022    COVID 1/30/2022    Gastric cancer ([facility] [address]     being treated currently - chemo and radiation    History of blood transfusion     6/30/2022 no rxn to this transfusion    Lazy eye, left     Severe anemia 7/12/2022        Past Surgical History:   Procedure Laterality Date    IR PORT PLACEMENT EQUAL OR GREATER THAN 5 YEARS  11/3/2020    IR PORT PLACEMENT EQUAL OR GREATER THAN 5 YEARS  11/3/2020    IR PORT PLACEMENT EQUAL OR GREATER THAN 5 YEARS 11/3/2020 [facility] RADIOLOGY SPECIALS    ORTHOPEDIC SURGERY      ankle    TONSILLECTOMY      UPPER GASTROINTESTINAL ENDOSCOPY N/A 7/5/2022    EGD DILATION BALLOON performed by Cristóbal Winters MD at [address] [facility] N/A 7/14/2022    EGD STENT PLACEMENT performed by Micheal Mohan MD at [facility] N/A 8/3/2022    EGD ESOPHAGOGASTRODUODENOSCOPY performed by Brad Santana MD at [address] [place] [address]  10/22/2020    US GUIDED LIVER BIOPSY PERCUTANEOUS 10/22/2020 [facility] [address]        Family History   Problem Relation Age of Onset    Schizophrenia Mother     Diabetes Mother     Dementia Mother     Depression Mother         Social History     Socioeconomic History    Marital status:      Spouse name: None    Number of children: None    Years of education: None    Highest education level: None   Tobacco Use    Smoking status: Never    Smokeless tobacco: Never   Vaping Use    Vaping Use: Never used   Substance and Sexual Activity    Alcohol use: Yes     Comment: rarely    Drug use: Never         Patient has no known allergies. Previous Medications    AMOXICILLIN-CLAVULANATE (AUGMENTIN) 875-125 MG PER TABLET    Take 1 tablet by mouth every 12 hours for 8 doses    ERGOCALCIFEROL (VITAMIN D) 43044 UNITS CAPS    Take 50,000 Units by mouth daily (with breakfast) for 5 doses    GABAPENTIN (NEURONTIN) 300 MG CAPSULE    Take 1 capsule by mouth in the morning and 1 capsule before bedtime. Do all this for 90 days. HYDROCODONE-ACETAMINOPHEN (NORCO)  MG PER TABLET    Take 1 tablet by mouth every 6 hours as needed for Pain for up to 14 days. Intended supply: 30 days    LEVOTHYROXINE (SYNTHROID) 50 MCG TABLET    Take 1 tablet by mouth in the morning.     MIRTAZAPINE (REMERON) 30 MG TABLET    TAKE 1 TABLET BY MOUTH NIGHTLY    PANTOPRAZOLE (PROTONIX) 40 MG TABLET    Take 1 tablet by mouth in the morning. Vitals signs and nursing note reviewed. Patient Vitals for the past 4 hrs:   Temp Pulse Resp BP SpO2   08/29/22 1714 98.2 °F (36.8 °C) (!) 108 16 104/82 94 %          Physical Exam  Vitals and nursing note reviewed. Constitutional:       General: He is not in acute distress. Appearance: Normal appearance. He is not ill-appearing, toxic-appearing or diaphoretic. Comments: Cachectic   HENT:      Head: Normocephalic and atraumatic. Right Ear: Tympanic membrane, ear canal and external ear normal.      Left Ear: Tympanic membrane, ear canal and external ear normal.      Nose: Nose normal.      Mouth/Throat:      Mouth: Mucous membranes are moist.   Eyes:      General: No scleral icterus. Right eye: No discharge. Left eye: No discharge. Conjunctiva/sclera: Conjunctivae normal.   Cardiovascular:      Rate and Rhythm: Regular rhythm. Tachycardia present. Pulses: Normal pulses. Heart sounds: Normal heart sounds. No murmur heard. Pulmonary:      Effort: Pulmonary effort is normal. No respiratory distress. Breath sounds: No stridor. Rales present. No wheezing or rhonchi. Comments: Rales to bilateral lower lung fields. Abdominal:      General: Abdomen is flat. Bowel sounds are normal. There is distension. Palpations: Abdomen is soft. There is fluid wave. There is no pulsatile mass. Tenderness: There is generalized abdominal tenderness. There is no guarding or rebound. Comments: Patient notes his abdomen feels diffuse discomfort with palpation but denies true pain   Genitourinary:     Comments: Declined by patient  Musculoskeletal:         General: Normal range of motion. Cervical back: Normal range of motion and neck supple. No rigidity or tenderness. Right lower leg: No edema. Left lower leg: No edema. Lymphadenopathy:      Cervical: No cervical adenopathy. Skin:     General: Skin is warm and dry. Capillary Refill: Capillary refill takes less than 2 seconds. Coloration: Skin is pale. Skin is not jaundiced. Comments: Large ecchymotic area to right flank where patient had paracentesis procedure done. No signs of infection. Neurological:      General: No focal deficit present. Mental Status: He is alert and oriented to person, place, and time. Mental status is at baseline. Motor: No weakness. Psychiatric:         Mood and Affect: Mood normal.         Behavior: Behavior normal.         Thought Content: Thought content normal.         Judgment: Judgment normal.        Procedures      [unfilled]     XR ACUTE ABD SERIES CHEST 1 VW   Final Result   1. No change in positioning of the support monitoring devices. 2. Persistent left basilar infiltrate. 3. Nonspecific bowel gas pattern. ED Course as of 08/29/22 1851   Mon Aug 29, 2022   1820 IMPRESSION:  1. No change in positioning of the support monitoring devices. 2. Persistent left basilar infiltrate. 3. Nonspecific bowel gas pattern. [NR]      ED Course User Index  [NR] JA Morrison        Voice dictation software was used during the making of this note. This software is not perfect and grammatical and other typographical errors may be present. This note has not been completely proofread for errors.      Amira Morrison  08/29/22 1282

## 2022-08-29 NOTE — TELEPHONE ENCOUNTER
Called patient to schedule TCV appt following discharge from [facility] 08/26/2022. Dx Severe Sepsis. Patient has appt with oncologist 09/06/2022.      L/M

## 2022-08-29 NOTE — ED NOTES
Report given to [facility] to assume care     Umair Reyes RN  08/29/22 5006

## 2022-08-29 NOTE — TELEPHONE ENCOUNTER
Wife calling on the behalf of the PT. .. Wife stated the PT is having bad swelling in his stomach. .    Wife would like to know if the PT can see Dr. Gisella eubanks?

## 2022-08-29 NOTE — ED TRIAGE NOTES
Pt arrived via EMS. Pt has hx of multiple cancers. Pt had a large amount of fluid pulled recently. Pt states spouse called EMS regarding his abd distention and SOB. /74, . Pt states he has no complaints at this time.

## 2022-08-30 PROBLEM — C79.9 METASTATIC MALIGNANT NEOPLASM (HCC): Status: ACTIVE | Noted: 2022-01-01

## 2022-08-30 PROBLEM — R54 FRAILTY: Status: ACTIVE | Noted: 2022-01-01

## 2022-08-30 PROBLEM — Z51.5 ENCOUNTER FOR PALLIATIVE CARE: Status: ACTIVE | Noted: 2022-01-01

## 2022-08-30 PROBLEM — Z71.89 ACP (ADVANCE CARE PLANNING): Status: ACTIVE | Noted: 2022-01-01

## 2022-08-30 PROBLEM — S01.01XA LACERATION OF SCALP WITHOUT FOREIGN BODY: Status: ACTIVE | Noted: 2022-01-01
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
## 2022-08-30 PROBLEM — R53.0 NEOPLASTIC MALIGNANT RELATED FATIGUE: Status: ACTIVE | Noted: 2022-01-01

## 2022-08-30 NOTE — H&P
MD at [facility] LIVER BIOPSY PERCUTANEOUS  10/22/2020    US GUIDED LIVER BIOPSY PERCUTANEOUS 10/22/2020 [facility]            Family History   Problem Relation Age of Onset    Schizophrenia Mother     Diabetes Mother     Dementia Mother     Depression Mother        Family history reviewed and negative except as noted above. Social History     Social History Narrative    Not on file            Social History     Tobacco Use    Smoking status: Never    Smokeless tobacco: Never   Substance Use Topics    Alcohol use: Yes     Comment: rarely            Social History     Substance and Sexual Activity   Drug Use Never                 No Known Allergies         Prior to Admission medications    Medication Sig Start Date End Date Taking? Authorizing Provider   amoxicillin-clavulanate (AUGMENTIN) 875-125 MG per tablet Take 1 tablet by mouth every 12 hours for 8 doses 8/26/22 8/30/22  Bety Silvestre, DO   Ergocalciferol (VITAMIN D) 65840 units CAPS Take 50,000 Units by mouth daily (with breakfast) for 5 doses 8/26/22 8/31/22  Zee Maldonado, DO   mirtazapine (REMERON) 30 MG tablet TAKE 1 TABLET BY MOUTH NIGHTLY 8/26/22   Alise Nguyen MD   HYDROcodone-acetaminophen (NORCO)  MG per tablet Take 1 tablet by mouth every 6 hours as needed for Pain for up to 14 days. Intended supply: 30 days 8/26/22 9/9/22  ARY Martinez NP   levothyroxine (SYNTHROID) 50 MCG tablet Take 1 tablet by mouth in the morning. 8/15/22   Alise Ngueyn MD   gabapentin (NEURONTIN) 300 MG capsule Take 1 capsule by mouth in the morning and 1 capsule before bedtime. Do all this for 90 days.  8/15/22 11/13/22  SHMUEL Samuels   sucralfate (CARAFATE) 1 GM tablet take 1 tablet dissolved in 4 ounces of H2O 4 times every day on an empty stomach 1 hour before meals and at bedtime for Bleeding  Patient not taking: Reported on 8/23/2022 7/5/22 8/26/22  Historical Provider, MD   potassium chloride (KLOR-CON HILLARY) 20 MEQ extended release tablet Take 1 tablet by mouth in the morning and 1 tablet before bedtime. Do all this for 7 days. 8/15/22 8/26/22  Leetta Apley, NP-C   pantoprazole (PROTONIX) 40 MG tablet Take 1 tablet by mouth in the morning. 8/6/22 9/5/22  Jose Deleon MD   prochlorperazine (COMPAZINE) 10 MG tablet Take 10 mg by mouth every 6 hours as needed  Patient not taking: No sig reported 10/28/20 8/6/22  Ar Automatic Reconciliation                      Review of Systems         Constitutional: Tired    Eyes:  no change in visual acuity, no photophobia    Ears, nose, mouth, throat, and face: no  Odynphagia, dysphagia, no thrush or exudate, negative for chronic sinus congestion, recurrent headaches    Respiratory: negative for SOB, hemoptysis or cough    Cardiovascular: negative for CP, palpitations, or PND    Gastrointestinal: distention, poor oral intake    Genitourinary: little urine output    Integument/breast: negative for skin rash or skin lesions    Hematologic/lymphatic: negative for known bleeding disorder    Musculoskeletal:negative for joint pain or joint tenderness    Neurological: diffuse weakness    Behavioral/Psych: negative for depression or chronic anxiety,    Endocrine: negative for polydyspia, polyuria or intolerance to heat or cold    Allergic/Immunologic: negative for chronic allergic rhinitis, or known connective tissue disorder              Objective:         Patient Vitals for the past 24 hrs:   Temp Pulse Resp BP SpO2   08/29/22 1930 -- (!) 109 12 107/77 95 %   08/29/22 1730 -- (!) 108 11 109/77 94 %   08/29/22 1714 98.2 °F (36.8 °C) (!) 108 16 104/82 94 %            No intake/output data recorded. No intake/output data recorded.          Data Review:   Recent Results (from the past 24 hour(s))   EKG 12 Lead (Select if Upper Abd Pain, or SOB, Diaphoresis or Tachy)    Collection Time: 08/29/22  5:23 PM   Result Value Ref Range    Ventricular Rate 108 BPM Atrial Rate 112 BPM    P-R Interval 122 ms    QRS Duration 98 ms    Q-T Interval 411 ms    QTc Calculation (Bazett) 549 ms    P Axis 62 degrees    R Axis 105 degrees    T Axis 32 degrees    Diagnosis Sinus tachycardia    CBC with Diff    Collection Time: 08/29/22  6:24 PM   Result Value Ref Range    WBC 7.6 4.3 - 11.1 K/uL    RBC 2.13 (L) 4.23 - 5.6 M/uL    Hemoglobin 6.9 (LL) 13.6 - 17.2 g/dL    Hematocrit 21.2 (L) 41.1 - 50.3 %    MCV 99.5 (H) 79.6 - 97.8 FL    MCH 32.4 26.1 - 32.9 PG    MCHC 32.5 31.4 - 35.0 g/dL    RDW 18.9 (H) 11.9 - 14.6 %    Platelets 869 (L) 863 - 450 K/uL    MPV 9.9 9.4 - 12.3 FL    nRBC 0.00 0.0 - 0.2 K/uL    Differential Type AUTOMATED      Seg Neutrophils 79 (H) 43 - 78 %    Lymphocytes 7 (L) 13 - 44 %    Monocytes 11 4.0 - 12.0 %    Eosinophils % 2 0.5 - 7.8 %    Basophils 1 0.0 - 2.0 %    Immature Granulocytes 1 0.0 - 5.0 %    Segs Absolute 6.0 1.7 - 8.2 K/UL    Absolute Lymph # 0.6 0.5 - 4.6 K/UL    Absolute Mono # 0.8 0.1 - 1.3 K/UL    Absolute Eos # 0.1 0.0 - 0.8 K/UL    Basophils Absolute 0.1 0.0 - 0.2 K/UL    Absolute Immature Granulocyte 0.1 0.0 - 0.5 K/UL   CMP    Collection Time: 08/29/22  6:24 PM   Result Value Ref Range    Sodium 130 (L) 136 - 145 mmol/L    Potassium 3.6 3.5 - 5.1 mmol/L    Chloride 103 98 - 107 mmol/L    CO2 19 (L) 21 - 32 mmol/L    Anion Gap 8 7 - 16 mmol/L    Glucose 87 65 - 100 mg/dL    BUN 18 6 - 23 MG/DL    Creatinine 0.80 0.8 - 1.5 MG/DL    GFR African American >60 >60 ml/min/1.73m2    GFR Non- >60 >60 ml/min/1.73m2    Calcium 7.8 (L) 8.3 - 10.4 MG/DL    Total Bilirubin 0.9 0.2 - 1.1 MG/DL    ALT 30 12 - 65 U/L     (H) 15 - 37 U/L    Alk Phosphatase 309 (H) 50 - 136 U/L    Total Protein 5.4 (L) 6.3 - 8.2 g/dL    Albumin 1.9 (L) 3.5 - 5.0 g/dL    Globulin 3.5 2.3 - 3.5 g/dL    Albumin/Globulin Ratio 0.5 (L) 1.2 - 3.5     Lipase    Collection Time: 08/29/22  6:24 PM   Result Value Ref Range    Lipase 31 (L) 73 - 393 U/L Physical Exam:         General:    Alert, cooperative, frail appearing and lethargic. Pale    Eyes:    Conjunctivae/corneas clear. PERRL    Ears:    Normal     Nose:    Wearing mask     Mouth/Throat:    Wearing mask     Neck:    no JVD. Back:     deferred    Lungs:     Clear to auscultation bilaterally but limited breath sounds to lower bases. Heart:    Regular rate and rhythm, S1, S2 normal    Abdomen:     Distended, no obvious tenderness on palpation and no rebound tenderness. Slow BS    Extremities:    3+ edema to LE bilaterally     Skin:    Pale    Neurologic:    No confusion. Lethargic with very limited ROM in bed. Assessment and Plan         Principal Problem:    Acute on chronic blood loss anemia  Active Problems:    Severe anemia    Thrombocytopenia (HCC)    GIB (gastrointestinal bleeding)    Gastric carcinoma (HCC)    Ascites    Metabolic acidosis    Malignant neoplasm of overlapping sites of stomach (HCC)  Resolved Problems:    * No resolved hospital problems. *      Stage IV gastric cancer with recurrent ascites - Consult oncology to assume care. IR for possible paracentesis in the AM. Likely will need scheduled paracentesis. High risk of SBP if place permanent cath. Will order Albumin X 3 doses. Has 2 more doses of Augmentin. Can continue chronic pain meds. Macrocytic anemia, acute on chronic blood loss anemia- To get 1 unit PRBC tonight. Likely a slow bleed from known gastric cancer. Trend Hg. Dark urine, order UA and strict Is and Os. Metabolic acidosis - Na bicarb    Hyponatremia - Poor oral intake. Na Bicarb.      Hypothyroidism - Synthroid    Holding remeron due to lethargy on exam    PPI    DVT prophylaxis - SCDs    Signed By:    Caridad Araujo DO    August 29, 2022

## 2022-08-30 NOTE — PROGRESS NOTES
OCCUPATIONAL THERAPY Initial Assessment and Daily Note       OT Visit Days: 1  Acknowledge Orders  Time  OT Charge Capture  Rehab Caseload Tracker      Eva Berg is a 56 y.o. male   PRIMARY DIAGNOSIS: Acute on chronic blood loss anemia  Metastatic malignant neoplasm, unspecified site ([facility] [address] [C79.9]  Anemia, unspecified type [D64.9]  Acute on chronic blood loss anemia [D62]       Reason for Referral: Generalized Muscle Weakness (M62.81)  Difficulty in walking, Not elsewhere classified (R26.2)  Other abnormalities of gait and mobility (R26.89)  Inpatient: Payor: /     ASSESSMENT:     REHAB RECOMMENDATIONS:   Recommendation to date pending progress:  Setting:  Home Health Therapy    Equipment:    None--pt has RW, SPC, w/c and SC at home     ASSESSMENT:  Mr. Cynthia Chacon is a 55 y/o male presents with anemia and has hx of gastric cancer with mets. At baseline pt lives with his wife, is independent with ADLs and reports furniture walking at home. Today pt presents with decreased activity tolerance, balance, strength and mobility impacting ADLs. Pt required some encouragement to mobilize today. Pt overall SBA for bed mobility and CGA no AD for functional transfers and mobility of household distances--therapist offered DME to pt as he was reaching for furniture/grab bars in room, however pt refused. Pt able to don socks with SBA EOB with additional time. Educated pt on importance of mobility to not decrease strength during hospital stay and to complete ankle pumps while in bed to decrease BLE edema. Pt is currently functioning below baseline and would benefit from skilled OT services to address OT goals and plan of care. Rec HHOT at d/c.       [address] [facility] [address] [address] AM-PAC 6 Clicks Daily Activity Inpatient Short Form:    AM-PAC Daily Activity Inpatient   How much help for putting on and taking off regular lower body clothing?: A Little  How much help for Bathing?: A Little  How much help for Toileting?: A Little  How much help for putting on and taking off regular upper body clothing?: None  How much help for taking care of personal grooming?: A Little  How much help for eating meals?: None  AM-PAC Inpatient Daily Activity Raw Score: 20  AM-PAC Inpatient ADL T-Scale Score : 42.03  ADL Inpatient CMS 0-100% Score: 38.32  ADL Inpatient CMS G-Code Modifier : CJ           SUBJECTIVE:     Mr. Paz Benavides states, \"My feet are swollen because y'all keep giving me fluid\"     Social/Functional Lives With: Spouse  Type of Home: House  Home Layout: Two level (bedroom on 2nd level)  Home Access: Level entry  Bathroom Shower/Tub: Walk-in shower  Bathroom Equipment: Shower chair  Bathroom Accessibility: Accessible  Home Equipment: Mary Melgar, melvina, [address] Help From: Family  ADL Assistance: Independent  Homemaking Assistance: Independent  Ambulation Assistance: Independent  Transfer Assistance: Independent  Active : No  Patient's  Info: Family  Occupation: On disability    OBJECTIVE:     LINES / Susi Grand / AIRWAY: IV    RESTRICTIONS/PRECAUTIONS:  Restrictions/Precautions: Fall Risk    PAIN: VITALS / O2:   Pre Treatment:   Pain Assessment: None - Denies Pain      Post Treatment: no c/o pain, resting comfortably in supine       Vitals          Oxygen            GROSS EVALUATION: INTACT IMPAIRED   (See Comments)   UE AROM [x] []   UE PROM [x] []   Strength []  Generally weak but functional   Posture / Balance [] Posture: Fair  Sitting - Static: Good  Sitting - Dynamic: Fair, +  Standing - Static: Fair, +  Standing - Dynamic: Fair, -   Sensation [x]     Coordination [x]       Tone [x]       Edema [x]    Activity Tolerance [] Patient limited by fatigue     Hand Dominance R [] L []      COGNITION/  PERCEPTION: INTACT IMPAIRED   (See Comments)   Orientation [x]     Vision [x]     Hearing [x]     Cognition  [x]  Decreased insight into importance of mobility   Perception [x]       MOBILITY: I Mod I S SBA CGA Min Mod Max Total  NT x2 Comments:   Bed Mobility    Rolling [] [] [] [] [] [] [] [] [] [x] []    Supine to Sit [] [] [] [x] [] [] [] [] [] [] []    Scooting [] [] [] [x] [] [] [] [] [] [] []    Sit to Supine [] [] [] [x] [] [] [] [] [] [] []    Transfers    Sit to Stand [] [] [] [] [x] [] [] [] [] [] []    Bed to Chair [] [] [] [] [x] [] [] [] [] [] []    Stand to Sit [] [] [] [] [x] [] [] [] [] [] []    Tub/Shower [] [] [] [] [] [] [] [] [] [x] []     Toilet [] [] [] [] [] [] [] [] [] [x] []      [] [] [] [] [] [] [] [] [] [x] []    I=Independent, Mod I=Modified Independent, S=Supervision/Setup, SBA=Standby Assistance, CGA=Contact Guard Assistance, Min=Minimal Assistance, Mod=Moderate Assistance, Max=Maximal Assistance, Total=Total Assistance, NT=Not Tested    ACTIVITIES OF DAILY LIVING: I Mod I S SBA CGA Min Mod Max Total NT Comments   BASIC ADLs:              Upper Body Bathing  [] [] [] [] [] [] [] [] [] [x]    Lower Body Bathing [] [] [] [] [] [] [] [] [] [x]    Toileting [] [] [] [] [] [] [] [] [] [x]    Upper Body Dressing [] [] [] [] [] [] [] [] [] [x]    Lower Body Dressing [] [] [] [x] [] [] [] [] [] [] For dynamic sitting balance EOB   Feeding [] [] [] [] [] [] [] [] [] [x]    Grooming [] [] [] [] [] [] [] [] [] [x]    Personal Device Care [] [] [] [] [] [] [] [] [] [x]    Functional Mobility [] [] [] [] [x] [] [] [] [] [] Furniture walking around room but declined DME   I=Independent, Mod I=Modified Independent, S=Supervision/Setup, SBA=Standby Assistance, CGA=Contact Guard Assistance, Min=Minimal Assistance, Mod=Moderate Assistance, Max=Maximal Assistance, Total=Total Assistance, NT=Not Tested    PLAN:     FREQUENCY/DURATION   OT Plan of Care: 3 times/week for duration of hospital stay or until stated goals are met, whichever comes first.    ACUTE OCCUPATIONAL THERAPY GOALS:   (Developed with and agreed upon by patient and/or caregiver.)  1.  Patient will complete lower body bathing and dressing with SUPERVISION and adaptive equipment as needed. 2. Patient will complete toileting with SUPERVISION. 3. Patient will complete grooming ADL in standing with SUPERVISION. 4. Patient will tolerate 25 minutes of OT treatment with 1-2 rest breaks to increase activity tolerance for ADLs. 5. Patient will complete functional transfers with SUPERVISION and adaptive equipment as needed. 6. Patient will tolerate 10 minutes BUE exercises to increase strength for safe, functional transfers. Timeframe: 7 visits       PROBLEM LIST:   (Skilled intervention is medically necessary to address:)  Decreased ADL/Functional Activities  Decreased Activity Tolerance  Decreased Balance  Decreased Gait Ability  Decreased Safety Awareness  Decreased Strength  Decreased Transfer Abilities   INTERVENTIONS PLANNED:  (Benefits and precautions of occupational therapy have been discussed with the patient.)  Self Care Training  Therapeutic Activity  Therapeutic Exercise/HEP  Neuromuscular Re-education  Manual Therapy  Education         TREATMENT:     EVALUATION: MODERATE COMPLEXITY: (Untimed Charge)    TREATMENT:   Self Care (10 minutes): Patient participated in lower body dressing ADLs in unsupported sitting with minimal verbal cueing to increase independence, decrease assistance required, and increase safety awareness. Patient also participated in functional mobility and functional transfer training to increase independence, decrease assistance required, increase activity tolerance, and increase safety awareness.      TREATMENT GRID:  N/A    AFTER TREATMENT PRECAUTIONS: Bed, Call light within reach, Needs within reach, and RN notified    INTERDISCIPLINARY COLLABORATION:  RN/ PCT and OT/ [facility]AMPOS    EDUCATION:  Education Given To: Patient  Education Provided: Role of Therapy;Plan of Care  Education Method: Verbal  Barriers to Learning: None  Education Outcome: Continued education needed;Verbalized understanding    TOTAL TREATMENT DURATION AND TIME:  Time In: 1412  Time Out: 0  Minutes: [facility], OT

## 2022-08-30 NOTE — ACP (ADVANCE CARE PLANNING)
[facility] Hospitalist Service  At the heart of better care     Advance Care Planning   Admit Date:  2022  5:14 PM   Name:  Ulisses Shah   Age:  56 y.o. Sex:  male  :  1975   MRN:  [id number]   Room:  [place] [address]    Ulisses Shah is able to make his own decisions:   Yes, but wants his wife to make decisions    If pt unable to make decisions, POA/surrogate decision maker:  Wife    Other people present:   Wife    Patient / surrogate decision-maker directed code status:  FULL    Patient or surrogate consented to discussion of the current conditions, workup, management plans, prognosis, and the risk for further deterioration. Time spent: 17 minutes in direct discussion.       Signed:  Kervin Sandoval DO

## 2022-08-30 NOTE — PROGRESS NOTES
-Pt tachy throughout shift.  -Refused tele box, provider aware. -Gave 1 unit pRBCs  -Changed port dressing.  -Port not giving blood return upon arrival, activase x2, blood return now noted. NS at [facility].    -Gave albumin x2  -Gave 1x dose ativan  -Pt currently resting with no complaints

## 2022-08-30 NOTE — PROGRESS NOTES
END OF SHIFT SUMMARY:    Significant vitals this shift:  none  Significant labs this shift:  none  Tests performed this shift:  CT of head  Orders to be followed up on:  1 unit of platelet and blood ordered  Blood products given this shift:  none  Additional events this shift:   Patient had uneventful day until this evening when he fell and hit his head. CT of head completed which was negative. Waiting for surgery to suture him up. VSS. Alert and oriented x4. I/Os:    08/30 0701 - 08/30 1900  In: 240 [P.O.:240]  Out: -       Gael Jernigan

## 2022-08-30 NOTE — PLAN OF CARE
Problem: Safety - Adult  Goal: Free from fall injury  Outcome: Progressing     Problem: Pain  Goal: Verbalizes/displays adequate comfort level or baseline comfort level  Outcome: Progressing     Problem: Coping  Goal: Pt/Family able to verbalize concerns and demonstrate effective coping strategies  Description: INTERVENTIONS:  1. Assist patient/family to identify coping skills, available support systems and cultural and spiritual values  2. Provide emotional support, including active listening and acknowledgement of concerns of patient and caregivers  3. Reduce environmental stimuli, as able  4. Instruct patient/family in relaxation techniques, as appropriate  5. Assess for spiritual pain/suffering and initiate Spiritual Care, Psychosocial Clinical Specialist consults as needed  Outcome: Progressing     Problem: Decision Making  Goal: Pt/Family able to effectively weigh alternatives and participate in decision making related to treatment and care  Description: INTERVENTIONS:  1. Determine when there are differences between patient's view, family's view, and healthcare provider's view of condition  2. Facilitate patient and family articulation of goals for care  3. Help patient and family identify pros/cons of alternative solutions  4. Provide information as requested by patient/family  5. Respect patient/family right to receive or not to receive information  6. Serve as a liaison between patient and family and health care team  7. Initiate Consults from Ethics, Palliative Care or initiate [facility] as is appropriate  Outcome: Progressing     Problem: Skin/Tissue Integrity  Goal: Absence of new skin breakdown  Description: 1. Monitor for areas of redness and/or skin breakdown  2. Assess vascular access sites hourly  3. Every 4-6 hours minimum:  Change oxygen saturation probe site  4.   Every 4-6 hours:  If on nasal continuous positive airway pressure, respiratory therapy assess nares and determine need for appliance change or resting period.   Outcome: Progressing

## 2022-08-30 NOTE — PROGRESS NOTES
TRANSFER - OUT REPORT:    Verbal report given to [facility] RN on [employer]  being transferred to [facility] [phone] for routine progression of patient care       Report consisted of patient's Situation, Background, Assessment and   Recommendations(SBAR). Information from the following report(s) Nurse Handoff Report was reviewed with the receiving nurse. Lines:   Single Lumen Implantable Port Right Subclavian (Active)   Port A Cath Status De-Accessed 08/24/22 0700   Criteria for Appropriate Use Long term IV/antibiotic administration 08/29/22 2220   Site Assessment Clean, dry & intact 08/29/22 2220   Alcohol Cap Used Yes 08/29/22 2220   Date of Last Dressing Change 08/29/22 08/29/22 2220   Dressing Type Antimicrobial 08/29/22 2220   Dressing Status Other (Comment); New dressing applied;Clean, dry & intact 08/29/22 2220   Dressing Intervention Dressing changed 08/29/22 2220   Date Accessed  08/29/22 08/29/22 2220   Access Attempts  1 08/15/22 1337   Access Needle Gauge 20 G 08/15/22 1337   Access Needle Length 0.75 inches 08/15/22 1337   Single Lumen Status No blood return;Flushed 08/29/22 2220   De-Access Date 08/15/22 08/15/22 1530   De-Access Time 1525 08/15/22 1530   De-Accessed By MECHE SCHMIDT 08/15/22 1530       Peripheral IV 08/29/22 Left Antecubital (Active)   Site Assessment Clean, dry & intact 08/29/22 2220   Line Status Flushed;Capped 08/29/22 2220   Phlebitis Assessment No symptoms 08/29/22 2220   Infiltration Assessment 0 08/29/22 2220   Alcohol Cap Used Yes 08/29/22 2220   Dressing Status Clean, dry & intact 08/29/22 2220   Dressing Type Transparent 08/29/22 2220        Opportunity for questions and clarification was provided.       Patient transported with:  [employer]

## 2022-08-30 NOTE — ED NOTES
Attempted to provide report to 5th floor. They will give ER a call back.       Rosi Stevenson RN  08/29/22 7359

## 2022-08-30 NOTE — CARE COORDINATION
Pt is a readmission from August 23-26, 2022. Readmission assessment completed. Chart screened by CM for d/c planning. 57 y/o male pt who resides with his spouse Kourtney Tobar [phone]. Pt does not have insurance nor a PCP. He is followed by Dr. Nereyda Brown at the [facility]. Pt is independent with ADLs at baseline. Pt admitted with Dx of Metastatic gastric CA stage IV, Acute on chronic blood loss anemia, GERD, Ascites, and Metabolic acidosis. PT/OT consults have been ordered. CM is awaiting recommendations. Current d/c plan is for pt to return home when medically stable. CM will continue ot follow and remain medically stable if any needs arise. 08/29/22 2220   Service Assessment   Patient Orientation Alert and Oriented;Person;Place; Self   Cognition Alert   History Provided By Patient;Medical Record   Primary Caregiver Self   Support Systems Spouse/Significant Other;Family Members   Patient's Healthcare Decision Maker is: Legal Next of Kin   PCP Verified by CM No  (No PCP)   Prior Functional Level Independent in ADLs/IADLs   Current Functional Level Independent in ADLs/IADLs   Can patient return to prior living arrangement Yes   Ability to make needs known: Good   Family able to assist with home care needs: Yes   Would you like for me to discuss the discharge plan with any other family members/significant others, and if so, who?  No   [address] Program  (Pt does not have insurance)   Social/Functional History   Lives With Spouse   Type of [address] [facility] Two level   Home Access Level entry   Bathroom Shower/Tub Walk-in shower   Bathroom Accessibility Accessible   Receives Help From Family   ADL Assistance Independent   Homemaking Assistance Independent   Ambulation Assistance Independent   Transfer Assistance Independent   Active  No   Patient's [facility]   Occupation On disability   Discharge [address] Arrangements Spouse/Significant Other;Family Members   Current Services Prior To Admission None   Potential Assistance Needed N/A   Potential Assistance Purchasing Medications Yes   Type of Home Care Services None   Patient expects to be discharged to: House   Follow Up Appointment: Best Day/Time  Monday AM   One/Two Story Residence Two story   History of falls? 0   Services At/After Discharge   Transition of Care Consult (CM Consult) Discharge [address] Provided? No   Mode of Transport at Discharge Other (see comment)  (Family)   Confirm Follow Up Transport Family   Condition of Participation: Discharge Planning   The Plan for Transition of Care is related to the following treatment goals: Pt to obtain care to become medically stable and to return with a safe transition. The Patient and/or Patient Representative was provided with a Choice of Provider? Patient   Freedom of Choice list was provided with basic dialogue that supports the patient's individualized plan of care/goals, treatment preferences, and shares the quality data associated with the providers?   Yes

## 2022-08-30 NOTE — PROGRESS NOTES
Physical Therapy Note:    Attempted to see patient this AM for physical therapy evaluation session. Patient is KELLY at this time. Will follow and re-attempt as schedule permits/patient available.  Thank you,    Fernando Pineda, PT     [facility]

## 2022-08-30 NOTE — PLAN OF CARE
Problem: Safety - Adult  Goal: Free from fall injury  Recent Flowsheet Documentation  Taken 8/29/2022 2220 by Harsha Morrison RN  Free From Fall Injury: Instruct family/caregiver on patient safety     Problem: Pain  Goal: Verbalizes/displays adequate comfort level or baseline comfort level  Recent Flowsheet Documentation  Taken 8/29/2022 2220 by Harsha Morrison RN  Verbalizes/displays adequate comfort level or baseline comfort level: Encourage patient to monitor pain and request assistance

## 2022-08-30 NOTE — ED NOTES
TRANSFER - OUT REPORT:    Verbal report given to Chari on [place]  being transferred to [phone] for routine progression of patient care       Report consisted of patient's Situation, Background, Assessment and   Recommendations(SBAR). Information from the following report(s) Nurse Handoff Report was reviewed with the receiving nurse. Lines:   Single Lumen Implantable Port Right Subclavian (Active)        Opportunity for questions and clarification was provided.       Patient transported with:  Candelario Diallo RN  08/29/22 7825

## 2022-08-30 NOTE — H&P
[facility] Hematology & Oncology        Inpatient Hematology / Oncology History and Physical    Reason for Admission:  Metastatic malignant neoplasm, unspecified site [facility]) [C79.9]  Anemia, unspecified type [D64.9]  Acute on chronic blood loss anemia [D62]    History of Present Illness:  Mr. Lake Sykes is a 56 y.o. male admitted on 8/29/2022  with a primary diagnosis of abdominal distention and pain. He is a patient of Dr. Pavel Ku with known metastatic gastric cancer. He completed 28 cycles of FOLFIRINOX with disease progression and most recently started on Keytruda. Cycle 2 completed on 8/15/2022. 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 He was discharged 8/26/2022 after admission for same symptoms. He had para removing ~7 liters (malignant cells present) and thora removing ~850 ml. CT at that time showed progression of disease. He returned to ED 8/29/2022 with same complaints as before ab distention and pain. Hgb was 6.9, bili 1.5, LFTs elevated. He has had paracentesis today removing 5500 ml. We are asked to see patient for recommendations. Review of Systems:  Constitutional Denies fever, chills. +weight loss, appetite changes, fatigue   HEENT Denies trauma, blurry vision, hearing loss, ear pain, nosebleeds, sore throat, neck pain    Skin Denies lesions or rashes. Lungs Denies dyspnea, cough, sputum production or hemoptysis. Cardiovascular Denies chest pain, palpitations. ++lower extremity edema. Gastrointestinal Denies nausea, vomiting, changes in bowel habits, bloody or black stools. +abdominal pain.  Denies dysuria, frequency or hesitancy of urination. Neuro Denies headaches, visual changes or ataxia. Denies dizziness, tingling, tremors, sensory change, speech change, focal weakness or headaches. MSK Denies back pain, arthralgias, myalgias or frequent falls.      Psychiatric/Behavioral The patient is not nervous/anxious.          No Known Allergies  Past Medical History:   Diagnosis Date    Acute gastrointestinal bleeding 7/17/2022    COVID 1/30/2022    Gastric cancer ([address]     being treated currently - chemo and radiation    History of blood transfusion     6/30/2022 no rxn to this transfusion    Lazy eye, left     Severe anemia 7/12/2022     Past Surgical History:   Procedure Laterality Date    IR PORT PLACEMENT EQUAL OR GREATER THAN 5 YEARS  11/3/2020    IR PORT PLACEMENT EQUAL OR GREATER THAN 5 YEARS  11/3/2020    IR PORT PLACEMENT EQUAL OR GREATER THAN 5 YEARS 11/3/2020 [facility] RADIOLOGY SPECIALS    ORTHOPEDIC SURGERY      ankle    TONSILLECTOMY      UPPER GASTROINTESTINAL ENDOSCOPY N/A 7/5/2022    EGD DILATION BALLOON performed by Cuca Miguel MD at [address] [facility] N/A 7/14/2022    EGD STENT PLACEMENT performed by Hector Valencia MD at [address] [facility] N/A 8/3/2022    EGD ESOPHAGOGASTRODUODENOSCOPY performed by Lashay Davalos MD at [address] [place] [address]  10/22/2020    US GUIDED LIVER BIOPSY PERCUTANEOUS 10/22/2020 [facility] [address]     Family History   Problem Relation Age of Onset    Schizophrenia Mother     Diabetes Mother     Dementia Mother     Depression Mother      Social History     Socioeconomic History    Marital status:      Spouse name: Not on file    Number of children: Not on file    Years of education: Not on file    Highest education level: Not on file   Occupational History    Not on file   Tobacco Use    Smoking status: Never    Smokeless tobacco: Never   Vaping Use    Vaping Use: Never used   Substance and Sexual Activity    Alcohol use: Yes     Comment: rarely    Drug use: Never    Sexual activity: Not on file   Other Topics Concern    Not on file   Social History Narrative    Not on file     Social Determinants of Health     Financial Resource Strain: Not on file   Food Insecurity: Not on file   Transportation Needs: Not on file   Physical Activity: Not on file   Stress: Not on file   Social Connections: Not on file   Intimate Partner Violence: Not on file   Housing Stability: Not on file     Current Facility-Administered Medications   Medication Dose Route Frequency Provider Last Rate Last Admin    diphenhydrAMINE (BENADRYL) capsule 25 mg  25 mg Oral Q6H PRN Sparkle Flores MD   25 mg at 08/30/22 0026    0.9 % sodium chloride infusion   IntraVENous PRN Deidre Valle MD        sodium bicarbonate tablet 650 mg  650 mg Oral 4x Daily Clement Leon, DO   650 mg at 08/30/22 1028    vitamin D (ERGOCALCIFEROL) capsule 50,000 Units  50,000 Units Oral Daily with breakfast Clement Leon DO   50,000 Units at 08/30/22 1028    gabapentin (NEURONTIN) capsule 300 mg  300 mg Oral BID Clement Leon, DO   300 mg at 08/30/22 1028    HYDROcodone-acetaminophen (NORCO)  MG per tablet 1 tablet  1 tablet Oral Q6H PRN Clement Leon, DO   1 tablet at 08/30/22 1042    levothyroxine (SYNTHROID) tablet 50 mcg  50 mcg Oral QAM AC Clement Leon, DO   50 mcg at 08/30/22 1028    pantoprazole (PROTONIX) tablet 40 mg  40 mg Oral Daily Clement Leon, DO   40 mg at 08/30/22 1028    sodium chloride flush 0.9 % injection 5-40 mL  5-40 mL IntraVENous 2 times per day Clement Leon, DO   10 mL at 08/30/22 1029    sodium chloride flush 0.9 % injection 5-40 mL  5-40 mL IntraVENous PRN Clement Leon, DO        0.9 % sodium chloride infusion   IntraVENous PRN Clement Austina, DO        polyethylene glycol (GLYCOLAX) packet 17 g  17 g Oral Daily PRN Clement Austina, DO        acetaminophen (TYLENOL) tablet 650 mg  650 mg Oral Q6H PRN Clement Leon, DO   650 mg at 08/30/22 6944    Or    acetaminophen (TYLENOL) suppository 650 mg  650 mg Rectal Q6H PRN Clement Austina, DO        tuberculin injection 5 Units  5 Units IntraDERmal Once Clement Leon, DO   5 Units at 08/29/22 2300     Facility-Administered Normal rate, regular rhythm and normal S1 and S2. No murmurs, gallops, or rubs. Abdomen Soft, nontender and nondistended, normoactive bowel sounds. No palpable mass. No hepatosplenomegaly. Neuro Grossly nonfocal with no obvious sensory or motor deficits. MSK Normal range of motion in general.  4+ BLE edema   Psych Appropriate mood and affect.         Labs:    Recent Results (from the past 24 hour(s))   EKG 12 Lead (Select if Upper Abd Pain, or SOB, Diaphoresis or Tachy)    Collection Time: 08/29/22  5:23 PM   Result Value Ref Range    Ventricular Rate 108 BPM    Atrial Rate 112 BPM    P-R Interval 122 ms    QRS Duration 98 ms    Q-T Interval 411 ms    QTc Calculation (Bazett) 549 ms    P Axis 62 degrees    R Axis 105 degrees    T Axis 32 degrees    Diagnosis Sinus tachycardia    CBC with Diff    Collection Time: 08/29/22  6:24 PM   Result Value Ref Range    WBC 7.6 4.3 - 11.1 K/uL    RBC 2.13 (L) 4.23 - 5.6 M/uL    Hemoglobin 6.9 (LL) 13.6 - 17.2 g/dL    Hematocrit 21.2 (L) 41.1 - 50.3 %    MCV 99.5 (H) 79.6 - 97.8 FL    MCH 32.4 26.1 - 32.9 PG    MCHC 32.5 31.4 - 35.0 g/dL    RDW 18.9 (H) 11.9 - 14.6 %    Platelets 826 (L) 154 - 450 K/uL    MPV 9.9 9.4 - 12.3 FL    nRBC 0.00 0.0 - 0.2 K/uL    Differential Type AUTOMATED      Seg Neutrophils 79 (H) 43 - 78 %    Lymphocytes 7 (L) 13 - 44 %    Monocytes 11 4.0 - 12.0 %    Eosinophils % 2 0.5 - 7.8 %    Basophils 1 0.0 - 2.0 %    Immature Granulocytes 1 0.0 - 5.0 %    Segs Absolute 6.0 1.7 - 8.2 K/UL    Absolute Lymph # 0.6 0.5 - 4.6 K/UL    Absolute Mono # 0.8 0.1 - 1.3 K/UL    Absolute Eos # 0.1 0.0 - 0.8 K/UL    Basophils Absolute 0.1 0.0 - 0.2 K/UL    Absolute Immature Granulocyte 0.1 0.0 - 0.5 K/UL   CMP    Collection Time: 08/29/22  6:24 PM   Result Value Ref Range    Sodium 130 (L) 136 - 145 mmol/L    Potassium 3.6 3.5 - 5.1 mmol/L    Chloride 103 98 - 107 mmol/L    CO2 19 (L) 21 - 32 mmol/L    Anion Gap 8 7 - 16 mmol/L    Glucose 87 65 - 100 mg/dL BUN 18 6 - 23 MG/DL    Creatinine 0.80 0.8 - 1.5 MG/DL    GFR African American >60 >60 ml/min/1.73m2    GFR Non- >60 >60 ml/min/1.73m2    Calcium 7.8 (L) 8.3 - 10.4 MG/DL    Total Bilirubin 0.9 0.2 - 1.1 MG/DL    ALT 30 12 - 65 U/L     (H) 15 - 37 U/L    Alk Phosphatase 309 (H) 50 - 136 U/L    Total Protein 5.4 (L) 6.3 - 8.2 g/dL    Albumin 1.9 (L) 3.5 - 5.0 g/dL    Globulin 3.5 2.3 - 3.5 g/dL    Albumin/Globulin Ratio 0.5 (L) 1.2 - 3.5     Lipase    Collection Time: 08/29/22  6:24 PM   Result Value Ref Range    Lipase 31 (L) 73 - 393 U/L   PREPARE RBC (CROSSMATCH), 1 Units    Collection Time: 08/29/22  8:15 PM   Result Value Ref Range    History Check Historical check performed    TYPE AND SCREEN    Collection Time: 08/29/22  8:39 PM   Result Value Ref Range    Crossmatch expiration date 09/01/2022,2359     ABO/Rh A POSITIVE     Antibody Screen NEG     Unit Number [id number]     Product Code Blood Bank [facility] LRIR     Unit Divison 00     Dispense Status Blood Bank ISSUED     Crossmatch Result Compatible    Lactic Acid    Collection Time: 08/29/22 10:28 PM   Result Value Ref Range    Lactic Acid, Plasma 1.9 0.4 - 2.0 MMOL/L   Comprehensive Metabolic Panel w/ Reflex to MG    Collection Time: 08/30/22  4:34 AM   Result Value Ref Range    Sodium 132 (L) 136 - 145 mmol/L    Potassium 3.7 3.5 - 5.1 mmol/L    Chloride 105 98 - 107 mmol/L    CO2 20 (L) 21 - 32 mmol/L    Anion Gap 7 7 - 16 mmol/L    Glucose 103 (H) 65 - 100 mg/dL    BUN 20 6 - 23 MG/DL    Creatinine 0.80 0.8 - 1.5 MG/DL    GFR African American >60 >60 ml/min/1.73m2    GFR Non- >60 >60 ml/min/1.73m2    Calcium 7.7 (L) 8.3 - 10.4 MG/DL    Total Bilirubin 1.5 (H) 0.2 - 1.1 MG/DL    ALT 27 12 - 65 U/L     (H) 15 - 37 U/L    Alk Phosphatase 282 (H) 50 - 136 U/L    Total Protein 5.2 (L) 6.3 - 8.2 g/dL    Albumin 2.1 (L) 3.5 - 5.0 g/dL    Globulin 3.1 2.3 - 3.5 g/dL    Albumin/Globulin Ratio 0.7 (L) 1.2 - 3.5     CBC with Auto Differential    Collection Time: 08/30/22  4:34 AM   Result Value Ref Range    WBC 6.2 4.3 - 11.1 K/uL    RBC 2.71 (L) 4.23 - 5.6 M/uL    Hemoglobin 8.6 (L) 13.6 - 17.2 g/dL    Hematocrit 26.1 (L) 41.1 - 50.3 %    MCV 96.3 79.6 - 97.8 FL    MCH 31.7 26.1 - 32.9 PG    MCHC 33.0 31.4 - 35.0 g/dL    RDW 19.6 (H) 11.9 - 14.6 %    Platelets 96 (L) 517 - 450 K/uL    MPV 10.1 9.4 - 12.3 FL    nRBC 0.03 0.0 - 0.2 K/uL    Differential Type AUTOMATED      Seg Neutrophils 78 43 - 78 %    Lymphocytes 9 (L) 13 - 44 %    Monocytes 11 4.0 - 12.0 %    Eosinophils % 2 0.5 - 7.8 %    Basophils 0 0.0 - 2.0 %    Immature Granulocytes 1 0.0 - 5.0 %    Segs Absolute 4.8 1.7 - 8.2 K/UL    Absolute Lymph # 0.5 0.5 - 4.6 K/UL    Absolute Mono # 0.7 0.1 - 1.3 K/UL    Absolute Eos # 0.1 0.0 - 0.8 K/UL    Basophils Absolute 0.0 0.0 - 0.2 K/UL    Absolute Immature Granulocyte 0.0 0.0 - 0.5 K/UL   DIRECT ANTIGLOBULIN TEST    Collection Time: 08/30/22  4:34 AM   Result Value Ref Range    Polyspecific Zara NEG    Reticulocytes    Collection Time: 08/30/22 10:39 AM   Result Value Ref Range    Reticulocyte Count,Automated 4.7 (H) 0.3 - 2.0 %    Absolute Retic # 0.1175 (H) 0.026 - 0.095 M/ul    Immature Retic Fraction 29.1 (H) 2.3 - 13.4 %    Retic Hemoglobin conc. 32 29 - 35 pg   Protime-INR    Collection Time: 08/30/22 10:39 AM   Result Value Ref Range    Protime 15.8 (H) 12.6 - 14.5 sec    INR 1.2     APTT    Collection Time: 08/30/22 10:39 AM   Result Value Ref Range    PTT 40.7 (H) 24.1 - 35.1 SEC   Fibrinogen    Collection Time: 08/30/22 10:39 AM   Result Value Ref Range    Fibrinogen 219 190 - 501 mg/dL   Hemoglobin and Hematocrit    Collection Time: 08/30/22 10:39 AM   Result Value Ref Range    Hemoglobin 7.9 (L) 13.6 - 17.2 g/dL    Hematocrit 24.2 (L) 41.1 - 50.3 %         PLAN:  Stage IV gastric cancer  -sp cycle 2 Keytruda 8/15     History of GI bleed   -EGD 7/2022 GE junction large friable malignant stricture with stent placed-at risk for bleed-monitor  -Consult rad/onc    Anemia  -check iron studies  -transfuse per Lisseth SOP    PC consulted for code status and goals of care. Lab studies and imaging studies were personally reviewed. Pertinent old records were reviewed. Thank you for allowing us to participate in the care of Mr. Omari Ferrara. We will gladly take over the care of our patient. Frandy Ruff APRN - NP   [facility]  [address] [place] [address]  Office : [phone]  Fax : [phone]        Attending Addendum:  I have personally performed a face to face diagnostic evaluation on this patient. I have reviewed and agree with the care plan as documented above by Frandy Ruff N.P.  My findings are as follows: Patient appears lethargic, heart rate regular without murmurs, abdomen is non-tender, bowel sounds are positive. 78-year-old , metastatic gastric cancer followed by Dr. Joesph Salmeron, with progressive disease on FOLFIRINOX regimen, recently on pembrolizumab with cycle 2 8/15/2022, recently discharged after requiring large-volume paracentesis as well as thoracentesis. He is also known to have a GE junction large friable mass with associated bleeding s/p stent placement. Patient now readmitted with abdominal distention and discomfort. Will recheck iron stores, blood transfusion support as needed. We will consult radiation oncology for any role of palliative XRT to GE junction friable mass to help with bleeding. We will also get palliative care involved to discuss goals of care.                 Amanda Millan MD  [facility]  [address] [id number] [facility]  Office : [phone]  Fax : [phone]

## 2022-08-30 NOTE — PROGRESS NOTES
This RN walked into room and found patient on hands and knees with a puddle of blood beneath him. Asked patient if he had fallen and he said yes and he stated he had hit his head. Assisted patient back into bed and this RN and supervisor RN took patient down to CT to get a CT of head. Patient brought back into room and labs were drawn and platelets and blood ordered.  Patient's wife notified as well as oncology NP.

## 2022-08-30 NOTE — PROGRESS NOTES
TRANSFER - IN REPORT:    Verbal report received from MECHE BAKER on Miguel Day  being received from IR for routine progression of patient care      Report consisted of patient's Situation, Background, Assessment and   Recommendations(SBAR). Information from the following report(s) Nurse Handoff Report was reviewed with the receiving nurse. Opportunity for questions and clarification was provided. Assessment completed upon patient's arrival to unit and care assumed.

## 2022-08-30 NOTE — CONSULTS
I've communicated with NP Devaughn Chan about possible placement of PleurX's before the pt goes home. We have not yet discussed code status. Will discuss findings with members of the interdisciplinary team.      Thank you for this referral.          .  In addition to the E&M described above, more than 50% of a 45-minute prolonged visit, from [phone], was spent on counseling and coordination of care. In addition to the E&M described above, a 60--minute ACP meeting was spent discussing the pt's wishes concerning end of life care. Subjective:     History obtained from:  Patient, Family, Care Provider, and Chart    Chief Complaint: Recurrent ascites    History of Present Illness:  Patient is a 56 y.o. male who presented to the ED on 8/29/22 for recurrent abdominal distention with pain. Hx of hypothyroidism, GERD, stage IV gastric cancer status post 28 cycles of FOLFIRINOX with disease progression,  now on Keytruda with known metastatic lesions throughout the liver and within the left adrenal gland. Was admitted on 8/4 - 8/6/22 for acute GI bleed. Admitted 8/23 -  8/26/22 for severe sepsis, abdominal distention ,and pain. He had paracentesis with almost 7L removed at that time. On this admission he has had   5.5 L removed. Advance Directive: No       Code Status:  Full Code            Health Care Power of : No - Patient does not have a [address]. His wife is his health care surrogate.     Past Medical History:   Diagnosis Date    Acute gastrointestinal bleeding 7/17/2022    COVID 1/30/2022    Gastric cancer ([facility] [address]     being treated currently - chemo and radiation    History of blood transfusion     6/30/2022 no rxn to this transfusion    Lazy eye, left     Severe anemia 7/12/2022      Past Surgical History:   Procedure Laterality Date    IR PORT PLACEMENT EQUAL OR GREATER THAN 5 YEARS  11/3/2020    IR PORT PLACEMENT EQUAL OR GREATER THAN 5 YEARS  11/3/2020    IR PORT dissolved in 4 ounces of H2O 4 times every day on an empty stomach 1 hour before meals and at bedtime for Bleeding  Patient not taking: Reported on 8/23/2022 7/5/22 8/26/22  Historical Provider, MD   potassium chloride (KLOR-CON M) 20 MEQ extended release tablet Take 1 tablet by mouth in the morning and 1 tablet before bedtime. Do all this for 7 days. 8/15/22 8/26/22  SHMUEL Oquendo   pantoprazole (PROTONIX) 40 MG tablet Take 1 tablet by mouth in the morning. 8/6/22 9/5/22  Danna Montilla MD   prochlorperazine (COMPAZINE) 10 MG tablet Take 10 mg by mouth every 6 hours as needed  Patient not taking: No sig reported 10/28/20 8/6/22  Ar Automatic Reconciliation       No Known Allergies     Review of Systems:  A comprehensive review of systems was negative except as noted above. Objective:     Visit Vitals  /65   Pulse 99   Temp 98 °F (36.7 °C) (Oral)   Resp 20   Ht 5' 11\" (1.803 m)   Wt 170 lb (77.1 kg)   SpO2 96%   BMI 23.71 kg/m²        Physical Exam:    General:  Cooperative. No acute distress. Eyes:  Conjunctivae/corneas clear    Nose: Nares normal. Septum midline. Neck: Supple, symmetrical, trachea midline, no JVD   Lungs:   Clear to auscultation bilaterally, unlabored   Heart:  Regular rate and rhythm, no murmur    Abdomen:   Soft, non-tender, non-distended; had paracentesis today removing 5.5 L   Extremities: Normal, atraumatic, no cyanosis or edema   Skin: Skin color, texture, turgor normal. No rash or lesions.    Neurologic: Nonfocal   Psych: Somnolent, but oriented in conversation       Signed By: Phill Aguilera, APRN - CNP     August 30, 2022

## 2022-08-30 NOTE — ED NOTES
Blood consent signed by pt and witnessed. Documentation given to  for scanning.       Arianna Collins RN  08/29/22 8586

## 2022-08-30 NOTE — CARE COORDINATION
Mary Kate Corrales NP with palliative care met with CM to discuss d/c planning. She reviewed with CM the conversation she had with pt and spouse about possibly d/c home with hospice. Spouse wishes to first speak with [facility] regarding the care of pt's mother to see if she can be transferred to a hospice house. If so then pt would like to d/c home with hospice care. NP has placed a hospice order but CM will not yet make a referral.  Pt does not have insurance so CM will need to locate a hospice who will accept a \"izabela\" case. ALFONSO Brock to f/u with CM. CM will continue to follow and remain available if any needs arise.

## 2022-08-31 NOTE — CARE COORDINATION
JULIUS spoke with Migel Naidu NP with palliative care yesterday at [facility] regarding pt's d/c plan. Rudolph Edwards had spoken with pt's spouse Larissa who plans to call [facility] this morning to try to have pt's mother placed. Per Jaciel Cao would like this CM to place a referral for pt to [facility] as this is who his mother is on service with. CM will make the referral and inquire if they will accept a pt who does not have insurance. Per Rudolph Edwrads if [facility] denies the referral then [facility] would like a referral made to [facility]. JULIUS will continue to follow. JULIUS called and spoke with pt's spouse Larissa to discuss d/c planning. She stated that she needs to have her mother-in-law moved to a hospice house before pt can be d/c home as she \"cannot handle both of them at the same time. Her mother-in-law is too much to handle. \" JULIUS gave Larissa the contact information for the liaison at [facility] as another option. Larissa gave JULIUS the following phone number for [facility]: [phone]. JULIUS spoke with the oncology NP about d/c planning. It is still undecided if a PleurX will be placed. NP agrees with JULIUS that it is premature to call hospice today regarding pt. JULIUS will continue to follow.

## 2022-08-31 NOTE — PROCEDURES
Procedure Note         Name: Beau Sicard      MRN: [id number]       : 1975       Age: 56 y.o. Sex: male      Anesthesia: local     Complications: None    Estimated Blood Loss: Minimal           Specimens: none    Procedure Details CPT 57605    Indications: See progress note. Procedure in Detail:   The right scalp was prepped and draped in a sterile fashion. Patient's laceration site was anesthetized locally with 1% Lidocaine. Hemostasis was verified. Laceration was closed with staples. Petroleum gauze and pressure dressing placed. The patient tolerated the procedure well. Patient will need staples removed in 7-10 days.      Signed by: Inder Anthony MD  [facility] - Bariatric & Minimally Invasive Surgery  2022 9:00 PM

## 2022-08-31 NOTE — PROGRESS NOTES
[facility] Hematology & Oncology        Inpatient Hematology / Oncology Progress Note    Reason for Admission:  Metastatic malignant neoplasm, unspecified site [facility]) [C79.9]  Anemia, unspecified type [D64.9]  Acute on chronic blood loss anemia [D62]    24 Hour Events:  Fell overnight - staples placed in head lac by surgery  CT head negative after fall   CM working on placement - possibly considering Hospice   Abdomen feels better after paracentesis       ROS:  Constitutional: Positive for fatigue; negative for fever, chills  CV: Negative for chest pain, palpitations, edema. Respiratory: Negative for dyspnea, cough, wheezing. GI: Positive for previous abdominal pain/ascites; negative for nausea, diarrhea. Skin:  Head laceration     10 point review of systems is otherwise negative with the exception of the elements mentioned above in the HPI.        No Known Allergies  Past Medical History:   Diagnosis Date    Acute gastrointestinal bleeding 7/17/2022    COVID 1/30/2022    Gastric cancer ([address]     being treated currently - chemo and radiation    History of blood transfusion     6/30/2022 no rxn to this transfusion    Lazy eye, left     Severe anemia 7/12/2022     Past Surgical History:   Procedure Laterality Date    IR PORT PLACEMENT EQUAL OR GREATER THAN 5 YEARS  11/3/2020    IR PORT PLACEMENT EQUAL OR GREATER THAN 5 YEARS  11/3/2020    IR PORT PLACEMENT EQUAL OR GREATER THAN 5 YEARS 11/3/2020 [facility] SPECIALS    ORTHOPEDIC SURGERY      ankle    TONSILLECTOMY      UPPER GASTROINTESTINAL ENDOSCOPY N/A 7/5/2022    EGD DILATION BALLOON performed by Chaka Hernandez MD at [address] [place] [address] 7/14/2022    EGD STENT PLACEMENT performed by Bebe Jenkins MD at [address] [place] [address] N/A 8/3/2022    EGD ESOPHAGOGASTRODUODENOSCOPY performed by Carlota Ray MD at [address] [place] [address]  10/22/2020    [address] PERCUTANEOUS 10/22/2020 [facility]OLOGY US     Family History   Problem Relation Age of Onset    Schizophrenia Mother     Diabetes Mother     Dementia Mother     Depression Mother      Social History     Socioeconomic History    Marital status:      Spouse name: Not on file    Number of children: Not on file    Years of education: Not on file    Highest education level: Not on file   Occupational History    Not on file   Tobacco Use    Smoking status: Never    Smokeless tobacco: Never   Vaping Use    Vaping Use: Never used   Substance and Sexual Activity    Alcohol use: Yes     Comment: rarely    Drug use: Never    Sexual activity: Not on file   Other Topics Concern    Not on file   Social History Narrative    Not on file     Social Determinants of Health     Financial Resource Strain: Not on file   Food Insecurity: Not on file   Transportation Needs: Not on file   Physical Activity: Not on file   Stress: Not on file   Social Connections: Not on file   Intimate Partner Violence: Not on file   Housing Stability: Not on file     Current Facility-Administered Medications   Medication Dose Route Frequency Provider Last Rate Last Admin    0.9 % sodium chloride infusion   IntraVENous PRN Kevin Norton MD        ondansetron [facility]) injection 4 mg  4 mg IntraVENous Q6H PRN ARY Murphy - LE        prochlorperazine (COMPAZINE) injection 10 mg  10 mg IntraVENous Q6H PRN ARY Murphy - CNP   10 mg at 08/31/22 1303    diphenhydrAMINE (BENADRYL) capsule 25 mg  25 mg Oral Q6H PRN Hope Handley MD   25 mg at 08/31/22 0256    0.9 % sodium chloride infusion   IntraVENous PRN Andrae Clark MD        sodium bicarbonate tablet 650 mg  650 mg Oral 4x Daily Anu Ron DO   650 mg at 08/31/22 1303    gabapentin (NEURONTIN) capsule 300 mg  300 mg Oral BID Anu Ron DO   300 mg at 08/31/22 0826    HYDROcodone-acetaminophen (NORCO)  MG per tablet 1 tablet  1 tablet Oral Q6H PRN Anu Ron DO 1 tablet at 22 0306    levothyroxine (SYNTHROID) tablet 50 mcg  50 mcg Oral QAM AC Ellene Barnacle, DO   50 mcg at 22 8555    pantoprazole (PROTONIX) tablet 40 mg  40 mg Oral Daily Surjit Brooksnacle, DO   40 mg at 22 9518    sodium chloride flush 0.9 % injection 5-40 mL  5-40 mL IntraVENous 2 times per day Ellene Barnacle, DO   10 mL at 22 7957    sodium chloride flush 0.9 % injection 5-40 mL  5-40 mL IntraVENous PRN Ellene Barnacle, DO        0.9 % sodium chloride infusion   IntraVENous PRN Ellene Barnacle, DO        polyethylene glycol (GLYCOLAX) packet 17 g  17 g Oral Daily PRN Ellene Barnacle, DO        acetaminophen (TYLENOL) tablet 650 mg  650 mg Oral Q6H PRN Xinene Barnacle, DO   650 mg at 22 0014    Or    acetaminophen (TYLENOL) suppository 650 mg  650 mg Rectal Q6H PRN Surjit Barnacle, DO         Facility-Administered Medications Ordered in Other Encounters   Medication Dose Route Frequency Provider Last Rate Last Admin    atropine injection 0.4 mg  0.4 mg IntraVENous Once Chrissy Baeza MD           OBJECTIVE:  Patient Vitals for the past 8 hrs:   BP Temp Temp src Pulse Resp SpO2   22 1045 98/73 97.5 °F (36.4 °C) Oral (!) 102 16 98 %   22 0830 104/74 98.5 °F (36.9 °C) Oral (!) 106 16 97 %     Temp (24hrs), Av.2 °F (36.8 °C), Min:97.5 °F (36.4 °C), Max:98.7 °F (37.1 °C)     07 -  1900  In: 5 [P.O.:420]  Out: -     Physical Exam:  Constitutional: Well developed, well nourished male in no acute distress, sitting comfortably in the hospital bed. HEENT: Normocephalic and atraumatic. Oropharynx is clear, mucous membranes are moist.  Neck supple. Lymph node   Deferred. Skin Warm and dry. No bruising and no rash noted. +head lac with staples - covered with dressing    Respiratory Lungs are clear to auscultation bilaterally without wheezes, rales or rhonchi, normal air exchange without accessory muscle use.     CVS Tachycardic rate, regular rhythm and normal S1 and S2. No murmurs, gallops, or rubs. Abdomen Soft, non-tender, normoactive bowel sounds. No palpable mass. +ascites (better)   Neuro Grossly nonfocal with no obvious sensory or motor deficits. MSK Normal range of motion in general.  +BLE edema   Psych Appropriate mood and affect.         Labs:    Recent Results (from the past 24 hour(s))   PREPARE PLATELETS, 1 Product    Collection Time: 08/30/22  4:45 PM   Result Value Ref Range    Unit Number [id number]     Product Code Blood Bank [facility],LRIR1     Unit Divison 00     Dispense Status Blood Bank REL FROM [facility]    Ferritin    Collection Time: 08/30/22  5:18 PM   Result Value Ref Range    Ferritin 331 8 - 388 NG/ML   Transferrin Saturation    Collection Time: 08/30/22  5:18 PM   Result Value Ref Range    Iron 55 35 - 150 ug/dL    TIBC 117 (L) 250 - 450 ug/dL    TRANSFERRIN SATURATION 47 >20 %   Vitamin B12    Collection Time: 08/30/22  5:18 PM   Result Value Ref Range    Vitamin B-12 1270 (H) 193 - 986 pg/mL   Folate    Collection Time: 08/30/22  5:18 PM   Result Value Ref Range    Folate 7.9 3.1 - 17.5 ng/mL   Protime-INR    Collection Time: 08/30/22  5:18 PM   Result Value Ref Range    Protime 16.7 (H) 12.6 - 14.5 sec    INR 1.3     APTT    Collection Time: 08/30/22  5:18 PM   Result Value Ref Range    PTT 42.4 (H) 24.1 - 35.1 SEC   CBC with Auto Differential    Collection Time: 08/30/22  5:18 PM   Result Value Ref Range    WBC 6.1 4.3 - 11.1 K/uL    RBC 2.26 (L) 4.23 - 5.6 M/uL    Hemoglobin 7.2 (L) 13.6 - 17.2 g/dL    Hematocrit 21.6 (L) 41.1 - 50.3 %    MCV 95.6 79.6 - 97.8 FL    MCH 31.9 26.1 - 32.9 PG    MCHC 33.3 31.4 - 35.0 g/dL    RDW 21.2 (H) 11.9 - 14.6 %    Platelets 82 (L) 613 - 450 K/uL    MPV 10.1 9.4 - 12.3 FL    nRBC 0.03 0.0 - 0.2 K/uL    Differential Type AUTOMATED      Seg Neutrophils 79 (H) 43 - 78 %    Lymphocytes 8 (L) 13 - 44 %    Monocytes 11 4.0 - 12.0 %    Eosinophils % 2 0.5 - 7.8 %    Basophils 0 0.0 - 2.0 %    Immature Granulocytes 1 0.0 - 5.0 %    Segs Absolute 4.8 1.7 - 8.2 K/UL    Absolute Lymph # 0.5 0.5 - 4.6 K/UL    Absolute Mono # 0.7 0.1 - 1.3 K/UL    Absolute Eos # 0.1 0.0 - 0.8 K/UL    Basophils Absolute 0.0 0.0 - 0.2 K/UL    Absolute Immature Granulocyte 0.0 0.0 - 0.5 K/UL   Fibrinogen    Collection Time: 08/30/22  5:18 PM   Result Value Ref Range    Fibrinogen 179 (L) 190 - 501 mg/dL   PREPARE RBC (CROSSMATCH), 1 Units    Collection Time: 08/30/22  5:45 PM   Result Value Ref Range    History Check Historical check performed    PLEASE READ & DOCUMENT PPD TEST IN 24 HRS    Collection Time: 08/30/22 11:00 PM   Result Value Ref Range    PPD, (POC) Negative Negative    mm Induration 0 0 - 5 mm   Hemoglobin and Hematocrit    Collection Time: 08/30/22 11:55 PM   Result Value Ref Range    Hemoglobin 6.9 (LL) 13.6 - 17.2 g/dL    Hematocrit 21.6 (L) 41.1 - 50.3 %   PREPARE RBC (CROSSMATCH), 1 Units    Collection Time: 08/31/22  1:00 AM   Result Value Ref Range    History Check Historical check performed    Comprehensive Metabolic Panel    Collection Time: 08/31/22  7:11 AM   Result Value Ref Range    Sodium 132 (L) 138 - 145 mmol/L    Potassium 3.5 3.5 - 5.1 mmol/L    Chloride 103 101 - 110 mmol/L    CO2 22 21 - 32 mmol/L    Anion Gap 7 4 - 13 mmol/L    Glucose 98 65 - 100 mg/dL    BUN 17 6 - 23 MG/DL    Creatinine 0.70 (L) 0.8 - 1.5 MG/DL    GFR African American >60 >60 ml/min/1.73m2    GFR Non- >60 >60 ml/min/1.73m2    Calcium 7.4 (L) 8.3 - 10.4 MG/DL    Total Bilirubin 2.4 (H) 0.2 - 1.1 MG/DL    ALT 25 12 - 65 U/L     (H) 15 - 37 U/L    Alk Phosphatase 236 (H) 50 - 136 U/L    Total Protein 5.0 (L) 6.3 - 8.2 g/dL    Albumin 2.2 (L) 3.5 - 5.0 g/dL    Globulin 2.8 2.3 - 3.5 g/dL    Albumin/Globulin Ratio 0.8 (L) 1.2 - 3.5     Magnesium    Collection Time: 08/31/22  7:11 AM   Result Value Ref Range    Magnesium 2.0 1.8 - 2.4 mg/dL   CBC with Auto Differential    Collection Time: 08/31/22 7:12 AM   Result Value Ref Range    WBC 8.7 4.3 - 11.1 K/uL    RBC 3.03 (L) 4.23 - 5.6 M/uL    Hemoglobin 9.5 (L) 13.6 - 17.2 g/dL    Hematocrit 27.8 (L) 41.1 - 50.3 %    MCV 91.7 79.6 - 97.8 FL    MCH 31.4 26.1 - 32.9 PG    MCHC 34.2 31.4 - 35.0 g/dL    RDW 19.4 (H) 11.9 - 14.6 %    Platelets 73 (L) 277 - 450 K/uL    MPV 10.4 9.4 - 12.3 FL    nRBC 0.02 0.0 - 0.2 K/uL    Differential Type AUTOMATED      Seg Neutrophils 81 (H) 43 - 78 %    Lymphocytes 5 (L) 13 - 44 %    Monocytes 11 4.0 - 12.0 %    Eosinophils % 1 0.5 - 7.8 %    Basophils 1 0.0 - 2.0 %    Immature Granulocytes 1 0.0 - 5.0 %    Segs Absolute 7.1 1.7 - 8.2 K/UL    Absolute Lymph # 0.5 0.5 - 4.6 K/UL    Absolute Mono # 1.0 0.1 - 1.3 K/UL    Absolute Eos # 0.1 0.0 - 0.8 K/UL    Basophils Absolute 0.0 0.0 - 0.2 K/UL    Absolute Immature Granulocyte 0.1 0.0 - 0.5 K/UL       Imaging:  CT of the head without contrast.       CLINICAL INDICATION: Unwitnessed fall with blood along the right side of the   forehead       PROCEDURE: Serial thin section axial images are obtained from the cranial vertex   through the skull base without the administration of intravenous contrast.    Radiation dose reduction techniques were used for this study. Our CT scanners   use one or all of the following: Automated exposure control, adjusted of the mA   and/or kV according to patient size, iterative reconstruction       COMPARISON: No prior. FINDINGS: There is no acute intracranial hemorrhage, mass, or mass effect. No   abnormal extra-axial fluid collections identified. There is no hydrocephalus. The basilar cisterns are widely patent. The gray-white matter brain parenchymal   interface is well-maintained. No skull fracture or aggressive osseous lesion   noted. The mastoid air cells and included paranasal sinuses are clear. Impression   No acute intracranial abnormality.        ASSESSMENT:  Patient Active Problem List   Diagnosis    Urgency of urination Malignant neoplasm of overlapping sites of stomach (HCC)    CINV (chemotherapy-induced nausea and vomiting)    Anemia    Thrombocytopenia (HCC)    Personal history of COVID-19    Hypoproteinemia (HCC)    Hypokalemia    GIB (gastrointestinal bleeding)    Hypothyroidism    PNA (pneumonia)    Acute GI bleeding    Gastric carcinoma (HCC)    Ascites    Pleural effusion    Hypophosphatemia    Acute on chronic blood loss anemia    Metabolic acidosis    Metastatic malignant neoplasm (HCC)    Neoplastic malignant related fatigue    Frailty    Encounter for palliative care    ACP (advance care planning)    Laceration of scalp without foreign body    Fall     Mr. Omari Ferrara is a 56 y.o. male admitted on 8/29/2022  with a primary diagnosis of abdominal distention and pain. He is a patient of Dr. Joesph Salmeron with known metastatic gastric cancer. He completed 28 cycles of FOLFIRINOX with disease progression and most recently started on Keytruda. Cycle 2 completed on 8/15/2022. 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 He was discharged 8/26/2022 after admission for same symptoms. He had para removing ~7 liters (malignant cells present) and thora removing ~850 ml. CT at that time showed progression of disease. He returned to ED 8/29/2022 with same complaints as before ab distention and pain. Hgb was 6.9, bili 1.5, LFTs elevated. He has had paracentesis today removing 5500 ml. We are asked to see patient for recommendations.      PLAN:  Stage IV gastric cancer  -sp cycle 2 Keytruda 8/15  8/31 Dr. 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 Anshul Hanks      Anemia  -check iron studies  -transfuse per Lisseth SOP  8/31 No GOOD, sabiha negative. No folate/B12 def. LD elevated, but Hapto WNL. Transfused 2 units of PRBC overnight d/t blood loss secondary to fall/head lac and Hgb now 9.5     Head Laceration s/p Fal  8/31 Staples placed yesterday by Dr. Marylen Sergeant. Pt will need staples removed in 7-10 days. Updates to Plan of care:   Discussed with wife over the phone today. Discussed below information (caring for her mother in law also in Hospice). They would like to discuss the possibility of abdominal pleur-x prior to d/c with Hospice (TBD when d/t logistics with her mother in law). Discharge Plan:  PC consulted for code status and goals of care. Considering Hospice, pt's mother is currently with home Hospice and pt's wife cares for her. She wishes to have her placed in a [address] prior to taking [address] home. CM following. Goals and plan of care reviewed with the patient. All questions answered to the best of our ability. ARY Barcenas NP   [facility] Hematology & Oncology  [address] [place] [address]  Office : [phone]  Fax : [phone]        Attending Addendum:  I have personally performed a face to face diagnostic evaluation on this patient. I have reviewed and agree with the care plan as documented above by Vu Antunez N.P.  My findings are as follows: Patient appears lethargic, heart rate regular without murmurs, abdomen is non-tender, bowel sounds are positive. 78-year-old , metastatic gastric cancer followed by Dr. Gisel Silveira, with progressive disease on FOLFIRINOX regimen, recently on pembrolizumab with cycle 2 8/15/2022, recently discharged after requiring large-volume paracentesis as well as thoracentesis. He is also known to have a GE junction large friable mass with associated bleeding s/p stent placement. Patient now readmitted with abdominal distention and discomfort.  Overnight fall w bleeding from laceration needing staples placement. Head CT -ve. S/p PRBC transfusion. Pall care following, working towards home hospice care.                      Domnigo Rodriguez MD  [facility] Hematology/Oncology  [address] [place] [address]  Office : [phone]  Fax : [phone]

## 2022-08-31 NOTE — CONSULTS
Sandra Norton MD   Bariatric & Advanced Laparoscopic Surgery & Endoscopy  [facility] [address]  [place]  Phone [phone]   Fax [phone]      Date of visit: 2022      Primary/Requesting provider: None Provider         Name: Nikia Gold      MRN: [id number]       : 1975       Age: 56 y.o. Sex: male        PCP: None Provider     CC:    Chief Complaint   Patient presents with    Abdominal Pain       HPI:     Nikia Gold is a 56 y.o. male who feel while trying to use the urinal. He reports hitting his head with the floor. He was then noticed to have a right anterior scalp laceration. The laceration was bleeding but a pressure dressing was placed and bleeding stopped. He had a head CT which was negative. He is currently receiving blood. He reports pain at the scalp laceration site.          PMH:    Past Medical History:   Diagnosis Date    Acute gastrointestinal bleeding 2022    COVID 2022    Gastric cancer ([address]     being treated currently - chemo and radiation    History of blood transfusion     2022 no rxn to this transfusion    Lazy eye, left     Severe anemia 2022       PSH:    Past Surgical History:   Procedure Laterality Date    IR PORT PLACEMENT EQUAL OR GREATER THAN 5 YEARS  11/3/2020    IR PORT PLACEMENT EQUAL OR GREATER THAN 5 YEARS  11/3/2020    IR PORT PLACEMENT EQUAL OR GREATER THAN 5 YEARS 11/3/2020 [facility]    ORTHOPEDIC SURGERY      ankle    TONSILLECTOMY      UPPER GASTROINTESTINAL ENDOSCOPY N/A 2022    EGD DILATION BALLOON performed by Edwin Crews MD at [address] [place] [address] 2022    EGD STENT PLACEMENT performed by Yoav Moore MD at [facility] N/A 8/3/2022    EGD ESOPHAGOGASTRODUODENOSCOPY performed by Shannon Fry MD at [facility]  10/22/2020    [place] [id number] LIVER BIOPSY PERCUTANEOUS 10/22/2020 [facility]       MEDS:    Current Facility-Administered Medications   Medication Dose Route Frequency Provider Last Rate Last Admin    diphenhydrAMINE (BENADRYL) capsule 25 mg  25 mg Oral Q6H PRN Tulio Hu MD   25 mg at 08/30/22 2010    0.9 % sodium chloride infusion   IntraVENous PRN Jayashree Hobbs, APRN - NP        0.9 % sodium chloride infusion   IntraVENous PRN Jayashree Hobbs, APRN - NP        0.9 % sodium chloride infusion   IntraVENous PRN Britta Mallory MD        sodium bicarbonate tablet 650 mg  650 mg Oral 4x Daily Murrieta Render, DO   650 mg at 08/30/22 2010    vitamin D (ERGOCALCIFEROL) capsule 50,000 Units  50,000 Units Oral Daily with breakfast Murrieta Render, DO   50,000 Units at 08/30/22 1028    gabapentin (NEURONTIN) capsule 300 mg  300 mg Oral BID Murrieta Render, DO   300 mg at 08/30/22 2010    HYDROcodone-acetaminophen (NORCO)  MG per tablet 1 tablet  1 tablet Oral Q6H PRN Murrieta Render, DO   1 tablet at 08/30/22 1624    levothyroxine (SYNTHROID) tablet 50 mcg  50 mcg Oral QAM AC Murrieta Render, DO   50 mcg at 08/30/22 1028    pantoprazole (PROTONIX) tablet 40 mg  40 mg Oral Daily Murrieta Render, DO   40 mg at 08/30/22 1028    sodium chloride flush 0.9 % injection 5-40 mL  5-40 mL IntraVENous 2 times per day Murrieta Render, DO   10 mL at 08/30/22 2015    sodium chloride flush 0.9 % injection 5-40 mL  5-40 mL IntraVENous PRN Murrieta Render, DO        0.9 % sodium chloride infusion   IntraVENous PRN Murrieta Render, DO        polyethylene glycol (GLYCOLAX) packet 17 g  17 g Oral Daily PRN Murrieta Render, DO        acetaminophen (TYLENOL) tablet 650 mg  650 mg Oral Q6H PRN Murrieta Render, DO   650 mg at 08/30/22 2010    Or    acetaminophen (TYLENOL) suppository 650 mg  650 mg Rectal Q6H PRN Murrieta Render, DO        tuberculin injection 5 Units  5 Units IntraDERmal Once Murrieta Render, DO   5 Units at 08/29/22 2300 Facility-Administered Medications Ordered in Other Encounters   Medication Dose Route Frequency Provider Last Rate Last Admin    atropine injection 0.4 mg  0.4 mg IntraVENous Once John Loera MD            ALLERGIES:      No Known Allergies    SH:    Social History     Tobacco Use    Smoking status: Never    Smokeless tobacco: Never   Vaping Use    Vaping Use: Never used   Substance Use Topics    Alcohol use: Yes     Comment: rarely    Drug use: Never       FH:    Family History   Problem Relation Age of Onset    Schizophrenia Mother     Diabetes Mother     Dementia Mother     Depression Mother        Review of systems:  Review of Systems   Constitutional:  Negative for chills, fatigue, fever and unexpected weight change. HENT:  Negative for ear discharge, ear pain and facial swelling. Eyes:  Negative for pain and itching. Respiratory:  Negative for cough, chest tightness and shortness of breath. Cardiovascular:  Negative for chest pain. Gastrointestinal:  Negative for abdominal distention, abdominal pain, blood in stool, constipation, diarrhea, nausea and vomiting. Genitourinary:  Negative for difficulty urinating, dysuria and hematuria. Musculoskeletal:  Negative for back pain, gait problem and neck pain. Skin:  Negative for rash and wound. Neurological:  Positive for weakness, light-headedness and headaches. Negative for facial asymmetry and speech difficulty. Hematological:  Does not bruise/bleed easily. Psychiatric/Behavioral:  Negative for agitation, decreased concentration and sleep disturbance. Physical Exam:     BP 99/63   Pulse (!) 101   Temp 98.7 °F (37.1 °C) (Oral)   Resp 18   Ht 5' 11\" (1.803 m)   Wt 170 lb (77.1 kg)   SpO2 94%   BMI 23.71 kg/m²     General:  Well-developed, well-nourished, no distress. Psych:  Cooperative, good insight and judgement. Neuro:  Alert, oriented to person, place and time. HEENT:  Normocephalic, Sclera clear.  Right anterior 44 %    Monocytes 11 4.0 - 12.0 %    Eosinophils % 2 0.5 - 7.8 %    Basophils 0 0.0 - 2.0 %    Immature Granulocytes 1 0.0 - 5.0 %    Segs Absolute 4.8 1.7 - 8.2 K/UL    Absolute Lymph # 0.5 0.5 - 4.6 K/UL    Absolute Mono # 0.7 0.1 - 1.3 K/UL    Absolute Eos # 0.1 0.0 - 0.8 K/UL    Basophils Absolute 0.0 0.0 - 0.2 K/UL    Absolute Immature Granulocyte 0.0 0.0 - 0.5 K/UL   DIRECT ANTIGLOBULIN TEST    Collection Time: 08/30/22  4:34 AM   Result Value Ref Range    Polyspecific Zara NEG    Reticulocytes    Collection Time: 08/30/22 10:39 AM   Result Value Ref Range    Reticulocyte Count,Automated 4.7 (H) 0.3 - 2.0 %    Absolute Retic # 0.1175 (H) 0.026 - 0.095 M/ul    Immature Retic Fraction 29.1 (H) 2.3 - 13.4 %    Retic Hemoglobin conc. 32 29 - 35 pg   Lactate Dehydrogenase    Collection Time: 08/30/22 10:39 AM   Result Value Ref Range    LD 3,327 (H) 100 - 190 U/L   Protime-INR    Collection Time: 08/30/22 10:39 AM   Result Value Ref Range    Protime 15.8 (H) 12.6 - 14.5 sec    INR 1.2     APTT    Collection Time: 08/30/22 10:39 AM   Result Value Ref Range    PTT 40.7 (H) 24.1 - 35.1 SEC   Fibrinogen    Collection Time: 08/30/22 10:39 AM   Result Value Ref Range    Fibrinogen 219 190 - 501 mg/dL   D-Dimer, Quantitative    Collection Time: 08/30/22 10:39 AM   Result Value Ref Range    D-Dimer, Quant 9.87 (H) <0.56 ug/ml(FEU)   Hemoglobin and Hematocrit    Collection Time: 08/30/22 10:39 AM   Result Value Ref Range    Hemoglobin 7.9 (L) 13.6 - 17.2 g/dL    Hematocrit 24.2 (L) 41.1 - 50.3 %   PREPARE PLATELETS, 1 Product    Collection Time: 08/30/22  4:45 PM   Result Value Ref Range    Unit Number [id number]     Product Code Blood Bank [facility]     Unit Divison 00     Dispense Status Blood Bank ALLOCATED    Ferritin    Collection Time: 08/30/22  5:18 PM   Result Value Ref Range    Ferritin 331 8 - 388 NG/ML   Transferrin Saturation    Collection Time: 08/30/22  5:18 PM   Result Value Ref Range    Iron 55 35 - 150 ug/dL    TIBC 117 (L) 250 - 450 ug/dL    TRANSFERRIN SATURATION 47 >20 %   Vitamin B12    Collection Time: 08/30/22  5:18 PM   Result Value Ref Range    Vitamin B-12 1270 (H) 193 - 986 pg/mL   Folate    Collection Time: 08/30/22  5:18 PM   Result Value Ref Range    Folate 7.9 3.1 - 17.5 ng/mL   Protime-INR    Collection Time: 08/30/22  5:18 PM   Result Value Ref Range    Protime 16.7 (H) 12.6 - 14.5 sec    INR 1.3     APTT    Collection Time: 08/30/22  5:18 PM   Result Value Ref Range    PTT 42.4 (H) 24.1 - 35.1 SEC   CBC with Auto Differential    Collection Time: 08/30/22  5:18 PM   Result Value Ref Range    WBC 6.1 4.3 - 11.1 K/uL    RBC 2.26 (L) 4.23 - 5.6 M/uL    Hemoglobin 7.2 (L) 13.6 - 17.2 g/dL    Hematocrit 21.6 (L) 41.1 - 50.3 %    MCV 95.6 79.6 - 97.8 FL    MCH 31.9 26.1 - 32.9 PG    MCHC 33.3 31.4 - 35.0 g/dL    RDW 21.2 (H) 11.9 - 14.6 %    Platelets 82 (L) 309 - 450 K/uL    MPV 10.1 9.4 - 12.3 FL    nRBC 0.03 0.0 - 0.2 K/uL    Differential Type AUTOMATED      Seg Neutrophils 79 (H) 43 - 78 %    Lymphocytes 8 (L) 13 - 44 %    Monocytes 11 4.0 - 12.0 %    Eosinophils % 2 0.5 - 7.8 %    Basophils 0 0.0 - 2.0 %    Immature Granulocytes 1 0.0 - 5.0 %    Segs Absolute 4.8 1.7 - 8.2 K/UL    Absolute Lymph # 0.5 0.5 - 4.6 K/UL    Absolute Mono # 0.7 0.1 - 1.3 K/UL    Absolute Eos # 0.1 0.0 - 0.8 K/UL    Basophils Absolute 0.0 0.0 - 0.2 K/UL    Absolute Immature Granulocyte 0.0 0.0 - 0.5 K/UL   Fibrinogen    Collection Time: 08/30/22  5:18 PM   Result Value Ref Range    Fibrinogen 179 (L) 190 - 501 mg/dL   PREPARE RBC (CROSSMATCH), 1 Units    Collection Time: 08/30/22  5:45 PM   Result Value Ref Range    History Check Historical check performed        Imaging: CT images were independently reviewed by me. No acute intracranial injury.      CT HEAD WO CONTRAST  Narrative: CT of the head without contrast.    CLINICAL INDICATION: Unwitnessed fall with blood along the right side of the  forehead    PROCEDURE: Serial thin section axial images are obtained from the cranial vertex  through the skull base without the administration of intravenous contrast.   Radiation dose reduction techniques were used for this study. Our CT scanners  use one or all of the following: Automated exposure control, adjusted of the mA  and/or kV according to patient size, iterative reconstruction    COMPARISON: No prior. FINDINGS: There is no acute intracranial hemorrhage, mass, or mass effect. No  abnormal extra-axial fluid collections identified. There is no hydrocephalus. The basilar cisterns are widely patent. The gray-white matter brain parenchymal  interface is well-maintained. No skull fracture or aggressive osseous lesion  noted. The mastoid air cells and included paranasal sinuses are clear. Impression: No acute intracranial abnormality. IR US GUIDED PARACENTESIS  Narrative: Title: Ultrasound guided paracentesis. History: 67-year-old male with metastatic gastric cancer, left pleural effusion,  recent sepsis, and large volume ascites. :  Dorothy Bender PA-C  Observed by: Flora Narvaez PA-C    Supervising Physician: Dr. Wendy Ortiz    Consent:  Informed written and oral consent was obtained from the patient after  the risks and benefits were discussed. All questions were answered and the  patient requested that we proceed. Procedure:  Maximal sterile barrier technique was used. Following routine prep  and drape of the abdomen, a local field block with 1% lidocaine was achieved. Ultrasound evaluation was performed. Fluid was identified in the peritoneal cavity. Using real-time ultrasound  guidance, the peritoneal cavity was accessed with an 8 [place] centesis catheter. The catheter was connected to wall suction. A total of 5500 cc of thin, bloody ascitic fluid was removed. The catheter was  removed and a bandage applied.   Sample: 240 cc of bloody ascitic fluid was sent to the

## 2022-08-31 NOTE — PROGRESS NOTES
Evelyn Angelo MD   Bariatric & Advanced Laparoscopic Surgery & Endoscopy  [address] [facility] [address], [address] [facility]  [facility] [place]  Phone [phone]   Fax [phone]      Date of visit: 2022      Primary/Requesting provider: None Provider         Name: Allna Salas      MRN: [id number]       : 1975       Age: 56 y.o. Sex: male        PCP: None Provider     CC:    Chief Complaint   Patient presents with    Abdominal Pain       HPI:     Allan Salas is a 56 y.o. male who feel while trying to use the urinal. He reports hitting his head with the floor. He was then noticed to have a right anterior scalp laceration. The laceration was bleeding but a pressure dressing was placed and bleeding stopped. He had a head CT which was negative. He is currently receiving blood. He reports pain at the scalp laceration site. 22: Alert. Sore. Asking for help with OOB. AF/VSS. Head dsg CDI not removed.      PMH:    Past Medical History:   Diagnosis Date    Acute gastrointestinal bleeding 2022    COVID 2022    Gastric cancer ([address]     being treated currently - chemo and radiation    History of blood transfusion     2022 no rxn to this transfusion    Lazy eye, left     Severe anemia 2022       PSH:    Past Surgical History:   Procedure Laterality Date    IR PORT PLACEMENT EQUAL OR GREATER THAN 5 YEARS  11/3/2020    IR PORT PLACEMENT EQUAL OR GREATER THAN 5 YEARS  11/3/2020    IR PORT PLACEMENT EQUAL OR GREATER THAN 5 YEARS 11/3/2020 [facility]OLOG[facility] SPECIALS    ORTHOPEDIC SURGERY      ankle    TONSILLECTOMY      UPPER GASTROINTESTINAL ENDOSCOPY N/A 2022    EGD DILATION BALLOON performed by Keagan Schroeder MD at [address] [place] [address] 2022    EGD STENT PLACEMENT performed by Shady Parrish MD at [place] [address] N/A 8/3/2022    EGD ESOPHAGOGASTRODUODENOSCOPY performed by Rain Hutchinson, MD at [address] [facility] LIVER BIOPSY PERCUTANEOUS  10/22/2020    US GUIDED LIVER BIOPSY PERCUTANEOUS 10/22/2020 D RADIOLOGY US       MEDS:    Current Facility-Administered Medications   Medication Dose Route Frequency Provider Last Rate Last Admin    0.9 % sodium chloride infusion   IntraVENous PRN Eveline Hull MD        diphenhydrAMINE (BENADRYL) capsule 25 mg  25 mg Oral Q6H PRN Silvia Kendrick MD   25 mg at 08/31/22 0256    0.9 % sodium chloride infusion   IntraVENous PRN Sushant Valdez MD        sodium bicarbonate tablet 650 mg  650 mg Oral 4x Daily [facility], DO   650 mg at 08/31/22 1973    gabapentin (NEURONTIN) capsule 300 mg  300 mg Oral BID [facility], DO   300 mg at 08/31/22 0826    HYDROcodone-acetaminophen (NORCO)  MG per tablet 1 tablet  1 tablet Oral Q6H PRN [facility], DO   1 tablet at 08/31/22 0306    levothyroxine (SYNTHROID) tablet 50 mcg  50 mcg Oral QAM AC [facility], DO   50 mcg at 08/31/22 5759    pantoprazole (PROTONIX) tablet 40 mg  40 mg Oral Daily [facility], DO   40 mg at 08/31/22 6271    sodium chloride flush 0.9 % injection 5-40 mL  5-40 mL IntraVENous 2 times per day [facility], DO   10 mL at 08/31/22 0905    sodium chloride flush 0.9 % injection 5-40 mL  5-40 mL IntraVENous PRN [facility], DO        0.9 % sodium chloride infusion   IntraVENous PRN [facility], DO        polyethylene glycol (GLYCOLAX) packet 17 g  17 g Oral Daily PRN [facility], DO        acetaminophen (TYLENOL) tablet 650 mg  650 mg Oral Q6H PRN [facility], DO   650 mg at 08/31/22 0526    Or    acetaminophen (TYLENOL) suppository 650 mg  650 mg Rectal Q6H PRN [facility], DO         Facility-Administered Medications Ordered in Other Encounters   Medication Dose Route Frequency Provider Last Rate Last Admin    atropine injection 0.4 mg  0.4 mg IntraVENous Once Jackie Fisher MD            ALLERGIES:      No Known Allergies    SH:    Social History     Tobacco Use    Smoking status: Never    Smokeless tobacco: Never   Vaping Use    Vaping Use: Never used   Substance Use Topics    Alcohol use: Yes     Comment: rarely    Drug use: Never       FH:    Family History   Problem Relation Age of Onset    Schizophrenia Mother     Diabetes Mother     Dementia Mother     Depression Mother        Review of systems:  Review of Systems   Constitutional:  Negative for chills, fatigue, fever and unexpected weight change. HENT:  Negative for ear discharge, ear pain and facial swelling. Eyes:  Negative for pain and itching. Respiratory:  Negative for cough, chest tightness and shortness of breath. Cardiovascular:  Negative for chest pain. Gastrointestinal:  Negative for abdominal distention, abdominal pain, blood in stool, constipation, diarrhea, nausea and vomiting. Genitourinary:  Negative for difficulty urinating, dysuria and hematuria. Musculoskeletal:  Negative for back pain, gait problem and neck pain. Skin:  Negative for rash and wound. Neurological:  Positive for weakness. Negative for facial asymmetry, speech difficulty, light-headedness and headaches. Hematological:  Does not bruise/bleed easily. Psychiatric/Behavioral:  Negative for agitation, decreased concentration and sleep disturbance. Physical Exam:     /74   Pulse (!) 106   Temp 98.5 °F (36.9 °C) (Oral)   Resp 16   Ht 5' 11\" (1.803 m)   Wt 170 lb (77.1 kg)   SpO2 97%   BMI 23.71 kg/m²     General:  Well-developed, well-nourished, no distress. Psych:  Cooperative, good insight and judgement. Neuro:  Alert, oriented to person, place and time. HEENT:  Normocephalic, Sclera clear. Right anterior scalp 5 cm laceration intact with staples. Under dressing not removed. No active bleeding seen. Lungs:  Unlabored breathing. Symmetrical chest expansion. Chest wall:  No tenderness or deformity. Heart:  Regular rate and rhythm.  No JVD.  Abdomen:  Soft, non-tender, non-distended. No guarding or rebound. Extremities:  Extremities normal, atraumatic, no cyanosis or edema. Skin:  Skin color, texture, turgor normal. No rashes. Labs: All recent labs were reviewed. Low Hgb.      Recent Results (from the past 24 hour(s))   Reticulocytes    Collection Time: 08/30/22 10:39 AM   Result Value Ref Range    Reticulocyte Count,Automated 4.7 (H) 0.3 - 2.0 %    Absolute Retic # 0.1175 (H) 0.026 - 0.095 M/ul    Immature Retic Fraction 29.1 (H) 2.3 - 13.4 %    Retic Hemoglobin conc. 32 29 - 35 pg   Lactate Dehydrogenase    Collection Time: 08/30/22 10:39 AM   Result Value Ref Range    LD 3,327 (H) 100 - 190 U/L   Protime-INR    Collection Time: 08/30/22 10:39 AM   Result Value Ref Range    Protime 15.8 (H) 12.6 - 14.5 sec    INR 1.2     APTT    Collection Time: 08/30/22 10:39 AM   Result Value Ref Range    PTT 40.7 (H) 24.1 - 35.1 SEC   Fibrinogen    Collection Time: 08/30/22 10:39 AM   Result Value Ref Range    Fibrinogen 219 190 - 501 mg/dL   D-Dimer, Quantitative    Collection Time: 08/30/22 10:39 AM   Result Value Ref Range    D-Dimer, Quant 9.87 (H) <0.56 ug/ml(FEU)   Hemoglobin and Hematocrit    Collection Time: 08/30/22 10:39 AM   Result Value Ref Range    Hemoglobin 7.9 (L) 13.6 - 17.2 g/dL    Hematocrit 24.2 (L) 41.1 - 50.3 %   PREPARE PLATELETS, 1 Product    Collection Time: 08/30/22  4:45 PM   Result Value Ref Range    Unit Number [id number]     Product Code Blood Bank [facility],LRIR1     Unit Divison 00     Dispense Status Blood Bank ALLOCATED    Ferritin    Collection Time: 08/30/22  5:18 PM   Result Value Ref Range    Ferritin 331 8 - 388 NG/ML   Transferrin Saturation    Collection Time: 08/30/22  5:18 PM   Result Value Ref Range    Iron 55 35 - 150 ug/dL    TIBC 117 (L) 250 - 450 ug/dL    TRANSFERRIN SATURATION 47 >20 %   Vitamin B12    Collection Time: 08/30/22  5:18 PM   Result Value Ref Range    Vitamin B-12 1270 (H) 193 - 986 pg/mL Folate    Collection Time: 08/30/22  5:18 PM   Result Value Ref Range    Folate 7.9 3.1 - 17.5 ng/mL   Protime-INR    Collection Time: 08/30/22  5:18 PM   Result Value Ref Range    Protime 16.7 (H) 12.6 - 14.5 sec    INR 1.3     APTT    Collection Time: 08/30/22  5:18 PM   Result Value Ref Range    PTT 42.4 (H) 24.1 - 35.1 SEC   CBC with Auto Differential    Collection Time: 08/30/22  5:18 PM   Result Value Ref Range    WBC 6.1 4.3 - 11.1 K/uL    RBC 2.26 (L) 4.23 - 5.6 M/uL    Hemoglobin 7.2 (L) 13.6 - 17.2 g/dL    Hematocrit 21.6 (L) 41.1 - 50.3 %    MCV 95.6 79.6 - 97.8 FL    MCH 31.9 26.1 - 32.9 PG    MCHC 33.3 31.4 - 35.0 g/dL    RDW 21.2 (H) 11.9 - 14.6 %    Platelets 82 (L) 135 - 450 K/uL    MPV 10.1 9.4 - 12.3 FL    nRBC 0.03 0.0 - 0.2 K/uL    Differential Type AUTOMATED      Seg Neutrophils 79 (H) 43 - 78 %    Lymphocytes 8 (L) 13 - 44 %    Monocytes 11 4.0 - 12.0 %    Eosinophils % 2 0.5 - 7.8 %    Basophils 0 0.0 - 2.0 %    Immature Granulocytes 1 0.0 - 5.0 %    Segs Absolute 4.8 1.7 - 8.2 K/UL    Absolute Lymph # 0.5 0.5 - 4.6 K/UL    Absolute Mono # 0.7 0.1 - 1.3 K/UL    Absolute Eos # 0.1 0.0 - 0.8 K/UL    Basophils Absolute 0.0 0.0 - 0.2 K/UL    Absolute Immature Granulocyte 0.0 0.0 - 0.5 K/UL   Fibrinogen    Collection Time: 08/30/22  5:18 PM   Result Value Ref Range    Fibrinogen 179 (L) 190 - 501 mg/dL   PREPARE RBC (CROSSMATCH), 1 Units    Collection Time: 08/30/22  5:45 PM   Result Value Ref Range    History Check Historical check performed    PLEASE READ & DOCUMENT PPD TEST IN 24 HRS    Collection Time: 08/30/22 11:00 PM   Result Value Ref Range    PPD, (POC) Negative Negative    mm Induration 0 0 - 5 mm   Hemoglobin and Hematocrit    Collection Time: 08/30/22 11:55 PM   Result Value Ref Range    Hemoglobin 6.9 (LL) 13.6 - 17.2 g/dL    Hematocrit 21.6 (L) 41.1 - 50.3 %   PREPARE RBC (CROSSMATCH), 1 Units    Collection Time: 08/31/22  1:00 AM   Result Value Ref Range    History Check Historical check performed    Comprehensive Metabolic Panel    Collection Time: 08/31/22  7:11 AM   Result Value Ref Range    Sodium 132 (L) 138 - 145 mmol/L    Potassium 3.5 3.5 - 5.1 mmol/L    Chloride 103 101 - 110 mmol/L    CO2 22 21 - 32 mmol/L    Anion Gap 7 4 - 13 mmol/L    Glucose 98 65 - 100 mg/dL    BUN 17 6 - 23 MG/DL    Creatinine 0.70 (L) 0.8 - 1.5 MG/DL    GFR African American >60 >60 ml/min/1.73m2    GFR Non- >60 >60 ml/min/1.73m2    Calcium 7.4 (L) 8.3 - 10.4 MG/DL    Total Bilirubin 2.4 (H) 0.2 - 1.1 MG/DL    ALT 25 12 - 65 U/L     (H) 15 - 37 U/L    Alk Phosphatase 236 (H) 50 - 136 U/L    Total Protein 5.0 (L) 6.3 - 8.2 g/dL    Albumin 2.2 (L) 3.5 - 5.0 g/dL    Globulin 2.8 2.3 - 3.5 g/dL    Albumin/Globulin Ratio 0.8 (L) 1.2 - 3.5     Magnesium    Collection Time: 08/31/22  7:11 AM   Result Value Ref Range    Magnesium 2.0 1.8 - 2.4 mg/dL   CBC with Auto Differential    Collection Time: 08/31/22  7:12 AM   Result Value Ref Range    WBC 8.7 4.3 - 11.1 K/uL    RBC 3.03 (L) 4.23 - 5.6 M/uL    Hemoglobin 9.5 (L) 13.6 - 17.2 g/dL    Hematocrit 27.8 (L) 41.1 - 50.3 %    MCV 91.7 79.6 - 97.8 FL    MCH 31.4 26.1 - 32.9 PG    MCHC 34.2 31.4 - 35.0 g/dL    RDW 19.4 (H) 11.9 - 14.6 %    Platelets 73 (L) 209 - 450 K/uL    MPV 10.4 9.4 - 12.3 FL    nRBC 0.02 0.0 - 0.2 K/uL    Differential Type AUTOMATED      Seg Neutrophils 81 (H) 43 - 78 %    Lymphocytes 5 (L) 13 - 44 %    Monocytes 11 4.0 - 12.0 %    Eosinophils % 1 0.5 - 7.8 %    Basophils 1 0.0 - 2.0 %    Immature Granulocytes 1 0.0 - 5.0 %    Segs Absolute 7.1 1.7 - 8.2 K/UL    Absolute Lymph # 0.5 0.5 - 4.6 K/UL    Absolute Mono # 1.0 0.1 - 1.3 K/UL    Absolute Eos # 0.1 0.0 - 0.8 K/UL    Basophils Absolute 0.0 0.0 - 0.2 K/UL    Absolute Immature Granulocyte 0.1 0.0 - 0.5 K/UL       Imaging: CT images were independently reviewed by me. No acute intracranial injury.      CT HEAD WO CONTRAST  Narrative: CT of the head without contrast.    CLINICAL INDICATION: Unwitnessed fall with blood along the right side of the  forehead    PROCEDURE: Serial thin section axial images are obtained from the cranial vertex  through the skull base without the administration of intravenous contrast.   Radiation dose reduction techniques were used for this study. Our CT scanners  use one or all of the following: Automated exposure control, adjusted of the mA  and/or kV according to patient size, iterative reconstruction    COMPARISON: No prior. FINDINGS: There is no acute intracranial hemorrhage, mass, or mass effect. No  abnormal extra-axial fluid collections identified. There is no hydrocephalus. The basilar cisterns are widely patent. The gray-white matter brain parenchymal  interface is well-maintained. No skull fracture or aggressive osseous lesion  noted. The mastoid air cells and included paranasal sinuses are clear. Impression: No acute intracranial abnormality. IR US GUIDED PARACENTESIS  Narrative: Title: Ultrasound guided paracentesis. History: 60-year-old male with metastatic gastric cancer, left pleural effusion,  recent sepsis, and large volume ascites. :  Leslie Bower PA-C  Observed by: Derrick Abrams PA-C    Supervising Physician: Dr. Nelsy Hand    Consent:  Informed written and oral consent was obtained from the patient after  the risks and benefits were discussed. All questions were answered and the  patient requested that we proceed. Procedure:  Maximal sterile barrier technique was used. Following routine prep  and drape of the abdomen, a local field block with 1% lidocaine was achieved. Ultrasound evaluation was performed. Fluid was identified in the peritoneal cavity. Using real-time ultrasound  guidance, the peritoneal cavity was accessed with an 8 Amharic centesis catheter. The catheter was connected to wall suction. A total of 5500 cc of thin, bloody ascitic fluid was removed. The catheter was  removed and a bandage applied. Sample: 240 cc of bloody ascitic fluid was sent to the lab    Complications: None. Findings: Large volume ascites. Impression: Uncomplicated ultrasound guided paracentesis. ICD-10-CM    1. Anemia, unspecified type  D64.9       2. Metastatic malignant neoplasm, unspecified site [facility])  C79.9               Assessment/Plan:  Sarahi Orr is a 56 y.o. male who has signs and symptoms consistent with acute head trauma and scalp laceration    Will need closure and daily dressing changes  Transfuse as needed. Will need staples removed in 7-10 days- this can be done here or follow up in office if discharged.           Signed: ARY Green CNP    8/31/2022 9:31 AM

## 2022-08-31 NOTE — PROGRESS NOTES
Physician Progress Note      Catrachita DIAZ #:                  [id number]  :                       1975  ADMIT DATE:       2022 5:14 PM  DISCH DATE:  RESPONDING  PROVIDER #:        Patrizia Galvin MD          QUERY TEXT:    Patient admitted with digestive malignancy. If possible, please document in   progress notes and discharge summary if you are evaluating and /or treating   any of the following: The medical record reflects the following:  Risk Factors: Stage IV gastric cancer, 28 cycles of FOLFIRINOX with disease   progression and most recently started on Keytruda  Clinical Indicators: +weight loss, appetite changes, Cachectic, poor oral   intake  Treatment: Is and Os, daily labs, onc consult    ASPEN Criteria:    https://aspenjournals. onlinelibrary. martínez. com/doi/full/10.1177/495633569871220  5  Options provided:  -- Protein calorie malnutrition mild  -- Protein calorie malnutrition moderate  -- Protein calorie malnutrition severe  -- Other - I will add my own diagnosis  -- Disagree - Not applicable / Not valid  -- Disagree - Clinically unable to determine / Unknown  -- Refer to Clinical Documentation Reviewer    PROVIDER RESPONSE TEXT:    This patient has moderate protein calorie malnutrition.     Query created by: Sheryl Andrade on 2022 11:04 AM      Electronically signed by:  Patrizia Galvin MD 2022 2:19 PM

## 2022-09-01 NOTE — PROGRESS NOTES
Occupational Therapy Note:    Attempted to see patient this AM for occupational therapy treatment  session. Patient is currently off the floor at radiation. Will follow and re-attempt as schedule permits/patient available. Thank you,    PM attempt: pt kindly declined as he was eating with family in his room. Will plan to check back again tomorrow.      [facility], [address]

## 2022-09-01 NOTE — PROGRESS NOTES
[address] [facility] Hematology & Oncology        Inpatient Hematology / Oncology Progress Note    Reason for Admission:  Metastatic malignant neoplasm, unspecified site [facility]) [C79.9]  Anemia, unspecified type [D64.9]  Acute on chronic blood loss anemia [D62]    24 Hour Events:  IR consulted for abd drain  CT simulation today-treatment to start shortly after over 2 week period    ROS:  Constitutional: Positive for fatigue; negative for fever, chills  CV: Negative for chest pain, palpitations, edema. Respiratory: Negative for dyspnea, cough, wheezing. GI: Positive for previous abdominal pain/ascites; negative for nausea, diarrhea. Skin:  Head laceration     10 point review of systems is otherwise negative with the exception of the elements mentioned above in the HPI.        No Known Allergies  Past Medical History:   Diagnosis Date    Acute gastrointestinal bleeding 7/17/2022    COVID 1/30/2022    Gastric cancer ([facility]     being treated currently - chemo and radiation    History of blood transfusion     6/30/2022 no rxn to this transfusion    Lazy eye, left     Severe anemia 7/12/2022     Past Surgical History:   Procedure Laterality Date    IR PORT PLACEMENT EQUAL OR GREATER THAN 5 YEARS  11/3/2020    IR PORT PLACEMENT EQUAL OR GREATER THAN 5 YEARS  11/3/2020    IR PORT PLACEMENT EQUAL OR GREATER THAN 5 YEARS 11/3/2020 [facility]OLOG[facility] SPECIALS    ORTHOPEDIC SURGERY      ankle    TONSILLECTOMY      UPPER GASTROINTESTINAL ENDOSCOPY N/A 7/5/2022    EGD DILATION BALLOON performed by Samantha Jimenez MD at [address] [place] [address] J N/A 7/14/2022    EGD STENT PLACEMENT performed by Francine Oneill MD at [address] [place] [address] J N/A 8/3/2022    EGD ESOPHAGOGASTRODUODENOSCOPY performed by Brianda Noel MD at [address] [place] [address]  10/22/2020    US GUIDED LIVER BIOPSY PERCUTANEOUS 10/22/2020 [facility]OLOGY US     Family History   Problem Relation Age of Onset    Schizophrenia Mother     Diabetes Mother     Dementia Mother     Depression Mother      Social History     Socioeconomic History    Marital status:      Spouse name: Not on file    Number of children: Not on file    Years of education: Not on file    Highest education level: Not on file   Occupational History    Not on file   Tobacco Use    Smoking status: Never    Smokeless tobacco: Never   Vaping Use    Vaping Use: Never used   Substance and Sexual Activity    Alcohol use: Yes     Comment: rarely    Drug use: Never    Sexual activity: Not on file   Other Topics Concern    Not on file   Social History Narrative    Not on file     Social Determinants of Health     Financial Resource Strain: Not on file   Food Insecurity: Not on file   Transportation Needs: Not on file   Physical Activity: Not on file   Stress: Not on file   Social Connections: Not on file   Intimate Partner Violence: Not on file   Housing Stability: Not on file     Current Facility-Administered Medications   Medication Dose Route Frequency Provider Last Rate Last Admin    0.9 % sodium chloride infusion   IntraVENous PRN Kristian Pinedo MD        ondansetron [facility]) injection 4 mg  4 mg IntraVENous Q6H PRN Sweeney Plan, APRN - CNP        prochlorperazine (COMPAZINE) injection 10 mg  10 mg IntraVENous Q6H PRN Sweeney Plan, APRN - CNP   10 mg at 08/31/22 1303    diphenhydrAMINE (BENADRYL) capsule 25 mg  25 mg Oral Q6H PRN Janet Richmond MD   25 mg at 08/31/22 0256    0.9 % sodium chloride infusion   IntraVENous PRN Devon Adorno MD        gabapentin (NEURONTIN) capsule 300 mg  300 mg Oral BID Genevia Milvia, DO   300 mg at 09/01/22 0854    HYDROcodone-acetaminophen ([facility])  MG per tablet 1 tablet  1 tablet Oral Q6H PRN Geneshefali Steel, DO   1 tablet at 08/31/22 1730    levothyroxine (SYNTHROID) tablet 50 mcg  50 mcg Oral QAM AC Genevia Milvia, DO   50 mcg at 09/01/22 0612    pantoprazole (PROTONIX) tablet 40 mg  40 mg Oral Daily Johnanna Karthikeyan, DO   40 mg at 22 0854    sodium chloride flush 0.9 % injection 5-40 mL  5-40 mL IntraVENous 2 times per day Johnanna Karthikeyan, DO   10 mL at 22 0854    sodium chloride flush 0.9 % injection 5-40 mL  5-40 mL IntraVENous PRN Johnanna Karthikeyan, DO        0.9 % sodium chloride infusion   IntraVENous PRN Johnanna Karthikeyan, DO        polyethylene glycol (GLYCOLAX) packet 17 g  17 g Oral Daily PRN Hughanna Karthikeyan, DO        acetaminophen (TYLENOL) tablet 650 mg  650 mg Oral Q6H PRN Hughanna Karthikeyan, DO   650 mg at 22 8934    Or    acetaminophen (TYLENOL) suppository 650 mg  650 mg Rectal Q6H PRN Geovany Karthikeyan, DO         Facility-Administered Medications Ordered in Other Encounters   Medication Dose Route Frequency Provider Last Rate Last Admin    atropine injection 0.4 mg  0.4 mg IntraVENous Once Adia Cain MD           OBJECTIVE:  Patient Vitals for the past 8 hrs:   BP Temp Temp src Pulse Resp SpO2   22 0835 108/73 98.8 °F (37.1 °C) Oral 99 18 97 %     Temp (24hrs), Av.2 °F (36.8 °C), Min:97.7 °F (36.5 °C), Max:98.8 °F (37.1 °C)     07 -  1900  In: 120 [P.O.:120]  Out: -     Physical Exam:  Constitutional: Well developed, well nourished male in no acute distress, sitting comfortably in the hospital bed. HEENT: Normocephalic and atraumatic. Oropharynx is clear, mucous membranes are moist.  Neck supple. Lymph node   Deferred. Skin Warm and dry. No bruising and no rash noted. +head lac with staples - covered with dressing    Respiratory Lungs are clear to auscultation bilaterally without wheezes, rales or rhonchi, normal air exchange without accessory muscle use. CVS Tachycardic rate, regular rhythm and normal S1 and S2. No murmurs, gallops, or rubs. Abdomen Soft, non-tender, normoactive bowel sounds. No palpable mass. +ascites (better)   Neuro Grossly nonfocal with no obvious sensory or motor deficits.    MSK Normal range of motion in general.  +BLE edema   Psych Appropriate mood and affect.         Labs:    Recent Results (from the past 24 hour(s))   Hemoglobin and Hematocrit    Collection Time: 08/31/22  5:38 PM   Result Value Ref Range    Hemoglobin 8.7 (L) 13.6 - 17.2 g/dL    Hematocrit 25.7 (L) 41.1 - 50.3 %   PLEASE READ & DOCUMENT PPD TEST IN 48 HRS    Collection Time: 08/31/22 11:00 PM   Result Value Ref Range    PPD, (POC) Negative Negative    mm Induration 0 0 - 5 mm   Hemoglobin and Hematocrit    Collection Time: 09/01/22 12:47 AM   Result Value Ref Range    Hemoglobin 8.9 (L) 13.6 - 17.2 g/dL    Hematocrit 25.7 (L) 41.1 - 50.3 %   Comprehensive Metabolic Panel    Collection Time: 09/01/22  6:20 AM   Result Value Ref Range    Sodium 131 (L) 138 - 145 mmol/L    Potassium 3.4 (L) 3.5 - 5.1 mmol/L    Chloride 103 101 - 110 mmol/L    CO2 21 21 - 32 mmol/L    Anion Gap 7 4 - 13 mmol/L    Glucose 89 65 - 100 mg/dL    BUN 17 6 - 23 MG/DL    Creatinine 0.70 (L) 0.8 - 1.5 MG/DL    GFR African American >60 >60 ml/min/1.73m2    GFR Non- >60 >60 ml/min/1.73m2    Calcium 7.4 (L) 8.3 - 10.4 MG/DL    Total Bilirubin 1.9 (H) 0.2 - 1.1 MG/DL    ALT 28 12 - 65 U/L     (H) 15 - 37 U/L    Alk Phosphatase 276 (H) 50 - 136 U/L    Total Protein 4.8 (L) 6.3 - 8.2 g/dL    Albumin 2.0 (L) 3.5 - 5.0 g/dL    Globulin 2.8 2.3 - 3.5 g/dL    Albumin/Globulin Ratio 0.7 (L) 1.2 - 3.5     Magnesium    Collection Time: 09/01/22  6:20 AM   Result Value Ref Range    Magnesium 2.0 1.8 - 2.4 mg/dL   CBC with Auto Differential    Collection Time: 09/01/22  6:21 AM   Result Value Ref Range    WBC 10.8 4.3 - 11.1 K/uL    RBC 3.09 (L) 4.23 - 5.6 M/uL    Hemoglobin 9.4 (L) 13.6 - 17.2 g/dL    Hematocrit 27.8 (L) 41.1 - 50.3 %    MCV 90.0 79.6 - 97.8 FL    MCH 30.4 26.1 - 32.9 PG    MCHC 33.8 31.4 - 35.0 g/dL    RDW 19.9 (H) 11.9 - 14.6 %    Platelets 79 (L) 387 - 450 K/uL    MPV 10.7 9.4 - 12.3 FL    nRBC 0.03 0.0 - 0.2 K/uL    Differential Type AUTOMATED      Seg Neutrophils 84 (H) 43 - 78 %    Lymphocytes 5 (L) 13 - 44 %    Monocytes 9 4.0 - 12.0 %    Eosinophils % 1 0.5 - 7.8 %    Basophils 1 0.0 - 2.0 %    Immature Granulocytes 1 0.0 - 5.0 %    Segs Absolute 9.1 (H) 1.7 - 8.2 K/UL    Absolute Lymph # 0.6 0.5 - 4.6 K/UL    Absolute Mono # 1.0 0.1 - 1.3 K/UL    Absolute Eos # 0.1 0.0 - 0.8 K/UL    Basophils Absolute 0.1 0.0 - 0.2 K/UL    Absolute Immature Granulocyte 0.1 0.0 - 0.5 K/UL       Imaging:  CT of the head without contrast.       CLINICAL INDICATION: Unwitnessed fall with blood along the right side of the   forehead       PROCEDURE: Serial thin section axial images are obtained from the cranial vertex   through the skull base without the administration of intravenous contrast.    Radiation dose reduction techniques were used for this study. Our CT scanners   use one or all of the following: Automated exposure control, adjusted of the mA   and/or kV according to patient size, iterative reconstruction       COMPARISON: No prior. FINDINGS: There is no acute intracranial hemorrhage, mass, or mass effect. No   abnormal extra-axial fluid collections identified. There is no hydrocephalus. The basilar cisterns are widely patent. The gray-white matter brain parenchymal   interface is well-maintained. No skull fracture or aggressive osseous lesion   noted. The mastoid air cells and included paranasal sinuses are clear. Impression   No acute intracranial abnormality.        ASSESSMENT:  Patient Active Problem List   Diagnosis    Urgency of urination    Malignant neoplasm of overlapping sites of stomach (HCC)    CINV (chemotherapy-induced nausea and vomiting)    Anemia    Thrombocytopenia (HCC)    Personal history of COVID-19    Hypoproteinemia (HCC)    Hypokalemia    GIB (gastrointestinal bleeding)    Hypothyroidism    PNA (pneumonia)    Acute GI bleeding    Gastric carcinoma (HCC)    Ascites    Pleural effusion    Hypophosphatemia Acute on chronic blood loss anemia    Metabolic acidosis    Metastatic malignant neoplasm [facility])    Neoplastic malignant related fatigue    Frailty    Encounter for palliative care    ACP (advance care planning)    Laceration of scalp without foreign body    Fall     Mr. Nidhi Iqbal is a 56 y.o. male admitted on 8/29/2022  with a primary diagnosis of abdominal distention and pain. He is a patient of Dr. Joleen Acuna with known metastatic gastric cancer. He completed 28 cycles of FOLFIRINOX with disease progression and most recently started on Keytruda. Cycle 2 completed on 8/15/2022. 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 He was discharged 8/26/2022 after admission for same symptoms. He had para removing ~7 liters (malignant cells present) and thora removing ~850 ml. CT at that time showed progression of disease. He returned to ED 8/29/2022 with same complaints as before ab distention and pain. Hgb was 6.9, bili 1.5, LFTs elevated. He has had paracentesis today removing 5500 ml. We are asked to see patient for recommendations. PLAN:  Stage IV gastric cancer  -sp cycle 2 Keytruda 8/15  8/31 Dr. 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 Frantz Arroyo   9/1 CT simulation today-treatment to start shortly after over 2 week period     Anemia  -check iron studies  -transfuse per Lisseth SOP  8/31 No GOOD, sabiha negative. No folate/B12 def. LD elevated, but Hapto WNL. Transfused 2 units of PRBC overnight d/t blood loss secondary to fall/head lac and Hgb now 9.5     Head Laceration s/p Fal  8/31 Staples placed yesterday by Dr. Maryanne Burns. Pt will need staples removed in 7-10 days. Discharge Plan:  IR consulted for AB drain. Palliative radiation to start next week. Patient is asking to be discharged. Goals and plan of care reviewed with the patient. All questions answered to the best of our ability. ARY Andrade NP   [facility] Hematology & Oncology  [address] [place] [address]  Office : [phone]  Fax : [phone]      Attending Addendum:  I have personally performed a face to face diagnostic evaluation on this patient. I have reviewed and agree with the care plan as documented above by Jessica Solitario N.P. My findings are as follows: Patient appears lethargic, heart rate regular without murmurs, abdomen is non-tender, bowel sounds are positive. 78-year-old , metastatic gastric cancer followed by Dr. Alejandra Nixon, with progressive disease on FOLFIRINOX regimen, recently on pembrolizumab with cycle 2 8/15/2022, recently discharged after requiring large-volume paracentesis as well as thoracentesis. He is also known to have a GE junction large friable mass with associated bleeding s/p stent placement. Patient now readmitted with abdominal distention and discomfort. Overnight fall w bleeding from laceration needing staples placement. Head CT -ve. S/p PRBC transfusion. Seeing rad-onc today. Pall care following, working towards home hospice care.                      Shanna Claire MD  [facility] Hematology/Oncology  [address] [id number] [facility]  Office : [phone]  Fax : [phone]

## 2022-09-01 NOTE — PROGRESS NOTES
END OF SHIFT SUMMARY:    Significant vitals this shift:  vss  Significant labs this shift:  none  Tests performed this shift:  none  Orders to be followed up on:  none  Blood products given this shift:  none  Additional events this shift:   See below    I/Os:  +/- this shift: yes  09/01 0701 - 09/01 1900  In: 360 [P.O.:360]  Out: 1 [Urine:1]  Occurrences this Shift:  Urine yes, BM 1, Emesis 0    Megan Nazario, RN   Pt VSS A&O X's 4 no complaints of pain or discomfort pt went to radiation today for ct simulation pt back in room voiding well BM today pt asked if we can no longer wrap his head   AS per Lionel Mccarty, gen surg OK to not wrap head put put band aid    Will continue to monitor
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
## 2022-09-01 NOTE — PROGRESS NOTES
Physical Therapy Note:    Attempted to see patient this PM for physical therapy treatment  session. Patient eating with family in room and request therapy to come back later. Will follow and re-attempt as schedule permits/patient available.  Thank you,    Truman Mendez, PT     [facility]

## 2022-09-01 NOTE — PROGRESS NOTES
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 Per Dr. Sam Carpio, pt will start RT sometime early next week.

## 2022-09-01 NOTE — PROGRESS NOTES
END OF SHIFT SUMMARY:    Significant vitals this shift:  elevated HR  Significant labs this shift:  Hgb 9.4  Additional events this shift:   Head lac dressing changed  Report given to Khurram Wild RN    I/Os:  Occurrences this Shift:  Urine 1, BM 1, Emesis 0    Natalya Davila RN

## 2022-09-01 NOTE — DISCHARGE SUMMARY
[address] [place] [address] Hematology & Oncology: Inpatient Hematology / Oncology Discharge Summary Note    Patient ID:  Beau Sicard  [id number]  7 y.o.  1975    Admit Date: 8/29/2022    Discharge Date: 9/1/2022    Admission Diagnoses: Metastatic malignant neoplasm, unspecified site ([facility] [C79.9]  Anemia, unspecified type [D64.9]  Acute on chronic blood loss anemia [D62]    Discharge Diagnoses:  Principal Diagnosis: Acute on chronic blood loss anemia  Principal Problem:    Acute on chronic blood loss anemia  Active Problems:    Anemia    Thrombocytopenia (HCC)    GIB (gastrointestinal bleeding)    Gastric carcinoma (HCC)    Ascites    Metabolic acidosis    Metastatic malignant neoplasm ([facility]    Neoplastic malignant related fatigue    Frailty    Encounter for palliative care    ACP (advance care planning)    Laceration of scalp without foreign body    Fall    Malignant neoplasm of overlapping sites of stomach ([facility]  Resolved Problems:    * No resolved hospital problems. [facility] Course:  Mr. Nohemy Montero is a 56 y.o. male admitted on 8/29/2022  with a primary diagnosis of abdominal distention and pain. He is a patient of Dr. Edinson Hillman with known metastatic gastric cancer. He completed 28 cycles of FOLFIRINOX with disease progression and most recently started on Keytruda. Cycle 2 completed on 8/15/2022. 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 He was discharged 8/26/2022 after admission for same symptoms. He had para removing ~7 liters (malignant cells present) and thora removing ~850 ml. CT at that time showed progression of disease. He returned to ED 8/29/2022 with same complaints as before ab distention and pain. Hgb was 6.9, bili 1.5, LFTs elevated. He had repeat paracentesis removing 5500 ml. He had fall resulting in head laceration requiring staples on 8/31. Pt will need staples removed in 7-10 days.  He was seen by Dr. Venetta Severs and it rhythm and normal S1 and S2. No murmurs, gallops, or rubs. Abdomen Soft, nontender and nondistended, normoactive bowel sounds. No palpable mass. No hepatosplenomegaly. Neuro Grossly nonfocal with no obvious sensory or motor deficits. MSK Normal range of motion in general.  No edema and no tenderness. Psych Appropriate mood and affect. ASSESSMENT:    Principal Problem:    Acute on chronic blood loss anemia  Active Problems:    Anemia    Thrombocytopenia (HCC)    GIB (gastrointestinal bleeding)    Gastric carcinoma (HCC)    Ascites    Metabolic acidosis    Metastatic malignant neoplasm (HCC)    Neoplastic malignant related fatigue    Frailty    Encounter for palliative care    ACP (advance care planning)    Laceration of scalp without foreign body    Fall    Malignant neoplasm of overlapping sites of stomach ([facility]  Resolved Problems:    * No resolved hospital problems. *      DISPOSITION:  Patient needs follow up appointment with NP, Palliative care NP and labs prior to visit. Over 30 minutes was spent in discharge planning and coordination of care. ARY Downey NP  [facility] Hematology & Oncology  [address] [place] [address]  Office : [phone]  Fax : [phone]        Attending Addendum:  I have personally performed a face to face diagnostic evaluation on this patient. I have reviewed and agree with the care plan as documented by Zuleyka Márquez N.P. Patient appears table, heart rate regular without murmurs, abdomen is non-tender, bowel sounds are positive. 60-year-old , metastatic gastric cancer followed by Dr. Jennifer Joshua, with progressive disease on FOLFIRINOX regimen, recently on pembrolizumab with cycle 2 8/15/2022, recently discharged after requiring large-volume paracentesis as well as thoracentesis. He is also known to have a GE junction large friable mass with associated bleeding s/p stent placement.   Patient now hospitalized with abdominal distention and discomfort s/p repeat paracentesis. During stay had fall w bleeding from laceration needing staples placement. Head CT -ve. S/p PRBC transfusion. Seen by rad-onc w/ plans for pall XRT x 2 weeks. Pall care followed, working towards home hospice care after completion of XRT. To f/u in office next week. I spent 32 minutes on evaluation, management, counseling and discharge planning on patient.                   Jesus Castillo MD  [facility] Hematology/Oncology  [address] [place] [address]  Office : [phone]  Fax : [phone]

## 2022-09-01 NOTE — PROGRESS NOTES
Physical Therapy Note:    Attempted to see patient this AM for physical therapy treatment  session. Patient is currently KELLY in radiation. Will follow and re-attempt as schedule permits/patient available.  Thank you,    Steve Byrd, PT     [facility]

## 2022-09-01 NOTE — CONSULTS
GREATER THAN 5 YEARS  11/3/2020    IR PORT PLACEMENT EQUAL OR GREATER THAN 5 YEARS  11/3/2020    IR PORT PLACEMENT EQUAL OR GREATER THAN 5 YEARS 11/3/2020 [facility]OLOGY SPECIALS    ORTHOPEDIC SURGERY      ankle    TONSILLECTOMY      UPPER GASTROINTESTINAL ENDOSCOPY N/A 7/5/2022    EGD DILATION BALLOON performed by Smooth Roberts MD at [address] N/A 7/14/2022    EGD STENT PLACEMENT performed by Steven Galo MD at [address] N/A 8/3/2022    EGD ESOPHAGOGASTRODUODENOSCOPY performed by Geneva Hensley MD at [address] [facility] [place]  10/22/2020    [facility] 10/22/2020 [facility] RADIOLOGY US     MEDICATIONS:   No current facility-administered medications for this encounter. No current outpatient medications on file.     Facility-Administered Medications Ordered in Other Encounters:     0.9 % sodium chloride infusion, , IntraVENous, PRN, Giovanna Williamson MD    ondansetron [facility]) injection 4 mg, 4 mg, IntraVENous, Q6H PRN, ARY Green CNP    prochlorperazine (COMPAZINE) injection 10 mg, 10 mg, IntraVENous, Q6H PRN, ARY Green CNP, 10 mg at 08/31/22 1303    diphenhydrAMINE (BENADRYL) capsule 25 mg, 25 mg, Oral, Q6H PRN, Luis Bright MD, 25 mg at 08/31/22 0256    0.9 % sodium chloride infusion, , IntraVENous, PRN, Humberto Ag MD    gabapentin (NEURONTIN) capsule 300 mg, 300 mg, Oral, BID, Cynthia Schmidt DO, 300 mg at 08/31/22 2145    HYDROcodone-acetaminophen ([address])  MG per tablet 1 tablet, 1 tablet, Oral, Q6H PRN, Cynthia Schmidt DO, 1 tablet at 08/31/22 1730    levothyroxine (SYNTHROID) tablet 50 mcg, 50 mcg, Oral, QAM AC, Cynthia Schmidt DO, 50 mcg at 09/01/22 0612    pantoprazole (PROTONIX) tablet 40 mg, 40 mg, Oral, Daily, Cynthia Schmidt DO, 40 mg at 08/31/22 0826    sodium chloride flush 0.9 % injection 5-40 mL, 5-40 mL, IntraVENous, 2 times per day, Ltanya Pastel, DO, 10 mL at 08/31/22 2145    sodium chloride flush 0.9 % injection 5-40 mL, 5-40 mL, IntraVENous, PRN, Ltanya Pastel, DO    0.9 % sodium chloride infusion, , IntraVENous, PRN, Ltanya Pastel, DO    polyethylene glycol (GLYCOLAX) packet 17 g, 17 g, Oral, Daily PRN, Ltanya Pastel, DO    acetaminophen (TYLENOL) tablet 650 mg, 650 mg, Oral, Q6H PRN, 650 mg at 08/31/22 0256 **OR** acetaminophen (TYLENOL) suppository 650 mg, 650 mg, Rectal, Q6H PRN, Ltanya Pastel, DO    atropine injection 0.4 mg, 0.4 mg, IntraVENous, Once, Mary Ann Henry MD    ALLERGIES:   No Known Allergies    SOCIAL HISTORY:   Social History     Socioeconomic History    Marital status:      Spouse name: Not on file    Number of children: Not on file    Years of education: Not on file    Highest education level: Not on file   Occupational History    Not on file   Tobacco Use    Smoking status: Never    Smokeless tobacco: Never   Vaping Use    Vaping Use: Never used   Substance and Sexual Activity    Alcohol use: Yes     Comment: rarely    Drug use: Never    Sexual activity: Not on file   Other Topics Concern    Not on file   Social History Narrative    Not on file     Social Determinants of Health     Financial Resource Strain: Not on file   Food Insecurity: Not on file   Transportation Needs: Not on file   Physical Activity: Not on file   Stress: Not on file   Social Connections: Not on file   Intimate Partner Violence: Not on file   Housing Stability: Not on file       FAMILY HISTORY:   Family History   Problem Relation Age of Onset    Schizophrenia Mother     Diabetes Mother     Dementia Mother     Depression Mother      REVIEW OF SYSTEMS:   A full 12-point review of systems was completed and was negative unless noted in the history of present illness. PHYSICAL EXAMINATION:   ECOG Performance status 2  VITAL SIGNS: There were no vitals filed for this visit.      General: cachectic chronically ill appearing adult Male in no acute distress  HEENT: normocephalic, atraumatic; EOMI  Neck: supple with full ROM  Respiratory: normal inspiratory effort, no audible wheezes  Extremities: no cyanosis, clubbing, or edema  Musculoskeletal: mobility intact x4; normal ROM in all joints  Neuro: AOx3; sensation intact x 4; CNII-XII grossly intact  Psych: appropriate affect, insight, and judgement  GI: abdomen soft, non-distended    PATHOLOGY:    I personally reviewed the pathology reports as documented in the HPI. LABORATORY:   Lab Results   Component Value Date/Time     09/01/2022 06:20 AM    K 3.4 09/01/2022 06:20 AM     09/01/2022 06:20 AM    CO2 21 09/01/2022 06:20 AM    BUN 17 09/01/2022 06:20 AM    GFRAA >60 09/01/2022 06:20 AM    MG 2.0 09/01/2022 06:20 AM    PHOS 1.9 08/26/2022 08:52 AM    GLOB 2.8 09/01/2022 06:20 AM    ALT 28 09/01/2022 06:20 AM     Lab Results   Component Value Date/Time    WBC 10.8 09/01/2022 06:21 AM    HGB 9.4 09/01/2022 06:21 AM    HCT 27.8 09/01/2022 06:21 AM    PLT 79 09/01/2022 06:21 AM     RADIOLOGY:    I personally reviewed the images and agree with the findings as documented in the HPI. IMPRESSION:  Jono Dobbs is a 56 y.o. male with metastatic esophageal cancer s/p chemotherapy, immunotherapy, and radiation with friable mass presenting for discussion of palliative radiation. I had a long discussion with Jono Dobbs regarding palliative radiation over 2 weeks in order to shrink his esophageal mass and minimize the risk of bleeding as much as possible. I reviewed in detail the anticipated acute and late toxicities of radiation therapy as well as  the goals of treatment which are palliative and not curative in nature. Jono Dobbs kourtney the opportunity to ask questions which appeared to be answered to his satisfaction and has elected to proceed.     PLAN:    Consented patient for treatment with external beam radiation after discussing risk, benefits, and side effects from treatment. CT simulation today, treatment to start shortly thereafter.     Adamaris Shine MD   September 1, 2022

## 2022-09-02 NOTE — PROGRESS NOTES
PHYSICAL THERAPY: Daily Note AM   (Link to Caseload Tracking: PT Visit Days : 2  Time In/Out PT Charge Capture  Rehab Caseload Tracker  Orders    Beau Sicard is a 56 y.o. male   PRIMARY DIAGNOSIS: Acute on chronic blood loss anemia  Metastatic malignant neoplasm, unspecified site ([facility] [address] [C79.9]  Anemia, unspecified type [D64.9]  Acute on chronic blood loss anemia [D62]       Inpatient: Payor: /     ASSESSMENT:     REHAB RECOMMENDATIONS:   Recommendation to date pending progress:  Setting:  Home Health Therapy    Equipment:    None   Pt has RW, SPC, w/c and SC     ASSESSMENT:  Mr. Nohemy Montero is supine in bed and requires some encouragement to participate in therapy. Pt ambulated x20ft in room with CGA and demonstrated a slight post lean at times and able to self-recover balance. Fair + standing dynamic balance and Mod I bed mobility. Pt provided with education regarding LE exercises, ankle pumps and heel slides to perform independently in bed. Pt declined any further engagement in therapy this AM. Pt returned supine with needs in reach, alarm activated and RN notified. PT to continue to follow. SUBJECTIVE:   Mr. Nohemy Montero states, Shakeel Kohut we done now? \"     Social/Functional Lives With: Spouse  Type of Home: House  Home Layout: Two level (bedroom on 2nd level)  Home Access: Level entry  Bathroom Shower/Tub: Walk-in shower  Bathroom Equipment: Shower chair  Bathroom Accessibility: Accessible  Home Equipment: Julio Yang, Amandeep Meléndez, [address] Help From: Family  ADL Assistance: Independent  Homemaking Assistance: Independent  Ambulation Assistance: Independent  Transfer Assistance: Independent  Active : No  Patient's  Info: Family  Occupation: On disability  OBJECTIVE:     PAIN: VITALS / O2: PRECAUTION / [place] [address]:   Pre Treatment:          Post Treatment: 0 Vitals        Oxygen    None    RESTRICTIONS/PRECAUTIONS:        MOBILITY: I Mod I S SBA CGA Min Mod Max Total  NT x2 Comments:   Bed Mobility    Rolling [] [] [] [] [] [] [] [] [] [] []    Supine to Sit [] [x] [] [] [] [] [] [] [] [] []    Scooting [x] [] [] [] [] [] [] [] [] [] []    Sit to Supine [] [x] [] [] [] [] [] [] [] [] []    Transfers    Sit to Stand [] [] [] [] [x] [] [] [] [] [] []    Bed to Chair [] [] [] [] [] [] [] [] [] [x] []    Stand to Sit [] [] [] [] [x] [] [] [] [] [] []     [] [] [] [] [] [] [] [] [] [] []    I=Independent, Mod I=Modified Independent, S=Supervision, SBA=Standby Assistance, CGA=Contact Guard Assistance,   Min=Minimal Assistance, Mod=Moderate Assistance, Max=Maximal Assistance, Total=Total Assistance, NT=Not Tested    BALANCE: Good Fair+ Fair Fair- Poor NT Comments   Sitting Static [x] [] [] [] [] []    Sitting Dynamic [x] [] [] [] [] []              Standing Static [] [x] [] [] [] []    Standing Dynamic [] [x] [] [] [] []      GAIT: I Mod I S SBA CGA Min Mod Max Total  NT x2 Comments:   Level of Assistance [] [] [] [] [x] [] [] [] [] [] []    Distance 20 feet    DME Gait Belt    Gait Quality Decreased dana , Decreased step clearance, and Path deviations     Weightbearing Status      Stairs      I=Independent, Mod I=Modified Independent, S=Supervision, SBA=Standby Assistance, CGA=Contact Guard Assistance,   Min=Minimal Assistance, Mod=Moderate Assistance, Max=Maximal Assistance, Total=Total Assistance, NT=Not Tested    PLAN:   ACUTE PHYSICAL THERAPY GOALS:   (Developed with and agreed upon by patient and/or caregiver. )     LTG:  (1.)Mr. Isaías Patricia will move from supine to sit and sit to supine  in bed with INDEPENDENCE within 7 treatment day(s). (2.)Mr. Isaías Patricia will transfer from bed to chair and chair to bed with MODIFIED INDEPENDENCE using the least restrictive device within 7 treatment day(s). (3.)Mr. Isaías Patricia will ambulate with MODIFIED INDEPENDENCE for 150 feet with the least restrictive device within 7 treatment day(s). (4.)Mr. Isaías Patricia will perform standing static and dynamic balance activities x 23 minutes with SUPERVISION to improve safety within 7 treatment day(s). (5.)Mr. Paz Benavides will ambulate and/or perform functional activities for  23 consecutive minutes with stable vital signs and no rests required to improve activity tolerance within 7 treatment days. FREQUENCY AND DURATION: 3 times/week for duration of hospital stay or until stated goals are met, whichever comes first.    TREATMENT:   TREATMENT:   Gait Training (8 Minutes): Gait training for 20 feet utilizing Gait Belt. Patient required Tactile and Verbal cueing to improve Activity Pacing and Dynamic Standing Balance.      TREATMENT GRID:  N/A    AFTER TREATMENT PRECAUTIONS: Alarm Activated, Bed, Bed/Chair Locked, Call light within reach, Needs within reach, and RN notified    INTERDISCIPLINARY COLLABORATION:  RN/ PCT and PT/ PTA    EDUCATION:      TIME IN/OUT:  Time In: 1526  Time Out: 8882  Minutes: 7077  Pat Portillo PT

## 2022-09-02 NOTE — CARE COORDINATION
Pt to d/c home today. Family will provide transportation home. Pt has necessary home DME. After completion of 10 XRT Tx pt wishes to enroll in [facility] services as his mother is a current pt. CM confirmed with this hospice that they will not cover any XRT Tx. Pt and spouse were told this during a bedside discussion yesterday with CM and oncology NP. No supportive care needs identified. Pt agrees with d/c plan. Milestones met. Pt will f/u OP with oncology. PleurX to be placed OP.       08/29/22 2220   Service Assessment   Patient Orientation Alert and Oriented;Person;Place; Self   Cognition Alert   History Provided By Patient;Medical Record   Primary Caregiver Self   Support Systems Spouse/Significant Other;Family Members   Patient's Healthcare Decision Maker is: Legal Next of Kin   PCP Verified by CM No  (No PCP)   Prior Functional Level Assistance with the following:;Mobility   Current Functional Level Assistance with the following:;Mobility   Can patient return to prior living arrangement Yes   Ability to make needs known: Good   Family able to assist with home care needs: Yes   Would you like for me to discuss the discharge plan with any other family members/significant others, and if so, who? No   [address] [facility] [address] [facility]  (Pt does not have insurance)   Social/Functional History   Lives With Spouse;Parent   Type of [address] [place] [address] Two level  (bedroom on 2nd level)   Home Access Level entry   Bathroom Shower/Tub Walk-in shower   [address] chair   [address] Cane;Walker, rolling; [address] Help From Family   ADL Assistance Independent   Homemaking Assistance Independent   Ambulation Assistance Needs assistance   Transfer Assistance Needs assistance   Active  No   Patient's  Info Family   Occupation On disability   Discharge Planning   Type of Residence House   Living Arrangements Spouse/Significant Other;Family Members   Current Services Prior To Admission Durable Medical Equipment   Potential Assistance Needed N/A   DME Ordered? No   Potential Assistance Purchasing Medications Yes   Type of Home Care Services None   Patient expects to be discharged to: House   Follow Up Appointment: Best Day/Time  Monday AM   One/Two Story Residence Two story   History of falls? 0   Services At/After Discharge   Transition of Care Consult (CM Consult) Discharge [address] Discharge None   [place] Resource Information Provided? No   Mode of Transport at Discharge Other (see comment)  (Family)   Confirm Follow Up Transport Family   Condition of Participation: Discharge Planning   The Plan for Transition of Care is related to the following treatment goals: Pt to obtain care to become medically stable and to return with a safe transition. The Patient and/or Patient Representative was provided with a Choice of Provider? Patient   The Patient and/Or Patient Representative agree with the Discharge Plan? Yes   Freedom of Choice list was provided with basic dialogue that supports the patient's individualized plan of care/goals, treatment preferences, and shares the quality data associated with the providers?   Yes

## 2022-09-02 NOTE — PROGRESS NOTES
Patient discharge instructions reviewed with patient and brother in law at bedside. IV was removed and port was de-accessed. Follow ups were reviewed and medications were sent to patients pharmacy. Patient rolled out by staff ion wheel chair to return home with brother in law.

## 2022-09-06 NOTE — PROGRESS NOTES
[facility] Hematology & Oncology: Office Visit Progress Note      Chief Complaint:     Gastric cancer   Liver mets      History of Present Illness:   Reason for Referral: Stomach cancer with metastasis to the liver       Referring Provider:  Dr. Adolfo Aponte       Primary Care Provider: None       Family History of Cancer/Hematologic Disorders: None reported       Presenting Symptoms: Weight loss, trouble swallowing, right flank/lower quadrant abdominal pain, gross hematuria, and abnormal CT urogram        Mr. Jet Man is a 56 y.o. white male with no pertinent medical history who presented to the [facility] ED on 10/15/20 reporting right flank/ lower quadrant  abdominal pain and gross hematuria. CT urogram was obtained in the ED with findings of a large mass centered in the lesser curvature the stomach measuring up to 8.6 cm invading into the lesser omentum concerning for a primary gastric cancer; multiple  large hypoattenuating hepatic metastases; retroperitoneal metastatic adenopathy; and small non-obstructing bilateral renal calculi measuring up to 4 mm in the right with mild right hydroureter but no obstructing stone evident (findings could indicate  a recently passed or nonradiopaque stone). Upon discussion of imaging results, patient endorsed recent history of weight loss and trouble swallowing. Case was discussed with Oncologist, Dr. Rian Lutz, who agreed to see patient in outpatient clinic on 10/19. CT OF THE ABDOMEN AND PELVIS WITHOUT IV CONTRAST 10/15/2020   FINDINGS:   Visualized thorax: Normal.   Liver: The liver demonstrates multiple large hypoattenuating masses, the largest centered in segment 6 measuring 10.0 x 8.7 cm. Partially imaged lesion in the left hepatic lobe measuring 6.6 x 2.8 cm. Gallbladder/biliary: Normal gallbladder. No biliary dilatation.     Pancreas: Normal.    Spleen: Normal.    Adrenals: Normal.    Kidneys: Small bilateral nonobstructing renal calculi measuring up to 4 mm in spread is seen in the retroperitoneum which appears to   encase the distal celiac axis and its proximal major branches. 2. Clear hepatic metastatic disease. In addition, retroperitoneal adenopathy is   seen concerning for metastatic lymph nodes. No evidence for metastatic disease   is otherwise seen. A nonspecific 4 mm x 2 mm left lower lobe nodule is seen. Although an early metastasis is not excluded with certainty, the appearance is   felt to be nonspecific and not clearly suggestive of pulmonary metastatic   disease. Attention on follow-up studies is recommended. He started first line mFOLFIRINOX 11/5/20. Interim history update in A/P. Review of Systems:      Constitutional  Weight loss. Anorexia. Denies fever or chills. Insomnia. HEENT  Denies trauma, bluring vision, hearing loss, ear pain, nosebleeds, sore throat, neck pain and ear discharge. Skin  Denies lesions or rashes. Lungs  Denies shortness of breath, cough, sputum production or hemoptysis. Cardiovascular  Denies chest pain, palpitations, orthopnea, claudication and leg swelling. Gastrointestinal  Nausea. Diarrhea. Denies vomiting. Dark stools. Abdominal pain.   Denies dysuria, frequency or hesitancy of urination     Neuro  Finger numb. Denies headaches, visual changes or ataxia. Denies dizziness, speech change, focal weakness and headaches. Hematology  Denies nasal/gum bleeding, denies easy bruise     Endo  Denies heat/cold intolerance, denies diabetes. MSK  Denies back pain, swollen legs, myalgias and falls. Psychiatric/Behavioral   insomnia. Denies depression and substance abuse. The patient is not nervous/anxious.           No Known Allergies  Past Medical History:   Diagnosis Date    Acute gastrointestinal bleeding 7/17/2022    COVID 1/30/2022    Gastric cancer ([facility] [address]     being treated currently - chemo and radiation    History of blood transfusion 6/30/2022 no rxn to this transfusion    Lazy eye, left     Severe anemia 7/12/2022     Past Surgical History:   Procedure Laterality Date    IR PORT PLACEMENT EQUAL OR GREATER THAN 5 YEARS  11/3/2020    IR PORT PLACEMENT EQUAL OR GREATER THAN 5 YEARS  11/3/2020    IR PORT PLACEMENT EQUAL OR GREATER THAN 5 YEARS 11/3/2020 [facility] RADIOLOGY SPECIALS    ORTHOPEDIC SURGERY      ankle    TONSILLECTOMY      UPPER GASTROINTESTINAL ENDOSCOPY N/A 7/5/2022    EGD DILATION BALLOON performed by Geneva Higgins MD at [facility] ENDOSCOPY    UPPER GASTROINTESTINAL ENDOSCOPY N/A 7/14/2022    EGD STENT PLACEMENT performed by Jimmy Henriquez MD at [place] [address] N/A 8/3/2022    EGD ESOPHAGOGASTRODUODENOSCOPY performed by Tone Turner MD at [address] [place] [address]  10/22/2020    US GUIDED LIVER BIOPSY PERCUTANEOUS 10/22/2020 [address]     Family History   Problem Relation Age of Onset    Schizophrenia Mother     Diabetes Mother     Dementia Mother     Depression Mother      Social History     Socioeconomic History    Marital status:      Spouse name: Not on file    Number of children: Not on file    Years of education: Not on file    Highest education level: Not on file   Occupational History    Not on file   Tobacco Use    Smoking status: Never    Smokeless tobacco: Never   Vaping Use    Vaping Use: Never used   Substance and Sexual Activity    Alcohol use: Yes     Comment: rarely    Drug use: Never    Sexual activity: Not on file   Other Topics Concern    Not on file   Social History Narrative    Not on file     Social Determinants of Health     Financial Resource Strain: Not on file   Food Insecurity: Not on file   Transportation Needs: Not on file   Physical Activity: Not on file   Stress: Not on file   Social Connections: Not on file   Intimate Partner Violence: Not on file   Housing Stability: Not on file     Current Outpatient Medications   Medication Sig Dispense Refill    mirtazapine (REMERON) 30 MG tablet TAKE 1 TABLET BY MOUTH NIGHTLY 30 tablet 0    HYDROcodone-acetaminophen (NORCO)  MG per tablet Take 1 tablet by mouth every 6 hours as needed for Pain for up to 14 days. Intended supply: 30 days 56 tablet 0    levothyroxine (SYNTHROID) 50 MCG tablet Take 1 tablet by mouth in the morning. 90 tablet 1    gabapentin (NEURONTIN) 300 MG capsule Take 1 capsule by mouth in the morning and 1 capsule before bedtime. Do all this for 90 days. 180 capsule 2    pantoprazole (PROTONIX) 40 MG tablet Take 1 tablet by mouth in the morning. 30 tablet 0     No current facility-administered medications for this visit. Facility-Administered Medications Ordered in Other Visits   Medication Dose Route Frequency Provider Last Rate Last Admin    atropine injection 0.4 mg  0.4 mg IntraVENous Once Cindy Comes, MD           OBJECTIVE:  BP (!) 81/54 (Site: Right Upper Arm, Position: Sitting, Cuff Size: Small Adult)   Pulse (!) 107   Temp 97.5 °F (36.4 °C) (Oral)   Resp 12   Ht 5' 11.5\" (1.816 m)   Wt 169 lb (76.7 kg)   SpO2 95%   BMI 23.24 kg/m²     Physical Exam:  Constitutional: Oriented to person, place, and time. Well-developed. Thin. HEENT: Normocephalic and atraumatic. Oropharynx is clear and moist.   Conjunctivae and EOM are normal. Pupils are equal, round, and reactive to light. No scleral icterus. Neck supple. No JVD present. No tracheal deviation present. No thyromegaly present. Lymph node No palpable submandibular, cervical, supraclavicular, axillary and inguinal lymph nodes. Skin Warm and dry. No bruising and no rash noted. No erythema. No pallor. Respiratory Effort normal and breath sounds normal.  No respiratory distress. No wheezes. No rales. No tenderness. CVS Normal rate, regular rhythm and normal heart sounds. Exam reveals no gallop, no friction and no rub. No murmur heard. Abdomen Soft.  Bowel sounds are normal. Exhibits no distension. There is no tenderness. There is no rebound and no guarding. Neuro Normal reflexes. No cranial nerve deficit. Exhibits normal muscle tone, 5 of 5 strength of all extremities. MSK Normal range of motion in general.  No edema and no tenderness. Psych Normal mood, affect, behavior, judgment and thought content      Labs:  No results found for this or any previous visit (from the past 24 hour(s)). Imaging:  No results found for this or any previous visit. ASSESSMENT/PLAN:   Diagnosis Orders   1. Malignant neoplasm of overlapping sites of stomach [facility])  External Referral to Hospice      2. Cancer associated pain        3. Cachexia ([facility] [address]        4. Liver metastasis ([facility] [address]        5. DNR (do not resuscitate)        6. Hospital discharge follow-up                 56 y.o. M consulted for gastric cancer and liver mets in 10/2020. 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 He presented to [facility] on 10/15/20 for pain and suspect kidney stone, CT urogram showed large gastric mass and liver masses, urgently  presented to [facility] on 10/19/20 with wife, and we discussed this appeared almost certain of metastatic malignancy, I showed CT images to pt and wife per request, arrange staging CT C/A/P, liver mass biopsy, consult FC and SW for sponsorship, discussed nutrition  and manage dysphagia, refer to [facility] cancer society for Ensure.       Liver biopsy reported metastatic carcinoma c/w upper GI origin, and I discussed with pt and wife this together with the CT finding of large gastric mass is consistent with stage IV gastric cancer, not expected to be curable but palliative rx may prolong  life and improve symptoms, both were motivated to pursue and rec start mFOLFIRINOX aiming for rapid response given the symptoms of dysphagia and rapid weight loss, check Caris pending/HER2 -, need to defer [address] trip, arrange port and antiemetics, remeron  for insomnia/anorexia, ativan as needed, educate for toxicities and management, can not tolerated Ensure and he attributed to sugar, try glucerna and consult nutritionist, started cycle 1 palliative FOLFIRINOX 11/5/20, tumor marker down and bloating improved,  encouraging clinical response, increase remeron and use ativan for insomnia, laxative prn diarrhea and find out particular association with diet, due for cycle 4 and neutropenic again, add GCSF and keep schedule for his birthday arrangement, c/o low libido  and check testosterone level normal, continue remeron managing insomnia/depression, Emend for nausea, titrate imodium for proper rx of diarrhea, prepH for hemorroid, due for cycle 7 and deferred for neutropenia, returned 2/10/21 and ANC 1.3, he was reluctant  to use GCSF for the cost, discussed with FC and obtained [employer] assistance, diarrhea responded to imodium, CT 2/10/2021 showed further shrinking of liver and the gastric mass, a periaortic lymph node will be monitored for change, reported neuropathy  worsened after cycle 9 with persistent numbness in the fingertips, discussed that the risk of worse neuropathy and hold oxaliplatin for cycle 10, neuropathy improved and proceed to cycle 12 with lower dose of oxaliplatin at 50 mg/m², monitor mild anemia  and thrombocytopenia, report mild neuropathy stable, repeat CT 5/12/2021 showed generally stable or improved disease except for the retroperitoneal lymph node next to left kidney continues to grow, which explains the increase of the CEA, we discussed  that since this appeared to be the solitary progression of disease, I referred him to radiation oncology to consider SBRT done in 6/2021, which may be cause of the CEA spike and monitor, we continued the systemic therapy with modified FOLFIRINOX with  dose modification of oxaliplatin and monitor neuropathy reportedly stable, on gabapentin, arranged for Covid vaccination now that he finally would accept it, reschedule following chemo's to give 2 weeks of response time to each vaccine dose, repeat CT  8/12/21 showed stable disease, meanwhile concerned of the CEA trending up for early disease progression versus the result of treatment delay due to coordination of Covid vaccine, he will complete second dose of vaccine on time and return in 2 weeks for  next cycle, also discussed further systemic treatment may be limited to IO versus Taxol/ramucirumab and his persistent neuropathy would be a concern, Enhertu for HER2 1+ on IHC has been proven effective in breast cancer but may need replacement if using  in gastric cancer, also discussed surgical resection given there is no new disease developed over the past 10 months although the quality of life expected to change significantly if pursuing surgery which would not be considered curative after all. CT  after cycle 22 showed disease progression of a para-aortic lymph node and a new lesion in the left adrenal gland:                      We discussed the options to add SBRT to the 2 oligo progression, versus changing to [facility], his neuropathy remains a major obstacle for changing chemotherapy, discussed with Dr. Michelle Suarez and completed SBRT in 5 fractions for both sites, return on 1/19/2022,  platelet 53 and , he forgot coming for [facility] and last cycle, we had to hold off chemo and come back next week for potentially cycle 28, however he got COVID with fever for 10 days, arranged to receive monoclonal antibody, returned on 2/16/2022,  still with left lung crackles but generally recovered, started having sense of dysphagia again after off chemo for a month and half, restart FOLFIRINOX with Udenyca for neutropenia, repeat CEA, continue Remeron, Ambien for insomnia, repeat CT 3/1/2022  showed a small amount of disease progression:       We discussed that this is generally similar disease burden after 6 weeks of chemo break, discussed with Dr. Michelle Suarez for SBRT of the small new liver lesion and the left adrenal mass growth, Dr. Juancho Palmer would like to hold off to see whether more metastasis  develops, trend CEA, continue FOLFIRINOX with Udenyca for neutropenia, platelet 95 and okay to proceed with cycle 35 with dose reduced bolus 5-FU, but complaining of dysphagia and weight loss, urgent referral for GI evaluation with EGD/dilation if possible, discussed the nutrition and he reports intolerance of Ensure and appear to having lactose intolerance, try lactose-free for diet, plan to go to vacation in [place] and defer next cycle after return, call as needed. He returned on 6/29/2022, discussed that the restaging CT yesterday showed disease progression:    Discussed that the radiographical progression and his clinical dysphagia and weight loss indicate change of therapy, discussed further options of chemotherapy with or without chemo given his CPS 30%, versus Taxol/ramucirumab which would be challenging to his neuropathy, decided to pursue authorization of single agent Keytruda, had a EGD 7/5/2022 showed GE junction friable malignant stricture that was dilated but no stent was placed, worked in on 7/12/2022 still complaining of no improvement of dysphagia and need to discuss with GI for stent placement, very tired and hemoglobin 5.3, dark stool, arrange PRBC 2 units and platelet 1 unit, admitted on 7/14 and received GE junction stent placement, discharged on 7/17 and returned on 7/21, hemoglobin improved, baseline TSH surprisingly 111, start Synthroid 50 mcg daily and trend TSH while proceed to [place], would add radiation if continues to have GI bleeding. Here for 3 week follow up and cycle 2 Keytruda. Since last seen he was admitted again for GI bleed and migration of his esophageal stent which was replaced. Hgb was 9.0 on arrival to ER for fatigue and dark stools but dropped to 6.6 with hydration/dilution. He did receive one unit of PRBC's while admitted.  EGD was performed and showed stigmata of recent bleeding. We discussed referral to radiation to help cauterize bleed but he would like to hold off on this. He tolerated cycle 1 Keytruda okay overall. He had no changes in energy or appetite. No nausea or bowel issues. He is having one loose stool a day and denies black stools or bleeding. He denies any significant pain. No recent fevers ,chills or other infectious symptoms. He will continue gabapentin 300 mg BID for his polyneuropathy - refill provided. Labs reviewed. CBC stable with Hgb 8.9, iron stores are acceptable. Platelets 546X. , return in 3 weeks but call as needed. K+ 3.1 today - will take potassium chloride 20 meq BID x 7 days, Rx given. His TSH is 110 - he has yet to start Synthroid 50 mcg but will start now and he will take on an empty stomach. Continue good oral nutrition and hydration. Encouraged frequent activity throughout the day and rest as needed to combat fatigue. Call with any fevers, uncontrolled side effects from treatment or any other worrisome/concerning symptoms. Follow up in 3 weeks or sooner if needed. However patient was hospitalized for 3 times in the month for GI bleeding, nausea and dehydration, fall and laceration of head, last discharged on 9/1/2022 and followed on 9/6/2022, cachectic and very weak, intended to pursue hospice pending palliative GE junction tumor radiation for bleeding control, however given the extensive rapid disease progression in the liver his life expectancy is very short and he needs hospice assistance as soon as possible rather than holding off for more radiation, patient and wife agree and referral to [facility] hospice was placed as they requested, DNR, will be available as needed. All questions are answered to their satisfaction. They will call for further questions and concerns. Fatigue - No flowsheet data found. Distress - No flowsheet data found.     Elements of this note have been dictated via voice recognition software. Text and phrases may be limited by the accuracy and autoconversion of the software. The chart has been reviewed, but errors may still be present.           Jeff Whalen MD, MD  [address]  Office : [phone]  Fax : [phone]

## 2022-09-06 NOTE — PATIENT INSTRUCTIONS
Patient Instructions from Today's Visit    Reason for Visit:  Hospital follow-up     Diagnosis Information:  [email or web address]    Plan:  -Planning palliative radiation to try to help with your symptoms. Pros and cons of doing radiation discussed. -DNR (do not resuscitate) status confirmed.   -We will send a referral to [facility] hospice. Follow Up:  -As needed    Treatment Summary has been discussed and given to patient: N/A      -------------------------------------------------------------------------------------------------------------------  Please call our office at [phone] if you have any  of the following symptoms:   Fever of 100.5 or greater  Chills  Shortness of breath  Swelling or pain in one leg    After office hours an answering service is available and will contact a provider for emergencies or if you are experiencing any of the above symptoms. Patient does express an interest in My Chart. My Chart log in information explained on the after visit summary printout at the [facility] [address] desk.     Felicita Marrero RN

## 2022-09-07 NOTE — PROGRESS NOTES
Nutrition:  RD did not see pt during today's office visit, referral has been made to hospice services. RD to sign off, please re-consult if change in status and further nutrition intervention is warranted.     [address] [facility], [address], [address] [facility]

## 2022-09-08 NOTE — TELEPHONE ENCOUNTER
I have reviewed the patient's controlled substance prescription history, as maintained in the [address] [place] [address] prescription monitoring program, so that the prescriptions(s) for a controlled substance can be given.   Last Date Reviewed: 9/8/22

## 2022-09-08 NOTE — H&P
Department of Interventional Radiology  [phone]    History and Physical    Patient:  Gail Sicard MRN:  [id number]  SSN:  [id number]    YOB: 1975  Age:  56 y.o. Sex:  male      Primary Care Provider:  None Provider  Referring Physician:  ARY Okeefe - ALFONSO    Subjective:     Chief Complaint: ascites    History of the Present Illness: The patient is a 56 y.o. male with metastatic gastric, ascites who presents for placement of abdominal PleritX catheter. [facility] has been consulted. BP on arrival was 73/51 but improved to SBP 93 with fluid bolus. NPO. Past Medical History:   Diagnosis Date    Acute gastrointestinal bleeding 7/17/2022    COVID 1/30/2022    Gastric cancer ([address]     being treated currently - chemo and radiation    History of blood transfusion     6/30/2022 no rxn to this transfusion    Lazy eye, left     Severe anemia 7/12/2022     Past Surgical History:   Procedure Laterality Date    IR PORT PLACEMENT EQUAL OR GREATER THAN 5 YEARS  11/3/2020    IR PORT PLACEMENT EQUAL OR GREATER THAN 5 YEARS  11/3/2020    IR PORT PLACEMENT EQUAL OR GREATER THAN 5 YEARS 11/3/2020 [facility] SPECIALS    ORTHOPEDIC SURGERY      ankle    TONSILLECTOMY      UPPER GASTROINTESTINAL ENDOSCOPY N/A 7/5/2022    EGD DILATION BALLOON performed by Smooth Roberts MD at [place] [address] N/A 7/14/2022    EGD STENT PLACEMENT performed by Steven Galo MD at [place] [address] N/A 8/3/2022    EGD ESOPHAGOGASTRODUODENOSCOPY performed by Geneva Hensley MD at [address] [place] [address]  10/22/2020    [address] [place] [address] 10/22/2020 [facility] [address] [place] [address]        Review of Systems:    Pertinent items are noted in HPI. Prior to Admission medications    Medication Sig Start Date End Date Taking?  Authorizing Provider   HYDROcodone-acetaminophen (NORCO)  MG per tablet Take 1 tablet by mouth every 6 hours as needed for Pain for up to 14 days. Intended supply: 30 days 9/8/22 9/22/22  Renee Durant MD   mirtazapine (REMERON) 30 MG tablet TAKE 1 TABLET BY MOUTH NIGHTLY 8/26/22   Renee Durant MD   Ergocalciferol (VITAMIN D) 81973 units CAPS Take 50,000 Units by mouth daily (with breakfast) for 5 doses 8/26/22 9/2/22  Cortez Guzman,    levothyroxine (SYNTHROID) 50 MCG tablet Take 1 tablet by mouth in the morning. 8/15/22   Renee Durant MD   gabapentin (NEURONTIN) 300 MG capsule Take 1 capsule by mouth in the morning and 1 capsule before bedtime. Do all this for 90 days. 8/15/22 11/13/22  SHMUEL Park   sucralfate (CARAFATE) 1 GM tablet take 1 tablet dissolved in 4 ounces of H2O 4 times every day on an empty stomach 1 hour before meals and at bedtime for Bleeding  Patient not taking: Reported on 8/23/2022 7/5/22 8/26/22  Historical Provider, MD   potassium chloride (KLOR-CON M) 20 MEQ extended release tablet Take 1 tablet by mouth in the morning and 1 tablet before bedtime. Do all this for 7 days. 8/15/22 8/26/22  SHMUEL Park   pantoprazole (PROTONIX) 40 MG tablet Take 1 tablet by mouth in the morning. 8/6/22 9/6/22  Zoe Alberts MD   prochlorperazine (COMPAZINE) 10 MG tablet Take 10 mg by mouth every 6 hours as needed  Patient not taking: No sig reported 10/28/20 8/6/22  Ar Automatic Reconciliation        No Known Allergies    Family History   Problem Relation Age of Onset    Schizophrenia Mother     Diabetes Mother     Dementia Mother     Depression Mother      Social History     Tobacco Use    Smoking status: Never    Smokeless tobacco: Never   Substance Use Topics    Alcohol use: Yes     Comment: rarely        Not in a hospital admission.     Objective:       Physical Examination:    Vitals:    09/08/22 1040   BP: (!) 73/51   Pulse: (!) 111   Resp: 14   Temp: 98.2 °F (36.8 °C)   TempSrc: Oral   SpO2: 96%       Pain Assessment denies                                          HEART: regular rate and rhythm  LUNG: clear to auscultation bilaterally  ABDOMEN: distended  EXTREMITIES: warm, no edema    Laboratory:     Lab Results   Component Value Date/Time     09/06/2022 01:48 PM     09/02/2022 05:44 AM    K 3.7 09/06/2022 01:48 PM    K 3.5 09/02/2022 05:44 AM    CL 99 09/06/2022 01:48 PM    CL 99 09/02/2022 05:44 AM    CO2 19 09/06/2022 01:48 PM    CO2 22 09/02/2022 05:44 AM    BUN 26 09/06/2022 01:48 PM    BUN 19 09/02/2022 05:44 AM    GFRAA >60 09/06/2022 01:48 PM    GFRAA >60 09/02/2022 05:44 AM    MG 2.3 09/06/2022 01:48 PM    MG 2.0 09/02/2022 05:44 AM    PHOS 1.9 08/26/2022 08:52 AM    PHOS 2.5 07/19/2022 05:14 AM    GLOB 3.2 09/06/2022 01:48 PM    GLOB 3.0 09/02/2022 05:44 AM    ALT 33 09/06/2022 01:48 PM    ALT 27 09/02/2022 05:44 AM     Lab Results   Component Value Date/Time    WBC 14.1 09/06/2022 01:48 PM    WBC 10.7 09/02/2022 05:44 AM    HGB 8.7 09/06/2022 01:48 PM    HGB 8.6 09/02/2022 05:44 AM    HCT 26.3 09/06/2022 01:48 PM    HCT 25.2 09/02/2022 05:44 AM     09/06/2022 01:48 PM    PLT 97 09/02/2022 05:44 AM     Lab Results   Component Value Date/Time    APTT 42.4 08/30/2022 05:18 PM    APTT 40.7 08/30/2022 10:39 AM    INR 1.3 08/30/2022 05:18 PM    INR 1.2 08/30/2022 10:39 AM       Assessment:     Metastatic gastric cancer, ascites    [unfilled]    Plan:     Planned Procedure:  abdominal PeritX catheter placement    Risks, benefits, and alternatives reviewed with patient and he agrees to proceed with the procedure.       Signed By: Linda Martines PA-C     September 8, 2022

## 2022-09-08 NOTE — DISCHARGE INSTRUCTIONS
[facility]     Department of Interventional Radiology     [facility] Radiology     [phone] Office     [phone] Fax       PLEURAL OR ABDOMINAL CATHETER  DISCHARGE INSTRUCTIONS           General Information:        A plastic catheter has been inserted through your skin and into either your pleural space (the sac surrounding your lung) or into your abdomen. This has been done because you have needed frequent Thoracentesis or Paracentesis. This catheter will provide you a way to drain off the fluid every couple of days. Your doctor will order a home health nurse to help you learn to do the drain and to take care of the catheter. Home Care Instructions: You can resume your regular diet and medication regimen. Do not drink alcohol, drive, or make any important legal decisions in the next 24 hours. Do not lift anything heavier than a gallon of milk, or do anything strenuous for the next 24 hours. You should not shower for 24 hours. You should cover the tube with plastic wrap and tape to keep it dry when you shower. Do not take a bath, swim or immerse yourself in water as long as you have this catheter. You should clean the tube and change the dressing every time you drain the fluid, and as needed. Keep the dressing clean and dry. Keep a journal of how often you drain the fluid, how much fluid you drain, and the character of the fluid. Call If:        You should call your Physician and/or the Radiology Nurse if you have any bleeding other than a small spot on your bandage. Call if you have any signs of infection, fever, or increased pain at the site of the tube. Call if you should have leakage around the tube, a change in the appearance of the fluid, or if the tube gets pulled out some or all the way out. Call us, your home health nurse, or your regular doctor if you have any concerns or questions about your catheter.            Follow-Up Instructions:   Please see your ordering doctor as he/she has requested. To Reach Us: If you have any questions about your procedure, please call the Interventional Radiology department at [phone]. After business hours (5pm) and weekends, call the answering service at [phone] and ask for the Radiologist on call to be paged. Si tiene Preguntas acerca del procedimiento, por favor llame al departamento de Radiología Intervencional al [phone]. Después de horas de oficina (5 pm) y los fines de [place], llamar al Angelia Gallego al [phone] y pregunte por el Radiologo de [facility]. Interventional Radiology General Nurse Discharge    After general anesthesia or intravenous sedation, for 24 hours or while taking prescription Narcotics:  Limit your activities  Do not drive and operate hazardous machinery  Do not make important personal or business decisions  Do  not drink alcoholic beverages  If you have not urinated within 8 hours after discharge, please contact your surgeon on call. * Please give a list of your current medications to your Primary Care Provider. * Please update this list whenever your medications are discontinued, doses are     changed, or new medications (including over-the-counter products) are added. * Please carry medication information at all times in case of emergency situations. These are general instructions for a healthy lifestyle:    No smoking/ No tobacco products/ Avoid exposure to second hand smoke  Surgeon General's Warning:  Quitting smoking now greatly reduces serious risk to your health.     Obesity, smoking, and sedentary lifestyle greatly increases your risk for illness  A healthy diet, regular physical exercise & weight monitoring are important for maintaining a healthy lifestyle    You may be retaining fluid if you have a history of heart failure or if you experience any of the following symptoms:  Weight gain of 3 pounds or more overnight or 5 pounds in a week, increased swelling in our hands or feet or shortness of breath while lying flat in bed. Please call your doctor as soon as you notice any of these symptoms; do not wait until your next office visit. Recognize signs and symptoms of STROKE:  F-face looks uneven    A-arms unable to move or move unevenly    S-speech slurred or non-existent    T-time-call 911 as soon as signs and symptoms begin-DO NOT go       Back to bed or wait to see if you get better-TIME IS BRAIN.          Patient Signature:     Date:      Discharging Nurse:

## 2022-10-20 NOTE — PROGRESS NOTES
Reviewed POC for removal of chemo pump. Verbalizes understanding. <--- Click to Launch ICD[employer] for PreOp, PostOp and Procedure
